# Patient Record
Sex: FEMALE | Race: WHITE | HISPANIC OR LATINO | Employment: FULL TIME | URBAN - NONMETROPOLITAN AREA
[De-identification: names, ages, dates, MRNs, and addresses within clinical notes are randomized per-mention and may not be internally consistent; named-entity substitution may affect disease eponyms.]

---

## 2017-01-23 RX ORDER — GLYBURIDE 5 MG/1
TABLET ORAL
Qty: 180 TAB | Refills: 3 | Status: ON HOLD | OUTPATIENT
Start: 2017-01-23 | End: 2018-10-20 | Stop reason: CLARIF

## 2017-02-18 DIAGNOSIS — I10 ESSENTIAL HYPERTENSION: ICD-10-CM

## 2017-02-21 RX ORDER — LISINOPRIL 20 MG/1
TABLET ORAL
Qty: 60 TAB | Refills: 2 | Status: ON HOLD | OUTPATIENT
Start: 2017-02-21 | End: 2018-10-20

## 2017-03-04 ENCOUNTER — OFFICE VISIT (OUTPATIENT)
Dept: URGENT CARE | Facility: PHYSICIAN GROUP | Age: 52
End: 2017-03-04
Payer: MEDICAID

## 2017-03-04 VITALS
TEMPERATURE: 97.1 F | WEIGHT: 180 LBS | SYSTOLIC BLOOD PRESSURE: 128 MMHG | RESPIRATION RATE: 16 BRPM | OXYGEN SATURATION: 97 % | DIASTOLIC BLOOD PRESSURE: 86 MMHG | HEART RATE: 96 BPM | BODY MASS INDEX: 34.03 KG/M2

## 2017-03-04 DIAGNOSIS — J06.9 VIRAL URI: ICD-10-CM

## 2017-03-04 PROCEDURE — 99214 OFFICE O/P EST MOD 30 MIN: CPT | Performed by: FAMILY MEDICINE

## 2017-03-04 RX ORDER — FLUTICASONE PROPIONATE 50 MCG
1 SPRAY, SUSPENSION (ML) NASAL DAILY
Qty: 1 BOTTLE | Refills: 0 | Status: ON HOLD | OUTPATIENT
Start: 2017-03-04 | End: 2018-10-20

## 2017-03-04 RX ORDER — BENZONATATE 100 MG/1
100 CAPSULE ORAL 3 TIMES DAILY PRN
Qty: 60 CAP | Refills: 0 | Status: ON HOLD | OUTPATIENT
Start: 2017-03-04 | End: 2018-10-20

## 2017-03-04 NOTE — MR AVS SNAPSHOT
Sonya Cervantes Eriberto   3/4/2017 12:35 PM   Office Visit   MRN: [id number]    Department:  [facility]   Dept Phone:  [phone]    Description:  Female : 1965   Provider:  Cezar Goodson M.D.           Reason for Visit     Cough           Allergies as of 3/4/2017     Allergen Noted Reactions    Morphine 2012   Itching    Rxn = unknown   Bumps all over body    Metformin 2012       headaches      You were diagnosed with     Viral URI   [386302]         Vital Signs     Blood Pressure Pulse Temperature Respirations Weight Oxygen Saturation    128/86 mmHg 96 36.2 °C (97.1 °F) 16 81.647 kg (180 lb) 97%    Smoking Status                   Never Smoker            Basic Information     Date Of Birth Sex Race Ethnicity Preferred Language    1965 Female  or , White  Origin (Japanese,Hungarian,Azerbaijani,Sohan, etc) English      Problem List              ICD-10-CM Priority Class Noted - Resolved    Diabetes mellitus type II, uncontrolled (CMS-HCC) E11.65   2012 - Present    Hyperlipidemia E78.5   2012 - Present    Vitamin D deficiency E55.9   2012 - Present    Microalbuminuria R80.9   2012 - Present    Left arm pain M79.602   10/23/2013 - Present    Hyponatremia E87.1   10/23/2013 - Present    Vaginal itching L29.8   2014 - Present    Healthcare maintenance Z00.00   2014 - Present    Chest pain R07.9   2015 - Present    Chest pain with high risk of acute coronary syndrome R07.9   2015 - Present    Hyperglycemia R73.9   2015 - Present    Hypertension I10   10/1/2015 - Present    Arthritis M19.90   2016 - Present    Incisional hernia K43.2   10/14/2016 - Present      Health Maintenance        Date Due Completion Dates    IMM HEP B VACCINE (1 of 3 - Primary Series) 1965 ---    IMM DTaP/Tdap/Td Vaccine (1 - Tdap) 1983 ---    RETINAL SCREENING 2015 (Prv Comp), 3/11/2013 (Prv Comp)    Override on  6/27/2014: Previously completed    Override on 3/11/2013: Previously completed    URINE ACR / MICROALBUMIN 8/27/2015 8/27/2014, 9/11/2013, 8/27/2012    DIABETES MONOFILAMENT / LE EXAM 9/27/2015 9/27/2014, 9/25/2014 (Done), 9/11/2013 (Done)    Override on 9/25/2014: Done    Override on 9/11/2013: Done    A1C SCREENING 6/30/2016 12/30/2015, 9/30/2015, 2/7/2015, 12/17/2014, 8/27/2014, 9/11/2013, 8/27/2012    IMM INFLUENZA (1) 9/1/2016 11/1/2014    FASTING LIPID PROFILE 9/30/2016 9/30/2015, 2/7/2015, 12/17/2014, 8/27/2014, 9/11/2013, 8/27/2012    MAMMOGRAM 12/11/2016 12/11/2015, 10/8/2014, 9/7/2012    COLON CANCER SCREENING ANNUAL FIT 1/27/2017 1/27/2016    SERUM CREATININE 10/5/2017 10/5/2016, 2/12/2016, 12/30/2015, 9/30/2015, 2/7/2015, 8/27/2014, 9/11/2013, 12/7/2012, 8/27/2012            Current Immunizations     Influenza TIV (IM) 11/1/2014    Pneumococcal polysaccharide vaccine (PPSV-23) 9/11/2013  8:30 AM      Below and/or attached are the medications your provider expects you to take. Review all of your home medications and newly ordered medications with your provider and/or pharmacist. Follow medication instructions as directed by your provider and/or pharmacist. Please keep your medication list with you and share with your provider. Update the information when medications are discontinued, doses are changed, or new medications (including over-the-counter products) are added; and carry medication information at all times in the event of emergency situations     Allergies:  MORPHINE - Itching     METFORMIN - (reactions not documented)               Medications  Valid as of: March 04, 2017 -  1:37 PM    Generic Name Brand Name Tablet Size Instructions for use    Aspirin (Tablet Delayed Response) ECOTRIN 81 MG Take 81 mg by mouth every day.        Atorvastatin Calcium (Tab) LIPITOR 10 MG Take 1 Tab by mouth every day.        Benzonatate (Cap) TESSALON 100 MG Take 1 Cap by mouth 3 times a day as needed for Cough.         Cholecalciferol (Cap) vitamin D3 5000 UNITS Take 1 Cap by mouth every day.        Fluticasone Propionate (Suspension) FLONASE 50 MCG/ACT Spray 1 Spray in nose every day.        GlyBURIDE (Tab) DIABETA 5 MG Take 1 Tab by mouth 2 times a day, with meals. For diabetes        GlyBURIDE (Tab) DIABETA 5 MG TAKE ONE TABLET BY MOUTH TWICE DAILY WITH MEALS FOR DIABETES        Lisinopril (Tab) PRINIVIL 10 MG Take 10 mg by mouth every day.        Lisinopril (Tab) PRINIVIL 20 MG TAKE ONE TABLET BY MOUTH ONCE DAILY        Meloxicam (Tab) MOBIC 7.5 MG Take 1 Tab by mouth every day.        Multiple Vitamin (Tab) THERAGRAN  Take 1 Tab by mouth every day.        Oxycodone-Acetaminophen (Tab) PERCOCET 5-325 MG Take 1-2 Tabs by mouth every four hours as needed.        .                 Medicines prescribed today were sent to:     [facility] [address] [place] [address]    [address] [place] [address]    Phone: [phone] Fax: [phone]    Open 24 Hours?: No    [facility] [address] [place] [address]    Phone: [phone] Fax: [phone]    Open 24 Hours?: No      Medication refill instructions:       If your prescription bottle indicates you have medication refills left, it is not necessary to call your provider’s office. Please contact your pharmacy and they will refill your medication.    If your prescription bottle indicates you do not have any refills left, you may request refills at any time through one of the following ways: The online [employer] system (except Urgent Care), by calling your provider’s office, or by asking your pharmacy to contact your provider’s office with a refill request. Medication refills are processed only during regular business hours and may not be available until the next business day. Your provider may request additional information or to have a follow-up visit with you prior to  refilling your medication.   *Please Note: Medication refills are assigned a new Rx number when refilled electronically. Your pharmacy may indicate that no refills were authorized even though a new prescription for the same medication is available at the pharmacy. Please request the medicine by name with the pharmacy before contacting your provider for a refill.           [employer] Access Code: [email or web address]  Expires: 3/28/2017 10:26 AM    [employer]  A secure, online tool to manage your health information     [employer]’s [employer]® is a secure, online tool that connects you to your personalized health information from the privacy of your home -- day or night - making it very easy for you to manage your healthcare. Once the activation process is completed, you can even access your medical information using the [employer] annabella, which is available for free in the Apple Annabella store or Google Play store.     [employer] provides the following levels of access (as shown below):   My Chart Features   [facility] Primary [facility] Doctor [facility]  Specialists [facility]  Urgent  Care Non-[facility]  Primary Care  Doctor   Email your healthcare team securely and privately 24/7 X X X    Manage appointments: schedule your next appointment; view details of past/upcoming appointments X      Request prescription refills. X      View recent personal medical records, including lab and immunizations X X X X   View health record, including health history, allergies, medications X X X X   Read reports about your outpatient visits, procedures, consult and ER notes X X X X   See your discharge summary, which is a recap of your hospital and/or ER visit that includes your diagnosis, lab results, and care plan. X X       How to register for [employer]:  1. Go to  [email or web address].  2. Click on the Sign Up Now box, which takes you to the New Member Sign Up page. You will need to provide the following information:  a. Enter your [employer] Access Code exactly as it  appears at the top of this page. (You will not need to use this code after you’ve completed the sign-up process. If you do not sign up before the expiration date, you must request a new code.)   b. Enter your date of birth.   c. Enter your home email address.   d. Click Submit, and follow the next screen’s instructions.  3. Create a [employer] ID. This will be your [employer] login ID and cannot be changed, so think of one that is secure and easy to remember.  4. Create a [employer] password. You can change your password at any time.  5. Enter your Password Reset Question and Answer. This can be used at a later time if you forget your password.   6. Enter your e-mail address. This allows you to receive e-mail notifications when new information is available in [employer].  7. Click Sign Up. You can now view your health information.    For assistance activating your [employer] account, call [phone]

## 2017-03-04 NOTE — PROGRESS NOTES
Chief Complaint   Patient presents with   • Cough         Cough  This is a new problem. The current episode started 2 days ago. The problem has been unchanged. The problem occurs constantly. The cough is dry. Associated symptoms include : fatigue, muscle aches, fever. Pertinent negatives include no   headaches, nausea, vomiting, diarrhea, sweats, weight loss or wheezing. Nothing aggravates the symptoms.  Patient has tried nothing for the symptoms. There is no history of asthma.        Past Medical History   Diagnosis Date   • Hyperlipidemia    • GERD (gastroesophageal reflux disease)    • Healthcare maintenance 9/27/2014     Mammogram: Due   • Hypertension    • Anemia    • Bowel habit changes      constipation   • Psychiatric problem      depression and anxiety   • Vaginal itching 8/27/2014     With anal itching X 1 month Getting worse Burning Min vag disch   • Jaundice 1999   • Diabetes    • Diabetes (CMS-[facility])    • High cholesterol    • Infectious disease      Cold 10/2016         Social History   Substance Use Topics   • Smoking status: Never Smoker    • Smokeless tobacco: None   • Alcohol Use: No         Current Outpatient Prescriptions on File Prior to Visit   Medication Sig Dispense Refill   • lisinopril (PRINIVIL) 20 MG Tab TAKE ONE TABLET BY MOUTH ONCE DAILY 60 Tab 2   • glyBURIDE (DIABETA) 5 MG Tab TAKE ONE TABLET BY MOUTH TWICE DAILY WITH MEALS FOR DIABETES 180 Tab 3   • oxycodone-acetaminophen (PERCOCET) 5-325 MG Tab Take 1-2 Tabs by mouth every four hours as needed. 35 Tab 0   • Cholecalciferol (VITAMIN D3) 5000 UNITS Cap Take 1 Cap by mouth every day.     • meloxicam (MOBIC) 7.5 MG Tab Take 1 Tab by mouth every day. 30 Tab 0   • glyBURIDE (DIABETA) 5 MG Tab Take 1 Tab by mouth 2 times a day, with meals. For diabetes 60 Tab 0   • atorvastatin (LIPITOR) 10 MG Tab Take 1 Tab by mouth every day. 60 Tab 3   • lisinopril (PRINIVIL) 10 MG Tab Take 10 mg by mouth every day.     • aspirin EC (ECOTRIN) 81 MG  Tablet Delayed Response Take 81 mg by mouth every day.     • multivitamin (THERAGRAN) Tab Take 1 Tab by mouth every day.       No current facility-administered medications on file prior to visit.                    Review of Systems   Constitutional: Negative for fever and weight loss.   HENT: negative for otalgia  Cardiovascular - denies chest pain or dyspnea  Respiratory: Positive for cough.  .  Negative for wheezing.    Neurological: Negative for headaches.   GI - denies nausea, vomiting or diarrhea  Neuro - denies numbness or tingling.            Objective:     Blood pressure 128/86, pulse 96, temperature 36.2 °C (97.1 °F), resp. rate 16, weight 81.647 kg (180 lb), SpO2 97 %.    Physical Exam   Constitutional: patient is oriented to person, place, and time. Patient appears well-developed and well-nourished. No distress.   HENT:   Head: Normocephalic and atraumatic.   Right Ear: External ear normal.   Left Ear: External ear normal.   Nose: Mucosal edema  present. Right sinus exhibits no maxillary sinus tenderness. Left sinus exhibits no maxillary sinus tenderness.   Mouth/Throat: Mucous membranes are normal. No oral lesions.  No posterior pharyngeal erythema.  No oropharyngeal exudate or posterior oropharyngeal edema.   Eyes: Conjunctivae and EOM are normal. Pupils are equal, round, and reactive to light. Right eye exhibits no discharge. Left eye exhibits no discharge. No scleral icterus.   Neck: Normal range of motion. Neck supple. No tracheal deviation present.   Cardiovascular: Normal rate, regular rhythm and normal heart sounds.  Exam reveals no friction rub.    Pulmonary/Chest: Effort normal. No respiratory distress. Patient has no wheezes or rhonchi. Patient has no rales.    Musculoskeletal:  exhibits no edema.   Lymphadenopathy:     Patient has no cervical adenopathy.      Neurological: patient is alert and oriented to person, place, and time.   Skin: Skin is warm and dry. No rash noted. No erythema.    Psychiatric: patient  has a normal mood and affect.  behavior is normal.   Nursing note and vitals reviewed.              Assessment/Plan:       1. Viral URI     - fluticasone (FLONASE) 50 MCG/ACT nasal spray; Spray 1 Spray in nose every day.  Dispense: 1 Bottle; Refill: 0  - benzonatate (TESSALON) 100 MG Cap; Take 1 Cap by mouth 3 times a day as needed for Cough.  Dispense: 60 Cap; Refill: 0     Follow up in one week if no improvement, sooner if symptoms worsen.

## 2017-04-11 ENCOUNTER — OFFICE VISIT (OUTPATIENT)
Dept: URGENT CARE | Facility: PHYSICIAN GROUP | Age: 52
End: 2017-04-11
Payer: MEDICAID

## 2017-04-11 VITALS
SYSTOLIC BLOOD PRESSURE: 132 MMHG | RESPIRATION RATE: 16 BRPM | DIASTOLIC BLOOD PRESSURE: 90 MMHG | BODY MASS INDEX: 37.24 KG/M2 | TEMPERATURE: 98.4 F | OXYGEN SATURATION: 98 % | WEIGHT: 197 LBS | HEART RATE: 94 BPM

## 2017-04-11 DIAGNOSIS — R05.9 COUGH: ICD-10-CM

## 2017-04-11 DIAGNOSIS — J98.8 WHEEZING-ASSOCIATED RESPIRATORY INFECTION (WARI): ICD-10-CM

## 2017-04-11 PROCEDURE — 99214 OFFICE O/P EST MOD 30 MIN: CPT | Performed by: PHYSICIAN ASSISTANT

## 2017-04-11 RX ORDER — BENZONATATE 200 MG/1
200 CAPSULE ORAL 3 TIMES DAILY PRN
Qty: 60 CAP | Refills: 0 | Status: ON HOLD | OUTPATIENT
Start: 2017-04-11 | End: 2018-10-20

## 2017-04-11 RX ORDER — ALBUTEROL SULFATE 90 UG/1
2 AEROSOL, METERED RESPIRATORY (INHALATION) EVERY 4 HOURS PRN
Qty: 1 INHALER | Refills: 0 | Status: ON HOLD | OUTPATIENT
Start: 2017-04-11 | End: 2018-10-20

## 2017-04-11 NOTE — MR AVS SNAPSHOT
Sonya Wei   2017 6:15 PM   Office Visit   MRN: [id number]    Department:  [facility]   Dept Phone:  [phone]    Description:  Female : 1965   Provider:  Patrick Kennedy PA-C           Reason for Visit     Cough x3 days      Allergies as of 2017     Allergen Noted Reactions    Morphine 2012   Itching    Rxn = unknown   Bumps all over body    Metformin 2012       headaches      You were diagnosed with     Wheezing-associated respiratory infection (WARI)   [086288]       Cough   [786.2.ICD-9-CM]         Vital Signs     Blood Pressure Pulse Temperature Respirations Weight Oxygen Saturation    132/90 mmHg 94 36.9 °C (98.4 °F) 16 89.359 kg (197 lb) 98%    Smoking Status                   Never Smoker            Basic Information     Date Of Birth Sex Race Ethnicity Preferred Language    1965 Female  or , White  Origin (Yi,Zambian,[place] Virgin Islander,Macanese, etc) English      Problem List              ICD-10-CM Priority Class Noted - Resolved    Diabetes mellitus type II, uncontrolled (CMS-HCC) E11.65   2012 - Present    Hyperlipidemia E78.5   2012 - Present    Vitamin D deficiency E55.9   2012 - Present    Microalbuminuria R80.9   2012 - Present    Left arm pain M79.602   10/23/2013 - Present    Hyponatremia E87.1   10/23/2013 - Present    Vaginal itching L29.8   2014 - Present    Healthcare maintenance Z00.00   2014 - Present    Chest pain R07.9   2015 - Present    Chest pain with high risk of acute coronary syndrome R07.9   2015 - Present    Hyperglycemia R73.9   2015 - Present    Hypertension I10   10/1/2015 - Present    Arthritis M19.90   2016 - Present    Incisional hernia K43.2   10/14/2016 - Present      Health Maintenance        Date Due Completion Dates    IMM HEP B VACCINE (1 of 3 - Primary Series) 1965 ---    IMM DTaP/Tdap/Td Vaccine (1 - Tdap) 1983 ---    RETINAL  SCREENING 6/27/2015 6/27/2014 (Prv Comp), 3/11/2013 (Prv Comp)    Override on 6/27/2014: Previously completed    Override on 3/11/2013: Previously completed    URINE ACR / MICROALBUMIN 8/27/2015 8/27/2014, 9/11/2013, 8/27/2012    DIABETES MONOFILAMENT / LE EXAM 9/27/2015 9/27/2014, 9/25/2014 (Done), 9/11/2013 (Done)    Override on 9/25/2014: Done    Override on 9/11/2013: Done    A1C SCREENING 6/30/2016 12/30/2015, 9/30/2015, 2/7/2015, 12/17/2014, 8/27/2014, 9/11/2013, 8/27/2012    FASTING LIPID PROFILE 9/30/2016 9/30/2015, 2/7/2015, 12/17/2014, 8/27/2014, 9/11/2013, 8/27/2012    MAMMOGRAM 12/11/2016 12/11/2015, 10/8/2014, 9/7/2012    COLON CANCER SCREENING ANNUAL FIT 1/27/2017 1/27/2016    SERUM CREATININE 10/5/2017 10/5/2016, 2/12/2016, 12/30/2015, 9/30/2015, 2/7/2015, 8/27/2014, 9/11/2013, 12/7/2012, 8/27/2012            Current Immunizations     Influenza TIV (IM) 11/1/2014    Pneumococcal polysaccharide vaccine (PPSV-23) 9/11/2013  8:30 AM      Below and/or attached are the medications your provider expects you to take. Review all of your home medications and newly ordered medications with your provider and/or pharmacist. Follow medication instructions as directed by your provider and/or pharmacist. Please keep your medication list with you and share with your provider. Update the information when medications are discontinued, doses are changed, or new medications (including over-the-counter products) are added; and carry medication information at all times in the event of emergency situations     Allergies:  MORPHINE - Itching     METFORMIN - (reactions not documented)               Medications  Valid as of: April 11, 2017 -  6:45 PM    Generic Name Brand Name Tablet Size Instructions for use    Albuterol Sulfate (Aero Soln) albuterol 108 (90 BASE) MCG/ACT Inhale 2 Puffs by mouth every four hours as needed.        Aspirin (Tablet Delayed Response) ECOTRIN 81 MG Take 81 mg by mouth every day.         Atorvastatin Calcium (Tab) LIPITOR 10 MG Take 1 Tab by mouth every day.        Benzonatate (Cap) TESSALON 100 MG Take 1 Cap by mouth 3 times a day as needed for Cough.        Benzonatate (Cap) TESSALON 200 MG Take 1 Cap by mouth 3 times a day as needed for Cough.        Cholecalciferol (Cap) vitamin D3 5000 UNITS Take 1 Cap by mouth every day.        Fluticasone Propionate (Suspension) FLONASE 50 MCG/ACT Spray 1 Spray in nose every day.        GlyBURIDE (Tab) DIABETA 5 MG Take 1 Tab by mouth 2 times a day, with meals. For diabetes        GlyBURIDE (Tab) DIABETA 5 MG TAKE ONE TABLET BY MOUTH TWICE DAILY WITH MEALS FOR DIABETES        Lisinopril (Tab) PRINIVIL 10 MG Take 10 mg by mouth every day.        Lisinopril (Tab) PRINIVIL 20 MG TAKE ONE TABLET BY MOUTH ONCE DAILY        Meloxicam (Tab) MOBIC 7.5 MG Take 1 Tab by mouth every day.        Multiple Vitamin (Tab) THERAGRAN  Take 1 Tab by mouth every day.        Oxycodone-Acetaminophen (Tab) PERCOCET 5-325 MG Take 1-2 Tabs by mouth every four hours as needed.        .                 Medicines prescribed today were sent to:     [facility] [address] [place] [address]    Phone: [phone] Fax: [phone]    Open 24 Hours?: No    [facility] [address] [place] [address]    Phone: [phone] Fax: [phone]    Open 24 Hours?: No      Medication refill instructions:       If your prescription bottle indicates you have medication refills left, it is not necessary to call your provider’s office. Please contact your pharmacy and they will refill your medication.    If your prescription bottle indicates you do not have any refills left, you may request refills at any time through one of the following ways: The online [employer] system (except Urgent Care), by calling your provider’s office, or by asking your pharmacy to contact  your provider’s office with a refill request. Medication refills are processed only during regular business hours and may not be available until the next business day. Your provider may request additional information or to have a follow-up visit with you prior to refilling your medication.   *Please Note: Medication refills are assigned a new Rx number when refilled electronically. Your pharmacy may indicate that no refills were authorized even though a new prescription for the same medication is available at the pharmacy. Please request the medicine by name with the pharmacy before contacting your provider for a refill.           [employer] Access Code: [email or web address]  Expires: 4/26/2017 12:06 PM    [employer]  A secure, online tool to manage your health information     [employer]’s [employer]® is a secure, online tool that connects you to your personalized health information from the privacy of your home -- day or night - making it very easy for you to manage your healthcare. Once the activation process is completed, you can even access your medical information using the [employer] annabella, which is available for free in the Apple Annabella store or Google Play store.     [employer] provides the following levels of access (as shown below):   My Chart Features   [facility] Primary Care Doctor [facility]  Specialists [facility]  Urgent  Care Non-[facility]  Primary Care  Doctor   Email your healthcare team securely and privately 24/7 X X X    Manage appointments: schedule your next appointment; view details of past/upcoming appointments X      Request prescription refills. X      View recent personal medical records, including lab and immunizations X X X X   View health record, including health history, allergies, medications X X X X   Read reports about your outpatient visits, procedures, consult and ER notes X X X X   See your discharge summary, which is a recap of your hospital and/or ER visit that includes your diagnosis, lab results, and care  plan. X X       How to register for [employer]:  1. Go to  [email or web address].  2. Click on the Sign Up Now box, which takes you to the New Member Sign Up page. You will need to provide the following information:  a. Enter your [employer] Access Code exactly as it appears at the top of this page. (You will not need to use this code after you’ve completed the sign-up process. If you do not sign up before the expiration date, you must request a new code.)   b. Enter your date of birth.   c. Enter your home email address.   d. Click Submit, and follow the next screen’s instructions.  3. Create a [employer] ID. This will be your [employer] login ID and cannot be changed, so think of one that is secure and easy to remember.  4. Create a [employer] password. You can change your password at any time.  5. Enter your Password Reset Question and Answer. This can be used at a later time if you forget your password.   6. Enter your e-mail address. This allows you to receive e-mail notifications when new information is available in [employer].  7. Click Sign Up. You can now view your health information.    For assistance activating your [employer] account, call [phone]

## 2017-04-12 NOTE — PROGRESS NOTES
Chief Complaint   Patient presents with   • Cough     x3 days       HISTORY OF PRESENT ILLNESS: Patient is a 51 y.o. female who presents today because she has a 4 day history of nasal congestion and a cough. Other family members have had similar symptoms over the last week. She has not been taking any over-the-counter medications, but had some Tessalon pearls that she took and those do seem to help quite a bit. She has noticed some wheezing, coughing, no nausea, vomiting or diarrhea.    Patient Active Problem List    Diagnosis Date Noted   • Incisional hernia 10/14/2016   • Arthritis 05/06/2016   • Hypertension 10/01/2015   • Chest pain with high risk of acute coronary syndrome 09/30/2015   • Hyperglycemia 09/30/2015   • Chest pain 02/07/2015   • Healthcare maintenance 09/27/2014   • Vaginal itching 08/27/2014   • Left arm pain 10/23/2013   • Hyponatremia 10/23/2013   • Vitamin D deficiency 09/24/2012   • Microalbuminuria 09/24/2012   • Diabetes mellitus type II, uncontrolled (CMS-[facility]) 08/27/2012   • Hyperlipidemia 08/27/2012       Allergies:Morphine and Metformin    Current Outpatient Prescriptions Ordered in [facility]   Medication Sig Dispense Refill   • benzonatate (TESSALON) 200 MG capsule Take 1 Cap by mouth 3 times a day as needed for Cough. 60 Cap 0   • albuterol 108 (90 BASE) MCG/ACT Aero Soln inhalation aerosol Inhale 2 Puffs by mouth every four hours as needed. 1 Inhaler 0   • fluticasone (FLONASE) 50 MCG/ACT nasal spray Spray 1 Spray in nose every day. 1 Bottle 0   • benzonatate (TESSALON) 100 MG Cap Take 1 Cap by mouth 3 times a day as needed for Cough. 60 Cap 0   • lisinopril (PRINIVIL) 20 MG Tab TAKE ONE TABLET BY MOUTH ONCE DAILY 60 Tab 2   • glyBURIDE (DIABETA) 5 MG Tab TAKE ONE TABLET BY MOUTH TWICE DAILY WITH MEALS FOR DIABETES 180 Tab 3   • oxycodone-acetaminophen (PERCOCET) 5-325 MG Tab Take 1-2 Tabs by mouth every four hours as needed. 35 Tab 0   • Cholecalciferol (VITAMIN D3) 5000 UNITS Cap Take 1  Cap by mouth every day.     • meloxicam (MOBIC) 7.5 MG Tab Take 1 Tab by mouth every day. 30 Tab 0   • glyBURIDE (DIABETA) 5 MG Tab Take 1 Tab by mouth 2 times a day, with meals. For diabetes 60 Tab 0   • atorvastatin (LIPITOR) 10 MG Tab Take 1 Tab by mouth every day. 60 Tab 3   • lisinopril (PRINIVIL) 10 MG Tab Take 10 mg by mouth every day.     • aspirin EC (ECOTRIN) 81 MG Tablet Delayed Response Take 81 mg by mouth every day.     • multivitamin (THERAGRAN) Tab Take 1 Tab by mouth every day.       No current [facility]-ordered facility-administered medications on file.       Past Medical History   Diagnosis Date   • Hyperlipidemia    • GERD (gastroesophageal reflux disease)    • Healthcare maintenance 2014     Mammogram: Due   • Hypertension    • Anemia    • Bowel habit changes      constipation   • Psychiatric problem      depression and anxiety   • Vaginal itching 2014     With anal itching X 1 month Getting worse Burning Min vag disch   • Jaundice    • Diabetes    • Diabetes (CMS-[facility])    • High cholesterol    • Infectious disease      Cold 10/2016       Social History   Substance Use Topics   • Smoking status: Never Smoker    • Smokeless tobacco: None   • Alcohol Use: No       Family Status   Relation Status Death Age   • Mother Alive    • Father       Family History   Problem Relation Age of Onset   • Diabetes Mother    • Hyperlipidemia Mother    • Diabetes Father    • Hypertension Father    • Hyperlipidemia Father        ROS:  Review of Systems   Constitutional: Negative for fever, chills, weight loss and malaise/fatigue.   HENT: Negative for ear pain, nosebleeds, positive for nasal congestion, no sore throat and neck pain.    Eyes: Negative for blurred vision.   Respiratory: Positive for cough, minimal if any sputum production, occasional cough related shortness of breath and has noticed wheezing.    Cardiovascular: Negative for chest pain, palpitations, orthopnea and leg swelling.    Gastrointestinal: Negative for heartburn, nausea, vomiting and abdominal pain.   Genitourinary: Negative for dysuria, urgency and frequency.     Exam:  Blood pressure 132/90, pulse 94, temperature 36.9 °C (98.4 °F), resp. rate 16, weight 89.359 kg (197 lb), SpO2 98 %.  General:  Well nourished, well developed female in NAD, frequent dry cough   Head:Normocephalic, atraumatic  Eyes: PERRLA, EOM within normal limits, no conjunctival injection, no scleral icterus, visual fields and acuity grossly intact.  Ears: Normal shape and symmetry, no tenderness, no discharge. External canals are without any significant edema or erythema. Tympanic membranes are without any inflammation, no effusion. Gross auditory acuity is intact  Nose: Symmetrical without tenderness, no discharge.  Mouth: reasonable hygiene, no erythema exudates or tonsillar enlargement.  Neck: no masses, range of motion within normal limits, no tracheal deviation. No obvious thyroid enlargement.  Pulmonary: chest is symmetrical with respiration, rare wheeze, no rales or rhonchi  Cardiovascular: regular rate and rhythm without murmurs, rubs, or gallops.  Extremities: no clubbing, cyanosis, or edema.    Please note that this dictation was created using voice recognition software. I have made every reasonable attempt to correct obvious errors, but I expect that there are errors of grammar and possibly content that I did not discover before finalizing the note.    Assessment/Plan:  1. Wheezing-associated respiratory infection (WARI)  albuterol 108 (90 BASE) MCG/ACT Aero Soln inhalation aerosol   2. Cough  benzonatate (TESSALON) 200 MG capsule       Followup with primary care in the next 7-10 days if not significantly improving, return to the urgent care or go to the emergency room sooner for any worsening of symptoms.

## 2018-02-19 ENCOUNTER — OCCUPATIONAL MEDICINE (OUTPATIENT)
Dept: URGENT CARE | Facility: CLINIC | Age: 53
End: 2018-02-19
Payer: COMMERCIAL

## 2018-02-19 ENCOUNTER — APPOINTMENT (OUTPATIENT)
Dept: RADIOLOGY | Facility: IMAGING CENTER | Age: 53
End: 2018-02-19
Attending: EMERGENCY MEDICINE
Payer: COMMERCIAL

## 2018-02-19 VITALS
WEIGHT: 170 LBS | DIASTOLIC BLOOD PRESSURE: 80 MMHG | HEIGHT: 61 IN | SYSTOLIC BLOOD PRESSURE: 136 MMHG | OXYGEN SATURATION: 98 % | TEMPERATURE: 98.3 F | RESPIRATION RATE: 14 BRPM | HEART RATE: 82 BPM | BODY MASS INDEX: 32.1 KG/M2

## 2018-02-19 DIAGNOSIS — M54.2 NECK PAIN: ICD-10-CM

## 2018-02-19 DIAGNOSIS — M25.511 ACUTE PAIN OF RIGHT SHOULDER: ICD-10-CM

## 2018-02-19 DIAGNOSIS — S16.1XXA STRAIN OF NECK MUSCLE, INITIAL ENCOUNTER: ICD-10-CM

## 2018-02-19 DIAGNOSIS — S00.03XA CONTUSION OF SCALP, INITIAL ENCOUNTER: ICD-10-CM

## 2018-02-19 DIAGNOSIS — M54.9 UPPER BACK PAIN: ICD-10-CM

## 2018-02-19 DIAGNOSIS — S20.229A CONTUSION OF UPPER BACK, UNSPECIFIED LATERALITY, INITIAL ENCOUNTER: ICD-10-CM

## 2018-02-19 DIAGNOSIS — Z79.82 ASPIRIN LONG-TERM USE: ICD-10-CM

## 2018-02-19 DIAGNOSIS — S09.90XA INJURY OF HEAD, INITIAL ENCOUNTER: ICD-10-CM

## 2018-02-19 DIAGNOSIS — S46.911A STRAIN OF RIGHT SHOULDER, INITIAL ENCOUNTER: ICD-10-CM

## 2018-02-19 PROCEDURE — 73030 X-RAY EXAM OF SHOULDER: CPT | Mod: TC,RT | Performed by: EMERGENCY MEDICINE

## 2018-02-19 PROCEDURE — 72070 X-RAY EXAM THORAC SPINE 2VWS: CPT | Mod: 26 | Performed by: EMERGENCY MEDICINE

## 2018-02-19 PROCEDURE — 72040 X-RAY EXAM NECK SPINE 2-3 VW: CPT | Mod: TC | Performed by: EMERGENCY MEDICINE

## 2018-02-19 PROCEDURE — 99203 OFFICE O/P NEW LOW 30 MIN: CPT | Mod: 25 | Performed by: EMERGENCY MEDICINE

## 2018-02-19 RX ORDER — ACETAMINOPHEN 500 MG
1000 TABLET ORAL ONCE
Status: COMPLETED | OUTPATIENT
Start: 2018-02-19 | End: 2018-02-19

## 2018-02-19 RX ADMIN — Medication 1000 MG: at 11:27

## 2018-02-19 ASSESSMENT — ENCOUNTER SYMPTOMS
DOUBLE VISION: 0
HEADACHES: 0
FOCAL WEAKNESS: 0
DIZZINESS: 0
LOSS OF CONSCIOUSNESS: 0
SHORTNESS OF BREATH: 0
ABDOMINAL PAIN: 0

## 2018-02-19 NOTE — PROGRESS NOTES
Subjective:      Sonya Wei is a 52 y.o. female who presents with Fall (pt fell today . hit head and neck . almost blacked out after this .)      Date of injury: 02/19/2018. Injured at work: yes; states slipped while walking in parking lot. States landed on back of head, pain in neck, upper back, right shoulder as well. Denies additional injury, no loss of consciousness. Previous injury: none. Second job: none. Outside activity: none. Contributing medical illness: Low-dose aspirin therapy. Severity: moderate. Prior treatment: none. Location: Right occipital scalp, upper neck, upper back, right diffuse shoulder. Radiation: none. Numbness/tingling: none. Focal weakness: none. Bowel/bladder dysfunction: none. No change in vision, nausea, vomiting.     PMH:  has a past medical history of Anemia; Bowel habit changes; Diabetes; Diabetes (CMS-[facility]); GERD (gastroesophageal reflux disease); Healthcare maintenance (9/27/2014); High cholesterol; Hyperlipidemia; Hypertension; Infectious disease; Jaundice (1999); Psychiatric problem; and Vaginal itching (8/27/2014).  MEDS: Current Outpatient Prescriptions: •  OMEPRAZOLE PO, Take  by mouth., Disp: , Rfl: •  fluticasone (FLONASE) 50 MCG/ACT nasal spray, Spray 1 Spray in nose every day., Disp: 1 Bottle, Rfl: 0•  lisinopril (PRINIVIL) 20 MG Tab, TAKE ONE TABLET BY MOUTH ONCE DAILY, Disp: 60 Tab, Rfl: 2•  glyBURIDE (DIABETA) 5 MG Tab, Take 1 Tab by mouth 2 times a day, with meals. For diabetes, Disp: 60 Tab, Rfl: 0•  atorvastatin (LIPITOR) 10 MG Tab, Take 1 Tab by mouth every day., Disp: 60 Tab, Rfl: 3•  aspirin EC (ECOTRIN) 81 MG Tablet Delayed Response, Take 81 mg by mouth every day., Disp: , Rfl: •  multivitamin (THERAGRAN) Tab, Take 1 Tab by mouth every day., Disp: , Rfl: •  benzonatate (TESSALON) 200 MG capsule, Take 1 Cap by mouth 3 times a day as needed for Cough., Disp: 60 Cap, Rfl: 0•  albuterol 108 (90 BASE) MCG/ACT Aero Soln inhalation aerosol, Inhale 2 Puffs by  mouth every four hours as needed., Disp: 1 Inhaler, Rfl: 0•  benzonatate (TESSALON) 100 MG Cap, Take 1 Cap by mouth 3 times a day as needed for Cough., Disp: 60 Cap, Rfl: 0•  glyBURIDE (DIABETA) 5 MG Tab, TAKE ONE TABLET BY MOUTH TWICE DAILY WITH MEALS FOR DIABETES, Disp: 180 Tab, Rfl: 3•  oxycodone-acetaminophen (PERCOCET) 5-325 MG Tab, Take 1-2 Tabs by mouth every four hours as needed., Disp: 35 Tab, Rfl: 0•  Cholecalciferol (VITAMIN D3) 5000 UNITS Cap, Take 1 Cap by mouth every day., Disp: , Rfl: •  meloxicam (MOBIC) 7.5 MG Tab, Take 1 Tab by mouth every day., Disp: 30 Tab, Rfl: 0•  lisinopril (PRINIVIL) 10 MG Tab, Take 10 mg by mouth every day., Disp: , Rfl:    ALLERGIES:  -- Morphine -- Itching    --  Rxn = unknown             Bumps all over body   -- Metformin     --  headaches  SURGHX: Past Surgical History:  10/14/2016: VENTRAL HERNIA REPAIR ROBOTIC XI N/A      Comment: Procedure: VENTRAL HERNIA REPAIR ROBOTIC XI                FOR INCISIONAL W/MESH;  Surgeon: Edi Mendoza M.D.;  Location: SURGERY [facility];  Service:   No date: ABDOMINAL HYSTERECTOMY TOTAL      Comment: still has ovaries  No date: CHOLECYSTECTOMY  No date: PRIMARY C SECTION  No date: TUBAL COAGULATION LAPAROSCOPIC BILATERAL  SOCHX:  reports that she has never smoked. She does not have any smokeless tobacco history on file. She reports that she does not drink alcohol or use drugs.  FH: family history includes Diabetes in her father and mother; Hyperlipidemia in her father and mother; Hypertension in her father.        Fall   Pertinent negatives include no abdominal pain, headaches or loss of consciousness.       Review of Systems   HENT: Negative for nosebleeds and tinnitus.    Eyes: Negative for double vision.   Respiratory: Negative for shortness of breath.    Cardiovascular: Negative for chest pain.   Gastrointestinal: Negative for abdominal pain.   Skin: Negative for rash.   Neurological: Negative  "for dizziness, focal weakness, loss of consciousness and headaches.          Objective:     /80   Pulse 82   Temp 36.8 °C (98.3 °F)   Resp 14   Ht 1.549 m (5' 1\")   Wt 77.1 kg (170 lb)   SpO2 98%   BMI 32.12 kg/m²      Physical Exam    Gen.: Alert, cooperative, mildly uncomfortable. Head: Approximately 3 cm raised tender area right occipital region without bony defect. Eyes: Pupils equal round and reactive to light, extraocular movement intact, conjunctiva clear. Nose: No discharge or epistaxis. Ears: No discharge or hemotympanum. Neck: Tenderness diffusely upper, no crepitation. Back: Tenderness mid thoracic region. Chest: No rib tenderness, lungs clear to auscultation bilaterally, heart regular rate and rhythm. Vascular: Bilateral radial pulses intact. Musculoskeletal: Right shoulder-diffuse anteromedial humeral head tenderness. Intact range of motion with some discomfort. No gross rotator cuff function deficit. Neurological: Alert and oriented ×3, cranial nerves intact, normal motor and sensory function, reflexes symmetric, no tremor, gait normal. Skin: Warm, dry, intact. Minimal erythema at right occipital scalp site.     D39 and C4 forms completed  Assessment/Plan:     1. Contusion of scalp, initial encounter  Cold pack, Tylenol when necessary    2. Strain of neck muscle, initial encounter    3. Contusion of upper back, unspecified laterality, initial encounter    4. Strain of right shoulder, initial encounter    5. Injury of head, initial encounter  - acetaminophen (TYLENOL) tablet 1,000 mg; Take 2 Tabs by mouth Once.    6. Neck pain  - DX-CERVICAL SPINE-2 OR 3 VIEWS; per radiologist:  Negative cervical spine series.    7. Upper back pain  - DX-THORACIC SPINE-2 VIEWS; per radiologist:  Unremarkable thoracic spine.    8. Acute pain of right shoulder  - DX-SHOULDER 2+ RIGHT; per radiologist:  Unremarkable shoulder series.    9. Aspirin long-term use  Plan recheck in 24 hours.    "

## 2018-02-19 NOTE — LETTER
[facility] [address] [place] [address]  Phone:  [phone] - Fax:  [phone]   [facility] Progress Report and Disability Certification  Date of Service: 2/19/2018   No Show:  No  Date / Time of Next Visit: 2/20/2018 @ 8:20 AM    Claim Information   Patient Name: Sonya Wei  Claim Number:     Employer: [employer] [facility] [facility] [employer] ONLY  Date of Injury: 2/19/2018     Insurer / TPA: [employer]  ID / SSN:     Occupation:   Diagnosis: Diagnoses of Contusion of scalp, initial encounter, Strain of neck muscle, initial encounter, Contusion of upper back, unspecified laterality, initial encounter, Strain of right shoulder, initial encounter, Injury of head, initial encounter, Neck pain, Upper back pain, Acute pain of right shoulder, and Aspirin long-term use were pertinent to this visit.    Medical Information   Related to Industrial Injury? Yes    Subjective Complaints:  Date of injury: 02/19/2018. Injured at work: yes; states slipped while walking in parking lot. States landed on back of head, pain in neck, upper back, right shoulder as well. Denies additional injury, no loss of consciousness. Previous injury: none. Second job: none. Outside activity: none. Contributing medical illness: Low-dose aspirin therapy. Severity: moderate. Prior treatment: none. Location: Right occipital scalp, upper neck, upper back, right diffuse shoulder. Radiation: none. Numbness/tingling: none. Focal weakness: none. Bowel/bladder dysfunction: none. No change in vision, nausea, vomiting.   Objective Findings: Gen.: Alert, cooperative, mildly uncomfortable. Head: Approximately 3 cm raised tender area right occipital region without bony defect. Eyes: Pupils equal round and reactive to light, extraocular movement intact, conjunctiva clear. Nose: No discharge or epistaxis. Ears: No discharge or hemotympanum.  Neck: Tenderness diffusely upper, no crepitation. Back: Tenderness mid thoracic region. Chest: No rib tenderness, lungs clear to auscultation bilaterally, heart regular rate and rhythm. Vascular: Bilateral radial pulses intact. Musculoskeletal: Right shoulder-diffuse anteromedial humeral head tenderness. Intact range of motion with some discomfort. No gross rotator cuff function deficit. Neurological: Alert and oriented ×3, cranial nerves intact, normal motor and sensory function, reflexes symmetric, no tremor, gait normal. Skin: Warm, dry, intact. Minimal erythema at right occipital scalp site.   Pre-Existing Condition(s):     Assessment:   Initial Visit    Status: Additional Care Required  Permanent Disability:No    Plan: Medication    Diagnostics: X-ray    Comments:       Disability Information   Status: Released to Restricted Duty    From:  2018  Through: 2018 Restrictions are: Temporary   Physical Restrictions   Sitting:    Standing:    Stooping:    Bending:      Squatting:    Walking:    Climbin hrs/day Pushing:      Pulling:    Other:    Reaching Above Shoulder (L):   Reaching Above Shoulder (R): 0 hrs/day     Reaching Below Shoulder (L):    Reaching Below Shoulder (R):      Not to exceed Weight Limits   Carrying(hrs):   Weight Limit(lb): < or = to 10 pounds Lifting(hrs):   Weight  Limit(lb): < or = to 10 pounds   Comments: Planning recheck tomorrow due to aspirin associated potential head injury complications.    Repetitive Actions   Hands: i.e. Fine Manipulations from Grasping:     Feet: i.e. Operating Foot Controls:     Driving / Operate Machinery:     Physician Name: Job Frey M.D. Physician Signature: JOB Ocampo M.D. e-Signature: Dr. Giles Tyson, Medical Director   Clinic Name / Location: [address] [facility]  [place] [address] Clinic Phone Number: Dept: [phone]   Appointment Time: 10:15 Am Visit Start Time: 10:32 AM   Check-In Time:   10:19 Am Visit Discharge Time:  12:22 PM    Original-Treating Physician or Chiropractor    Page 2-Insurer/TPA    Page 3-Employer    Page 4-Employee

## 2018-02-19 NOTE — LETTER
"EMPLOYEE’S CLAIM FOR COMPENSATION/ REPORT OF INITIAL TREATMENT  FORM C-4    EMPLOYEE’S CLAIM - PROVIDE ALL INFORMATION REQUESTED   First Name  Sonya Last Name  Eriberto Birthdate                    1965                Sex  female Claim Number   Home Address  [address] Age  52 y.o. Height  1.549 m (5' 1\") Weight  77.1 kg (170 lb) N     [facility] Zip  [address] Telephone  [phone] (home) [phone] (work)   Mailing Address  [address] [facility] Zip  [address] Primary Language Spoken  English    Insurer  [employer]   [employer]   Employee's Occupation (Job Title) When Injury or Occupational Disease Occurred      Employer's Name  [employer] ONLY  Telephone  [phone]    Employer Address  [address]  [facility]  Zip  [address]   Date of Injury  2/19/2018               Hour of Injury  7:35 AM Date Employer Notified  2/19/2018 Last Day of Work after Injury or Occupational Disease  2/19/2018 Supervisor to Whom Injury Reported  Jl   Address or Location of Accident (if applicable)  [[employer] (side) parking lot]   What were you doing at the time of accident? (if applicable)  I was walking to meet with my ride.    How did this injury or occupational disease occur? (Be specific an answer in detail. Use additional sheet if necessary)  I was walking and fell I hit (my) the back of my head and my head and neck hurt. I got an- goose egg size bump.   If you believe that you have an occupational disease, when did you first have knowledge of the disability and it relationship to your employment?  n/a Witnesses to the Accident  n/a      Nature of Injury or Occupational Disease  Contusion  Part(s) of Body Injured or Affected  Skull, Upper Back Area (Thoracic Area), N/A    I " certify that the above is true and correct to the best of my knowledge and that I have provided this information in order to obtain the benefits of [place]  I hereby authorize any physician, chiropractor, surgeon, practitioner, or other person, any hospital, including [facility] or [facility], any medical service organization, any insurance company, or other institution or organization to release to each other, any medical or other information, including benefits paid or payable, pertinent to this injury or disease, except information relative to diagnosis, treatment and/or counseling for AIDS, psychological conditions, alcohol or controlled substances, for which I must give specific authorization.  A Photostat of this authorization shall be as valid as the original.     Date   Place   Employee’s Signature   THIS REPORT MUST BE COMPLETED AND MAILED WITHIN 3 WORKING DAYS OF TREATMENT   Place  [facility]  Name of Facility  [facility]   Date  2/19/2018 Diagnosis  (S00.03XA) Contusion of scalp, initial encounter  (S16.1XXA) Strain of neck muscle, initial encounter  (S20.229A) Contusion of upper back, unspecified laterality, initial encounter  (S46.911A) Strain of right shoulder, initial encounter  (S09.90XA) Injury of head, initial encounter  (M54.2) Neck pain  (M54.9) Upper back pain  (M25.511) Acute pain of right shoulder  (Z79.82) Aspirin long-term use Is there evidence the injured employee was under the influence of alcohol and/or another controlled substance at the time of accident?   Hour  10:32 AM Description of Injury or Disease  Diagnoses of Contusion of scalp, initial encounter, Strain of neck muscle, initial encounter, Contusion of upper back, unspecified laterality, initial encounter, Strain of right shoulder, initial encounter, Injury of head, initial encounter, Neck  "pain, Upper back pain, Acute pain of right shoulder, and Aspirin long-term use were pertinent to this visit. No   Treatment  Cold pack, Tylenol when necessary.  Have you advised the patient to remain off work five days or more? No   X-Ray Findings  Negative   If Yes   From Date  To Date      From information given by the employee, together with medical evidence, can you directly connect this injury or occupational disease as job incurred?  Yes If No Full Duty  No Modified Duty  Yes   Is additional medical care by a physician indicated?  Yes If Modified Duty, Specify any Limitations / Restrictions  No right arm overhead activity, no climbing, no heavy lifting.   Do you know of any previous injury or disease contributing to this condition or occupational disease?                            No   Date  2/19/2018 Print Doctor’s Name Job Frey M.D. I certify the employer’s copy of  this form was mailed on:   Address  [address] [place] [address] Insurer’s Use Only     [facility]  [address]    Provider’s Tax ID Number  [id number] Telephone  Dept: [phone]        e-JOB Parisi M.D.   e-Signature: Dr. Giles Tyson, Medical Director Degree  MD        ORIGINAL-TREATING PHYSICIAN OR CHIROPRACTOR    PAGE 2-INSURER/TPA    PAGE 3-EMPLOYER    PAGE 4-EMPLOYEE             Form C-4 (rev10/07)              BRIEF DESCRIPTION OF RIGHTS AND BENEFITS  (Pursuant to [place] 616C.[place])    Notice of Injury or Occupational Disease (Incident Report Form C-1): If an injury or occupational disease (OD) arises out of and in the  course of employment, you must provide written notice to your employer as soon as practicable, but no later than 7 days after the accident or  OD. Your employer shall maintain a sufficient supply of the required forms.    Claim for Compensation (Form C-4): If medical treatment is sought, the form C-4 is available at the place of initial treatment. A completed  \"Claim for Compensation\" (Form C-4) must be " filed within 90 days after an accident or OD. The treating physician or chiropractor must,  within 3 working days after treatment, complete and mail to the employer, the employer's insurer and third-party , the Claim for  Compensation.    Medical Treatment: If you require medical treatment for your on-the-job injury or OD, you may be required to select a physician or  chiropractor from a list provided by your workers’ compensation insurer, if it has contracted with an Organization for Managed Care (MCO) or  Preferred Provider Organization (PPO) or providers of health care. If your employer has not entered into a contract with an MCO or PPO, you  may select a physician or chiropractor from the Panel of Physicians and Chiropractors. Any medical costs related to your industrial injury or  OD will be paid by your insurer.    Temporary Total Disability (TTD): If your doctor has certified that you are unable to work for a period of at least 5 consecutive days, or 5  cumulative days in a 20-day period, or places restrictions on you that your employer does not accommodate, you may be entitled to TTD  compensation.    Temporary Partial Disability (TPD): If the wage you receive upon reemployment is less than the compensation for TTD to which you are  entitled, the insurer may be required to pay you TPD compensation to make up the difference. TPD can only be paid for a maximum of 24  months.    Permanent Partial Disability (PPD): When your medical condition is stable and there is an indication of a PPD as a result of your injury or  OD, within 30 days, your insurer must arrange for an evaluation by a rating physician or chiropractor to determine the degree of your PPD. The  amount of your PPD award depends on the date of injury, the results of the PPD evaluation and your age and wage.    Permanent Total Disability (PTD): If you are medically certified by a treating physician or chiropractor as permanently and  totally disabled  and have been granted a PTD status by your insurer, you are entitled to receive monthly benefits not to exceed 66 2/3% of your average  monthly wage. The amount of your PTD payments is subject to reduction if you previously received a PPD award.    Vocational Rehabilitation Services: You may be eligible for vocational rehabilitation services if you are unable to return to the job due to a  permanent physical impairment or permanent restrictions as a result of your injury or occupational disease.    Transportation and Per Garcia Reimbursement: You may be eligible for travel expenses and per garcia associated with medical treatment.    Reopening: You may be able to reopen your claim if your condition worsens after claim closure.    Appeal Process: If you disagree with a written determination issued by the insurer or the insurer does not respond to your request, you may  appeal to the Department of Administration, , by following the instructions contained in your determination letter. You must  appeal the determination within 70 days from the date of the determination letter at [address], [address], [place]  [address], or [address], [address], [place] [address]. If you disagree with the  decision, you may appeal to the  Department of Administration, . You must file your appeal within 30 days from the date of the  decision  letter at [address], [address], [place], [place] [address], or [address], [address], [place], [place] [address]. If you  disagree with a decision of an , you may file a petition for judicial review with the District Court. You must do so within 30  days of the Appeal Officer’s decision. You may be represented by an  at your own expense or you may contact the [facility] for possible  representation.    [place]  for Injured Workers (IW): If you  disagree with a  decision, you may request that [facility] represent you  without charge at an  Hearing. For information regarding denial of benefits, you may contact the [facility] at: [address], [address], [place] [address], [phone], or [address] [facility], [address], [place] [address], [phone]    To File a Complaint with the Division: If you wish to file a complaint with the  of the Division of Industrial Relations (DIR),  please contact the Workers’ Compensation Section, [address], [address], [place], [place] [address], telephone [phone], or  [address], [address], [place] [address], telephone [phone].    For assistance with Workers’ Compensation Issues: you may contact the Office of the Governor Consumer Health Assistance, [address] [place] [address], [address], [place], [place] [address], Toll Free 1-627.233.5002, Web site: http://govcha.[facility].nv., E-mail  Alison@[facility].nv.                                                                                                                                                                                                                                   __________________________________________________________________                                                                   _________________                Employee Name / Signature                                                                                                                                                       Date                                                                                                                                                                                                     D-2 (rev. 10/07)

## 2018-02-20 ENCOUNTER — OCCUPATIONAL MEDICINE (OUTPATIENT)
Dept: OCCUPATIONAL MEDICINE | Facility: CLINIC | Age: 53
End: 2018-02-20
Payer: COMMERCIAL

## 2018-02-20 VITALS
WEIGHT: 173 LBS | HEIGHT: 61 IN | HEART RATE: 84 BPM | DIASTOLIC BLOOD PRESSURE: 94 MMHG | TEMPERATURE: 97.1 F | OXYGEN SATURATION: 96 % | RESPIRATION RATE: 14 BRPM | BODY MASS INDEX: 32.66 KG/M2 | SYSTOLIC BLOOD PRESSURE: 142 MMHG

## 2018-02-20 DIAGNOSIS — S16.1XXD STRAIN OF NECK MUSCLE, SUBSEQUENT ENCOUNTER: ICD-10-CM

## 2018-02-20 DIAGNOSIS — S00.03XD CONTUSION OF SCALP, SUBSEQUENT ENCOUNTER: ICD-10-CM

## 2018-02-20 DIAGNOSIS — S60.212D CONTUSION OF LEFT WRIST, SUBSEQUENT ENCOUNTER: ICD-10-CM

## 2018-02-20 PROCEDURE — 99204 OFFICE O/P NEW MOD 45 MIN: CPT | Performed by: PREVENTIVE MEDICINE

## 2018-02-20 RX ORDER — TIZANIDINE 4 MG/1
4 TABLET ORAL EVERY 6 HOURS PRN
Qty: 30 TAB | Refills: 3 | Status: ON HOLD | OUTPATIENT
Start: 2018-02-20 | End: 2018-10-20

## 2018-02-20 RX ORDER — DICLOFENAC SODIUM 75 MG/1
75 TABLET, DELAYED RELEASE ORAL 2 TIMES DAILY
Qty: 60 TAB | Refills: 1 | Status: ON HOLD | OUTPATIENT
Start: 2018-02-20 | End: 2018-10-20

## 2018-02-20 ASSESSMENT — PAIN SCALES - GENERAL: PAINLEVEL: 8=MODERATE-SEVERE PAIN

## 2018-02-20 NOTE — LETTER
[address],   [address] [place] [address]  Phone:  [phone] - Fax:  [phone]   Occupational Health [facility] Progress Report and Disability Certification  Date of Service: 2/20/2018   No Show:  No  Date / Time of Next Visit: 3/8/18 @9:10AM   Claim Information   Patient Name: Sonya Wei  Claim Number:     Employer: [employer] ONLY  Date of Injury: 2/19/2018     Insurer / TPA: [employer]  ID / SSN:     Occupation:   Diagnosis: Diagnoses of Contusion of scalp, subsequent encounter, Strain of neck muscle, subsequent encounter, and Contusion of left wrist, subsequent encounter were pertinent to this visit.    Medical Information   Related to Industrial Injury?      Subjective Complaints:  Date of injury 2/19/2018. Mechanism of injury-slipped in the parking lot injuring head and neck. 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 She complains of persistent headache, neck pain, and shoulder pain. No nausea, vomiting, or visual disturbance.   Objective Findings: Appearance: Well-developed, well-nourished.   Mental Status: Mood and Affect normal. Pleasant. Cooperative. Appropriate.   ENT: Oropharynx clear. Moist mucous membranes. Hearing normal.   Eyes: Pupils reactive. Conjunctiva normal. No scleral icterus.   Neck: Trachea Midline. No thyromegaly. No masses.  Cardiovascular: Normal rate. Regular rhythm. Normal heart sounds.   Chest: Effort normal. Breath sounds clear.   Skin: Skin is warm and dry. No rash.   Musculoskeletal: Cervical spine shows mild tenderness in spinal and paraspinal areas. Pain on motion moderate. Upper extremity strength and range of motion good.  Head: Mild tenderness and swelling in the occiput. No open wounds or abrasions.     Pre-Existing Condition(s):     Assessment:    Condition Worsened    Status:    Permanent Disability:     Plan: MedicationMedication (NOT at Work)PT    Diagnostics:      Comments:       Disability Information   Status: Released to Restricted Duty    From:  2018  Through: 3/5/2018 Restrictions are: Temporary   Physical Restrictions   Sitting:    Standing:    Stooping:    Bending:      Squatting:    Walking:    Climbin hrs/day Pushing:  < or = to 1 hr/day   Pulling:  < or = to 1 hr/day Other:    Reaching Above Shoulder (L): < or = to 1 hr/day Reaching Above Shoulder (R): < or = 1 hrs/day     Reaching Below Shoulder (L):    Reaching Below Shoulder (R):      Not to exceed Weight Limits   Carrying(hrs):   Weight Limit(lb):   Lifting(hrs):   Weight  Limit(lb): < or = to 10 pounds   Comments: No strenuous work. No safety sensitive duties.    Repetitive Actions   Hands: i.e. Fine Manipulations from Grasping:     Feet: i.e. Operating Foot Controls:     Driving / Operate Machinery:     Physician Name: Haroon Saunders M.D. Physician Signature: HAROON Esqueda M.D. e-Signature: Dr. Giles Tysno, Medical Director   Clinic Name / Location: [address],   [address] [place] [address] Clinic Phone Number: Dept: [phone]   Appointment Time: 8:30 Am Visit Start Time: 8:18 AM   Check-In Time:  8:15 Am Visit Discharge Time:  9:22 AM   Original-Treating Physician or Chiropractor    Page 2-Insurer/TPA    Page 3-Employer    Page 4-Employee

## 2018-02-20 NOTE — PROGRESS NOTES
Subjective:      Sonya Wei is a 52 y.o. female who presents with Follow-Up ( DOI 02/19/2018 - Head - Skull, Upper Back Area (Thoracic Area), - Worse - ROOM 1)      Date of injury 2/19/2018. Mechanism of injury-slipped in the parking lot injuring head and neck. 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 She complains of persistent headache, neck pain, and shoulder pain. No nausea, vomiting, or visual disturbance.     HPI    ROS  Comprehensive medical history form reviewed. Pertinent positives and negatives included in HPI.    PFSH: reviewed in Epic    PMH:  has a past medical history of Anemia; Bowel habit changes; Diabetes; Diabetes ([facility]); GERD (gastroesophageal reflux disease); Healthcare maintenance (9/27/2014); High cholesterol; Hyperlipidemia; Hypertension; Infectious disease; Jaundice (1999); Psychiatric problem; and Vaginal itching (8/27/2014).  MEDS:   Current Outpatient Prescriptions:   •  diclofenac EC (VOLTAREN) 75 MG Tablet Delayed Response, Take 1 Tab by mouth 2 times a day., Disp: 60 Tab, Rfl: 1  •  tizanidine (ZANAFLEX) 4 MG Tab, Take 1 Tab by mouth every 6 hours as needed., Disp: 30 Tab, Rfl: 3  •  OMEPRAZOLE PO, Take  by mouth., Disp: , Rfl:   •  benzonatate (TESSALON) 200 MG capsule, Take 1 Cap by mouth 3 times a day as needed for Cough., Disp: 60 Cap, Rfl: 0  •  albuterol 108 (90 BASE) MCG/ACT Aero Soln inhalation aerosol, Inhale 2 Puffs by mouth every four hours as needed., Disp: 1 Inhaler, Rfl: 0  •  fluticasone (FLONASE) 50 MCG/ACT nasal spray, Spray 1 Spray in nose every day., Disp: 1 Bottle, Rfl: 0  •  benzonatate (TESSALON) 100 MG Cap, Take 1 Cap by mouth 3 times a day as needed for Cough., Disp: 60 Cap, Rfl: 0  •  lisinopril (PRINIVIL) 20 MG Tab, TAKE ONE TABLET BY MOUTH ONCE DAILY, Disp: 60 Tab, Rfl: 2  •  glyBURIDE (DIABETA) 5 MG Tab, TAKE ONE TABLET BY MOUTH TWICE DAILY WITH MEALS FOR DIABETES, Disp:  "180 Tab, Rfl: 3  •  oxycodone-acetaminophen (PERCOCET) 5-325 MG Tab, Take 1-2 Tabs by mouth every four hours as needed., Disp: 35 Tab, Rfl: 0  •  Cholecalciferol (VITAMIN D3) 5000 UNITS Cap, Take 1 Cap by mouth every day., Disp: , Rfl:   •  glyBURIDE (DIABETA) 5 MG Tab, Take 1 Tab by mouth 2 times a day, with meals. For diabetes, Disp: 60 Tab, Rfl: 0  •  atorvastatin (LIPITOR) 10 MG Tab, Take 1 Tab by mouth every day., Disp: 60 Tab, Rfl: 3  •  lisinopril (PRINIVIL) 10 MG Tab, Take 10 mg by mouth every day., Disp: , Rfl:   •  aspirin EC (ECOTRIN) 81 MG Tablet Delayed Response, Take 81 mg by mouth every day., Disp: , Rfl:   •  multivitamin (THERAGRAN) Tab, Take 1 Tab by mouth every day., Disp: , Rfl:   ALLERGIES:   Allergies   Allergen Reactions   • Morphine Itching     Rxn = unknown   Bumps all over body   • Metformin      headaches     SURGHX:   Past Surgical History:   Procedure Laterality Date   • VENTRAL HERNIA REPAIR ROBOTIC XI N/A 10/14/2016    Procedure: VENTRAL HERNIA REPAIR ROBOTIC XI FOR INCISIONAL W/MESH;  Surgeon: Edi Mendoza M.D.;  Location: SURGERY [facility];  Service:    • ABDOMINAL HYSTERECTOMY TOTAL      still has ovaries   • CHOLECYSTECTOMY     • PRIMARY C SECTION     • TUBAL COAGULATION LAPAROSCOPIC BILATERAL       SOCHX:  reports that she has never smoked. She does not have any smokeless tobacco history on file. She reports that she does not drink alcohol or use drugs.  Work Status: Works for [employer]   FH: No pertinent hereditary disorders.    one more time     Objective:     /94   Pulse 84   Temp 36.2 °C (97.1 °F)   Resp 14   Ht 1.549 m (5' 1\")   Wt 78.5 kg (173 lb)   SpO2 96%   BMI 32.69 kg/m²      Physical Exam    Appearance: Well-developed, well-nourished.   Mental Status: Mood and Affect normal. Pleasant. Cooperative. Appropriate.   ENT: Oropharynx clear. Moist mucous membranes. Hearing normal.   Eyes: Pupils reactive. Conjunctiva normal. No " scleral icterus.   Neck: Trachea Midline. No thyromegaly. No masses.  Cardiovascular: Normal rate. Regular rhythm. Normal heart sounds.   Chest: Effort normal. Breath sounds clear.   Skin: Skin is warm and dry. No rash.   Musculoskeletal: Cervical spine shows mild tenderness in spinal and paraspinal areas. Pain on motion moderate. Upper extremity strength and range of motion good.  Head: Mild tenderness and swelling in the occiput. No open wounds or abrasions.         Assessment/Plan:     1. Contusion of scalp, subsequent encounter  2. Strain of neck muscle, subsequent encounter  3. Contusion of left wrist, subsequent encounter  New to Occupational Health from urgent care    - diclofenac EC (VOLTAREN) 75 MG Tablet Delayed Response; Take 1 Tab by mouth 2 times a day.  Dispense: 60 Tab; Refill: 1  - tizanidine (ZANAFLEX) 4 MG Tab; Take 1 Tab by mouth every 6 hours as needed.  Dispense: 30 Tab; Refill: 3  - REFERRAL TO PHYSICAL THERAPY Reason for Therapy: Eval/Treat/Report  - Restricted duty  - Recheck in 7-10 days or sooner if needed

## 2018-03-08 ENCOUNTER — OCCUPATIONAL MEDICINE (OUTPATIENT)
Dept: OCCUPATIONAL MEDICINE | Facility: CLINIC | Age: 53
End: 2018-03-08
Payer: COMMERCIAL

## 2018-03-08 VITALS
DIASTOLIC BLOOD PRESSURE: 90 MMHG | HEART RATE: 83 BPM | OXYGEN SATURATION: 96 % | RESPIRATION RATE: 12 BRPM | BODY MASS INDEX: 32.66 KG/M2 | SYSTOLIC BLOOD PRESSURE: 140 MMHG | HEIGHT: 61 IN | WEIGHT: 173 LBS

## 2018-03-08 DIAGNOSIS — S00.03XD CONTUSION OF SCALP, SUBSEQUENT ENCOUNTER: ICD-10-CM

## 2018-03-08 DIAGNOSIS — S16.1XXD STRAIN OF NECK MUSCLE, SUBSEQUENT ENCOUNTER: ICD-10-CM

## 2018-03-08 PROCEDURE — 99213 OFFICE O/P EST LOW 20 MIN: CPT | Performed by: PREVENTIVE MEDICINE

## 2018-03-08 RX ORDER — DULAGLUTIDE 0.75 MG/.5ML
INJECTION, SOLUTION SUBCUTANEOUS
Status: ON HOLD | COMMUNITY
Start: 2018-03-03 | End: 2018-10-20

## 2018-03-08 RX ORDER — ATORVASTATIN CALCIUM 40 MG/1
40 TABLET, FILM COATED ORAL DAILY
Status: ON HOLD | COMMUNITY
Start: 2018-01-31 | End: 2018-10-20

## 2018-03-08 RX ORDER — SITAGLIPTIN 100 MG/1
100 TABLET, FILM COATED ORAL DAILY
Status: ON HOLD | COMMUNITY
Start: 2018-01-31 | End: 2018-10-20

## 2018-03-08 RX ORDER — LISINOPRIL AND HYDROCHLOROTHIAZIDE 20; 12.5 MG/1; MG/1
TABLET ORAL
Status: ON HOLD | COMMUNITY
Start: 2018-03-02 | End: 2018-10-20

## 2018-03-08 ASSESSMENT — PAIN SCALES - GENERAL: PAINLEVEL: 5=MODERATE PAIN

## 2018-03-08 ASSESSMENT — ENCOUNTER SYMPTOMS
NEUROLOGICAL NEGATIVE: 1
CONSTITUTIONAL NEGATIVE: 1

## 2018-03-08 NOTE — PROGRESS NOTES
Subjective:      Sonya Wei is a 52 y.o. female who presents with Follow-Up ( DOI 02/19/2018 - head - neck - better - room 25)      Date of injury 2/19/2018. Mechanism of injury-slipped in the parking lot injuring head and neck. 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 Overall, she seems to relate slight improvement with 5/10 pain. She continues to have difficulty moving the right shoulder. Also, she has persistent neck discomfort. Seems to be tolerating light-duty. She is having difficulty scheduling physical therapy.     HPI    Review of Systems   Constitutional: Negative.    Neurological: Negative.      PFSH:  WORK STATUS: Restricted activity  PMH:  has a past medical history of Anemia; Bowel habit changes; Diabetes; Diabetes (CMS-[facility]); GERD (gastroesophageal reflux disease); Healthcare maintenance (9/27/2014); High cholesterol; Hyperlipidemia; Hypertension; Infectious disease; Jaundice (1999); Psychiatric problem; and Vaginal itching (8/27/2014).  MEDS:   Current Outpatient Prescriptions:   •  TRULICITY 0.75 MG/0.5ML Solution Pen-injector, , Disp: , Rfl:   •  lisinopril-hydrochlorothiazide (PRINZIDE, ZESTORETIC) 20-12.5 MG per tablet, , Disp: , Rfl:   •  JANUVIA 100 MG Tab, , Disp: , Rfl:   •  atorvastatin (LIPITOR) 40 MG Tab, , Disp: , Rfl:   •  diclofenac EC (VOLTAREN) 75 MG Tablet Delayed Response, Take 1 Tab by mouth 2 times a day., Disp: 60 Tab, Rfl: 1  •  tizanidine (ZANAFLEX) 4 MG Tab, Take 1 Tab by mouth every 6 hours as needed., Disp: 30 Tab, Rfl: 3  •  OMEPRAZOLE PO, Take  by mouth., Disp: , Rfl:   •  benzonatate (TESSALON) 200 MG capsule, Take 1 Cap by mouth 3 times a day as needed for Cough., Disp: 60 Cap, Rfl: 0  •  albuterol 108 (90 BASE) MCG/ACT Aero Soln inhalation aerosol, Inhale 2 Puffs by mouth every four hours as needed., Disp: 1 Inhaler, Rfl: 0  •  fluticasone (FLONASE) 50 MCG/ACT nasal spray, Spray 1 Spray  "in nose every day., Disp: 1 Bottle, Rfl: 0  •  benzonatate (TESSALON) 100 MG Cap, Take 1 Cap by mouth 3 times a day as needed for Cough., Disp: 60 Cap, Rfl: 0  •  lisinopril (PRINIVIL) 20 MG Tab, TAKE ONE TABLET BY MOUTH ONCE DAILY, Disp: 60 Tab, Rfl: 2  •  glyBURIDE (DIABETA) 5 MG Tab, TAKE ONE TABLET BY MOUTH TWICE DAILY WITH MEALS FOR DIABETES, Disp: 180 Tab, Rfl: 3  •  oxycodone-acetaminophen (PERCOCET) 5-325 MG Tab, Take 1-2 Tabs by mouth every four hours as needed., Disp: 35 Tab, Rfl: 0  •  Cholecalciferol (VITAMIN D3) 5000 UNITS Cap, Take 1 Cap by mouth every day., Disp: , Rfl:   •  glyBURIDE (DIABETA) 5 MG Tab, Take 1 Tab by mouth 2 times a day, with meals. For diabetes, Disp: 60 Tab, Rfl: 0  •  atorvastatin (LIPITOR) 10 MG Tab, Take 1 Tab by mouth every day., Disp: 60 Tab, Rfl: 3  •  lisinopril (PRINIVIL) 10 MG Tab, Take 10 mg by mouth every day., Disp: , Rfl:   •  aspirin EC (ECOTRIN) 81 MG Tablet Delayed Response, Take 81 mg by mouth every day., Disp: , Rfl:   •  multivitamin (THERAGRAN) Tab, Take 1 Tab by mouth every day., Disp: , Rfl:        Objective:     /90   Pulse 83   Resp 12   Ht 1.549 m (5' 1\")   Wt 78.5 kg (173 lb)   SpO2 96%   BMI 32.69 kg/m²      Physical Exam    Appearance: Well-developed, well-nourished.   Mental Status: Mood and Affect normal. Pleasant. Cooperative. Appropriate.   ENT: Oropharynx clear. Moist mucous membranes. Hearing normal.   Eyes: Pupils reactive. Conjunctiva normal. No scleral icterus.   Neck: Trachea Midline. No thyromegaly. No masses.  Cardiovascular: Normal rate. Regular rhythm. Normal heart sounds.   Chest: Effort normal. Breath sounds clear.   Skin: Skin is warm and dry. No rash.   Musculoskeletal: Cervical spine shows mild pain on motion. Right shoulder range of motion limited due to pain         Assessment/Plan:     1. Contusion of scalp, subsequent encounter  2. Strain of neck muscle, subsequent encounter  3. Right shoulder strain  Condition slightly " improved  Physical therapy pending scheduling  Restricted activity  Recheck 3 weeks

## 2018-03-08 NOTE — LETTER
[address],   [address] [place] [address]  Phone:  [phone] - Fax:  [phone]   Occupational Health [facility] Progress Report and Disability Certification  Date of Service: 3/8/2018   No Show:  No  Date / Time of Next Visit: 4/5/2018@10:30am   Claim Information   Patient Name: Sonya Wei  Claim Number:     Employer: [employer] [facility] RESPIRATORY SERVICES ONLY  Date of Injury: 2/19/2018     Insurer / TPA: [employer]  ID / SSN:     Occupation:   Diagnosis: Diagnoses of Contusion of scalp, subsequent encounter and Strain of neck muscle, subsequent encounter were pertinent to this visit.    Medical Information   Related to Industrial Injury? Yes    Subjective Complaints:  Date of injury 2/19/2018. Mechanism of injury-slipped in the parking lot injuring head and neck. 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 Overall, she seems to relate slight improvement with 5/10 pain. She continues to have difficulty moving the right shoulder. Also, she has persistent neck discomfort. Seems to be tolerating light-duty. She is having difficulty scheduling physical therapy.   Objective Findings: Appearance: Well-developed, well-nourished.   Mental Status: Mood and Affect normal. Pleasant. Cooperative. Appropriate.   ENT: Oropharynx clear. Moist mucous membranes. Hearing normal.   Eyes: Pupils reactive. Conjunctiva normal. No scleral icterus.   Neck: Trachea Midline. No thyromegaly. No masses.  Cardiovascular: Normal rate. Regular rhythm. Normal heart sounds.   Chest: Effort normal. Breath sounds clear.   Skin: Skin is warm and dry. No rash.   Musculoskeletal: Cervical spine shows mild pain on motion. Right shoulder range of motion limited due to pain     Pre-Existing Condition(s):     Assessment:   Condition Improved    Status: Additional  Care Required  Permanent Disability:No    Plan: MedicationPT    Diagnostics:      Comments:       Disability Information   Status: Released to Restricted Duty    From:  3/8/2018  Through: 4/5/2018 Restrictions are:     Physical Restrictions   Sitting:    Standing:    Stooping:    Bending:      Squatting:    Walking:    Climbing:    Pushing:      Pulling:    Other:    Reaching Above Shoulder (L):   Reaching Above Shoulder (R): < or = 1 hrs/day     Reaching Below Shoulder (L):    Reaching Below Shoulder (R):      Not to exceed Weight Limits   Carrying(hrs):   Weight Limit(lb):   Lifting(hrs):   Weight  Limit(lb):     Comments: Lifting restriction of 20 pounds.    Repetitive Actions   Hands: i.e. Fine Manipulations from Grasping:     Feet: i.e. Operating Foot Controls:     Driving / Operate Machinery:     Physician Name: Haroon Saunders M.D. Physician Signature: HAROON Esqueda M.D. e-Signature: Dr. Giles Tyson, Medical Director   Clinic Name / Location: [address],   [address] [address] Clinic Phone Number: Dept: [phone]   Appointment Time: 9:10 Am Visit Start Time: 9:14 AM   Check-In Time:  9:11 Am Visit Discharge Time:  10:15am   Original-Treating Physician or Chiropractor    Page 2-Insurer/TPA    Page 3-Employer    Page 4-Employee

## 2018-04-05 ENCOUNTER — OCCUPATIONAL MEDICINE (OUTPATIENT)
Dept: OCCUPATIONAL MEDICINE | Facility: CLINIC | Age: 53
End: 2018-04-05
Payer: COMMERCIAL

## 2018-04-05 VITALS
OXYGEN SATURATION: 100 % | WEIGHT: 173 LBS | SYSTOLIC BLOOD PRESSURE: 130 MMHG | DIASTOLIC BLOOD PRESSURE: 90 MMHG | BODY MASS INDEX: 32.66 KG/M2 | HEART RATE: 79 BPM | HEIGHT: 61 IN

## 2018-04-05 DIAGNOSIS — S16.1XXD STRAIN OF NECK MUSCLE, SUBSEQUENT ENCOUNTER: ICD-10-CM

## 2018-04-05 DIAGNOSIS — S00.03XD CONTUSION OF SCALP, SUBSEQUENT ENCOUNTER: ICD-10-CM

## 2018-04-05 DIAGNOSIS — S60.212D CONTUSION OF LEFT WRIST, SUBSEQUENT ENCOUNTER: ICD-10-CM

## 2018-04-05 PROCEDURE — 99212 OFFICE O/P EST SF 10 MIN: CPT | Performed by: PREVENTIVE MEDICINE

## 2018-04-05 ASSESSMENT — PAIN SCALES - GENERAL: PAINLEVEL: 1=MINIMAL PAIN

## 2018-04-05 NOTE — PROGRESS NOTES
"Subjective:      Sonya Wei is a 52 y.o. female who presents with Follow-Up (WC DOI 02/19/2018 - Head - Neck - Better- ROOM 3)      Date of injury 2/19/2018. Mechanism of injury-slipped in the parking lot injuring head and neck. 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 She indicates she is much improved. She has attended physical therapy and is now on home exercises.     HPI    ROS  PFSH:  WORK STATUS: Restricted activity       Objective:     /90   Pulse 79   Ht 1.549 m (5' 1\")   Wt 78.5 kg (173 lb)   SpO2 100%   BMI 32.69 kg/m²      Physical Exam    Appearance: Well-developed, well-nourished.   Mental Status: Mood and Affect normal. Pleasant. Cooperative. Appropriate.   Musculoskeletal: Cervical spine shows good range of motion in all planes. Right shoulder shows good strength and range of motion.         Assessment/Plan:     1. Contusion of scalp, subsequent encounter  2. Strain of neck muscle, subsequent encounter  3. Contusion of left wrist, subsequent encounter  Conditions improved  Regular work  Released from care      "

## 2018-04-05 NOTE — LETTER
[address],   [address] [place] [address]  Phone:  [phone] - Fax:  [phone]   Occupational Health [facility] Progress Report and Disability Certification  Date of Service: 4/5/2018   No Show:  No  Date / Time of Next Visit:  Discharged   Claim Information   Patient Name: Sonya Wei  Claim Number:     Employer: [employer] [employer] RESPIRATORY SERVICES ONLY  Date of Injury: 2/19/2018     Insurer / TPA: [employer]  ID / SSN:     Occupation:   Diagnosis: Diagnoses of Contusion of scalp, subsequent encounter, Strain of neck muscle, subsequent encounter, and Contusion of left wrist, subsequent encounter were pertinent to this visit.    Medical Information   Related to Industrial Injury? Yes    Subjective Complaints:  Date of injury 2/19/2018. Mechanism of injury-slipped in the parking lot injuring head and neck. 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 She indicates she is much improved. She has attended physical therapy and is now on home exercises.   Objective Findings: Appearance: Well-developed, well-nourished.   Mental Status: Mood and Affect normal. Pleasant. Cooperative. Appropriate.   Musculoskeletal: Cervical spine shows good range of motion in all planes. Right shoulder shows good strength and range of motion.     Pre-Existing Condition(s):     Assessment:   Condition Improved    Status: Discharged /  MMI  Permanent Disability:No    Plan:      Diagnostics:      Comments:       Disability Information   Status: Released to Full Duty    From:  4/5/2018  Through:   Restrictions are:     Physical Restrictions   Sitting:    Standing:    Stooping:    Bending:      Squatting:    Walking:    Climbing:    Pushing:      Pulling:    Other:    Reaching Above Shoulder (L):   Reaching Above Shoulder (R):       Reaching Below  Shoulder (L):    Reaching Below Shoulder (R):      Not to exceed Weight Limits   Carrying(hrs):   Weight Limit(lb):   Lifting(hrs):   Weight  Limit(lb):     Comments:      Repetitive Actions   Hands: i.e. Fine Manipulations from Grasping:     Feet: i.e. Operating Foot Controls:     Driving / Operate Machinery:     Physician Name: Haroon Saunders M.D. Physician Signature: HAROON Esqueda M.D. e-Signature: Dr. Giles yTson, Medical Director   Clinic Name / Location: [address],   [address]  [place] [address] Clinic Phone Number: Dept: [phone]   Appointment Time: 10:30 Am Visit Start Time: 10:48 AM   Check-In Time:  10:20 Am Visit Discharge Time:  11:12 AM    Original-Treating Physician or Chiropractor    Page 2-Insurer/TPA    Page 3-Employer    Page 4-Employee

## 2018-10-20 ENCOUNTER — APPOINTMENT (OUTPATIENT)
Dept: RADIOLOGY | Facility: MEDICAL CENTER | Age: 53
End: 2018-10-20
Attending: FAMILY MEDICINE
Payer: COMMERCIAL

## 2018-10-20 ENCOUNTER — APPOINTMENT (OUTPATIENT)
Dept: RADIOLOGY | Facility: MEDICAL CENTER | Age: 53
End: 2018-10-20
Attending: EMERGENCY MEDICINE
Payer: COMMERCIAL

## 2018-10-20 ENCOUNTER — HOSPITAL ENCOUNTER (OUTPATIENT)
Facility: MEDICAL CENTER | Age: 53
End: 2018-10-20
Attending: EMERGENCY MEDICINE | Admitting: FAMILY MEDICINE
Payer: COMMERCIAL

## 2018-10-20 VITALS
OXYGEN SATURATION: 96 % | HEART RATE: 81 BPM | HEIGHT: 61 IN | DIASTOLIC BLOOD PRESSURE: 59 MMHG | BODY MASS INDEX: 33.92 KG/M2 | SYSTOLIC BLOOD PRESSURE: 140 MMHG | TEMPERATURE: 97.8 F | RESPIRATION RATE: 16 BRPM | WEIGHT: 179.68 LBS

## 2018-10-20 DIAGNOSIS — R07.9 CHEST PAIN, UNSPECIFIED TYPE: ICD-10-CM

## 2018-10-20 LAB
ALBUMIN SERPL BCP-MCNC: 3.5 G/DL (ref 3.2–4.9)
ALBUMIN/GLOB SERPL: 1.1 G/DL
ALP SERPL-CCNC: 109 U/L (ref 30–99)
ALT SERPL-CCNC: 28 U/L (ref 2–50)
ANION GAP SERPL CALC-SCNC: 6 MMOL/L (ref 0–11.9)
APTT PPP: 28.6 SEC (ref 24.7–36)
AST SERPL-CCNC: 27 U/L (ref 12–45)
BASOPHILS # BLD AUTO: 0.4 % (ref 0–1.8)
BASOPHILS # BLD: 0.04 K/UL (ref 0–0.12)
BILIRUB SERPL-MCNC: 0.2 MG/DL (ref 0.1–1.5)
BNP SERPL-MCNC: 20 PG/ML (ref 0–100)
BUN SERPL-MCNC: 33 MG/DL (ref 8–22)
CALCIUM SERPL-MCNC: 8.9 MG/DL (ref 8.4–10.2)
CHLORIDE SERPL-SCNC: 102 MMOL/L (ref 96–112)
CO2 SERPL-SCNC: 24 MMOL/L (ref 20–33)
CREAT SERPL-MCNC: 0.99 MG/DL (ref 0.5–1.4)
EKG IMPRESSION: NORMAL
EOSINOPHIL # BLD AUTO: 0.26 K/UL (ref 0–0.51)
EOSINOPHIL NFR BLD: 2.9 % (ref 0–6.9)
ERYTHROCYTE [DISTWIDTH] IN BLOOD BY AUTOMATED COUNT: 38.7 FL (ref 35.9–50)
EST. AVERAGE GLUCOSE BLD GHB EST-MCNC: 200 MG/DL
GLOBULIN SER CALC-MCNC: 3.2 G/DL (ref 1.9–3.5)
GLUCOSE BLD-MCNC: 246 MG/DL (ref 65–99)
GLUCOSE BLD-MCNC: 319 MG/DL (ref 65–99)
GLUCOSE BLD-MCNC: 406 MG/DL (ref 65–99)
GLUCOSE BLD-MCNC: 429 MG/DL (ref 65–99)
GLUCOSE SERPL-MCNC: 378 MG/DL (ref 65–99)
HBA1C MFR BLD: 8.6 % (ref 0–5.6)
HCT VFR BLD AUTO: 32.9 % (ref 37–47)
HGB BLD-MCNC: 11.5 G/DL (ref 12–16)
IMM GRANULOCYTES # BLD AUTO: 0.04 K/UL (ref 0–0.11)
IMM GRANULOCYTES NFR BLD AUTO: 0.4 % (ref 0–0.9)
INR PPP: 0.92 (ref 0.87–1.13)
LIPASE SERPL-CCNC: 52 U/L (ref 7–58)
LYMPHOCYTES # BLD AUTO: 3.53 K/UL (ref 1–4.8)
LYMPHOCYTES NFR BLD: 39.7 % (ref 22–41)
MCH RBC QN AUTO: 30.3 PG (ref 27–33)
MCHC RBC AUTO-ENTMCNC: 35 G/DL (ref 33.6–35)
MCV RBC AUTO: 86.6 FL (ref 81.4–97.8)
MONOCYTES # BLD AUTO: 0.58 K/UL (ref 0–0.85)
MONOCYTES NFR BLD AUTO: 6.5 % (ref 0–13.4)
NEUTROPHILS # BLD AUTO: 4.45 K/UL (ref 2–7.15)
NEUTROPHILS NFR BLD: 50.1 % (ref 44–72)
NRBC # BLD AUTO: 0 K/UL
NRBC BLD-RTO: 0 /100 WBC
PLATELET # BLD AUTO: 264 K/UL (ref 164–446)
PMV BLD AUTO: 11.3 FL (ref 9–12.9)
POTASSIUM SERPL-SCNC: 4.2 MMOL/L (ref 3.6–5.5)
PROT SERPL-MCNC: 6.7 G/DL (ref 6–8.2)
PROTHROMBIN TIME: 12.3 SEC (ref 12–14.6)
RBC # BLD AUTO: 3.8 M/UL (ref 4.2–5.4)
SODIUM SERPL-SCNC: 132 MMOL/L (ref 135–145)
TROPONIN I SERPL-MCNC: <0.02 NG/ML (ref 0–0.04)
WBC # BLD AUTO: 8.9 K/UL (ref 4.8–10.8)

## 2018-10-20 PROCEDURE — 700111 HCHG RX REV CODE 636 W/ 250 OVERRIDE (IP): Performed by: FAMILY MEDICINE

## 2018-10-20 PROCEDURE — 96374 THER/PROPH/DIAG INJ IV PUSH: CPT

## 2018-10-20 PROCEDURE — A9502 TC99M TETROFOSMIN: HCPCS

## 2018-10-20 PROCEDURE — 80053 COMPREHEN METABOLIC PANEL: CPT

## 2018-10-20 PROCEDURE — 93018 CV STRESS TEST I&R ONLY: CPT | Performed by: INTERNAL MEDICINE

## 2018-10-20 PROCEDURE — 85025 COMPLETE CBC W/AUTO DIFF WBC: CPT

## 2018-10-20 PROCEDURE — 71045 X-RAY EXAM CHEST 1 VIEW: CPT

## 2018-10-20 PROCEDURE — G0378 HOSPITAL OBSERVATION PER HR: HCPCS

## 2018-10-20 PROCEDURE — A9270 NON-COVERED ITEM OR SERVICE: HCPCS | Performed by: EMERGENCY MEDICINE

## 2018-10-20 PROCEDURE — 78452 HT MUSCLE IMAGE SPECT MULT: CPT | Mod: 26 | Performed by: INTERNAL MEDICINE

## 2018-10-20 PROCEDURE — 83036 HEMOGLOBIN GLYCOSYLATED A1C: CPT

## 2018-10-20 PROCEDURE — 83880 ASSAY OF NATRIURETIC PEPTIDE: CPT

## 2018-10-20 PROCEDURE — A9270 NON-COVERED ITEM OR SERVICE: HCPCS | Performed by: FAMILY MEDICINE

## 2018-10-20 PROCEDURE — 36415 COLL VENOUS BLD VENIPUNCTURE: CPT

## 2018-10-20 PROCEDURE — 84484 ASSAY OF TROPONIN QUANT: CPT

## 2018-10-20 PROCEDURE — 99285 EMERGENCY DEPT VISIT HI MDM: CPT

## 2018-10-20 PROCEDURE — 700102 HCHG RX REV CODE 250 W/ 637 OVERRIDE(OP): Performed by: EMERGENCY MEDICINE

## 2018-10-20 PROCEDURE — 700102 HCHG RX REV CODE 250 W/ 637 OVERRIDE(OP): Performed by: FAMILY MEDICINE

## 2018-10-20 PROCEDURE — 85730 THROMBOPLASTIN TIME PARTIAL: CPT

## 2018-10-20 PROCEDURE — 85610 PROTHROMBIN TIME: CPT

## 2018-10-20 PROCEDURE — 700111 HCHG RX REV CODE 636 W/ 250 OVERRIDE (IP)

## 2018-10-20 PROCEDURE — 82962 GLUCOSE BLOOD TEST: CPT | Mod: 91

## 2018-10-20 PROCEDURE — 96372 THER/PROPH/DIAG INJ SC/IM: CPT

## 2018-10-20 PROCEDURE — 99235 HOSP IP/OBS SAME DATE MOD 70: CPT | Performed by: FAMILY MEDICINE

## 2018-10-20 PROCEDURE — 83690 ASSAY OF LIPASE: CPT

## 2018-10-20 PROCEDURE — 93005 ELECTROCARDIOGRAM TRACING: CPT | Performed by: EMERGENCY MEDICINE

## 2018-10-20 RX ORDER — GLYBURIDE 5 MG/1
10 TABLET ORAL 2 TIMES DAILY WITH MEALS
Status: ON HOLD | COMMUNITY
End: 2018-10-20

## 2018-10-20 RX ORDER — RANITIDINE 150 MG/1
150 TABLET ORAL 2 TIMES DAILY
Status: ON HOLD | COMMUNITY
End: 2018-10-20

## 2018-10-20 RX ORDER — ATORVASTATIN CALCIUM 40 MG/1
80 TABLET, FILM COATED ORAL EVERY EVENING
Status: DISCONTINUED | OUTPATIENT
Start: 2018-10-20 | End: 2018-10-20 | Stop reason: HOSPADM

## 2018-10-20 RX ORDER — REGADENOSON 0.08 MG/ML
INJECTION, SOLUTION INTRAVENOUS
Status: COMPLETED
Start: 2018-10-20 | End: 2018-10-20

## 2018-10-20 RX ORDER — INSULIN GLARGINE 100 [IU]/ML
10 INJECTION, SOLUTION SUBCUTANEOUS EVERY EVENING
Status: DISCONTINUED | OUTPATIENT
Start: 2018-10-20 | End: 2018-10-20 | Stop reason: HOSPADM

## 2018-10-20 RX ORDER — LISINOPRIL AND HYDROCHLOROTHIAZIDE 20; 12.5 MG/1; MG/1
1 TABLET ORAL DAILY
Status: ON HOLD | COMMUNITY
End: 2018-10-20

## 2018-10-20 RX ORDER — ONDANSETRON 2 MG/ML
4 INJECTION INTRAMUSCULAR; INTRAVENOUS EVERY 4 HOURS PRN
Status: DISCONTINUED | OUTPATIENT
Start: 2018-10-20 | End: 2018-10-20 | Stop reason: HOSPADM

## 2018-10-20 RX ORDER — DEXTROSE MONOHYDRATE 25 G/50ML
25 INJECTION, SOLUTION INTRAVENOUS
Status: DISCONTINUED | OUTPATIENT
Start: 2018-10-20 | End: 2018-10-20

## 2018-10-20 RX ORDER — ALBUTEROL SULFATE 90 UG/1
2 AEROSOL, METERED RESPIRATORY (INHALATION) EVERY 4 HOURS PRN
Status: DISCONTINUED | OUTPATIENT
Start: 2018-10-20 | End: 2018-10-20

## 2018-10-20 RX ORDER — ACETAMINOPHEN 325 MG/1
650 TABLET ORAL ONCE
Status: COMPLETED | OUTPATIENT
Start: 2018-10-20 | End: 2018-10-20

## 2018-10-20 RX ORDER — HYDRALAZINE HYDROCHLORIDE 20 MG/ML
10 INJECTION INTRAMUSCULAR; INTRAVENOUS EVERY 4 HOURS PRN
Status: DISCONTINUED | OUTPATIENT
Start: 2018-10-20 | End: 2018-10-20 | Stop reason: HOSPADM

## 2018-10-20 RX ORDER — DICLOFENAC SODIUM 75 MG/1
75 TABLET, DELAYED RELEASE ORAL 2 TIMES DAILY
Status: DISCONTINUED | OUTPATIENT
Start: 2018-10-20 | End: 2018-10-20

## 2018-10-20 RX ORDER — FENOFIBRATE 160 MG/1
160 TABLET ORAL DAILY
Qty: 30 TAB | Refills: 1 | Status: SHIPPED | OUTPATIENT
Start: 2018-10-20 | End: 2019-01-29 | Stop reason: SDUPTHER

## 2018-10-20 RX ORDER — M-VIT,TX,IRON,MINS/CALC/FOLIC 27MG-0.4MG
1 TABLET ORAL DAILY
Qty: 30 TAB | Refills: 11 | COMMUNITY
Start: 2018-10-20 | End: 2021-08-03

## 2018-10-20 RX ORDER — GLYBURIDE 5 MG/1
10 TABLET ORAL 2 TIMES DAILY WITH MEALS
Qty: 60 TAB | Refills: 1 | Status: SHIPPED | OUTPATIENT
Start: 2018-10-20 | End: 2019-01-08 | Stop reason: SDUPTHER

## 2018-10-20 RX ORDER — LISINOPRIL 20 MG/1
20 TABLET ORAL DAILY
Qty: 30 TAB | Refills: 1 | Status: SHIPPED | OUTPATIENT
Start: 2018-10-21 | End: 2019-01-29 | Stop reason: SDUPTHER

## 2018-10-20 RX ORDER — ATORVASTATIN CALCIUM 20 MG/1
30 TABLET, FILM COATED ORAL NIGHTLY
Status: ON HOLD | COMMUNITY
End: 2018-10-20

## 2018-10-20 RX ORDER — BISACODYL 10 MG
10 SUPPOSITORY, RECTAL RECTAL
Status: DISCONTINUED | OUTPATIENT
Start: 2018-10-20 | End: 2018-10-20 | Stop reason: HOSPADM

## 2018-10-20 RX ORDER — PROMETHAZINE HYDROCHLORIDE 25 MG/1
12.5-25 SUPPOSITORY RECTAL EVERY 4 HOURS PRN
Status: DISCONTINUED | OUTPATIENT
Start: 2018-10-20 | End: 2018-10-20 | Stop reason: HOSPADM

## 2018-10-20 RX ORDER — DEXTROSE MONOHYDRATE 25 G/50ML
25 INJECTION, SOLUTION INTRAVENOUS
Status: DISCONTINUED | OUTPATIENT
Start: 2018-10-20 | End: 2018-10-20 | Stop reason: HOSPADM

## 2018-10-20 RX ORDER — ATORVASTATIN CALCIUM 20 MG/1
20 TABLET, FILM COATED ORAL DAILY
Qty: 30 TAB | Refills: 1 | Status: SHIPPED | OUTPATIENT
Start: 2018-10-20 | End: 2019-01-29 | Stop reason: SDUPTHER

## 2018-10-20 RX ORDER — LISINOPRIL 5 MG/1
20 TABLET ORAL DAILY
Status: DISCONTINUED | OUTPATIENT
Start: 2018-10-20 | End: 2018-10-20 | Stop reason: HOSPADM

## 2018-10-20 RX ORDER — M-VIT,TX,IRON,MINS/CALC/FOLIC 27MG-0.4MG
1 TABLET ORAL DAILY
Status: ON HOLD | COMMUNITY
End: 2018-10-20

## 2018-10-20 RX ORDER — POLYETHYLENE GLYCOL 3350 17 G/17G
1 POWDER, FOR SOLUTION ORAL
Status: DISCONTINUED | OUTPATIENT
Start: 2018-10-20 | End: 2018-10-20 | Stop reason: HOSPADM

## 2018-10-20 RX ORDER — AMOXICILLIN 250 MG
2 CAPSULE ORAL 2 TIMES DAILY
Status: DISCONTINUED | OUTPATIENT
Start: 2018-10-20 | End: 2018-10-20 | Stop reason: HOSPADM

## 2018-10-20 RX ORDER — FENOFIBRATE 160 MG/1
160 TABLET ORAL DAILY
Status: ON HOLD | COMMUNITY
End: 2018-10-20

## 2018-10-20 RX ORDER — ONDANSETRON 4 MG/1
4 TABLET, ORALLY DISINTEGRATING ORAL EVERY 4 HOURS PRN
Status: DISCONTINUED | OUTPATIENT
Start: 2018-10-20 | End: 2018-10-20 | Stop reason: HOSPADM

## 2018-10-20 RX ORDER — DICYCLOMINE HYDROCHLORIDE 10 MG/1
10 CAPSULE ORAL 3 TIMES DAILY
Status: ON HOLD | COMMUNITY
End: 2018-10-20

## 2018-10-20 RX ORDER — FLUTICASONE PROPIONATE 50 MCG
1 SPRAY, SUSPENSION (ML) NASAL DAILY
Status: DISCONTINUED | OUTPATIENT
Start: 2018-10-20 | End: 2018-10-20 | Stop reason: HOSPADM

## 2018-10-20 RX ORDER — PROMETHAZINE HYDROCHLORIDE 25 MG/1
12.5-25 TABLET ORAL EVERY 4 HOURS PRN
Status: DISCONTINUED | OUTPATIENT
Start: 2018-10-20 | End: 2018-10-20 | Stop reason: HOSPADM

## 2018-10-20 RX ORDER — RANITIDINE 150 MG/1
150 TABLET ORAL 2 TIMES DAILY
Qty: 60 TAB | Refills: 11 | COMMUNITY
Start: 2018-10-20 | End: 2019-07-02 | Stop reason: SDUPTHER

## 2018-10-20 RX ORDER — ASPIRIN 325 MG
325 TABLET ORAL ONCE
Status: COMPLETED | OUTPATIENT
Start: 2018-10-20 | End: 2018-10-20

## 2018-10-20 RX ORDER — DICYCLOMINE HYDROCHLORIDE 10 MG/1
10 CAPSULE ORAL 3 TIMES DAILY
Qty: 120 CAP | Refills: 1 | Status: SHIPPED | OUTPATIENT
Start: 2018-10-20 | End: 2019-09-20

## 2018-10-20 RX ORDER — OXYCODONE HYDROCHLORIDE 5 MG/1
5 TABLET ORAL
Status: DISCONTINUED | OUTPATIENT
Start: 2018-10-20 | End: 2018-10-20 | Stop reason: HOSPADM

## 2018-10-20 RX ADMIN — SENNOSIDES AND DOCUSATE SODIUM 2 TABLET: 8.6; 5 TABLET ORAL at 05:42

## 2018-10-20 RX ADMIN — OXYCODONE HYDROCHLORIDE 5 MG: 5 TABLET ORAL at 12:23

## 2018-10-20 RX ADMIN — ACETAMINOPHEN 650 MG: 325 TABLET, FILM COATED ORAL at 05:41

## 2018-10-20 RX ADMIN — REGADENOSON 0.4 MG: 0.08 INJECTION, SOLUTION INTRAVENOUS at 10:15

## 2018-10-20 RX ADMIN — ENOXAPARIN SODIUM 40 MG: 100 INJECTION SUBCUTANEOUS at 05:42

## 2018-10-20 RX ADMIN — ONDANSETRON HYDROCHLORIDE 4 MG: 2 INJECTION INTRAMUSCULAR; INTRAVENOUS at 07:35

## 2018-10-20 RX ADMIN — ASPIRIN 325 MG ORAL TABLET 325 MG: 325 PILL ORAL at 01:57

## 2018-10-20 RX ADMIN — LISINOPRIL 20 MG: 5 TABLET ORAL at 05:41

## 2018-10-20 ASSESSMENT — COGNITIVE AND FUNCTIONAL STATUS - GENERAL
CLIMB 3 TO 5 STEPS WITH RAILING: A LITTLE
DAILY ACTIVITIY SCORE: 23
WALKING IN HOSPITAL ROOM: A LITTLE
TOILETING: A LITTLE
SUGGESTED CMS G CODE MODIFIER MOBILITY: CJ
MOBILITY SCORE: 22
SUGGESTED CMS G CODE MODIFIER DAILY ACTIVITY: CI

## 2018-10-20 ASSESSMENT — PAIN SCALES - GENERAL
PAINLEVEL_OUTOF10: 4
PAINLEVEL_OUTOF10: 3
PAINLEVEL_OUTOF10: 6

## 2018-10-20 ASSESSMENT — PATIENT HEALTH QUESTIONNAIRE - PHQ9
SUM OF ALL RESPONSES TO PHQ9 QUESTIONS 1 AND 2: 0
2. FEELING DOWN, DEPRESSED, IRRITABLE, OR HOPELESS: NOT AT ALL
2. FEELING DOWN, DEPRESSED, IRRITABLE, OR HOPELESS: NOT AT ALL
1. LITTLE INTEREST OR PLEASURE IN DOING THINGS: NOT AT ALL
SUM OF ALL RESPONSES TO PHQ9 QUESTIONS 1 AND 2: 0
1. LITTLE INTEREST OR PLEASURE IN DOING THINGS: NOT AT ALL

## 2018-10-20 ASSESSMENT — LIFESTYLE VARIABLES
ALCOHOL_USE: NO
EVER_SMOKED: NEVER

## 2018-10-20 ASSESSMENT — PAIN DESCRIPTION - DESCRIPTORS: DESCRIPTORS: PRESSURE

## 2018-10-20 NOTE — PROGRESS NOTES
0715- Beside report received from Karissa MCGILL. Safety precautions in place. Call light within reach. Pt resting in bed.     0845- Pt down to stress test via wheelchair by transport.    1100- Pt back to unit. Pt family at bedside. Pt states she has mild discomfort in her shoulders and neck    1223- Pt rates pain 5/10. Medication administration complete. Pt . MD notified. Sliding scale adjusted. Will continue to monitor.     1315- Pt BS >430 MD notified.     1400- MD at bedside. BS checked with MD in room. BS trending down.

## 2018-10-20 NOTE — DISCHARGE SUMMARY
Discharge Summary    CHIEF COMPLAINT ON ADMISSION  Chief Complaint   Patient presents with   • Chest Pain   • Nausea       Reason for Admission  Chest Pain      Admission Date  10/20/2018    CODE STATUS  Full Code    HPI & HOSPITAL COURSE  This is a 52 y.o. female here with chest pain, hypertension and elevated blood sugars.  She has been off her medications for several weeks and has not refilled them.  She states that she is waiting to get a glycohemoglobin and go see her primary care provider.  She had a myocardial perfusion imaging study that did not show any evidence of ischemia.  Put the patient back on all of her usual medications and her blood pressure improved.  Her blood sugar is coming down.  I refill her medications the patient is agreeable to going to the pharmacy picking them up today to resume them.  She has an appointment scheduled with her primary care provider in [place].       Therefore, she is discharged in good and stable condition to home with close outpatient follow-up.        Discharge Date  10/20/18    FOLLOW UP ITEMS POST DISCHARGE  Primary care    DISCHARGE DIAGNOSES  Active Problems:    Diabetes mellitus type II, uncontrolled (HCC) POA: Yes    Chest pain POA: Yes    Hypertension POA: Yes  Resolved Problems:    * No resolved hospital problems. *      FOLLOW UP  No future appointments.  No follow-up provider specified.    MEDICATIONS ON DISCHARGE     Medication List      CHANGE how you take these medications      Instructions   atorvastatin 20 MG Tabs  What changed:  · how much to take  · when to take this  Commonly known as:  LIPITOR   Take 1 Tab by mouth every day.  Dose:  20 mg     lisinopril 20 MG Tabs  What changed:  · medication strength  · how much to take  Commonly known as:  PRINIVIL   Take 1 Tab by mouth every day.  Dose:  20 mg        CONTINUE taking these medications      Instructions   aspirin EC 81 MG Tbec  Commonly known as:  ECOTRIN   Take 1 Tab by mouth every day.  Dose:  81  mg     dicyclomine 10 MG Caps  Commonly known as:  BENTYL   Take 1 Cap by mouth 3 times a day.  Dose:  10 mg     Dulaglutide 1.5 MG/0.5ML Sopn  Commonly known as:  TRULICITY   Inject 1.5 mg as instructed every Friday.  Dose:  1.5 mg     fenofibrate 160 MG tablet  Commonly known as:  TRIGLIDE   Take 1 Tab by mouth every day.  Dose:  160 mg     glyBURIDE 5 MG Tabs  Commonly known as:  DIABETA   Take 2 Tabs by mouth 2 times a day, with meals.  Dose:  10 mg     raNITidine 150 MG Tabs  Commonly known as:  ZANTAC   Take 1 Tab by mouth 2 times a day.  Dose:  150 mg     SITagliptin 100 MG Tabs  Commonly known as:  JANUVIA   Take 1 Tab by mouth every day.  Dose:  100 mg     therapeutic multivitamin-minerals Tabs   Take 1 Tab by mouth every day.  Dose:  1 Tab        STOP taking these medications    lisinopril-hydrochlorothiazide 20-12.5 MG per tablet  Commonly known as:  PRINZIDE, ZESTORETIC            Allergies  Allergies   Allergen Reactions   • Morphine Itching     Rxn = unknown   Bumps all over body   • Metformin      headaches       DIET  Orders Placed This Encounter   Procedures   • Diet Order Regular     Standing Status:   Standing     Number of Occurrences:   1     Order Specific Question:   Diet:     Answer:   Regular [1]     Order Specific Question:   Miscellaneous modifications:     Answer:   No Decaf, No Caffeine(for test) [11]     Comments:   Patient to have no caffeine for 12 hours prior to exam (decaf, coffee, cola, tea, chocolate)       ACTIVITY  As tolerated.  Weight bearing as tolerated    CONSULTATIONS  none    PROCEDURES  none    LABORATORY  Lab Results   Component Value Date    SODIUM 132 (L) 10/20/2018    POTASSIUM 4.2 10/20/2018    CHLORIDE 102 10/20/2018    CO2 24 10/20/2018    GLUCOSE 378 (H) 10/20/2018    BUN 33 (H) 10/20/2018    CREATININE 0.99 10/20/2018        Lab Results   Component Value Date    WBC 8.9 10/20/2018    HEMOGLOBIN 11.5 (L) 10/20/2018    HEMATOCRIT 32.9 (L) 10/20/2018    PLATELETCT  264 10/20/2018

## 2018-10-20 NOTE — CARE PLAN
Problem: Communication  Goal: The ability to communicate needs accurately and effectively will improve  Outcome: PROGRESSING AS EXPECTED      Problem: Infection  Goal: Will remain free from infection  Outcome: PROGRESSING AS EXPECTED      Problem: Knowledge Deficit  Goal: Knowledge of disease process/condition, treatment plan, diagnostic tests, and medications will improve  Outcome: PROGRESSING AS EXPECTED      Problem: Pain Management  Goal: Pain level will decrease to patient's comfort goal  Outcome: PROGRESSING AS EXPECTED

## 2018-10-20 NOTE — PROGRESS NOTES
x2 RN Skin check performed. Documented in assessment. No skin breakdown or pressure ulcers upon admission.

## 2018-10-20 NOTE — ED NOTES
The Medication Reconciliation process has been completed by interviewing the patient who lost her meds after her move recently.      Allergies have been reviewed  Antibiotic use in 30 days - none    Home Pharmacy:  [facility] [place]

## 2018-10-20 NOTE — PROGRESS NOTES
Discussed discharge instructions. Tele and IV D/C. Belongings gathered and given to pt upon leaving the unit.  Family pickup in front. Pt walked down by CNA via Wheelchair

## 2018-10-20 NOTE — PROGRESS NOTES
Pt admitted to floor. VS stable. Pt oriented to room and shown how to operate call light. Bed low and locked. Pt informed on care plan for shift, verbalized understanding.

## 2018-10-20 NOTE — ASSESSMENT & PLAN NOTE
Asa  lipitor  1st trop is negative  ekg is noted  Cardiac stress test  High risk with DM and htn  Oxycodone prn

## 2018-10-20 NOTE — ED NOTES
"Chief Complaint   Patient presents with   • Chest Pain   • Nausea       Pt arrives with C/O left chest \"pressure\" that began yesterday around 1630.    BP (!) 165/91   Pulse 82   Temp 36.3 °C (97.4 °F)   Resp 16   Ht 1.549 m (5' 1\")   Wt 82.7 kg (182 lb 5.1 oz)   BMI 34.45 kg/m²       "

## 2018-10-20 NOTE — ED PROVIDER NOTES
CHIEF COMPLAINT  Chest pain     HPI  Sonya Wei is a 52 y.o. female who presents to the ED for the evaluation of chest pain. The patient states that she has been feeling poorly over the last couple of days. Yesterday in the afternoon she started having chest tightness and palpitations. She went to work last night and started feeling worse. She was seen by a medic at her work and was told that her BP was high. Her pain is located in the middle of her chest and over the left chest. She denies alleviating or exacerbating factors. She admits to nausea and had one episode of emesis two days ago. She denies diaphoresis but feels short of breath. She has a history of DM, HTN, and HLD. Her father passed away from a heart attack in his 60's. She has never smoked. She denies any diarrhea, URI symptoms, or urinary symptoms.      REVIEW OF SYSTEMS  See HPI for further details. All other systems are negative.     PAST MEDICAL HISTORY   has a past medical history of Anemia; Bowel habit changes; Diabetes; Diabetes (CMS-HCC) (HCC); GERD (gastroesophageal reflux disease); Healthcare maintenance (9/27/2014); High cholesterol; Hyperlipidemia; Hypertension; Infectious disease; Jaundice (1999); Psychiatric problem; and Vaginal itching (8/27/2014).    SOCIAL HISTORY  Social History     Social History Main Topics   • Smoking status: Never Smoker   • Smokeless tobacco: Never Used   • Alcohol use No   • Drug use: No   • Sexual activity: Yes     Partners: Male       SURGICAL HISTORY   has a past surgical history that includes cholecystectomy; primary c section; tubal coagulation laparoscopic bilateral; abdominal hysterectomy total; and ventral hernia repair robotic xi (N/A, 10/14/2016).    CURRENT MEDICATIONS  Home Medications    **Home medications have not yet been reviewed for this encounter**         ALLERGIES  Allergies   Allergen Reactions   • Morphine Itching     Rxn = unknown   Bumps all over body   • Metformin      headaches  "      PHYSICAL EXAM  VITAL SIGNS: BP (!) 165/91   Pulse 82   Temp 36.3 °C (97.4 °F)   Resp 16   Ht 1.549 m (5' 1\")   Wt 82.7 kg (182 lb 5.1 oz)   BMI 34.45 kg/m²    Constitutional: Alert and in no apparent distress.  HENT: Normocephalic atraumatic. Bilateral external ears normal. Nose normal. Mucous membranes are moist.  Eyes: Pupils are equal and reactive. Conjunctiva normal. Non-icteric sclera.   Neck: Normal range of motion without tenderness. Supple. No meningeal signs.  Cardiovascular: Regular rate and rhythm. No murmurs, gallops or rubs.  Thorax & Lungs: Breath sounds are clear to auscultation bilaterally. No wheezing, rhonchi or rales.  Abdomen: Soft, nontender and nondistended. No peritoneal signs noted.  Skin: Warm and dry. No rashes are noted.  Back: No bony tenderness, No CVA tenderness.   Extremities: 2+ peripheral pulses. Cap refill is less than 2 seconds. No edema, cyanosis, or clubbing.  Musculoskeletal: Good range of motion in all major joints. No tenderness to palpation or major deformities noted.   Neurologic: Alert and oriented ×3. The patient moves all 4 extremities and follows commands.  Psychiatric: Affect is anxious. Judgment appears to be intact.      DIAGNOSTIC STUDIES / PROCEDURES    EKG  EKG was performed at 00: 32.  It shows a normal sinus rhythm with a heart rate of 80.  QT and QTC are 388/448.  Axis is normal.  A right bundle branch block is noted.  Previous EKG from September 2015 was reviewed.  Today's EKG shows a new right bundle branch block.  Impression: Abnormal EKG.    LABS  Results for orders placed or performed during the hospital encounter of 10/20/18   Troponin   Result Value Ref Range    Troponin I <0.02 0.00 - 0.04 ng/mL   Btype Natriuretic Peptide   Result Value Ref Range    B Natriuretic Peptide 20 0 - 100 pg/mL   CBC with Differential   Result Value Ref Range    WBC 8.9 4.8 - 10.8 K/uL    RBC 3.80 (L) 4.20 - 5.40 M/uL    Hemoglobin 11.5 (L) 12.0 - 16.0 g/dL    " Hematocrit 32.9 (L) 37.0 - 47.0 %    MCV 86.6 81.4 - 97.8 fL    MCH 30.3 27.0 - 33.0 pg    MCHC 35.0 33.6 - 35.0 g/dL    RDW 38.7 35.9 - 50.0 fL    Platelet Count 264 164 - 446 K/uL    MPV 11.3 9.0 - 12.9 fL    Neutrophils-Polys 50.10 44.00 - 72.00 %    Lymphocytes 39.70 22.00 - 41.00 %    Monocytes 6.50 0.00 - 13.40 %    Eosinophils 2.90 0.00 - 6.90 %    Basophils 0.40 0.00 - 1.80 %    Immature Granulocytes 0.40 0.00 - 0.90 %    Nucleated RBC 0.00 /100 WBC    Neutrophils (Absolute) 4.45 2.00 - 7.15 K/uL    Lymphs (Absolute) 3.53 1.00 - 4.80 K/uL    Monos (Absolute) 0.58 0.00 - 0.85 K/uL    Eos (Absolute) 0.26 0.00 - 0.51 K/uL    Baso (Absolute) 0.04 0.00 - 0.12 K/uL    Immature Granulocytes (abs) 0.04 0.00 - 0.11 K/uL    NRBC (Absolute) 0.00 K/uL   Complete Metabolic Panel (CMP)   Result Value Ref Range    Sodium 132 (L) 135 - 145 mmol/L    Potassium 4.2 3.6 - 5.5 mmol/L    Chloride 102 96 - 112 mmol/L    Co2 24 20 - 33 mmol/L    Anion Gap 6.0 0.0 - 11.9    Glucose 378 (H) 65 - 99 mg/dL    Bun 33 (H) 8 - 22 mg/dL    Creatinine 0.99 0.50 - 1.40 mg/dL    Calcium 8.9 8.4 - 10.2 mg/dL    AST(SGOT) 27 12 - 45 U/L    ALT(SGPT) 28 2 - 50 U/L    Alkaline Phosphatase 109 (H) 30 - 99 U/L    Total Bilirubin 0.2 0.1 - 1.5 mg/dL    Albumin 3.5 3.2 - 4.9 g/dL    Total Protein 6.7 6.0 - 8.2 g/dL    Globulin 3.2 1.9 - 3.5 g/dL    A-G Ratio 1.1 g/dL   Prothrombin Time   Result Value Ref Range    PT 12.3 12.0 - 14.6 sec    INR 0.92 0.87 - 1.13   APTT   Result Value Ref Range    APTT 28.6 24.7 - 36.0 sec   Lipase   Result Value Ref Range    Lipase 52 7 - 58 U/L   ESTIMATED GFR   Result Value Ref Range    GFR If African American >60 >60 mL/min/1.73 m 2    GFR If Non African American 58 (A) >60 mL/min/1.73 m 2   Troponin   Result Value Ref Range    Troponin I <0.02 0.00 - 0.04 ng/mL   EKG   Result Value Ref Range    Report       [facility] Emergency Dept.    Test Date:  2018-10-20  Pt Name:    MATTIE SANDOVAL                Department: [facility]  MRN:        [id number]                      Room:       -ROOM 5  Gender:     Female                       Technician: MELODIE  :        [id number]                   Requested By:MAR SOUSA  Order #:    [id number]                    Reading MD:    Measurements  Intervals                                Axis  Rate:       80                           P:          69  DC:         147                          QRS:        -1  QRSD:       136                          T:          43  QT:         388  QTc:        448    Interpretive Statements  Sinus rhythm  Right bundle branch block  Compared to ECG 10/05/2016 14:51:03  Right bundle-branch block now present       RADIOLOGY  DX-CHEST-LIMITED (1 VIEW)   Final Result         1.  No acute cardiopulmonary disease.      NM-CARDIAC STRESS TEST    (Results Pending)     COURSE & MEDICAL DECISION MAKING  Pertinent Labs & Imaging studies reviewed. (See chart for details)    This is a 52-year-old female presenting with chest pain with several significant risk factors including diabetes, hypertension, hyperlipidemia, and obesity.  Initial vital signs were stable and her physical exam was notable for obesity and an anxious affect however she had good pulses in all 4 extremities and was otherwise well-appearing.  EKG did not show evidence of acute ischemia and chest x-ray did not reveal any acute cardiopulmonary disease.  Her first troponin was negative.  She was noted to be hyperglycemic but did not have any evidence of DKA. The plan was made to admit for stress testing and close observation. She was given an ASA and remained stable while in the ED.     2:35 AM - I discussed the case with Dr Aguirre, hospitalist, who agreed with the plan and accepted the patient.    FINAL IMPRESSION  1. Chest pain, unspecified type    2.      Hypergycemia      -ADMIT-           Electronically signed by: Mar Sousa, 10/20/2018 1:45 AM  ED Provider Note

## 2018-10-20 NOTE — H&P
DATE OF ADMISSION:  10/20/2018    HISTORY OF PRESENT ILLNESS:  This is a 52-year-old female who comes to the   emergency room with a complaint of chest pain.  The patient states that chest   pain has started yesterday morning when she went to work.  The patient states   that chest pain is mostly in the center of the chest with radiation to the   left shoulder and also to the neck and also to the right shoulder as well.    The patient states that the pain is throbbing pain, has been persistent all   day.  The patient states that pain was getting worse, decided to come to the   emergency room.  The patient also states that there has been nausea and also   vomiting.  Denies any diaphoresis.  Patient also denies any shortness of   breath.  Patient also denies any fever or chills.  The patient states that   pain got worse last night and decided to come to the emergency room.    PAST MEDICAL HISTORY:  The patient has a past medical history of diabetes,   hypertension, hyperlipidemia, and arthritis.    SOCIAL HISTORY:  Denies any tobacco, alcohol, or drug use.    FAMILY HISTORY:  Reviewed, positive for heart disease in her father who   apparently  of heart disease.    PAST SURGICAL HISTORY:  Hysterectomy.    MEDICATIONS:  Lisinopril 10 mg p.o. daily, Januvia 100 mg p.o. daily, Lipitor   10 mg p.o. daily, Zanaflex is 1 tablet q. 6 hours p.r.n., omeprazole 20 mg   p.o. daily, glyburide 5 mg p.o. daily, Lipitor 10 mg p.o. daily, diclofenac 75   mg p.o. 2 times a day, albuterol p.r.n., Flonase is 1 spray every day in each   nostril, vitamin D3 5000 units p.o. daily, aspirin ____ 81 mg p.o. daily.    REVIEW OF SYSTEMS:  All 14 systems reviewed, all of them were negative except   what I mentioned in the history of present illness.    PHYSICAL EXAMINATION:  VITAL SIGNS:  Temperature 36.3, pulse of 82, respiratory rate of 16, blood   pressure 165/91, O2 saturation is 95%.  GENERAL APPEARANCE:  Patient is awake, alert, oriented  x3, in mild distress   secondary to pain.  NEUROLOGIC:  Cranial nerves II-XII intact.  HEENT:  Neck is supple.  Oral mucosa is dry.  No jugular venous distention   noted.  No icterus noted in both eyes.  CARDIOVASCULAR:  S1, S2, regular.  LUNGS:  Decreased breath sounds, otherwise clear.  ABDOMEN:  Obese, soft, and nontender.  LOWER EXTREMITIES:  No edema or rash noted.    LABORATORY DATA:  WBC of 8.9, H and H 11.5 and 32.9, platelets of 264,000.    Sodium is 132, potassium 4.2, chloride 102, bicarbonate 24, glucose of 378,   BUN of 33, creatinine 0.99, troponin less than 0.02.    IMAGING:  Chest x-ray was done and is negative.  EKG was done that shows   patient is in sinus rhythm at a rate of 80.  Rhythm is regular.  Axis is   normal  Also, right bundle-branch block noted.  This is new from the previous   EKG that was done on 10/05/2016.    ASSESSMENT AND PLAN:  This is a 52-year-old female with chest pain.  1.  Patient is at high risk for diabetes and high blood pressure.  2.  Aspirin 81 mg p.o. daily.  3.  Lipitor 80 mg p.o. daily.  4.  First troponin is negative.  5.  We will do troponin at 7:15 a.m.  6.  I will place order for cardiac stress test.  7.  Oxycodone IR 5 mg p.o. q. 3 hours p.r.n. pain.    For diabetes:  1.  Patient ____ that she is noncompliant with her medications.  2.  Sliding scale insulin insulin.  3.  Check hemoglobin A1c.  4.  Start the patient on Lantus 10 units subcutaneous daily, first dose now.  5.  Home medications will be on hold at this time.    For hypertension:  1.  Lisinopril 10 mg p.o. daily.  2.  Hydralazine 10 mg IV q. 4 hours p.r.n. if systolic blood pressure above   150.    For deep venous thrombosis prophylaxis, patient is on Lovenox 40 mg   subcutaneously daily.       ____________________________________     MD ZEINAB Castañeda / TATYANA    DD:  10/20/2018 03:21:49  DT:  10/20/2018 04:39:30    D#:  [id number]  Job#:  [id number]

## 2018-10-20 NOTE — PROGRESS NOTES
"Nursing care plan includes knowledge deficit, potential for discomfort, potential for compromised cardiac output. POC includes teaching, comfort measures and reassurance, and access to code cart, cardiology stand by and availability of rapid response team. Pt verbalizes good understanding of benefits and risks of pharmacological cardiac stress test. Informed consent obtained. Lexiscan given, pt developed the following symptoms SOB \"weird\" dizzy stomach cramps VS stable, major symptoms resolved. To waiting room, caffeinated fluids and/or snacks given, awaiting second scan. Nursing goals met.  "

## 2018-10-20 NOTE — CARE PLAN
Problem: Communication  Goal: The ability to communicate needs accurately and effectively will improve  Outcome: PROGRESSING AS EXPECTED  The ability to communicate needs accurately and effectively will improve    Problem: Safety  Goal: Will remain free from injury  Outcome: PROGRESSING AS EXPECTED  Hourly rounding, Call light within reach, Fall protocol and Safety precautions in place. Pt verbalized understanding.

## 2018-10-20 NOTE — PROGRESS NOTES
Telemetry Report: pt has been SR.  HR: 70s  Measurements: (.18/.12/.38)  Ectopy: none    Measurements and updates per Marcella WANG

## 2018-10-23 ENCOUNTER — PATIENT OUTREACH (OUTPATIENT)
Dept: HEALTH INFORMATION MANAGEMENT | Facility: OTHER | Age: 53
End: 2018-10-23

## 2018-10-23 NOTE — PROGRESS NOTES
10/23/18 8:55am:  CM post discharge outreach call first attempt.   left requesting return call to  at [phone].    10/23/18 4:15pm:  Post discharge call completed.

## 2018-10-25 ENCOUNTER — OFFICE VISIT (OUTPATIENT)
Dept: MEDICAL GROUP | Facility: MEDICAL CENTER | Age: 53
End: 2018-10-25
Payer: COMMERCIAL

## 2018-10-25 VITALS
DIASTOLIC BLOOD PRESSURE: 72 MMHG | TEMPERATURE: 97.6 F | SYSTOLIC BLOOD PRESSURE: 116 MMHG | WEIGHT: 184 LBS | HEART RATE: 82 BPM | RESPIRATION RATE: 14 BRPM | BODY MASS INDEX: 34.74 KG/M2 | OXYGEN SATURATION: 96 % | HEIGHT: 61 IN

## 2018-10-25 DIAGNOSIS — Z09 HOSPITAL DISCHARGE FOLLOW-UP: ICD-10-CM

## 2018-10-25 DIAGNOSIS — I10 ESSENTIAL HYPERTENSION: ICD-10-CM

## 2018-10-25 DIAGNOSIS — R07.9 CHEST PAIN, UNSPECIFIED TYPE: ICD-10-CM

## 2018-10-25 DIAGNOSIS — R10.10 UPPER ABDOMINAL PAIN: ICD-10-CM

## 2018-10-25 DIAGNOSIS — E11.65 UNCONTROLLED TYPE 2 DIABETES MELLITUS WITH HYPERGLYCEMIA (HCC): ICD-10-CM

## 2018-10-25 PROCEDURE — 99215 OFFICE O/P EST HI 40 MIN: CPT | Performed by: FAMILY MEDICINE

## 2018-10-25 NOTE — PROGRESS NOTES
Subjective:     Sonya Wei is a 52 y.o. female who presents for Hospital Follow-up.  Chart and discharge summary reviewed.   Date of discharge 10/20/18.  48- hour post discharge RN call completed on 10/23/18 and documented in the medical record by Areli Moreno RN:   POST DISCHARGE CALL:  Discharge Date:10/20/2018   Date of Outreach Call: 10/23/2018  3:50 PM  Now that you're home, how are you doing? Fair  Comment:Pt reports she still has some pain in mid  epigastric area. States pain is below her bra. Reports she  has been burping. States pain is better than when  hospitalized.  Do you have questions about your medications? No    Did you fill your medications? Yes    Do you have a follow-up appointment scheduled?Yes  Comment:Post discharge clinic on 10/25    Discharging Department: [facility]    Number of Attempts: 2  Current or previous attempts completed within two business days of discharge? Yes  Provided education regarding treatment plan, medication, self-management, ADLs? Yes  Comment:ER precautions reviewed with patient if any  worsening symptoms. Pt states blood sugar reading today 230.  Encourged pt to bring log of readings to appt.  Has patient completed Advance Directive? If yes, advise them to bring to appointment. No  Care Manager phone number provided? Yes  Is there anything else I can help you with? No        HPI: Recently hospitalized for chest pain, nausea, uncontrolled hypertension and diabetes.  Cardiac stress test was negative for ischemia.  Patient had been off of all of her medications for about a month because she lost them during a move.  She says they were 90-day supplies so she was unable to get refills.  Her hemoglobin A1c was 8.6.  She was placed back on all of her medications.  She has checked her blood sugars a couple of times since discharge.  One time it was 130, another time it was 200.    Since returning home, patient reports feeling better but she still has  the same nausea and upper abdominal pain that she had when she went into the hospital.  She is on Zantac 150 mg twice daily.  She has had an endoscopy in the past.  The pain is more of an ache and accompanied by a lot of burping.  Eating makes it worse.  She has had her gallbladder removed.     The patient has questions regarding hospitalization or discharge: All questions are answered  The patient denies weakness; denies difficulty taking care of self at home.  Patient reports taking medications as instructed.    Current Outpatient Prescriptions   Medication Sig Dispense Refill   • aspirin EC (ECOTRIN) 81 MG Tablet Delayed Response Take 1 Tab by mouth every day. 30 Tab    • dicyclomine (BENTYL) 10 MG Cap Take 1 Cap by mouth 3 times a day. 120 Cap 1   • [START ON 10/26/2018] Dulaglutide (TRULICITY) 1.5 MG/0.5ML Solution Pen-injector Inject 1.5 mg as instructed every Friday. 1 PEN 1   • fenofibrate (TRIGLIDE) 160 MG tablet Take 1 Tab by mouth every day. 30 Tab 1   • atorvastatin (LIPITOR) 20 MG Tab Take 1 Tab by mouth every day. 30 Tab 1   • glyBURIDE (DIABETA) 5 MG Tab Take 2 Tabs by mouth 2 times a day, with meals. 60 Tab 1   • lisinopril (PRINIVIL) 20 MG Tab Take 1 Tab by mouth every day. 30 Tab 1   • SITagliptin (JANUVIA) 100 MG Tab Take 1 Tab by mouth every day. 30 Tab 1   • therapeutic multivitamin-minerals (THERAGRAN-M) Tab Take 1 Tab by mouth every day. 30 Tab 11   • raNITidine (ZANTAC) 150 MG Tab Take 1 Tab by mouth 2 times a day. 60 Tab 11     No current facility-administered medications for this visit.        Allergies:   Morphine and Metformin    Social History:  Social History   Substance Use Topics   • Smoking status: Never Smoker   • Smokeless tobacco: Never Used   • Alcohol use No        Family History:  Family History   Problem Relation Age of Onset   • Diabetes Mother    • Hyperlipidemia Mother    • Diabetes Father    • Hypertension Father    • Hyperlipidemia Father        Past Medical History:  "  Diagnosis Date   • Anemia    • Bowel habit changes     constipation   • Diabetes    • Diabetes (HCC)    • GERD (gastroesophageal reflux disease)    • Healthcare maintenance 9/27/2014    Mammogram: Due   • High cholesterol    • Hyperlipidemia    • Hypertension    • Infectious disease     Cold 10/2016   • Jaundice 1999   • Psychiatric problem     depression and anxiety   • Vaginal itching 8/27/2014    With anal itching X 1 month Getting worse Burning Min vag disch       Surgical History  Past Surgical History:   Procedure Laterality Date   • VENTRAL HERNIA REPAIR ROBOTIC XI N/A 10/14/2016    Procedure: VENTRAL HERNIA REPAIR ROBOTIC XI FOR INCISIONAL W/MESH;  Surgeon: Edi Mendoza M.D.;  Location: SURGERY [facility];  Service:    • ABDOMINAL HYSTERECTOMY TOTAL      still has ovaries   • CHOLECYSTECTOMY     • PRIMARY C SECTION     • TUBAL COAGULATION LAPAROSCOPIC BILATERAL         ROS:    Constitutional:  Denies fever, chills, night sweats, fatigue or malaise  HENT: Denies head congestion, ear pain or drainage, decreased hearing, sore throat  Eyes: Denies visual changes, eye drainage or redness, eye pain  Neck: Denies pain, swollen glands, decreased range of motion  Lungs: Denies shortness of breath, wheezing, cough  Cardiovascular: Denies chest pain, orthopnea, lower extremity edema, palpitations  Abdominal: Denies change in bowel or bladder habits, vomiting.  She has nausea and upper abdominal pain as described above.  Musculoskeletal: Denies back pain, joint pains, decreased range of motion  Endocrine: Denies unexplained weight changes, heat or cold intolerance, hair loss, polyuria or polydipsia  Neurological: Denies dizziness, headache, confusion, focal weakness or numbness, memory loss  Psychiatric: Denies depression, anxiety, insomnia       Objective:     Blood pressure 116/72, pulse 82, temperature 36.4 °C (97.6 °F), temperature source Temporal, resp. rate 14, height 1.549 m (5' 1\"), weight 83.5 kg (184 " lb), SpO2 96 %, not currently breastfeeding.     Physical Exam:    GEN:  Alert, oriented, in no distress  HEENT:  PERRLA, EOMI  NECK;  Supple without adenopathy   LUNGS:  Clear to auscultation without rales, rhonci, or wheezes.  CV:  Heart RRR without murmurs or S3 or S4  ABD:  Soft, nondistended, mild midepigastric tenderness to palpation  EXT:  Without cyanosis, clubbing, or edema.  NEURO:  Cranial nerves II through XII intact.  Motor function and sensation intact.  SKIN: No rashes or suspicious lesions.  PSYCH:  Behavior is appropriate.      Assessment and Plan:     1. Hospital follow-up:   Hospitalization and results reviewed with patient. High risk conditions requiring teaching or care coordination were identified and addressed.The patient demonstrate understanding of admission and underlying conditions. The patient understands discharge instructions and when to seek medical attention. Medications reviewed including instructions regarding high risk medications, dosing and side effects.    The patient is able to safely adhere to ADL/IADL, treatment and medication regimen, self-manage of high-risk conditions? Yes  The patient requires physical therapy/home health/DME referral? No   The patient requires referral to care coordination/behavioral health/social work?  No   Patient requires referral for pharmacy consult? No     2. Uncontrolled type 2 diabetes mellitus with hyperglycemia (HCC)  -She is continuing Trulicity, glyburide, and Januvia.  She will continue to monitor blood sugar.    3. Chest pain, unspecified type  -Resolved    4. Essential hypertension  -Well-controlled on lisinopril    5. Upper abdominal pain  -She is currently taking ranitidine.  I recommended a trial of switching from ranitidine to omeprazole 20 mg daily.  She says she has some at home.  - US-ABDOMEN COMPLETE SURVEY; Future        Medication Reconciliation  Medication list at end of encounter:   Current Outpatient Prescriptions    Medication Sig Dispense Refill   • aspirin EC (ECOTRIN) 81 MG Tablet Delayed Response Take 1 Tab by mouth every day. 30 Tab    • dicyclomine (BENTYL) 10 MG Cap Take 1 Cap by mouth 3 times a day. 120 Cap 1   • [START ON 10/26/2018] Dulaglutide (TRULICITY) 1.5 MG/0.5ML Solution Pen-injector Inject 1.5 mg as instructed every Friday. 1 PEN 1   • fenofibrate (TRIGLIDE) 160 MG tablet Take 1 Tab by mouth every day. 30 Tab 1   • atorvastatin (LIPITOR) 20 MG Tab Take 1 Tab by mouth every day. 30 Tab 1   • glyBURIDE (DIABETA) 5 MG Tab Take 2 Tabs by mouth 2 times a day, with meals. 60 Tab 1   • lisinopril (PRINIVIL) 20 MG Tab Take 1 Tab by mouth every day. 30 Tab 1   • SITagliptin (JANUVIA) 100 MG Tab Take 1 Tab by mouth every day. 30 Tab 1   • therapeutic multivitamin-minerals (THERAGRAN-M) Tab Take 1 Tab by mouth every day. 30 Tab 11   • raNITidine (ZANTAC) 150 MG Tab Take 1 Tab by mouth 2 times a day. 60 Tab 11     No current facility-administered medications for this visit.        Primary care follow-up:    Recommended followup:  With new PCP Nu Eason M.D.   Future Appointments       Provider Department Center    11/21/2018 1:20 PM Nu Eason M.D. [facility]          Patient Instruction  Patient provided with educational material on discharge diagnosis and management of symptoms/red flags. Patient instructed to keep follow-up appointments and to bring written questions and and actual medications to each office visit. Patient instructed to call PCP/specialist with any problems/questions/concerns. Patient verbalizes understanding and has no further questions at this time.    Total of 40 minutes face-to-face time was spent with patient, with greater than 50% of the time spent in counseling regarding treatment of her diabetes and coordination of care.

## 2018-10-25 NOTE — PATIENT INSTRUCTIONS
If you need further assistance, or have any questions; concerns or lingering symptoms before seeing your Primary Care Provider or specialist.     Do not hesitate to contact us.     Please contact us at the Post-Hospital Follow Up Program at [phone] and ask for the Medical Assistant for Dr Hester.   Our offices hours are Monday-Friday 8 am-5 pm.

## 2019-01-08 ENCOUNTER — OFFICE VISIT (OUTPATIENT)
Dept: URGENT CARE | Facility: PHYSICIAN GROUP | Age: 54
End: 2019-01-08
Payer: COMMERCIAL

## 2019-01-08 VITALS
HEART RATE: 87 BPM | TEMPERATURE: 98.3 F | RESPIRATION RATE: 16 BRPM | WEIGHT: 193 LBS | OXYGEN SATURATION: 99 % | SYSTOLIC BLOOD PRESSURE: 146 MMHG | BODY MASS INDEX: 36.47 KG/M2 | DIASTOLIC BLOOD PRESSURE: 90 MMHG

## 2019-01-08 DIAGNOSIS — E11.9 TYPE 2 DIABETES MELLITUS WITHOUT COMPLICATION, WITHOUT LONG-TERM CURRENT USE OF INSULIN (HCC): ICD-10-CM

## 2019-01-08 DIAGNOSIS — R11.0 NAUSEA: ICD-10-CM

## 2019-01-08 DIAGNOSIS — R19.7 DIARRHEA, UNSPECIFIED TYPE: ICD-10-CM

## 2019-01-08 LAB — GLUCOSE BLD-MCNC: 294 MG/DL (ref 70–100)

## 2019-01-08 PROCEDURE — 82962 GLUCOSE BLOOD TEST: CPT | Performed by: PHYSICIAN ASSISTANT

## 2019-01-08 PROCEDURE — 99214 OFFICE O/P EST MOD 30 MIN: CPT | Performed by: PHYSICIAN ASSISTANT

## 2019-01-08 RX ORDER — ONDANSETRON 4 MG/1
4 TABLET, ORALLY DISINTEGRATING ORAL EVERY 8 HOURS PRN
Qty: 20 TAB | Refills: 0 | Status: SHIPPED | OUTPATIENT
Start: 2019-01-08 | End: 2019-06-21

## 2019-01-08 RX ORDER — ONDANSETRON 4 MG/1
4 TABLET, ORALLY DISINTEGRATING ORAL ONCE
Status: COMPLETED | OUTPATIENT
Start: 2019-01-08 | End: 2019-01-08

## 2019-01-08 RX ORDER — GLYBURIDE 5 MG/1
10 TABLET ORAL 2 TIMES DAILY WITH MEALS
Qty: 60 TAB | Refills: 0 | Status: SHIPPED | OUTPATIENT
Start: 2019-01-08 | End: 2019-01-29 | Stop reason: SDUPTHER

## 2019-01-08 RX ADMIN — ONDANSETRON 4 MG: 4 TABLET, ORALLY DISINTEGRATING ORAL at 15:52

## 2019-01-08 NOTE — PROGRESS NOTES
Chief Complaint   Patient presents with   • Diarrhea     x3 days, nausea, dizzy       HISTORY OF PRESENT ILLNESS: Patient is a 53 y.o. female who presents today for the following:    Diarrhea x 3 days  Getting up 3-4 times/night; during the day, 6-8 episodes; watery without blood  Nausea without vomiting   Intermittent dizziness  Burping a lot - chronic, 2 years  Reports having intermittent loose stool/constipation over the last 2 years  Hasn't seen GI; last c-scope was about a year ago; wasn't told it was abnormal  PCP - Dr. Cortes  Last checked BG: October 2018  Out of diabetes medication - 2-3 weeks  Denies recent travel, abx, bad food, sick contacts     Patient Active Problem List    Diagnosis Date Noted   • Incisional hernia 10/14/2016   • Arthritis 05/06/2016   • Hypertension 10/01/2015   • Pain in the chest 09/30/2015   • Hyperglycemia 09/30/2015   • Chest pain 02/07/2015   • Healthcare maintenance 09/27/2014   • Vaginal itching 08/27/2014   • Left arm pain 10/23/2013   • Hyponatremia 10/23/2013   • Vitamin D deficiency 09/24/2012   • Microalbuminuria 09/24/2012   • Diabetes mellitus type II, uncontrolled (HCC) 08/27/2012   • Hyperlipidemia 08/27/2012       Allergies:Morphine and Metformin    Current Outpatient Prescriptions Ordered in [facility]   Medication Sig Dispense Refill   • ondansetron (ZOFRAN ODT) 4 MG TABLET DISPERSIBLE Take 1 Tab by mouth every 8 hours as needed for Nausea. 20 Tab 0   • [START ON 1/11/2019] Dulaglutide (TRULICITY) 1.5 MG/0.5ML Solution Pen-injector Inject 1.5 mg as instructed every Friday. 1 PEN 0   • glyBURIDE (DIABETA) 5 MG Tab Take 2 Tabs by mouth 2 times a day, with meals. 60 Tab 0   • SITagliptin (JANUVIA) 100 MG Tab Take 1 Tab by mouth every day. 30 Tab 0   • aspirin EC (ECOTRIN) 81 MG Tablet Delayed Response Take 1 Tab by mouth every day. 30 Tab    • dicyclomine (BENTYL) 10 MG Cap Take 1 Cap by mouth 3 times a day. 120 Cap 1   • fenofibrate (TRIGLIDE) 160 MG tablet Take 1 Tab by  mouth every day. 30 Tab 1   • atorvastatin (LIPITOR) 20 MG Tab Take 1 Tab by mouth every day. 30 Tab 1   • lisinopril (PRINIVIL) 20 MG Tab Take 1 Tab by mouth every day. 30 Tab 1   • raNITidine (ZANTAC) 150 MG Tab Take 1 Tab by mouth 2 times a day. 60 Tab 11   • therapeutic multivitamin-minerals (THERAGRAN-M) Tab Take 1 Tab by mouth every day. 30 Tab 11     Current Facility-Administered Medications Ordered in Epic   Medication Dose Route Frequency Provider Last Rate Last Dose   • ondansetron (ZOFRAN ODT) dispertab 4 mg  4 mg Oral Once Serena Ariza PLoriALori-MAKI           Past Medical History:   Diagnosis Date   • Anemia    • Bowel habit changes     constipation   • Diabetes    • Diabetes (HCC)    • GERD (gastroesophageal reflux disease)    • Healthcare maintenance 2014    Mammogram: Due   • High cholesterol    • Hyperlipidemia    • Hypertension    • Infectious disease     Cold 10/2016   • Jaundice    • Psychiatric problem     depression and anxiety   • Vaginal itching 2014    With anal itching X 1 month Getting worse Burning Min vag disch       Social History   Substance Use Topics   • Smoking status: Never Smoker   • Smokeless tobacco: Never Used   • Alcohol use No       Family Status   Relation Status   • Mo Alive   • Fa      Family History   Problem Relation Age of Onset   • Diabetes Mother    • Hyperlipidemia Mother    • Diabetes Father    • Hypertension Father    • Hyperlipidemia Father        Review of Systems:    Constitutional ROS: No unexpected change in weight, No weakness, No fatigue  Eye ROS: No recent significant change in vision, No eye pain, redness, discharge  Ear ROS: No drainage, No tinnitus or vertigo, No recent change in hearing  Mouth/Throat ROS: No teeth or gum problems, No bleeding gums, No tongue complaints  Neck ROS: No swollen glands, No significant pain in neck  Pulmonary ROS: No chronic cough, sputum, or hemoptysis, No dyspnea on exertion, No wheezing  Cardiovascular  ROS: No diaphoresis, No edema, No palpitations  Musculoskeletal/Extremities ROS: No peripheral edema, No pain, redness or swelling on the joints  Hematologic/Lymphatic ROS: No chills, No night sweats, No weight loss  Skin/Integumentary ROS: No edema, No evidence of rash, No itching      Exam:  Blood pressure 146/90, pulse 87, temperature 36.8 °C (98.3 °F), resp. rate 16, weight 87.5 kg (193 lb), SpO2 99 %, not currently breastfeeding.  General: Well developed, well nourished. No distress.  HENT: Head is grossly normal.  Pulmonary: Unlabored respiratory effort. Lungs clear to auscultation, no wheezes, no rhonchi.  Cardiovascular: Regular rate and rhythm without murmur.    Abdomen: Soft, non-tender, nondistended. No hepatosplenomegaly.  Bowel sounds within normal limits.  Neurologic: Grossly nonfocal. No facial asymmetry noted.  Skin: Warm, dry, good turgor. No rashes in visible areas.   Psych: Normal mood. Alert and oriented x3. Judgment and insight is normal.    Glucose: 294    Zofran 4 mg ODT    Assessment/Plan:    1. Diarrhea, unspecified type   Discussed likely viral etiology.  Use all medication as directed.  Leslie diet.  Drink plenty fluids.. Follow up for worsening or persistent symptoms.    2. Nausea  ondansetron (ZOFRAN ODT) 4 MG TABLET DISPERSIBLE    ondansetron (ZOFRAN ODT) dispertab 4 mg   3. Type 2 diabetes mellitus without complication, without long-term current use of insulin ([facility])   Refilling patient's medications for 1 month while she is waiting to reestablish with primary care in this clinic. POCT glucose    Dulaglutide (TRULICITY) 1.5 MG/0.5ML Solution Pen-injector    glyBURIDE (DIABETA) 5 MG Tab    SITagliptin (JANUVIA) 100 MG Tab

## 2019-01-29 ENCOUNTER — OFFICE VISIT (OUTPATIENT)
Dept: MEDICAL GROUP | Facility: PHYSICIAN GROUP | Age: 54
End: 2019-01-29
Payer: COMMERCIAL

## 2019-01-29 VITALS
RESPIRATION RATE: 12 BRPM | OXYGEN SATURATION: 99 % | WEIGHT: 185 LBS | SYSTOLIC BLOOD PRESSURE: 142 MMHG | HEART RATE: 84 BPM | TEMPERATURE: 98.1 F | HEIGHT: 61 IN | DIASTOLIC BLOOD PRESSURE: 90 MMHG | BODY MASS INDEX: 34.93 KG/M2

## 2019-01-29 DIAGNOSIS — Z12.11 COLON CANCER SCREENING: ICD-10-CM

## 2019-01-29 DIAGNOSIS — E55.9 VITAMIN D DEFICIENCY: ICD-10-CM

## 2019-01-29 DIAGNOSIS — E11.9 TYPE 2 DIABETES MELLITUS WITHOUT COMPLICATION, WITHOUT LONG-TERM CURRENT USE OF INSULIN (HCC): ICD-10-CM

## 2019-01-29 DIAGNOSIS — E78.5 HYPERLIPIDEMIA, UNSPECIFIED HYPERLIPIDEMIA TYPE: ICD-10-CM

## 2019-01-29 DIAGNOSIS — K21.9 GASTROESOPHAGEAL REFLUX DISEASE, ESOPHAGITIS PRESENCE NOT SPECIFIED: ICD-10-CM

## 2019-01-29 DIAGNOSIS — Z12.39 BREAST CANCER SCREENING: ICD-10-CM

## 2019-01-29 DIAGNOSIS — I10 ESSENTIAL HYPERTENSION: ICD-10-CM

## 2019-01-29 DIAGNOSIS — L60.0 INGROWN TOENAIL: ICD-10-CM

## 2019-01-29 PROCEDURE — 99214 OFFICE O/P EST MOD 30 MIN: CPT | Performed by: NURSE PRACTITIONER

## 2019-01-29 RX ORDER — LISINOPRIL 20 MG/1
20 TABLET ORAL DAILY
Qty: 90 TAB | Refills: 1 | Status: SHIPPED | OUTPATIENT
Start: 2019-01-29 | End: 2019-09-10 | Stop reason: SDUPTHER

## 2019-01-29 RX ORDER — GLYBURIDE 5 MG/1
10 TABLET ORAL 2 TIMES DAILY WITH MEALS
Qty: 60 TAB | Refills: 0 | Status: SHIPPED | OUTPATIENT
Start: 2019-01-29 | End: 2019-06-27 | Stop reason: SDUPTHER

## 2019-01-29 RX ORDER — FENOFIBRATE 160 MG/1
160 TABLET ORAL DAILY
Qty: 90 TAB | Refills: 1 | Status: SHIPPED | OUTPATIENT
Start: 2019-01-29 | End: 2019-08-29 | Stop reason: SDUPTHER

## 2019-01-29 RX ORDER — ATORVASTATIN CALCIUM 20 MG/1
20 TABLET, FILM COATED ORAL DAILY
Qty: 90 TAB | Refills: 1 | Status: SHIPPED | OUTPATIENT
Start: 2019-01-29 | End: 2019-09-03 | Stop reason: SDUPTHER

## 2019-01-29 ASSESSMENT — PAIN SCALES - GENERAL: PAINLEVEL: NO PAIN

## 2019-01-29 ASSESSMENT — PATIENT HEALTH QUESTIONNAIRE - PHQ9: CLINICAL INTERPRETATION OF PHQ2 SCORE: 0

## 2019-01-29 NOTE — ASSESSMENT & PLAN NOTE
Patient reports this is a chronic health problem.  She takes  januvia 100 mg daily, trulicity 1.5 mg a week, glyburide 5 mg 2 tabs twice a day.  Doesn't take blood sugar at home.  Has glucometer.   Vision changes for reading.  Upcoming appointment with eye doctor.  Hgb a1c 8.6 in 10/2018 - had run out of medication at that time for 2 months.  Patient requesting refills of medication.  She was given 1 month refills at urgent care appointment 3 weeks ago.  Patient will start monitoring blood glucose at home.  Will bring readings to next appointment.  Refills have been sent into her pharmacy.  Denies numbness and tingling, dysuria, polyuria

## 2019-01-29 NOTE — ASSESSMENT & PLAN NOTE
Patient reports this is been a problem for over 2 years. She reports burping, epigastric pain, and nausea every time she eats.  Does not matter if it is a cracker or a hamburger.  She was taking ranitidine without relief.  She has recently started omeprazole as needed.  Will refer to gastroenterology for evaluation.

## 2019-01-29 NOTE — ASSESSMENT & PLAN NOTE
Patient reports left great toe tenderness.  Had some white discharge from lateral nail bed a couple days ago.  Patient reports she put a warm pack on it and drained it.  She reports it is better now but .  Upon exam today, mildly pink and swollen lateral aspect of the nailbed.  Will prescribe you mupirocin ointment and will refer to podiatry for evaluation of toenail removal.

## 2019-01-29 NOTE — ASSESSMENT & PLAN NOTE
Patient reports this is a chronic health problem for which she usually takes atorvastatin 20 mg daily.  She reports she has been out of atorvastatin for 2 months.  Patient reports she was tolerating medication, denies muscle aches or weakness.  She is requesting refill.  We reviewed lifestyle measures in addition to medication.  Refill has been sent to pharmacy.  Will get updated lipid profile.

## 2019-01-29 NOTE — PROGRESS NOTES
CC: To establish care and for diabetes, hyperlipidemia, other health problems    HISTORY OF THE PRESENT ILLNESS: Patient is a 53 y.o. female. This pleasant patient is here today to establish care and for evaluation management of the following health problems.    Health Maintenance: Due    Diabetes mellitus type II, uncontrolled (HCC)  Patient reports this is a chronic health problem.  She takes  januvia 100 mg daily, trulicity 1.5 mg a week, glyburide 5 mg 2 tabs twice a day.  Doesn't take blood sugar at home.  Has glucometer.   Vision changes for reading.  Upcoming appointment with eye doctor.  Hgb a1c 8.6 in 10/2018 - had run out of medication at that time for 2 months.  Patient requesting refills of medication.  She was given 1 month refills at urgent care appointment 3 weeks ago.  Patient will start monitoring blood glucose at home.  Will bring readings to next appointment.  Refills have been sent into her pharmacy.  Denies numbness and tingling, dysuria, polyuria    Hyperlipidemia  Patient reports this is a chronic health problem for which she usually takes atorvastatin 20 mg daily.  She reports she has been out of atorvastatin for 2 months.  Patient reports she was tolerating medication, denies muscle aches or weakness.  She is requesting refill.  We reviewed lifestyle measures in addition to medication.  Refill has been sent to pharmacy.  Will get updated lipid profile.      Vitamin D deficiency  History of vitamin d deficiency.  Does not take supplemental vitamin D.  Advised to take vitamin d 2000 units a day..  We will get updated vitamin D level.    Hypertension  Patient reports this is a chronic health problem.  Patient reports she was taking lisinopril 20 mg up until 3 months ago when she ran out.  She reports she was tolerating medication, denies cough or lightheadedness.  Blood pressure today is 142/90.  She does not take blood pressure at home.  She reports some increase in frequency of headaches.   Refill for lisinopril sent to pharmacy.  Patient will monitor blood pressure when she has at the store and bring in readings to next appointment.  ER precautions reviewed with patient. The patient denies chest pain, shortness of breath, epistaxis, or dyspnea on exertion.    Ingrown toenail  Patient reports left great toe tenderness.  Had some white discharge from lateral nail bed a couple days ago.  Patient reports she put a warm pack on it and drained it.  She reports it is better now but .  Upon exam today, mildly pink and swollen lateral aspect of the nailbed.  Will prescribe you mupirocin ointment and will refer to podiatry for evaluation of toenail removal.        Gastroesophageal reflux disease  Patient reports this is been a problem for over 2 years. She reports burping, epigastric pain, and nausea every time she eats.  Does not matter if it is a cracker or a hamburger.  She was taking ranitidine without relief.  She has recently started omeprazole as needed.  Will refer to gastroenterology for evaluation.      Allergies: Morphine and Metformin    Current Outpatient Prescriptions Ordered in [facility]   Medication Sig Dispense Refill   • atorvastatin (LIPITOR) 20 MG Tab Take 1 Tab by mouth every day. 90 Tab 1   • lisinopril (PRINIVIL) 20 MG Tab Take 1 Tab by mouth every day. 90 Tab 1   • fenofibrate (TRIGLIDE) 160 MG tablet Take 1 Tab by mouth every day. 90 Tab 1   • glyBURIDE (DIABETA) 5 MG Tab Take 2 Tabs by mouth 2 times a day, with meals. 60 Tab 0   • [START ON 2/1/2019] Dulaglutide (TRULICITY) 1.5 MG/0.5ML Solution Pen-injector Inject 1.5 mg as instructed every Friday. 4 PEN 2   • SITagliptin (JANUVIA) 100 MG Tab Take 1 Tab by mouth every day. 90 Tab 1   • mupirocin (BACTROBAN) 2 % Ointment Apply 1 Application to affected area(s) every day. 1 Tube 0   • ondansetron (ZOFRAN ODT) 4 MG TABLET DISPERSIBLE Take 1 Tab by mouth every 8 hours as needed for Nausea. 20 Tab 0   • aspirin EC (ECOTRIN) 81 MG  Tablet Delayed Response Take 1 Tab by mouth every day. 30 Tab    • dicyclomine (BENTYL) 10 MG Cap Take 1 Cap by mouth 3 times a day. 120 Cap 1   • raNITidine (ZANTAC) 150 MG Tab Take 1 Tab by mouth 2 times a day. 60 Tab 11   • therapeutic multivitamin-minerals (THERAGRAN-M) Tab Take 1 Tab by mouth every day. 30 Tab 11     No current [facility]-ordered facility-administered medications on file.        Past Medical History:   Diagnosis Date   • Anemia    • Bowel habit changes     constipation   • Diabetes    • Diabetes (HCC)    • GERD (gastroesophageal reflux disease)    • Healthcare maintenance 9/27/2014    Mammogram: Due   • High cholesterol    • Hyperlipidemia    • Hypertension    • Infectious disease     Cold 10/2016   • Jaundice 1999   • Psychiatric problem     depression and anxiety   • Vaginal itching 8/27/2014    With anal itching X 1 month Getting worse Burning Min vag disch       Past Surgical History:   Procedure Laterality Date   • VENTRAL HERNIA REPAIR ROBOTIC XI N/A 10/14/2016    Procedure: VENTRAL HERNIA REPAIR ROBOTIC XI FOR INCISIONAL W/MESH;  Surgeon: Edi Mendoza M.D.;  Location: SURGERY [facility];  Service:    • ABDOMINAL HYSTERECTOMY TOTAL      still has ovaries   • CHOLECYSTECTOMY     • PRIMARY C SECTION     • TUBAL COAGULATION LAPAROSCOPIC BILATERAL         Social History   Substance Use Topics   • Smoking status: Never Smoker   • Smokeless tobacco: Never Used   • Alcohol use No       Family History   Problem Relation Age of Onset   • Diabetes Mother    • Hyperlipidemia Mother    • Diabetes Father    • Hypertension Father    • Hyperlipidemia Father        ROS:     - Constitutional: Negative for fever, chills, unexpected weight change.     - HEENT: Negative for hearing changes, ear pain, rhinorrhea, sinus congestion, and sore throat.      - Respiratory: Negative for cough, dyspnea, and wheezing.      - Cardiovascular: Negative for chest pain, palpitations, orthopnea, and bilateral lower  "extremity edema.     - Gastrointestinal: As in HPI.     - Genitourinary: Negative for dysuria, polyuria, hematuria.    - Musculoskeletal: Negative for myalgias, back pain, and joint pain.     - Skin: As in HPI, otherwise negative for rash, itching, cyanotic skin color change.     - Neurological: Negative for dizziness, tingling, tremors, focal sensory deficit, focal weakness and headaches.     - Endo/Heme/Allergies: Does not bruise/bleed easily.     - Psychiatric/Behavioral: Negative for depression.          Exam: Blood pressure 142/90, pulse 84, temperature 36.7 °C (98.1 °F), temperature source Temporal, resp. rate 12, height 1.549 m (5' 1\"), weight 83.9 kg (185 lb), SpO2 99 %, not currently breastfeeding. Body mass index is 34.96 kg/m².    General: Alert, pleasant, obese habitus, well nourished, well developed female in NAD  HEENT: Normocephalic. Eyes conjunctiva clear lids without ptosis, pupils equal and reactive to light, ears normal shape and contour, canals are clear bilaterally, tympanic membranes are pearly gray with good light reflex, nasal mucosa without erythema and drainage, oropharynx is without erythema, edema or exudates.   Neck: Supple without bruit. Thyroid is not enlarged.  Pulmonary: Clear to ausculation.  Normal effort. No rales, ronchi, or wheezing.  Cardiovascular: Normal rate and rhythm without murmur. Carotid and radial pulses are intact and equal bilaterally.  No lower extremity edema.  Abdomen: Soft, nontender, nondistended. Normal bowel sounds. Liver and spleen are not palpable  Neurologic: Grossly nonfocal  Lymph: No cervical or supraclavicular lymph nodes are palpable  Right great toe: Mildly pink and swollen lateral aspect nailbed, toenail ingrown, no drainage, intact  Skin: Warm and dry.  No obvious lesions.  Musculoskeletal: Normal gait.   Psych: Normal mood and affect. Alert and oriented. Judgment and insight is normal.    Please note that this dictation was created using voice " recognition software. I have made every reasonable attempt to correct obvious errors, but I expect that there are errors of grammar and possibly content that I did not discover before finalizing the note.      Assessment/Plan    1. Hyperlipidemia, unspecified hyperlipidemia type  Continue with medications.  Work on lifestyle measures.  Will get updated lipid profile.  - Lipid Profile; Future  - atorvastatin (LIPITOR) 20 MG Tab; Take 1 Tab by mouth every day.  Dispense: 90 Tab; Refill: 1  - fenofibrate (TRIGLIDE) 160 MG tablet; Take 1 Tab by mouth every day.  Dispense: 90 Tab; Refill: 1    2. Vitamin D deficiency  History of vitamin D deficiency.  Patient will start supplemental vitamin D.  Will get serum vitamin D level.  - VITAMIN D,25 HYDROXY; Future    3. Essential hypertension  Patient will start lisinopril.  Work on lifestyle measures.  ER precautions reviewed with patient.  - lisinopril (PRINIVIL) 20 MG Tab; Take 1 Tab by mouth every day.  Dispense: 90 Tab; Refill: 1    4. Type 2 diabetes mellitus without complication, without long-term current use of insulin (HCC)  Patient will continue with medications and lifestyle measures.  Consider diabetic nurse educator.  - COMP METABOLIC PANEL; Future  - TSH; Future  - FREE THYROXINE; Future  - HEMOGLOBIN A1C; Future  - ESTIMATED GFR; Future  - glyBURIDE (DIABETA) 5 MG Tab; Take 2 Tabs by mouth 2 times a day, with meals.  Dispense: 60 Tab; Refill: 0  - Dulaglutide (TRULICITY) 1.5 MG/0.5ML Solution Pen-injector; Inject 1.5 mg as instructed every Friday.  Dispense: 4 PEN; Refill: 2  - SITagliptin (JANUVIA) 100 MG Tab; Take 1 Tab by mouth every day.  Dispense: 90 Tab; Refill: 1    5. Ingrown toenail  Will refer to podiatry for further evaluation and management.  Antibiotic ointment to help with local skin infection if needed.  - mupirocin (BACTROBAN) 2 % Ointment; Apply 1 Application to affected area(s) every day.  Dispense: 1 Tube; Refill: 0  - REFERRAL TO  PODIATRY    6. Breast cancer screening  Ordered.  - MA-SCREENING MAMMO BILAT W/TOMOSYNTHESIS W/CAD; Future    7. Colon cancer screening  Referred to GI for screening colonoscopy.  - REFERRAL TO GI FOR COLONOSCOPY    8. Gastroesophageal reflux disease, esophagitis presence not specified  Patient will continue with omeprazole.  Will refer to GI for further evaluation and treatment.  - REFERRAL TO GASTROENTEROLOGY    Patient will return to clinic in 2 months for lab review or sooner if needed.

## 2019-01-29 NOTE — ASSESSMENT & PLAN NOTE
Patient reports this is a chronic health problem.  Patient reports she was taking lisinopril 20 mg up until 3 months ago when she ran out.  She reports she was tolerating medication, denies cough or lightheadedness.  Blood pressure today is 142/90.  She does not take blood pressure at home.  She reports some increase in frequency of headaches.  Refill for lisinopril sent to pharmacy.  Patient will monitor blood pressure when she has at the store and bring in readings to next appointment.  ER precautions reviewed with patient. The patient denies chest pain, shortness of breath, epistaxis, or dyspnea on exertion.

## 2019-01-29 NOTE — ASSESSMENT & PLAN NOTE
History of vitamin d deficiency.  Does not take supplemental vitamin D.  Advised to take vitamin d 2000 units a day..  We will get updated vitamin D level.

## 2019-02-04 ENCOUNTER — HOSPITAL ENCOUNTER (OUTPATIENT)
Dept: RADIOLOGY | Facility: MEDICAL CENTER | Age: 54
End: 2019-02-04
Attending: NURSE PRACTITIONER
Payer: COMMERCIAL

## 2019-02-04 DIAGNOSIS — Z12.39 BREAST CANCER SCREENING: ICD-10-CM

## 2019-02-04 PROCEDURE — 77067 SCR MAMMO BI INCL CAD: CPT

## 2019-02-12 ENCOUNTER — APPOINTMENT (OUTPATIENT)
Dept: SCHEDULING | Facility: IMAGING CENTER | Age: 54
End: 2019-02-12

## 2019-03-26 ENCOUNTER — HOSPITAL ENCOUNTER (OUTPATIENT)
Dept: LAB | Facility: MEDICAL CENTER | Age: 54
End: 2019-03-26
Attending: NURSE PRACTITIONER
Payer: COMMERCIAL

## 2019-03-26 DIAGNOSIS — E78.5 HYPERLIPIDEMIA, UNSPECIFIED HYPERLIPIDEMIA TYPE: ICD-10-CM

## 2019-03-26 DIAGNOSIS — E11.9 TYPE 2 DIABETES MELLITUS WITHOUT COMPLICATION, WITHOUT LONG-TERM CURRENT USE OF INSULIN (HCC): ICD-10-CM

## 2019-03-26 DIAGNOSIS — E55.9 VITAMIN D DEFICIENCY: ICD-10-CM

## 2019-03-26 LAB
25(OH)D3 SERPL-MCNC: 27 NG/ML (ref 30–100)
ALBUMIN SERPL BCP-MCNC: 3.7 G/DL (ref 3.2–4.9)
ALBUMIN/GLOB SERPL: 1.2 G/DL
ALP SERPL-CCNC: 49 U/L (ref 30–99)
ALT SERPL-CCNC: 26 U/L (ref 2–50)
ANION GAP SERPL CALC-SCNC: 10 MMOL/L (ref 0–11.9)
AST SERPL-CCNC: 50 U/L (ref 12–45)
BILIRUB SERPL-MCNC: 0.1 MG/DL (ref 0.1–1.5)
BUN SERPL-MCNC: 24 MG/DL (ref 8–22)
CALCIUM SERPL-MCNC: 9.1 MG/DL (ref 8.5–10.5)
CHLORIDE SERPL-SCNC: 110 MMOL/L (ref 96–112)
CHOLEST SERPL-MCNC: 130 MG/DL (ref 100–199)
CO2 SERPL-SCNC: 17 MMOL/L (ref 20–33)
CREAT SERPL-MCNC: 0.82 MG/DL (ref 0.5–1.4)
GLOBULIN SER CALC-MCNC: 3.1 G/DL (ref 1.9–3.5)
GLUCOSE SERPL-MCNC: 133 MG/DL (ref 65–99)
HDLC SERPL-MCNC: 46 MG/DL
LDLC SERPL CALC-MCNC: 69 MG/DL
POTASSIUM SERPL-SCNC: 4.9 MMOL/L (ref 3.6–5.5)
PROT SERPL-MCNC: 6.8 G/DL (ref 6–8.2)
SODIUM SERPL-SCNC: 137 MMOL/L (ref 135–145)
T4 FREE SERPL-MCNC: 0.83 NG/DL (ref 0.53–1.43)
TRIGL SERPL-MCNC: 75 MG/DL (ref 0–149)
TSH SERPL DL<=0.005 MIU/L-ACNC: 3.5 UIU/ML (ref 0.38–5.33)

## 2019-03-26 PROCEDURE — 83036 HEMOGLOBIN GLYCOSYLATED A1C: CPT

## 2019-03-26 PROCEDURE — 84443 ASSAY THYROID STIM HORMONE: CPT

## 2019-03-26 PROCEDURE — 80053 COMPREHEN METABOLIC PANEL: CPT

## 2019-03-26 PROCEDURE — 36415 COLL VENOUS BLD VENIPUNCTURE: CPT

## 2019-03-26 PROCEDURE — 80061 LIPID PANEL: CPT

## 2019-03-26 PROCEDURE — 84439 ASSAY OF FREE THYROXINE: CPT

## 2019-03-26 PROCEDURE — 82306 VITAMIN D 25 HYDROXY: CPT

## 2019-03-28 LAB
EST. AVERAGE GLUCOSE BLD GHB EST-MCNC: 180 MG/DL
HBA1C MFR BLD: 7.9 % (ref 0–5.6)

## 2019-04-02 ENCOUNTER — OFFICE VISIT (OUTPATIENT)
Dept: MEDICAL GROUP | Facility: PHYSICIAN GROUP | Age: 54
End: 2019-04-02
Payer: COMMERCIAL

## 2019-04-02 VITALS
WEIGHT: 180 LBS | HEIGHT: 61 IN | OXYGEN SATURATION: 99 % | BODY MASS INDEX: 33.99 KG/M2 | TEMPERATURE: 97.8 F | RESPIRATION RATE: 12 BRPM | HEART RATE: 86 BPM | SYSTOLIC BLOOD PRESSURE: 130 MMHG | DIASTOLIC BLOOD PRESSURE: 88 MMHG

## 2019-04-02 DIAGNOSIS — E55.9 VITAMIN D DEFICIENCY: ICD-10-CM

## 2019-04-02 DIAGNOSIS — E66.9 OBESITY (BMI 30-39.9): ICD-10-CM

## 2019-04-02 DIAGNOSIS — Z13.6 SCREENING FOR CARDIOVASCULAR CONDITION: ICD-10-CM

## 2019-04-02 DIAGNOSIS — E78.5 HYPERLIPIDEMIA, UNSPECIFIED HYPERLIPIDEMIA TYPE: ICD-10-CM

## 2019-04-02 DIAGNOSIS — E11.65 UNCONTROLLED TYPE 2 DIABETES MELLITUS WITH HYPERGLYCEMIA (HCC): ICD-10-CM

## 2019-04-02 DIAGNOSIS — K21.9 GASTROESOPHAGEAL REFLUX DISEASE, ESOPHAGITIS PRESENCE NOT SPECIFIED: ICD-10-CM

## 2019-04-02 DIAGNOSIS — M54.9 MID BACK PAIN ON RIGHT SIDE: ICD-10-CM

## 2019-04-02 PROCEDURE — 99214 OFFICE O/P EST MOD 30 MIN: CPT | Performed by: NURSE PRACTITIONER

## 2019-04-02 NOTE — ASSESSMENT & PLAN NOTE
Chronic health problem with improving control.  Hemoglobin A1c is 7.9.  This is down from 8.6 6 months ago.  Takes Januvia 100 mg daily, Trulicity 1.5 mg weekly, glyburide 2 tabs twice a day.  Not taking blood sugar at home, though has glucometer.  Eye exam up-to-date.  On ACE and statin.  Denies numbness, tingling, dysuria, polyuria.  Reviewed lifestyle measures, including low carbohydrate diet, routine aerobic exercise, weight loss.

## 2019-04-02 NOTE — ASSESSMENT & PLAN NOTE
Chronic health problem.  Recent serum vitamin D level is 27 patient had started vitamin D 2000 units about a week prior to blood draw.  Patient will continue with vitamin D3 2000 units daily.  Explained the importance of vitamin D and calcium absorption.

## 2019-04-02 NOTE — PROGRESS NOTES
CC: Lab review, diabetes    HISTORY OF THE PRESENT ILLNESS: Patient is a 53 y.o. female. This pleasant patient is here today for evaluation management of the following health problems.      Vitamin D deficiency  Chronic health problem.  Recent serum vitamin D level is 27 patient had started vitamin D 2000 units about a week prior to blood draw.  Patient will continue with vitamin D3 2000 units daily.  Explained the importance of vitamin D and calcium absorption.      Diabetes mellitus type II, uncontrolled (HCC)  Chronic health problem with improving control.  Hemoglobin A1c is 7.9.  This is down from 8.6 6 months ago.  Takes Januvia 100 mg daily, Trulicity 1.5 mg weekly, glyburide 2 tabs twice a day.  Not taking blood sugar at home, though has glucometer.  Eye exam up-to-date.  On ACE and statin.  Denies numbness, tingling, dysuria, polyuria.  Reviewed lifestyle measures, including low carbohydrate diet, routine aerobic exercise, weight loss.      Hyperlipidemia  This is a chronic health problem that is well controlled with current medications and lifestyle measures.  Taking atorvastatin 20 mg daily.  Tolerating medication, denies muscle aches or weakness.  Reviewed lifestyle measures.  Component      Latest Ref Rng & Units 3/26/2019          11:08 AM   Cholesterol,Tot      100 - 199 mg/dL 130   Triglycerides      0 - 149 mg/dL 75   HDL      >=40 mg/dL 46   LDL      <100 mg/dL 69       Gastroesophageal reflux disease  Managed by gastroenterology.  Patient has upcoming colonoscopy and upper endoscopy scheduled.    Mid back pain on right side  Patient reports right-sided mid back pain, onset a couple weeks ago.  Denies injury.  Quality of pain is aching.  Took Advil one time and it helped.  Has not tried heat.  Cannot identify any aggravating factors.  Denies dysuria, lower extremity numbness and tingling, foot drop, fecal or urinary incontinence.      Allergies: Morphine and Metformin    Current Outpatient  Prescriptions Ordered in Epic   Medication Sig Dispense Refill   • atorvastatin (LIPITOR) 20 MG Tab Take 1 Tab by mouth every day. 90 Tab 1   • lisinopril (PRINIVIL) 20 MG Tab Take 1 Tab by mouth every day. 90 Tab 1   • fenofibrate (TRIGLIDE) 160 MG tablet Take 1 Tab by mouth every day. 90 Tab 1   • glyBURIDE (DIABETA) 5 MG Tab Take 2 Tabs by mouth 2 times a day, with meals. 60 Tab 0   • Dulaglutide (TRULICITY) 1.5 MG/0.5ML Solution Pen-injector Inject 1.5 mg as instructed every Friday. 4 PEN 2   • SITagliptin (JANUVIA) 100 MG Tab Take 1 Tab by mouth every day. 90 Tab 1   • mupirocin (BACTROBAN) 2 % Ointment Apply 1 Application to affected area(s) every day. 1 Tube 0   • ondansetron (ZOFRAN ODT) 4 MG TABLET DISPERSIBLE Take 1 Tab by mouth every 8 hours as needed for Nausea. 20 Tab 0   • aspirin EC (ECOTRIN) 81 MG Tablet Delayed Response Take 1 Tab by mouth every day. 30 Tab    • dicyclomine (BENTYL) 10 MG Cap Take 1 Cap by mouth 3 times a day. 120 Cap 1   • raNITidine (ZANTAC) 150 MG Tab Take 1 Tab by mouth 2 times a day. 60 Tab 11   • therapeutic multivitamin-minerals (THERAGRAN-M) Tab Take 1 Tab by mouth every day. 30 Tab 11     No current [facility]-ordered facility-administered medications on file.        Past Medical History:   Diagnosis Date   • Anemia    • Bowel habit changes     constipation   • Diabetes    • Diabetes (HCC)    • GERD (gastroesophageal reflux disease)    • Healthcare maintenance 9/27/2014    Mammogram: Due   • High cholesterol    • Hyperlipidemia    • Hypertension    • Infectious disease     Cold 10/2016   • Jaundice 1999   • Psychiatric problem     depression and anxiety   • Vaginal itching 8/27/2014    With anal itching X 1 month Getting worse Burning Min vag disch       Past Surgical History:   Procedure Laterality Date   • VENTRAL HERNIA REPAIR ROBOTIC XI N/A 10/14/2016    Procedure: VENTRAL HERNIA REPAIR ROBOTIC XI FOR INCISIONAL W/MESH;  Surgeon: Edi Mendoza M.D.;  Location: SURGERY [facility]  "[facility];  Service:    • ABDOMINAL HYSTERECTOMY TOTAL      still has ovaries   • CHOLECYSTECTOMY     • PRIMARY C SECTION     • TUBAL COAGULATION LAPAROSCOPIC BILATERAL         Social History   Substance Use Topics   • Smoking status: Never Smoker   • Smokeless tobacco: Never Used   • Alcohol use No       Family History   Problem Relation Age of Onset   • Diabetes Mother    • Hyperlipidemia Mother    • Diabetes Father    • Hypertension Father    • Hyperlipidemia Father        ROS:   As in HPI, otherwise negative for chest pain, dyspnea, abdominal pain, dysuria, blood in stool, fever. Positive fatigue for last few days.  .    Exam: Blood pressure 130/88, pulse 86, temperature 36.6 °C (97.8 °F), temperature source Temporal, resp. rate 12, height 1.549 m (5' 1\"), weight 81.6 kg (180 lb), SpO2 99 %, not currently breastfeeding. Body mass index is 34.01 kg/m².    General: Alert, pleasant, well nourished, well developed female in NAD  Neck: Supple without bruit. Thyroid is not enlarged.  Pulmonary: Clear to ausculation.  Normal effort. No rales, ronchi, or wheezing.  Cardiovascular: Normal rate and rhythm without murmur. Carotid and radial pulses are intact and equal bilaterally.  No lower extremity edema.  Abdomen: Soft, nontender, nondistended. Normal bowel sounds.   Mid back: No edema or erythema, mild tenderness to palpation right paraspinals, full range of motion, normal tip toe and heel walk, patellar DTRs 2+ and equal bilaterally  Neurologic: Grossly nonfocal  Lymph: No cervical or supraclavicular lymph nodes are palpable  Skin: Warm and dry.  No obvious lesions.  Musculoskeletal: Normal gait.   Psych: Normal mood and affect. Alert and oriented. Judgment and insight is normal.    Please note that this dictation was created using voice recognition software. I have made every reasonable attempt to correct obvious errors, but I expect that there are errors of grammar and possibly content that I did not discover before " finalizing the note.      Assessment/Plan  1. Vitamin D deficiency  Continue supplemental vitamin D.    2. Uncontrolled type 2 diabetes mellitus with hyperglycemia (HCC)  Continue with medications and lifestyle measures.  Consider referral to endocrinology or diabetic nurse educator.  - Comp Metabolic Panel; Future  - HEMOGLOBIN A1C; Future  - ESTIMATED GFR; Future    3. Screening for cardiovascular condition    - CBC WITHOUT DIFFERENTIAL; Future    4. Mid back pain on right side  Advised patient that this is likely a muscle strain and to use heat and ibuprofen as needed.  Will also refer to physical therapy for further evaluation management.  - REFERRAL TO PHYSICAL THERAPY Reason for Therapy: Eval/Treat/Report    5. Hyperlipidemia, unspecified hyperlipidemia type  Continue with atorvastatin and lifestyle measures.    6. Gastroesophageal reflux disease, esophagitis presence not specified  Continue follow-up with gastroenterology.      7. Obesity (BMI 30-39.9)    - Patient identified as having weight management issue.  Appropriate orders and counseling given.      Patient will return to clinic in 3 months.  I did inform patient that I am transferring to [facility].  Patient wishes to follow me there.

## 2019-04-03 PROBLEM — E66.9 OBESITY (BMI 30-39.9): Status: ACTIVE | Noted: 2019-04-03

## 2019-04-03 NOTE — ASSESSMENT & PLAN NOTE
This is a chronic health problem that is well controlled with current medications and lifestyle measures.  Taking atorvastatin 20 mg daily.  Tolerating medication, denies muscle aches or weakness.  Reviewed lifestyle measures.  Component      Latest Ref Rng & Units 3/26/2019          11:08 AM   Cholesterol,Tot      100 - 199 mg/dL 130   Triglycerides      0 - 149 mg/dL 75   HDL      >=40 mg/dL 46   LDL      <100 mg/dL 69

## 2019-04-03 NOTE — ASSESSMENT & PLAN NOTE
Patient reports right-sided mid back pain, onset a couple weeks ago.  Denies injury.  Quality of pain is aching.  Took Advil one time and it helped.  Has not tried heat.  Cannot identify any aggravating factors.  Denies dysuria, lower extremity numbness and tingling, foot drop, fecal or urinary incontinence.

## 2019-05-31 DIAGNOSIS — E11.9 TYPE 2 DIABETES MELLITUS WITHOUT COMPLICATION, WITHOUT LONG-TERM CURRENT USE OF INSULIN (HCC): ICD-10-CM

## 2019-06-03 RX ORDER — DULAGLUTIDE 1.5 MG/.5ML
INJECTION, SOLUTION SUBCUTANEOUS
Qty: 12 PEN | Refills: 0 | Status: SHIPPED | OUTPATIENT
Start: 2019-06-03 | End: 2019-08-29 | Stop reason: SDUPTHER

## 2019-06-03 NOTE — TELEPHONE ENCOUNTER
Was the patient seen in the last year in this department? Yes    Does patient have an active prescription for medications requested? No     Received Request Via: Pharmacy      Pt met protocol?: Yes, OV 4/19   Lab Results   Component Value Date/Time    HBA1C 7.9 (H) 03/26/2019 11:08 AM

## 2019-06-05 ENCOUNTER — TELEPHONE (OUTPATIENT)
Dept: MEDICAL GROUP | Facility: PHYSICIAN GROUP | Age: 54
End: 2019-06-05

## 2019-06-05 DIAGNOSIS — E11.65 UNCONTROLLED TYPE 2 DIABETES MELLITUS WITH HYPERGLYCEMIA (HCC): ICD-10-CM

## 2019-06-05 NOTE — TELEPHONE ENCOUNTER
1. Caller Name: [facility]                      Call Back Number: [phone]    2. Message: [facility] is requesting a backdate referral for 6/3/19, exam notes scanned into media. Please advise.    3. Patient approves office to leave a detailed voicemail/MyChart message: N\A

## 2019-06-21 ENCOUNTER — HOSPITAL ENCOUNTER (EMERGENCY)
Facility: MEDICAL CENTER | Age: 54
End: 2019-06-21
Attending: EMERGENCY MEDICINE
Payer: COMMERCIAL

## 2019-06-21 ENCOUNTER — APPOINTMENT (OUTPATIENT)
Dept: RADIOLOGY | Facility: MEDICAL CENTER | Age: 54
End: 2019-06-21
Attending: EMERGENCY MEDICINE
Payer: COMMERCIAL

## 2019-06-21 VITALS
HEIGHT: 61 IN | BODY MASS INDEX: 38.92 KG/M2 | OXYGEN SATURATION: 98 % | HEART RATE: 78 BPM | DIASTOLIC BLOOD PRESSURE: 68 MMHG | TEMPERATURE: 98 F | RESPIRATION RATE: 16 BRPM | WEIGHT: 206.13 LBS | SYSTOLIC BLOOD PRESSURE: 152 MMHG

## 2019-06-21 DIAGNOSIS — R73.9 HYPERGLYCEMIA: ICD-10-CM

## 2019-06-21 DIAGNOSIS — K85.90 ACUTE PANCREATITIS WITHOUT INFECTION OR NECROSIS, UNSPECIFIED PANCREATITIS TYPE: ICD-10-CM

## 2019-06-21 DIAGNOSIS — R10.13 EPIGASTRIC ABDOMINAL PAIN: ICD-10-CM

## 2019-06-21 LAB
ALBUMIN SERPL BCP-MCNC: 3.6 G/DL (ref 3.2–4.9)
ALBUMIN/GLOB SERPL: 1.2 G/DL
ALP SERPL-CCNC: 43 U/L (ref 30–99)
ALT SERPL-CCNC: 23 U/L (ref 2–50)
ANION GAP SERPL CALC-SCNC: 8 MMOL/L (ref 0–11.9)
APPEARANCE UR: CLEAR
AST SERPL-CCNC: 31 U/L (ref 12–45)
BACTERIA #/AREA URNS HPF: ABNORMAL /HPF
BASOPHILS # BLD AUTO: 0.5 % (ref 0–1.8)
BASOPHILS # BLD: 0.04 K/UL (ref 0–0.12)
BILIRUB SERPL-MCNC: 0.5 MG/DL (ref 0.1–1.5)
BILIRUB UR QL STRIP.AUTO: NEGATIVE
BUN SERPL-MCNC: 30 MG/DL (ref 8–22)
CALCIUM SERPL-MCNC: 9.2 MG/DL (ref 8.4–10.2)
CHLORIDE SERPL-SCNC: 102 MMOL/L (ref 96–112)
CO2 SERPL-SCNC: 25 MMOL/L (ref 20–33)
COLOR UR: YELLOW
CREAT SERPL-MCNC: 1.13 MG/DL (ref 0.5–1.4)
EKG IMPRESSION: NORMAL
EOSINOPHIL # BLD AUTO: 0.18 K/UL (ref 0–0.51)
EOSINOPHIL NFR BLD: 2.1 % (ref 0–6.9)
EPI CELLS #/AREA URNS HPF: ABNORMAL /HPF
ERYTHROCYTE [DISTWIDTH] IN BLOOD BY AUTOMATED COUNT: 39.2 FL (ref 35.9–50)
GLOBULIN SER CALC-MCNC: 3.1 G/DL (ref 1.9–3.5)
GLUCOSE SERPL-MCNC: 204 MG/DL (ref 65–99)
GLUCOSE UR STRIP.AUTO-MCNC: NEGATIVE MG/DL
HCT VFR BLD AUTO: 34.5 % (ref 37–47)
HGB BLD-MCNC: 11.6 G/DL (ref 12–16)
IMM GRANULOCYTES # BLD AUTO: 0.05 K/UL (ref 0–0.11)
IMM GRANULOCYTES NFR BLD AUTO: 0.6 % (ref 0–0.9)
KETONES UR STRIP.AUTO-MCNC: NEGATIVE MG/DL
LEUKOCYTE ESTERASE UR QL STRIP.AUTO: NEGATIVE
LIPASE SERPL-CCNC: 131 U/L (ref 7–58)
LYMPHOCYTES # BLD AUTO: 3.29 K/UL (ref 1–4.8)
LYMPHOCYTES NFR BLD: 38.3 % (ref 22–41)
MCH RBC QN AUTO: 30.2 PG (ref 27–33)
MCHC RBC AUTO-ENTMCNC: 33.6 G/DL (ref 33.6–35)
MCV RBC AUTO: 89.8 FL (ref 81.4–97.8)
MICRO URNS: ABNORMAL
MONOCYTES # BLD AUTO: 0.55 K/UL (ref 0–0.85)
MONOCYTES NFR BLD AUTO: 6.4 % (ref 0–13.4)
MUCOUS THREADS #/AREA URNS HPF: ABNORMAL /HPF
NEUTROPHILS # BLD AUTO: 4.49 K/UL (ref 2–7.15)
NEUTROPHILS NFR BLD: 52.1 % (ref 44–72)
NITRITE UR QL STRIP.AUTO: NEGATIVE
NRBC # BLD AUTO: 0 K/UL
NRBC BLD-RTO: 0 /100 WBC
PH UR STRIP.AUTO: 6 [PH]
PLATELET # BLD AUTO: 306 K/UL (ref 164–446)
PMV BLD AUTO: 10.9 FL (ref 9–12.9)
POTASSIUM SERPL-SCNC: 4.2 MMOL/L (ref 3.6–5.5)
PROT SERPL-MCNC: 6.7 G/DL (ref 6–8.2)
PROT UR QL STRIP: 100 MG/DL
RBC # BLD AUTO: 3.84 M/UL (ref 4.2–5.4)
RBC # URNS HPF: ABNORMAL /HPF
RBC UR QL AUTO: ABNORMAL
SODIUM SERPL-SCNC: 135 MMOL/L (ref 135–145)
SP GR UR STRIP.AUTO: 1.01
TROPONIN I SERPL-MCNC: <0.02 NG/ML (ref 0–0.04)
WBC # BLD AUTO: 8.6 K/UL (ref 4.8–10.8)
WBC #/AREA URNS HPF: ABNORMAL /HPF

## 2019-06-21 PROCEDURE — 99285 EMERGENCY DEPT VISIT HI MDM: CPT

## 2019-06-21 PROCEDURE — 80053 COMPREHEN METABOLIC PANEL: CPT

## 2019-06-21 PROCEDURE — 93005 ELECTROCARDIOGRAM TRACING: CPT | Performed by: EMERGENCY MEDICINE

## 2019-06-21 PROCEDURE — 71045 X-RAY EXAM CHEST 1 VIEW: CPT

## 2019-06-21 PROCEDURE — 700102 HCHG RX REV CODE 250 W/ 637 OVERRIDE(OP): Performed by: EMERGENCY MEDICINE

## 2019-06-21 PROCEDURE — 36415 COLL VENOUS BLD VENIPUNCTURE: CPT

## 2019-06-21 PROCEDURE — A9270 NON-COVERED ITEM OR SERVICE: HCPCS | Performed by: EMERGENCY MEDICINE

## 2019-06-21 PROCEDURE — 84484 ASSAY OF TROPONIN QUANT: CPT

## 2019-06-21 PROCEDURE — 93005 ELECTROCARDIOGRAM TRACING: CPT

## 2019-06-21 PROCEDURE — 83690 ASSAY OF LIPASE: CPT

## 2019-06-21 PROCEDURE — 85025 COMPLETE CBC W/AUTO DIFF WBC: CPT

## 2019-06-21 PROCEDURE — 81001 URINALYSIS AUTO W/SCOPE: CPT

## 2019-06-21 RX ORDER — ASPIRIN 81 MG/1
324 TABLET, CHEWABLE ORAL ONCE
Status: COMPLETED | OUTPATIENT
Start: 2019-06-21 | End: 2019-06-21

## 2019-06-21 RX ORDER — HYDROCODONE BITARTRATE AND ACETAMINOPHEN 5; 325 MG/1; MG/1
1 TABLET ORAL EVERY 4 HOURS PRN
Qty: 20 TAB | Refills: 0 | Status: SHIPPED | OUTPATIENT
Start: 2019-06-21 | End: 2019-06-26

## 2019-06-21 RX ORDER — ONDANSETRON 4 MG/1
4 TABLET, ORALLY DISINTEGRATING ORAL EVERY 6 HOURS PRN
Qty: 10 TAB | Refills: 0 | Status: SHIPPED | OUTPATIENT
Start: 2019-06-21 | End: 2019-09-20

## 2019-06-21 RX ORDER — ASPIRIN 600 MG/1
300 SUPPOSITORY RECTAL ONCE
Status: COMPLETED | OUTPATIENT
Start: 2019-06-21 | End: 2019-06-21

## 2019-06-21 RX ADMIN — ASPIRIN 81 MG 324 MG: 81 TABLET ORAL at 21:02

## 2019-06-21 RX ADMIN — LIDOCAINE HYDROCHLORIDE 30 ML: 20 SOLUTION OROPHARYNGEAL at 21:47

## 2019-06-22 NOTE — ED TRIAGE NOTES
Pt coms in c/o her BP high today   Got off work this am and wasn't feeling well  Went and saw the work health provider and they took her BP  It was high 200's over 100's  Went home took her BP med and went to sleep  Woke up not feeling well  Comes in for this complaint   Also c/p epigastric pain that she has been having on going for years  Comes and goes  Does not have a gallbladder feels like the usual complaint

## 2019-06-22 NOTE — ED NOTES
Pt roomed, attached to vitals machine and cardiac monitor. IV established, blood drawn and sent to lab. Call light in reach. All needs met at this time. Waiting for MD

## 2019-06-22 NOTE — ED NOTES
Dc instructions and medications discussed with patient at bedside. Informed consent for narcotics signs and discussed. VSS. IV removed. All questions answered at this time. Pt ambulated to lobby with daughter.
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
## 2019-06-27 DIAGNOSIS — E11.9 TYPE 2 DIABETES MELLITUS WITHOUT COMPLICATION, WITHOUT LONG-TERM CURRENT USE OF INSULIN (HCC): ICD-10-CM

## 2019-06-27 RX ORDER — GLYBURIDE 5 MG/1
10 TABLET ORAL 2 TIMES DAILY WITH MEALS
Qty: 60 TAB | Refills: 0 | Status: SHIPPED | OUTPATIENT
Start: 2019-06-27 | End: 2019-08-21 | Stop reason: SDUPTHER

## 2019-06-27 NOTE — TELEPHONE ENCOUNTER
Was the patient seen in the last year in this department? Yes    Does patient have an active prescription for medications requested? No     Received Request Via: Pharmacy    Last OV 4/2/19, last labs 6/21/19

## 2019-07-02 ENCOUNTER — OFFICE VISIT (OUTPATIENT)
Dept: MEDICAL GROUP | Facility: PHYSICIAN GROUP | Age: 54
End: 2019-07-02
Payer: COMMERCIAL

## 2019-07-02 VITALS
DIASTOLIC BLOOD PRESSURE: 56 MMHG | OXYGEN SATURATION: 96 % | WEIGHT: 178 LBS | TEMPERATURE: 97.8 F | SYSTOLIC BLOOD PRESSURE: 120 MMHG | HEART RATE: 81 BPM | RESPIRATION RATE: 14 BRPM | HEIGHT: 61 IN | BODY MASS INDEX: 33.61 KG/M2

## 2019-07-02 DIAGNOSIS — E11.65 UNCONTROLLED TYPE 2 DIABETES MELLITUS WITH HYPERGLYCEMIA (HCC): ICD-10-CM

## 2019-07-02 DIAGNOSIS — I10 ESSENTIAL HYPERTENSION: ICD-10-CM

## 2019-07-02 DIAGNOSIS — K21.9 GASTROESOPHAGEAL REFLUX DISEASE, ESOPHAGITIS PRESENCE NOT SPECIFIED: ICD-10-CM

## 2019-07-02 DIAGNOSIS — K85.90 ACUTE PANCREATITIS WITHOUT INFECTION OR NECROSIS, UNSPECIFIED PANCREATITIS TYPE: ICD-10-CM

## 2019-07-02 LAB
HBA1C MFR BLD: 8 % (ref 0–5.6)
INT CON NEG: NEGATIVE
INT CON POS: POSITIVE

## 2019-07-02 PROCEDURE — 83036 HEMOGLOBIN GLYCOSYLATED A1C: CPT | Performed by: NURSE PRACTITIONER

## 2019-07-02 PROCEDURE — 99214 OFFICE O/P EST MOD 30 MIN: CPT | Performed by: NURSE PRACTITIONER

## 2019-07-02 RX ORDER — RANITIDINE 150 MG/1
150 TABLET ORAL 2 TIMES DAILY
Qty: 60 TAB | Refills: 11 | Status: SHIPPED
Start: 2019-07-02 | End: 2020-03-03

## 2019-07-02 NOTE — PROGRESS NOTES
CC: Diabetes, ER follow-up    HISTORY OF THE PRESENT ILLNESS: Patient is a 53 y.o. female. This pleasant patient is here today for evaluation management of the following health problems.    Health Maintenance: Had screening colonoscopy in 5/2019      Diabetes mellitus type II, uncontrolled (HCC)  This is a chronic health problem that is uncontrolled with current medications and lifestyle measures.  Patient taking glyburide 10 mg twice daily, Januvia 100 mg daily, Trulicity 1.5 mg weekly.  Hemoglobin A1c has increased to 8%.  Patient is not monitoring blood sugars at home.  She was recently seen in the ER and diagnosed with pancreatitis.  Reviewed lifestyle measures with patient including low carbohydrate diet and routine aerobic exercise as tolerated.  Will refer to diabetic nurse educator via telemedicine.  Patient is unable to meet with diabetic nurse educator in person this patient works on days that educator comes to [place].  Recently went to eye doctor.  No diabetic retinopathy however does have some hypertensive changes.  On ACE and statin.      Hypertension  Chronic health problem, well controlled.    Taking lisinopril 20 mg daily.Tolerating medication, denies cough or lightheadedness.  Does not monitor blood pressures at home.  Blood pressure today is 120/56.  Patient recently saw optometry who reported some hypertensive retinopathy and will continue to monitor.  The patient denies chest pain, shortness of breath, headache, vision changes, epistaxis, or dyspnea on exertion.  Reviewed lifestyle measures including routine aerobic exercise, weight loss, DASH diet.    Acute pancreatitis without infection or necrosis  This is a 53-year-old female with history of hypertension, type 2 diabetes who presented to the [facility] ER on 6/21/2019 with concern for hypertension.  During work-up patient was found to have epigastric pain and diagnosed with pancreatitis.  Patient was prescribed a small supply of Norco, which  she has taken sparingly.  She has been eating a low-fat diet. Does report frequent burping. Denies nausea, emesis, diarrhea.  Reports mild to moderate epigastric pain.  Will refer to gastroenterology for further evaluation.  Patient is established with GI and she will also call to try to get an appointment.      Allergies: Morphine and Metformin    Current Outpatient Prescriptions Ordered in [facility]   Medication Sig Dispense Refill   • Misc. Devices Misc Please dispense blood pressure cuff 1 Device 0   • glyBURIDE (DIABETA) 5 MG Tab Take 2 Tabs by mouth 2 times a day, with meals. 60 Tab 0   • TRULICITY 1.5 MG/0.5ML Solution Pen-injector  INJECT 1.5MG SUBCUTANEOUSLY AS INSTRUCTED EVERY FRIDAY. 12 PEN 0   • atorvastatin (LIPITOR) 20 MG Tab Take 1 Tab by mouth every day. 90 Tab 1   • lisinopril (PRINIVIL) 20 MG Tab Take 1 Tab by mouth every day. 90 Tab 1   • fenofibrate (TRIGLIDE) 160 MG tablet Take 1 Tab by mouth every day. 90 Tab 1   • SITagliptin (JANUVIA) 100 MG Tab Take 1 Tab by mouth every day. 90 Tab 1   • aspirin EC (ECOTRIN) 81 MG Tablet Delayed Response Take 1 Tab by mouth every day. 30 Tab    • therapeutic multivitamin-minerals (THERAGRAN-M) Tab Take 1 Tab by mouth every day. 30 Tab 11   • raNITidine (ZANTAC) 150 MG Tab Take 1 Tab by mouth 2 times a day. 60 Tab 11   • ondansetron (ZOFRAN ODT) 4 MG TABLET DISPERSIBLE Take 1 Tab by mouth every 6 hours as needed for Nausea. 10 Tab 0   • mupirocin (BACTROBAN) 2 % Ointment Apply 1 Application to affected area(s) every day. (Patient not taking: Reported on 7/2/2019) 1 Tube 0   • dicyclomine (BENTYL) 10 MG Cap Take 1 Cap by mouth 3 times a day. (Patient not taking: Reported on 7/2/2019) 120 Cap 1     No current Epic-ordered facility-administered medications on file.        Past Medical History:   Diagnosis Date   • Anemia    • Bowel habit changes     constipation   • Diabetes    • Diabetes (HCC)    • GERD (gastroesophageal reflux disease)    • Healthcare maintenance  "9/27/2014    Mammogram: Due   • High cholesterol    • Hyperlipidemia    • Hypertension    • Infectious disease     Cold 10/2016   • Jaundice 1999   • Psychiatric problem     depression and anxiety   • Vaginal itching 8/27/2014    With anal itching X 1 month Getting worse Burning Min vag disch       Past Surgical History:   Procedure Laterality Date   • VENTRAL HERNIA REPAIR ROBOTIC XI N/A 10/14/2016    Procedure: VENTRAL HERNIA REPAIR ROBOTIC XI FOR INCISIONAL W/MESH;  Surgeon: Edi Mendoza M.D.;  Location: SURGERY [facility];  Service:    • ABDOMINAL HYSTERECTOMY TOTAL      still has ovaries   • CHOLECYSTECTOMY     • PRIMARY C SECTION     • TUBAL COAGULATION LAPAROSCOPIC BILATERAL         Social History   Substance Use Topics   • Smoking status: Never Smoker   • Smokeless tobacco: Never Used   • Alcohol use No       Family History   Problem Relation Age of Onset   • Diabetes Mother    • Hyperlipidemia Mother    • Diabetes Father    • Hypertension Father    • Hyperlipidemia Father        ROS:   As in HPI, otherwise negative for chest pain, dyspnea,  dysuria, blood in stool, fever         Exam: /56 (BP Location: Left arm, Patient Position: Sitting, BP Cuff Size: Adult)   Pulse 81   Temp 36.6 °C (97.8 °F)   Resp 14   Ht 1.549 m (5' 1\")   Wt 80.7 kg (178 lb)   SpO2 96%  Body mass index is 33.63 kg/m².    General: Alert, pleasant, obese habitus, well nourished, well developed female in NAD  Pulmonary: Clear to ausculation.  Normal effort. No rales, ronchi, or wheezing.  Cardiovascular: Normal rate and rhythm without murmur. Carotid and radial pulses are intact and equal bilaterally.  No lower extremity edema.  Abdomen: Soft, mild tenderness in epigastrium, nondistended. Normal bowel sounds. Liver and spleen are not palpable  Neurologic: Grossly nonfocal  Lymph: No cervical or supraclavicular lymph nodes are palpable  Skin: Warm and dry.    Musculoskeletal: Normal gait.   Psych: Normal mood and " affect. Alert and oriented. Judgment and insight is normal.    Diabetic Foot Exam: No ulcers or skin lesions present, patient tested with a 10 g force and is sensitive bilaterally throughout the ball of the foot, great toe and heel.  Dorsalis pedis and posterior tibial pulses are present and intact bilaterally      Please note that this dictation was created using voice recognition software. I have made every reasonable attempt to correct obvious errors, but I expect that there are errors of grammar and possibly content that I did not discover before finalizing the note.      Assessment/Plan  1. Uncontrolled type 2 diabetes mellitus with hyperglycemia (HCC)  Patient will continue with glipizide, Trulicity, Januvia.  She will work on lifestyle measures.  Patient will start monitoring blood sugars at home.  She does have equipment.  A1c may be elevated due to recent pancreatitis.  Continue to monitor. Will refer to diabetic nurse educator via telemedicine.  - POCT  A1C  - REFERRAL TO DIABETIC EDUCATION Diabetes Self Management Education / Training (DSME/T) and Medical Nutrition Therapy (MNT): Initial Group DSME/MNT as authorized by payor, Follow-Up DSME/MNT as authorized by payor; DSME/T Content: Monitoring Diabete...    2. Acute pancreatitis without infection or necrosis, unspecified pancreatitis type  Patient in no acute distress today, functioning eating okay.  Will refer patient back to gastroenterology for further evaluation and management.  Strict ER precautions reviewed with patient.  Consider abdominal CT.  - REFERRAL TO GASTROENTEROLOGY    3. Essential hypertension  Continue with lisinopril 20 mg daily.  Advised patient to monitor.  She is asking for prescription for blood pressure cuff.  I advised this may not be covered by her insurance, but I would be happy to give her a prescription for a cuff.  Patient will bring in readings to next appointment.  - Misc. Devices Misc; Please dispense blood pressure cuff   Dispense: 1 Device; Refill: 0    Patient will return to clinic in 3 months or sooner if needed.

## 2019-07-02 NOTE — ASSESSMENT & PLAN NOTE
This is a chronic health problem that is uncontrolled with current medications and lifestyle measures.  Patient taking glyburide 10 mg twice daily, Januvia 100 mg daily, Trulicity 1.5 mg weekly.  Hemoglobin A1c has increased to 8%.  Patient is not monitoring blood sugars at home.  She was recently seen in the ER and diagnosed with pancreatitis.  Reviewed lifestyle measures with patient including low carbohydrate diet and routine aerobic exercise as tolerated.  Will refer to diabetic nurse educator via telemedicine.  Patient is unable to meet with diabetic nurse educator in person this patient works on days that educator comes to [place].  Recently went to eye doctor.  No diabetic retinopathy however does have some hypertensive changes.  On ACE and statin.

## 2019-07-02 NOTE — PATIENT INSTRUCTIONS
Take blood pressure a few times a week and write down the reading.  Bring in readings to next appointment.    Monitor fasting blood sugar daily.  Bring in readings to next appointment.

## 2019-07-02 NOTE — ASSESSMENT & PLAN NOTE
Chronic health problem, well controlled.    Taking lisinopril 20 mg daily.Tolerating medication, denies cough or lightheadedness.  Does not monitor blood pressures at home.  Blood pressure today is 120/56.  Patient recently saw optometry who reported some hypertensive retinopathy and will continue to monitor.  The patient denies chest pain, shortness of breath, headache, vision changes, epistaxis, or dyspnea on exertion.  Reviewed lifestyle measures including routine aerobic exercise, weight loss, DASH diet.

## 2019-07-02 NOTE — TELEPHONE ENCOUNTER
Was the patient seen in the last year in this department? Yes    Does patient have an active prescription for medications requested? No     Received Request Via: Pharmacy     Last OV 7/2/19, last labs 6/21/19

## 2019-07-03 PROBLEM — K85.90 ACUTE PANCREATITIS WITHOUT INFECTION OR NECROSIS: Status: ACTIVE | Noted: 2019-07-03

## 2019-07-03 NOTE — ASSESSMENT & PLAN NOTE
This is a 53-year-old female with history of hypertension, type 2 diabetes who presented to the [facility] ER on 6/21/2019 with concern for hypertension.  During work-up patient was found to have epigastric pain and diagnosed with pancreatitis.  Patient was prescribed a small supply of Norco, which she has taken sparingly.  She has been eating a low-fat diet. Does report frequent burping. Denies nausea, emesis, diarrhea.  Reports mild to moderate epigastric pain.  Will refer to gastroenterology for further evaluation.  Patient is established with GI and she will also call to try to get an appointment.

## 2019-08-21 DIAGNOSIS — E11.9 TYPE 2 DIABETES MELLITUS WITHOUT COMPLICATION, WITHOUT LONG-TERM CURRENT USE OF INSULIN (HCC): ICD-10-CM

## 2019-08-22 RX ORDER — GLYBURIDE 5 MG/1
TABLET ORAL
Qty: 120 TAB | Refills: 1 | Status: SHIPPED | OUTPATIENT
Start: 2019-08-22 | End: 2019-10-30

## 2019-08-22 NOTE — TELEPHONE ENCOUNTER
Was the patient seen in the last year in this department? Yes    Does patient have an active prescription for medications requested? No     Received Request Via: Pharmacy    Pt met protocol?: Yes     Last OV 07/02/19    Lab Results   Component Value Date/Time    HBA1C 8.0 (A) 07/02/2019 09:16 AM      Lab Results   Component Value Date/Time    CHOLSTRLTOT 130 03/26/2019 1108    TRIGLYCERIDE 75 03/26/2019 1108    HDL 46 03/26/2019 1108    LDL 69 03/26/2019 1108

## 2019-08-29 DIAGNOSIS — E11.9 TYPE 2 DIABETES MELLITUS WITHOUT COMPLICATION, WITHOUT LONG-TERM CURRENT USE OF INSULIN (HCC): ICD-10-CM

## 2019-08-29 DIAGNOSIS — E78.5 HYPERLIPIDEMIA, UNSPECIFIED HYPERLIPIDEMIA TYPE: ICD-10-CM

## 2019-08-30 RX ORDER — FENOFIBRATE 160 MG/1
TABLET ORAL
Qty: 90 TAB | Refills: 0 | Status: SHIPPED | OUTPATIENT
Start: 2019-08-30 | End: 2019-10-28 | Stop reason: SDUPTHER

## 2019-08-30 RX ORDER — DULAGLUTIDE 1.5 MG/.5ML
INJECTION, SOLUTION SUBCUTANEOUS
Qty: 12 PEN | Refills: 0 | Status: SHIPPED | OUTPATIENT
Start: 2019-08-30 | End: 2019-10-28 | Stop reason: SDUPTHER

## 2019-09-03 DIAGNOSIS — E78.5 HYPERLIPIDEMIA, UNSPECIFIED HYPERLIPIDEMIA TYPE: ICD-10-CM

## 2019-09-04 RX ORDER — ATORVASTATIN CALCIUM 20 MG/1
TABLET, FILM COATED ORAL
Qty: 90 TAB | Refills: 1 | Status: SHIPPED | OUTPATIENT
Start: 2019-09-04 | End: 2020-02-13

## 2019-09-04 NOTE — TELEPHONE ENCOUNTER
Was the patient seen in the last year in this department? Yes    Does patient have an active prescription for medications requested? No     Received Request Via: Pharmacy      Pt met protocol?: Yes    LAST OV 07/02/2019    BP Readings from Last 1 Encounters:   07/02/19 120/56     Lab Results   Component Value Date/Time    CHOLSTRLTOT 130 03/26/2019 11:08 AM    LDL 69 03/26/2019 11:08 AM    HDL 46 03/26/2019 11:08 AM    TRIGLYCERIDE 75 03/26/2019 11:08 AM       Lab Results   Component Value Date/Time    SODIUM 135 06/21/2019 08:40 PM    POTASSIUM 4.2 06/21/2019 08:40 PM    CHLORIDE 102 06/21/2019 08:40 PM    CO2 25 06/21/2019 08:40 PM    GLUCOSE 204 (H) 06/21/2019 08:40 PM    BUN 30 (H) 06/21/2019 08:40 PM    CREATININE 1.13 06/21/2019 08:40 PM     Lab Results   Component Value Date/Time    ALKPHOSPHAT 43 06/21/2019 08:40 PM    ASTSGOT 31 06/21/2019 08:40 PM    ALTSGPT 23 06/21/2019 08:40 PM    TBILIRUBIN 0.5 06/21/2019 08:40 PM

## 2019-09-04 NOTE — TELEPHONE ENCOUNTER
Pt has had OV within the 12 month protocol and lipid panel is current. 6 month supply sent to pharmacy.   Lab Results   Component Value Date/Time    CHOLSTRLTOT 130 03/26/2019 11:08 AM    LDL 69 03/26/2019 11:08 AM    HDL 46 03/26/2019 11:08 AM    TRIGLYCERIDE 75 03/26/2019 11:08 AM       Lab Results   Component Value Date/Time    SODIUM 135 06/21/2019 08:40 PM    POTASSIUM 4.2 06/21/2019 08:40 PM    CHLORIDE 102 06/21/2019 08:40 PM    CO2 25 06/21/2019 08:40 PM    GLUCOSE 204 (H) 06/21/2019 08:40 PM    BUN 30 (H) 06/21/2019 08:40 PM    CREATININE 1.13 06/21/2019 08:40 PM     Lab Results   Component Value Date/Time    ALKPHOSPHAT 43 06/21/2019 08:40 PM    ASTSGOT 31 06/21/2019 08:40 PM    ALTSGPT 23 06/21/2019 08:40 PM    TBILIRUBIN 0.5 06/21/2019 08:40 PM

## 2019-09-10 DIAGNOSIS — I10 ESSENTIAL HYPERTENSION: ICD-10-CM

## 2019-09-11 RX ORDER — LISINOPRIL 20 MG/1
TABLET ORAL
Qty: 90 TAB | Refills: 1 | Status: SHIPPED | OUTPATIENT
Start: 2019-09-11 | End: 2020-05-15

## 2019-09-11 NOTE — TELEPHONE ENCOUNTER
Was the patient seen in the last year in this department? Yes    Does patient have an active prescription for medications requested? No     Received Request Via: Pharmacy    Pt met protocol?: Yes     Last OV 07/02/19    BP Readings from Last 1 Encounters:   07/02/19 120/56

## 2019-09-11 NOTE — TELEPHONE ENCOUNTER
Refill X 6 months, sent to pharmacy.Pt. Seen in the last 6 months per protocol.   Lab Results   Component Value Date/Time    SODIUM 135 06/21/2019 08:40 PM    POTASSIUM 4.2 06/21/2019 08:40 PM    CHLORIDE 102 06/21/2019 08:40 PM    CO2 25 06/21/2019 08:40 PM    GLUCOSE 204 (H) 06/21/2019 08:40 PM    BUN 30 (H) 06/21/2019 08:40 PM    CREATININE 1.13 06/21/2019 08:40 PM

## 2019-09-20 ENCOUNTER — HOSPITAL ENCOUNTER (EMERGENCY)
Facility: MEDICAL CENTER | Age: 54
End: 2019-09-20
Attending: EMERGENCY MEDICINE
Payer: COMMERCIAL

## 2019-09-20 ENCOUNTER — NON-PROVIDER VISIT (OUTPATIENT)
Dept: OCCUPATIONAL MEDICINE | Facility: CLINIC | Age: 54
End: 2019-09-20

## 2019-09-20 ENCOUNTER — APPOINTMENT (OUTPATIENT)
Dept: RADIOLOGY | Facility: MEDICAL CENTER | Age: 54
End: 2019-09-20
Attending: EMERGENCY MEDICINE
Payer: COMMERCIAL

## 2019-09-20 VITALS
OXYGEN SATURATION: 99 % | WEIGHT: 187.83 LBS | SYSTOLIC BLOOD PRESSURE: 145 MMHG | HEIGHT: 61 IN | HEART RATE: 70 BPM | DIASTOLIC BLOOD PRESSURE: 80 MMHG | RESPIRATION RATE: 18 BRPM | BODY MASS INDEX: 35.46 KG/M2 | TEMPERATURE: 97.2 F
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
## 2019-09-20 DIAGNOSIS — S39.012A STRAIN OF LUMBAR REGION, INITIAL ENCOUNTER: ICD-10-CM

## 2019-09-20 DIAGNOSIS — Z02.83 ENCOUNTER FOR DRUG SCREENING: ICD-10-CM

## 2019-09-20 LAB
AMP AMPHETAMINE: NORMAL
BAR BARBITURATES: NORMAL
BREATH ALCOHOL COMMENT: NORMAL
BZO BENZODIAZEPINES: NORMAL
COC COCAINE: NORMAL
INT CON NEG: NORMAL
INT CON POS: NORMAL
MDMA ECSTASY: NORMAL
MET METHAMPHETAMINES: NORMAL
MTD METHADONE: NORMAL
OPI OPIATES: NORMAL
OXY OXYCODONE: NORMAL
PCP PHENCYCLIDINE: NORMAL
POC BREATHALIZER: 0 PERCENT (ref 0–0.01)
POC URINE DRUG SCREEN OCDRS: NORMAL
THC: NORMAL

## 2019-09-20 PROCEDURE — 700102 HCHG RX REV CODE 250 W/ 637 OVERRIDE(OP): Performed by: EMERGENCY MEDICINE

## 2019-09-20 PROCEDURE — A9270 NON-COVERED ITEM OR SERVICE: HCPCS | Performed by: EMERGENCY MEDICINE

## 2019-09-20 PROCEDURE — 99284 EMERGENCY DEPT VISIT MOD MDM: CPT

## 2019-09-20 PROCEDURE — 8899 PR URINE 11 PANEL - AFTER HOURS: Performed by: PREVENTIVE MEDICINE

## 2019-09-20 PROCEDURE — 72100 X-RAY EXAM L-S SPINE 2/3 VWS: CPT

## 2019-09-20 PROCEDURE — 80305 DRUG TEST PRSMV DIR OPT OBS: CPT | Performed by: PREVENTIVE MEDICINE

## 2019-09-20 PROCEDURE — 82075 ASSAY OF BREATH ETHANOL: CPT | Performed by: PREVENTIVE MEDICINE

## 2019-09-20 RX ORDER — IBUPROFEN 600 MG/1
600 TABLET ORAL ONCE
Status: COMPLETED | OUTPATIENT
Start: 2019-09-20 | End: 2019-09-20

## 2019-09-20 RX ADMIN — IBUPROFEN 600 MG: 600 TABLET ORAL at 08:24

## 2019-09-20 ASSESSMENT — LIFESTYLE VARIABLES: DO YOU DRINK ALCOHOL: NO

## 2019-09-20 NOTE — LETTER
"  FORM C-4:  EMPLOYEE’S CLAIM FOR COMPENSATION/ REPORT OF INITIAL TREATMENT  EMPLOYEE’S CLAIM - PROVIDE ALL INFORMATION REQUESTED   First Name  Sonya Last Name  Eribreto Birthdate             Age  1965 53 y.o. Sex  female Claim Number   Home Employee Address  [address]  [facility]                                     Zip  [address] Height  1.549 m (5' 1\") Weight  85.2 kg (187 lb 13.3 oz) N  [id number]   Mailing Employee Address                           [address]   [facility]               Zip  [address] Telephone  [phone] (home) [phone] (work) Primary Language Spoken  ENGLISH   Insurer  *** Third Party   [employer] Employee's Occupation (Job Title) When Injury or Occupational Disease Occurred     Employer's Name  [employer] Telephone  [phone]    Employer Address  [address] [facility] [address]] Zip  [address]   Date of Injury  9/20/2019       Hour of Injury  9:02 AM Date Employer Notified  9/20/2019 Last Day of Work after Injury or Occupational Disease  9/20/2019 Supervisor to Whom Injury Reported  Bniu Newman   Address or Location of Accident (if applicable)  [[facility]]   What were you doing at the time of accident? (if applicable)  Pulling a cart    How did this injury or occupational disease occur? Be specific and answer in detail. Use additional sheet if necessary)  I was pulling the cart w 1 material pancake called anode the tire got caught on some    some tape that was on the floor and the cart fell on top of me.   If you believe that you have an occupational disease, when did you first have knowledge of the disability and it relationship to your employment?  N/A Witnesses to the Accident  John Shift Lead     Nature of Injury or Occupational Disease  Crushing  Part(s) of Body Injured or Affected  Buttocks, Shoulder (L), Upper " Back Area (Thoracic Area)    I certify that the above is true and correct to the best of my knowledge and that I have provided this information in order to obtain the benefits of [place] inclusive or Chapter 617 of [place]RS).  I hereby authorize any physician, chiropractor, surgeon, practitioner, or other person, any hospital, including [facility] or [facility], any medical service organization, any insurance company, or other institution or organization to release to each other, any medical or other information, including benefits paid or payable, pertinent to this injury or disease, except information relative to diagnosis, treatment and/or counseling for AIDS, psychological conditions, alcohol or controlled substances, for which I must give specific authorization.  A Photostat of this authorization shall be as valid as the original.   Date Place   Employee’s Signature   THIS REPORT MUST BE COMPLETED AND MAILED WITHIN 3 WORKING DAYS OF TREATMENT   Place  [facility], EMERGENCY DEPT  Name of Facility   [facility]   Date  9/20/2019 Diagnosis  No diagnosis found. Is there evidence the injured employee was under the influence of alcohol and/or another controlled substance at the time of accident?   Hour  9:07 AM Description of Injury or Disease       Treatment     Have you advised the patient to remain off work five days or more?             X-Ray Findings      If Yes   From Date    To Date      From information given by the employee, together with medical evidence, can you directly connect this injury or occupational disease as job incurred?    If No, is the employee capable of: Full Duty    Modified Duty      Is additional medical care by a physician indicated?    If Modified Duty, Specify any Limitations / Restrictions        Do you know of any previous injury or disease  "contributing to this condition or occupational disease?      Date  9/20/2019 Print Doctor’s Name  Derik Yoo JUNAID certify the employer’s copy of this form was mailed on:   Address  [address] [place] [address] [phone] Insurer’s Use Only   [facility]  [address]    Provider’s Tax ID Number  [id number] Telephone  Dept: [phone]    Doctor’s Signature    Degree       Original - TREATING PHYSICIAN OR CHIROPRACTOR   Pg 2-Insurer/TPA   Pg 3-Employer   Pg 4-Employee                                                                                                  Form C-4 (rev01/03)     BRIEF DESCRIPTION OF RIGHTS AND BENEFITS  (Pursuant to NRS 616C.050)    Notice of Injury or Occupational Disease (Incident Report Form C-1): If an injury or occupational disease (OD) arises out of and in the course of employment, you must provide written notice to your employer as soon as practicable, but no later than 7 days after the accident or OD. Your employer shall maintain a sufficient supply of the required forms.    Claim for Compensation (Form C-4): If medical treatment is sought, the form C-4 is available at the place of initial treatment. A completed \"Claim for Compensation\" (Form C-4) must be filed within 90 days after an accident or OD. The treating physician or chiropractor must, within 3 working days after treatment, complete and mail to the employer, the employer's insurer and third-party , the Claim for Compensation.    Medical Treatment: If you require medical treatment for your on-the-job injury or OD, you may be required to select a physician or chiropractor from a list provided by your workers’ compensation insurer, if it has contracted with an Organization for Managed Care (MCO) or Preferred Provider Organization (PPO) or providers of health care. If your employer has not entered into a contract with an MCO or PPO, you may select a physician or chiropractor from the " Panel of Physicians and Chiropractors. Any medical costs related to your industrial injury or OD will be paid by your insurer.    Temporary Total Disability (TTD): If your doctor has certified that you are unable to work for a period of at least 5 consecutive days, or 5 cumulative days in a 20-day period, or places restrictions on you that your employer does not accommodate, you may be entitled to TTD compensation.    Temporary Partial Disability (TPD): If the wage you receive upon reemployment is less than the compensation for TTD to which you are entitled, the insurer may be required to pay you TPD compensation to make up the difference. TPD can only be paid for a maximum of 24 months.    Permanent Partial Disability (PPD): When your medical condition is stable and there is an indication of a PPD as a result of your injury or OD, within 30 days, your insurer must arrange for an evaluation by a rating physician or chiropractor to determine the degree of your PPD. The amount of your PPD award depends on the date of injury, the results of the PPD evaluation and your age and wage.    Permanent Total Disability (PTD): If you are medically certified by a treating physician or chiropractor as permanently and totally disabled and have been granted a PTD status by your insurer, you are entitled to receive monthly benefits not to exceed 66 2/3% of your average monthly wage. The amount of your PTD payments is subject to reduction if you previously received a PPD award.    Vocational Rehabilitation Services: You may be eligible for vocational rehabilitation services if you are unable to return to the job due to a permanent physical impairment or permanent restrictions as a result of your injury or occupational disease.    Transportation and Per Garica Reimbursement: You may be eligible for travel expenses and per garcia associated with medical treatment.  Reopening: You may be able to reopen your claim if your condition worsens  after claim closure.    Appeal Process: If you disagree with a written determination issued by the insurer or the insurer does not respond to your request, you may appeal to the Department of Administration, , by following the instructions contained in your determination letter. You must appeal the determination within 70 days from the date of the determination letter at [address], [address], [place], [place] [address], or [address], [place], [place] [address]. If you disagree with the  decision, you may appeal to the Department of Administration, . You must file your appeal within 30 days from the date of the  decision letter at [address], [address], [place], [place] [address], or [address] [facility] [address] [facility] [address], [place], [place] [address]. If you disagree with a decision of an , you may file a petition for judicial review with the District Court. You must do so within 30 days of the Appeal Officer’s decision. You may be represented by an  at your own expense or you may contact the [facility] for possible representation.    [place]  for Injured Workers (NAIW): If you disagree with a  decision, you may request that NAIW represent you without charge at an  Hearing. For information regarding denial of benefits, you may contact the NA at: [address], [address], [place] [address], [phone], or [address], [address] [facility] [facility] [address], [place] [address], [phone]    To File a Complaint with the Division: If you wish to file a complaint with the  of the Division of Industrial Relations (DIR), please contact the Workers’ Compensation Section, [address] [facility] [address] [facility], [address] [facility] [address], [place], [place] [address], telephone [phone], or [address], [place] [address], telephone [phone].    For  assistance with Workers’ Compensation Issues: you may contact the Office of the Governor Consumer Health Assistance, [address] [place] [address], [place] [address], [place] [address], Toll Free 1-281.888.9428, Web site: http://govcha.[facility].nv., E-mail [email or web address].                                                                                                                                                                               __________________________________________________________________                                    _________________            Employee Name / Signature                                                                                                                            Date                                       D-2 (rev. 10/07)

## 2019-09-20 NOTE — ED NOTES
Pt alert and oriented, NAD, VSS, pt verbalized understanding of dc instructions and follow up. Patient ambulatory without difficulty

## 2019-09-20 NOTE — LETTER
"  FORM C-4:  EMPLOYEE’S CLAIM FOR COMPENSATION/ REPORT OF INITIAL TREATMENT  EMPLOYEE’S CLAIM - PROVIDE ALL INFORMATION REQUESTED   First Name  Sonya Last Name  Eriberto Birthdate             Age  1965 53 y.o. Sex  female Claim Number   Home Employee Address  [address]  [facility]                                     Zip  [address] Height  1.549 m (5' 1\") Weight  85.2 kg (187 lb 13.3 oz) [facility]     Mailing Employee Address                           [address]   [facility]               Zip  [address] Telephone  [phone] (home) [phone] (work) Primary Language Spoken  ENGLISH   Insurer   Third Party   [employer] Employee's Occupation (Job Title) When Injury or Occupational Disease Occurred     Employer's Name  [employer] Telephone  [phone]    Employer Address  [address] [facility] [address]] Zip  [address]   Date of Injury  9/20/2019       Hour of Injury  9:02 AM Date Employer Notified  9/20/2019 Last Day of Work after Injury or Occupational Disease  9/20/2019 Supervisor to Whom Injury Reported  Binu Newman   Address or Location of Accident (if applicable)  [[facility]]   What were you doing at the time of accident? (if applicable)  Pulling a cart    How did this injury or occupational disease occur? Be specific and answer in detail. Use additional sheet if necessary)  I was pulling the cart w 1 material pancake called anode the tire got caught on some    some tape that was on the floor and the cart fell on top of me.   If you believe that you have an occupational disease, when did you first have knowledge of the disability and it relationship to your employment?  N/A Witnesses to the Accident  John Shift Lead     Nature of Injury or Occupational Disease  Crushing  Part(s) of Body Injured or Affected  Buttocks, Shoulder (L), Upper Back " Area (Thoracic Area)    I certify that the above is true and correct to the best of my knowledge and that I have provided this information in order to obtain the benefits of [place]’s [place] and [place] ([place] 616A to 616D, inclusive or Chapter 617 of NRS).  I hereby authorize any physician, chiropractor, surgeon, practitioner, or other person, any hospital, including [facility] or [facility], any medical service organization, any insurance company, or other institution or organization to release to each other, any medical or other information, including benefits paid or payable, pertinent to this injury or disease, except information relative to diagnosis, treatment and/or counseling for AIDS, psychological conditions, alcohol or controlled substances, for which I must give specific authorization.  A Photostat of this authorization shall be as valid as the original.   Date  09/20/2019 Place:[facility] Employee’s Signature   THIS REPORT MUST BE COMPLETED AND MAILED WITHIN 3 WORKING DAYS OF TREATMENT   Place  [facility], EMERGENCY DEPT  Name of Facility   [facility]   Date  9/20/2019 Diagnosis  (W19.XXXA) Fall, initial encounter  (S39.012A) Strain of lumbar region, initial encounter Is there evidence the injured employee was under the influence of alcohol and/or another controlled substance at the time of accident?   Hour  9:17 AM Description of Injury or Disease  Fall, initial encounter  Strain of lumbar region, initial encounter No   Treatment  Rest, ibuprofen  Have you advised the patient to remain off work five days or more?         No   X-Ray Findings  Negative   If Yes   From Date    To Date      From information given by the employee, together with medical evidence, can you directly connect this injury or occupational disease as job incurred?  Yes If No, is the employee capable of:  "Full Duty  No Modified Duty  Yes   Is additional medical care by a physician indicated?  Yes If Modified Duty, Specify any Limitations / Restrictions  No lifting or bending or prolonged standing for 3 days     Do you know of any previous injury or disease contributing to this condition or occupational disease?  No   Date  9/20/2019 Print Doctor’s Name  Mike Yoo certify the employer’s copy of this form was mailed on:   Address  [address] [facility] [address] [phone] Insurer’s Use Only   [facility]  [address]    Provider’s Tax ID Number  [id number] Telephone  Dept: [phone]    Doctor’s Signature  e-MIKE Lira M.D. Degree   MD    Original - TREATING PHYSICIAN OR CHIROPRACTOR   Pg 2-Insurer/TPA   Pg 3-Employer   Pg 4-Employee                                                                                                  Form C-4 (rev01/03)     BRIEF DESCRIPTION OF RIGHTS AND BENEFITS  (Pursuant to NRS 616C.050)    Notice of Injury or Occupational Disease (Incident Report Form C-1): If an injury or occupational disease (OD) arises out of and in the course of employment, you must provide written notice to your employer as soon as practicable, but no later than 7 days after the accident or OD. Your employer shall maintain a sufficient supply of the required forms.    Claim for Compensation (Form C-4): If medical treatment is sought, the form C-4 is available at the place of initial treatment. A completed \"Claim for Compensation\" (Form C-4) must be filed within 90 days after an accident or OD. The treating physician or chiropractor must, within 3 working days after treatment, complete and mail to the employer, the employer's insurer and third-party , the Claim for Compensation.    Medical Treatment: If you require medical treatment for your on-the-job injury or OD, you may be required to select a physician or chiropractor from a list provided by your " workers’ compensation insurer, if it has contracted with an Organization for Managed Care (MCO) or Preferred Provider Organization (PPO) or providers of health care. If your employer has not entered into a contract with an MCO or PPO, you may select a physician or chiropractor from the Panel of Physicians and Chiropractors. Any medical costs related to your industrial injury or OD will be paid by your insurer.    Temporary Total Disability (TTD): If your doctor has certified that you are unable to work for a period of at least 5 consecutive days, or 5 cumulative days in a 20-day period, or places restrictions on you that your employer does not accommodate, you may be entitled to TTD compensation.  Temporary Partial Disability (TPD): If the wage you receive upon reemployment is less than the compensation for TTD to which you are entitled, the insurer may be required to pay you TPD compensation to make up the difference. TPD can only be paid for a maximum of 24 months.  Permanent Partial Disability (PPD): When your medical condition is stable and there is an indication of a PPD as a result of your injury or OD, within 30 days, your insurer must arrange for an evaluation by a rating physician or chiropractor to determine the degree of your PPD. The amount of your PPD award depends on the date of injury, the results of the PPD evaluation and your age and wage.  Permanent Total Disability (PTD): If you are medically certified by a treating physician or chiropractor as permanently and totally disabled and have been granted a PTD status by your insurer, you are entitled to receive monthly benefits not to exceed 66 2/3% of your average monthly wage. The amount of your PTD payments is subject to reduction if you previously received a PPD award.  Vocational Rehabilitation Services: You may be eligible for vocational rehabilitation services if you are unable to return to the job due to a permanent physical impairment or  permanent restrictions as a result of your injury or occupational disease.  Transportation and Per Garcia Reimbursement: You may be eligible for travel expenses and per garcia associated with medical treatment.  Reopening: You may be able to reopen your claim if your condition worsens after claim closure.  Appeal Process: If you disagree with a written determination issued by the insurer or the insurer does not respond to your request, you may appeal to the Department of Administration, , by following the instructions contained in your determination letter. You must appeal the determination within 70 days from the date of the determination letter at [address], [address], [place], [place] [address], or [address], [address], [place], [place] [address]. If you disagree with the  decision, you may appeal to the Department of Administration, . You must file your appeal within 30 days from the date of the  decision letter at [address], [address], [place], [place] [address], or [address], [facility] [address], [place], [place] [address]. If you disagree with a decision of an , you may file a petition for judicial review with the District Court. You must do so within 30 days of the Appeal Officer’s decision. You may be represented by an  at your own expense or you may contact the [facility] for possible representation.  [place]  for Injured Workers ([place]IW): If you disagree with a  decision, you may request that NAIW represent you without charge at an  Hearing. For information regarding denial of benefits, you may contact the [facility] at: [address], [address], [place] [address], [phone], or [address], [address], [place] [address], [phone]  To File a Complaint with the Division: If you wish to file a complaint with the  of the Division of  Industrial Relations (DIR), please contact the Workers’ Compensation Section, [address], [address], [place], [place] [address], telephone (108) 955-9120, or [address], [address], [place], [place] [address], telephone [phone]) [phone].  For assistance with Workers’ Compensation Issues: you may contact the Office of the [facility] Consumer Health Assistance, [address] [place] [address], [address], [place], [place] [address], Toll Free 1-433.520.8927, Web site: http://govVeodin.[facility].nv., E-mail [email or web address].                                                                                                                                                                               __________________________________________________________________                                    _________________            Employee Name / Signature                                                                                                                            Date                                       D-2 (rev. 10/07)

## 2019-09-20 NOTE — ED PROVIDER NOTES
"ED Provider Note    ER PROVIDER NOTE        CHIEF COMPLAINT  Chief Complaint   Patient presents with   • Fall     Pt was at work when a \"cart\" fell and hit her left hand; bruising noted to left third digit; cms intact; pt then fell onto right side; c/o right hip and lower back pain   • Work-Related Injury       HPI  Sonya Wei is a 53 y.o. female who presents to the emergency department complaining of back and side pain.  Patient was at work, moving a cart when the car tipped over and fell knocking her over onto her right side.  She has had some low back pain since as well as right sided hip/leg pain.  She states it did hit her left hand but her left hand does not hurt.  She denies any focal weakness numbness or tingling.  No bowel or bladder incontinence or retention.  Denies any chest pain abdominal pain or head injury, no other extremity pain    REVIEW OF SYSTEMS  Pertinent positives include back pain. Pertinent negatives include no weakness or numbness. See HPI for details. All other systems reviewed and are negative.    PAST MEDICAL HISTORY   has a past medical history of Anemia, Bowel habit changes, Diabetes, Diabetes (HCC), GERD (gastroesophageal reflux disease), Healthcare maintenance (9/27/2014), High cholesterol, Hyperlipidemia, Hypertension, Infectious disease, Jaundice (1999), Psychiatric problem, and Vaginal itching (8/27/2014).    SURGICAL HISTORY   has a past surgical history that includes cholecystectomy; primary c section; tubal coagulation laparoscopic bilateral; abdominal hysterectomy total; and ventral hernia repair robotic xi (N/A, 10/14/2016).    FAMILY HISTORY  Family History   Problem Relation Age of Onset   • Diabetes Mother    • Hyperlipidemia Mother    • Diabetes Father    • Hypertension Father    • Hyperlipidemia Father        SOCIAL HISTORY  Social History     Socioeconomic History   • Marital status:      Spouse name: Not on file   • Number of children: Not on file   • " Years of education: Not on file   • Highest education level: Not on file   Occupational History   • Not on file   Social Needs   • Financial resource strain: Not on file   • Food insecurity:     Worry: Not on file     Inability: Not on file   • Transportation needs:     Medical: Not on file     Non-medical: Not on file   Tobacco Use   • Smoking status: Never Smoker   • Smokeless tobacco: Never Used   Substance and Sexual Activity   • Alcohol use: No   • Drug use: No   • Sexual activity: Yes     Partners: Male     Birth control/protection: None   Lifestyle   • Physical activity:     Days per week: Not on file     Minutes per session: Not on file   • Stress: Not on file   Relationships   • Social connections:     Talks on phone: Not on file     Gets together: Not on file     Attends Muslim service: Not on file     Active member of club or organization: Not on file     Attends meetings of clubs or organizations: Not on file     Relationship status: Not on file   • Intimate partner violence:     Fear of current or ex partner: Not on file     Emotionally abused: Not on file     Physically abused: Not on file     Forced sexual activity: Not on file   Other Topics Concern   • Not on file   Social History Narrative    ** Merged History Encounter **           Social History     Substance and Sexual Activity   Drug Use No       CURRENT MEDICATIONS  Home Medications     Reviewed by Louie Brar (Pharmacy Tech) on 09/20/19 at 0917  Med List Status: Complete   Medication Last Dose Status   aspirin EC (ECOTRIN) 81 MG Tablet Delayed Response 9/19/2019 Active   atorvastatin (LIPITOR) 20 MG Tab 9/19/2019 Active   Cholecalciferol (VITAMIN D PO) 9/19/2019 Active   fenofibrate (TRIGLIDE) 160 MG tablet 9/19/2019 Active   glyBURIDE (DIABETA) 5 MG Tab 9/20/2019 Active   lisinopril (PRINIVIL) 20 MG Tab 9/19/2019 Active   raNITidine (ZANTAC) 150 MG Tab 9/20/2019 Active   SITagliptin (JANUVIA) 100 MG Tab 9/20/2019 Active  "  therapeutic multivitamin-minerals (THERAGRAN-M) Tab 9/19/2019 Active   TRULICITY 1.5 MG/0.5ML Solution Pen-injector 9/15/2019 Active                ALLERGIES  Allergies   Allergen Reactions   • Morphine Itching     Rxn = unknown   Bumps all over body   • Metformin      headaches       PHYSICAL EXAM  VITAL SIGNS: /82   Pulse 86   Temp 36.2 °C (97.2 °F) (Temporal)   Resp 18   Ht 1.549 m (5' 1\")   Wt 85.2 kg (187 lb 13.3 oz)   SpO2 97%   BMI 35.49 kg/m²   Pulse ox interpretation: I interpret this pulse ox as normal.    Constitutional: Alert in no apparent distress.  HENT: No signs of trauma, Bilateral external ears normal, Nose normal.   Eyes: Pupils are equal and reactive, Conjunctiva normal, Non-icteric.   Neck: Normal range of motion, No tenderness, Supple, No stridor.   Cardiovascular: Regular rate and rhythm, no murmurs.   Thorax & Lungs: Normal breath sounds, No respiratory distress, No wheezing, No chest tenderness.   Abdomen: Bowel sounds normal, Soft, No tenderness, No masses, No pulsatile masses. No peritoneal signs.  Skin: Warm, Dry, No erythema, No rash.   Back: Mild tenderness over mid L spine, no deformity or step-off, no other bony tenderness no CVA tenderness.   Musculoskeletal: Small contusion noted over dorsal left hand although nontender, no deformity or step-off, mild tenderness to right lateral upper leg although no bony tenderness otherwise good range of motion in all major joints. No tenderness to palpation or major deformities noted.   Neurologic: Alert , strength is 5 out of 5 with bilateral lower extremities, hip flexion and extension, abduction, abduction, knee flexion and extension, dorsiflexion and plantar flexion of feet, sensation is intact to light touch, no clonus, ambulates with steady gait normal motor function, Normal sensory function, No focal deficits noted.   Psychiatric: Affect normal, Judgment normal, Mood normal.     DIAGNOSTIC STUDIES / " PROCEDURES        RADIOLOGY  DX-LUMBAR SPINE-2 OR 3 VIEWS   Final Result      Mild multilevel degenerative disc disease and facet degeneration. No evidence of fracture.        The radiologist's interpretation of all radiological studies have been reviewed by me.    COURSE & MEDICAL DECISION MAKING  Nursing notes, VS, PMSFHx reviewed in chart.    8:10 AM Patient seen and examined at bedside. Patient will be treated with ibuprofen. Ordered for x-ray to evaluate her symptoms.     9:06 AM  Patient reevaluated, she is feeling improved, updated on results and will plan for discharge        Decision Making:  This is a 53 y.o. female presenting after fall and injury sustained at work.  Patient primarily has some low back pain and this seems more strained than anything at this point.  Advised to rest and ibuprofen, will follow-up with occupational health.  She has no focal neurologic findings or symptoms to suggest spinal compression syndrome, no evidence of fracture or significant traumatic injury on her x-ray.  She otherwise has no other bony tenderness to suggest other significant traumatic injury     The patient will return for new or worsening symptoms and is stable at the time of discharge.    The patient is referred to a primary physician for blood pressure management, diabetic screening, and for all other preventative health concerns.      DISPOSITION:  Patient will be discharged home in stable condition.    FOLLOW UP:  [place] [id number] [facility]  [address]  [facility] [address] [phone]  In 3 days        OUTPATIENT MEDICATIONS:  New Prescriptions    No medications on file         FINAL IMPRESSION  1. Fall, initial encounter    2. Strain of lumbar region, initial encounter          The note accurately reflects work and decisions made by me.  Derik Yoo  9/20/2019  9:27 AM

## 2019-09-20 NOTE — LETTER
"  FORM C-4:  EMPLOYEE’S CLAIM FOR COMPENSATION/ REPORT OF INITIAL TREATMENT  EMPLOYEE’S CLAIM - PROVIDE ALL INFORMATION REQUESTED   First Name  Sonya Last Name  Eriberto Birthdate             Age  1965 53 y.o. Sex  female Claim Number   Home Employee Address  [address]  [facility] [place] [facility]                                     [place]  [address] Height  1.549 m (5' 1\") Weight  85.2 kg (187 lb 13.3 oz) [facility]     Mailing Employee Address                           [address]   [facility]               Zip  [address] Telephone  [phone] (home) [phone] (work) Primary Language Spoken  ENGLISH   Insurer   Third Party   [employer] Employee's Occupation (Job Title) When Injury or Occupational Disease Occurred     Employer's Name  [employer] Telephone  [phone]    Employer Address  [address] [facility] [address]] Zip  [address]   Date of Injury  9/20/2019       Hour of Injury  5:55 AM Date Employer Notified  9/20/2019 Last Day of Work after Injury or Occupational Disease  9/20/2019 Supervisor to Whom Injury Reported  Binu Newman   Address or Location of Accident (if applicable)  [[facility]]   What were you doing at the time of accident? (if applicable)  Pulling a cart    How did this injury or occupational disease occur? Be specific and answer in detail. Use additional sheet if necessary)  I was pulling the cart w 1 material pancake called anode the tire got caught on some    some tape that was on the floor and the cart fell on top of me.   If you believe that you have an occupational disease, when did you first have knowledge of the disability and it relationship to your employment?  N/A Witnesses to the Accident  John Shift Lead     Nature of Injury or Occupational Disease  Crushing  Part(s) of Body Injured or Affected  Buttocks, Shoulder (L), Upper Back " Area (Thoracic Area)    I certify that the above is true and correct to the best of my knowledge and that I have provided this information in order to obtain the benefits of [place]  I hereby authorize any physician, chiropractor, surgeon, practitioner, or other person, any hospital, including [facility] or [facility], any medical service organization, any insurance company, or other institution or organization to release to each other, any medical or other information, including benefits paid or payable, pertinent to this injury or disease, except information relative to diagnosis, treatment and/or counseling for AIDS, psychological conditions, alcohol or controlled substances, for which I must give specific authorization.  A Photostat of this authorization shall be as valid as the original.   Date  09/20/2019 Place:[facility] Employee’s Signature   THIS REPORT MUST BE COMPLETED AND MAILED WITHIN 3 WORKING DAYS OF TREATMENT   Place  [facility], EMERGENCY DEPT  Name of Facility   [facility]   Date  9/20/2019 Diagnosis  (W19.XXXA) Fall, initial encounter  (S39.012A) Strain of lumbar region, initial encounter Is there evidence the injured employee was under the influence of alcohol and/or another controlled substance at the time of accident?   Hour  9:34 AM Description of Injury or Disease  Fall, initial encounter  Strain of lumbar region, initial encounter No   Treatment  Rest, ibuprofen  Have you advised the patient to remain off work five days or more?         No   X-Ray Findings  Negative   If Yes   From Date    To Date      From information given by the employee, together with medical evidence, can you directly connect this injury or occupational disease as job incurred?  Yes If No, is the employee capable of:  "Full Duty  No Modified Duty  Yes   Is additional medical care by a physician indicated?  Yes If Modified Duty, Specify any Limitations / Restrictions  No lifting or bending or prolonged standing for 3 days     Do you know of any previous injury or disease contributing to this condition or occupational disease?  No   Date  9/20/2019 Print Doctor’s Name  Mike Yoo certify the employer’s copy of this form was mailed on:   Address  [address] [facility] [address] [phone] Insurer’s Use Only   [facility]  [address]    Provider’s Tax ID Number  [id number] Telephone  Dept: [phone]    Doctor’s Signature  e-MIKE Lira M.D. Degree   MD    Original - TREATING PHYSICIAN OR CHIROPRACTOR   Pg 2-Insurer/TPA   Pg 3-Employer   Pg 4-Employee                                                                                                  Form C-4 (rev01/03)     BRIEF DESCRIPTION OF RIGHTS AND BENEFITS  (Pursuant to [place] 616C.050)    Notice of Injury or Occupational Disease (Incident Report Form C-1): If an injury or occupational disease (OD) arises out of and in the course of employment, you must provide written notice to your employer as soon as practicable, but no later than 7 days after the accident or OD. Your employer shall maintain a sufficient supply of the required forms.    Claim for Compensation (Form C-4): If medical treatment is sought, the form C-4 is available at the place of initial treatment. A completed \"Claim for Compensation\" (Form C-4) must be filed within 90 days after an accident or OD. The treating physician or chiropractor must, within 3 working days after treatment, complete and mail to the employer, the employer's insurer and third-party , the Claim for Compensation.    Medical Treatment: If you require medical treatment for your on-the-job injury or OD, you may be required to select a physician or chiropractor from a list provided by your " workers’ compensation insurer, if it has contracted with an Organization for Managed Care (MCO) or Preferred Provider Organization (PPO) or providers of health care. If your employer has not entered into a contract with an MCO or PPO, you may select a physician or chiropractor from the Panel of Physicians and Chiropractors. Any medical costs related to your industrial injury or OD will be paid by your insurer.    Temporary Total Disability (TTD): If your doctor has certified that you are unable to work for a period of at least 5 consecutive days, or 5 cumulative days in a 20-day period, or places restrictions on you that your employer does not accommodate, you may be entitled to TTD compensation.    Temporary Partial Disability (TPD): If the wage you receive upon reemployment is less than the compensation for TTD to which you are entitled, the insurer may be required to pay you TPD compensation to make up the difference. TPD can only be paid for a maximum of 24 months.    Permanent Partial Disability (PPD): When your medical condition is stable and there is an indication of a PPD as a result of your injury or OD, within 30 days, your insurer must arrange for an evaluation by a rating physician or chiropractor to determine the degree of your PPD. The amount of your PPD award depends on the date of injury, the results of the PPD evaluation and your age and wage.    Permanent Total Disability (PTD): If you are medically certified by a treating physician or chiropractor as permanently and totally disabled and have been granted a PTD status by your insurer, you are entitled to receive monthly benefits not to exceed 66 2/3% of your average monthly wage. The amount of your PTD payments is subject to reduction if you previously received a PPD award.    Vocational Rehabilitation Services: You may be eligible for vocational rehabilitation services if you are unable to return to the job due to a permanent physical impairment  or permanent restrictions as a result of your injury or occupational disease.    Transportation and Per Garcia Reimbursement: You may be eligible for travel expenses and per garcia associated with medical treatment.  Reopening: You may be able to reopen your claim if your condition worsens after claim closure.    Appeal Process: If you disagree with a written determination issued by the insurer or the insurer does not respond to your request, you may appeal to the Department of Administration, , by following the instructions contained in your determination letter. You must appeal the determination within 70 days from the date of the determination letter at [address], [address], [place], [place] [address], or [address], [address], [place], [place] [address]. If you disagree with the  decision, you may appeal to the Department of Administration, . You must file your appeal within 30 days from the date of the  decision letter at [address], [address], [place], [place] [address], or [address] [facility] [address], [address], [place], [place] [address]. If you disagree with a decision of an , you may file a petition for judicial review with the District Court. You must do so within 30 days of the Appeal Officer’s decision. You may be represented by an  at your own expense or you may contact the [facility] for possible representation.    [place]  for Injured Workers (NAIW): If you disagree with a  decision, you may request that NAIW represent you without charge at an  Hearing. For information regarding denial of benefits, you may contact the [facility] at: [address], [address], [place] [address], [phone], or [address], [address], [place] [address], [phone]    To File a Complaint with the Division: If you wish to file a complaint with the  of the  Division of Industrial Relations (DIR), please contact the Workers’ Compensation Section, [address], [address], [place], [place] [address], telephone [phone]) [phone], or [address], [address], [place], [place] [address], telephone ([phone] [phone].    For assistance with Workers’ Compensation Issues: you may contact the Office of the [facility] Consumer Health Assistance, [address] [place] [address], [address], [place], [place] [address], Toll Free 1-830.439.3653, Web site: http://Priceline.[facility].nv., E-mail kath@[facility].nv.                                                                                                                                                                               __________________________________________________________________                                    ____09/29/2019__             Employee Name / Signature                                                                                                                            Date                                       D-2 (rev. 10/07)

## 2019-09-20 NOTE — ED TRIAGE NOTES
"Chief Complaint   Patient presents with   • Fall     Pt was at work when a \"cart\" fell and hit her left hand; bruising noted to left third digit; cms intact; pt then fell onto right side; c/o right hip and lower back pain   • Work-Related Injury     Pt states cart was about 150lbs. Pt states right lower back is hurting as well as right hip. Pt is able to bear weight and ambulate independently. Denies hitting head. Denies any blood thinner use.   "

## 2019-09-20 NOTE — LETTER
"  FORM C-4:  EMPLOYEE’S CLAIM FOR COMPENSATION/ REPORT OF INITIAL TREATMENT  EMPLOYEE’S CLAIM - PROVIDE ALL INFORMATION REQUESTED   First Name  Sonya Last Name  Eriberto Birthdate             Age  1965 53 y.o. Sex  female Claim Number   Home Employee Address  [address]  [facility]                                     Zip  [address] Height  1.549 m (5' 1\") Weight  85.2 kg (187 lb 13.3 oz) [facility]     Mailing Employee Address                           [address]   [facility]               Zip  [address] Telephone  [phone] (home) [phone] (work) Primary Language Spoken  ENGLISH    Third Party   [employer] Employee's Occupation (Job Title) When Injury or Occupational Disease Occurred     Employer's Name  [employer] Telephone  [phone]    Employer Address  [address] [facility] [[address]] Zip  [address]   Date of Injury  9/20/2019       Hour of Injury  9:02 AM Date Employer Notified  9/20/2019 Last Day of Work after Injury or Occupational Disease  9/20/2019 Supervisor to Whom Injury Reported  Binu Newman   Address or Location of Accident (if applicable)  [[facility]]   What were you doing at the time of accident? (if applicable)  Pulling a cart    How did this injury or occupational disease occur? Be specific and answer in detail. Use additional sheet if necessary)  I was pulling the cart w 1 material pancake called anode the tire got caught on some    some tape that was on the floor and the cart fell on top of me.   If you believe that you have an occupational disease, when did you first have knowledge of the disability and it relationship to your employment?  N/A Witnesses to the Accident  John Shift Lead     Nature of Injury or Occupational Disease  Crushing  Part(s) of Body Injured or Affected  Buttocks, Shoulder (L), Upper Back Area " (Thoracic Area)    I certify that the above is true and correct to the best of my knowledge and that I have provided this information in order to obtain the benefits of [place] or [place] of [place]).  I hereby authorize any physician, chiropractor, surgeon, practitioner, or other person, any hospital, including [facility] or [facility], any medical service organization, any insurance company, or other institution or organization to release to each other, any medical or other information, including benefits paid or payable, pertinent to this injury or disease, except information relative to diagnosis, treatment and/or counseling for AIDS, psychological conditions, alcohol or controlled substances, for which I must give specific authorization.  A Photostat of this authorization shall be as valid as the original.   Date Place   Employee’s Signature   THIS REPORT MUST BE COMPLETED AND MAILED WITHIN 3 WORKING DAYS OF TREATMENT   Place  [facility], EMERGENCY DEPT  Name of Facility   [facility]   Date  9/20/2019 Diagnosis  (W19.XXXA) Fall, initial encounter  (S39.012A) Strain of lumbar region, initial encounter Is there evidence the injured employee was under the influence of alcohol and/or another controlled substance at the time of accident?   Hour  9:31 AM Description of Injury or Disease  Fall, initial encounter  Strain of lumbar region, initial encounter No   Treatment  Rest, ibuprofen  Have you advised the patient to remain off work five days or more?         No   X-Ray Findings  Negative   If Yes   From Date    To Date      From information given by the employee, together with medical evidence, can you directly connect this injury or occupational disease as job incurred?  Yes If No, is the employee capable of: Full Duty  No Modified Duty  Yes   Is additional  "medical care by a physician indicated?  Yes If Modified Duty, Specify any Limitations / Restrictions  No lifting or bending or prolonged standing for 3 days     Do you know of any previous injury or disease contributing to this condition or occupational disease?  No   Date  9/20/2019 Print Doctor’s Name  Mike Yoo certify the employer’s copy of this form was mailed on:   [place]  [address] [place] [id number] [phone] Insurer’s Use Only   [facility]  [address]    Provider’s Tax ID Number  [id number] Telephone  Dept: [phone]    Doctor’s Signature  e-MIKE Lira M.D. Degree       Original - TREATING PHYSICIAN OR CHIROPRACTOR   Pg 2-Insurer/TPA   Pg 3-Employer   Pg 4-Employee                                                                                                  Form C-4 (rev01/03)     BRIEF DESCRIPTION OF RIGHTS AND BENEFITS  (Pursuant to [place]RS 616C.050)    Notice of Injury or Occupational Disease (Incident Report Form C-1): If an injury or occupational disease (OD) arises out of and in the course of employment, you must provide written notice to your employer as soon as practicable, but no later than 7 days after the accident or OD. Your employer shall maintain a sufficient supply of the required forms.    Claim for Compensation (Form C-4): If medical treatment is sought, the form C-4 is available at the place of initial treatment. A completed \"Claim for Compensation\" (Form C-4) must be filed within 90 days after an accident or OD. The treating physician or chiropractor must, within 3 working days after treatment, complete and mail to the employer, the employer's insurer and third-party , the Claim for Compensation.    Medical Treatment: If you require medical treatment for your on-the-job injury or OD, you may be required to select a physician or chiropractor from a list provided by your workers’ compensation insurer, if it has contracted " with an Organization for Managed Care (MCO) or Preferred Provider Organization (PPO) or providers of health care. If your employer has not entered into a contract with an MCO or PPO, you may select a physician or chiropractor from the Panel of Physicians and Chiropractors. Any medical costs related to your industrial injury or OD will be paid by your insurer.    Temporary Total Disability (TTD): If your doctor has certified that you are unable to work for a period of at least 5 consecutive days, or 5 cumulative days in a 20-day period, or places restrictions on you that your employer does not accommodate, you may be entitled to TTD compensation.    Temporary Partial Disability (TPD): If the wage you receive upon reemployment is less than the compensation for TTD to which you are entitled, the insurer may be required to pay you TPD compensation to make up the difference. TPD can only be paid for a maximum of 24 months.    Permanent Partial Disability (PPD): When your medical condition is stable and there is an indication of a PPD as a result of your injury or OD, within 30 days, your insurer must arrange for an evaluation by a rating physician or chiropractor to determine the degree of your PPD. The amount of your PPD award depends on the date of injury, the results of the PPD evaluation and your age and wage.    Permanent Total Disability (PTD): If you are medically certified by a treating physician or chiropractor as permanently and totally disabled and have been granted a PTD status by your insurer, you are entitled to receive monthly benefits not to exceed 66 2/3% of your average monthly wage. The amount of your PTD payments is subject to reduction if you previously received a PPD award.    Vocational Rehabilitation Services: You may be eligible for vocational rehabilitation services if you are unable to return to the job due to a permanent physical impairment or permanent restrictions as a result of your injury  or occupational disease.    Transportation and Per Garcia Reimbursement: You may be eligible for travel expenses and per garcia associated with medical treatment.  Reopening: You may be able to reopen your claim if your condition worsens after claim closure.    Appeal Process: If you disagree with a written determination issued by the insurer or the insurer does not respond to your request, you may appeal to the Department of Administration, , by following the instructions contained in your determination letter. You must appeal the determination within 70 days from the date of the determination letter at [address], [address], [place], [place] [address], or [address], [address], [place], [place] [address]. If you disagree with the  decision, you may appeal to the Department of Administration, . You must file your appeal within 30 days from the date of the  decision letter at [address], [address], [place], [place] [address], or [address], [place], [place] [address]. If you disagree with a decision of an , you may file a petition for judicial review with the District Court. You must do so within 30 days of the Appeal Officer’s decision. You may be represented by an  at your own expense or you may contact the [facility] for possible representation.    [place]  for Injured Workers (NAIW): If you disagree with a  decision, you may request that NAIW represent you without charge at an  Hearing. For information regarding denial of benefits, you may contact the [facility] at: [address], [address], [place] [address], [phone], or [address] [facility] [address] [address], [phone]    To File a Complaint with the Division: If you wish to file a complaint with the  of the Division of Industrial Relations (DIR), please  contact the Workers’ Compensation Section, [address], [address], [place], [place] [address], telephone ([phone]) [phone], or [address], [address], [place], [place] [address], telephone [phone]) [phone].    For assistance with Workers’ Compensation Issues: you may contact the Office of the [facility] Consumer Health Assistance, [address] [place] [address], [address], [place], [place] [address], Toll Free 1-405.327.3118, Web site: http://govcha.[facility].nv., E-mail [email or web address].nv.                                                                                                                                                                               __________________________________________________________________                                    _________________            Employee Name / Signature                                                                                                                            Date                                       D-2 (rev. 10/07)

## 2019-09-23 ENCOUNTER — OCCUPATIONAL MEDICINE (OUTPATIENT)
Dept: OCCUPATIONAL MEDICINE | Facility: CLINIC | Age: 54
End: 2019-09-23
Payer: COMMERCIAL

## 2019-09-23 VITALS
OXYGEN SATURATION: 100 % | TEMPERATURE: 96.9 F | SYSTOLIC BLOOD PRESSURE: 130 MMHG | BODY MASS INDEX: 34.55 KG/M2 | DIASTOLIC BLOOD PRESSURE: 80 MMHG | HEART RATE: 86 BPM | HEIGHT: 61 IN | WEIGHT: 183 LBS

## 2019-09-23 DIAGNOSIS — S70.01XD CONTUSION OF RIGHT HIP, SUBSEQUENT ENCOUNTER: ICD-10-CM

## 2019-09-23 DIAGNOSIS — S39.012D STRAIN OF LUMBAR REGION, SUBSEQUENT ENCOUNTER: ICD-10-CM

## 2019-09-23 PROCEDURE — 99214 OFFICE O/P EST MOD 30 MIN: CPT | Performed by: PREVENTIVE MEDICINE

## 2019-09-23 RX ORDER — CYCLOBENZAPRINE HCL 10 MG
10 TABLET ORAL
Qty: 15 TAB | Refills: 0 | Status: SHIPPED
Start: 2019-09-23 | End: 2020-03-03

## 2019-09-23 NOTE — PROGRESS NOTES
"Subjective:      Sonya Wei is a 53 y.o. female who presents with Other (new- doi- 09/2019/ back - r leg- better )      DOI 9/20/2019: 52 yo female presents with back and right hip injury. Patient was at work, moving a cart when the car tipped over and fell knocking her over onto her right side.  She has had some low back pain since as well as right sided hip/leg pain.  Patient states she has had a little bit of improvement since the injury.  She continues to have mostly right-sided lower back and hip pain.  Denies any radiating pain, numbness or tingling.  She has been taking ibuprofen for relief which does seem to help a little bit.  She does note some difficulty sleeping at night due to pain.  She has been working light duty but will be off over the next week and is not set to return to work until next Wednesday.     HPI    ROS  ROS: All systems were reviewed on intake form, form was reviewed and signed. See scanned documents in media. Pertinent positives and negatives included in HPI.    PMH: No pertinent past medical history to this problem  MEDS: Medications were reviewed in Epic  ALLERGIES:   Allergies   Allergen Reactions   • Morphine Itching     Rxn = unknown   Bumps all over body   • Metformin      headaches     SOCHX: Works as a  at [employer]  FH: No pertinent family history to this problem     Objective:     /80   Pulse 86   Temp 36.1 °C (96.9 °F)   Ht 1.549 m (5' 1\")   Wt 83 kg (183 lb)   SpO2 100%   BMI 34.58 kg/m²      Physical Exam   Constitutional: She is oriented to person, place, and time. She appears well-developed and well-nourished.   Cardiovascular: Normal rate.   Pulmonary/Chest: Effort normal.   Neurological: She is alert and oriented to person, place, and time.   Skin: Skin is warm and dry.   Psychiatric: She has a normal mood and affect. Her behavior is normal. Judgment normal.       Lumbar: No gross deformity.  Tenderness over the right lower paraspinal " musculature.  Slightly decreased flexion with pain.  Straight leg test negative.  Reflexes intact.  Strength intact  Right Hip: Mild diffuse tenderness over the right hip on the lateral side.  Full range of motion.       Assessment/Plan:     1. Strain of lumbar region, subsequent encounter  - cyclobenzaprine (FLEXERIL) 10 MG Tab; Take 1 Tab by mouth at bedtime as needed.  Dispense: 15 Tab; Refill: 0  2. Contusion of right hip, subsequent encounter  - cyclobenzaprine (FLEXERIL) 10 MG Tab; Take 1 Tab by mouth at bedtime as needed.  Dispense: 15 Tab; Refill: 0    Prescribed Flexeril to use at night  Continue ibuprofen as needed  Restricted duty  Follow-up 1 week

## 2019-09-23 NOTE — LETTER
[address],   [address] [place] [address]  Phone:  [phone] - Fax:  [phone]   Occupational Health [facility] Progress Report and Disability Certification  Date of Service: 9/23/2019   No Show:  No  Date / Time of Next Visit: 10/1/2019 @ 1pm   Claim Information   Patient Name: Sonya Wei  Claim Number:     Employer: [employer] [facility] [employer]  Date of Injury: 9/20/2019     Insurer / TPA: [employer]  ID / SSN:     Occupation:   Diagnosis: Diagnoses of Strain of lumbar region, subsequent encounter and Contusion of right hip, subsequent encounter were pertinent to this visit.    Medical Information   Related to Industrial Injury? Yes    Subjective Complaints:  DOI 9/20/2019: 54 yo female presents with back and right hip injury. Patient was at work, moving a cart when the car tipped over and fell knocking her over onto her right side.  She has had some low back pain since as well as right sided hip/leg pain.  Patient states she has had a little bit of improvement since the injury.  She continues to have mostly right-sided lower back and hip pain.  Denies any radiating pain, numbness or tingling.  She has been taking ibuprofen for relief which does seem to help a little bit.  She does note some difficulty sleeping at night due to pain.  She has been working light duty but will be off over the next week and is not set to return to work until next Wednesday.   Objective Findings: Lumbar: No gross deformity.  Tenderness over the right lower paraspinal musculature.  Slightly decreased flexion with pain.  Straight leg test negative.  Reflexes intact.  Strength intact  Right Hip: Mild diffuse tenderness over the right hip on the lateral side.  Full range of motion.   Pre-Existing Condition(s):     Assessment:   Condition Same    Status: Additional Care Required  Permanent Disability:No       Plan:      Diagnostics:      Comments:  Prescribed Flexeril to use at night  Continue ibuprofen as needed  Restricted duty  Follow-up 1 week    Disability Information   Status: Released to Restricted Duty    From:  9/23/2019  Through: 10/1/2019 Restrictions are: Temporary   Physical Restrictions   Sitting:    Standing:    Stooping:  < or = to 1 hr/day Bending:  < or = to 1 hr/day   Squatting:    Walking:    Climbing:    Pushing:      Pulling:    Other:    Reaching Above Shoulder (L):   Reaching Above Shoulder (R):       Reaching Below Shoulder (L):    Reaching Below Shoulder (R):      Not to exceed Weight Limits   Carrying(hrs):   Weight Limit(lb): < or = to 10 pounds Lifting(hrs):   Weight  Limit(lb): < or = to 10 pounds   Comments:      Repetitive Actions   Hands: i.e. Fine Manipulations from Grasping:     Feet: i.e. Operating Foot Controls:     Driving / Operate Machinery:     Physician Name: Eligio Salmon D.O. Physician Signature: ELIGIO Cullen D.O. e-Signature: Dr. Willis Frankel, Medical Director   Clinic Name / Location: [address],   [address] [place] [address] Clinic Phone Number: Dept: [phone]   Appointment Time: 8:45 Am Visit Start Time: 8:38 AM   Check-In Time:  8:36 Am Visit Discharge Time:  8:57am   Original-Treating Physician or Chiropractor    Page 2-Insurer/TPA    Page 3-Employer    Page 4-Employee

## 2019-10-01 ENCOUNTER — OCCUPATIONAL MEDICINE (OUTPATIENT)
Dept: OCCUPATIONAL MEDICINE | Facility: CLINIC | Age: 54
End: 2019-10-01
Payer: COMMERCIAL

## 2019-10-01 ENCOUNTER — OFFICE VISIT (OUTPATIENT)
Dept: MEDICAL GROUP | Facility: PHYSICIAN GROUP | Age: 54
End: 2019-10-01
Payer: COMMERCIAL

## 2019-10-01 VITALS
RESPIRATION RATE: 12 BRPM | BODY MASS INDEX: 34.74 KG/M2 | DIASTOLIC BLOOD PRESSURE: 62 MMHG | HEART RATE: 81 BPM | OXYGEN SATURATION: 96 % | HEIGHT: 61 IN | SYSTOLIC BLOOD PRESSURE: 122 MMHG | TEMPERATURE: 97.8 F | WEIGHT: 184 LBS

## 2019-10-01 VITALS
SYSTOLIC BLOOD PRESSURE: 144 MMHG | TEMPERATURE: 97.9 F | DIASTOLIC BLOOD PRESSURE: 80 MMHG | OXYGEN SATURATION: 97 % | HEIGHT: 61 IN | BODY MASS INDEX: 34.74 KG/M2 | WEIGHT: 184 LBS | HEART RATE: 89 BPM

## 2019-10-01 DIAGNOSIS — S39.012D STRAIN OF LUMBAR REGION, SUBSEQUENT ENCOUNTER: ICD-10-CM

## 2019-10-01 DIAGNOSIS — E55.9 VITAMIN D DEFICIENCY: ICD-10-CM

## 2019-10-01 DIAGNOSIS — E78.5 HYPERLIPIDEMIA, UNSPECIFIED HYPERLIPIDEMIA TYPE: ICD-10-CM

## 2019-10-01 DIAGNOSIS — K85.90 ACUTE PANCREATITIS WITHOUT INFECTION OR NECROSIS, UNSPECIFIED PANCREATITIS TYPE: ICD-10-CM

## 2019-10-01 DIAGNOSIS — Z13.29 SCREENING FOR THYROID DISORDER: ICD-10-CM

## 2019-10-01 DIAGNOSIS — S70.01XD CONTUSION OF RIGHT HIP, SUBSEQUENT ENCOUNTER: ICD-10-CM

## 2019-10-01 DIAGNOSIS — Z13.6 SCREENING FOR CARDIOVASCULAR CONDITION: ICD-10-CM

## 2019-10-01 DIAGNOSIS — E11.65 UNCONTROLLED TYPE 2 DIABETES MELLITUS WITH HYPERGLYCEMIA (HCC): ICD-10-CM

## 2019-10-01 LAB
HBA1C MFR BLD: 7.9 % (ref 0–5.6)
INT CON NEG: NEGATIVE
INT CON POS: POSITIVE

## 2019-10-01 PROCEDURE — 83036 HEMOGLOBIN GLYCOSYLATED A1C: CPT | Performed by: NURSE PRACTITIONER

## 2019-10-01 PROCEDURE — 99212 OFFICE O/P EST SF 10 MIN: CPT | Performed by: PREVENTIVE MEDICINE

## 2019-10-01 PROCEDURE — 99213 OFFICE O/P EST LOW 20 MIN: CPT | Performed by: NURSE PRACTITIONER

## 2019-10-01 NOTE — LETTER
[facility]  DECLAN Johnson  [address]   [place] [address]  Fax: [phone]   Authorization for Release/Disclosure of   Protected Health Information   Name: MATTIE SANDOVAL : 1965 SSN: [id number]   Address: [address]  [place] [address] Phone:    [phone] (home) [phone] (work)   I authorize the entity listed below to release/disclose the PHI below to:   [facility]/DECLAN Johnson and DECLAN Johnson   Provider or Entity Name:    [facility]      Address   City, State, Zip   Phone:      Fax:     Reason for request: continuity of care   Information to be released:    [  ] LAST COLONOSCOPY,  including any PATH REPORT and follow-up  [  ] LAST FIT/COLOGUARD RESULT [  ] LAST DEXA  [  ] LAST MAMMOGRAM  [  ] LAST PAP  [  ] LAST LABS [  ] RETINA EXAM REPORT  [  ] IMMUNIZATION RECORDS  [X] Release last visit notes      [  ] Check here and initial the line next to each item to release ALL health information INCLUDING  _____ Care and treatment for drug and / or alcohol abuse  _____ HIV testing, infection status, or AIDS  _____ Genetic Testing    DATES OF SERVICE OR TIME PERIOD TO BE DISCLOSED: _____________  I understand and acknowledge that:  * This Authorization may be revoked at any time by you in writing, except if your health information has already been used or disclosed.  * Your health information that will be used or disclosed as a result of you signing this authorization could be re-disclosed by the recipient. If this occurs, your re-disclosed health information may no longer be protected by State or Federal laws.  * You may refuse to sign this Authorization. Your refusal will not affect your ability to obtain treatment.  * This Authorization becomes effective upon signing and will  on (date) __________.      If no date is indicated, this Authorization will  one (1) year from the signature date.    Name:  Sonya Wei    Signature:   Date:     10/1/2019       PLEASE FAX REQUESTED RECORDS BACK TO: [phone]

## 2019-10-01 NOTE — ASSESSMENT & PLAN NOTE
This is a chronic health problem that is uncontrolled with current medications and lifestyle measures.  Taking glyburide 10 mg twice a day, Trulicity 1.5 mg weekly, Januvia 100 mg daily.  Point-of-care hemoglobin A1c is mildly improved to 7.9%.  Was 8% at last appointment.  Short fasting blood sugars are in the 90s.  Reportedly having low fasting blood sugars in the 50s when waking in the morning after a work night.  Patient works nights.  During those low blood sugar readings, she was not eating prior to bedtime.  She then started eating a snack prior to bedtime which brought her fasting blood sugars into the 90s.  Advised patient to include snack of protein.  Explained to patient as her hemoglobin A1c continues to be elevated and she is taking 3 medications for diabetes, that I would like her to see endocrinology for further evaluation and treatment.  Patient is open to referral.

## 2019-10-01 NOTE — PROGRESS NOTES
CC: Diabetes    HISTORY OF THE PRESENT ILLNESS: Patient is a 53 y.o. female. This pleasant patient is here today for evaluation management following health problems.    Health Maintenance:  Patient declined flu vaccine, though we reviewed benefits of flu vaccine.  Encouraged him to wash his hands frequently and stay out of environments with people who are ill.  Declined all vaccines.      Diabetes mellitus type II, uncontrolled (HCC)  This is a chronic health problem that is uncontrolled with current medications and lifestyle measures.  Taking glyburide 10 mg twice a day, Trulicity 1.5 mg weekly, Januvia 100 mg daily.  Point-of-care hemoglobin A1c is mildly improved to 7.9%.  Was 8% at last appointment.  Short fasting blood sugars are in the 90s.  Reportedly having low fasting blood sugars in the 50s when waking in the morning after a work night.  Patient works nights.  During those low blood sugar readings, she was not eating prior to bedtime.  She then started eating a snack prior to bedtime which brought her fasting blood sugars into the 90s.  Advised patient to include snack of protein.  Explained to patient as her hemoglobin A1c continues to be elevated and she is taking 3 medications for diabetes, that I would like her to see endocrinology for further evaluation and treatment.  Patient is open to referral.        Acute pancreatitis without infection or necrosis  Patient was seen in ER in 6/2019 for mild pancreatitis.  She reports she followed up with her gastroenterologist at [facility] and was reportedly told she did not have pancreatitis.  I will request records today.      Allergies: Morphine and Metformin    Current Outpatient Medications Ordered in Epic   Medication Sig Dispense Refill   • cyclobenzaprine (FLEXERIL) 10 MG Tab Take 1 Tab by mouth at bedtime as needed. 15 Tab 0   • Cholecalciferol (VITAMIN D PO) Take 2 Tabs by mouth every day. GUMMIES     • lisinopril (PRINIVIL) 20 MG Tab  TAKE 1 TABLET BY MOUTH ONCE DAILY 90 Tab 1   • atorvastatin (LIPITOR) 20 MG Tab TAKE 1 TABLET BY MOUTH ONCE DAILY 90 Tab 1   • fenofibrate (TRIGLIDE) 160 MG tablet TAKE 1 TABLET BY MOUTH ONCE DAILY 90 Tab 0   • TRULICITY 1.5 MG/0.5ML Solution Pen-injector INJECT 1 SYRINGE SUBCUTANEOUSLY EVERY FRIDAY 12 PEN 0   • glyBURIDE (DIABETA) 5 MG Tab TAKE 2 TABLETS BY MOUTH TWICE DAILY WITH MEALS 120 Tab 1   • raNITidine (ZANTAC) 150 MG Tab Take 1 Tab by mouth 2 times a day. 60 Tab 11   • SITagliptin (JANUVIA) 100 MG Tab Take 1 Tab by mouth every day. 90 Tab 1   • aspirin EC (ECOTRIN) 81 MG Tablet Delayed Response Take 1 Tab by mouth every day. 30 Tab    • therapeutic multivitamin-minerals (THERAGRAN-M) Tab Take 1 Tab by mouth every day. 30 Tab 11     No current [facility]-ordered facility-administered medications on file.        Past Medical History:   Diagnosis Date   • Anemia    • Bowel habit changes     constipation   • Diabetes    • Diabetes (HCC)    • GERD (gastroesophageal reflux disease)    • Healthcare maintenance 9/27/2014    Mammogram: Due   • High cholesterol    • Hyperlipidemia    • Hypertension    • Infectious disease     Cold 10/2016   • Jaundice 1999   • Psychiatric problem     depression and anxiety   • Vaginal itching 8/27/2014    With anal itching X 1 month Getting worse Burning Min vag disch       Past Surgical History:   Procedure Laterality Date   • VENTRAL HERNIA REPAIR ROBOTIC XI N/A 10/14/2016    Procedure: VENTRAL HERNIA REPAIR ROBOTIC XI FOR INCISIONAL W/MESH;  Surgeon: Edi Mendoza M.D.;  Location: SURGERY [facility];  Service:    • ABDOMINAL HYSTERECTOMY TOTAL      still has ovaries   • CHOLECYSTECTOMY     • PRIMARY C SECTION     • TUBAL COAGULATION LAPAROSCOPIC BILATERAL         Social History     Tobacco Use   • Smoking status: Never Smoker   • Smokeless tobacco: Never Used   Substance Use Topics   • Alcohol use: No   • Drug use: No       Family History   Problem Relation Age of Onset   •  "Diabetes Mother    • Hyperlipidemia Mother    • Diabetes Father    • Hypertension Father    • Hyperlipidemia Father        ROS:   As in HPI, otherwise negative for chest pain, dyspnea, abdominal pain, dysuria, blood in stool, fever           Exam: /62 (BP Location: Left arm, Patient Position: Sitting, BP Cuff Size: Adult)   Pulse 81   Temp 36.6 °C (97.8 °F)   Resp 12   Ht 1.549 m (5' 1\")   Wt 83.5 kg (184 lb)   SpO2 96%  Body mass index is 34.77 kg/m².    General: Alert, pleasant, well nourished, well developed female in NAD  Neck: Supple without bruit.   Pulmonary: Clear to ausculation.  Normal effort. No rales, ronchi, or wheezing.  Cardiovascular: Normal rate and rhythm without murmur. Carotid and radial pulses are intact and equal bilaterally.  No lower extremity edema.  Abdomen: Soft, nontender, nondistended.  Neurologic: Grossly nonfocal  Skin: Warm and dry.   Musculoskeletal: Normal gait.   Psych: Normal mood and affect. Alert and oriented. Judgment and insight is normal.    Please note that this dictation was created using voice recognition software. I have made every reasonable attempt to correct obvious errors, but I expect that there are errors of grammar and possibly content that I did not discover before finalizing the note.      Assessment/Plan  1. Uncontrolled type 2 diabetes mellitus with hyperglycemia (HCC)  Mildly improved.  Taking 3 medications.  Agreeable to referral to endocrinology.  Patient will have labs done prior to next appointment in 6 months.  - POCT  A1C  - REFERRAL TO ENDOCRINOLOGY  - Comp Metabolic Panel; Future  - Lipid Profile; Future  - MICROALBUMIN CREAT RATIO URINE; Future  - ESTIMATED GFR; Future  - TSH; Future  - FREE THYROXINE; Future    2. Screening for cardiovascular condition  Will review results with patient at next appointment.  - Comp Metabolic Panel; Future  - Lipid Profile; Future    3. Screening for thyroid disorder    - TSH; Future  - FREE THYROXINE; " Future    4. Hyperlipidemia, unspecified hyperlipidemia type    - Lipid Profile; Future    5. Vitamin D deficiency    - VITAMIN D,25 HYDROXY; Future    6. Acute pancreatitis without infection or necrosis, unspecified pancreatitis type  Patient was reportedly told by gastroenterology that she did not have pancreatitis.  Will request records.    Patient will return to clinic in 6 months.  She will have lab work done prior to that appointment.  She will return to clinic sooner if needed.

## 2019-10-01 NOTE — ASSESSMENT & PLAN NOTE
Patient was seen in ER in 6/2019 for mild pancreatitis.  She reports she followed up with her gastroenterologist at [facility] and was reportedly told she did not have pancreatitis.  I will request records today.

## 2019-10-01 NOTE — PROGRESS NOTES
"Subjective:      Sonya Wei is a 53 y.o. female who presents with Other (Doi -better )      DOI 9/20/2019: 52 yo female presents with back and right hip injury. Patient was at work, moving a cart when the car tipped over and fell knocking her over onto her right side.  She has had some low back pain since as well as right sided hip/leg pain. Improved, only minimal pain at this point. Feels ready for release and full duty     HPI    ROS       Objective:     /80   Pulse 89   Temp 36.6 °C (97.9 °F)   Ht 1.549 m (5' 1\")   Wt 83.5 kg (184 lb)   SpO2 97%   BMI 34.77 kg/m²      Physical Exam    Lumbar: No gross deformity.  Full ROM. Normal Gait.       Assessment/Plan:     1. Strain of lumbar region, subsequent encounter  2. Contusion of right hip, subsequent encounter    Release from care  FUll  Duty  Follow up as needed    "

## 2019-10-01 NOTE — LETTER
[address],   [address] [place] [address]  Phone:  [phone] - Fax:  [phone]   Occupational [facility] Progress Report and Disability Certification  Date of Service: 10/1/2019   No Show:  No  Date / Time of Next Visit:  Release from care   Claim Information   Patient Name: Sonya Wei  Claim Number:     Employer: [employer] RESPIRATORY SERVICES ONLY  Date of Injury: 9/20/2019     Insurer / TPA: [employer]  ID / SSN:     Occupation:   Diagnosis: Diagnoses of Strain of lumbar region, subsequent encounter and Contusion of right hip, subsequent encounter were pertinent to this visit.    Medical Information   Related to Industrial Injury? Yes    Subjective Complaints:  DOI 9/20/2019: 52 yo female presents with back and right hip injury. Patient was at work, moving a cart when the car tipped over and fell knocking her over onto her right side.  She has had some low back pain since as well as right sided hip/leg pain. Improved, only minimal pain at this point. Feels ready for release and full duty   Objective Findings: Lumbar: No gross deformity.  Full ROM. Normal Gait.   Pre-Existing Condition(s):     Assessment:   Condition Improved    Status: Discharged /  MMI  Permanent Disability:No    Plan:      Diagnostics:      Comments:  Release from care  FUll  Duty  Follow up as needed    Disability Information   Status: Released to Full Duty    From:  10/1/2019  Through:   Restrictions are:     Physical Restrictions   Sitting:    Standing:    Stooping:    Bending:      Squatting:    Walking:    Climbing:    Pushing:      Pulling:    Other:    Reaching Above Shoulder (L):   Reaching Above Shoulder (R):       Reaching Below Shoulder (L):    Reaching Below Shoulder (R):      Not to exceed Weight Limits   Carrying(hrs):   Weight Limit(lb):   Lifting(hrs):   Weight  Limit(lb):     Comments:         Repetitive Actions   Hands: i.e. Fine Manipulations from Grasping:     Feet: i.e. Operating Foot Controls:     Driving / Operate Machinery:     Physician Name: Eligio Salmon D.O. Physician Signature: ELIGIO Cullen D.O. e-Signature: Dr. Willis Frankel, Medical Director   Clinic Name / Location: [address],   [address]  [place] [address] Clinic Phone Number: Dept: [phone]   Appointment Time: 1:00 Pm Visit Start Time: 1:17 PM   Check-In Time:  12:57 Pm Visit Discharge Time: 1:28 PM   Original-Treating Physician or Chiropractor    Page 2-Insurer/TPA    Page 3-Employer    Page 4-Employee

## 2019-10-28 DIAGNOSIS — E11.9 TYPE 2 DIABETES MELLITUS WITHOUT COMPLICATION, WITHOUT LONG-TERM CURRENT USE OF INSULIN (HCC): ICD-10-CM

## 2019-10-28 DIAGNOSIS — E78.5 HYPERLIPIDEMIA, UNSPECIFIED HYPERLIPIDEMIA TYPE: ICD-10-CM

## 2019-10-30 RX ORDER — SITAGLIPTIN 100 MG/1
TABLET, FILM COATED ORAL
Qty: 90 TAB | Refills: 1 | Status: ON HOLD | OUTPATIENT
Start: 2019-10-30 | End: 2020-08-14 | Stop reason: SDUPTHER

## 2019-10-30 RX ORDER — DULAGLUTIDE 1.5 MG/.5ML
INJECTION, SOLUTION SUBCUTANEOUS
Qty: 12 PEN | Refills: 1 | Status: SHIPPED | OUTPATIENT
Start: 2019-10-30 | End: 2020-05-15

## 2019-10-30 RX ORDER — GLYBURIDE 5 MG/1
TABLET ORAL
Qty: 360 TAB | Refills: 1 | Status: SHIPPED | OUTPATIENT
Start: 2019-10-30 | End: 2020-05-15

## 2019-10-30 RX ORDER — FENOFIBRATE 160 MG/1
TABLET ORAL
Qty: 90 TAB | Refills: 1 | Status: SHIPPED | OUTPATIENT
Start: 2019-10-30 | End: 2020-05-15

## 2019-10-30 NOTE — TELEPHONE ENCOUNTER
Patient has recently been seen by PCP within the last 6 months per protocol (10/19). Will refill medications for 6 months.  Lab Results   Component Value Date/Time    HBA1C 7.9 (A) 10/01/2019 09:48 AM      Lab Results   Component Value Date/Time    MALBCRT 328 (H) 08/27/2014 11:38 AM    MICROALBUR 66.2 08/27/2014 11:38 AM      Lab Results   Component Value Date/Time    ALKPHOSPHAT 43 06/21/2019 08:40 PM    ASTSGOT 31 06/21/2019 08:40 PM    ALTSGPT 23 06/21/2019 08:40 PM    TBILIRUBIN 0.5 06/21/2019 08:40 PM        Lab Results   Component Value Date/Time    CHOLSTRLTOT 130 03/26/2019 11:08 AM    LDL 69 03/26/2019 11:08 AM    HDL 46 03/26/2019 11:08 AM    TRIGLYCERIDE 75 03/26/2019 11:08 AM       Lab Results   Component Value Date/Time    SODIUM 135 06/21/2019 08:40 PM    POTASSIUM 4.2 06/21/2019 08:40 PM    CHLORIDE 102 06/21/2019 08:40 PM    CO2 25 06/21/2019 08:40 PM    GLUCOSE 204 (H) 06/21/2019 08:40 PM    BUN 30 (H) 06/21/2019 08:40 PM    CREATININE 1.13 06/21/2019 08:40 PM

## 2019-11-22 ENCOUNTER — OFFICE VISIT (OUTPATIENT)
Dept: URGENT CARE | Facility: PHYSICIAN GROUP | Age: 54
End: 2019-11-22
Payer: COMMERCIAL

## 2019-11-22 ENCOUNTER — APPOINTMENT (OUTPATIENT)
Dept: RADIOLOGY | Facility: IMAGING CENTER | Age: 54
End: 2019-11-22
Attending: NURSE PRACTITIONER
Payer: COMMERCIAL

## 2019-11-22 VITALS
BODY MASS INDEX: 34.77 KG/M2 | HEART RATE: 93 BPM | SYSTOLIC BLOOD PRESSURE: 130 MMHG | RESPIRATION RATE: 18 BRPM | TEMPERATURE: 98 F | DIASTOLIC BLOOD PRESSURE: 72 MMHG | WEIGHT: 184 LBS | OXYGEN SATURATION: 97 %

## 2019-11-22 DIAGNOSIS — R05.9 COUGH: ICD-10-CM

## 2019-11-22 DIAGNOSIS — R50.9 FEVER, UNSPECIFIED FEVER CAUSE: ICD-10-CM

## 2019-11-22 DIAGNOSIS — R53.81 MALAISE: ICD-10-CM

## 2019-11-22 DIAGNOSIS — J10.1 INFLUENZA B: ICD-10-CM

## 2019-11-22 LAB
FLUAV+FLUBV AG SPEC QL IA: POSITIVE
INT CON NEG: NEGATIVE
INT CON POS: POSITIVE

## 2019-11-22 PROCEDURE — 87804 INFLUENZA ASSAY W/OPTIC: CPT | Performed by: NURSE PRACTITIONER

## 2019-11-22 PROCEDURE — 99214 OFFICE O/P EST MOD 30 MIN: CPT | Performed by: NURSE PRACTITIONER

## 2019-11-22 PROCEDURE — 71046 X-RAY EXAM CHEST 2 VIEWS: CPT | Mod: TC,FY | Performed by: NURSE PRACTITIONER

## 2019-11-22 RX ORDER — BENZONATATE 200 MG/1
200 CAPSULE ORAL 3 TIMES DAILY PRN
Qty: 60 CAP | Refills: 0 | Status: SHIPPED
Start: 2019-11-22 | End: 2020-03-03

## 2019-11-22 RX ORDER — ALBUTEROL SULFATE 90 UG/1
2 AEROSOL, METERED RESPIRATORY (INHALATION) EVERY 6 HOURS PRN
Qty: 8.5 G | Refills: 0 | Status: SHIPPED
Start: 2019-11-22 | End: 2020-03-03

## 2019-11-22 RX ORDER — OSELTAMIVIR PHOSPHATE 75 MG/1
75 CAPSULE ORAL 2 TIMES DAILY
Qty: 10 CAP | Refills: 0 | Status: SHIPPED
Start: 2019-11-22 | End: 2020-03-03

## 2019-11-22 ASSESSMENT — ENCOUNTER SYMPTOMS
SPUTUM PRODUCTION: 1
RHINORRHEA: 1
COUGH: 1
WHEEZING: 1
CHILLS: 1

## 2019-11-22 NOTE — LETTER
November 22, 2019       Patient: Sonya Wei   YOB: 1965   Date of Visit: 11/22/2019         To Whom It May Concern:    It is my medical opinion that Sonya Wei may return to work on 11/27/19.    If you have any questions or concerns, please don't hesitate to call [phone]          Sincerely,          Cathey J Hamman, A.P.N.  Electronically Signed

## 2019-11-23 NOTE — PROGRESS NOTES
Subjective:      Sonya Wei is a 53 y.o. female who presents with Cough (x2 weeks, ear pain, wheezing, headaches )    Past Medical History:   Diagnosis Date   • Anemia    • Bowel habit changes     constipation   • Diabetes    • Diabetes (HCC)    • GERD (gastroesophageal reflux disease)    • Healthcare maintenance 9/27/2014    Mammogram: Due   • High cholesterol    • Hyperlipidemia    • Hypertension    • Infectious disease     Cold 10/2016   • Jaundice 1999   • Psychiatric problem     depression and anxiety   • Vaginal itching 8/27/2014    With anal itching X 1 month Getting worse Burning Min vag disch     Social History     Socioeconomic History   • Marital status:      Spouse name: Not on file   • Number of children: Not on file   • Years of education: Not on file   • Highest education level: Not on file   Occupational History   • Not on file   Social Needs   • Financial resource strain: Not on file   • Food insecurity:     Worry: Not on file     Inability: Not on file   • Transportation needs:     Medical: Not on file     Non-medical: Not on file   Tobacco Use   • Smoking status: Never Smoker   • Smokeless tobacco: Never Used   Substance and Sexual Activity   • Alcohol use: No   • Drug use: No   • Sexual activity: Yes     Partners: Male     Birth control/protection: None   Lifestyle   • Physical activity:     Days per week: Not on file     Minutes per session: Not on file   • Stress: Not on file   Relationships   • Social connections:     Talks on phone: Not on file     Gets together: Not on file     Attends [employer] service: Not on file     Active member of club or organization: Not on file     Attends meetings of clubs or organizations: Not on file     Relationship status: Not on file   • Intimate partner violence:     Fear of current or ex partner: Not on file     Emotionally abused: Not on file     Physically abused: Not on file     Forced sexual activity: Not on file   Other Topics Concern   •  Not on file   Social History Narrative    ** Merged History Encounter **          Family History   Problem Relation Age of Onset   • Diabetes Mother    • Hyperlipidemia Mother    • Diabetes Father    • Hypertension Father    • Hyperlipidemia Father        Allergies: Morphine and Metformin    Patient is a 53-year-old female who presents today with complaint of 2-week onset of cough.  She reports cough was productive initially and then she improved.  Over the last 24 hours, she has had sudden onset of worsening of cough with fever, body aches, chills, and intermittent shortness of breath.        Cough   This is a new problem. The current episode started 1 to 4 weeks ago. The problem has been unchanged. The problem occurs every few minutes. The cough is productive of sputum. Associated symptoms include chills, nasal congestion, postnasal drip, rhinorrhea and wheezing. Nothing aggravates the symptoms. She has tried nothing for the symptoms. The treatment provided no relief.       Review of Systems   Constitutional: Positive for chills and malaise/fatigue.   HENT: Positive for postnasal drip and rhinorrhea.    Respiratory: Positive for cough, sputum production and wheezing.    All other systems reviewed and are negative.         Objective:     /72   Pulse 93   Temp 36.7 °C (98 °F)   Resp 18   Wt 83.5 kg (184 lb)   SpO2 97%   BMI 34.77 kg/m²      Physical Exam  Vitals signs reviewed.   Constitutional:       Appearance: Normal appearance.   HENT:      Head: Normocephalic.      Right Ear: Tympanic membrane, ear canal and external ear normal.      Left Ear: Tympanic membrane, ear canal and external ear normal.      Mouth/Throat:      Mouth: Mucous membranes are moist.   Eyes:      Extraocular Movements: Extraocular movements intact.      Pupils: Pupils are equal, round, and reactive to light.   Cardiovascular:      Rate and Rhythm: Normal rate and regular rhythm.   Pulmonary:      Effort: Pulmonary effort is  normal.      Breath sounds: Wheezing present.   Musculoskeletal: Normal range of motion.   Skin:     General: Skin is warm and dry.      Capillary Refill: Capillary refill takes less than 2 seconds.   Neurological:      Mental Status: She is alert.       X-ray, chest:      11/22/2019 5:30 PM     HISTORY/REASON FOR EXAM: Cough.     TECHNIQUE/EXAM DESCRIPTION AND NUMBER OF VIEWS:  Two views of the chest.     COMPARISON:  6/21/2019.     FINDINGS:     Cardiomediastinal contours are stable with upper normal heart size.     No pulmonary consolidation is seen.     No pleural space process is evident.     IMPRESSION:     No radiographic evidence of acute cardiopulmonary process.    Point-of-care influenza: positive B          Assessment/Plan:   1. Cough  2.  Fever  3.  Malaise  4. INfluenza B    tamiflu  Continue albuterol  Tessalon PRN cough  Push fluids  ER precautions for respiratory distress

## 2020-02-12 DIAGNOSIS — E78.5 HYPERLIPIDEMIA, UNSPECIFIED HYPERLIPIDEMIA TYPE: ICD-10-CM

## 2020-02-13 RX ORDER — ATORVASTATIN CALCIUM 20 MG/1
TABLET, FILM COATED ORAL
Qty: 90 TAB | Refills: 0 | Status: SHIPPED | OUTPATIENT
Start: 2020-02-13 | End: 2020-05-15

## 2020-02-13 NOTE — TELEPHONE ENCOUNTER
Was the patient seen in the last year in this department? Yes    Does patient have an active prescription for medications requested? No     Received Request Via: Pharmacy      Pt met protocol?: Yes   Last ov 10/2019   Lab Results   Component Value Date/Time    CHOLSTRLTOT 130 03/26/2019 1108    TRIGLYCERIDE 75 03/26/2019 1108    HDL 46 03/26/2019 1108    LDL 69 03/26/2019 1108

## 2020-03-03 ENCOUNTER — OFFICE VISIT (OUTPATIENT)
Dept: MEDICAL GROUP | Facility: PHYSICIAN GROUP | Age: 55
End: 2020-03-03
Payer: COMMERCIAL

## 2020-03-03 VITALS
HEART RATE: 81 BPM | RESPIRATION RATE: 16 BRPM | SYSTOLIC BLOOD PRESSURE: 130 MMHG | DIASTOLIC BLOOD PRESSURE: 72 MMHG | HEIGHT: 61 IN | TEMPERATURE: 97.9 F | BODY MASS INDEX: 35.3 KG/M2 | OXYGEN SATURATION: 98 % | WEIGHT: 187 LBS

## 2020-03-03 DIAGNOSIS — J01.90 ACUTE BACTERIAL SINUSITIS: ICD-10-CM

## 2020-03-03 DIAGNOSIS — K21.9 GASTROESOPHAGEAL REFLUX DISEASE, ESOPHAGITIS PRESENCE NOT SPECIFIED: ICD-10-CM

## 2020-03-03 DIAGNOSIS — B96.89 ACUTE BACTERIAL SINUSITIS: ICD-10-CM

## 2020-03-03 PROCEDURE — 99214 OFFICE O/P EST MOD 30 MIN: CPT | Performed by: NURSE PRACTITIONER

## 2020-03-03 RX ORDER — AMOXICILLIN AND CLAVULANATE POTASSIUM 875; 125 MG/1; MG/1
1 TABLET, FILM COATED ORAL 2 TIMES DAILY
Qty: 14 TAB | Refills: 0 | Status: SHIPPED | OUTPATIENT
Start: 2020-03-03 | End: 2020-05-28

## 2020-03-03 RX ORDER — FLUTICASONE PROPIONATE 50 MCG
1 SPRAY, SUSPENSION (ML) NASAL DAILY
Qty: 1 BOTTLE | Refills: 3 | Status: SHIPPED | OUTPATIENT
Start: 2020-03-03 | End: 2020-10-13

## 2020-03-03 RX ORDER — FAMOTIDINE 20 MG/1
20 TABLET, FILM COATED ORAL 2 TIMES DAILY
Qty: 60 TAB | Refills: 2 | Status: SHIPPED | OUTPATIENT
Start: 2020-03-03 | End: 2020-08-18

## 2020-03-03 ASSESSMENT — PATIENT HEALTH QUESTIONNAIRE - PHQ9: CLINICAL INTERPRETATION OF PHQ2 SCORE: 0

## 2020-03-03 ASSESSMENT — FIBROSIS 4 INDEX: FIB4 SCORE: 1.16

## 2020-03-03 NOTE — PROGRESS NOTES
CC: Headache, malaise    HISTORY OF THE PRESENT ILLNESS: Patient is a 54 y.o. female. This pleasant patient is here today for evaluation and management of the following health problems.      Acute bacterial sinusitis  Patient reports 3-week onset of sinus congestion, frontal headache, cough, malaise.  Denies fever.  Has mild cough.  Sometimes headache radiates to back of head.  Denies fever, otalgia, sore throat, nausea.    Gastroesophageal reflux disease  Chronic health problem.  Was taking ranitidine twice a day, but discontinued taking due to ranitidine recall.  Patient reports she has been taking medication for GERD that she had at home that has been working well.  Does not know the name of the medication.  Will prescribe Pepcid to take twice a day.  Advised patient to follow-up with GI.      Allergies: Morphine; Glucophage  [metformin hcl]; and Metformin    Current Outpatient Medications Ordered in Epic   Medication Sig Dispense Refill   • fluticasone (FLONASE) 50 MCG/ACT nasal spray Spray 1 Spray in nose every day. 1 Bottle 3   • amoxicillin-clavulanate (AUGMENTIN) 875-125 MG Tab Take 1 Tab by mouth 2 times a day. 14 Tab 0   • famotidine (PEPCID) 20 MG Tab Take 1 Tab by mouth 2 times a day. 60 Tab 2   • atorvastatin (LIPITOR) 20 MG Tab TAKE 1 TABLET BY MOUTH ONCE DAILY 90 Tab 0   • TRULICITY 1.5 MG/0.5ML Solution Pen-injector  INJECT 1 SYRINGE SUBCUTANEOUSLY  EVERY FRIDAY. 12 PEN 1   • JANUVIA 100 MG Tab TAKE 1 TABLET BY MOUTH ONCE DAILY 90 Tab 1   • glyBURIDE (DIABETA) 5 MG Tab TAKE 2 TABLETS BY MOUTH TWICE DAILY WITH MEALS 360 Tab 1   • Cholecalciferol (VITAMIN D PO) Take 2 Tabs by mouth every day. GUMMIES     • lisinopril (PRINIVIL) 20 MG Tab TAKE 1 TABLET BY MOUTH ONCE DAILY 90 Tab 1   • aspirin EC (ECOTRIN) 81 MG Tablet Delayed Response Take 1 Tab by mouth every day. 30 Tab    • therapeutic multivitamin-minerals (THERAGRAN-M) Tab Take 1 Tab by mouth every day. 30 Tab 11   • fenofibrate (TRIGLIDE) 160 MG  "tablet TAKE 1 TABLET BY MOUTH ONCE DAILY 90 Tab 1     No current Epic-ordered facility-administered medications on file.        Past Medical History:   Diagnosis Date   • Anemia    • Bowel habit changes     constipation   • Diabetes    • Diabetes (HCC)    • GERD (gastroesophageal reflux disease)    • Healthcare maintenance 9/27/2014    Mammogram: Due   • High cholesterol    • Hyperlipidemia    • Hypertension    • Infectious disease     Cold 10/2016   • Jaundice 1999   • Psychiatric problem     depression and anxiety   • Vaginal itching 8/27/2014    With anal itching X 1 month Getting worse Burning Min vag disch       Past Surgical History:   Procedure Laterality Date   • VENTRAL HERNIA REPAIR ROBOTIC XI N/A 10/14/2016    Procedure: VENTRAL HERNIA REPAIR ROBOTIC XI FOR INCISIONAL W/MESH;  Surgeon: Edi Mendoza M.D.;  Location: SURGERY [facility];  Service:    • ABDOMINAL HYSTERECTOMY TOTAL      still has ovaries   • CHOLECYSTECTOMY     • PRIMARY C SECTION     • TUBAL COAGULATION LAPAROSCOPIC BILATERAL         Social History     Tobacco Use   • Smoking status: Never Smoker   • Smokeless tobacco: Never Used   Substance Use Topics   • Alcohol use: No   • Drug use: No       Family History   Problem Relation Age of Onset   • Diabetes Mother    • Hyperlipidemia Mother    • Diabetes Father    • Hypertension Father    • Hyperlipidemia Father        ROS:   As in HPI, otherwise negative for chest pain, dyspnea, abdominal pain, dysuria, blood in stool, fever         Exam: /72 (BP Location: Left arm, Patient Position: Sitting, BP Cuff Size: Adult)   Pulse 81   Temp 36.6 °C (97.9 °F)   Resp 16   Ht 1.549 m (5' 1\")   Wt 84.8 kg (187 lb)   SpO2 98%  Body mass index is 35.33 kg/m².    General: Alert, pleasant, obese habitus, well nourished, well developed female in NAD  HEENT: Normocephalic. Eyes conjunctiva clear lids without ptosis, pupils equal and reactive to light, ears normal shape and contour, canals are " clear bilaterally, tympanic membranes are pearly gray with good light reflex, nasal mucosa pink and boggy with clear drainage, oropharynx is without erythema, edema or exudates.  Frontal maxillary sinuses moderately tender to palpation.  Neck: Supple without bruit. Thyroid is not enlarged.  Pulmonary: Clear to ausculation.  Normal effort. No rales, ronchi, or wheezing.  Cardiovascular: Normal rate and rhythm without murmur. Carotid and radial pulses are intact and equal bilaterally.  No lower extremity edema.  Abdomen: Soft, nontender, nondistended.   Neurologic: Grossly nonfocal  Lymph: No cervical or supraclavicular lymph nodes are palpable  Skin: Warm and dry.    Musculoskeletal: Normal gait.   Psych: Normal mood and affect. Alert and oriented. Judgment and insight is normal.    Please note that this dictation was created using voice recognition software. I have made every reasonable attempt to correct obvious errors, but I expect that there are errors of grammar and possibly content that I did not discover before finalizing the note.      Assessment/Plan  1. Acute bacterial sinusitis  Symptoms have been present for over 10 days.  Will start Flonase nasal spray in addition to Augmentin.  Instructions and side effects reviewed with patient.  Comfort measures reviewed with patient.  - fluticasone (FLONASE) 50 MCG/ACT nasal spray; Spray 1 Spray in nose every day.  Dispense: 1 Bottle; Refill: 3  - amoxicillin-clavulanate (AUGMENTIN) 875-125 MG Tab; Take 1 Tab by mouth 2 times a day.  Dispense: 14 Tab; Refill: 0    2. Gastroesophageal reflux disease, esophagitis presence not specified  I did not deem recalled.  Will prescribe famotidine.  Patient due for follow-up with GI.  - famotidine (PEPCID) 20 MG Tab; Take 1 Tab by mouth 2 times a day.  Dispense: 60 Tab; Refill: 2    Patient will return to clinic as previously scheduled on 3/31/2020 or sooner if needed.

## 2020-03-04 NOTE — ASSESSMENT & PLAN NOTE
Chronic health problem.  Was taking ranitidine twice a day, but discontinued taking due to ranitidine recall.  Patient reports she has been taking medication for GERD that she had at home that has been working well.  Does not know the name of the medication.  Will prescribe Pepcid to take twice a day.  Advised patient to follow-up with GI.

## 2020-03-04 NOTE — ASSESSMENT & PLAN NOTE
Patient reports 3-week onset of sinus congestion, frontal headache, cough, malaise.  Denies fever.  Has mild cough.  Sometimes headache radiates to back of head.  Denies fever, otalgia, sore throat, nausea.

## 2020-03-04 NOTE — PATIENT INSTRUCTIONS
Take antibiotic twice a day until gone    Use flonase nasal spray once a day    Take ibuprofen or acetaminophen for headache      Sinusitis, Adult  Sinusitis is soreness and inflammation of your sinuses. Sinuses are hollow spaces in the bones around your face. They are located:  · Around your eyes.  · In the middle of your forehead.  · Behind your nose.  · In your cheekbones.  Your sinuses and nasal passages are lined with a stringy fluid (mucus). Mucus normally drains out of your sinuses. When your nasal tissues get inflamed or swollen, the mucus can get trapped or blocked so air cannot flow through your sinuses. This lets bacteria, viruses, and funguses grow, and that leads to infection.  Follow these instructions at home:  Medicines  · Take, use, or apply over-the-counter and prescription medicines only as told by your doctor. These may include nasal sprays.  · If you were prescribed an antibiotic medicine, take it as told by your doctor. Do not stop taking the antibiotic even if you start to feel better.  Hydrate and Humidify  · Drink enough water to keep your pee (urine) clear or pale yellow.  · Use a cool mist humidifier to keep the humidity level in your home above 50%.  · Breathe in steam for 10-15 minutes, 3-4 times a day or as told by your doctor. You can do this in the bathroom while a hot shower is running.  · Try not to spend time in cool or dry air.  Rest  · Rest as much as possible.  · Sleep with your head raised (elevated).  · Make sure to get enough sleep each night.  General instructions  · Put a warm, moist washcloth on your face 3-4 times a day or as told by your doctor. This will help with discomfort.  · Wash your hands often with soap and water. If there is no soap and water, use hand .  · Do not smoke. Avoid being around people who are smoking (secondhand smoke).  · Keep all follow-up visits as told by your doctor. This is important.  Contact a doctor if:  · You have a fever.  · Your  symptoms get worse.  · Your symptoms do not get better within 10 days.  Get help right away if:  · You have a very bad headache.  · You cannot stop throwing up (vomiting).  · You have pain or swelling around your face or eyes.  · You have trouble seeing.  · You feel confused.  · Your neck is stiff.  · You have trouble breathing.  This information is not intended to replace advice given to you by your health care provider. Make sure you discuss any questions you have with your health care provider.  Document Released: 06/05/2009 Document Revised: 08/13/2017 Document Reviewed: 10/12/2016  [employer] Interactive Patient Education © 2017 [employer]

## 2020-03-18 ENCOUNTER — OFFICE VISIT (OUTPATIENT)
Dept: URGENT CARE | Facility: CLINIC | Age: 55
End: 2020-03-18
Payer: COMMERCIAL

## 2020-03-18 ENCOUNTER — TELEPHONE (OUTPATIENT)
Dept: HEALTH INFORMATION MANAGEMENT | Facility: OTHER | Age: 55
End: 2020-03-18

## 2020-03-18 VITALS
DIASTOLIC BLOOD PRESSURE: 82 MMHG | OXYGEN SATURATION: 96 % | SYSTOLIC BLOOD PRESSURE: 144 MMHG | TEMPERATURE: 97.1 F | HEART RATE: 88 BPM

## 2020-03-18 DIAGNOSIS — R68.89 FLU-LIKE SYMPTOMS: ICD-10-CM

## 2020-03-18 DIAGNOSIS — R05.9 COUGH: ICD-10-CM

## 2020-03-18 DIAGNOSIS — R06.09 DYSPNEA ON EXERTION: ICD-10-CM

## 2020-03-18 LAB
FLUAV+FLUBV AG SPEC QL IA: NEGATIVE
INT CON NEG: NORMAL
INT CON POS: NORMAL

## 2020-03-18 PROCEDURE — 99214 OFFICE O/P EST MOD 30 MIN: CPT | Performed by: NURSE PRACTITIONER

## 2020-03-18 PROCEDURE — 87804 INFLUENZA ASSAY W/OPTIC: CPT | Performed by: NURSE PRACTITIONER

## 2020-03-18 RX ORDER — ALBUTEROL SULFATE 90 UG/1
2 AEROSOL, METERED RESPIRATORY (INHALATION) EVERY 4 HOURS PRN
Qty: 1 INHALER | Refills: 0 | Status: SHIPPED | OUTPATIENT
Start: 2020-03-18 | End: 2020-04-01

## 2020-03-18 RX ORDER — DEXTROMETHORPHAN HYDROBROMIDE AND PROMETHAZINE HYDROCHLORIDE 15; 6.25 MG/5ML; MG/5ML
5 SYRUP ORAL EVERY 4 HOURS PRN
Qty: 100 ML | Refills: 0 | Status: SHIPPED | OUTPATIENT
Start: 2020-03-18 | End: 2020-03-25

## 2020-03-18 ASSESSMENT — ENCOUNTER SYMPTOMS
HEADACHES: 1
MYALGIAS: 1
SORE THROAT: 1
NAUSEA: 0
SPUTUM PRODUCTION: 0
VOMITING: 0
COUGH: 1
FEVER: 0
CHILLS: 1
ABDOMINAL PAIN: 0
PALPITATIONS: 0
BLURRED VISION: 0
DOUBLE VISION: 0
SHORTNESS OF BREATH: 1
WHEEZING: 0
BACK PAIN: 1
HEARTBURN: 0

## 2020-03-18 ASSESSMENT — COPD QUESTIONNAIRES: COPD: 0

## 2020-03-18 NOTE — PROGRESS NOTES
Subjective:     Sonya Wei is a 54 y.o. female who presents for Cough (had it 3 weeks ago, runny nose and cough came back this morning)      Cough   This is a new problem. The current episode started in the past 7 days (2 days ago). The problem has been gradually worsening. The cough is non-productive. Associated symptoms include chest pain, chills, ear pain, headaches, myalgias, nasal congestion, a sore throat and shortness of breath. Pertinent negatives include no fever, heartburn, rash or wheezing. The symptoms are aggravated by exercise and cold air. She has tried rest for the symptoms. There is no history of asthma, bronchitis or COPD.       Review of Systems   Constitutional: Positive for chills and malaise/fatigue. Negative for fever.   HENT: Positive for congestion, ear pain and sore throat. Negative for ear discharge.    Eyes: Negative for blurred vision and double vision.   Respiratory: Positive for cough and shortness of breath. Negative for sputum production and wheezing.    Cardiovascular: Positive for chest pain. Negative for palpitations.   Gastrointestinal: Negative for abdominal pain, heartburn, nausea and vomiting.   Genitourinary: Negative for dysuria, frequency and urgency.   Musculoskeletal: Positive for back pain and myalgias.   Skin: Negative for itching and rash.   Neurological: Positive for headaches.   All other systems reviewed and are negative.      PMH:   Past Medical History:   Diagnosis Date   • Anemia    • Bowel habit changes     constipation   • Diabetes    • Diabetes (HCC)    • GERD (gastroesophageal reflux disease)    • Healthcare maintenance 9/27/2014    Mammogram: Due   • High cholesterol    • Hyperlipidemia    • Hypertension    • Infectious disease     Cold 10/2016   • Jaundice 1999   • Psychiatric problem     depression and anxiety   • Vaginal itching 8/27/2014    With anal itching X 1 month Getting worse Burning Min vag disch     ALLERGIES:   Allergies   Allergen  Reactions   • Morphine Itching     Rxn = unknown   Bumps all over body   • Glucophage  [Metformin Hcl]    • Metformin      headaches     SURGHX:   Past Surgical History:   Procedure Laterality Date   • VENTRAL HERNIA REPAIR ROBOTIC XI N/A 10/14/2016    Procedure: VENTRAL HERNIA REPAIR ROBOTIC XI FOR INCISIONAL W/MESH;  Surgeon: Edi Menodza M.D.;  Location: SURGERY [facility];  Service:    • ABDOMINAL HYSTERECTOMY TOTAL      still has ovaries   • CHOLECYSTECTOMY     • PRIMARY C SECTION     • TUBAL COAGULATION LAPAROSCOPIC BILATERAL       SOCHX:   Social History     Socioeconomic History   • Marital status:      Spouse name: Not on file   • Number of children: Not on file   • Years of education: Not on file   • Highest education level: Not on file   Occupational History   • Not on file   Social Needs   • Financial resource strain: Not on file   • Food insecurity     Worry: Not on file     Inability: Not on file   • Transportation needs     Medical: Not on file     Non-medical: Not on file   Tobacco Use   • Smoking status: Never Smoker   • Smokeless tobacco: Never Used   Substance and Sexual Activity   • Alcohol use: No   • Drug use: No   • Sexual activity: Yes     Partners: Male     Birth control/protection: None   Lifestyle   • Physical activity     Days per week: Not on file     Minutes per session: Not on file   • Stress: Not on file   Relationships   • Social connections     Talks on phone: Not on file     Gets together: Not on file     Attends [employer] service: Not on file     Active member of club or organization: Not on file     Attends meetings of clubs or organizations: Not on file     Relationship status: Not on file   • Intimate partner violence     Fear of current or ex partner: Not on file     Emotionally abused: Not on file     Physically abused: Not on file     Forced sexual activity: Not on file   Other Topics Concern   • Not on file   Social History Narrative    ** Merged History  Encounter **          FH:   Family History   Problem Relation Age of Onset   • Diabetes Mother    • Hyperlipidemia Mother    • Diabetes Father    • Hypertension Father    • Hyperlipidemia Father          Objective:   /82   Pulse 88   Temp 36.2 °C (97.1 °F) (Temporal)   SpO2 96%     Physical Exam  Vitals signs and nursing note reviewed.   Constitutional:       General: She is not in acute distress.     Appearance: Normal appearance. She is obese. She is ill-appearing. She is not toxic-appearing.   HENT:      Head: Normocephalic and atraumatic.      Right Ear: Tympanic membrane, ear canal and external ear normal. There is no impacted cerumen.      Left Ear: Tympanic membrane, ear canal and external ear normal. There is no impacted cerumen.      Nose: Congestion and rhinorrhea present.      Mouth/Throat:      Mouth: Mucous membranes are moist.      Pharynx: No oropharyngeal exudate or posterior oropharyngeal erythema.   Eyes:      General:         Right eye: No discharge.         Left eye: No discharge.      Extraocular Movements: Extraocular movements intact.      Conjunctiva/sclera: Conjunctivae normal.      Pupils: Pupils are equal, round, and reactive to light.   Neck:      Musculoskeletal: Normal range of motion and neck supple. No muscular tenderness.   Cardiovascular:      Rate and Rhythm: Normal rate and regular rhythm.      Heart sounds: Normal heart sounds.   Pulmonary:      Effort: Pulmonary effort is normal. No respiratory distress.      Breath sounds: Normal breath sounds. No stridor. No wheezing, rhonchi or rales.   Chest:      Chest wall: No tenderness.   Abdominal:      General: Abdomen is flat. There is no distension.      Palpations: Abdomen is soft.      Tenderness: There is abdominal tenderness in the epigastric area. There is no right CVA tenderness, left CVA tenderness, guarding or rebound. Negative signs include Bonner's sign, Rovsing's sign, McBurney's sign, psoas sign and obturator  sign.          Comments: Pt currently being treated for PUD by PCP   Musculoskeletal:         General: No swelling or tenderness.   Lymphadenopathy:      Cervical: No cervical adenopathy.   Skin:     General: Skin is warm and dry.      Findings: No rash.   Neurological:      General: No focal deficit present.      Mental Status: She is alert and oriented to person, place, and time. Mental status is at baseline.   Psychiatric:         Mood and Affect: Mood normal.         Behavior: Behavior normal.         Thought Content: Thought content normal.         Judgment: Judgment normal.       POCT flu:neg  Assessment/Plan:   Assessment      1. Flu-like symptoms  - POCT Influenza A/B    2. Cough    3. Dyspnea on exertion  We discussed supportive measures including humidifier, warm salt water gargles, over-the-counter Cepacol throat lozenges, rest  and increased fluids.  She was encouraged to seek treatment back in the ER or urgent care for worsening symptoms,  fever greater than 100.5, wheezes or shortness of breath.  She was instructed to remain in isolation for the next 2 weeks. Work note given.  AVS handout given and reviewed with patient. Pt educated on red flags and when to seek treatment back in ER or UC.

## 2020-03-18 NOTE — PATIENT INSTRUCTIONS
"Upper Respiratory Infection, Adult  Most upper respiratory infections (URIs) are caused by a virus. A URI affects the nose, throat, and upper air passages. The most common type of URI is often called \"the common cold.\"  Follow these instructions at home:  · Take medicines only as told by your doctor.  · Gargle warm saltwater or take cough drops to comfort your throat as told by your doctor.  · Use a warm mist humidifier or inhale steam from a shower to increase air moisture. This may make it easier to breathe.  · Drink enough fluid to keep your pee (urine) clear or pale yellow.  · Eat soups and other clear broths.  · Have a healthy diet.  · Rest as needed.  · Go back to work when your fever is gone or your doctor says it is okay.  ¨ You may need to stay home longer to avoid giving your URI to others.  ¨ You can also wear a face mask and wash your hands often to prevent spread of the virus.  · Use your inhaler more if you have asthma.  · Do not use any tobacco products, including cigarettes, chewing tobacco, or electronic cigarettes. If you need help quitting, ask your doctor.  Contact a doctor if:  · You are getting worse, not better.  · Your symptoms are not helped by medicine.  · You have chills.  · You are getting more short of breath.  · You have brown or red mucus.  · You have yellow or brown discharge from your nose.  · You have pain in your face, especially when you bend forward.  · You have a fever.  · You have puffy (swollen) neck glands.  · You have pain while swallowing.  · You have white areas in the back of your throat.  Get help right away if:  · You have very bad or constant:  ¨ Headache.  ¨ Ear pain.  ¨ Pain in your forehead, behind your eyes, and over your cheekbones (sinus pain).  ¨ Chest pain.  · You have long-lasting (chronic) lung disease and any of the following:  ¨ Wheezing.  ¨ Long-lasting cough.  ¨ Coughing up blood.  ¨ A change in your usual mucus.  · You have a stiff neck.  · You have " changes in your:  ¨ Vision.  ¨ Hearing.  ¨ Thinking.  ¨ Mood.  This information is not intended to replace advice given to you by your health care provider. Make sure you discuss any questions you have with your health care provider.  Document Released: 06/05/2009 Document Revised: 08/20/2017 Document Reviewed: 03/25/2015  [employer] Interactive Patient Education © 2017 [employer]  Self-Isolating at Home    If it is determined that you do not need to be hospitalized and can be isolated at home please follow the follow the prevention steps below as based on CDC guidelines.    Stay home except to get medical care  People who are mildly ill with unconfirmed COVID-19 or have any other infectious respiratory illness are able to isolate at home during their illness. You should restrict activities outside your home, except for getting medical care. Do not go to work, school, or public areas. Avoid using public transportation, ride-sharing, or taxis.    Call ahead before visiting your doctor  If you have a medical appointment, call the healthcare provider and tell them that you have or may have unconfirmed COVID-19 or another possibly contagious respiratory illness. This will help the healthcare provider’s office take steps to keep other people from getting infected or exposed.    Separate yourself from other people and animals in your home  As much as possible, you should stay in a specific room and away from other people in your home. Also, you should use a separate bathroom, if available.    You should restrict contact with pets and other animals while you are sick, just like you would around other people. When possible, have another member of your household care for your animals while you are sick. If you must care for your pet or be around animals while you are sick, wash your hands before and after you interact with pets.    Wear a facemask  You should wear a facemask when you are around other people (e.g., sharing a  room or vehicle) or pets and before you enter a healthcare provider’s office. If you are not able to wear a facemask (for example, because it causes trouble breathing), then people who live with you should not stay in the same room with you, or they should wear a facemask if they enter your room.      Cover your coughs and sneezes  Cover your mouth and nose with a tissue when you cough or sneeze. Throw used tissues in a lined trash can. Immediately wash your hands with soap and water for at least 20 seconds or, if soap and water are not available, clean your hands with an alcohol-based hand  that contains at least 60% alcohol.    Clean your hands often  Wash your hands often with soap and water for at least 20 seconds, especially after blowing your nose, coughing, or sneezing; going to the bathroom; and before eating or preparing food. If soap and water are not readily available, use an alcohol-based hand  with at least 60% alcohol, covering all surfaces of your hands and rubbing them together until they feel dry.    Soap and water are the best option if hands are visibly dirty. Avoid touching your eyes, nose, and mouth with unwashed hands.    Avoid sharing personal household items  You should not share dishes, drinking glasses, cups, eating utensils, towels, or bedding with other people or pets in your home. After using these items, they should be washed thoroughly with soap and water.    Clean all “high-touch” surfaces everyday  High touch surfaces include counters, tabletops, doorknobs, bathroom fixtures, toilets, phones, keyboards, tablets, and bedside tables. Also, clean any surfaces that may have blood, stool, or body fluids on them. Use a household cleaning spray or wipe, according to the label instructions. Labels contain instructions for safe and effective use of the cleaning product including precautions you should take when applying the product, such as wearing gloves and making sure you  have good ventilation during use of the product.    Monitor your symptoms  Seek prompt medical attention if your illness is worsening (e.g., difficulty breathing). Before seeking care, call your healthcare provider and tell them that you have, or are being evaluated for, unconfirmed COVID-19 or another infectious respiratory illness. Put on a facemask before you enter the facility. These steps will help the healthcare provider’s office to keep other people in the office or waiting room from getting infected or exposed. Ask your healthcare provider to call the local or [facility] health department. Persons who are placed under active monitoring or facilitated self-monitoring should follow instructions provided by their local health department or occupational health professionals, as appropriate. When working with your local health department check their available hours.    If you have a medical emergency and need to call 911, notify the dispatch personnel that you have, or are being evaluated for unconfirmed COVID-19 or another infectious respiratory illness. If possible, put on a facemask before emergency medical services arrive.    Discontinuing home isolation  Patients with unconfirmed COVID-19 or other infectious respiratory illnesses should remain under home isolation precautions until the risk of secondary transmission to others is thought to be low. In general that means 24 hours after fever resolves without the use of fever reducing medications or 3 days after symptoms have resolved. If you have questions or concerns consult your healthcare providers or your local health department.

## 2020-03-18 NOTE — TELEPHONE ENCOUNTER
1. Caller Name: Sonya Wei                Call Back Number: [phone]  [facility] PCP or Specialty Provider: Yes         2.  Does patient have any active symptoms of respiratory illness (fever OR cough OR shortness of breath)? Yes, the patient reports the following respiratory symptoms: cough, runny nose, headaches.  Got dx with a sinus infection x3 weeks ago, has heavy pressure on lungs on her back.    3.  Does patient have any comoribidities? DM    4.  In the last 30 days, has the patient traveled outside of the country OR in a high risk area within the  OR have any known contact with someone who has or is suspected to have COVID-19?  No.     5. Disposition: Advised to go to  Patient finished antibiotics for sinus infection with no improvement, sounded like she has some dyspnea with coughing.    Note routed to PCP: KLAUDIA only.

## 2020-03-31 ENCOUNTER — OFFICE VISIT (OUTPATIENT)
Dept: MEDICAL GROUP | Facility: PHYSICIAN GROUP | Age: 55
End: 2020-03-31
Payer: COMMERCIAL

## 2020-03-31 DIAGNOSIS — J06.9 VIRAL UPPER RESPIRATORY TRACT INFECTION: ICD-10-CM

## 2020-03-31 DIAGNOSIS — M54.9 MID BACK PAIN: ICD-10-CM

## 2020-03-31 DIAGNOSIS — E11.65 UNCONTROLLED TYPE 2 DIABETES MELLITUS WITH HYPERGLYCEMIA (HCC): ICD-10-CM

## 2020-03-31 PROBLEM — B96.89 ACUTE BACTERIAL SINUSITIS: Status: RESOLVED | Noted: 2020-03-03 | Resolved: 2020-03-31

## 2020-03-31 PROBLEM — J01.90 ACUTE BACTERIAL SINUSITIS: Status: RESOLVED | Noted: 2020-03-03 | Resolved: 2020-03-31

## 2020-03-31 PROCEDURE — 99443 PR PHYSICIAN TELEPHONE EVALUATION 21-30 MIN: CPT | Mod: CR | Performed by: NURSE PRACTITIONER

## 2020-03-31 NOTE — PROGRESS NOTES
Telephone Appointment Visit   As a means of avoiding spread of COVID-19, this visit is being conducted by telephone. This telephone visit was initiated by the patient and they verbally consented.    Time at start of call: 9:49 am    Reason for Call:  Urgent Care/ ED Follow-up    Patient Comments / History:      Diabetes mellitus type II, uncontrolled (HCC)  As a means of avoiding spread of COVID-19, this visit is being conducted by telephone.  Chronic health problem, uncontrolled.  Continues with glyburide, Trsue Januvia.  Patient was referred to endocrinology 5 months ago, but has not followed up.  Has not had labs done.  Unable to get point-of-care A1c as this is a telephone appointment.  Denies vision changes, polyuria, polydipsia.    Viral upper respiratory tract infection  As a means of avoiding spread of COVID-19, this visit is being conducted by telephone.  Patient was seen in urgent care on 3/18/2024 flulike symptoms and cough.  She reports that cough has improved.  Denies fever or chills.  Does continue to have rhinorrhea and headache.  She is not sure if this is from allergies.  She is inquiring about returning back to work.  I advised patient that she will need to be symptom free for 3 days prior to returning to work.  She is asking for a note.  Advised patient that we are not required to provide work notes during COVID-19 pandemic.  Patient works at [employer] and is pleading for a note.  A note was written today for patient pickup.  Reports has been diligent about self isolating.    Mid back pain  As a means of avoiding spread of COVID-19, this visit is being conducted by telephone.  Patient reports chronic intermittent mid back pain.  Seems to be worsening in the last few months.  Pain is at bra line across both sides of back.  Worse when supine.  Some relief with acetaminophen.  Has not tried heat.  Denies upper extremity weakness, numbness and tingling.  Has been sedentary secondary to URI.   Did have physical therapy almost a year ago that helped with pain.  Patient reports she is starting on home exercise program today.            Labs / Images Reviewed   none     Assessment and Plan:     1. Uncontrolled type 2 diabetes mellitus with hyperglycemia (HCC)  Continue with medications, no changes.  Will get hemoglobin A1c.  Patient instructed to call and schedule appointment with endocrinology to establish care.  She will continue to monitor blood sugars at home.  - HEMOGLOBIN A1C; Future    2. Viral upper respiratory tract infection  Work note given to patient today.  Instructed to continue with self isolation until 3 days symptom-free.  ER precautions reviewed with patient.    3. Mid back pain  Advised patient that we could refer back to physical therapy, but there are likely restrictions due to coronavirus pandemic.  Patient will start home exercise program that she received from physical therapy in the past.  Will try heat.    Follow-up: 2 months.  Time at end of call: 10:14 am  Total Time Spent: 21-30 minutes    DOMINICK Johnson.

## 2020-03-31 NOTE — LETTER
March 31, 2020    To Whom It May Concern:         This is confirmation that Sonya Wei attended her scheduled appointment with DECLAN Johnson on 3/31/20.    Ms. Wei continues to be acutely ill.  She will need to off work until she is symptom free for 72 hours. Patient will notify workplace when she has been symptom free for 72 hours.  Please see [facility] policy for return to work during corona virus pandemic.         If you have any questions please do not hesitate to call me at the phone number listed below.    Sincerely,          RODGER Johnson.CATHERINE.R.N.  [phone]

## 2020-04-01 NOTE — ASSESSMENT & PLAN NOTE
As a means of avoiding spread of COVID-19, this visit is being conducted by telephone.  Patient reports chronic intermittent mid back pain.  Seems to be worsening in the last few months.  Pain is at bra line across both sides of back.  Worse when supine.  Some relief with acetaminophen.  Has not tried heat.  Denies upper extremity weakness, numbness and tingling.  Has been sedentary secondary to URI.  Did have physical therapy almost a year ago that helped with pain.  Patient reports she is starting on home exercise program today.

## 2020-04-01 NOTE — ASSESSMENT & PLAN NOTE
As a means of avoiding spread of COVID-19, this visit is being conducted by telephone.  Patient was seen in urgent care on 3/18/2024 flulike symptoms and cough.  She reports that cough has improved.  Denies fever or chills.  Does continue to have rhinorrhea and headache.  She is not sure if this is from allergies.  She is inquiring about returning back to work.  I advised patient that she will need to be symptom free for 3 days prior to returning to work.  She is asking for a note.  Advised patient that we are not required to provide work notes during COVID-19 pandemic.  Patient works at [employer] and is pleading for a note.  A note was written today for patient pickup.  Reports has been diligent about self isolating.

## 2020-04-01 NOTE — ASSESSMENT & PLAN NOTE
As a means of avoiding spread of COVID-19, this visit is being conducted by telephone.  Chronic health problem, uncontrolled.  Continues with glyburide, Trulicity, Januvia.  Patient was referred to endocrinology 5 months ago, but has not followed up.  Has not had labs done.  Unable to get point-of-care A1c as this is a telephone appointment.  Denies vision changes, polyuria, polydipsia.

## 2020-05-15 DIAGNOSIS — E11.9 TYPE 2 DIABETES MELLITUS WITHOUT COMPLICATION, WITHOUT LONG-TERM CURRENT USE OF INSULIN (HCC): ICD-10-CM

## 2020-05-15 DIAGNOSIS — I10 ESSENTIAL HYPERTENSION: ICD-10-CM

## 2020-05-15 DIAGNOSIS — E78.5 HYPERLIPIDEMIA, UNSPECIFIED HYPERLIPIDEMIA TYPE: ICD-10-CM

## 2020-05-15 RX ORDER — FENOFIBRATE 160 MG/1
TABLET ORAL
Qty: 90 TAB | Refills: 0 | Status: SHIPPED | OUTPATIENT
Start: 2020-05-15 | End: 2020-08-18 | Stop reason: SDUPTHER

## 2020-05-15 RX ORDER — ATORVASTATIN CALCIUM 20 MG/1
TABLET, FILM COATED ORAL
Qty: 90 TAB | Refills: 0 | Status: SHIPPED | OUTPATIENT
Start: 2020-05-15 | End: 2020-08-18 | Stop reason: SDUPTHER

## 2020-05-15 RX ORDER — DULAGLUTIDE 1.5 MG/.5ML
INJECTION, SOLUTION SUBCUTANEOUS
Qty: 12 ML | Refills: 0 | Status: SHIPPED | OUTPATIENT
Start: 2020-05-15 | End: 2020-08-18 | Stop reason: SDUPTHER

## 2020-05-15 RX ORDER — LISINOPRIL 20 MG/1
TABLET ORAL
Qty: 90 TAB | Refills: 0 | Status: SHIPPED | OUTPATIENT
Start: 2020-05-15 | End: 2020-08-18 | Stop reason: SDUPTHER

## 2020-05-15 RX ORDER — GLYBURIDE 5 MG/1
TABLET ORAL
Qty: 360 TAB | Refills: 0 | Status: ON HOLD | OUTPATIENT
Start: 2020-05-15 | End: 2020-08-14 | Stop reason: SDUPTHER

## 2020-05-18 ENCOUNTER — PATIENT MESSAGE (OUTPATIENT)
Dept: HEALTH INFORMATION MANAGEMENT | Facility: OTHER | Age: 55
End: 2020-05-18

## 2020-05-28 ENCOUNTER — APPOINTMENT (OUTPATIENT)
Dept: RADIOLOGY | Facility: MEDICAL CENTER | Age: 55
End: 2020-05-28
Attending: EMERGENCY MEDICINE
Payer: COMMERCIAL

## 2020-05-28 ENCOUNTER — NON-PROVIDER VISIT (OUTPATIENT)
Dept: OCCUPATIONAL MEDICINE | Facility: CLINIC | Age: 55
End: 2020-05-28
Payer: COMMERCIAL

## 2020-05-28 ENCOUNTER — HOSPITAL ENCOUNTER (EMERGENCY)
Facility: MEDICAL CENTER | Age: 55
End: 2020-05-28
Attending: EMERGENCY MEDICINE
Payer: COMMERCIAL

## 2020-05-28 VITALS
TEMPERATURE: 97 F | BODY MASS INDEX: 36.02 KG/M2 | HEIGHT: 61 IN | DIASTOLIC BLOOD PRESSURE: 71 MMHG | SYSTOLIC BLOOD PRESSURE: 179 MMHG | OXYGEN SATURATION: 96 % | RESPIRATION RATE: 16 BRPM | HEART RATE: 77 BPM | WEIGHT: 190.81 LBS

## 2020-05-28 DIAGNOSIS — Z02.83 ENCOUNTER FOR DRUG SCREENING: ICD-10-CM

## 2020-05-28 DIAGNOSIS — T07.XXXA MULTIPLE CONTUSIONS: ICD-10-CM

## 2020-05-28 PROCEDURE — A9270 NON-COVERED ITEM OR SERVICE: HCPCS | Performed by: EMERGENCY MEDICINE

## 2020-05-28 PROCEDURE — 700102 HCHG RX REV CODE 250 W/ 637 OVERRIDE(OP): Performed by: EMERGENCY MEDICINE

## 2020-05-28 PROCEDURE — 73080 X-RAY EXAM OF ELBOW: CPT | Mod: RT

## 2020-05-28 PROCEDURE — 73564 X-RAY EXAM KNEE 4 OR MORE: CPT | Mod: LT

## 2020-05-28 PROCEDURE — 8899 PR URINE 11 PANEL - AFTER HOURS: Performed by: NURSE PRACTITIONER

## 2020-05-28 PROCEDURE — 82075 ASSAY OF BREATH ETHANOL: CPT | Performed by: NURSE PRACTITIONER

## 2020-05-28 PROCEDURE — 80305 DRUG TEST PRSMV DIR OPT OBS: CPT | Performed by: NURSE PRACTITIONER

## 2020-05-28 PROCEDURE — 72100 X-RAY EXAM L-S SPINE 2/3 VWS: CPT

## 2020-05-28 PROCEDURE — 99283 EMERGENCY DEPT VISIT LOW MDM: CPT

## 2020-05-28 RX ORDER — IBUPROFEN 600 MG/1
600 TABLET ORAL ONCE
Status: COMPLETED | OUTPATIENT
Start: 2020-05-28 | End: 2020-05-28

## 2020-05-28 RX ADMIN — IBUPROFEN 600 MG: 600 TABLET ORAL at 07:46

## 2020-05-28 ASSESSMENT — FIBROSIS 4 INDEX: FIB4 SCORE: 1.16

## 2020-05-28 NOTE — ED PROVIDER NOTES
ED Provider Note      CHIEF COMPLAINT  Fall    HPI  This is a 54-year-old female who presents after ground-level fall.  She was at work.  There is a leaky water dispenser.  She slipped on water on the ground falling on her right elbow and left knee.  She had immediate severe pain in both of these areas.  Constant aching since the event at 540 this morning.  She has noticed progressive worsening low back pain.  Denies hitting her head or neck pain.  No chest pain, abdominal pain or other extremity injury.  Denies weakness, numbness or neurologic symptoms.    REVIEW OF SYSTEMS    Pertinent negatives: No fever, runny nose, sore throat, cough, rash, bleeding, bruising      PAST MEDICAL HISTORY  Past Medical History:   Diagnosis Date   • Anemia    • Bowel habit changes     constipation   • Diabetes    • Diabetes (HCC)    • GERD (gastroesophageal reflux disease)    • Healthcare maintenance 9/27/2014    Mammogram: Due   • High cholesterol    • Hyperlipidemia    • Hypertension    • Infectious disease     Cold 10/2016   • Jaundice 1999   • Psychiatric problem     depression and anxiety   • Vaginal itching 8/27/2014    With anal itching X 1 month Getting worse Burning Min vag disch       FAMILY HISTORY  Family History   Problem Relation Age of Onset   • Diabetes Mother    • Hyperlipidemia Mother    • Diabetes Father    • Hypertension Father    • Hyperlipidemia Father        SOCIAL HISTORY  Social History     Tobacco Use   • Smoking status: Never Smoker   • Smokeless tobacco: Never Used   Substance Use Topics   • Alcohol use: No   • Drug use: No       SURGICAL HISTORY  Past Surgical History:   Procedure Laterality Date   • VENTRAL HERNIA REPAIR ROBOTIC XI N/A 10/14/2016    Procedure: VENTRAL HERNIA REPAIR ROBOTIC XI FOR INCISIONAL W/MESH;  Surgeon: Edi Mendoza M.D.;  Location: SURGERY [facility];  Service:    • ABDOMINAL HYSTERECTOMY TOTAL      still has ovaries   • CHOLECYSTECTOMY     • PRIMARY C SECTION     • TUBAL  COAGULATION LAPAROSCOPIC BILATERAL         CURRENT MEDICATIONS  Home Medications    **Home medications have not yet been reviewed for this encounter**         ALLERGIES  Allergies   Allergen Reactions   • Morphine Itching     Rxn = unknown   Bumps all over body   • Glucophage  [Metformin Hcl]    • Metformin      headaches       PHYSICAL EXAM  VITAL SIGNS: See chart  Constitutional: Awake and alert complaining of pain  HENT: Head nontender, face without suggestion of fracture, TMs without hemotympanum, oropharynx without trauma  Eyes: PERRL, Conjunctiva normal, No discharge.   Neck: No mid line tenderness.  Full range of motion.  Nexus negative  Cardiovascular: Normal heart rate, Normal rhythm.   Thorax & Lungs: Normal breath sounds, No respiratory distress, No wheezing, No chest tenderness.   Abdomen: Bowel sounds normal, Soft, No tenderness, No masses, No pulsatile masses. No tenderness over solid organ.  Skin: No suturable lacerations.   Back: Nontender thoracic and lumbar spine  Musculoskeletal: Left arm without trauma.  Right arm: Mild tenderness over the medial and lateral epicondyles of the right elbow.  No tenderness over the current on.  No tenderness of the forearm wrist or shoulder.  Left leg tenderness over the patella.  Full range of motion of the knee otherwise without deformity or swelling.  No joint effusion.  No ligamentous laxity.  No crepitus.  No tenderness over the hip full range of motion of the hip.  No tenderness of the tib-fib ankle foot.  Right leg: Without trauma.  Neurologic: Alert & oriented x 3, Normal motor function, Normal sensory function, No focal deficits noted.       RADIOLOGY/PROCEDURES  DX-ELBOW-COMPLETE 3+ RIGHT   Final Result      No evidence of acute fracture or dislocation.      DX-LUMBAR SPINE-2 OR 3 VIEWS   Final Result      1.  No evidence of fracture.      2.  Multilevel degenerative disc disease and facet degeneration.      DX-KNEE COMPLETE 4+ LEFT   Final Result       Mild tricompartment degenerative change. No evidence of fracture.         Imaging is interpreted by radiologist        COURSE & MEDICAL DECISION MAKING  Patient presents after mechanical ground-level fall.  She has several areas of injury however on examination no remarkable swelling or deformity.  Obtained x-rays to exercise caution.  These x-rays are negative for fracture.  She is noted to have degenerative changes.  At this point she appears to have suffered multiple contusions and muscle strain.  Of advised Tylenol and/or ibuprofen, rest, ice.  She will need to follow-up at occupational health within the next 2 to 3 days and I informed her of this.  She should return to the ER if she notices other area of injury or has concern..     FINAL IMPRESSION  1.  Multiple contusions  2.  Lumbar strain          This dictation was created using voice recognition software. The accuracy of the dictation is limited to the abilities of the software.  The nursing notes were reviewed and certain aspects of this information were incorporated into this note.      Electronically signed by: Teddy Edwards M.D., 5/28/2020

## 2020-05-28 NOTE — LETTER
"  FORM C-4:  EMPLOYEE’S CLAIM FOR COMPENSATION/ REPORT OF INITIAL TREATMENT  EMPLOYEE’S CLAIM - PROVIDE ALL INFORMATION REQUESTED   First Name Sonya Last Name Eriberto Birthdate 1965  Sex female Claim Number   Home Employee Address [address]  [address]  [facility]                                     Zip  [address] Height  1.549 m (5' 1\") Weight  86.5 kg (190 lb 12.9 oz) [facility]     Mailing Employee Address [address]  [address]   [facility]               Zip  [address] Telephone  [phone] (home) [phone] (work) Primary Language Spoken  English   Insurer   Third Party   [employer] Employee's Occupation (Job Title) When Injury or Occupational Disease Occurred     Employer's Name   [employer] Telephone [phone]    Employer Address [address] [facility] [address]] Zip [address]   Date of Injury  5/28/2020       Hour of Injury  5:40 AM Date Employer Notified  5/28/2020 Last Day of Work after Injury or Occupational Disease  5/28/2020 Supervisor to Whom Injury Reported  Surjit Lacy   Address or Location of Accident (if applicable) [[employer] [place]]   What were you doing at the time of accident? (if applicable) Walking to the bathroom on my break.    How did this injury or occupational disease occur? Be specific and answer in detail. Use additional sheet if necessary)  I was walking to the restroom on my break and I fell in front of the water dispenser I landed on my left knee and hit my lower back my elbow and my whole body slamped on the floor.   If you believe that you have an occupational disease, when did you first have knowledge of the disability and it relationship to your employment? N/A Witnesses to the Accident  N/A   Nature of Injury or Occupational Disease  Workers' Compensation Part(s) of Body Injured or Affected  Knee (L), Elbow (R), Lower Back Area (Lumbar Area " & Lumbo-Sacral)    I CERTIFY THAT THE ABOVE IS TRUE AND CORRECT TO THE BEST OF MY KNOWLEDGE AND THAT I HAVE PROVIDED THIS INFORMATION IN ORDER TO OBTAIN THE BENEFITS OF [place]).  I HEREBY AUTHORIZE ANY PHYSICIAN, CHIROPRACTOR, SURGEON, PRACTITIONER, OR OTHER PERSON, ANY HOSPITAL, INCLUDING [facility] OR [facility], ANY MEDICAL SERVICE ORGANIZATION, ANY INSURANCE COMPANY, OR OTHER INSTITUTION OR ORGANIZATION TO RELEASE TO EACH OTHER, ANY MEDICAL OR OTHER INFORMATION, INCLUDING BENEFITS PAID OR PAYABLE, PERTINENT TO THIS INJURY OR DISEASE, EXCEPT INFORMATION RELATIVE TO DIAGNOSIS, TREATMENT AND/OR COUNSELING FOR AIDS, PSYCHOLOGICAL CONDITIONS, ALCOHOL OR CONTROLLED SUBSTANCES, FOR WHICH I MUST GIVE SPECIFIC AUTHORIZATION.  A PHOTOSTAT OF THIS AUTHORIZATION SHALL BE AS VALID AS THE ORIGINAL.  Date: 05/28/2020                   Place: [facility]                         Employee’s Signature:   THIS REPORT MUST BE COMPLETED AND MAILED WITHIN 3 WORKING DAYS OF TREATMENT   Place [facility], EMERGENCY DEPT                                                                             Name of Facility [facility]   Date  5/28/2020 Diagnosis  (T07.XXXA) Multiple contusions Is there evidence the injured employee was under the influence of alcohol and/or another controlled substance at the time of accident?   Hour  9:10 AM Description of Injury or Disease  Multiple contusions No   Treatment  rest ice, NSAIDs  Have you advised the patient to remain off work five days or more?         No   X-Ray Findings  Negative  Comments:Negative elbow x-ray, negative lumbar x-ray, negative knee x-ray aside from degenerative changes If Yes   From Date    To Date      From information given by the employee, together with medical evidence, can you  "directly connect this injury or occupational disease as job incurred? Yes If No, is employee capable of: Full Duty  No Modified Duty  Yes   Is additional medical care by a physician indicated? Yes If Modified Duty, Specify any Limitations / Restrictions   May return to work 5/30 or per occupational health restrictions   Do you know of any previous injury or disease contributing to this condition or occupational disease? No    Date 5/28/2020 Print Doctor’s Name Samantha Edwards certify the employer’s copy of this form was mailed on:   Address [address] [facility] [place] [id number] [phone] INSURER’S USE ONLY   Provider’s Tax ID Number [id number] Telephone Dept: [phone]    Doctor’s Signature e-SAMANTHA Huerta M.D. Degree     MD      Form C-4 (rev.10/07)                                                                         BRIEF DESCRIPTION OF RIGHTS AND BENEFITS  (Pursuant to [place] 616[place].050)    Notice of Injury or Occupational Disease (Incident Report Form C-1): If an injury or occupational disease (OD) arises out of and in the course of employment, you must provide written notice to your employer as soon as practicable, but no later than 7 days after the accident or OD. Your employer shall maintain a sufficient supply of the required forms.    Claim for Compensation (Form C-4): If medical treatment is sought, the form C-4 is available at the place of initial treatment. A completed \"Claim for Compensation\" (Form C-4) must be filed within 90 days after an accident or OD. The treating physician or chiropractor must, within 3 working days after treatment, complete and mail to the employer, the employer's insurer and third-party , the Claim for Compensation.    Medical Treatment: If you require medical treatment for your on-the-job injury or OD, you may be required to select a physician or chiropractor from a list provided by your workers’ compensation insurer, if it has contracted with an " Organization for Managed Care (MCO) or Preferred Provider Organization (PPO) or providers of health care. If your employer has not entered into a contract with an MCO or PPO, you may select a physician or chiropractor from the Panel of Physicians and Chiropractors. Any medical costs related to your industrial injury or OD will be paid by your insurer.    Temporary Total Disability (TTD): If your doctor has certified that you are unable to work for a period of at least 5 consecutive days, or 5 cumulative days in a 20-day period, or places restrictions on you that your employer does not accommodate, you may be entitled to TTD compensation.    Temporary Partial Disability (TPD): If the wage you receive upon reemployment is less than the compensation for TTD to which you are entitled, the insurer may be required to pay you TPD compensation to make up the difference. TPD can only be paid for a maximum of 24 months.    Permanent Partial Disability (PPD): When your medical condition is stable and there is an indication of a PPD as a result of your injury or OD, within 30 days, your insurer must arrange for an evaluation by a rating physician or chiropractor to determine the degree of your PPD. The amount of your PPD award depends on the date of injury, the results of the PPD evaluation and your age and wage.    Permanent Total Disability (PTD): If you are medically certified by a treating physician or chiropractor as permanently and totally disabled and have been granted a PTD status by your insurer, you are entitled to receive monthly benefits not to exceed 66 2/3% of your average monthly wage. The amount of your PTD payments is subject to reduction if you previously received a PPD award.    Vocational Rehabilitation Services: You may be eligible for vocational rehabilitation services if you are unable to return to the job due to a permanent physical impairment or permanent restrictions as a result of your injury or  occupational disease.    Transportation and Per Garcia Reimbursement: You may be eligible for travel expenses and per garcia associated with medical treatment.    Reopening: You may be able to reopen your claim if your condition worsens after claim closure.     Appeal Process: If you disagree with a written determination issued by the insurer or the insurer does not respond to your request, you may appeal to the Department of Administration, , by following the instructions contained in your determination letter. You must appeal the determination within 70 days from the date of the determination letter at [address], [address], [place], [place] [address], or [address], [address], [place], [place] [address]. If you disagree with the  decision, you may appeal to the Department of Administration, . You must file your appeal within 30 days from the date of the  decision letter at [address], [address], [place], [place] [address], or [address], [place], [place] [address]. If you disagree with a decision of an , you may file a petition for judicial review with the District Court. You must do so within 30 days of the Appeal Officer’s decision. You may be represented by an  at your own expense or you may contact the [facility] for possible representation.    [place]  for Injured Workers (NAIW): If you disagree with a  decision, you may request that NAIW represent you without charge at an  Hearing. For information regarding denial of benefits, you may contact the [facility] at: [address], [address], [place] [address], [phone], or [address], [place] [address], [phone]    To File a Complaint with the Division: If you wish to file a complaint with the  of the Division of Industrial Relations (DIR),  please  contact the Workers’ Compensation Section, [address], [address], [place], [place] [address], telephone [phone]) [phone], or [address], [address], [place], [place] [address], telephone [phone].    For assistance with Workers’ Compensation Issues: You may contact the Office of the [facility] Consumer Health Assistance, [address] [place] [address], [address], [place], [place] [address], Toll Free 1-413.911.1235, Web site: [email or web address]., E-mail [email or web address].  D-2 (rev. 06/18)              __________________________________________________________________                                    _______05/28/2020__________            Employee Name / Signature                                                                                                                            Date

## 2020-05-28 NOTE — ED TRIAGE NOTES
"Chief Complaint   Patient presents with   • GLF     Workmans Comp case. pt works for [employer]. pt slipped on wet floor sustaining left knee, R elbow and lower back. pt denies LOC or hitting head.      /78   Pulse 85   Temp 36.1 °C (97 °F) (Temporal)   Resp 16   Ht 1.549 m (5' 1\")   Wt 86.5 kg (190 lb 12.9 oz)   SpO2 97%   BMI 36.05 kg/m²     "

## 2020-05-28 NOTE — LETTER
"  FORM C-4:  EMPLOYEE’S CLAIM FOR COMPENSATION/ REPORT OF INITIAL TREATMENT  EMPLOYEE’S CLAIM - PROVIDE ALL INFORMATION REQUESTED   First Name Sonya Last Name Eriberto Birthdate 1965  Sex female Claim Number   Home Employee Address [address]  [facility]                                     Zip  [address] Height  1.549 m (5' 1\") Weight  86.5 kg (190 lb 12.9 oz) [facility]     Mailing Employee Address [address]   [facility]               Zip  [address] Telephone  [phone] (home) [phone] (work) Primary Language Spoken  English   Insurer   Third Party   [employer] Employee's Occupation (Job Title) When Injury or Occupational Disease Occurred     Employer's Name   [employer] [place] Telephone [phone]    Employer Address [address] [facility] [[address]] Zip [address]   Date of Injury  5/28/2020       Hour of Injury  5:40 AM Date Employer Notified  5/28/2020 Last Day of Work after Injury or Occupational Disease  5/28/2020 Supervisor to Whom Injury Reported  Montrell Ed   Address or Location of Accident (if applicable) [[employer] [place]]   What were you doing at the time of accident? (if applicable) Walking to the bathroom on my break.    How did this injury or occupational disease occur? Be specific and answer in detail. Use additional sheet if necessary)  I was walking to the restroom on my break and I fell in front of the water dispenser I landed on my left knee and hit my lower back my elbow and my whole body slamped on the floor.   If you believe that you have an occupational disease, when did you first have knowledge of the disability and it relationship to your employment? N/A Witnesses to the Accident  N/A   Nature of Injury or Occupational Disease  Workers' Compensation Part(s) of Body Injured or Affected  Knee (L), Elbow (R), Lower Back Area (Lumbar Area & " Lumbo-Sacral)    I CERTIFY THAT THE ABOVE IS TRUE AND CORRECT TO THE BEST OF MY KNOWLEDGE AND THAT I HAVE PROVIDED THIS INFORMATION IN ORDER TO OBTAIN THE BENEFITS OF [place] OR [place]).  I HEREBY AUTHORIZE ANY PHYSICIAN, CHIROPRACTOR, SURGEON, PRACTITIONER, OR OTHER PERSON, ANY HOSPITAL, INCLUDING [facility] OR [facility], ANY MEDICAL SERVICE ORGANIZATION, ANY INSURANCE COMPANY, OR OTHER INSTITUTION OR ORGANIZATION TO RELEASE TO EACH OTHER, ANY MEDICAL OR OTHER INFORMATION, INCLUDING BENEFITS PAID OR PAYABLE, PERTINENT TO THIS INJURY OR DISEASE, EXCEPT INFORMATION RELATIVE TO DIAGNOSIS, TREATMENT AND/OR COUNSELING FOR AIDS, PSYCHOLOGICAL CONDITIONS, ALCOHOL OR CONTROLLED SUBSTANCES, FOR WHICH I MUST GIVE SPECIFIC AUTHORIZATION.  A PHOTOSTAT OF THIS AUTHORIZATION SHALL BE AS VALID AS THE ORIGINAL.  Date:05/28/2020                 Place: [facility]                   Employee’s Signature:   THIS REPORT MUST BE COMPLETED AND MAILED WITHIN 3 WORKING DAYS OF TREATMENT   Place [facility], EMERGENCY DEPT                                                                             Name of Facility [facility]   Date  5/28/2020 Diagnosis  (T07.XXXA) Multiple contusions Is there evidence the injured employee was under the influence of alcohol and/or another controlled substance at the time of accident?   Hour  9:01 AM Description of Injury or Disease  Multiple contusions No   Treatment  rest ice, NSAIDs  Have you advised the patient to remain off work five days or more?         No   X-Ray Findings  Negative  Comments:Negative elbow x-ray, negative lumbar x-ray, negative knee x-ray aside from degenerative changes If Yes   From Date    To Date      From information given by the employee, together with medical evidence, can you directly  "connect this injury or occupational disease as job incurred? Yes If No, is employee capable of: Full Duty  No Modified Duty  Yes   Is additional medical care by a physician indicated? Yes If Modified Duty, Specify any Limitations / Restrictions   May return to work 5/30 or per occupational health restrictions   Do you know of any previous injury or disease contributing to this condition or occupational disease? No    Date 5/28/2020 Print Doctor’s Name Samantha Edwards certify the employer’s copy of this form was mailed on:   Address [address] [facility] [place] [id number] [phone] INSURER’S USE ONLY   Provider’s Tax ID Number [id number] Telephone Dept: [phone]    Doctor’s Signature e-SAMANTHA Huerta M.D. Degree     MD      Form C-4 (rev.10/07)                                                                         BRIEF DESCRIPTION OF RIGHTS AND BENEFITS  (Pursuant to [place] 616C.050)    Notice of Injury or Occupational Disease (Incident Report Form C-1): If an injury or occupational disease (OD) arises out of and in the course of employment, you must provide written notice to your employer as soon as practicable, but no later than 7 days after the accident or OD. Your employer shall maintain a sufficient supply of the required forms.    Claim for Compensation (Form C-4): If medical treatment is sought, the form C-4 is available at the place of initial treatment. A completed \"Claim for Compensation\" (Form C-4) must be filed within 90 days after an accident or OD. The treating physician or chiropractor must, within 3 working days after treatment, complete and mail to the employer, the employer's insurer and third-party , the Claim for Compensation.    Medical Treatment: If you require medical treatment for your on-the-job injury or OD, you may be required to select a physician or chiropractor from a list provided by your workers’ compensation insurer, if it has contracted with an " Organization for Managed Care (MCO) or Preferred Provider Organization (PPO) or providers of health care. If your employer has not entered into a contract with an MCO or PPO, you may select a physician or chiropractor from the Panel of Physicians and Chiropractors. Any medical costs related to your industrial injury or OD will be paid by your insurer.    Temporary Total Disability (TTD): If your doctor has certified that you are unable to work for a period of at least 5 consecutive days, or 5 cumulative days in a 20-day period, or places restrictions on you that your employer does not accommodate, you may be entitled to TTD compensation.    Temporary Partial Disability (TPD): If the wage you receive upon reemployment is less than the compensation for TTD to which you are entitled, the insurer may be required to pay you TPD compensation to make up the difference. TPD can only be paid for a maximum of 24 months.    Permanent Partial Disability (PPD): When your medical condition is stable and there is an indication of a PPD as a result of your injury or OD, within 30 days, your insurer must arrange for an evaluation by a rating physician or chiropractor to determine the degree of your PPD. The amount of your PPD award depends on the date of injury, the results of the PPD evaluation and your age and wage.    Permanent Total Disability (PTD): If you are medically certified by a treating physician or chiropractor as permanently and totally disabled and have been granted a PTD status by your insurer, you are entitled to receive monthly benefits not to exceed 66 2/3% of your average monthly wage. The amount of your PTD payments is subject to reduction if you previously received a PPD award.    Vocational Rehabilitation Services: You may be eligible for vocational rehabilitation services if you are unable to return to the job due to a permanent physical impairment or permanent restrictions as a result of your injury or  occupational disease.    Transportation and Per Garcia Reimbursement: You may be eligible for travel expenses and per garcia associated with medical treatment.    Reopening: You may be able to reopen your claim if your condition worsens after claim closure.     Appeal Process: If you disagree with a written determination issued by the insurer or the insurer does not respond to your request, you may appeal to the Department of Administration, , by following the instructions contained in your determination letter. You must appeal the determination within 70 days from the date of the determination letter at [address], [address], [place], [place] [address], or [address], [address], [place], [place] [address]. If you disagree with the  decision, you may appeal to the Department of Administration, . You must file your appeal within 30 days from the date of the  decision letter at [address], [address], [place], [place] [address], or [address], [place], [place] [address]. If you disagree with a decision of an , you may file a petition for judicial review with the District Court. You must do so within 30 days of the Appeal Officer’s decision. You may be represented by an  at your own expense or you may contact the [facility] for possible representation.    [place]  for Injured Workers (NAIW): If you disagree with a  decision, you may request that NAIW represent you without charge at an  Hearing. For information regarding denial of benefits, you may contact the [facility] at: [address], [address], [place] [address], [phone], or [address], [place] [address], [phone]    To File a Complaint with the Division: If you wish to file a complaint with the  of the Division of Industrial Relations (DIR),  please  contact the Workers’ Compensation Section, [address], [address], [place], [place] [address], telephone [phone], or [address], [address], [place], [place] [address], telephone [phone].    For assistance with Workers’ Compensation Issues: You may contact the Office of the [facility], [address] [place] [address], [address], [place], [place] [address], Toll Free 1-685.536.2720, Web site: http://govcha.[facility].nv., E-mail [email or web address].  D-2 (rev. 06/18)              __________________________________________________________________                                    ____05/28/2020_____________            Employee Name / Signature                                                                                                                            Date

## 2020-05-29 ENCOUNTER — OCCUPATIONAL MEDICINE (OUTPATIENT)
Dept: URGENT CARE | Facility: CLINIC | Age: 55
End: 2020-05-29
Payer: COMMERCIAL

## 2020-05-29 VITALS
WEIGHT: 184 LBS | BODY MASS INDEX: 34.74 KG/M2 | DIASTOLIC BLOOD PRESSURE: 84 MMHG | HEART RATE: 88 BPM | TEMPERATURE: 98 F | OXYGEN SATURATION: 97 % | RESPIRATION RATE: 16 BRPM | SYSTOLIC BLOOD PRESSURE: 138 MMHG | HEIGHT: 61 IN

## 2020-05-29 DIAGNOSIS — S39.012D STRAIN OF LUMBAR REGION, SUBSEQUENT ENCOUNTER: ICD-10-CM

## 2020-05-29 DIAGNOSIS — S46.911D STRAIN OF RIGHT SHOULDER, SUBSEQUENT ENCOUNTER: ICD-10-CM

## 2020-05-29 DIAGNOSIS — S50.01XD CONTUSION OF RIGHT ELBOW, SUBSEQUENT ENCOUNTER: ICD-10-CM

## 2020-05-29 DIAGNOSIS — S86.912D KNEE STRAIN, LEFT, SUBSEQUENT ENCOUNTER: ICD-10-CM

## 2020-05-29 DIAGNOSIS — S16.1XXD STRAIN OF NECK MUSCLE, SUBSEQUENT ENCOUNTER: ICD-10-CM

## 2020-05-29 PROCEDURE — 99214 OFFICE O/P EST MOD 30 MIN: CPT | Performed by: PREVENTIVE MEDICINE

## 2020-05-29 RX ORDER — TIZANIDINE 4 MG/1
4 TABLET ORAL
Qty: 20 TAB | Refills: 0 | Status: ON HOLD | OUTPATIENT
Start: 2020-05-29 | End: 2020-08-13

## 2020-05-29 RX ORDER — DICLOFENAC SODIUM 75 MG/1
75 TABLET, DELAYED RELEASE ORAL 2 TIMES DAILY
Qty: 60 TAB | Refills: 0 | Status: ON HOLD | OUTPATIENT
Start: 2020-05-29 | End: 2020-08-13

## 2020-05-29 ASSESSMENT — FIBROSIS 4 INDEX: FIB4 SCORE: 1.16

## 2020-05-29 NOTE — PROGRESS NOTES
"Subjective:     Sonya Wei is a 54 y.o. female who presents for No chief complaint on file.      DOI 5/28/2020: 53 yo female presents with left knee, right elbow and lower back injury. She slipped on water on the ground falling on her right elbow and left knee.  She had immediate severe pain in both of these areas. She was seen in the ER, XR of the knee and elbow were negative for acute findings.  Patient states overall pain is about the same as yesterday.  Notes significant pain in left knee and walks with limp.  She does note some weakness but no overt instability.  Mostly pain with walking.  She notes right elbow pain has improved but she has pain in the right shoulder and in the neck bilaterally.  Denies radiating pain, numbness or tingling.  Patient notes also lower back pain without radiation.  She states she just feels stiff and tender all over.  Taking ibuprofen currently for relief.    ROS: All systems were reviewed on intake form, form was reviewed and signed. See scanned documents in media. Pertinent positives and negatives included in HPI.    PMH: No pertinent past medical history to this problem  MEDS: Medications were reviewed in Epic  ALLERGIES:   Allergies   Allergen Reactions   • Morphine Itching     Rxn = unknown   Bumps all over body   • Glucophage  [Metformin Hcl]    • Metformin      headaches     SOCHX: Works as a  at [employer]  FH: No pertinent family history to this problem       Objective:     /84   Pulse 88   Temp 36.7 °C (98 °F) (Temporal)   Resp 16   Ht 1.549 m (5' 1\")   Wt 83.5 kg (184 lb)   SpO2 97%   BMI 34.77 kg/m²     Constitutional: Patient is in no acute distress. Appears well-developed and well-nourished.   HENT: Normocephalic and atraumatic. EOM are normal. No scleral icterus.   Cardiovascular: Normal rate.    Pulmonary/Chest: Effort normal. No respiratory distress.   Neurological: Patient is alert and oriented to person, place, and time. "   Skin: Skin is warm and dry.   Psychiatric: Normal mood and affect. Behavior is normal.     Left Knee: No gross deformity.  Tenderness over the anterior knee and diffusely over both medial and lateral knee without specific joint line tenderness.  Somewhat reduced flexion/extension of the knee.  Unable to squat very much due to pain.  Anterior/posterior drawer test negative.  No laxity of varus or valgus stress.  Right upper extremity: Minimal tenderness over the lateral elbow, mild to moderate tenderness over the anterior GH, lateral shoulder and upper trapezius.  Somewhat diminished range of motion of the shoulder with flexion/abduction to less than 150 degrees.  Strength grossly intact but does elicit pain.  Empty can test negative.  Cervical: No gross deformity.  Moderate tenderness over the bilateral paraspinal musculature with notable spasm.  Spurling's is negative.  Lumbar: No gross deformity.  Bilateral paraspinal muscular tenderness in lower lumbar region.  Range of motion diminished slightly with flexion to less than 70 degrees.  Straight leg test negative.  Antalgic gait.    Assessment/Plan:       1. Contusion of right elbow, subsequent encounter  - diclofenac EC (VOLTAREN) 75 MG Tablet Delayed Response; Take 1 Tab by mouth 2 times a day.  Dispense: 60 Tab; Refill: 0  - tizanidine (ZANAFLEX) 4 MG Tab; Take 1 Tab by mouth at bedtime as needed.  Dispense: 20 Tab; Refill: 0    2. Knee strain, left, subsequent encounter  - diclofenac EC (VOLTAREN) 75 MG Tablet Delayed Response; Take 1 Tab by mouth 2 times a day.  Dispense: 60 Tab; Refill: 0  - tizanidine (ZANAFLEX) 4 MG Tab; Take 1 Tab by mouth at bedtime as needed.  Dispense: 20 Tab; Refill: 0    3. Strain of lumbar region, subsequent encounter  - diclofenac EC (VOLTAREN) 75 MG Tablet Delayed Response; Take 1 Tab by mouth 2 times a day.  Dispense: 60 Tab; Refill: 0  - tizanidine (ZANAFLEX) 4 MG Tab; Take 1 Tab by mouth at bedtime as needed.  Dispense: 20  Tab; Refill: 0    4. Strain of neck muscle, subsequent encounter  - diclofenac EC (VOLTAREN) 75 MG Tablet Delayed Response; Take 1 Tab by mouth 2 times a day.  Dispense: 60 Tab; Refill: 0  - tizanidine (ZANAFLEX) 4 MG Tab; Take 1 Tab by mouth at bedtime as needed.  Dispense: 20 Tab; Refill: 0    5. Strain of right shoulder, subsequent encounter  - diclofenac EC (VOLTAREN) 75 MG Tablet Delayed Response; Take 1 Tab by mouth 2 times a day.  Dispense: 60 Tab; Refill: 0  - tizanidine (ZANAFLEX) 4 MG Tab; Take 1 Tab by mouth at bedtime as needed.  Dispense: 20 Tab; Refill: 0    • Prescribed diclofenac 75 mg twice daily and tizanidine 4 mg to use at night as needed  • Gentle range of motion exercises  • Restricted duty  • Follow-up 1 week, if no improvement will consider physical therapy    Differential diagnosis, natural history, supportive care, and indications for immediate follow-up discussed.

## 2020-05-29 NOTE — LETTER
[facility]  [address] [facility] [address] [place] [address]  Phone:  [phone] - Fax:  [phone]   [facility] Progress Report and Disability Certification  Date of Service: 5/29/2020   No Show:  No  Date / Time of Next Visit: 6/5/2020 @ 7:45 AM   Claim Information   Patient Name: Sonya Wei  Claim Number:     Employer: [employer] [facility] [employer]ATORY SERVICES ONLY  Date of Injury: 5/28/2020     Insurer / TPA: [employer]  ID / SSN:     Occupation:   Diagnosis: Diagnoses of Contusion of right elbow, subsequent encounter, Knee strain, left, subsequent encounter, Strain of lumbar region, subsequent encounter, Strain of neck muscle, subsequent encounter, and Strain of right shoulder, subsequent encounter were pertinent to this visit.    Medical Information   Related to Industrial Injury? Yes    Subjective Complaints:  DOI 5/28/2020: 55 yo female presents with left knee, right elbow and lower back injury. She slipped on water on the ground falling on her right elbow and left knee.  She had immediate severe pain in both of these areas. She was seen in the ER, XR of the knee and elbow were negative for acute findings.  Patient states overall pain is about the same as yesterday.  Notes significant pain in left knee and walks with limp.  She does note some weakness but no overt instability.  Mostly pain with walking.  She notes right elbow pain has improved but she has pain in the right shoulder and in the neck bilaterally.  Denies radiating pain, numbness or tingling.  Patient notes also lower back pain without radiation.  She states she just feels stiff and tender all over.  Taking ibuprofen currently for relief.   Objective Findings: Left Knee: No gross deformity.  Tenderness over the anterior knee and diffusely over both medial and lateral knee without specific joint line tenderness.  Somewhat reduced  flexion/extension of the knee.  Unable to squat very much due to pain.  Anterior/posterior drawer test negative.  No laxity of varus or valgus stress.  Right upper extremity: Minimal tenderness over the lateral elbow, mild to moderate tenderness over the anterior GH, lateral shoulder and upper trapezius.  Somewhat diminished range of motion of the shoulder with flexion/abduction to less than 150 degrees.  Strength grossly intact but does elicit pain.  Empty can test negative.  Cervical: No gross deformity.  Moderate tenderness over the bilateral paraspinal musculature with notable spasm.  Spurling's is negative.  Lumbar: No gross deformity.  Bilateral paraspinal muscular tenderness in lower lumbar region.  Range of motion diminished slightly with flexion to less than 70 degrees.  Straight leg test negative.  Antalgic gait.   Pre-Existing Condition(s):     Assessment:   Condition Same    Status: Additional Care Required  Permanent Disability:No    Plan:      Diagnostics:      Comments:  Prescribed diclofenac 75 mg twice daily and tizanidine 4 mg to use at night as needed  Gentle range of motion exercises  Restricted duty  Follow-up 1 week, if no improvement will consider physical therapy    Disability Information   Status: Released to Restricted Duty    From:  2020  Through: 2020 Restrictions are: Temporary   Physical Restrictions   Sitting:    Standing:  < or = to 1 hr/day Stooping:  < or = to 1 hr/day Bending:  < or = to 1 hr/day   Squattin hrs/day Walking:  < or = to 1 hr/day Climbing:    Pushing:  < or = to 1 hr/day   Pulling:  < or = to 1 hr/day Other:    Reaching Above Shoulder (L):   Reaching Above Shoulder (R):       Reaching Below Shoulder (L):    Reaching Below Shoulder (R):      Not to exceed Weight Limits   Carrying(hrs):   Weight Limit(lb): < or = to 10 pounds Lifting(hrs):   Weight  Limit(lb): < or = to 10 pounds   Comments: Sedentary/office work only    Repetitive Actions   Hands: i.e.  Fine Manipulations from Grasping:     Feet: i.e. Operating Foot Controls:     Driving / Operate Machinery:     Physician Name: Eligio Salmon D.O. Physician Signature: ELIGIO Cullen D.O. e-Signature: Dr. Willis Frankel, Medical Director   Clinic Name / Location: [address]  [place] [address] Clinic Phone Number: Dept: [phone]   Appointment Time: 10:30 Am Visit Start Time: 10:30 AM   Check-In Time:  10:16 Am Visit Discharge Time:  10:43 AM   Original-Treating Physician or Chiropractor    Page 2-Insurer/TPA    Page 3-Employer    Page 4-Employee

## 2020-05-29 NOTE — LETTER
[facility]  [address] [facility], [address] - [place] [address]  Phone:  [phone] - Fax:  [phone]   [facility] Progress Report and Disability Certification  Date of Service: 5/29/2020   No Show:  No  Date / Time of Next Visit: 6/5/2020   Claim Information   Patient Name: Sonya Wei  Claim Number:     Employer: [employer] RESPIRATORY SERVICES ONLY *** Date of Injury: 5/28/2020     Insurer / TPA: [employer] *** ID / SSN:     Occupation:  *** Diagnosis: Diagnoses of Contusion of right elbow, subsequent encounter and Knee strain, left, subsequent encounter were pertinent to this visit.    Medical Information   Related to Industrial Injury? Yes ***   Subjective Complaints:  DOI 5/28/2020: 55 yo female presents with left knee, right elbow and lower back injury. She slipped on water on the ground falling on her right elbow and left knee.  She had immediate severe pain in both of these areas. She was seen in the ER, XR of the knee and elbow were negative for acute findings.  Patient states overall pain is about the same as yesterday.  Notes significant pain in left knee and walks with limp.  She does note some weakness but no overt instability.  Mostly pain with walking.  She notes right elbow pain has improved but she has pain in the right shoulder and in the neck bilaterally.  Denies radiating pain, numbness or tingling.  Patient notes also lower back pain without radiation.  She states she just feels stiff and tender all over.  Taking ibuprofen currently for relief.   Objective Findings: Left Knee: No gross deformity.  Tenderness over the anterior knee and diffusely over both medial and lateral knee without specific joint line tenderness.  Somewhat reduced flexion/extension of the knee.  Unable to squat very much due to pain.  Anterior/posterior drawer test negative.  No laxity of varus or valgus  stress.  Right upper extremity: Minimal tenderness over the lateral elbow, mild to moderate tenderness over the anterior GH, lateral shoulder and upper trapezius.  Somewhat diminished range of motion of the shoulder with flexion/abduction to less than 150 degrees.  Strength grossly intact but does elicit pain.  Empty can test negative.  Cervical: No gross deformity.  Moderate tenderness over the bilateral paraspinal musculature with notable spasm.  Spurling's is negative.  Lumbar: No gross deformity.  Bilateral paraspinal muscular tenderness in lower lumbar region.  Range of motion diminished slightly with flexion to less than 70 degrees.  Straight leg test negative.  Antalgic gait.   Pre-Existing Condition(s):     Assessment:   Condition Same    Status: Additional Care Required  Permanent Disability:No    Plan:      Diagnostics:      Comments:  Prescribed diclofenac 75 mg twice daily and tizanidine 4 mg to use at night as needed  Gentle range of motion exercises  Restricted duty  Follow-up 1 week, if no improvement will consider physical therapy    Disability Information   Status: Released to Restricted Duty    From:  2020  Through: 2020 Restrictions are: Temporary   Physical Restrictions   Sitting:    Standing:  < or = to 1 hr/day Stooping:  < or = to 1 hr/day Bending:  < or = to 1 hr/day   Squattin hrs/day Walking:  < or = to 1 hr/day Climbing:    Pushing:  < or = to 1 hr/day   Pulling:  < or = to 1 hr/day Other:    Reaching Above Shoulder (L):   Reaching Above Shoulder (R):       Reaching Below Shoulder (L):    Reaching Below Shoulder (R):      Not to exceed Weight Limits   Carrying(hrs):   Weight Limit(lb): < or = to 10 pounds Lifting(hrs):   Weight  Limit(lb): < or = to 10 pounds   Comments: Sedentary/office work only    Repetitive Actions   Hands: i.e. Fine Manipulations from Grasping:     Feet: i.e. Operating Foot Controls:     Driving / Operate Machinery:     Physician Name: Eligio Salmon  ROCKY Physician Signature: SAVANNAH Cullen D.O. e-Signature: Dr. Willis Frankel, Medical Director   Clinic Name / Location: [address], [address]  [place] [address] Clinic Phone Number: Dept: [phone]   Appointment Time: 10:30 Am Visit Start Time: 10:30 AM   Check-In Time:  10:16 Am Visit Discharge Time:  ***   Original-Treating Physician or Chiropractor    Page 2-Insurer/TPA    Page 3-Employer    Page 4-Employee

## 2020-06-05 ENCOUNTER — OCCUPATIONAL MEDICINE (OUTPATIENT)
Dept: OCCUPATIONAL MEDICINE | Facility: CLINIC | Age: 55
End: 2020-06-05
Payer: COMMERCIAL

## 2020-06-05 VITALS
OXYGEN SATURATION: 96 % | SYSTOLIC BLOOD PRESSURE: 114 MMHG | HEIGHT: 61 IN | TEMPERATURE: 98.7 F | HEART RATE: 92 BPM | BODY MASS INDEX: 35.5 KG/M2 | WEIGHT: 188 LBS | DIASTOLIC BLOOD PRESSURE: 78 MMHG

## 2020-06-05 DIAGNOSIS — S39.012D STRAIN OF LUMBAR REGION, SUBSEQUENT ENCOUNTER: ICD-10-CM

## 2020-06-05 DIAGNOSIS — S46.911D STRAIN OF RIGHT SHOULDER, SUBSEQUENT ENCOUNTER: ICD-10-CM

## 2020-06-05 DIAGNOSIS — S16.1XXD STRAIN OF NECK MUSCLE, SUBSEQUENT ENCOUNTER: ICD-10-CM

## 2020-06-05 DIAGNOSIS — S86.912D KNEE STRAIN, LEFT, SUBSEQUENT ENCOUNTER: ICD-10-CM

## 2020-06-05 PROCEDURE — 99214 OFFICE O/P EST MOD 30 MIN: CPT | Performed by: PREVENTIVE MEDICINE

## 2020-06-05 ASSESSMENT — ENCOUNTER SYMPTOMS
SENSORY CHANGE: 0
HEADACHES: 0
DIZZINESS: 0
TINGLING: 1

## 2020-06-05 ASSESSMENT — FIBROSIS 4 INDEX: FIB4 SCORE: 1.16

## 2020-06-05 NOTE — LETTER
[address],   [address] [place] [address]  Phone:  [phone] - Fax:  [phone]   Occupational Health [facility] Progress Report and Disability Certification  Date of Service: 6/5/2020   No Show:  No  Date / Time of Next Visit: 6/26/2020 @ 9:30 AM   Claim Information   Patient Name: Sonya Wei  Claim Number:     Employer: [employer] [facility] RESPIRATORY SERVICES ONLY  Date of Injury: 5/28/2020     Insurer / TPA: [employer]  ID / SSN:     Occupation:   Diagnosis: Diagnoses of Strain of lumbar region, subsequent encounter, Strain of neck muscle, subsequent encounter, Knee strain, left, subsequent encounter, and Strain of right shoulder, subsequent encounter were pertinent to this visit.    Medical Information   Related to Industrial Injury? Yes    Subjective Complaints:  DOI 5/28/2020: 55 yo female presents with left knee, right elbow and lower back injury. She slipped on water on the ground falling on her right elbow and left knee.  She had immediate severe pain in both of these areas. XR of the knee and elbow were negative for acute findings.  Patient states that her left knee has slightly improved but everything else is about the same continues as significant pain in the neck, lower back and right upper extremity extending from the elbow to the shoulder.  She notes limited range of motion of the shoulder.  She notes a continuous sense of soreness in all these areas.  She has not been back to work due to the restrictions.  She is has been taking some the diclofenac and tizanidine but that tizanidine does not seem to be helping much at night.   Objective Findings: Left Knee: No gross deformity.  Diffuse tenderness over the entire knee.  Slightly reduced flexion/extension of the knee.  Unable to squat very much due to pain.   Right upper extremity: No gross deformity.  Moderate tenderness over the  lateral elbow, mild to moderate tenderness over the anterior GH, lateral shoulder and upper trapezius.  Range of motion is worsened and is now less than 90 degrees flexion/abduction.  Strength grossly intact but does elicit pain.  Empty can test negative.  Cervical: No gross deformity.  Moderate tenderness over the bilateral paraspinal musculature with notable spasm.  Range of motion essentially normal but with hesitation and pain.  Spurling's is negative.  Lumbar: No gross deformity.  Bilateral paraspinal muscular tenderness in lower lumbar region.  Range of motion diminished slightly with flexion to less than 70 degrees.     Pre-Existing Condition(s):     Assessment:   Condition Same    Status: Additional Care Required  Permanent Disability:No    Plan:      Diagnostics:      Comments:  Referral to physical therapy  Continue diclofenac as prescribed  We will prescribe Flexeril in place of tizanidine  Gentle range of motion exercises  Restricted duty  Follow-up 3 weeks    Disability Information   Status: Released to Restricted Duty    From:  6/5/2020  Through: 6/26/2020 Restrictions are: Temporary   Physical Restrictions   Sitting:    Standing:  < or = to 2 hrs/day Stooping:  < or = to 1 hr/day Bending:  < or = to 1 hr/day   Squatting:    Walking:  < or = to 2 hrs/day Climbing:    Pushing:  < or = to 1 hr/day   Pulling:  < or = to 1 hr/day Other:    Reaching Above Shoulder (L):   Reaching Above Shoulder (R): < or = 1 hrs/day     Reaching Below Shoulder (L):    Reaching Below Shoulder (R):      Not to exceed Weight Limits   Carrying(hrs):   Weight Limit(lb): < or = to 10 pounds Lifting(hrs):   Weight  Limit(lb): < or = to 10 pounds   Comments: Seated/sedentary work     Repetitive Actions   Hands: i.e. Fine Manipulations from Grasping:     Feet: i.e. Operating Foot Controls:     Driving / Operate Machinery:     Physician Name: Eligio Salmon D.O. Physician Signature: heather-SignTAYLELIGIO MADDEN D.O. e-Signature: Dr. Pedro  Jostin, Medical Director   Clinic Name / Location: [address],   [address]  [place] [address] Clinic Phone Number: Dept: [phone]   Appointment Time: 10:45 Am Visit Start Time: 9:15 AM   Check-In Time:  9:14 Am Visit Discharge Time: 10:05 AM    Original-Treating Physician or Chiropractor    Page 2-Insurer/TPA    Page 3-Employer    Page 4-Employee

## 2020-06-05 NOTE — PROGRESS NOTES
"Subjective:     Sonya Wei is a 54 y.o. female who presents for Follow-Up ( DOI 5/28/20 left knee, feeling the same room 11)      DOI 5/28/2020: 53 yo female presents with left knee, right elbow and lower back injury. She slipped on water on the ground falling on her right elbow and left knee.  She had immediate severe pain in both of these areas. XR of the knee and elbow were negative for acute findings.  Patient states that her left knee has slightly improved but everything else is about the same continues as significant pain in the neck, lower back and right upper extremity extending from the elbow to the shoulder.  She notes limited range of motion of the shoulder.  She notes a continuous sense of soreness in all these areas.  She has not been back to work due to the restrictions.  She is has been taking some the diclofenac and tizanidine but that tizanidine does not seem to be helping much at night.    Review of Systems   Skin: Negative for itching and rash.   Neurological: Positive for tingling. Negative for dizziness, sensory change and headaches.       SOCHX: Works as a  at [employer]  FH: No pertinent family history to this problem.       Objective:     /78   Pulse 92   Temp 37.1 °C (98.7 °F)   Ht 1.549 m (5' 1\")   Wt 85.3 kg (188 lb)   SpO2 96%   BMI 35.52 kg/m²     Constitutional: Patient is in no acute distress. Appears well-developed and well-nourished.   Cardiovascular: Normal rate.    Pulmonary/Chest: Effort normal. No respiratory distress.   Neurological: Patient is alert and oriented to person, place, and time.   Skin: Skin is warm and dry.   Psychiatric: Normal mood and affect. Behavior is normal.     Left Knee: No gross deformity.  Diffuse tenderness over the entire knee.  Slightly reduced flexion/extension of the knee.  Unable to squat very much due to pain.   Right upper extremity: No gross deformity.  Moderate tenderness over the lateral elbow, mild to " moderate tenderness over the anterior GH, lateral shoulder and upper trapezius.  Range of motion is worsened and is now less than 90 degrees flexion/abduction.  Strength grossly intact but does elicit pain.  Empty can test negative.  Cervical: No gross deformity.  Moderate tenderness over the bilateral paraspinal musculature with notable spasm.  Range of motion essentially normal but with hesitation and pain.  Spurling's is negative.  Lumbar: No gross deformity.  Bilateral paraspinal muscular tenderness in lower lumbar region.  Range of motion diminished slightly with flexion to less than 70 degrees.      Assessment/Plan:       1. Strain of lumbar region, subsequent encounter    2. Strain of neck muscle, subsequent encounter    3. Knee strain, left, subsequent encounter    4. Strain of right shoulder, subsequent encounter    • Referral to physical therapy  • Continue diclofenac as prescribed  • We will prescribe Flexeril in place of tizanidine  • Gentle range of motion exercises  • Restricted duty  • Follow-up 3 weeks    Differential diagnosis, natural history, supportive care, and indications for immediate follow-up discussed.

## 2020-06-12 RX ORDER — CYCLOBENZAPRINE HCL 10 MG
10 TABLET ORAL
Qty: 30 TAB | Refills: 0 | Status: ON HOLD | OUTPATIENT
Start: 2020-06-12 | End: 2020-08-13

## 2020-06-26 ENCOUNTER — OCCUPATIONAL MEDICINE (OUTPATIENT)
Dept: OCCUPATIONAL MEDICINE | Facility: CLINIC | Age: 55
End: 2020-06-26
Payer: COMMERCIAL

## 2020-06-26 VITALS
BODY MASS INDEX: 35.3 KG/M2 | HEART RATE: 83 BPM | RESPIRATION RATE: 14 BRPM | TEMPERATURE: 98.4 F | HEIGHT: 61 IN | OXYGEN SATURATION: 97 % | WEIGHT: 187 LBS

## 2020-06-26 DIAGNOSIS — S86.912D KNEE STRAIN, LEFT, SUBSEQUENT ENCOUNTER: ICD-10-CM

## 2020-06-26 DIAGNOSIS — S39.012D STRAIN OF LUMBAR REGION, SUBSEQUENT ENCOUNTER: ICD-10-CM

## 2020-06-26 DIAGNOSIS — S16.1XXD STRAIN OF NECK MUSCLE, SUBSEQUENT ENCOUNTER: ICD-10-CM

## 2020-06-26 DIAGNOSIS — S46.911D STRAIN OF RIGHT SHOULDER, SUBSEQUENT ENCOUNTER: ICD-10-CM

## 2020-06-26 PROCEDURE — 99214 OFFICE O/P EST MOD 30 MIN: CPT | Performed by: PREVENTIVE MEDICINE

## 2020-06-26 ASSESSMENT — ENCOUNTER SYMPTOMS
COUGH: 0
WHEEZING: 0
FEVER: 0
SENSORY CHANGE: 0
SHORTNESS OF BREATH: 0
TINGLING: 0
CHILLS: 0

## 2020-06-26 ASSESSMENT — PAIN SCALES - GENERAL: PAINLEVEL: 4=SLIGHT-MODERATE PAIN

## 2020-06-26 ASSESSMENT — FIBROSIS 4 INDEX: FIB4 SCORE: 1.16

## 2020-06-26 NOTE — LETTER
[address],   [address] [place] [address]  Phone:  [phone] - Fax:  [phone]   Occupational Health [facility] Progress Report and Disability Certification  Date of Service: 6/26/2020   No Show:  No  Date / Time of Next Visit: 8/10/2020 @ 11 AM   Claim Information   Patient Name: Sonya Wei  Claim Number:     Employer: [employer] [facility] RESPIRATORY SERVICES ONLY  Date of Injury: 5/28/2020     Insurer / TPA: [employer]  ID / SSN:     Occupation:   Diagnosis: Diagnoses of Strain of lumbar region, subsequent encounter, Strain of neck muscle, subsequent encounter, Knee strain, left, subsequent encounter, and Strain of right shoulder, subsequent encounter were pertinent to this visit.    Medical Information   Related to Industrial Injury? Yes    Subjective Complaints:  DOI 5/28/2020: 53 yo female presents with left knee, right elbow and lower back injury. She slipped on water on the ground falling on her right elbow and left knee.  She had immediate severe pain in both of these areas. XR of the knee and elbow were negative for acute findings.  Patient notes overall gradual mild improvement in symptoms in her neck, low back and shoulder.  She states that physical therapy has been helping and that she has been doing her home exercise program.  She states that the physical therapist believes she will need at least 6 more weeks of PT.  She is not working due to the current work restrictions.   Objective Findings: Left Knee: No gross deformity.  Diffuse tenderness over the entire knee.  Slightly reduced flexion/extension of the knee.   Right upper extremity:   Range of motion improved about 150 degrees flexion/abduction.    Cervical: No gross deformity.    Range of motion essentially normal but with pain.  Spurling's is negative.  Lumbar: No gross deformity.  Bilateral paraspinal muscular tenderness in  lower lumbar region.  Range of motion diminished slightly with flexion to less than 80 degrees.     Pre-Existing Condition(s):     Assessment:   Condition Same    Status: Additional Care Required  Permanent Disability:No    Plan:      Diagnostics:      Comments:  Referral for more physical therapy  OTC ibuprofen/Tylenol as needed  Continue home exercise program  Restricted duty  Follow-up 4 weeks will consider lessening work restrictions if improvement continues    ADDENDUM: Rescheduled appointment due to upp  er respiratory symptoms, lengthened work restrictions until next appointment    Disability Information   Status: Released to Restricted Duty    From:  6/26/2020  Through: 8/10/2020 Restrictions are: Temporary   Physical Restrictions   Sitting:    Standing:  < or = to 2 hrs/day Stooping:    Bending:  < or = to 1 hr/day   Squatting:  < or = to 1 hr/day Walking:  < or = to 2 hrs/day Climbing:  < or = to 1 hr/day Pushing:      Pulling:    Other:    Reaching Above Shoulder (L):   Reaching Above Shoulder (R):       Reaching Below Shoulder (L):    Reaching Below Shoulder (R):      Not to exceed Weight Limits   Carrying(hrs):   Weight Limit(lb): < or = to 10 pounds Lifting(hrs):   Weight  Limit(lb): < or = to 10 pounds   Comments: Seated/sedentary work      Repetitive Actions   Hands: i.e. Fine Manipulations from Grasping:     Feet: i.e. Operating Foot Controls:     Driving / Operate Machinery:     Provider Name:   Eligio Salmon D.O. Physician Signature:  Physician Name:     Clinic Name / Location: [address],   [address] [place] [address] Clinic Phone Number: Dept: [phone]   Appointment Time: 9:30 Am Visit Start Time: 9:06 AM   Check-In Time:  8:58 Am Visit Discharge Time: 1:40 Am    Original-Treating Physician or Chiropractor    Page 2-Insurer/TPA    Page 3-Employer    Page 4-Employee

## 2020-06-26 NOTE — PROGRESS NOTES
"Subjective:     Sonya Wei is a 54 y.o. female who presents for Follow-Up (WC DOI 5/28/20 left knee - same - RM 19)      DOI 5/28/2020: 53 yo female presents with left knee, right elbow and lower back injury. She slipped on water on the ground falling on her right elbow and left knee.  She had immediate severe pain in both of these areas. XR of the knee and elbow were negative for acute findings.  Patient notes overall gradual mild improvement in symptoms in her neck, low back and shoulder.  She states that physical therapy has been helping and that she has been doing her home exercise program.  She states that the physical therapist believes she will need at least 6 more weeks of PT.  She is not working due to the current work restrictions.    Review of Systems   Constitutional: Negative for chills and fever.   Respiratory: Negative for cough, shortness of breath and wheezing.    Neurological: Negative for tingling and sensory change.       SOCHX: Works as a  at [employer]  FH: No pertinent family history to this problem.       Objective:     Pulse 83   Temp 36.9 °C (98.4 °F) (Temporal)   Resp 14   Ht 1.549 m (5' 1\")   Wt 84.8 kg (187 lb)   SpO2 97%   BMI 35.33 kg/m²     Constitutional: Patient is in no acute distress. Appears well-developed and well-nourished.   Cardiovascular: Normal rate.    Pulmonary/Chest: Effort normal. No respiratory distress.   Neurological: Patient is alert and oriented to person, place, and time.   Skin: Skin is warm and dry.   Psychiatric: Normal mood and affect. Behavior is normal.     Left Knee: No gross deformity.  Diffuse tenderness over the entire knee.  Slightly reduced flexion/extension of the knee.   Right upper extremity:   Range of motion improved about 150 degrees flexion/abduction.    Cervical: No gross deformity.    Range of motion essentially normal but with pain.  Spurling's is negative.  Lumbar: No gross deformity.  Bilateral paraspinal " muscular tenderness in lower lumbar region.  Range of motion diminished slightly with flexion to less than 80 degrees.      Assessment/Plan:       1. Strain of lumbar region, subsequent encounter  - REFERRAL TO PHYSICAL THERAPY Reason for Therapy: Eval/Treat/Report    2. Strain of neck muscle, subsequent encounter  - REFERRAL TO PHYSICAL THERAPY Reason for Therapy: Eval/Treat/Report    3. Knee strain, left, subsequent encounter  - REFERRAL TO PHYSICAL THERAPY Reason for Therapy: Eval/Treat/Report    4. Strain of right shoulder, subsequent encounter  - REFERRAL TO PHYSICAL THERAPY Reason for Therapy: Eval/Treat/Report    • Referral for more physical therapy  • OTC ibuprofen/Tylenol as needed  • Continue home exercise program  • Restricted duty  • Follow-up 4 weeks will consider lessening work restrictions if improvement continues    Differential diagnosis, natural history, supportive care, and indications for immediate follow-up discussed.

## 2020-06-26 NOTE — LETTER
[address],   [address] [address]  Phone:  [phone] - Fax:  [phone]   Occupational Health [facility] Progress Report and Disability Certification  Date of Service: 6/26/2020   No Show:  No  Date / Time of Next Visit: 7/24/2020 @ 9:15 AM   Claim Information   Patient Name: Sonya Wei  Claim Number:     Employer: [employer] [facility] RESPIRATORY SERVICES ONLY  Date of Injury: 5/28/2020     Insurer / TPA: [employer]  ID / SSN:     Occupation:   Diagnosis: Diagnoses of Strain of lumbar region, subsequent encounter, Strain of neck muscle, subsequent encounter, Knee strain, left, subsequent encounter, and Strain of right shoulder, subsequent encounter were pertinent to this visit.    Medical Information   Related to Industrial Injury? Yes    Subjective Complaints:  DOI 5/28/2020: 55 yo female presents with left knee, right elbow and lower back injury. She slipped on water on the ground falling on her right elbow and left knee.  She had immediate severe pain in both of these areas. XR of the knee and elbow were negative for acute findings.  Patient notes overall gradual mild improvement in symptoms in her neck, low back and shoulder.  She states that physical therapy has been helping and that she has been doing her home exercise program.  She states that the physical therapist believes she will need at least 6 more weeks of PT.  She is not working due to the current work restrictions.   Objective Findings: Left Knee: No gross deformity.  Diffuse tenderness over the entire knee.  Slightly reduced flexion/extension of the knee.   Right upper extremity:   Range of motion improved about 150 degrees flexion/abduction.    Cervical: No gross deformity.    Range of motion essentially normal but with pain.  Spurling's is negative.  Lumbar: No gross deformity.  Bilateral paraspinal muscular tenderness in  lower lumbar region.  Range of motion diminished slightly with flexion to less than 80 degrees.     Pre-Existing Condition(s):     Assessment:   Condition Same    Status: Additional Care Required  Permanent Disability:No    Plan:      Diagnostics:      Comments:  Referral for more physical therapy  OTC ibuprofen/Tylenol as needed  Continue home exercise program  Restricted duty  Follow-up 4 weeks will consider lessening work restrictions if improvement continues    Disability Information   Status: Released to Restricted Duty    From:  6/26/2020  Through: 7/24/2020 Restrictions are: Temporary   Physical Restrictions   Sitting:    Standing:  < or = to 2 hrs/day Stooping:    Bending:  < or = to 1 hr/day   Squatting:  < or = to 1 hr/day Walking:  < or = to 2 hrs/day Climbing:  < or = to 1 hr/day Pushing:      Pulling:    Other:    Reaching Above Shoulder (L):   Reaching Above Shoulder (R):       Reaching Below Shoulder (L):    Reaching Below Shoulder (R):      Not to exceed Weight Limits   Carrying(hrs):   Weight Limit(lb): < or = to 10 pounds Lifting(hrs):   Weight  Limit(lb): < or = to 10 pounds   Comments: Seated/sedentary work      Repetitive Actions   Hands: i.e. Fine Manipulations from Grasping:     Feet: i.e. Operating Foot Controls:     Driving / Operate Machinery:     Provider Name:   Eligio Salmon D.O. Physician Signature:  Physician Name:     Clinic Name / Location: [address],   [address] [address] Clinic Phone Number: Dept: [phone]   Appointment Time: 9:30 Am Visit Start Time: 9:06 AM   Check-In Time:  8:58 Am Visit Discharge Time: 9:31 AM    Original-Treating Physician or Chiropractor    Page 2-Insurer/TPA    Page 3-Employer    Page 4-Employee

## 2020-07-27 ENCOUNTER — HOSPITAL ENCOUNTER (OUTPATIENT)
Facility: MEDICAL CENTER | Age: 55
End: 2020-07-27
Attending: PHYSICIAN ASSISTANT
Payer: COMMERCIAL

## 2020-07-27 ENCOUNTER — NURSE TRIAGE (OUTPATIENT)
Dept: HEALTH INFORMATION MANAGEMENT | Facility: OTHER | Age: 55
End: 2020-07-27

## 2020-07-27 ENCOUNTER — OFFICE VISIT (OUTPATIENT)
Dept: URGENT CARE | Facility: PHYSICIAN GROUP | Age: 55
End: 2020-07-27
Payer: COMMERCIAL

## 2020-07-27 VITALS
HEART RATE: 84 BPM | HEIGHT: 61 IN | SYSTOLIC BLOOD PRESSURE: 138 MMHG | BODY MASS INDEX: 34.93 KG/M2 | RESPIRATION RATE: 16 BRPM | DIASTOLIC BLOOD PRESSURE: 86 MMHG | WEIGHT: 185 LBS | TEMPERATURE: 97.5 F | OXYGEN SATURATION: 95 %

## 2020-07-27 DIAGNOSIS — Z20.822 CLOSE EXPOSURE TO COVID-19 VIRUS: ICD-10-CM

## 2020-07-27 DIAGNOSIS — R50.9 FEVER, UNSPECIFIED FEVER CAUSE: ICD-10-CM

## 2020-07-27 DIAGNOSIS — R11.0 NAUSEA: ICD-10-CM

## 2020-07-27 DIAGNOSIS — R06.02 SOB (SHORTNESS OF BREATH): ICD-10-CM

## 2020-07-27 DIAGNOSIS — R05.9 COUGH: ICD-10-CM

## 2020-07-27 LAB
COVID ORDER STATUS COVID19: NORMAL
FLUAV+FLUBV AG SPEC QL IA: NEGATIVE
INT CON NEG: NORMAL
INT CON POS: NORMAL

## 2020-07-27 PROCEDURE — U0003 INFECTIOUS AGENT DETECTION BY NUCLEIC ACID (DNA OR RNA); SEVERE ACUTE RESPIRATORY SYNDROME CORONAVIRUS 2 (SARS-COV-2) (CORONAVIRUS DISEASE [COVID-19]), AMPLIFIED PROBE TECHNIQUE, MAKING USE OF HIGH THROUGHPUT TECHNOLOGIES AS DESCRIBED BY CMS-2020-01-R: HCPCS

## 2020-07-27 PROCEDURE — 87804 INFLUENZA ASSAY W/OPTIC: CPT | Mod: CS | Performed by: PHYSICIAN ASSISTANT

## 2020-07-27 PROCEDURE — 99214 OFFICE O/P EST MOD 30 MIN: CPT | Mod: CS | Performed by: PHYSICIAN ASSISTANT

## 2020-07-27 RX ORDER — ONDANSETRON 4 MG/1
4 TABLET, FILM COATED ORAL EVERY 4 HOURS PRN
Qty: 20 TAB | Refills: 0 | Status: SHIPPED | OUTPATIENT
Start: 2020-07-27 | End: 2020-08-01

## 2020-07-27 RX ORDER — ALBUTEROL SULFATE 90 UG/1
2 AEROSOL, METERED RESPIRATORY (INHALATION) EVERY 6 HOURS PRN
Qty: 8.5 G | Refills: 0 | Status: SHIPPED | OUTPATIENT
Start: 2020-07-27 | End: 2022-05-03 | Stop reason: SDUPTHER

## 2020-07-27 RX ORDER — BENZONATATE 100 MG/1
100 CAPSULE ORAL 3 TIMES DAILY PRN
Qty: 60 CAP | Refills: 0 | Status: ON HOLD | OUTPATIENT
Start: 2020-07-27 | End: 2020-08-13

## 2020-07-27 ASSESSMENT — ENCOUNTER SYMPTOMS
ABDOMINAL PAIN: 0
FEVER: 0
HEADACHES: 1
DIARRHEA: 0
SPUTUM PRODUCTION: 0
RHINORRHEA: 1
CHILLS: 1
CARDIOVASCULAR NEGATIVE: 1
NAUSEA: 1
SINUS PAIN: 0
WHEEZING: 1
SHORTNESS OF BREATH: 0
VOMITING: 0
COUGH: 1
PALPITATIONS: 0
MYALGIAS: 1
SORE THROAT: 1

## 2020-07-27 ASSESSMENT — COPD QUESTIONNAIRES: COPD: 0

## 2020-07-27 ASSESSMENT — FIBROSIS 4 INDEX: FIB4 SCORE: 1.16

## 2020-07-27 NOTE — TELEPHONE ENCOUNTER
1. Caller Name: Sonya                 Call Back Number: [phone]  [facility] PCP or Specialty Provider: Yes Fern Lawrence        2.  In the last two weeks, has the patient had any new or worsening symptoms (not explained by alternative diagnosis)? Yes, the patient reports the following COVID-19 consistent symptoms: cough, chills, sore throat, congestion or runny nose, muscle pain or body aches, fatigue, headache and nausea.    3.  Does patient have any comoribidities? DM, HTN    4.  Has the patient traveled in the last 14 days OR had any known contact with someone who is suspected or confirmed to have COVID-19?  Yes, Daughter was exposed and quarantined for 2 weeks.  Pt went with daughter to get test.  Another daughter came to visit and she had flu symptoms.    5. POTENTIAL PUI (MODERATE):  Directed to [facility] Patient lives in [place] and prefers to go to  in [place]. Symptoms started Friday afternoon.  Symptoms are getting worse then Friday coughing up green mucus with upper back pain and chest tightness.    Note routed to [facility] Provider: KLAUDIA only.         Reason for Disposition  • [1] Mild body aches, chills, diarrhea, headache, runny nose, or sore throat AND [2] within 14 days of COVID-19 EXPOSURE    Additional Information  • Negative: SEVERE difficulty breathing (e.g., struggling for each breath, speak in single words, bluish lips)  • Negative: Sounds like a life-threatening emergency to the triager  • Negative: [1] Adult has symptoms of COVID-19 (fever, cough, or SOB) AND [2] lab test positive  • Negative: [1] Adult has symptoms of COVID-19 (fever, cough or SOB) AND [2] major community spread where patient lives AND [3] testing not being done for mild symptoms  • Negative: [1] Difficulty breathing (shortness of breath) occurs AND [2] onset > 14 days after COVID-19 EXPOSURE (Close Contact) AND [3] no major community spread  • Negative: [1] Cough occurs AND [2] onset > 14 days after COVID-19  "EXPOSURE AND [3] no major community spread  • Negative: [1] Common cold symptoms AND [2] onset > 14 days after COVID-19 EXPOSURE AND [3] no major community spread  • Negative: [1] Fever (or feeling feverish) OR cough AND [2] within 14 Days of COVID-19 EXPOSURE (Close Contact)  • Negative: [1] Fever (or feeling feverish) OR cough occurs AND [2] within 14 days of travel from another country (international travel)  • Negative: [1] Fever (or feeling feverish) OR cough occurs AND [2] within 14 days of travel from a city or area with major community spread  • Negative: [1] Fever (or feeling feverish) OR cough occurs AND [2] living in area with major community spread AND [3] testing being done in the community for symptoms    Answer Assessment - Initial Assessment Questions  1. CLOSE CONTACT: \"Who is the person with the confirmed or suspected COVID-19 infection that you were exposed to?\"      yes  2. PLACE of CONTACT: \"Where were you when you were exposed to COVID-19?\" (e.g., home, school, medical waiting room; which city?)      Home  3. TYPE of CONTACT: \"How much contact was there?\" (e.g., sitting next to, live in same house, work in same office, same building)      She was visiting for about 2 hours  4. DURATION of CONTACT: \"How long were you in contact with the COVID-19 patient?\" (e.g., a few seconds, passed by person, a few minutes, live with the patient)      2  hours  5. DATE of CONTACT: \"When did you have contact with a COVID-19 patient?\" (e.g., how many days ago)      Tuesday the 21st  6. TRAVEL: \"Have you traveled out of the country recently?\" If so, \"When and where?\"      * Also ask about out-of-state travel, since the CDC has identified some high risk cities for community spread in the .      * Note: Travel becomes less relevant if there is widespread community transmission where the patient lives.      no  7. COMMUNITY SPREAD: \"Are there lots of cases of COVID-19 (community spread) where you live?\" (See " "public health department website, if unsure)    * MAJOR community spread: high number of cases; numbers of cases are increasing; many people hospitalized.    * MINOR community spread: low number of cases; not increasing; few or no people hospitalized      no  8. SYMPTOMS: \"Do you have any symptoms?\" (e.g., fever, cough, breathing difficulty)      Cough, chills, runny nose, congestion , muscle pain and body aches  9. PREGNANCY OR POSTPARTUM: \"Is there any chance you are pregnant?\" \"When was your last menstrual period?\" \"Did you deliver in the last 2 weeks?\"      No  10. HIGH RISK: \"Do you have any heart or lung problems? Do you have a weak immune system?\" (e.g., CHF, COPD, asthma, HIV positive, chemotherapy, renal failure, diabetes mellitus, sickle cell anemia)         DM HTN    Protocols used: CORONAVIRUS (COVID-19) EXPOSURE-A-OH      "

## 2020-07-27 NOTE — PROGRESS NOTES
Subjective:      Sonya Wei is a 54 y.o. female who presents with Coronavirus Screening (cold, cough, sore throat, wheezing, bodyaches, muscle pain, green mucus comes up with cough, nausea)            Congestion, cough, sore throat, body aches, nausea, cough, shortness of breath.  Exposure to COVID-19.  No loss of taste or smell.    Cough   This is a new problem. The current episode started in the past 7 days. The problem has been gradually worsening. The problem occurs every few minutes. The cough is non-productive. Associated symptoms include chills, headaches, myalgias, nasal congestion, rhinorrhea, a sore throat and wheezing. Pertinent negatives include no chest pain, ear congestion, ear pain, fever, postnasal drip or shortness of breath. She has tried OTC cough suppressant for the symptoms. The treatment provided mild relief. Her past medical history is significant for asthma. There is no history of bronchitis, COPD or pneumonia.       PMH:  has a past medical history of Anemia, Bowel habit changes, Diabetes, Diabetes (HCC), GERD (gastroesophageal reflux disease), Healthcare maintenance (9/27/2014), High cholesterol, Hyperlipidemia, Hypertension, Infectious disease, Jaundice (1999), Psychiatric problem, and Vaginal itching (8/27/2014).  MEDS:   Current Outpatient Medications:   •  cyclobenzaprine (FLEXERIL) 10 MG Tab, Take 1 Tab by mouth at bedtime as needed., Disp: 30 Tab, Rfl: 0  •  diclofenac EC (VOLTAREN) 75 MG Tablet Delayed Response, Take 1 Tab by mouth 2 times a day., Disp: 60 Tab, Rfl: 0  •  tizanidine (ZANAFLEX) 4 MG Tab, Take 1 Tab by mouth at bedtime as needed., Disp: 20 Tab, Rfl: 0  •  lisinopril (PRINIVIL) 20 MG Tab, Take 1 tablet by mouth once daily, Disp: 90 Tab, Rfl: 0  •  fenofibrate (TRIGLIDE) 160 MG tablet, Take 1 tablet by mouth once daily, Disp: 90 Tab, Rfl: 0  •  glyBURIDE (DIABETA) 5 MG Tab, TAKE 2 TABLETS BY MOUTH TWICE DAILY WITH MEALS, Disp: 360 Tab, Rfl: 0  •  TRULICITY 1.5  MG/0.5ML Solution Pen-injector, INJECT 1 SYRINGE SUBCUTANEOUSLY ONCE A WEEK ON  FRIDAY, Disp: 12 mL, Rfl: 0  •  atorvastatin (LIPITOR) 20 MG Tab, Take 1 tablet by mouth once daily, Disp: 90 Tab, Rfl: 0  •  fluticasone (FLONASE) 50 MCG/ACT nasal spray, Spray 1 Spray in nose every day., Disp: 1 Bottle, Rfl: 3  •  famotidine (PEPCID) 20 MG Tab, Take 1 Tab by mouth 2 times a day., Disp: 60 Tab, Rfl: 2  •  JANUVIA 100 MG Tab, TAKE 1 TABLET BY MOUTH ONCE DAILY, Disp: 90 Tab, Rfl: 1  •  Cholecalciferol (VITAMIN D PO), Take 2 Tabs by mouth every day. GUMMIES, Disp: , Rfl:   •  aspirin EC (ECOTRIN) 81 MG Tablet Delayed Response, Take 1 Tab by mouth every day., Disp: 30 Tab, Rfl:   •  therapeutic multivitamin-minerals (THERAGRAN-M) Tab, Take 1 Tab by mouth every day., Disp: 30 Tab, Rfl: 11  ALLERGIES:   Allergies   Allergen Reactions   • Morphine Itching     Rxn = unknown   Bumps all over body   • Glucophage  [Metformin Hcl]    • Metformin      headaches     SURGHX:   Past Surgical History:   Procedure Laterality Date   • VENTRAL HERNIA REPAIR ROBOTIC XI N/A 10/14/2016    Procedure: VENTRAL HERNIA REPAIR ROBOTIC XI FOR INCISIONAL W/MESH;  Surgeon: Edi Mendoza M.D.;  Location: SURGERY [facility];  Service:    • ABDOMINAL HYSTERECTOMY TOTAL      still has ovaries   • CHOLECYSTECTOMY     • PRIMARY C SECTION     • TUBAL COAGULATION LAPAROSCOPIC BILATERAL       SOCHX:  reports that she has never smoked. She has never used smokeless tobacco. She reports that she does not drink alcohol or use drugs.  FH: family history includes Diabetes in her father and mother; Hyperlipidemia in her father and mother; Hypertension in her father.      Review of Systems   Constitutional: Positive for chills and malaise/fatigue. Negative for fever.   HENT: Positive for congestion, rhinorrhea and sore throat. Negative for ear pain, postnasal drip and sinus pain.    Respiratory: Positive for cough and wheezing. Negative for sputum production  "and shortness of breath.    Cardiovascular: Negative.  Negative for chest pain, palpitations and leg swelling.   Gastrointestinal: Positive for nausea. Negative for abdominal pain, diarrhea and vomiting.   Genitourinary: Negative.    Musculoskeletal: Positive for myalgias. Negative for joint pain.   Neurological: Positive for headaches.       Medications, Allergies, and current problem list reviewed today in Epic     Objective:     /86   Pulse 84   Temp 36.4 °C (97.5 °F) (Temporal)   Resp 16   Ht 1.549 m (5' 1\")   Wt 83.9 kg (185 lb)   SpO2 95%   BMI 34.96 kg/m²      Physical Exam  Vitals signs and nursing note reviewed.   Constitutional:       General: She is not in acute distress.     Appearance: Normal appearance. She is well-developed. She is not ill-appearing, toxic-appearing or diaphoretic.   HENT:      Head: Normocephalic and atraumatic.      Right Ear: Tympanic membrane and external ear normal.      Left Ear: Tympanic membrane and external ear normal.      Nose: Congestion and rhinorrhea present.      Mouth/Throat:      Pharynx: Posterior oropharyngeal erythema present. No oropharyngeal exudate.   Eyes:      General:         Right eye: No discharge.         Left eye: No discharge.      Conjunctiva/sclera: Conjunctivae normal.      Pupils: Pupils are equal, round, and reactive to light.   Neck:      Musculoskeletal: Normal range of motion and neck supple.   Cardiovascular:      Rate and Rhythm: Normal rate and regular rhythm.      Heart sounds: Normal heart sounds.   Pulmonary:      Effort: Pulmonary effort is normal. No respiratory distress.      Breath sounds: Normal breath sounds. No wheezing, rhonchi or rales.   Abdominal:      General: Abdomen is flat. There is no distension.      Tenderness: There is no abdominal tenderness. There is no right CVA tenderness, left CVA tenderness, guarding or rebound.   Musculoskeletal: Normal range of motion.      Right lower leg: No edema.      Left lower " leg: No edema.   Lymphadenopathy:      Cervical: No cervical adenopathy.   Skin:     General: Skin is warm and dry.   Neurological:      Mental Status: She is alert and oriented to person, place, and time.   Psychiatric:         Behavior: Behavior normal.         Thought Content: Thought content normal.         Judgment: Judgment normal.                 Assessment/Plan:     1. Cough  POCT Influenza A/B    COVID/SARS COV-2 PCR    benzonatate (TESSALON) 100 MG Cap   2. Fever, unspecified fever cause  POCT Influenza A/B    COVID/SARS COV-2 PCR   3. Close exposure to COVID-19 virus  COVID/SARS COV-2 PCR   4. SOB (shortness of breath)  albuterol 108 (90 Base) MCG/ACT Aero Soln inhalation aerosol   5. Nausea  ondansetron (ZOFRAN) 4 MG Tab tablet     Flu: Negative    COVID type symptoms with exposure to COVID-19.  Exam shows PO2 95%.  Lungs clear bilaterally with no signs of wheezes rhonchi or rales.  Patient denies chest pain, palpitations.  No lower leg swelling.  She does have upper respiratory symptoms with a cough.  Flu test negative.  Coban testing initiated.  Quarantine discussed at length.  Tessalon Perles, albuterol, Zofran for symptomatic relief.  OTC meds and conservative measures as discussed    Return to clinic or go to ED if symptoms worsen or persist. Indications for ED discussed at length. Patient voices understanding. Follow-up with your primary care provider in 3-5 days. Red flags discussed. All side effects of medication discussed including allergic response, GI upset, tendon injury, etc.    Please note that this dictation was created using voice recognition software. I have made every reasonable attempt to correct obvious errors, but I expect that there are errors of grammar and possibly content that I did not discover before finalizing the note.

## 2020-07-28 LAB
SARS-COV-2 RNA RESP QL NAA+PROBE: NOTDETECTED
SPECIMEN SOURCE: NORMAL

## 2020-08-10 ENCOUNTER — OCCUPATIONAL MEDICINE (OUTPATIENT)
Dept: OCCUPATIONAL MEDICINE | Facility: CLINIC | Age: 55
End: 2020-08-10
Payer: COMMERCIAL

## 2020-08-10 VITALS
SYSTOLIC BLOOD PRESSURE: 132 MMHG | WEIGHT: 185 LBS | BODY MASS INDEX: 34.93 KG/M2 | HEART RATE: 95 BPM | DIASTOLIC BLOOD PRESSURE: 68 MMHG | OXYGEN SATURATION: 96 % | TEMPERATURE: 97.6 F | RESPIRATION RATE: 12 BRPM | HEIGHT: 61 IN

## 2020-08-10 DIAGNOSIS — S16.1XXD STRAIN OF NECK MUSCLE, SUBSEQUENT ENCOUNTER: ICD-10-CM

## 2020-08-10 DIAGNOSIS — S86.912D KNEE STRAIN, LEFT, SUBSEQUENT ENCOUNTER: ICD-10-CM

## 2020-08-10 DIAGNOSIS — S46.911D STRAIN OF RIGHT SHOULDER, SUBSEQUENT ENCOUNTER: ICD-10-CM

## 2020-08-10 DIAGNOSIS — S39.012D STRAIN OF LUMBAR REGION, SUBSEQUENT ENCOUNTER: ICD-10-CM

## 2020-08-10 PROCEDURE — 99212 OFFICE O/P EST SF 10 MIN: CPT | Performed by: PREVENTIVE MEDICINE

## 2020-08-10 ASSESSMENT — PAIN SCALES - GENERAL: PAINLEVEL: NO PAIN

## 2020-08-10 ASSESSMENT — FIBROSIS 4 INDEX: FIB4 SCORE: 1.16

## 2020-08-10 NOTE — PROGRESS NOTES
"Subjective:     Sonya Wei is a 54 y.o. female who presents for No chief complaint on file.      DOI 5/28/2020: 55 yo female presents with left knee, right elbow and lower back injury. She slipped on water on the ground falling on her right elbow and left knee.  She had immediate severe pain in both of these areas. XR of the knee and elbow were negative for acute findings.  Patient notes overall significant improvement with physical therapy.  She states at this point her knee has very little pain.  She states her right upper extremity as well has very minimal pain.  She states the neck and lower back as well over improved as well.  She feels comfortable returning to full duty at this point.  She has 2 physical therapy visits remaining.    ROS         Objective:     /68   Pulse 95   Temp 36.4 °C (97.6 °F)   Resp 12   Ht 1.549 m (5' 1\")   Wt 83.9 kg (185 lb)   SpO2 96%   BMI 34.96 kg/m²     Constitutional: Patient is in no acute distress. Appears well-developed and well-nourished.   Cardiovascular: Normal rate.    Pulmonary/Chest: Effort normal. No respiratory distress.   Neurological: Patient is alert and oriented to person, place, and time.   Skin: Skin is warm and dry.   Psychiatric: Normal mood and affect. Behavior is normal.     Left Knee: No gross deformity. No tenderness. Full ROM  Right upper extremity: No gross deformity. Essentially full ROM without discomfort   Cervical: No gross deformity.    Range of motion essentially normal .  Lumbar: No gross deformity.  Full ROM. Normal gait    Assessment/Plan:       1. Strain of lumbar region, subsequent encounter    2. Strain of neck muscle, subsequent encounter    3. Knee strain, left, subsequent encounter    4. Strain of right shoulder, subsequent encounter    • Complete remaining physical therapy  • Released from care  • Full duty  • Follow-up as needed    Differential diagnosis, natural history, supportive care, and indications for immediate " follow-up discussed.

## 2020-08-10 NOTE — LETTER
[address],   [address] [place] [address]  Phone:  [phone] - Fax:  [phone]   Occupational Health [facility] Progress Report and Disability Certification  Date of Service: 8/10/2020   No Show:  No  Date / Time of Next Visit:  Release from care   Claim Information   Patient Name: Sonya Wei  Claim Number:     Employer: [employer] [facility] [employer]ATORY [employer] ONLY  Date of Injury: 5/28/2020     Insurer / TPA: [employer]  ID / SSN:     Occupation:   Diagnosis: Diagnoses of Strain of lumbar region, subsequent encounter, Strain of neck muscle, subsequent encounter, Knee strain, left, subsequent encounter, and Strain of right shoulder, subsequent encounter were pertinent to this visit.    Medical Information   Related to Industrial Injury? Yes    Subjective Complaints:  DOI 5/28/2020: 53 yo female presents with left knee, right elbow and lower back injury. She slipped on water on the ground falling on her right elbow and left knee.  She had immediate severe pain in both of these areas. XR of the knee and elbow were negative for acute findings.  Patient notes overall significant improvement with physical therapy.  She states at this point her knee has very little pain.  She states her right upper extremity as well has very minimal pain.  She states the neck and lower back as well over improved as well.  She feels comfortable returning to full duty at this point.  She has 2 physical therapy visits remaining.   Objective Findings: Left Knee: No gross deformity.  Diffuse tenderness over the entire knee.  Slightly reduced flexion/extension of the knee.   Right upper extremity:   Range of motion improved about 150 degrees flexion/abduction.    Cervical: No gross deformity.    Range of motion essentially normal but with pain.  Spurling's is negative.  Lumbar: No gross deformity.  Bilateral paraspinal  muscular tenderness in lower lumbar region.  Range of motion diminished slightly with flexion to less than 80 degrees.   Pre-Existing Condition(s):     Assessment:   Condition Improved    Status: Discharged /  MMI  Permanent Disability:No    Plan:      Diagnostics:      Comments:  Complete remaining physical therapy  Released from care  Full duty  Follow-up as needed    Disability Information   Status: Released to Full Duty    From:  8/10/2020  Through:   Restrictions are:     Physical Restrictions   Sitting:    Standing:    Stooping:    Bending:      Squatting:    Walking:    Climbing:    Pushing:      Pulling:    Other:    Reaching Above Shoulder (L):   Reaching Above Shoulder (R):       Reaching Below Shoulder (L):    Reaching Below Shoulder (R):      Not to exceed Weight Limits   Carrying(hrs):   Weight Limit(lb):   Lifting(hrs):   Weight  Limit(lb):     Comments:      Repetitive Actions   Hands: i.e. Fine Manipulations from Grasping:     Feet: i.e. Operating Foot Controls:     Driving / Operate Machinery:     Provider Name:   Eligio Salmon D.O. Physician Signature:  Physician Name:     Clinic Name / Location: [address] [address] Clinic Phone Number: Dept: [phone]   Appointment Time: 11:00 Am Visit Start Time: 10:59 AM   Check-In Time:  10:53 Am Visit Discharge Time:  11:15am   Original-Treating Physician or Chiropractor    Page 2-Insurer/TPA    Page 3-Employer    Page 4-Employee

## 2020-08-13 ENCOUNTER — APPOINTMENT (OUTPATIENT)
Dept: RADIOLOGY | Facility: MEDICAL CENTER | Age: 55
End: 2020-08-13
Attending: EMERGENCY MEDICINE
Payer: COMMERCIAL

## 2020-08-13 ENCOUNTER — HOSPITAL ENCOUNTER (OUTPATIENT)
Facility: MEDICAL CENTER | Age: 55
End: 2020-08-14
Attending: EMERGENCY MEDICINE | Admitting: INTERNAL MEDICINE
Payer: COMMERCIAL

## 2020-08-13 ENCOUNTER — APPOINTMENT (OUTPATIENT)
Dept: RADIOLOGY | Facility: MEDICAL CENTER | Age: 55
End: 2020-08-13
Attending: INTERNAL MEDICINE
Payer: COMMERCIAL

## 2020-08-13 DIAGNOSIS — R42 DIZZINESS: ICD-10-CM

## 2020-08-13 DIAGNOSIS — E11.9 TYPE 2 DIABETES MELLITUS WITHOUT COMPLICATION, WITHOUT LONG-TERM CURRENT USE OF INSULIN (HCC): ICD-10-CM

## 2020-08-13 PROBLEM — N17.9 AKI (ACUTE KIDNEY INJURY) (HCC): Status: ACTIVE | Noted: 2020-08-13

## 2020-08-13 PROBLEM — R10.13 EPIGASTRIC PAIN: Status: ACTIVE | Noted: 2020-08-13

## 2020-08-13 LAB
ALBUMIN SERPL BCP-MCNC: 3.5 G/DL (ref 3.2–4.9)
ALBUMIN/GLOB SERPL: 1.3 G/DL
ALP SERPL-CCNC: 64 U/L (ref 30–99)
ALT SERPL-CCNC: 27 U/L (ref 2–50)
ANION GAP SERPL CALC-SCNC: 15 MMOL/L (ref 7–16)
AST SERPL-CCNC: 23 U/L (ref 12–45)
BASOPHILS # BLD AUTO: 0.4 % (ref 0–1.8)
BASOPHILS # BLD: 0.03 K/UL (ref 0–0.12)
BILIRUB SERPL-MCNC: 0.2 MG/DL (ref 0.1–1.5)
BUN SERPL-MCNC: 42 MG/DL (ref 8–22)
CALCIUM SERPL-MCNC: 8.5 MG/DL (ref 8.5–10.5)
CHLORIDE SERPL-SCNC: 98 MMOL/L (ref 96–112)
CO2 SERPL-SCNC: 18 MMOL/L (ref 20–33)
CREAT SERPL-MCNC: 1.48 MG/DL (ref 0.5–1.4)
D DIMER PPP IA.FEU-MCNC: 0.29 UG/ML (FEU) (ref 0–0.5)
EKG IMPRESSION: NORMAL
EOSINOPHIL # BLD AUTO: 0.15 K/UL (ref 0–0.51)
EOSINOPHIL NFR BLD: 2 % (ref 0–6.9)
ERYTHROCYTE [DISTWIDTH] IN BLOOD BY AUTOMATED COUNT: 40.8 FL (ref 35.9–50)
EST. AVERAGE GLUCOSE BLD GHB EST-MCNC: 301 MG/DL
GLOBULIN SER CALC-MCNC: 2.7 G/DL (ref 1.9–3.5)
GLUCOSE BLD-MCNC: 113 MG/DL (ref 65–99)
GLUCOSE BLD-MCNC: 144 MG/DL (ref 65–99)
GLUCOSE BLD-MCNC: 188 MG/DL (ref 65–99)
GLUCOSE BLD-MCNC: 200 MG/DL (ref 65–99)
GLUCOSE SERPL-MCNC: 513 MG/DL (ref 65–99)
H PYLORI AG STL QL IA: NOT DETECTED
HBA1C MFR BLD: 12.1 % (ref 0–5.6)
HCT VFR BLD AUTO: 29.4 % (ref 37–47)
HGB BLD-MCNC: 9.6 G/DL (ref 12–16)
IMM GRANULOCYTES # BLD AUTO: 0.07 K/UL (ref 0–0.11)
IMM GRANULOCYTES NFR BLD AUTO: 0.9 % (ref 0–0.9)
LYMPHOCYTES # BLD AUTO: 2.27 K/UL (ref 1–4.8)
LYMPHOCYTES NFR BLD: 30.4 % (ref 22–41)
MCH RBC QN AUTO: 29.9 PG (ref 27–33)
MCHC RBC AUTO-ENTMCNC: 32.7 G/DL (ref 33.6–35)
MCV RBC AUTO: 91.6 FL (ref 81.4–97.8)
MONOCYTES # BLD AUTO: 0.51 K/UL (ref 0–0.85)
MONOCYTES NFR BLD AUTO: 6.8 % (ref 0–13.4)
NEUTROPHILS # BLD AUTO: 4.43 K/UL (ref 2–7.15)
NEUTROPHILS NFR BLD: 59.5 % (ref 44–72)
NRBC # BLD AUTO: 0 K/UL
NRBC BLD-RTO: 0 /100 WBC
PLATELET # BLD AUTO: 244 K/UL (ref 164–446)
PMV BLD AUTO: 11.3 FL (ref 9–12.9)
POTASSIUM SERPL-SCNC: 4.5 MMOL/L (ref 3.6–5.5)
PROT SERPL-MCNC: 6.2 G/DL (ref 6–8.2)
RBC # BLD AUTO: 3.21 M/UL (ref 4.2–5.4)
SODIUM SERPL-SCNC: 131 MMOL/L (ref 135–145)
TROPONIN T SERPL-MCNC: 14 NG/L (ref 6–19)
TROPONIN T SERPL-MCNC: 18 NG/L (ref 6–19)
TROPONIN T SERPL-MCNC: 19 NG/L (ref 6–19)
WBC # BLD AUTO: 7.5 K/UL (ref 4.8–10.8)

## 2020-08-13 PROCEDURE — 36415 COLL VENOUS BLD VENIPUNCTURE: CPT

## 2020-08-13 PROCEDURE — 71045 X-RAY EXAM CHEST 1 VIEW: CPT

## 2020-08-13 PROCEDURE — 700105 HCHG RX REV CODE 258: Performed by: INTERNAL MEDICINE

## 2020-08-13 PROCEDURE — 83036 HEMOGLOBIN GLYCOSYLATED A1C: CPT

## 2020-08-13 PROCEDURE — 80053 COMPREHEN METABOLIC PANEL: CPT

## 2020-08-13 PROCEDURE — 700111 HCHG RX REV CODE 636 W/ 250 OVERRIDE (IP)

## 2020-08-13 PROCEDURE — C9803 HOPD COVID-19 SPEC COLLECT: HCPCS | Performed by: INTERNAL MEDICINE

## 2020-08-13 PROCEDURE — 82962 GLUCOSE BLOOD TEST: CPT | Mod: 91

## 2020-08-13 PROCEDURE — 85379 FIBRIN DEGRADATION QUANT: CPT

## 2020-08-13 PROCEDURE — 700105 HCHG RX REV CODE 258: Performed by: EMERGENCY MEDICINE

## 2020-08-13 PROCEDURE — G0378 HOSPITAL OBSERVATION PER HR: HCPCS

## 2020-08-13 PROCEDURE — 99220 PR INITIAL OBSERVATION CARE,LEVL III: CPT | Performed by: INTERNAL MEDICINE

## 2020-08-13 PROCEDURE — 84484 ASSAY OF TROPONIN QUANT: CPT

## 2020-08-13 PROCEDURE — A9270 NON-COVERED ITEM OR SERVICE: HCPCS | Performed by: INTERNAL MEDICINE

## 2020-08-13 PROCEDURE — A9502 TC99M TETROFOSMIN: HCPCS | Mod: ME

## 2020-08-13 PROCEDURE — A9270 NON-COVERED ITEM OR SERVICE: HCPCS | Performed by: HOSPITALIST

## 2020-08-13 PROCEDURE — 87338 HPYLORI STOOL AG IA: CPT

## 2020-08-13 PROCEDURE — 93005 ELECTROCARDIOGRAM TRACING: CPT | Performed by: INTERNAL MEDICINE

## 2020-08-13 PROCEDURE — 93005 ELECTROCARDIOGRAM TRACING: CPT | Performed by: EMERGENCY MEDICINE

## 2020-08-13 PROCEDURE — 700102 HCHG RX REV CODE 250 W/ 637 OVERRIDE(OP): Performed by: HOSPITALIST

## 2020-08-13 PROCEDURE — 700102 HCHG RX REV CODE 250 W/ 637 OVERRIDE(OP): Performed by: INTERNAL MEDICINE

## 2020-08-13 PROCEDURE — 99285 EMERGENCY DEPT VISIT HI MDM: CPT

## 2020-08-13 PROCEDURE — 96372 THER/PROPH/DIAG INJ SC/IM: CPT

## 2020-08-13 PROCEDURE — 85025 COMPLETE CBC W/AUTO DIFF WBC: CPT

## 2020-08-13 RX ORDER — FAMOTIDINE 20 MG/1
20 TABLET, FILM COATED ORAL DAILY
Status: DISCONTINUED | OUTPATIENT
Start: 2020-08-14 | End: 2020-08-14 | Stop reason: HOSPADM

## 2020-08-13 RX ORDER — REGADENOSON 0.08 MG/ML
0.4 INJECTION, SOLUTION INTRAVENOUS
Status: COMPLETED | OUTPATIENT
Start: 2020-08-13 | End: 2020-08-13

## 2020-08-13 RX ORDER — FENOFIBRATE 160 MG/1
160 TABLET ORAL DAILY
Status: DISCONTINUED | OUTPATIENT
Start: 2020-08-13 | End: 2020-08-13

## 2020-08-13 RX ORDER — DEXTROSE MONOHYDRATE 25 G/50ML
50 INJECTION, SOLUTION INTRAVENOUS
Status: DISCONTINUED | OUTPATIENT
Start: 2020-08-13 | End: 2020-08-14 | Stop reason: HOSPADM

## 2020-08-13 RX ORDER — FAMOTIDINE 20 MG/1
20 TABLET, FILM COATED ORAL 2 TIMES DAILY
Status: DISCONTINUED | OUTPATIENT
Start: 2020-08-13 | End: 2020-08-13

## 2020-08-13 RX ORDER — ASPIRIN 300 MG/1
300 SUPPOSITORY RECTAL DAILY
Status: DISCONTINUED | OUTPATIENT
Start: 2020-08-13 | End: 2020-08-14 | Stop reason: HOSPADM

## 2020-08-13 RX ORDER — AMOXICILLIN 250 MG
2 CAPSULE ORAL 2 TIMES DAILY
Status: DISCONTINUED | OUTPATIENT
Start: 2020-08-13 | End: 2020-08-14 | Stop reason: HOSPADM

## 2020-08-13 RX ORDER — ALBUTEROL SULFATE 90 UG/1
2 AEROSOL, METERED RESPIRATORY (INHALATION) EVERY 6 HOURS PRN
Status: DISCONTINUED | OUTPATIENT
Start: 2020-08-13 | End: 2020-08-14 | Stop reason: HOSPADM

## 2020-08-13 RX ORDER — AMINOPHYLLINE 25 MG/ML
100 INJECTION, SOLUTION INTRAVENOUS
Status: DISCONTINUED | OUTPATIENT
Start: 2020-08-13 | End: 2020-08-14 | Stop reason: HOSPADM

## 2020-08-13 RX ORDER — REGADENOSON 0.08 MG/ML
INJECTION, SOLUTION INTRAVENOUS
Status: COMPLETED
Start: 2020-08-13 | End: 2020-08-13

## 2020-08-13 RX ORDER — CYCLOBENZAPRINE HCL 10 MG
10 TABLET ORAL EVERY EVENING
Status: DISCONTINUED | OUTPATIENT
Start: 2020-08-13 | End: 2020-08-13

## 2020-08-13 RX ORDER — FENOFIBRATE 134 MG/1
134 CAPSULE ORAL DAILY
Status: DISCONTINUED | OUTPATIENT
Start: 2020-08-13 | End: 2020-08-14 | Stop reason: HOSPADM

## 2020-08-13 RX ORDER — ASPIRIN 81 MG/1
324 TABLET, CHEWABLE ORAL DAILY
Status: DISCONTINUED | OUTPATIENT
Start: 2020-08-13 | End: 2020-08-14 | Stop reason: HOSPADM

## 2020-08-13 RX ORDER — ACETAMINOPHEN 325 MG/1
650 TABLET ORAL EVERY 6 HOURS PRN
Status: DISCONTINUED | OUTPATIENT
Start: 2020-08-13 | End: 2020-08-14 | Stop reason: HOSPADM

## 2020-08-13 RX ORDER — SODIUM CHLORIDE 9 MG/ML
INJECTION, SOLUTION INTRAVENOUS CONTINUOUS
Status: DISCONTINUED | OUTPATIENT
Start: 2020-08-13 | End: 2020-08-14 | Stop reason: HOSPADM

## 2020-08-13 RX ORDER — INSULIN GLARGINE 100 [IU]/ML
10 INJECTION, SOLUTION SUBCUTANEOUS
Status: DISCONTINUED | OUTPATIENT
Start: 2020-08-13 | End: 2020-08-13

## 2020-08-13 RX ORDER — SODIUM CHLORIDE 9 MG/ML
1000 INJECTION, SOLUTION INTRAVENOUS ONCE
Status: COMPLETED | OUTPATIENT
Start: 2020-08-13 | End: 2020-08-13

## 2020-08-13 RX ORDER — ATORVASTATIN CALCIUM 20 MG/1
20 TABLET, FILM COATED ORAL EVERY EVENING
Status: DISCONTINUED | OUTPATIENT
Start: 2020-08-13 | End: 2020-08-14 | Stop reason: HOSPADM

## 2020-08-13 RX ORDER — ASPIRIN 325 MG
325 TABLET ORAL DAILY
Status: DISCONTINUED | OUTPATIENT
Start: 2020-08-13 | End: 2020-08-14 | Stop reason: HOSPADM

## 2020-08-13 RX ORDER — GLYBURIDE 5 MG/1
10 TABLET ORAL 2 TIMES DAILY WITH MEALS
Status: DISCONTINUED | OUTPATIENT
Start: 2020-08-13 | End: 2020-08-13

## 2020-08-13 RX ORDER — POLYETHYLENE GLYCOL 3350 17 G/17G
1 POWDER, FOR SOLUTION ORAL
Status: DISCONTINUED | OUTPATIENT
Start: 2020-08-13 | End: 2020-08-14 | Stop reason: HOSPADM

## 2020-08-13 RX ORDER — BISACODYL 10 MG
10 SUPPOSITORY, RECTAL RECTAL
Status: DISCONTINUED | OUTPATIENT
Start: 2020-08-13 | End: 2020-08-14 | Stop reason: HOSPADM

## 2020-08-13 RX ADMIN — ATORVASTATIN CALCIUM 20 MG: 20 TABLET, FILM COATED ORAL at 17:43

## 2020-08-13 RX ADMIN — REGADENOSON 0.4 MG: 0.08 INJECTION, SOLUTION INTRAVENOUS at 12:04

## 2020-08-13 RX ADMIN — SODIUM CHLORIDE: 9 INJECTION, SOLUTION INTRAVENOUS at 03:58

## 2020-08-13 RX ADMIN — INSULIN HUMAN 2 UNITS: 100 INJECTION, SOLUTION PARENTERAL at 20:17

## 2020-08-13 RX ADMIN — ACETAMINOPHEN 650 MG: 325 TABLET, FILM COATED ORAL at 20:22

## 2020-08-13 RX ADMIN — SITAGLIPTIN 50 MG: 50 TABLET, FILM COATED ORAL at 14:52

## 2020-08-13 RX ADMIN — INSULIN HUMAN 2 UNITS: 100 INJECTION, SOLUTION PARENTERAL at 09:04

## 2020-08-13 RX ADMIN — ASPIRIN 325 MG: 325 TABLET, FILM COATED ORAL at 04:03

## 2020-08-13 RX ADMIN — SODIUM CHLORIDE 1000 ML: 9 INJECTION, SOLUTION INTRAVENOUS at 01:26

## 2020-08-13 RX ADMIN — DOCUSATE SODIUM 50 MG AND SENNOSIDES 8.6 MG 2 TABLET: 8.6; 5 TABLET, FILM COATED ORAL at 17:43

## 2020-08-13 ASSESSMENT — ENCOUNTER SYMPTOMS
HEARTBURN: 0
CHILLS: 0
EYE DISCHARGE: 0
BACK PAIN: 0
EYE PAIN: 0
SPUTUM PRODUCTION: 0
MYALGIAS: 0
HEADACHES: 0
VOMITING: 0
NERVOUS/ANXIOUS: 0
INSOMNIA: 0
ORTHOPNEA: 0
EYE REDNESS: 0
BLURRED VISION: 0
ABDOMINAL PAIN: 0
SEIZURES: 0
SHORTNESS OF BREATH: 0
COUGH: 0
DEPRESSION: 0
WEIGHT LOSS: 0
FEVER: 0
STRIDOR: 0
PALPITATIONS: 0
NECK PAIN: 0
FOCAL WEAKNESS: 0
NAUSEA: 0
DIARRHEA: 0
DIZZINESS: 1

## 2020-08-13 ASSESSMENT — COGNITIVE AND FUNCTIONAL STATUS - GENERAL
MOVING FROM LYING ON BACK TO SITTING ON SIDE OF FLAT BED: A LITTLE
DRESSING REGULAR LOWER BODY CLOTHING: A LITTLE
SUGGESTED CMS G CODE MODIFIER DAILY ACTIVITY: CI
DAILY ACTIVITIY SCORE: 23
MOBILITY SCORE: 22
SUGGESTED CMS G CODE MODIFIER MOBILITY: CJ
MOVING TO AND FROM BED TO CHAIR: A LITTLE

## 2020-08-13 ASSESSMENT — PATIENT HEALTH QUESTIONNAIRE - PHQ9
9. THOUGHTS THAT YOU WOULD BE BETTER OFF DEAD, OR OF HURTING YOURSELF: NOT AT ALL
SUM OF ALL RESPONSES TO PHQ QUESTIONS 1-9: 3
4. FEELING TIRED OR HAVING LITTLE ENERGY: SEVERAL DAYS
3. TROUBLE FALLING OR STAYING ASLEEP OR SLEEPING TOO MUCH: NOT AT ALL
SUM OF ALL RESPONSES TO PHQ9 QUESTIONS 1 AND 2: 1
1. LITTLE INTEREST OR PLEASURE IN DOING THINGS: NOT AT ALL
2. FEELING DOWN, DEPRESSED, IRRITABLE, OR HOPELESS: SEVERAL DAYS
8. MOVING OR SPEAKING SO SLOWLY THAT OTHER PEOPLE COULD HAVE NOTICED. OR THE OPPOSITE, BEING SO FIGETY OR RESTLESS THAT YOU HAVE BEEN MOVING AROUND A LOT MORE THAN USUAL: NOT AT ALL
5. POOR APPETITE OR OVEREATING: NOT AT ALL
6. FEELING BAD ABOUT YOURSELF - OR THAT YOU ARE A FAILURE OR HAVE LET YOURSELF OR YOUR FAMILY DOWN: NOT AL ALL
7. TROUBLE CONCENTRATING ON THINGS, SUCH AS READING THE NEWSPAPER OR WATCHING TELEVISION: SEVERAL DAYS

## 2020-08-13 ASSESSMENT — LIFESTYLE VARIABLES
DO YOU DRINK ALCOHOL: NO
ALCOHOL_USE: NO
TOTAL SCORE: 0
TOTAL SCORE: 0
EVER_SMOKED: NEVER
CONSUMPTION TOTAL: NEGATIVE
AVERAGE NUMBER OF DAYS PER WEEK YOU HAVE A DRINK CONTAINING ALCOHOL: 0
EVER HAD A DRINK FIRST THING IN THE MORNING TO STEADY YOUR NERVES TO GET RID OF A HANGOVER: NO
HOW MANY TIMES IN THE PAST YEAR HAVE YOU HAD 5 OR MORE DRINKS IN A DAY: 0
HAVE YOU EVER FELT YOU SHOULD CUT DOWN ON YOUR DRINKING: NO
EVER FELT BAD OR GUILTY ABOUT YOUR DRINKING: NO
ON A TYPICAL DAY WHEN YOU DRINK ALCOHOL HOW MANY DRINKS DO YOU HAVE: 0
TOTAL SCORE: 0
HAVE PEOPLE ANNOYED YOU BY CRITICIZING YOUR DRINKING: NO

## 2020-08-13 ASSESSMENT — FIBROSIS 4 INDEX
FIB4 SCORE: 1.16
FIB4 SCORE: 1

## 2020-08-13 NOTE — ED TRIAGE NOTES
"Chief Complaint   Patient presents with   • Chest Pain     was at work developed dizziness and then chest pain pt given 324mg ASA and 0.4 nitro   • Dizziness     Pt BIB EMS from work. Pt developed dizziness/lightheadedness and then developed chest pain. Pt described pain 4/10 in the center of her chest. Pt took 324 ASA and in route given .4 nitro.  given 750mL NS. Pt placed in gown and EKG obtained. Labs collected and sent. ERP at bedside.     /62   Pulse 91   Temp 36.3 °C (97.3 °F) (Temporal)   Resp 18   Ht 1.549 m (5' 1\")   Wt 83.9 kg (185 lb)   SpO2 97%   BMI 34.96 kg/m²     "

## 2020-08-13 NOTE — H&P
Hospital Medicine History & Physical Note    Date of Service  8/13/2020    Primary Care Physician  DOMINICK Johnson.    Consultants  none    Code Status  Full Code    Chief Complaint  Chief Complaint   Patient presents with   • Chest Pain     was at work developed dizziness and then chest pain pt given 324mg ASA and 0.4 nitro   • Dizziness       History of Presenting Illness  54 y.o. female with past medical history of essential hypertension, diabetes mellitus who presented 8/13/2020 with lightheadedness when she was at work earlier today.  She also complained about racing heart rate.  Denies any syncope episode associated with it.  In addition she complained about pain in the epigastric area and lower sternal area.  She has history of GERD but no history of stomach ulcer in the past.  She will be admitted for observation.    Review of Systems  Review of Systems   Constitutional: Negative for chills, fever and weight loss.   HENT: Negative for congestion and nosebleeds.    Eyes: Negative for blurred vision, pain, discharge and redness.   Respiratory: Negative for cough, sputum production, shortness of breath and stridor.    Cardiovascular: Positive for chest pain. Negative for palpitations and orthopnea.   Gastrointestinal: Negative for abdominal pain, diarrhea, heartburn, nausea and vomiting.   Genitourinary: Negative for dysuria, frequency and urgency.   Musculoskeletal: Negative for back pain, myalgias and neck pain.   Skin: Negative for itching and rash.   Neurological: Positive for dizziness. Negative for focal weakness, seizures and headaches.   Psychiatric/Behavioral: Negative for depression. The patient is not nervous/anxious and does not have insomnia.        Past Medical History   has a past medical history of Anemia, Bowel habit changes, Diabetes, Diabetes (HCC), GERD (gastroesophageal reflux disease), Healthcare maintenance (9/27/2014), High cholesterol, Hyperlipidemia, Hypertension,  Infectious disease, Jaundice (1999), Psychiatric problem, and Vaginal itching (8/27/2014).    Surgical History   has a past surgical history that includes cholecystectomy; primary c section; tubal coagulation laparoscopic bilateral; abdominal hysterectomy total; and ventral hernia repair robotic xi (N/A, 10/14/2016).     Family History  family history includes Diabetes in her father and mother; Hyperlipidemia in her father and mother; Hypertension in her father.     Social History   reports that she has never smoked. She has never used smokeless tobacco. She reports that she does not drink alcohol or use drugs.    Allergies  Allergies   Allergen Reactions   • Morphine Itching     Rxn = unknown   Bumps all over body   • Glucophage  [Metformin Hcl]    • Metformin      headaches       Medications  Prior to Admission Medications   Prescriptions Last Dose Informant Patient Reported? Taking?   Cholecalciferol (VITAMIN D PO)  Patient Yes No   Sig: Take 2 Tabs by mouth every day. GUMMIES   JANUVIA 100 MG Tab  Patient No No   Sig: TAKE 1 TABLET BY MOUTH ONCE DAILY   TRULICITY 1.5 MG/0.5ML Solution Pen-injector  Patient No No   Sig: INJECT 1 SYRINGE SUBCUTANEOUSLY ONCE A WEEK ON  FRIDAY   albuterol 108 (90 Base) MCG/ACT Aero Soln inhalation aerosol   No No   Sig: Inhale 2 Puffs by mouth every 6 hours as needed for Shortness of Breath.   aspirin EC (ECOTRIN) 81 MG Tablet Delayed Response  Patient Yes No   Sig: Take 1 Tab by mouth every day.   atorvastatin (LIPITOR) 20 MG Tab  Patient No No   Sig: Take 1 tablet by mouth once daily   benzonatate (TESSALON) 100 MG Cap   No No   Sig: Take 1 Cap by mouth 3 times a day as needed for Cough.   cyclobenzaprine (FLEXERIL) 10 MG Tab   No No   Sig: Take 1 Tab by mouth at bedtime as needed.   diclofenac EC (VOLTAREN) 75 MG Tablet Delayed Response   No No   Sig: Take 1 Tab by mouth 2 times a day.   famotidine (PEPCID) 20 MG Tab  Patient No No   Sig: Take 1 Tab by mouth 2 times a day.    fenofibrate (TRIGLIDE) 160 MG tablet  Patient No No   Sig: Take 1 tablet by mouth once daily   fluticasone (FLONASE) 50 MCG/ACT nasal spray  Patient No No   Sig: Spray 1 Spray in nose every day.   glyBURIDE (DIABETA) 5 MG Tab  Patient No No   Sig: TAKE 2 TABLETS BY MOUTH TWICE DAILY WITH MEALS   lisinopril (PRINIVIL) 20 MG Tab  Patient No No   Sig: Take 1 tablet by mouth once daily   therapeutic multivitamin-minerals (THERAGRAN-M) Tab  Patient Yes No   Sig: Take 1 Tab by mouth every day.   tizanidine (ZANAFLEX) 4 MG Tab   No No   Sig: Take 1 Tab by mouth at bedtime as needed.      Facility-Administered Medications: None       Physical Exam  Temp:  [36.3 °C (97.3 °F)] 36.3 °C (97.3 °F)  Pulse:  [91] 91  Resp:  [18] 18  BP: (145)/(62) 145/62  SpO2:  [97 %] 97 %    Physical Exam  Vitals signs reviewed.   Constitutional:       General: She is not in acute distress.     Appearance: Normal appearance.   HENT:      Head: Normocephalic and atraumatic.      Nose: No congestion or rhinorrhea.   Eyes:      Extraocular Movements: Extraocular movements intact.      Pupils: Pupils are equal, round, and reactive to light.   Neck:      Musculoskeletal: Normal range of motion and neck supple.   Cardiovascular:      Rate and Rhythm: Normal rate and regular rhythm.      Pulses: Normal pulses.   Pulmonary:      Effort: Pulmonary effort is normal. No respiratory distress.      Breath sounds: Normal breath sounds.   Abdominal:      General: Bowel sounds are normal. There is no distension.      Palpations: Abdomen is soft.      Tenderness: There is no abdominal tenderness.   Musculoskeletal:         General: No swelling or tenderness.   Skin:     General: Skin is warm.      Findings: No erythema.   Neurological:      General: No focal deficit present.      Mental Status: She is alert and oriented to person, place, and time.         Laboratory:  Recent Labs     08/13/20  0011   WBC 7.5   RBC 3.21*   HEMOGLOBIN 9.6*   HEMATOCRIT 29.4*    MCV 91.6   MCH 29.9   MCHC 32.7*   RDW 40.8   PLATELETCT 244   MPV 11.3     Recent Labs     08/13/20  0011   SODIUM 131*   POTASSIUM 4.5   CHLORIDE 98   CO2 18*   GLUCOSE 513*   BUN 42*   CREATININE 1.48*   CALCIUM 8.5     Recent Labs     08/13/20  0011   ALTSGPT 27   ASTSGOT 23   ALKPHOSPHAT 64   TBILIRUBIN 0.2   GLUCOSE 513*         No results for input(s): NTPROBNP in the last 72 hours.      Recent Labs     08/13/20  0011   TROPONINT 18       Imaging:  DX-CHEST-PORTABLE (1 VIEW)   Final Result      No acute cardiopulmonary disease evident.      NM-CARDIAC STRESS TEST    (Results Pending)         Assessment/Plan:  I anticipate this patient is appropriate for observation status at this time.    Epigastric pain- (present on admission)  Assessment & Plan  Check H. pylori test  Treat if positive for infection    ANA (acute kidney injury) (HCC)- (present on admission)  Assessment & Plan  Likely prerenal  Holding lisinopril, diclofenac  Started on IV fluid  Follow CMP in the morning  Avoid nephrotoxic drugs  Check urinalysis    Pain in the chest- (present on admission)  Assessment & Plan  Trend troponin and EKG  N.p.o.  Check d-dimer  Stress test in the morning    Diabetes mellitus type II, uncontrolled (HCC)- (present on admission)  Assessment & Plan  Blood sugar over 500 in ER  Sliding scale insulin  Last A1c was 7.9 in 10/19  Recheck A1c

## 2020-08-13 NOTE — ED PROVIDER NOTES
ED Provider Note    CHIEF COMPLAINT  Chief Complaint   Patient presents with   • Chest Pain     was at work developed dizziness and then chest pain pt given 324mg ASA and 0.4 nitro   • Dizziness       HPI  Sonya Wei is a 54 y.o. female who presents with dizziness.  The patient states she went back to work today for the first time in several months.  She states she started to feel dizzy.  She bent over to  a battery at [employer] and when she sat up she had sharp pain in the epigastric region.  The patient did not have any associated dyspnea.  She does not have any nausea.  She continued some dizziness.  She do not develop a headache and she does not have any focal weakness.  EMS was contacted and the patient received aspirin as well as nitroglycerin.  She states after the nitroglycerin she feels tremulous.  Currently the patient does not have any chest pain or difficulty with breathing.    REVIEW OF SYSTEMS  See HPI for further details. All other systems are negative.     PAST MEDICAL HISTORY  Past Medical History:   Diagnosis Date   • Healthcare maintenance 9/27/2014    Mammogram: Due   • Vaginal itching 8/27/2014    With anal itching X 1 month Getting worse Burning Min vag disch   • Jaundice 1999   • Anemia    • Bowel habit changes     constipation   • Diabetes    • Diabetes (HCC)    • GERD (gastroesophageal reflux disease)    • High cholesterol    • Hyperlipidemia    • Hypertension    • Infectious disease     Cold 10/2016   • Psychiatric problem     depression and anxiety       FAMILY HISTORY  [unfilled]    SOCIAL HISTORY  Social History     Socioeconomic History   • Marital status:      Spouse name: Not on file   • Number of children: Not on file   • Years of education: Not on file   • Highest education level: Not on file   Occupational History   • Not on file   Social Needs   • Financial resource strain: Not on file   • Food insecurity     Worry: Not on file     Inability: Not on file   •  "Transportation needs     Medical: Not on file     Non-medical: Not on file   Tobacco Use   • Smoking status: Never Smoker   • Smokeless tobacco: Never Used   Substance and Sexual Activity   • Alcohol use: No   • Drug use: No   • Sexual activity: Yes     Partners: Male     Birth control/protection: None   Lifestyle   • Physical activity     Days per week: Not on file     Minutes per session: Not on file   • Stress: Not on file   Relationships   • Social connections     Talks on phone: Not on file     Gets together: Not on file     Attends Jainism service: Not on file     Active member of club or organization: Not on file     Attends meetings of clubs or organizations: Not on file     Relationship status: Not on file   • Intimate partner violence     Fear of current or ex partner: Not on file     Emotionally abused: Not on file     Physically abused: Not on file     Forced sexual activity: Not on file   Other Topics Concern   • Not on file   Social History Narrative    ** Merged History Encounter **            SURGICAL HISTORY  Past Surgical History:   Procedure Laterality Date   • VENTRAL HERNIA REPAIR ROBOTIC XI N/A 10/14/2016    Procedure: VENTRAL HERNIA REPAIR ROBOTIC XI FOR INCISIONAL W/MESH;  Surgeon: Edi Mendoza M.D.;  Location: SURGERY [facility];  Service:    • ABDOMINAL HYSTERECTOMY TOTAL      still has ovaries   • CHOLECYSTECTOMY     • PRIMARY C SECTION     • TUBAL COAGULATION LAPAROSCOPIC BILATERAL         CURRENT MEDICATIONS  Home Medications    **Home medications have not yet been reviewed for this encounter**         ALLERGIES  Allergies   Allergen Reactions   • Morphine Itching     Rxn = unknown   Bumps all over body   • Glucophage  [Metformin Hcl]    • Metformin      headaches       PHYSICAL EXAM  VITAL SIGNS: /62   Pulse 91   Temp 36.3 °C (97.3 °F) (Temporal)   Resp 18   Ht 1.549 m (5' 1\")   Wt 83.9 kg (185 lb)   SpO2 97%   BMI 34.96 kg/m²       Constitutional: Anxious but " nontoxic  HENT: Normocephalic, Atraumatic, Bilateral external ears normal, Oropharynx moist, No oral exudates, Nose normal.   Eyes: PERRLA, EOMI, Conjunctiva normal, No discharge.   Neck: Normal range of motion, No tenderness, Supple, No stridor.   Lymphatic: No lymphadenopathy noted.   Cardiovascular: Normal heart rate, Normal rhythm, No murmurs, No rubs, No gallops.   Thorax & Lungs: Normal breath sounds, No respiratory distress, No wheezing, No chest tenderness.   Abdomen: Bowel sounds normal, Soft, No tenderness, No masses, No pulsatile masses.   Skin: Warm, Dry, No erythema, No rash.   Back: No tenderness, No CVA tenderness.   Extremities: Intact distal pulses, No edema, No tenderness, No cyanosis, No clubbing.   Neurologic: Alert & oriented x 3, Normal motor function, Normal sensory function, No focal deficits noted.   Psychiatric: Anxious    EKG  Twelve-lead EKG shows a normal sinus rhythm with a ventricular to 93, QRS shows a right bundle branch block, no ST segment elevation or depression, normal T waves.    RADIOLOGY/PROCEDURES  DX-CHEST-PORTABLE (1 VIEW)   Final Result      No acute cardiopulmonary disease evident.            COURSE & MEDICAL DECISION MAKING  Pertinent Labs & Imaging studies reviewed. (See chart for details)  This a 54-year-old female who presents the emergency department with dizziness.  The patient is neurologically intact.  She also had some associated epigastric discomfort.  EKG was performed in triage and does not show any evidence of ischemic disease.  Her troponin is negative.  Her pain is more in the epigastric region I suspect this is from gastritis.  In reviewing her laboratory analysis she has uncontrolled diabetes mellitus with mild acidosis and I suspect this is the source of her dizziness.  The patient will see if 2 L of fluid intravenously help dilute down the blood sugar as well as treat the acute on chronic renal sufficiency that is most likely prerenal.  The patient  continues be neurologically intact.    FINAL IMPRESSION  1.  Dizziness  2.  Epigastric pain suspect secondary to gastritis  3.  Uncontrolled diabetes mellitus  4.  Acute on chronic renal insufficiency  5.  Dehydration    Disposition  The patient will be admitted in stable condition         Electronically signed by: Bryan Araiza M.D., 8/13/2020 12:19 AM

## 2020-08-13 NOTE — ASSESSMENT & PLAN NOTE
Likely prerenal  Holding lisinopril, diclofenac  Started on IV fluid  Follow CMP in the morning  Avoid nephrotoxic drugs  Check urinalysis

## 2020-08-13 NOTE — ED NOTES
Assist RN:  Report called to Linda MCGILL on Neuro. ACLS RN to come  patient from ER.   Pt updated on POC and room assignment.

## 2020-08-13 NOTE — CARE PLAN
Problem: Safety  Goal: Will remain free from injury  Outcome: PROGRESSING AS EXPECTED  Intervention: Educate patient and significant other/support system about adaptive mobility strategies and safe transfers  Note: A/Ox4. Call light and personal belongings within reach, able to make needs known. No impulsive behavior noted so far during shift. Hourly rounding, fall precautions, safety maintained. WCTM       Problem: Pain Management  Goal: Pain level will decrease to patient's comfort goal  Outcome: PROGRESSING AS EXPECTED

## 2020-08-13 NOTE — CARE PLAN
Problem: Safety  Goal: Will remain free from falls  Outcome: PROGRESSING AS EXPECTED  Note: Bed alarm on, wheels locked, in lowest position. Non skid socks applied. Call light within reach.      Problem: Discharge Barriers/Planning  Goal: Patient's continuum of care needs will be met  Outcome: PROGRESSING AS EXPECTED  Note: Pt updated on plan of care. No questions or concerns at this time.

## 2020-08-13 NOTE — ED NOTES
Assist RN: Second IV established, repeat troponin drawn and sent to lab. IV fluids still infusing and VSS

## 2020-08-13 NOTE — PROCEDURES
Patient presents to NM suite for cardiac stress test with MPI. Nursing goals identified: knowledge deficit, potential for anxiety r/t stress test, potential for compromised cardiac output. Care plan includes educating patient, reassurance, access to Aminophylline and access to ACLS cart/team. Labs and ECG reviewed. No caffeine and NPO confirmed. Resting images attained and patient prepped for pharmacological stress study. Lexiscan given while patient ambulated on TM x 2 min. Reported &/or observed symptoms include:  Caffeinated beverage provided. Symptoms resolved.

## 2020-08-13 NOTE — ED NOTES
Lab called with critical result of glucose 513 at 0111. Critical lab result read back to .   Dr. Araiza notified of critical lab result at 0112.  Critical lab result read back by Dr. Araiza.

## 2020-08-13 NOTE — PROGRESS NOTES
Patient admitted after midnight    Patient with epigastric abd pain and chest pain, ACS r/o with nm stress today   Cont with famotidine suspect GI etiology of patient symptoms     Uncontrolled diabetes has been non complaint with home glyburide and januvia as she ran out of medications will restart home medications, IVF and SSI    Gabbie recheck labs in am    Telephone Encounter by Anita Andres at 10/18/17 07:50 AM     Author:  Anita Andres Service:  (none) Author Type:  Patient      Filed:  10/18/17 07:52 AM Encounter Date:  10/16/2017 Status:  Signed     :  Anita Andres (Patient )            Offered multiple appointments for pt.  Pt unable to make days/time. Pt is moving to [facility] in November.  Would like to see Dr Clayton prior to leaving.    Possible work in appt?[KL1.1M]      Revision History        User Key Date/Time User Provider Type Action    > KL1.1 10/18/17 07:52 AM Anita Anders Patient  Sign    M - Manual

## 2020-08-13 NOTE — PROGRESS NOTES
2 RN Skin Check    2 RN skin check complete with Nohelia, RN     Ears, shoulders, elbows, sacrum pink and blanching. Bilateral heels dry and calloused.  All other skin WDL.

## 2020-08-14 VITALS
DIASTOLIC BLOOD PRESSURE: 64 MMHG | SYSTOLIC BLOOD PRESSURE: 127 MMHG | HEIGHT: 61 IN | HEART RATE: 80 BPM | BODY MASS INDEX: 36.55 KG/M2 | RESPIRATION RATE: 17 BRPM | TEMPERATURE: 98.6 F | WEIGHT: 193.6 LBS | OXYGEN SATURATION: 96 %

## 2020-08-14 LAB
ANION GAP SERPL CALC-SCNC: 10 MMOL/L (ref 7–16)
BUN SERPL-MCNC: 29 MG/DL (ref 8–22)
CALCIUM SERPL-MCNC: 8.9 MG/DL (ref 8.5–10.5)
CHLORIDE SERPL-SCNC: 105 MMOL/L (ref 96–112)
CO2 SERPL-SCNC: 22 MMOL/L (ref 20–33)
CREAT SERPL-MCNC: 0.91 MG/DL (ref 0.5–1.4)
ERYTHROCYTE [DISTWIDTH] IN BLOOD BY AUTOMATED COUNT: 40.4 FL (ref 35.9–50)
GLUCOSE BLD-MCNC: 255 MG/DL (ref 65–99)
GLUCOSE BLD-MCNC: 341 MG/DL (ref 65–99)
GLUCOSE SERPL-MCNC: 246 MG/DL (ref 65–99)
HCT VFR BLD AUTO: 31.7 % (ref 37–47)
HGB BLD-MCNC: 10.4 G/DL (ref 12–16)
MCH RBC QN AUTO: 29.9 PG (ref 27–33)
MCHC RBC AUTO-ENTMCNC: 32.8 G/DL (ref 33.6–35)
MCV RBC AUTO: 91.1 FL (ref 81.4–97.8)
PLATELET # BLD AUTO: 242 K/UL (ref 164–446)
PMV BLD AUTO: 10.9 FL (ref 9–12.9)
POTASSIUM SERPL-SCNC: 4.1 MMOL/L (ref 3.6–5.5)
RBC # BLD AUTO: 3.48 M/UL (ref 4.2–5.4)
SODIUM SERPL-SCNC: 137 MMOL/L (ref 135–145)
WBC # BLD AUTO: 6 K/UL (ref 4.8–10.8)

## 2020-08-14 PROCEDURE — 700105 HCHG RX REV CODE 258: Performed by: INTERNAL MEDICINE

## 2020-08-14 PROCEDURE — 82962 GLUCOSE BLOOD TEST: CPT

## 2020-08-14 PROCEDURE — 99217 PR OBSERVATION CARE DISCHARGE: CPT | Performed by: HOSPITALIST

## 2020-08-14 PROCEDURE — 700102 HCHG RX REV CODE 250 W/ 637 OVERRIDE(OP): Performed by: INTERNAL MEDICINE

## 2020-08-14 PROCEDURE — 96372 THER/PROPH/DIAG INJ SC/IM: CPT

## 2020-08-14 PROCEDURE — 700102 HCHG RX REV CODE 250 W/ 637 OVERRIDE(OP): Performed by: HOSPITALIST

## 2020-08-14 PROCEDURE — A9270 NON-COVERED ITEM OR SERVICE: HCPCS | Performed by: INTERNAL MEDICINE

## 2020-08-14 PROCEDURE — 80048 BASIC METABOLIC PNL TOTAL CA: CPT

## 2020-08-14 PROCEDURE — 36415 COLL VENOUS BLD VENIPUNCTURE: CPT

## 2020-08-14 PROCEDURE — 85027 COMPLETE CBC AUTOMATED: CPT

## 2020-08-14 PROCEDURE — G0378 HOSPITAL OBSERVATION PER HR: HCPCS

## 2020-08-14 PROCEDURE — A9270 NON-COVERED ITEM OR SERVICE: HCPCS | Performed by: HOSPITALIST

## 2020-08-14 RX ORDER — GLYBURIDE 5 MG/1
10 TABLET ORAL 2 TIMES DAILY WITH MEALS
Qty: 14 TAB | Refills: 0 | Status: SHIPPED | OUTPATIENT
Start: 2020-08-14 | End: 2020-08-18 | Stop reason: SDUPTHER

## 2020-08-14 RX ADMIN — SITAGLIPTIN 50 MG: 50 TABLET, FILM COATED ORAL at 04:59

## 2020-08-14 RX ADMIN — DOCUSATE SODIUM 50 MG AND SENNOSIDES 8.6 MG 2 TABLET: 8.6; 5 TABLET, FILM COATED ORAL at 05:00

## 2020-08-14 RX ADMIN — SODIUM CHLORIDE: 9 INJECTION, SOLUTION INTRAVENOUS at 05:00

## 2020-08-14 RX ADMIN — INSULIN HUMAN 6 UNITS: 100 INJECTION, SOLUTION PARENTERAL at 10:59

## 2020-08-14 RX ADMIN — INSULIN HUMAN 5 UNITS: 100 INJECTION, SOLUTION PARENTERAL at 07:55

## 2020-08-14 RX ADMIN — FENOFIBRATE 134 MG: 134 CAPSULE ORAL at 04:59

## 2020-08-14 RX ADMIN — FAMOTIDINE 20 MG: 20 TABLET, FILM COATED ORAL at 05:00

## 2020-08-14 RX ADMIN — ASPIRIN 325 MG: 325 TABLET, FILM COATED ORAL at 04:59

## 2020-08-14 NOTE — DISCHARGE SUMMARY
Discharge Summary    CHIEF COMPLAINT ON ADMISSION  Chief Complaint   Patient presents with   • Chest Pain     was at work developed dizziness and then chest pain pt given 324mg ASA and 0.4 nitro   • Dizziness       Reason for Admission  EMS     Admission Date  8/13/2020    CODE STATUS  Full Code    HPI & HOSPITAL COURSE  This is a 54 y.o. female here with past medical history of hypertension, diabetes who presented to the hospital on 8/13/2020 chest pain epigastric pain and lightheadedness.  This patient was initially admitted to the hospital as an ACS rule out and a nuclear medicine stress test that was unremarkable for any ischemia.  Also had uncontrolled diabetes with initial blood sugars in the 500s.  She states that she ran out of her oral medications.  She has been without glyburide as well as Januvia for the last month to month and a half.  She was also noted to be dehydrated with an ANA.  Subsequently she received IV fluids restarted on her home blood sugar regimen with improvement of her symptoms and will be discharged home with close outpatient follow-up.       Therefore, she is discharged in good and stable condition to home with close outpatient follow-up.        Discharge Date  8/14/20    FOLLOW UP ITEMS POST DISCHARGE  pcp x 1 week     DISCHARGE DIAGNOSES  Active Problems:    Diabetes mellitus type II, uncontrolled (HCC) POA: Yes    ANA (acute kidney injury) (HCC) POA: Yes    Epigastric pain POA: Yes  Resolved Problems:    Pain in the chest POA: Yes      FOLLOW UP  No future appointments.  Fern Lawrence, RODGER.P.RLoriNLori  [address]  [facility] [address]  [phone]    In 1 week        MEDICATIONS ON DISCHARGE     Medication List      START taking these medications      Instructions   metFORMIN 500 MG Tabs  Commonly known as: GLUCOPHAGE   Take 1 Tab by mouth 2 times a day, with meals.  Dose: 500 mg        CHANGE how you take these medications      Instructions   glyBURIDE 5 MG Tabs  What  changed: when to take this  Commonly known as: DIABETA   Take 2 Tabs by mouth 2 times a day, with meals.  Dose: 10 mg     SITagliptin 100 MG Tabs  What changed:   · how much to take  · when to take this  Commonly known as: Januvia   Take 1 Tab by mouth every day.  Dose: 100 mg        CONTINUE taking these medications      Instructions   albuterol 108 (90 Base) MCG/ACT Aers inhalation aerosol   Inhale 2 Puffs by mouth every 6 hours as needed for Shortness of Breath.  Dose: 2 Puff     aspirin EC 81 MG Tbec  Commonly known as: ECOTRIN   Take 1 Tab by mouth every day.  Dose: 81 mg     atorvastatin 20 MG Tabs  Commonly known as: LIPITOR   Take 1 tablet by mouth once daily     famotidine 20 MG Tabs  Commonly known as: PEPCID   Take 1 Tab by mouth 2 times a day.  Dose: 20 mg     fenofibrate 160 MG tablet  Commonly known as: TRIGLIDE   Take 1 tablet by mouth once daily     fluticasone 50 MCG/ACT nasal spray  Commonly known as: FLONASE   Spray 1 Spray in nose every day.  Dose: 1 Spray     lisinopril 20 MG Tabs  Commonly known as: PRINIVIL   Take 1 tablet by mouth once daily     therapeutic multivitamin-minerals Tabs   Take 1 Tab by mouth every day.  Dose: 1 Tab     Trulicity 1.5 MG/0.5ML Sopn  Generic drug: Dulaglutide   INJECT 1 SYRINGE SUBCUTANEOUSLY ONCE A WEEK ON  FRIDAY     VITAMIN D PO   Take 2 Tabs by mouth every day. GUMMIES  Dose: 2 Tab            Allergies  Allergies   Allergen Reactions   • Morphine Itching     Rxn = unknown   Bumps all over body   • Glucophage  [Metformin Hcl]    • Metformin      headaches       DIET  Orders Placed This Encounter   Procedures   • Diet Order Diabetic     Standing Status:   Standing     Number of Occurrences:   1     Order Specific Question:   Diet:     Answer:   Diabetic [3]     Order Specific Question:   Texture Modifier     Answer:   Level 7 - Regular/Easy to Chew     Order Specific Question:   Liquid level     Answer:   Level 0 - Thin       ACTIVITY  As tolerated.  Weight  bearing as tolerated    CONSULTATIONS  none    PROCEDURES  none    LABORATORY  Lab Results   Component Value Date    SODIUM 137 08/14/2020    POTASSIUM 4.1 08/14/2020    CHLORIDE 105 08/14/2020    CO2 22 08/14/2020    GLUCOSE 246 (H) 08/14/2020    BUN 29 (H) 08/14/2020    CREATININE 0.91 08/14/2020        Lab Results   Component Value Date    WBC 6.0 08/14/2020    HEMOGLOBIN 10.4 (L) 08/14/2020    HEMATOCRIT 31.7 (L) 08/14/2020    PLATELETCT 242 08/14/2020        Total time of the discharge process exceeds 36 minutes.
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
## 2020-08-14 NOTE — PROGRESS NOTES
Pt DCd home via WC to relative's vehicle with hospital escort.     LDAs addressed. Tele monitor disconnected.      AVS printed, questions reviewed. Education provided about diabetic diet and glucose monitoring.     Doctor's note provided for work from Dr Ye     All belongings sent with pt.

## 2020-08-14 NOTE — DISCHARGE INSTRUCTIONS
Blood Glucose Monitoring, Adult  Monitoring your blood sugar (glucose) is an important part of managing your diabetes (diabetes mellitus). Blood glucose monitoring involves checking your blood glucose as often as directed and keeping a record (log) of your results over time.  Checking your blood glucose regularly and keeping a blood glucose log can:  · Help you and your health care provider adjust your diabetes management plan as needed, including your medicines or insulin.  · Help you understand how food, exercise, illnesses, and medicines affect your blood glucose.  · Let you know what your blood glucose is at any time. You can quickly find out if you have low blood glucose (hypoglycemia) or high blood glucose (hyperglycemia).  Your health care provider will set individualized treatment goals for you. Your goals will be based on your age, other medical conditions you have, and how you respond to diabetes treatment. Generally, the goal of treatment is to maintain the following blood glucose levels:  · Before meals (preprandial):  mg/dL (4.4-7.2 mmol/L).  · After meals (postprandial): below 180 mg/dL (10 mmol/L).  · A1c level: less than 7%.  Supplies needed:  · Blood glucose meter.  · Test strips for your meter. Each meter has its own strips. You must use the strips that came with your meter.  · A needle to prick your finger (lancet). Do not use a lancet more than one time.  · A device that holds the lancet (lancing device).  · A journal or log book to write down your results.  How to check your blood glucose    1. Wash your hands with soap and water.  2. Prick the side of your finger (not the tip) with the lancet. Use a different finger each time.  3. Gently rub the finger until a small drop of blood appears.  4. Follow instructions that come with your meter for inserting the test strip, applying blood to the strip, and using your blood glucose meter.  5. Write down your result and any notes.  Some meters  allow you to use areas of your body other than your finger (alternative sites) to test your blood. The most common alternative sites are:  · Forearm.  · Thigh.  · Palm of the hand.  If you think you may have hypoglycemia, or if you have a history of not knowing when your blood glucose is getting low (hypoglycemia unawareness), do not use alternative sites. Use your finger instead. Alternative sites may not be as accurate as the fingers, because blood flow is slower in these areas. This means that the result you get may be delayed, and it may be different from the result that you would get from your finger.  Follow these instructions at home:  Blood glucose log    · Every time you check your blood glucose, write down your result. Also write down any notes about things that may be affecting your blood glucose, such as your diet and exercise for the day. This information can help you and your health care provider:  ? Look for patterns in your blood glucose over time.  ? Adjust your diabetes management plan as needed.  · Check if your meter allows you to download your records to a computer. Most glucose meters store a record of glucose readings in the meter.  If you have type 1 diabetes:  · Check your blood glucose 2 or more times a day.  · Also check your blood glucose:  ? Before every insulin injection.  ? Before and after exercise.  ? Before meals.  ? 2 hours after a meal.  ? Occasionally between 2:00 a.m. and 3:00 a.m., as directed.  ? Before potentially dangerous tasks, like driving or using heavy machinery.  ? At bedtime.  · You may need to check your blood glucose more often, up to 6-10 times a day, if you:  ? Use an insulin pump.  ? Need multiple daily injections (MDI).  ? Have diabetes that is not well-controlled.  ? Are ill.  ? Have a history of severe hypoglycemia.  ? Have hypoglycemia unawareness.  If you have type 2 diabetes:  · If you take insulin or other diabetes medicines, check your blood glucose 2 or  "more times a day.  · If you are on intensive insulin therapy, check your blood glucose 4 or more times a day. Occasionally, you may also need to check between 2:00 a.m. and 3:00 a.m., as directed.  · Also check your blood glucose:  ? Before and after exercise.  ? Before potentially dangerous tasks, like driving or using heavy machinery.  · You may need to check your blood glucose more often if:  ? Your medicine is being adjusted.  ? Your diabetes is not well-controlled.  ? You are ill.  General tips  · Always keep your supplies with you.  · If you have questions or need help, all blood glucose meters have a 24-hour \"hotline\" phone number that you can call. You may also contact your health care provider.  · After you use a few boxes of test strips, adjust (calibrate) your blood glucose meter by following instructions that came with your meter.  Contact a health care provider if:  · Your blood glucose is at or above 240 mg/dL (13.3 mmol/L) for 2 days in a row.  · You have been sick or have had a fever for 2 days or longer, and you are not getting better.  · You have any of the following problems for more than 6 hours:  ? You cannot eat or drink.  ? You have nausea or vomiting.  ? You have diarrhea.  Get help right away if:  · Your blood glucose is lower than 54 mg/dL (3 mmol/L).  · You become confused or you have trouble thinking clearly.  · You have difficulty breathing.  · You have moderate or large ketone levels in your urine.  Summary  · Monitoring your blood sugar (glucose) is an important part of managing your diabetes (diabetes mellitus).  · Blood glucose monitoring involves checking your blood glucose as often as directed and keeping a record (log) of your results over time.  · Your health care provider will set individualized treatment goals for you. Your goals will be based on your age, other medical conditions you have, and how you respond to diabetes treatment.  · Every time you check your blood glucose, " "write down your result. Also write down any notes about things that may be affecting your blood glucose, such as your diet and exercise for the day.  This information is not intended to replace advice given to you by your health care provider. Make sure you discuss any questions you have with your health care provider.  Document Released: 12/20/2004 Document Revised: 10/11/2019 Document Reviewed: 05/29/2017  Elsevier Patient Education © 2020 [employer]      Carbohydrate Counting for Diabetes Mellitus, Adult    Carbohydrate counting is a method of keeping track of how many carbohydrates you eat. Eating carbohydrates naturally increases the amount of sugar (glucose) in the blood. Counting how many carbohydrates you eat helps keep your blood glucose within normal limits, which helps you manage your diabetes (diabetes mellitus).  It is important to know how many carbohydrates you can safely have in each meal. This is different for every person. A diet and nutrition specialist (registered dietitian) can help you make a meal plan and calculate how many carbohydrates you should have at each meal and snack.  Carbohydrates are found in the following foods:  · Grains, such as breads and cereals.  · Dried beans and soy products.  · Starchy vegetables, such as potatoes, peas, and corn.  · Fruit and fruit juices.  · Milk and yogurt.  · Sweets and snack foods, such as cake, cookies, candy, chips, and soft drinks.  How do I count carbohydrates?  There are two ways to count carbohydrates in food. You can use either of the methods or a combination of both.  Reading \"Nutrition Facts\" on packaged food  The \"Nutrition Facts\" list is included on the labels of almost all packaged foods and beverages in the [place] It includes:  · The serving size.  · Information about nutrients in each serving, including the grams (g) of carbohydrate per serving.  To use the “Nutrition Facts\":  · Decide how many servings you will have.  · Multiply the " "number of servings by the number of carbohydrates per serving.  · The resulting number is the total amount of carbohydrates that you will be having.  Learning standard serving sizes of other foods  When you eat carbohydrate foods that are not packaged or do not include \"Nutrition Facts\" on the label, you need to measure the servings in order to count the amount of carbohydrates:  · Measure the foods that you will eat with a food scale or measuring cup, if needed.  · Decide how many standard-size servings you will eat.  · Multiply the number of servings by 15. Most carbohydrate-rich foods have about 15 g of carbohydrates per serving.  ? For example, if you eat 8 oz (170 g) of strawberries, you will have eaten 2 servings and 30 g of carbohydrates (2 servings x 15 g = 30 g).  · For foods that have more than one food mixed, such as soups and casseroles, you must count the carbohydrates in each food that is included.  The following list contains standard serving sizes of common carbohydrate-rich foods. Each of these servings has about 15 g of carbohydrates:  · ½ hamburger bun or ½ English muffin.  · ½ oz (15 mL) syrup.  · ½ oz (14 g) jelly.  · 1 slice of bread.  · 1 six-inch tortilla.  · 3 oz (85 g) cooked rice or pasta.  · 4 oz (113 g) cooked dried beans.  · 4 oz (113 g) starchy vegetable, such as peas, corn, or potatoes.  · 4 oz (113 g) hot cereal.  · 4 oz (113 g) mashed potatoes or ¼ of a large baked potato.  · 4 oz (113 g) canned or frozen fruit.  · 4 oz (120 mL) fruit juice.  · 4-6 crackers.  · 6 chicken nuggets.  · 6 oz (170 g) unsweetened dry cereal.  · 6 oz (170 g) plain fat-free yogurt or yogurt sweetened with artificial sweeteners.  · 8 oz (240 mL) milk.  · 8 oz (170 g) fresh fruit or one small piece of fruit.  · 24 oz (680 g) popped popcorn.  Example of carbohydrate counting  Sample meal  · 3 oz (85 g) chicken breast.  · 6 oz (170 g) brown rice.  · 4 oz (113 g) corn.  · 8 oz (240 mL) milk.  · 8 oz (170 g) " strawberries with sugar-free whipped topping.  Carbohydrate calculation  1. Identify the foods that contain carbohydrates:  ? Rice.  ? Corn.  ? Milk.  ? Strawberries.  2. Calculate how many servings you have of each food:  ? 2 servings rice.  ? 1 serving corn.  ? 1 serving milk.  ? 1 serving strawberries.  3. Multiply each number of servings by 15 g:  ? 2 servings rice x 15 g = 30 g.  ? 1 serving corn x 15 g = 15 g.  ? 1 serving milk x 15 g = 15 g.  ? 1 serving strawberries x 15 g = 15 g.  4. Add together all of the amounts to find the total grams of carbohydrates eaten:  ? 30 g + 15 g + 15 g + 15 g = 75 g of carbohydrates total.  Summary  · Carbohydrate counting is a method of keeping track of how many carbohydrates you eat.  · Eating carbohydrates naturally increases the amount of sugar (glucose) in the blood.  · Counting how many carbohydrates you eat helps keep your blood glucose within normal limits, which helps you manage your diabetes.  · A diet and nutrition specialist (registered dietitian) can help you make a meal plan and calculate how many carbohydrates you should have at each meal and snack.  This information is not intended to replace advice given to you by your health care provider. Make sure you discuss any questions you have with your health care provider.  Document Released: 12/18/2006 Document Revised: 07/12/2018 Document Reviewed: 05/31/2017  [employer] Patient Education © 2020 [employer]    Diabetes Mellitus and Nutrition, Adult  When you have diabetes (diabetes mellitus), it is very important to have healthy eating habits because your blood sugar (glucose) levels are greatly affected by what you eat and drink. Eating healthy foods in the appropriate amounts, at about the same times every day, can help you:  · Control your blood glucose.  · Lower your risk of heart disease.  · Improve your blood pressure.  · Reach or maintain a healthy weight.  Every person with diabetes is different, and each  person has different needs for a meal plan. Your health care provider may recommend that you work with a diet and nutrition specialist (dietitian) to make a meal plan that is best for you. Your meal plan may vary depending on factors such as:  · The calories you need.  · The medicines you take.  · Your weight.  · Your blood glucose, blood pressure, and cholesterol levels.  · Your activity level.  · Other health conditions you have, such as heart or kidney disease.  How do carbohydrates affect me?  Carbohydrates, also called carbs, affect your blood glucose level more than any other type of food. Eating carbs naturally raises the amount of glucose in your blood. Carb counting is a method for keeping track of how many carbs you eat. Counting carbs is important to keep your blood glucose at a healthy level, especially if you use insulin or take certain oral diabetes medicines.  It is important to know how many carbs you can safely have in each meal. This is different for every person. Your dietitian can help you calculate how many carbs you should have at each meal and for each snack.  Foods that contain carbs include:  · Bread, cereal, rice, pasta, and crackers.  · Potatoes and corn.  · Peas, beans, and lentils.  · Milk and yogurt.  · Fruit and juice.  · Desserts, such as cakes, cookies, ice cream, and candy.  How does alcohol affect me?  Alcohol can cause a sudden decrease in blood glucose (hypoglycemia), especially if you use insulin or take certain oral diabetes medicines. Hypoglycemia can be a life-threatening condition. Symptoms of hypoglycemia (sleepiness, dizziness, and confusion) are similar to symptoms of having too much alcohol.  If your health care provider says that alcohol is safe for you, follow these guidelines:  · Limit alcohol intake to no more than 1 drink per day for nonpregnant women and 2 drinks per day for men. One drink equals 12 oz of beer, 5 oz of wine, or 1½ oz of hard liquor.  · Do not drink  "on an empty stomach.  · Keep yourself hydrated with water, diet soda, or unsweetened iced tea.  · Keep in mind that regular soda, juice, and other mixers may contain a lot of sugar and must be counted as carbs.  What are tips for following this plan?    Reading food labels  · Start by checking the serving size on the \"Nutrition Facts\" label of packaged foods and drinks. The amount of calories, carbs, fats, and other nutrients listed on the label is based on one serving of the item. Many items contain more than one serving per package.  · Check the total grams (g) of carbs in one serving. You can calculate the number of servings of carbs in one serving by dividing the total carbs by 15. For example, if a food has 30 g of total carbs, it would be equal to 2 servings of carbs.  · Check the number of grams (g) of saturated and trans fats in one serving. Choose foods that have low or no amount of these fats.  · Check the number of milligrams (mg) of salt (sodium) in one serving. Most people should limit total sodium intake to less than 2,300 mg per day.  · Always check the nutrition information of foods labeled as \"low-fat\" or \"nonfat\". These foods may be higher in added sugar or refined carbs and should be avoided.  · Talk to your dietitian to identify your daily goals for nutrients listed on the label.  Shopping  · Avoid buying canned, premade, or processed foods. These foods tend to be high in fat, sodium, and added sugar.  · Shop around the outside edge of the grocery store. This includes fresh fruits and vegetables, bulk grains, fresh meats, and fresh dairy.  Cooking  · Use low-heat cooking methods, such as baking, instead of high-heat cooking methods like deep frying.  · Cook using healthy oils, such as olive, canola, or sunflower oil.  · Avoid cooking with butter, cream, or high-fat meats.  Meal planning  · Eat meals and snacks regularly, preferably at the same times every day. Avoid going long periods of time " without eating.  · Eat foods high in fiber, such as fresh fruits, vegetables, beans, and whole grains. Talk to your dietitian about how many servings of carbs you can eat at each meal.  · Eat 4-6 ounces (oz) of lean protein each day, such as lean meat, chicken, fish, eggs, or tofu. One oz of lean protein is equal to:  ? 1 oz of meat, chicken, or fish.  ? 1 egg.  ? ¼ cup of tofu.  · Eat some foods each day that contain healthy fats, such as avocado, nuts, seeds, and fish.  Lifestyle  · Check your blood glucose regularly.  · Exercise regularly as told by your health care provider. This may include:  ? 150 minutes of moderate-intensity or vigorous-intensity exercise each week. This could be brisk walking, biking, or water aerobics.  ? Stretching and doing strength exercises, such as yoga or weightlifting, at least 2 times a week.  · Take medicines as told by your health care provider.  · Do not use any products that contain nicotine or tobacco, such as cigarettes and e-cigarettes. If you need help quitting, ask your health care provider.  · Work with a counselor or diabetes educator to identify strategies to manage stress and any emotional and social challenges.  Questions to ask a health care provider  · Do I need to meet with a diabetes educator?  · Do I need to meet with a dietitian?  · What number can I call if I have questions?  · When are the best times to check my blood glucose?  Where to find more information:  · American Diabetes Association: diabetes.org  · Academy of Nutrition and Dietetics: www.eatright.org  · National [place] of Diabetes and Digestive and Kidney Diseases (NIH): www.niddk.nih.gov  Summary  · A healthy meal plan will help you control your blood glucose and maintain a healthy lifestyle.  · Working with a diet and nutrition specialist (dietitian) can help you make a meal plan that is best for you.  · Keep in mind that carbohydrates (carbs) and alcohol have immediate effects on your blood  glucose levels. It is important to count carbs and to use alcohol carefully.  This information is not intended to replace advice given to you by your health care provider. Make sure you discuss any questions you have with your health care provider.      Discharge Instructions    Discharged to home by car with relative. Discharged via wheelchair, hospital escort: Yes.  Special equipment needed: Not Applicable    Be sure to schedule a follow-up appointment with your primary care doctor or any specialists as instructed.     Discharge Plan:   Diet Plan: Discussed  Activity Level: Discussed  Confirmed Follow up Appointment: Patient to Call and Schedule Appointment  Confirmed Symptoms Management: Discussed  Medication Reconciliation Updated: Yes    I understand that a diet low in cholesterol, fat, and sodium is recommended for good health. Unless I have been given specific instructions below for another diet, I accept this instruction as my diet prescription.   Other diet: Diabetic     Special Instructions: None    · Is patient discharged on Warfarin / Coumadin?   No     Depression / Suicide Risk    As [facility], it is important to learn how to keep safe from harming yourself.    Recognize the warning signs:  · Abrupt changes in personality, positive or negative- including increase in energy   · Giving away possessions  · Change in eating patterns- significant weight changes-  positive or negative  · Change in sleeping patterns- unable to sleep or sleeping all the time   · Unwillingness or inability to communicate  · Depression  · Unusual sadness, discouragement and loneliness  · Talk of wanting to die  · Neglect of personal appearance   · Rebelliousness- reckless behavior  · Withdrawal from people/activities they love  · Confusion- inability to concentrate     If you or a loved one observes any of these behaviors or has concerns about self-harm, here's what you can do:  · Talk  about it- your feelings and reasons for harming yourself  · Remove any means that you might use to hurt yourself (examples: pills, rope, extension cords, firearm)  · Get professional help from the community (Mental Health, Substance Abuse, psychological counseling)  · Do not be alone:Call your Safe Contact- someone whom you trust who will be there for you.  · Call your local CRISIS HOTLINE 986-4278 or 798-429-0815  · Call your local Children's Mobile Crisis Response Team [place] (302) 299-6182 or wwwunrival  · Call the toll free National Suicide Prevention Hotlines   · National Suicide Prevention Lifeline 015-654-DVNE (9626)  · National Hope Line Network 800-SUICIDE (197-4770)

## 2020-08-14 NOTE — CARE PLAN
Problem: Communication  Goal: The ability to communicate needs accurately and effectively will improve  Outcome: MET     Problem: Safety  Goal: Will remain free from injury  Outcome: MET  Intervention: Educate patient and significant other/support system about adaptive mobility strategies and safe transfers  Note: Call light and personal belongings within reach, able to make needs known. No impulsive behavior noted so far during shift. Hourly rounding, fall precautions, safety maintained. WCTM    Goal: Will remain free from falls  Outcome: MET     Problem: Infection  Goal: Will remain free from infection  Outcome: MET     Problem: Venous Thromboembolism (VTW)/Deep Vein Thrombosis (DVT) Prevention:  Goal: Patient will participate in Venous Thrombosis (VTE)/Deep Vein Thrombosis (DVT)Prevention Measures  Outcome: MET     Problem: Bowel/Gastric:  Goal: Normal bowel function is maintained or improved  Outcome: MET  Goal: Will not experience complications related to bowel motility  Outcome: MET     Problem: Knowledge Deficit  Goal: Knowledge of disease process/condition, treatment plan, diagnostic tests, and medications will improve  Outcome: MET  Goal: Knowledge of the prescribed therapeutic regimen will improve  Outcome: MET     Problem: Discharge Barriers/Planning  Goal: Patient's continuum of care needs will be met  Outcome: MET     Problem: Pain Management  Goal: Pain level will decrease to patient's comfort goal  Outcome: MET

## 2020-08-14 NOTE — CARE PLAN
Problem: Communication  Goal: The ability to communicate needs accurately and effectively will improve  Outcome: PROGRESSING AS EXPECTED   Patient encouraged to use call light to make needs known.   Problem: Bowel/Gastric:  Goal: Normal bowel function is maintained or improved  Outcome: PROGRESSING AS EXPECTED   Patient had bowel movement tonight 8/13

## 2020-08-18 ENCOUNTER — OFFICE VISIT (OUTPATIENT)
Dept: MEDICAL GROUP | Facility: PHYSICIAN GROUP | Age: 55
End: 2020-08-18
Payer: COMMERCIAL

## 2020-08-18 VITALS
SYSTOLIC BLOOD PRESSURE: 118 MMHG | DIASTOLIC BLOOD PRESSURE: 62 MMHG | BODY MASS INDEX: 35.57 KG/M2 | WEIGHT: 188.4 LBS | HEIGHT: 61 IN | HEART RATE: 80 BPM | TEMPERATURE: 97 F | OXYGEN SATURATION: 98 %

## 2020-08-18 DIAGNOSIS — I10 ESSENTIAL HYPERTENSION: ICD-10-CM

## 2020-08-18 DIAGNOSIS — E78.5 HYPERLIPIDEMIA, UNSPECIFIED HYPERLIPIDEMIA TYPE: ICD-10-CM

## 2020-08-18 DIAGNOSIS — M79.18 MUSCULOSKELETAL PAIN: ICD-10-CM

## 2020-08-18 DIAGNOSIS — E11.65 UNCONTROLLED TYPE 2 DIABETES MELLITUS WITH HYPERGLYCEMIA (HCC): ICD-10-CM

## 2020-08-18 DIAGNOSIS — K21.9 GASTROESOPHAGEAL REFLUX DISEASE, ESOPHAGITIS PRESENCE NOT SPECIFIED: ICD-10-CM

## 2020-08-18 DIAGNOSIS — R10.13 EPIGASTRIC PAIN: ICD-10-CM

## 2020-08-18 DIAGNOSIS — M54.2 NECK PAIN: ICD-10-CM

## 2020-08-18 DIAGNOSIS — E11.9 TYPE 2 DIABETES MELLITUS WITHOUT COMPLICATION, WITHOUT LONG-TERM CURRENT USE OF INSULIN (HCC): ICD-10-CM

## 2020-08-18 DIAGNOSIS — N17.9 AKI (ACUTE KIDNEY INJURY) (HCC): ICD-10-CM

## 2020-08-18 PROCEDURE — 99214 OFFICE O/P EST MOD 30 MIN: CPT | Performed by: INTERNAL MEDICINE

## 2020-08-18 RX ORDER — GLYBURIDE 5 MG/1
10 TABLET ORAL 2 TIMES DAILY WITH MEALS
Qty: 120 TAB | Refills: 1 | Status: SHIPPED | OUTPATIENT
Start: 2020-08-18 | End: 2020-10-13 | Stop reason: SDUPTHER

## 2020-08-18 RX ORDER — LISINOPRIL 20 MG/1
20 TABLET ORAL DAILY
Qty: 90 TAB | Refills: 1 | Status: SHIPPED | OUTPATIENT
Start: 2020-08-18 | End: 2021-02-19 | Stop reason: SDUPTHER

## 2020-08-18 RX ORDER — ATORVASTATIN CALCIUM 20 MG/1
20 TABLET, FILM COATED ORAL DAILY
Qty: 90 TAB | Refills: 1 | Status: SHIPPED | OUTPATIENT
Start: 2020-08-18 | End: 2021-09-30

## 2020-08-18 RX ORDER — PANTOPRAZOLE SODIUM 20 MG/1
20 TABLET, DELAYED RELEASE ORAL DAILY
Qty: 30 TAB | Refills: 3 | Status: SHIPPED | OUTPATIENT
Start: 2020-08-18 | End: 2020-12-09

## 2020-08-18 RX ORDER — DULAGLUTIDE 1.5 MG/.5ML
0.5 INJECTION, SOLUTION SUBCUTANEOUS
Qty: 6 ML | Refills: 1 | Status: SHIPPED | OUTPATIENT
Start: 2020-08-18 | End: 2020-10-13 | Stop reason: SDUPTHER

## 2020-08-18 RX ORDER — FENOFIBRATE 160 MG/1
160 TABLET ORAL DAILY
Qty: 90 TAB | Refills: 1 | Status: SHIPPED | OUTPATIENT
Start: 2020-08-18 | End: 2021-08-03

## 2020-08-18 ASSESSMENT — PAIN SCALES - GENERAL: PAINLEVEL: 2=MINIMAL-SLIGHT

## 2020-08-18 ASSESSMENT — FIBROSIS 4 INDEX: FIB4 SCORE: 1.005989105406166105

## 2020-08-18 NOTE — ASSESSMENT & PLAN NOTE
Patient s/p recent admit for chest pain and dizziness, work up neg for cardiac ischemia.  She reports the retrosternal pain persists but is better, is just at the epigastric, improves with burping.

## 2020-08-18 NOTE — ASSESSMENT & PLAN NOTE
metformin 500 mg 2 x a day, glyburide 5 mg 2 tabs bid, trulicity 1.5 mg on Mon, sitagliptin 100 mg daily.  Patient reports she just replaced her home fingersticks yesterday.  She does not have home fingersticks between hospitalization and today.

## 2020-08-18 NOTE — ASSESSMENT & PLAN NOTE
Patient is status post recent Workmen's Compensation injury that resulted in neck bilateral shoulder right arm and left leg pain.  She has received evaluation and physical therapy.  She states these pains are improved but still persisting.  She has been released from Workmen's Comp. and is back to work.

## 2020-08-18 NOTE — PROGRESS NOTES
Chief Complaint   Patient presents with   • Hospital Follow-up     [facility] REG, Neuro, headache       HISTORY OF PRESENT ILLNESS: Patient is a 54 y.o. female established patient who presents today to discuss the medical issues below.    Diabetes mellitus type II, uncontrolled ([facility])  metformin 500 mg 2 x a day, glyburide 5 mg 2 tabs bid, trulicity 1.5 mg on Mon, sitagliptin 100 mg daily.  Patient reports she just replaced her home fingersticks yesterday.  She does not have home fingersticks between hospitalization and today.    Hypertension  Continues on the lisinopril 20 mg a day.      Epigastric pain  Patient s/p recent admit for chest pain and dizziness, work up neg for cardiac ischemia.  She reports the retrosternal pain persists but is better, is just at the epigastric, improves with burping.      Gastroesophageal reflux disease  Patient with epigatric discomfort as above worked up for cardiac neg, has been on the pepsid bid since discharge.  Patient reports she has had GI evaluation in March 2020 positive only gastritis, change from proton pump to pepsid, gradually reoccuring.      ANA (acute kidney injury) ([facility])  Acute kidney issues noted in house.  Improved with hydration    Musculoskeletal pain  Patient is status post recent Workmen's Compensation injury that resulted in neck bilateral shoulder right arm and left leg pain.  She has received evaluation and physical therapy.  She states these pains are improved but still persisting.  She has been released from Workmen's Comp. and is back to work.      Patient Active Problem List    Diagnosis Date Noted   • Neck pain 08/18/2020   • Musculoskeletal pain 08/18/2020   • ANA (acute kidney injury) ([facility]) 08/13/2020   • Epigastric pain 08/13/2020   • Viral upper respiratory tract infection 03/31/2020   • Obesity (BMI 30-39.9) 04/03/2019   • Mid back pain 04/02/2019   • Ingrown toenail 01/29/2019   • Gastroesophageal reflux disease 01/29/2019   • Incisional hernia  10/14/2016   • Hypertension 10/01/2015   • Vitamin D deficiency 09/24/2012   • Microalbuminuria 09/24/2012   • Diabetes mellitus type II, uncontrolled (HCC) 08/27/2012   • Hyperlipidemia 08/27/2012       Allergies:Morphine, Glucophage  [metformin hcl], and Metformin    Current Outpatient Medications   Medication Sig Dispense Refill   • pantoprazole (PROTONIX) 20 MG tablet Take 1 Tab by mouth every day. 30 Tab 3   • metFORMIN (GLUCOPHAGE) 500 MG Tab Take 1 Tab by mouth 2 times a day, with meals. 60 Tab 0   • glyBURIDE (DIABETA) 5 MG Tab Take 2 Tabs by mouth 2 times a day, with meals. 14 Tab 0   • SITagliptin (JANUVIA) 100 MG Tab Take 1 Tab by mouth every day. 14 Tab 1   • albuterol 108 (90 Base) MCG/ACT Aero Soln inhalation aerosol Inhale 2 Puffs by mouth every 6 hours as needed for Shortness of Breath. 8.5 g 0   • lisinopril (PRINIVIL) 20 MG Tab Take 1 tablet by mouth once daily 90 Tab 0   • fenofibrate (TRIGLIDE) 160 MG tablet Take 1 tablet by mouth once daily 90 Tab 0   • TRULICITY 1.5 MG/0.5ML Solution Pen-injector INJECT 1 SYRINGE SUBCUTANEOUSLY ONCE A WEEK ON  FRIDAY 12 mL 0   • atorvastatin (LIPITOR) 20 MG Tab Take 1 tablet by mouth once daily 90 Tab 0   • fluticasone (FLONASE) 50 MCG/ACT nasal spray Spray 1 Spray in nose every day. 1 Bottle 3   • Cholecalciferol (VITAMIN D PO) Take 2 Tabs by mouth every day. GUMMIES     • aspirin EC (ECOTRIN) 81 MG Tablet Delayed Response Take 1 Tab by mouth every day. 30 Tab    • therapeutic multivitamin-minerals (THERAGRAN-M) Tab Take 1 Tab by mouth every day. 30 Tab 11     No current facility-administered medications for this visit.          Past Medical History:   Diagnosis Date   • Anemia    • Bowel habit changes     constipation   • Diabetes    • Diabetes (HCC)    • GERD (gastroesophageal reflux disease)    • Healthcare maintenance 9/27/2014    Mammogram: Due   • High cholesterol    • Hyperlipidemia    • Hypertension    • Infectious disease     Cold 10/2016   • Jaundice  "   • Psychiatric problem     depression and anxiety   • Vaginal itching 2014    With anal itching X 1 month Getting worse Burning Min vag disch       Social History     Tobacco Use   • Smoking status: Never Smoker   • Smokeless tobacco: Never Used   Substance Use Topics   • Alcohol use: No   • Drug use: No       Family Status   Relation Name Status   • Mo  Alive   • Fa       Family History   Problem Relation Age of Onset   • Diabetes Mother    • Hyperlipidemia Mother    • Diabetes Father    • Hypertension Father    • Hyperlipidemia Father        ROS:    Respiratory: Negative for cough, sputum production, shortness of breath or wheezing.    Cardiovascular: Negative for chest pain, palpitations, orthopnea, dyspnea with exertion or edema.   Gastrointestinal: Negative for GI upset, nausea, vomiting, abdominal pain, constipation or diarrhea.   Genitourinary: Negative for dysuria, urgency, hesitancy or frequency.       Exam:    /62 (BP Location: Right arm, Patient Position: Sitting)   Pulse 80   Temp 36.1 °C (97 °F) (Temporal)   Ht 1.549 m (5' 1\")   Wt 85.5 kg (188 lb 6.4 oz)   SpO2 98%  Body mass index is 35.6 kg/m².  General:  Well nourished, well developed female in NAD.  Neck: No localized areas of tenderness.  Indicates that palpation actually feels good.  Spine: No vertebral or vertebral angle tenderness  Pulmonary: Clear to ausculation and percussion.  Normal effort. No rales, rhonchi, or wheezing.  Cardiovascular: Regular rate and rhythm without murmur.   Abdomen: Normal bowel sounds soft and nontender no palpable liver spleen bladder mass.  Extremities: No LE edema noted.  No point tenderness on upper or lower extremity exam  Neuro: Grossly nonfocal.  Psych: Alert and oriented to person, place, and time. Appropriate mood and conversation.    Reviewed hospital records    This dictation was created using voice recognition software. I have made reasonable attempts to correct errors, " however, errors of grammar and content may exist.          Assessment/Plan:    1. Neck pain  Per patient this is improved she has been released to go back to work.  Most likely related to fall she had at work.  Monitor with regular exercise.    2. Uncontrolled type 2 diabetes mellitus with hyperglycemia ([facility])  A1c was awful in house.  She had been out of medications had not been following diet.  Reviewed with her current situation.  She is working really hard to start following her blood sugars follow diet and has her medications.  Early follow-up with labs.  Encouragement given.  She did not see endocrinology after last referral.  Discussed compliance with medications and diet as key.  Defer repeat referral to endocrinology to [facility] at follow-up.  - HEMOGLOBIN A1C; Future    3. Essential hypertension  Stable on meds  - Comp Metabolic Panel; Future    4. Epigastric pain  Some exacerbation recently.  She has had work-up which was positive for gastritis.  Change Pepcid back over to Protonix monitor    5. Gastroesophageal reflux disease, esophagitis presence not specified  Review of EGD negative for reflux however positive for gastritis.  As #4 above.  Current with endoscopies.    6. Hyperlipidemia, unspecified hyperlipidemia type  Check labs with next draw  - Lipid Profile; Future    7. ANA (acute kidney injury) ([facility])  Monitor with ongoing return to regular diet and hydration.  Implications discussed with patient regarding poor diabetic control.    8. Musculoskeletal pain  Patient with neck and extremity discomfort highly consistent with her previous injury.  Encouraged her to continue her regular physical exercise and physical therapy exercises.    Patient was seen for 25 minutes face to face of which more than 50% of the time was spent in counseling and coordination of care regarding the above problems.

## 2020-08-18 NOTE — ASSESSMENT & PLAN NOTE
Patient with epigatric discomfort as above worked up for cardiac neg, has been on the pepsid bid since discharge.  Patient reports she has had GI evaluation in March 2020 positive only gastritis, change from proton pump to pepsid, gradually reoccuring.

## 2020-10-13 ENCOUNTER — OFFICE VISIT (OUTPATIENT)
Dept: MEDICAL GROUP | Facility: PHYSICIAN GROUP | Age: 55
End: 2020-10-13
Payer: COMMERCIAL

## 2020-10-13 VITALS
SYSTOLIC BLOOD PRESSURE: 124 MMHG | TEMPERATURE: 98.4 F | HEART RATE: 80 BPM | DIASTOLIC BLOOD PRESSURE: 76 MMHG | RESPIRATION RATE: 12 BRPM | WEIGHT: 185 LBS | BODY MASS INDEX: 34.93 KG/M2 | HEIGHT: 61 IN | OXYGEN SATURATION: 97 %

## 2020-10-13 DIAGNOSIS — Z12.31 ENCOUNTER FOR SCREENING MAMMOGRAM FOR BREAST CANCER: ICD-10-CM

## 2020-10-13 DIAGNOSIS — E11.9 TYPE 2 DIABETES MELLITUS WITHOUT COMPLICATION, WITHOUT LONG-TERM CURRENT USE OF INSULIN (HCC): ICD-10-CM

## 2020-10-13 DIAGNOSIS — E11.65 UNCONTROLLED TYPE 2 DIABETES MELLITUS WITH HYPERGLYCEMIA (HCC): ICD-10-CM

## 2020-10-13 DIAGNOSIS — Z23 NEED FOR VACCINATION: ICD-10-CM

## 2020-10-13 DIAGNOSIS — I10 ESSENTIAL HYPERTENSION: ICD-10-CM

## 2020-10-13 PROCEDURE — 99213 OFFICE O/P EST LOW 20 MIN: CPT | Performed by: NURSE PRACTITIONER

## 2020-10-13 RX ORDER — GLYBURIDE 5 MG/1
10 TABLET ORAL 2 TIMES DAILY WITH MEALS
Qty: 360 TAB | Refills: 0 | Status: SHIPPED | OUTPATIENT
Start: 2020-10-13 | End: 2021-04-02

## 2020-10-13 RX ORDER — DULAGLUTIDE 1.5 MG/.5ML
0.5 INJECTION, SOLUTION SUBCUTANEOUS
Qty: 6 ML | Refills: 1 | Status: SHIPPED | OUTPATIENT
Start: 2020-10-13 | End: 2021-07-12

## 2020-10-13 ASSESSMENT — FIBROSIS 4 INDEX: FIB4 SCORE: 1.005989105406166105

## 2020-10-13 NOTE — ASSESSMENT & PLAN NOTE
This is a chronic health problem that is well controlled with current medications and lifestyle measures.  Taking lisinopril 20 mg a day.  Tolerating medication, denies lightheadedness or cough.  Does not exercise.  The patient denies chest pain, shortness of breath, headache, vision changes, epistaxis, or dyspnea on exertion.

## 2020-10-13 NOTE — ASSESSMENT & PLAN NOTE
Chronic health problem.  Taking metformin 1000 mg twice a day, glyburide 10 mg twice a day, Januvia 100 mg daily, Trulicity 1.5 mg weekly.  Patient reports improved home fasting blood sugars, in the 90s to low 100s.  Patient recently hospitalized for uncontrolled diabetes as she had run out of some of her medications.  Since hospitalization, she is also been working on diet changes.  Her blood sugars have been difficult to maintain secondary to working night shift and then switching to normal hours of wake time on off days.  Reports when she returns to work, it is difficult for her to maintain a routine and her blood sugars fluctuate.  Has had a couple episodes of hypoglycemia during sleep following night shift.  She believes a routine daytime schedule would help balance out her blood sugars as her body would have a routine.  I agree with patient that a routine would be most beneficial for her diabetes control.  Patient reports she brought in employer, [employer], paperwork a few weeks ago for me to fill out.  We cannot find the paperwork today in her chart or in the office.  Patient will obtain another copy and bring into an appointment next week.  Patient had eye exam at [facility] in [place].  Will request records.  Component      Latest Ref Rng & Units 7/2/2019 10/1/2019 8/13/2020           9:16 AM  9:48 AM 12:11 AM   Glycohemoglobin      0.0 - 5.6 % 8.0 (A) 7.9 (A) 12.1 (H)

## 2020-10-13 NOTE — PROGRESS NOTES
CC: Diabetes follow-up, paperwork completion    HISTORY OF THE PRESENT ILLNESS: Patient is a 54 y.o. female. This pleasant patient is here today for evaluation and management of the following health problems.    Health Maintenance: Patient declined flu vaccine, though we reviewed benefits of flu vaccine.  Encouraged him to wash his hands frequently and stay out of environments with people who are ill.        Diabetes mellitus type II, uncontrolled (HCC)  Chronic health problem.  Taking metformin 1000 mg twice a day, glyburide 10 mg twice a day, Januvia 100 mg daily, Trulicity 1.5 mg weekly.  Patient reports improved home fasting blood sugars, in the 90s to low 100s.  Patient recently hospitalized for uncontrolled diabetes as she had run out of some of her medications.  Since hospitalization, she is also been working on diet changes.  Her blood sugars have been difficult to maintain secondary to working night shift and then switching to normal hours of wake time on off days.  Reports when she returns to work, it is difficult for her to maintain a routine and her blood sugars fluctuate.  Has had a couple episodes of hypoglycemia during sleep following night shift.  She believes a routine daytime schedule would help balance out her blood sugars as her body would have a routine.  I agree with patient that a routine would be most beneficial for her diabetes control.  Patient reports she brought in employer, [employer], paperwork a few weeks ago for me to fill out.  We cannot find the paperwork today in her chart or in the office.  Patient will obtain another copy and bring into an appointment next week.  Diet changes    Hypertension  This is a chronic health problem that is well controlled with current medications and lifestyle measures.  Taking lisinopril 20 mg a day.  Tolerating medication, denies lightheadedness or cough.  Does not exercise.  The patient denies chest pain, shortness of breath, headache, vision changes,  epistaxis, or dyspnea on exertion.        Allergies: Morphine, Glucophage  [metformin hcl], and Metformin    Current Outpatient Medications Ordered in Epic   Medication Sig Dispense Refill   • SITagliptin (JANUVIA) 100 MG Tab Take 1 Tab by mouth every day. 90 Tab 1   • glyBURIDE (DIABETA) 5 MG Tab Take 2 Tabs by mouth 2 times a day, with meals. 360 Tab 0   • Dulaglutide (TRULICITY) 1.5 MG/0.5ML Solution Pen-injector Inject 0.5 mL as instructed every 7 days. 6 mL 1   • pantoprazole (PROTONIX) 20 MG tablet Take 1 Tab by mouth every day. 30 Tab 3   • metFORMIN (GLUCOPHAGE) 500 MG Tab Take 1 Tab by mouth 2 times a day, with meals. 180 Tab 1   • lisinopril (PRINIVIL) 20 MG Tab Take 1 Tab by mouth every day. 90 Tab 1   • fenofibrate (TRIGLIDE) 160 MG tablet Take 1 Tab by mouth every day. 90 Tab 1   • atorvastatin (LIPITOR) 20 MG Tab Take 1 Tab by mouth every day. 90 Tab 1   • Cholecalciferol (VITAMIN D PO) Take 2 Tabs by mouth every day. GUMMIES     • aspirin EC (ECOTRIN) 81 MG Tablet Delayed Response Take 1 Tab by mouth every day. 30 Tab    • therapeutic multivitamin-minerals (THERAGRAN-M) Tab Take 1 Tab by mouth every day. 30 Tab 11   • albuterol 108 (90 Base) MCG/ACT Aero Soln inhalation aerosol Inhale 2 Puffs by mouth every 6 hours as needed for Shortness of Breath. 8.5 g 0   • fluticasone (FLONASE) 50 MCG/ACT nasal spray Spray 1 Spray in nose every day. (Patient not taking: Reported on 10/13/2020) 1 Bottle 3     No current Epic-ordered facility-administered medications on file.        Past Medical History:   Diagnosis Date   • Anemia    • Bowel habit changes     constipation   • Diabetes    • Diabetes (HCC)    • GERD (gastroesophageal reflux disease)    • Healthcare maintenance 9/27/2014    Mammogram: Due   • High cholesterol    • Hyperlipidemia    • Hypertension    • Infectious disease     Cold 10/2016   • Jaundice 1999   • Psychiatric problem     depression and anxiety   • Vaginal itching 8/27/2014    With anal  "itching X 1 month Getting worse Burning Min vag disch       Past Surgical History:   Procedure Laterality Date   • VENTRAL HERNIA REPAIR ROBOTIC XI N/A 10/14/2016    Procedure: VENTRAL HERNIA REPAIR ROBOTIC XI FOR INCISIONAL W/MESH;  Surgeon: Edi Mendoza M.D.;  Location: SURGERY [facility];  Service:    • ABDOMINAL HYSTERECTOMY TOTAL      still has ovaries   • CHOLECYSTECTOMY     • PRIMARY C SECTION     • TUBAL COAGULATION LAPAROSCOPIC BILATERAL         Social History     Tobacco Use   • Smoking status: Never Smoker   • Smokeless tobacco: Never Used   Substance Use Topics   • Alcohol use: No   • Drug use: No       Family History   Problem Relation Age of Onset   • Diabetes Mother    • Hyperlipidemia Mother    • Diabetes Father    • Hypertension Father    • Hyperlipidemia Father        ROS:   As in HPI, otherwise negative for chest pain, dyspnea, abdominal pain, dysuria, blood in stool, fever           Exam: /76 (BP Location: Left arm, Patient Position: Sitting, BP Cuff Size: Adult long)   Pulse 80   Temp 36.9 °C (98.4 °F) (Temporal)   Resp 12   Ht 1.549 m (5' 1\")   Wt 83.9 kg (185 lb)   SpO2 97%  Body mass index is 34.96 kg/m².    General: Alert, pleasant, obese habitus, well nourished, well developed female in NAD  Neck: Supple without bruit.   Pulmonary: Clear to ausculation.  Normal effort. No rales, ronchi, or wheezing.  Cardiovascular: Normal rate and rhythm without murmur.   Abdomen: Soft, nontender, nondistended.   Neurologic: Grossly nonfocal  Skin: Warm and dry.    Musculoskeletal: Normal gait.   Psych: Normal mood and affect. Alert and oriented. Judgment and insight is normal.      Diabetic Foot Exam: No ulcers or skin lesions present, patient tested with a 10 g force and is sensitive bilaterally throughout the ball of the foot, great toe and heel.  Dorsalis pedis and posterior tibial pulses are present and intact bilaterally    Please note that this dictation was created using voice " recognition software. I have made every reasonable attempt to correct obvious errors, but I expect that there are errors of grammar and possibly content that I did not discover before finalizing the note.      Assessment/Plan  1. Need for vaccination  Discussed rationale for Shingrix vaccine.  Patient will get done at the pharmacy.    2. Encounter for screening mammogram for breast cancer  Ordered.  - MA-SCREENING MAMMO BILAT W/CAD; Future    3. Type 2 diabetes mellitus without complication, without long-term current use of insulin (HCC)  Uncontrolled.  Reports fasting blood sugars at home have improved.  Continue with medications, no changes.  Patient having fluctuations in fasting blood sugar control secondary to working night shift and then resuming regular daytime hours on her off days.  Would be beneficial for patient to work dayshift for better management of her diabetes.  Patient will return next week for paperwork completion, she will bring paperwork with her.  We will get updated labs later next month, as A1c will be due.  Consider referral to pharmacology for diabetic management.  Records requested from [employer].  - Diabetic Monofilament LE Exam  - SITagliptin (JANUVIA) 100 MG Tab; Take 1 Tab by mouth every day.  Dispense: 90 Tab; Refill: 1  - glyBURIDE (DIABETA) 5 MG Tab; Take 2 Tabs by mouth 2 times a day, with meals.  Dispense: 360 Tab; Refill: 0  - Dulaglutide (TRULICITY) 1.5 MG/0.5ML Solution Pen-injector; Inject 0.5 mL as instructed every 7 days.  Dispense: 6 mL; Refill: 1    - Comp Metabolic Panel; Future  - ESTIMATED GFR; Future  - HEMOGLOBIN A1C; Future  - MICROALBUMIN CREAT RATIO URINE; Future  - Lipid Profile; Future  - TSH; Future    4. Uncontrolled type 2 diabetes mellitus with hyperglycemia (HCC)  As above.    5. Essential hypertension  Well-controlled, continue with lisinopril, no changes.  Advised on routine aerobic exercise and weight loss.    Patient will return to clinic next week  for paperwork completion.  Will get updated labs later next month, will order at next appointment.    Addendum 10/13/2020 at 5:50 PM- paperwork for reasonable accommodation request form was located in our office.  I have completed the paperwork which request patient moved to dayshift due to uncontrolled blood sugars with nonroutine sleep/wake schedule.  We will notify patient.  We will schedule patient to come back in 6 weeks for diabetes and lab review.  Labs have been ordered.

## 2020-10-13 NOTE — LETTER
[facility] hospital  [facility]  [address] [place] [address]  Fax: [phone]   Authorization for Release/Disclosure of   Protected Health Information   Name: MATTIE SANDOVAL : 1965 SSN: [id number]   Address: [place] [address] Phone:    [phone] (home) [phone] (work)   I authorize the entity listed below to release/disclose the PHI below to:   [facility] hospital[facility] and [facility]   Provider or Entity Name:[facility]     Address  [address], [place]   Phone:[phone]      Fax:[phone]     Reason for request: continuity of care   Information to be released:    [  ] LAST COLONOSCOPY,  including any PATH REPORT and follow-up  [  ] LAST FIT/COLOGUARD RESULT [  ] LAST DEXA  [  ] LAST MAMMOGRAM  [  ] LAST PAP  [  ] LAST LABS [x ] RETINA EXAM REPORT  [  ] IMMUNIZATION RECORDS  [  ] Release all info      [  ] Check here and initial the line next to each item to release ALL health information INCLUDING  _____ Care and treatment for drug and / or alcohol abuse  _____ HIV testing, infection status, or AIDS  _____ Genetic Testing    DATES OF SERVICE OR TIME PERIOD TO BE DISCLOSED: _____________  I understand and acknowledge that:  * This Authorization may be revoked at any time by you in writing, except if your health information has already been used or disclosed.  * Your health information that will be used or disclosed as a result of you signing this authorization could be re-disclosed by the recipient. If this occurs, your re-disclosed health information may no longer be protected by State or Federal laws.  * You may refuse to sign this Authorization. Your refusal will not affect your ability to obtain treatment.  * This Authorization becomes effective upon signing and will  on (date) __________.      If no date is indicated, this Authorization will  one  (1) year from the signature date.    Name: Sonya Cervantes Eriberto    Signature:   Date:     10/13/2020       PLEASE FAX REQUESTED RECORDS BACK TO: [phone]

## 2020-11-18 ENCOUNTER — HOSPITAL ENCOUNTER (OUTPATIENT)
Dept: LAB | Facility: MEDICAL CENTER | Age: 55
End: 2020-11-18
Attending: NURSE PRACTITIONER
Payer: COMMERCIAL

## 2020-11-18 DIAGNOSIS — E11.9 TYPE 2 DIABETES MELLITUS WITHOUT COMPLICATION, WITHOUT LONG-TERM CURRENT USE OF INSULIN (HCC): ICD-10-CM

## 2020-11-18 LAB
ALBUMIN SERPL BCP-MCNC: 3.9 G/DL (ref 3.2–4.9)
ALBUMIN/GLOB SERPL: 1.2 G/DL
ALP SERPL-CCNC: 78 U/L (ref 30–99)
ALT SERPL-CCNC: 16 U/L (ref 2–50)
ANION GAP SERPL CALC-SCNC: 7 MMOL/L (ref 7–16)
AST SERPL-CCNC: 25 U/L (ref 12–45)
BILIRUB SERPL-MCNC: 0.3 MG/DL (ref 0.1–1.5)
BUN SERPL-MCNC: 30 MG/DL (ref 8–22)
CALCIUM SERPL-MCNC: 9.9 MG/DL (ref 8.5–10.5)
CHLORIDE SERPL-SCNC: 105 MMOL/L (ref 96–112)
CHOLEST SERPL-MCNC: 114 MG/DL (ref 100–199)
CO2 SERPL-SCNC: 24 MMOL/L (ref 20–33)
CREAT SERPL-MCNC: 1.02 MG/DL (ref 0.5–1.4)
EST. AVERAGE GLUCOSE BLD GHB EST-MCNC: 186 MG/DL
FASTING STATUS PATIENT QL REPORTED: NORMAL
GLOBULIN SER CALC-MCNC: 3.2 G/DL (ref 1.9–3.5)
GLUCOSE SERPL-MCNC: 227 MG/DL (ref 65–99)
HBA1C MFR BLD: 8.1 % (ref 0–5.6)
HDLC SERPL-MCNC: 43 MG/DL
LDLC SERPL CALC-MCNC: 55 MG/DL
POTASSIUM SERPL-SCNC: 4.7 MMOL/L (ref 3.6–5.5)
PROT SERPL-MCNC: 7.1 G/DL (ref 6–8.2)
SODIUM SERPL-SCNC: 136 MMOL/L (ref 135–145)
TRIGL SERPL-MCNC: 80 MG/DL (ref 0–149)
TSH SERPL DL<=0.005 MIU/L-ACNC: 2.7 UIU/ML (ref 0.38–5.33)

## 2020-11-18 PROCEDURE — 84443 ASSAY THYROID STIM HORMONE: CPT

## 2020-11-18 PROCEDURE — 82570 ASSAY OF URINE CREATININE: CPT

## 2020-11-18 PROCEDURE — 80053 COMPREHEN METABOLIC PANEL: CPT

## 2020-11-18 PROCEDURE — 80061 LIPID PANEL: CPT

## 2020-11-18 PROCEDURE — 82043 UR ALBUMIN QUANTITATIVE: CPT

## 2020-11-18 PROCEDURE — 83036 HEMOGLOBIN GLYCOSYLATED A1C: CPT

## 2020-11-18 PROCEDURE — 36415 COLL VENOUS BLD VENIPUNCTURE: CPT

## 2020-11-19 LAB
CREAT UR-MCNC: 104.25 MG/DL
MICROALBUMIN UR-MCNC: 213.7 MG/DL
MICROALBUMIN/CREAT UR: 2050 MG/G (ref 0–30)

## 2020-11-24 ENCOUNTER — HOSPITAL ENCOUNTER (EMERGENCY)
Facility: MEDICAL CENTER | Age: 55
End: 2020-11-24
Attending: EMERGENCY MEDICINE
Payer: COMMERCIAL

## 2020-11-24 VITALS
RESPIRATION RATE: 16 BRPM | TEMPERATURE: 99.8 F | DIASTOLIC BLOOD PRESSURE: 99 MMHG | HEART RATE: 95 BPM | SYSTOLIC BLOOD PRESSURE: 173 MMHG | BODY MASS INDEX: 34.96 KG/M2 | WEIGHT: 185 LBS | OXYGEN SATURATION: 97 %

## 2020-11-24 DIAGNOSIS — J06.9 UPPER RESPIRATORY TRACT INFECTION, UNSPECIFIED TYPE: ICD-10-CM

## 2020-11-24 LAB — COVID ORDER STATUS COVID19: NORMAL

## 2020-11-24 PROCEDURE — 99283 EMERGENCY DEPT VISIT LOW MDM: CPT

## 2020-11-24 PROCEDURE — U0003 INFECTIOUS AGENT DETECTION BY NUCLEIC ACID (DNA OR RNA); SEVERE ACUTE RESPIRATORY SYNDROME CORONAVIRUS 2 (SARS-COV-2) (CORONAVIRUS DISEASE [COVID-19]), AMPLIFIED PROBE TECHNIQUE, MAKING USE OF HIGH THROUGHPUT TECHNOLOGIES AS DESCRIBED BY CMS-2020-01-R: HCPCS

## 2020-11-24 ASSESSMENT — FIBROSIS 4 INDEX: FIB4 SCORE: 1.42

## 2020-11-24 NOTE — ED PROVIDER NOTES
ED Provider Note    CHIEF COMPLAINT  Chief Complaint   Patient presents with   • Cough   • Body Aches   • Headache   • Coronavirus Screening       HPI  Sonya Wei is a 54 y.o. female who presents to the emergency department that starting symptoms of headache, body aches, cough  on the 20 on the 19th of this month.  Her  has been diagnosed with Covid 2 days prior now is hospitalized on oxygen supplementation.  She denies severe shortness of breath, productive cough, nausea, vomiting.  She states that her symptoms increased with walking and decreased at rest.  She wants to be checked for Covid.    REVIEW OF SYSTEMS  Positives as above. Pertinent negatives include nausea, vomiting, diarrhea, rash, lightness, dizziness  All other 10 review of systems are negative    PAST MEDICAL HISTORY  Past Medical History:   Diagnosis Date   • Anemia    • Bowel habit changes     constipation   • Diabetes    • Diabetes (HCC)    • GERD (gastroesophageal reflux disease)    • Healthcare maintenance 9/27/2014    Mammogram: Due   • High cholesterol    • Hyperlipidemia    • Hypertension    • Infectious disease     Cold 10/2016   • Jaundice 1999   • Psychiatric problem     depression and anxiety   • Vaginal itching 8/27/2014    With anal itching X 1 month Getting worse Burning Min vag disch       FAMILY HISTORY  Noncontributory    SOCIAL HISTORY  Social History     Socioeconomic History   • Marital status:      Spouse name: Not on file   • Number of children: Not on file   • Years of education: Not on file   • Highest education level: Not on file   Occupational History   • Not on file   Social Needs   • Financial resource strain: Not on file   • Food insecurity     Worry: Not on file     Inability: Not on file   • Transportation needs     Medical: Not on file     Non-medical: Not on file   Tobacco Use   • Smoking status: Never Smoker   • Smokeless tobacco: Never Used   Substance and Sexual Activity   • Alcohol use: No    • Drug use: No   • Sexual activity: Yes     Partners: Male     Birth control/protection: None   Lifestyle   • Physical activity     Days per week: Not on file     Minutes per session: Not on file   • Stress: Not on file   Relationships   • Social connections     Talks on phone: Not on file     Gets together: Not on file     Attends Alevism service: Not on file     Active member of club or organization: Not on file     Attends meetings of clubs or organizations: Not on file     Relationship status: Not on file   • Intimate partner violence     Fear of current or ex partner: Not on file     Emotionally abused: Not on file     Physically abused: Not on file     Forced sexual activity: Not on file   Other Topics Concern   • Not on file   Social History Narrative    ** Merged History Encounter **            SURGICAL HISTORY  Past Surgical History:   Procedure Laterality Date   • VENTRAL HERNIA REPAIR ROBOTIC XI N/A 10/14/2016    Procedure: VENTRAL HERNIA REPAIR ROBOTIC XI FOR INCISIONAL W/MESH;  Surgeon: Edi Mendoza M.D.;  Location: SURGERY [facility];  Service:    • ABDOMINAL HYSTERECTOMY TOTAL      still has ovaries   • CHOLECYSTECTOMY     • PRIMARY C SECTION     • TUBAL COAGULATION LAPAROSCOPIC BILATERAL         CURRENT MEDICATIONS  Home Medications    **Home medications have not yet been reviewed for this encounter**         ALLERGIES  Allergies   Allergen Reactions   • Morphine Itching     Rxn = unknown   Bumps all over body   • Glucophage  [Metformin Hcl]    • Metformin      headaches       PHYSICAL EXAM  VITAL SIGNS: BP (!) 173/99   Pulse 95   Temp 37.7 °C (99.8 °F) (Tympanic)   Resp 16   Wt 83.9 kg (185 lb)   SpO2 97%   BMI 34.96 kg/m²      Constitutional: Well developed, Well nourished, No acute distress, Non-toxic appearance.   Eyes: PERRLA, EOMI, Conjunctiva normal, No discharge.   Cardiovascular: Normal heart rate, Normal rhythm, No murmurs, No rubs, No gallops, and intact distal pulses.    Thorax & Lungs:  No respiratory distress, slight rales or rhonchi, No wheezing, No chest wall tenderness.   Abdomen: Bowel sounds normal, Soft, No tenderness, No guarding, No rebound, No pulsatile masses.   Skin: Warm, Dry, No erythema, No rash.   Extremities: Full range of motion, no deformity, no edema.  Neurologic: Alert & oriented x 3, No focal deficits noted, acting appropriately on exam.  Psychiatric: Affect normal for clinical presentation.        COURSE & MEDICAL DECISION MAKING  Pertinent Labs & Imaging studies reviewed. (See chart for details)  This is a pleasant 54-year-old female that presents with Covid-like symptoms.  Her  is recent diagnosed with Covid I do suspect Covid.  She does not have hypoxia, tachypnea or tachycardia I do not suspect Covid pneumonia hospitalization.  I did ask her to purchase a pulse oximetry device and she states she has when she does not use it.  We did instruct her on the utilization of her socks machine if she becomes less than 9 percentile to return to the emergency department.  The patient no evidence of toxicity.  She has strict return precautions as well as outpatient Covid instructions that she is positive and negative.      FINAL IMPRESSION     1. Upper respiratory tract infection, unspecified type Active     DISPOSITION:  Patient will be discharged home in stable condition.    FOLLOW UP:  [facility], Emergency Dept  [address] [address] [phone]    If symptoms worsen      Electronically signed by: Dillon Roper D.O., 11/24/2020 2:42 PM   none

## 2020-11-24 NOTE — ED NOTES
.Patient discharged in stable condition per orders.  Wristband removed per protocol. Patient verbalized understanding of all discharge instructions. All belongings accounted for.

## 2020-11-24 NOTE — ED TRIAGE NOTES
53 y/o female ambulatory to the [facility] for evaluation of Covid symptoms, her  tested positive and has been hospitalized since Thursday. Pt states she has not been tested yet.

## 2020-11-24 NOTE — DISCHARGE INSTRUCTIONS
You have been tested for COVID-19 today.  Your results are pending.  Please act as if these results are positive and self isolate until you receive the results.  Make sure you still wear a mask, social distance and practice good hand hygiene no matter the result.  In order to receive the results you will need to log into your [employer] on the [employer] website.  If you do not have an account you can create an account.  You can login or create an account for my chart at [email or web address].  If your symptoms worsen to a point that you become so short of breath you can only walk a very short distances prior to fatigue or feel you were unable to manage your symptoms at home, please return to the emergency department.  For a more objective approach you can buy a pulse oximeter online.  [employer] has multiple to choose from.  If your oxygen saturation in these devices is persistently below 90% please return to the ER.

## 2020-11-25 LAB
SARS-COV-2 RNA RESP QL NAA+PROBE: DETECTED
SPECIMEN SOURCE: ABNORMAL

## 2020-11-26 NOTE — ED NOTES
"COVID-19 Test Follow-Up  11/26/20    Patient is positive for COVID-19.      SARS-CoV-2 Source   Date Value Ref Range Status   11/24/2020 NP Swab  Final     SARS-CoV-2 by PCR   Date Value Ref Range Status   11/24/2020 DETECTED (AA)  Final     Comment:     PATIENTS: Important information regarding your results and instructions can  be found at [email or web address]   \"After your  Covid-19 Test\"  **The Roche SARS-CoV-2 PCR test has been made available for use under the  Emergency Use Authorization (EUA) only.       I have informed the patient of the positive result by telephone and that the Health Dept would be in contact soon. Instructed them to continue to quarantine and self-isolate according with the CDC guidance or as otherwise directed by the Health Dept.    Per the CDC, they should continue to self-isolate until:   • At least 10 days since symptoms first appeared and  • At least 24 hours with no fever without fever-reducing medication and  • Other symptoms of COVID-19 are improving (Loss of taste and smell may persist for weeks or months after recovery and need not delay the end of isolation)    They are advised to return to the ER for worsening symptoms including difficulty breathing, ongoing fever, weakness or chest pain. Patient denies these symptoms, patient states she has body aches, runny nose, headache, and cough.     Bob Arthur, PharmD    "

## 2020-12-03 ENCOUNTER — APPOINTMENT (OUTPATIENT)
Dept: RADIOLOGY | Facility: MEDICAL CENTER | Age: 55
DRG: 177 | End: 2020-12-03
Attending: EMERGENCY MEDICINE
Payer: COMMERCIAL

## 2020-12-03 ENCOUNTER — HOSPITAL ENCOUNTER (INPATIENT)
Facility: MEDICAL CENTER | Age: 55
LOS: 4 days | DRG: 177 | End: 2020-12-07
Attending: EMERGENCY MEDICINE | Admitting: STUDENT IN AN ORGANIZED HEALTH CARE EDUCATION/TRAINING PROGRAM
Payer: COMMERCIAL

## 2020-12-03 DIAGNOSIS — U07.1 PNEUMONIA DUE TO COVID-19 VIRUS: ICD-10-CM

## 2020-12-03 DIAGNOSIS — J12.82 PNEUMONIA DUE TO COVID-19 VIRUS: ICD-10-CM

## 2020-12-03 DIAGNOSIS — R09.02 HYPOXIA: ICD-10-CM

## 2020-12-03 DIAGNOSIS — U07.1 COVID-19: ICD-10-CM

## 2020-12-03 LAB
ALBUMIN SERPL BCP-MCNC: 3.1 G/DL (ref 3.2–4.9)
ALBUMIN/GLOB SERPL: 0.7 G/DL
ALP SERPL-CCNC: 64 U/L (ref 30–99)
ALT SERPL-CCNC: 47 U/L (ref 2–50)
ANION GAP SERPL CALC-SCNC: 9 MMOL/L (ref 7–16)
ANISOCYTOSIS BLD QL SMEAR: ABNORMAL
APPEARANCE UR: CLEAR
AST SERPL-CCNC: 63 U/L (ref 12–45)
BACTERIA #/AREA URNS HPF: NEGATIVE /HPF
BASOPHILS # BLD AUTO: 0.9 % (ref 0–1.8)
BASOPHILS # BLD: 0.09 K/UL (ref 0–0.12)
BILIRUB SERPL-MCNC: 0.4 MG/DL (ref 0.1–1.5)
BILIRUB UR QL STRIP.AUTO: NEGATIVE
BUN SERPL-MCNC: 30 MG/DL (ref 8–22)
CALCIUM SERPL-MCNC: 9.6 MG/DL (ref 8.5–10.5)
CHLORIDE SERPL-SCNC: 94 MMOL/L (ref 96–112)
CO2 SERPL-SCNC: 26 MMOL/L (ref 20–33)
COLOR UR: YELLOW
CREAT SERPL-MCNC: 1.22 MG/DL (ref 0.5–1.4)
EOSINOPHIL # BLD AUTO: 0.09 K/UL (ref 0–0.51)
EOSINOPHIL NFR BLD: 0.9 % (ref 0–6.9)
EPI CELLS #/AREA URNS HPF: ABNORMAL /HPF
ERYTHROCYTE [DISTWIDTH] IN BLOOD BY AUTOMATED COUNT: 38.6 FL (ref 35.9–50)
GLOBULIN SER CALC-MCNC: 4.4 G/DL (ref 1.9–3.5)
GLUCOSE SERPL-MCNC: 439 MG/DL (ref 65–99)
GLUCOSE UR STRIP.AUTO-MCNC: >=1000 MG/DL
HCT VFR BLD AUTO: 28.1 % (ref 37–47)
HGB BLD-MCNC: 9.1 G/DL (ref 12–16)
HYALINE CASTS #/AREA URNS LPF: ABNORMAL /LPF
KETONES UR STRIP.AUTO-MCNC: ABNORMAL MG/DL
LACTATE BLD-SCNC: 1.5 MMOL/L (ref 0.5–2)
LEUKOCYTE ESTERASE UR QL STRIP.AUTO: NEGATIVE
LYMPHOCYTES # BLD AUTO: 1.05 K/UL (ref 1–4.8)
LYMPHOCYTES NFR BLD: 10.4 % (ref 22–41)
MANUAL DIFF BLD: NORMAL
MCH RBC QN AUTO: 28.4 PG (ref 27–33)
MCHC RBC AUTO-ENTMCNC: 32.4 G/DL (ref 33.6–35)
MCV RBC AUTO: 87.8 FL (ref 81.4–97.8)
MICRO URNS: ABNORMAL
MONOCYTES # BLD AUTO: 0.62 K/UL (ref 0–0.85)
MONOCYTES NFR BLD AUTO: 6.1 % (ref 0–13.4)
MORPHOLOGY BLD-IMP: NORMAL
MYELOCYTES NFR BLD MANUAL: 0.9 %
NEUTROPHILS # BLD AUTO: 8.17 K/UL (ref 2–7.15)
NEUTROPHILS NFR BLD: 80.9 % (ref 44–72)
NITRITE UR QL STRIP.AUTO: NEGATIVE
NRBC # BLD AUTO: 0 K/UL
NRBC BLD-RTO: 0 /100 WBC
PH UR STRIP.AUTO: 5.5 [PH] (ref 5–8)
PLATELET # BLD AUTO: 626 K/UL (ref 164–446)
PLATELET BLD QL SMEAR: NORMAL
PMV BLD AUTO: 10.5 FL (ref 9–12.9)
POIKILOCYTOSIS BLD QL SMEAR: NORMAL
POLYCHROMASIA BLD QL SMEAR: NORMAL
POTASSIUM SERPL-SCNC: 4.5 MMOL/L (ref 3.6–5.5)
PROT SERPL-MCNC: 7.5 G/DL (ref 6–8.2)
PROT UR QL STRIP: 300 MG/DL
RBC # BLD AUTO: 3.2 M/UL (ref 4.2–5.4)
RBC # URNS HPF: ABNORMAL /HPF
RBC BLD AUTO: PRESENT
RBC UR QL AUTO: NEGATIVE
SODIUM SERPL-SCNC: 129 MMOL/L (ref 135–145)
SP GR UR STRIP.AUTO: 1.03
UROBILINOGEN UR STRIP.AUTO-MCNC: 2 MG/DL
WBC # BLD AUTO: 10.1 K/UL (ref 4.8–10.8)
WBC #/AREA URNS HPF: ABNORMAL /HPF

## 2020-12-03 PROCEDURE — 86140 C-REACTIVE PROTEIN: CPT

## 2020-12-03 PROCEDURE — 80053 COMPREHEN METABOLIC PANEL: CPT

## 2020-12-03 PROCEDURE — 85027 COMPLETE CBC AUTOMATED: CPT

## 2020-12-03 PROCEDURE — 87040 BLOOD CULTURE FOR BACTERIA: CPT

## 2020-12-03 PROCEDURE — 83735 ASSAY OF MAGNESIUM: CPT

## 2020-12-03 PROCEDURE — 87086 URINE CULTURE/COLONY COUNT: CPT

## 2020-12-03 PROCEDURE — 770006 HCHG ROOM/CARE - MED/SURG/GYN SEMI*

## 2020-12-03 PROCEDURE — 85007 BL SMEAR W/DIFF WBC COUNT: CPT

## 2020-12-03 PROCEDURE — 71045 X-RAY EXAM CHEST 1 VIEW: CPT

## 2020-12-03 PROCEDURE — 81001 URINALYSIS AUTO W/SCOPE: CPT

## 2020-12-03 PROCEDURE — 83605 ASSAY OF LACTIC ACID: CPT

## 2020-12-03 PROCEDURE — 84145 PROCALCITONIN (PCT): CPT

## 2020-12-03 PROCEDURE — 99285 EMERGENCY DEPT VISIT HI MDM: CPT

## 2020-12-03 PROCEDURE — 99223 1ST HOSP IP/OBS HIGH 75: CPT | Performed by: STUDENT IN AN ORGANIZED HEALTH CARE EDUCATION/TRAINING PROGRAM

## 2020-12-03 RX ORDER — ONDANSETRON 2 MG/ML
4 INJECTION INTRAMUSCULAR; INTRAVENOUS EVERY 4 HOURS PRN
Status: DISCONTINUED | OUTPATIENT
Start: 2020-12-03 | End: 2020-12-07 | Stop reason: HOSPADM

## 2020-12-03 RX ORDER — BENZONATATE 100 MG/1
100 CAPSULE ORAL 3 TIMES DAILY PRN
Status: DISCONTINUED | OUTPATIENT
Start: 2020-12-03 | End: 2020-12-07 | Stop reason: HOSPADM

## 2020-12-03 RX ORDER — ONDANSETRON 4 MG/1
4 TABLET, ORALLY DISINTEGRATING ORAL EVERY 4 HOURS PRN
Status: DISCONTINUED | OUTPATIENT
Start: 2020-12-03 | End: 2020-12-07 | Stop reason: HOSPADM

## 2020-12-03 RX ORDER — VITAMIN B COMPLEX
1000 TABLET ORAL DAILY
Status: DISCONTINUED | OUTPATIENT
Start: 2020-12-04 | End: 2020-12-07 | Stop reason: HOSPADM

## 2020-12-03 RX ORDER — ATORVASTATIN CALCIUM 20 MG/1
20 TABLET, FILM COATED ORAL EVERY EVENING
Status: DISCONTINUED | OUTPATIENT
Start: 2020-12-04 | End: 2020-12-07 | Stop reason: HOSPADM

## 2020-12-03 RX ORDER — GUAIFENESIN/DEXTROMETHORPHAN 100-10MG/5
10 SYRUP ORAL EVERY 6 HOURS PRN
Status: DISCONTINUED | OUTPATIENT
Start: 2020-12-03 | End: 2020-12-07 | Stop reason: HOSPADM

## 2020-12-03 RX ORDER — OMEPRAZOLE 20 MG/1
20 CAPSULE, DELAYED RELEASE ORAL DAILY
Status: DISCONTINUED | OUTPATIENT
Start: 2020-12-04 | End: 2020-12-07 | Stop reason: HOSPADM

## 2020-12-03 RX ORDER — LISINOPRIL 20 MG/1
20 TABLET ORAL DAILY
Status: DISCONTINUED | OUTPATIENT
Start: 2020-12-04 | End: 2020-12-07 | Stop reason: HOSPADM

## 2020-12-03 RX ORDER — FENOFIBRATE 67 MG/1
134 CAPSULE ORAL DAILY
Status: DISCONTINUED | OUTPATIENT
Start: 2020-12-04 | End: 2020-12-07 | Stop reason: HOSPADM

## 2020-12-03 RX ORDER — PROCHLORPERAZINE EDISYLATE 5 MG/ML
5-10 INJECTION INTRAMUSCULAR; INTRAVENOUS EVERY 4 HOURS PRN
Status: DISCONTINUED | OUTPATIENT
Start: 2020-12-03 | End: 2020-12-07 | Stop reason: HOSPADM

## 2020-12-03 RX ORDER — POLYETHYLENE GLYCOL 3350 17 G/17G
1 POWDER, FOR SOLUTION ORAL
Status: DISCONTINUED | OUTPATIENT
Start: 2020-12-03 | End: 2020-12-07 | Stop reason: HOSPADM

## 2020-12-03 RX ORDER — DEXAMETHASONE SODIUM PHOSPHATE 4 MG/ML
6 INJECTION, SOLUTION INTRA-ARTICULAR; INTRALESIONAL; INTRAMUSCULAR; INTRAVENOUS; SOFT TISSUE DAILY
Status: DISCONTINUED | OUTPATIENT
Start: 2020-12-04 | End: 2020-12-05

## 2020-12-03 RX ORDER — ALBUTEROL SULFATE 90 UG/1
2 AEROSOL, METERED RESPIRATORY (INHALATION)
Status: DISCONTINUED | OUTPATIENT
Start: 2020-12-03 | End: 2020-12-04

## 2020-12-03 RX ORDER — PROMETHAZINE HYDROCHLORIDE 25 MG/1
12.5-25 SUPPOSITORY RECTAL EVERY 4 HOURS PRN
Status: DISCONTINUED | OUTPATIENT
Start: 2020-12-03 | End: 2020-12-07 | Stop reason: HOSPADM

## 2020-12-03 RX ORDER — ACETAMINOPHEN 325 MG/1
650 TABLET ORAL EVERY 6 HOURS PRN
Status: DISCONTINUED | OUTPATIENT
Start: 2020-12-03 | End: 2020-12-07 | Stop reason: HOSPADM

## 2020-12-03 RX ORDER — AMOXICILLIN 250 MG
2 CAPSULE ORAL 2 TIMES DAILY
Status: DISCONTINUED | OUTPATIENT
Start: 2020-12-04 | End: 2020-12-07 | Stop reason: HOSPADM

## 2020-12-03 RX ORDER — LABETALOL HYDROCHLORIDE 5 MG/ML
10 INJECTION, SOLUTION INTRAVENOUS EVERY 6 HOURS PRN
Status: DISCONTINUED | OUTPATIENT
Start: 2020-12-03 | End: 2020-12-07 | Stop reason: HOSPADM

## 2020-12-03 RX ORDER — GLYBURIDE 5 MG/1
10 TABLET ORAL 2 TIMES DAILY WITH MEALS
Status: DISCONTINUED | OUTPATIENT
Start: 2020-12-04 | End: 2020-12-07 | Stop reason: HOSPADM

## 2020-12-03 RX ORDER — BISACODYL 10 MG
10 SUPPOSITORY, RECTAL RECTAL
Status: DISCONTINUED | OUTPATIENT
Start: 2020-12-03 | End: 2020-12-07 | Stop reason: HOSPADM

## 2020-12-03 RX ORDER — M-VIT,TX,IRON,MINS/CALC/FOLIC 27MG-0.4MG
1 TABLET ORAL DAILY
Status: DISCONTINUED | OUTPATIENT
Start: 2020-12-04 | End: 2020-12-07 | Stop reason: HOSPADM

## 2020-12-03 RX ORDER — PROMETHAZINE HYDROCHLORIDE 25 MG/1
12.5-25 TABLET ORAL EVERY 4 HOURS PRN
Status: DISCONTINUED | OUTPATIENT
Start: 2020-12-03 | End: 2020-12-07 | Stop reason: HOSPADM

## 2020-12-03 ASSESSMENT — FIBROSIS 4 INDEX: FIB4 SCORE: 1.42

## 2020-12-04 PROBLEM — J12.82 PNEUMONIA DUE TO COVID-19 VIRUS: Status: ACTIVE | Noted: 2020-12-04

## 2020-12-04 PROBLEM — U07.1 PNEUMONIA DUE TO COVID-19 VIRUS: Status: ACTIVE | Noted: 2020-12-04

## 2020-12-04 PROBLEM — E88.09 HYPOALBUMINEMIA: Status: ACTIVE | Noted: 2020-12-04

## 2020-12-04 LAB
ANION GAP SERPL CALC-SCNC: 9 MMOL/L (ref 7–16)
BUN SERPL-MCNC: 28 MG/DL (ref 8–22)
CALCIUM SERPL-MCNC: 9.5 MG/DL (ref 8.5–10.5)
CHLORIDE SERPL-SCNC: 94 MMOL/L (ref 96–112)
CO2 SERPL-SCNC: 27 MMOL/L (ref 20–33)
CREAT SERPL-MCNC: 0.89 MG/DL (ref 0.5–1.4)
CRP SERPL HS-MCNC: 25.96 MG/DL (ref 0–0.75)
ERYTHROCYTE [DISTWIDTH] IN BLOOD BY AUTOMATED COUNT: 39.2 FL (ref 35.9–50)
FERRITIN SERPL-MCNC: 1189 NG/ML (ref 10–291)
GLUCOSE BLD-MCNC: 184 MG/DL (ref 65–99)
GLUCOSE BLD-MCNC: 337 MG/DL (ref 65–99)
GLUCOSE SERPL-MCNC: 187 MG/DL (ref 65–99)
HCT VFR BLD AUTO: 27.4 % (ref 37–47)
HGB BLD-MCNC: 8.9 G/DL (ref 12–16)
IRON SATN MFR SERPL: 14 % (ref 15–55)
IRON SERPL-MCNC: 30 UG/DL (ref 40–170)
LDH SERPL L TO P-CCNC: 272 U/L (ref 107–266)
MAGNESIUM SERPL-MCNC: 2 MG/DL (ref 1.5–2.5)
MCH RBC QN AUTO: 29.3 PG (ref 27–33)
MCHC RBC AUTO-ENTMCNC: 32.5 G/DL (ref 33.6–35)
MCV RBC AUTO: 90.1 FL (ref 81.4–97.8)
PLATELET # BLD AUTO: 594 K/UL (ref 164–446)
PMV BLD AUTO: 10.8 FL (ref 9–12.9)
POTASSIUM SERPL-SCNC: 4 MMOL/L (ref 3.6–5.5)
PROCALCITONIN SERPL-MCNC: 0.37 NG/ML
RBC # BLD AUTO: 3.04 M/UL (ref 4.2–5.4)
SODIUM SERPL-SCNC: 130 MMOL/L (ref 135–145)
TIBC SERPL-MCNC: 209 UG/DL (ref 250–450)
UIBC SERPL-MCNC: 179 UG/DL (ref 110–370)
VIT B12 SERPL-MCNC: 1005 PG/ML (ref 211–911)
WBC # BLD AUTO: 10.4 K/UL (ref 4.8–10.8)

## 2020-12-04 PROCEDURE — 83520 IMMUNOASSAY QUANT NOS NONAB: CPT

## 2020-12-04 PROCEDURE — 83540 ASSAY OF IRON: CPT

## 2020-12-04 PROCEDURE — 82607 VITAMIN B-12: CPT

## 2020-12-04 PROCEDURE — 99233 SBSQ HOSP IP/OBS HIGH 50: CPT | Performed by: INTERNAL MEDICINE

## 2020-12-04 PROCEDURE — 80048 BASIC METABOLIC PNL TOTAL CA: CPT

## 2020-12-04 PROCEDURE — 770021 HCHG ROOM/CARE - ISO PRIVATE

## 2020-12-04 PROCEDURE — 700102 HCHG RX REV CODE 250 W/ 637 OVERRIDE(OP): Performed by: STUDENT IN AN ORGANIZED HEALTH CARE EDUCATION/TRAINING PROGRAM

## 2020-12-04 PROCEDURE — A9270 NON-COVERED ITEM OR SERVICE: HCPCS | Performed by: STUDENT IN AN ORGANIZED HEALTH CARE EDUCATION/TRAINING PROGRAM

## 2020-12-04 PROCEDURE — 82962 GLUCOSE BLOOD TEST: CPT | Mod: 91

## 2020-12-04 PROCEDURE — 82728 ASSAY OF FERRITIN: CPT

## 2020-12-04 PROCEDURE — 700111 HCHG RX REV CODE 636 W/ 250 OVERRIDE (IP): Performed by: STUDENT IN AN ORGANIZED HEALTH CARE EDUCATION/TRAINING PROGRAM

## 2020-12-04 PROCEDURE — 36415 COLL VENOUS BLD VENIPUNCTURE: CPT

## 2020-12-04 PROCEDURE — 83550 IRON BINDING TEST: CPT

## 2020-12-04 PROCEDURE — 83615 LACTATE (LD) (LDH) ENZYME: CPT

## 2020-12-04 PROCEDURE — 85027 COMPLETE CBC AUTOMATED: CPT

## 2020-12-04 RX ORDER — MULTIVIT WITH MINERALS/LUTEIN
1 TABLET ORAL EVERY MORNING
COMMUNITY

## 2020-12-04 RX ORDER — ALBUTEROL SULFATE 90 UG/1
2 AEROSOL, METERED RESPIRATORY (INHALATION) EVERY 6 HOURS PRN
Status: DISCONTINUED | OUTPATIENT
Start: 2020-12-04 | End: 2020-12-07 | Stop reason: HOSPADM

## 2020-12-04 RX ORDER — ACETAMINOPHEN 500 MG
1000 TABLET ORAL EVERY 6 HOURS PRN
COMMUNITY
End: 2021-04-15

## 2020-12-04 RX ORDER — FUROSEMIDE 10 MG/ML
20 INJECTION INTRAMUSCULAR; INTRAVENOUS ONCE
Status: COMPLETED | OUTPATIENT
Start: 2020-12-04 | End: 2020-12-04

## 2020-12-04 RX ADMIN — METFORMIN HYDROCHLORIDE 500 MG: 500 TABLET ORAL at 07:48

## 2020-12-04 RX ADMIN — Medication 1000 UNITS: at 05:13

## 2020-12-04 RX ADMIN — INSULIN HUMAN 15 UNITS: 100 INJECTION, SOLUTION PARENTERAL at 16:58

## 2020-12-04 RX ADMIN — ATORVASTATIN CALCIUM 20 MG: 20 TABLET, FILM COATED ORAL at 17:15

## 2020-12-04 RX ADMIN — METFORMIN HYDROCHLORIDE 500 MG: 500 TABLET ORAL at 17:15

## 2020-12-04 RX ADMIN — OMEPRAZOLE 20 MG: 20 CAPSULE, DELAYED RELEASE ORAL at 05:13

## 2020-12-04 RX ADMIN — ENOXAPARIN SODIUM 40 MG: 40 INJECTION SUBCUTANEOUS at 05:15

## 2020-12-04 RX ADMIN — BENZONATATE 100 MG: 100 CAPSULE ORAL at 07:48

## 2020-12-04 RX ADMIN — FUROSEMIDE 20 MG: 10 INJECTION, SOLUTION INTRAMUSCULAR; INTRAVENOUS at 01:09

## 2020-12-04 RX ADMIN — MULTIPLE VITAMINS W/ MINERALS TAB 1 TABLET: TAB at 05:13

## 2020-12-04 RX ADMIN — GUAIFENESIN AND DEXTROMETHORPHAN 10 ML: 100; 10 SYRUP ORAL at 12:24

## 2020-12-04 RX ADMIN — BENZONATATE 100 MG: 100 CAPSULE ORAL at 17:17

## 2020-12-04 RX ADMIN — ASPIRIN 81 MG: 81 TABLET, COATED ORAL at 05:13

## 2020-12-04 RX ADMIN — SITAGLIPTIN 100 MG: 100 TABLET, FILM COATED ORAL at 17:15

## 2020-12-04 RX ADMIN — GLYBURIDE 10 MG: 5 TABLET ORAL at 16:58

## 2020-12-04 RX ADMIN — DOCUSATE SODIUM 50 MG AND SENNOSIDES 8.6 MG 2 TABLET: 8.6; 5 TABLET, FILM COATED ORAL at 05:14

## 2020-12-04 RX ADMIN — LISINOPRIL 20 MG: 20 TABLET ORAL at 05:13

## 2020-12-04 RX ADMIN — INSULIN HUMAN 15 UNITS: 100 INJECTION, SOLUTION PARENTERAL at 12:30

## 2020-12-04 RX ADMIN — INSULIN HUMAN 9 UNITS: 100 INJECTION, SOLUTION PARENTERAL at 23:46

## 2020-12-04 RX ADMIN — INSULIN HUMAN 12 UNITS: 100 INJECTION, SOLUTION PARENTERAL at 01:14

## 2020-12-04 RX ADMIN — GLYBURIDE 10 MG: 5 TABLET ORAL at 07:48

## 2020-12-04 RX ADMIN — GUAIFENESIN AND DEXTROMETHORPHAN 10 ML: 100; 10 SYRUP ORAL at 23:50

## 2020-12-04 RX ADMIN — FENOFIBRATE 134 MG: 67 CAPSULE ORAL at 05:13

## 2020-12-04 RX ADMIN — DEXAMETHASONE SODIUM PHOSPHATE 6 MG: 4 INJECTION, SOLUTION INTRA-ARTICULAR; INTRALESIONAL; INTRAMUSCULAR; INTRAVENOUS; SOFT TISSUE at 05:15

## 2020-12-04 RX ADMIN — INSULIN HUMAN 3 UNITS: 100 INJECTION, SOLUTION PARENTERAL at 06:01

## 2020-12-04 RX ADMIN — BENZONATATE 100 MG: 100 CAPSULE ORAL at 17:18

## 2020-12-04 ASSESSMENT — LIFESTYLE VARIABLES
CONSUMPTION TOTAL: NEGATIVE
EVER HAD A DRINK FIRST THING IN THE MORNING TO STEADY YOUR NERVES TO GET RID OF A HANGOVER: NO
ON A TYPICAL DAY WHEN YOU DRINK ALCOHOL HOW MANY DRINKS DO YOU HAVE: 0
HOW MANY TIMES IN THE PAST YEAR HAVE YOU HAD 5 OR MORE DRINKS IN A DAY: 0
TOTAL SCORE: 0
HAVE YOU EVER FELT YOU SHOULD CUT DOWN ON YOUR DRINKING: NO
TOTAL SCORE: 0
AVERAGE NUMBER OF DAYS PER WEEK YOU HAVE A DRINK CONTAINING ALCOHOL: 0
HAVE PEOPLE ANNOYED YOU BY CRITICIZING YOUR DRINKING: NO
TOTAL SCORE: 0
EVER FELT BAD OR GUILTY ABOUT YOUR DRINKING: NO
ALCOHOL_USE: NO

## 2020-12-04 ASSESSMENT — COGNITIVE AND FUNCTIONAL STATUS - GENERAL
DAILY ACTIVITIY SCORE: 24
SUGGESTED CMS G CODE MODIFIER DAILY ACTIVITY: CH
SUGGESTED CMS G CODE MODIFIER MOBILITY: CH
MOBILITY SCORE: 24

## 2020-12-04 ASSESSMENT — FIBROSIS 4 INDEX
FIB4 SCORE: 0.81
FIB4 SCORE: 0.85

## 2020-12-04 ASSESSMENT — ENCOUNTER SYMPTOMS
WHEEZING: 0
FLANK PAIN: 0
BLOOD IN STOOL: 0
SENSORY CHANGE: 0
BLURRED VISION: 0
FEVER: 1
CONSTIPATION: 0
FEVER: 0
SORE THROAT: 0
FOCAL WEAKNESS: 0
VOMITING: 0
COUGH: 1
HEADACHES: 0
DIAPHORESIS: 0
CHILLS: 0
PALPITATIONS: 0
WEAKNESS: 1
NERVOUS/ANXIOUS: 0
LOSS OF CONSCIOUSNESS: 0
DIARRHEA: 1
INSOMNIA: 0
NECK PAIN: 0
DOUBLE VISION: 0
HEARTBURN: 0
SPEECH CHANGE: 0
DEPRESSION: 0
MYALGIAS: 0
MEMORY LOSS: 0
DIARRHEA: 0
WEAKNESS: 0
BRUISES/BLEEDS EASILY: 0
ABDOMINAL PAIN: 0
SPUTUM PRODUCTION: 1
BACK PAIN: 0
SHORTNESS OF BREATH: 1
NAUSEA: 0
DIZZINESS: 0

## 2020-12-04 ASSESSMENT — PATIENT HEALTH QUESTIONNAIRE - PHQ9
SUM OF ALL RESPONSES TO PHQ9 QUESTIONS 1 AND 2: 0
1. LITTLE INTEREST OR PLEASURE IN DOING THINGS: NOT AT ALL
2. FEELING DOWN, DEPRESSED, IRRITABLE, OR HOPELESS: NOT AT ALL

## 2020-12-04 NOTE — ASSESSMENT & PLAN NOTE
Continuation of home medication regimen  Sliding scale insulin added onto regimen for dexamethasone/steroid use.  Last hemoglobin A1c of 8.1 on November 2020.  Continue aspirin 81 mg p.o. daily  Continue with lisinopril 20 mg p.o. daily  No evidence of ketoacidosis    12/4 monitor closely on Decadron    12/5 uncontrolled, add Lantus  - cont ssi    12/6 with hypoglycemia this am, unresponsive  Dc lantus, cont ssi

## 2020-12-04 NOTE — PROGRESS NOTES
2 RN skin assessment completed with Lucinda RN  Bilateral elbows, ears, heels intact. Sacrum intact.  Devices in place: PIV; nasal cannula.   Skin assessed under devices: Yes  Interventions: Pillows for support and positioning.

## 2020-12-04 NOTE — CARE PLAN
Problem: Communication  Goal: The ability to communicate needs accurately and effectively will improve  Outcome: PROGRESSING AS EXPECTED  Note: Pt oriented to appropriate use of call light. White board updated.      Problem: Knowledge Deficit  Goal: Knowledge of disease process/condition, treatment plan, diagnostic tests, and medications will improve  Outcome: PROGRESSING AS EXPECTED  Note: Questions regarding POC were answered to patient satisfaction.

## 2020-12-04 NOTE — DIETARY
Nutrition Services: Day 1 of admit.  Sonya Wei is a 54 y.o. female with admitting DX of Pneumonia due to COVID-19 virus  Consult received for Poor PO & Wt Loss 2-13# in 1 Month on Nutrition Admit Screen (MST 2)    Per current department guidelines dietary staff not permitted to enter COVID+ isolation rooms at this time.  Spoke to pt via phone. Pt states her appetite is okay and reports prior to admit her appetite wasn't good for ~1 week. Pt states she was still eating 3 meals per day, but only consuming ~50% of the meal. Pt reports wt loss and reports her UBW at 185 lbs (84.1 kg), which she weighed a week ago. Update pt's preference in computrition. Pt had no questions regarding the diabetic diet.     Assessment:  Ht: 154.9 cm, Wt 12/4: 82.4 kg via bed scale, BMI: 34.34 - Class I Obesity  Diet/Intake: Consistent Carbohydrate - No PO documented at this time.     Evaluation:   1. Wt loss percent is 2% in a week, which is significant  2. Labs: Na 140, Glucose 187, BUN 28, POC glucose 337, 341, 255  3. Meds: decadron, lovenox, lofibra, diabeta, humulin, glucophage, prilosec, pericolace, januvia, MVI with minerals, vitamin D    Malnutrition Risk: Pt with moderate malnutrition in the context of acute illness related to COVID-19 as evidenced by significant wt loss of 2% in a week and PO <50% for a week per pt report.     Recommendations/Plan:  1. Encourage intake of meals  2. Document intake of all meals as % taken in ADL's to provide interdisciplinary communication across all shifts.   3. Monitor weight.  4. Nutrition rep will continue to see patient for ongoing meal and snack preferences.  5. Obtain supplement order per RD as needed.    RD following

## 2020-12-04 NOTE — PROGRESS NOTES
[facility] Medicine Daily Progress Note    Date of Service  12/4/2020    Chief Complaint  54 y.o. female admitted 12/3/2020 with Covid pneumonia.    Hospital Course  54 y.o. female who presented 12/3/2020 with complaints of worsening shortness of breath.  Patient was diagnosed with COVID-19 November 24.  Her  is currently admitted to the hospital on the seventh floor with COVID-19.  She complains of recent diarrhea which is now resolved, cough with sputum production, fevers, chills, myalgias.  She currently denies otherwise melena, hematochezia, constipation.      On presentation patient had the following vital signs: 98.0, 86, 16, 154/82, 84% on room air.  She subsequently treated with nasal cannula and had oxygen saturations at 99% on 3 L nasal cannula.  She was also noted to be tachypneic and hypertensive subsequently.     Labs on presentation reveal lymphocytopenia, neutrophilia, thrombocytosis, moderate normocytic anemia.  CMP reveals moderate hyponatremia, hyperglycemia at 439, mild elevation in AST at 63, hypoalbuminemia at 3.1 and otherwise unremarkable.  Urinalysis was performed and revealed concentrated urine with a specific gravity 1.033, glucose greater than 1000 and trace ketones and protein greater than 300.  Microscopic analysis reviews mild pyuria with no bacteria.     Patient in agreement with inpatient admission to medical [facility] floor for COVID-19 pneumonia.  She agrees to full CODE STATUS and agrees with above treatment plan.    Interval Problem Update    Improving sob  Min cough with sputum production    Consultants/Specialty    none    Code Status  Full Code    Disposition  To home in 2-3 days with clinical improvement    Review of Systems  Review of Systems   Constitutional: Positive for malaise/fatigue. Negative for chills, diaphoresis and fever.   HENT: Negative for congestion and hearing loss.    Respiratory: Positive for cough, sputum production and shortness of breath. Negative for  wheezing.    Cardiovascular: Negative for chest pain, palpitations and leg swelling.   Gastrointestinal: Negative for abdominal pain, constipation, diarrhea, nausea and vomiting.   Genitourinary: Negative for dysuria and flank pain.   Musculoskeletal: Negative for back pain, joint pain and myalgias.   Neurological: Positive for weakness. Negative for dizziness, sensory change, speech change, focal weakness and headaches.   Psychiatric/Behavioral: Negative for depression and memory loss. The patient is not nervous/anxious.         Physical Exam  Temp:  [36.2 °C (97.1 °F)-37.5 °C (99.5 °F)] 36.8 °C (98.2 °F)  Pulse:  [81-92] 81  Resp:  [16-24] 18  BP: (127-181)/(51-87) 128/68  SpO2:  [84 %-99 %] 97 %    Physical Exam  Vitals signs and nursing note reviewed.   Constitutional:       General: She is not in acute distress.     Appearance: She is ill-appearing. She is not toxic-appearing or diaphoretic.   HENT:      Head: Normocephalic and atraumatic.      Nose: Nose normal.   Eyes:      General:         Right eye: No discharge.         Left eye: No discharge.      Pupils: Pupils are equal, round, and reactive to light.   Neck:      Musculoskeletal: Neck supple.      Thyroid: No thyromegaly.      Vascular: No JVD.   Cardiovascular:      Rate and Rhythm: Normal rate.      Heart sounds: No murmur.   Pulmonary:      Effort: No respiratory distress.      Breath sounds: No stridor. Rhonchi present. No wheezing or rales.   Chest:      Chest wall: No tenderness.   Abdominal:      General: Bowel sounds are normal. There is no distension.      Palpations: Abdomen is soft. There is no mass.      Tenderness: There is no abdominal tenderness.   Musculoskeletal:         General: No swelling or tenderness.      Right lower leg: No edema.      Left lower leg: No edema.   Skin:     General: Skin is warm and dry.      Coloration: Skin is not pale.      Findings: No erythema or rash.   Neurological:      Mental Status: She is alert and  oriented to person, place, and time.      Cranial Nerves: No cranial nerve deficit.      Motor: Weakness present.   Psychiatric:         Behavior: Behavior normal.         Thought Content: Thought content normal.         Fluids    Intake/Output Summary (Last 24 hours) at 12/4/2020 1214  Last data filed at 12/4/2020 1000  Gross per 24 hour   Intake 300 ml   Output --   Net 300 ml       Laboratory  Recent Labs     12/03/20 2145 12/04/20  0645   WBC 10.1 10.4   RBC 3.20* 3.04*   HEMOGLOBIN 9.1* 8.9*   HEMATOCRIT 28.1* 27.4*   MCV 87.8 90.1   MCH 28.4 29.3   MCHC 32.4* 32.5*   RDW 38.6 39.2   PLATELETCT 626* 594*   MPV 10.5 10.8     Recent Labs     12/03/20 2145 12/04/20 0645   SODIUM 129* 130*   POTASSIUM 4.5 4.0   CHLORIDE 94* 94*   CO2 26 27   GLUCOSE 439* 187*   BUN 30* 28*   CREATININE 1.22 0.89   CALCIUM 9.6 9.5                   Imaging  DX-CHEST-PORTABLE (1 VIEW)   Final Result         1.  Interstitial pulmonary parenchymal prominence, compatible with interstitial edema and/or infiltrates.   2.  Cardiomegaly   3.  Atherosclerosis           Assessment/Plan  * Pneumonia due to COVID-19 virus- (present on admission)  Assessment & Plan  Chest x-ray reveals diffuse pulmonary edema and cardiomegaly.  Hypoxic on admission and improved with oxygenation through nasal cannula.  Maintain oxygen saturation greater than 92%.  Dexamethasone 6 mg IV x10 days  Prone patient as needed  Lasix 20 mg IV x1 administered  Consider ID consultation for remdesivir  LDH, ferritin elevated,, CRP, D-dimer, sedimentation rate elevated    12/4 continue oxygen supplementation  On 3 L   on telemetry floor    Hyponatremia- (present on admission)  Assessment & Plan  Asymptomatic euvolemic hyponatremia    Mild    Diabetes mellitus type II, uncontrolled (HCC)- (present on admission)  Assessment & Plan  Continuation of home medication regimen  Sliding scale insulin added onto regimen for dexamethasone/steroid use.  Last hemoglobin A1c of  8.1 on November 2020.  Continue aspirin 81 mg p.o. daily  Continue with lisinopril 20 mg p.o. daily  No evidence of ketoacidosis    12/4 monitor closely on Decadron    Hypoalbuminemia- (present on admission)  Assessment & Plan  No indication for albumin infusion at this time  CMP in a.m.    Hypertension- (present on admission)  Assessment & Plan  Continue lisinopril 20 mg p.o. daily  Monitor vital signs  Labetalol 10 mg IV as needed    Hyperlipidemia- (present on admission)  Assessment & Plan  Continue Lipitor 20 mg nightly       VTE prophylaxis: lovenox

## 2020-12-04 NOTE — H&P
Hospital Medicine History & Physical Note    Date of Service  12/3/2020    Primary Care Physician  DOMINICK Johnson.    Consultants  None    Code Status  Full Code    Chief Complaint  Chief Complaint   Patient presents with   • Shortness of Breath     Tested positive for COVID last Tues 11/26; tested spO2 at home 77% on RA, 84% in triage. Placed on 3L NC.    • Flu Like Symptoms     C/o HA, chills, nausea, diarrhea, malaise x last week       History of Presenting Illness  54 y.o. female who presented 12/3/2020 with complaints of worsening shortness of breath.  Patient was diagnosed with COVID-19 November 24.  Her  is currently admitted to the hospital on the seventh floor with COVID-19.  She complains of recent diarrhea which is now resolved, cough with sputum production, fevers, chills, myalgias.  She currently denies otherwise melena, hematochezia, constipation.     On presentation patient had the following vital signs: 98.0, 86, 16, 154/82, 84% on room air.  She subsequently treated with nasal cannula and had oxygen saturations at 99% on 3 L nasal cannula.  She was also noted to be tachypneic and hypertensive subsequently.    Labs on presentation reveal lymphocytopenia, neutrophilia, thrombocytosis, moderate normocytic anemia.  CMP reveals moderate hyponatremia, hyperglycemia at 439, mild elevation in AST at 63, hypoalbuminemia at 3.1 and otherwise unremarkable.  Urinalysis was performed and revealed concentrated urine with a specific gravity 1.033, glucose greater than 1000 and trace ketones and protein greater than 300.  Microscopic analysis reviews mild pyuria with no bacteria.    Patient in agreement with inpatient admission to [facility] floor for COVID-19 pneumonia.  She agrees to full CODE STATUS and agrees with above treatment plan.    Review of Systems  Review of Systems   Constitutional: Positive for fever and malaise/fatigue. Negative for chills.   HENT: Negative for congestion and  sore throat.    Eyes: Negative for blurred vision and double vision.   Respiratory: Positive for cough, sputum production and shortness of breath. Negative for wheezing.    Cardiovascular: Negative for chest pain and palpitations.   Gastrointestinal: Positive for diarrhea. Negative for abdominal pain, blood in stool, constipation, heartburn, melena, nausea and vomiting.   Genitourinary: Negative for dysuria and frequency.   Musculoskeletal: Negative for back pain and neck pain.   Skin: Negative for itching and rash.   Neurological: Negative for focal weakness, loss of consciousness, weakness and headaches.   Endo/Heme/Allergies: Negative for environmental allergies. Does not bruise/bleed easily.   Psychiatric/Behavioral: Negative for depression. The patient does not have insomnia.        Past Medical History   has a past medical history of Anemia, Bowel habit changes, Diabetes, Diabetes (HCC), GERD (gastroesophageal reflux disease), Healthcare maintenance (9/27/2014), High cholesterol, Hyperlipidemia, Hypertension, Infectious disease, Jaundice (1999), Psychiatric problem, and Vaginal itching (8/27/2014).    Surgical History   has a past surgical history that includes cholecystectomy; primary c section; tubal coagulation laparoscopic bilateral; abdominal hysterectomy total; and ventral hernia repair robotic xi (N/A, 10/14/2016).     Family History  family history includes Diabetes in her father and mother; Hyperlipidemia in her father and mother; Hypertension in her father.     Social History   reports that she has never smoked. She has never used smokeless tobacco. She reports that she does not drink alcohol or use drugs.    Allergies  Allergies   Allergen Reactions   • Morphine Itching     Rxn = unknown   Bumps all over body       Medications  Prior to Admission Medications   Prescriptions Last Dose Informant Patient Reported? Taking?   Cholecalciferol (VITAMIN D PO)  Patient Yes No   Sig: Take 2 Tabs by mouth  every day. GUMMIES   Dulaglutide (TRULICITY) 1.5 MG/0.5ML Solution Pen-injector   No No   Sig: Inject 0.5 mL as instructed every 7 days.   SITagliptin (JANUVIA) 100 MG Tab   No No   Sig: Take 1 Tab by mouth every day.   albuterol 108 (90 Base) MCG/ACT Aero Soln inhalation aerosol   No No   Sig: Inhale 2 Puffs by mouth every 6 hours as needed for Shortness of Breath.   aspirin EC (ECOTRIN) 81 MG Tablet Delayed Response  Patient Yes No   Sig: Take 1 Tab by mouth every day.   atorvastatin (LIPITOR) 20 MG Tab   No No   Sig: Take 1 Tab by mouth every day.   fenofibrate (TRIGLIDE) 160 MG tablet   No No   Sig: Take 1 Tab by mouth every day.   glyBURIDE (DIABETA) 5 MG Tab   No No   Sig: Take 2 Tabs by mouth 2 times a day, with meals.   lisinopril (PRINIVIL) 20 MG Tab   No No   Sig: Take 1 Tab by mouth every day.   metFORMIN (GLUCOPHAGE) 500 MG Tab   No No   Sig: Take 1 Tab by mouth 2 times a day, with meals.   pantoprazole (PROTONIX) 20 MG tablet   No No   Sig: Take 1 Tab by mouth every day.   therapeutic multivitamin-minerals (THERAGRAN-M) Tab  Patient Yes No   Sig: Take 1 Tab by mouth every day.      Facility-Administered Medications: None       Physical Exam  Temp:  [36.7 °C (98 °F)-37.5 °C (99.5 °F)] 37.5 °C (99.5 °F)  Pulse:  [86-91] 91  Resp:  [16-24] 22  BP: (154-181)/(71-87) 165/71  SpO2:  [84 %-99 %] 95 %    Physical Exam  Vitals signs reviewed.   Constitutional:       General: She is not in acute distress.     Appearance: Normal appearance. She is obese. She is not ill-appearing, toxic-appearing or diaphoretic.   HENT:      Head: Normocephalic and atraumatic.      Mouth/Throat:      Mouth: Mucous membranes are moist.      Pharynx: Oropharynx is clear. No posterior oropharyngeal erythema.   Eyes:      General: No scleral icterus.     Extraocular Movements: Extraocular movements intact.      Pupils: Pupils are equal, round, and reactive to light.      Comments: Pale conjunctiva   Neck:      Musculoskeletal: Normal  range of motion.      Vascular: No carotid bruit.   Cardiovascular:      Rate and Rhythm: Normal rate and regular rhythm.      Pulses: Normal pulses.      Heart sounds: Normal heart sounds. No murmur. No friction rub. No gallop.    Pulmonary:      Effort: Pulmonary effort is normal.      Breath sounds: Normal breath sounds. No wheezing, rhonchi or rales.   Abdominal:      General: Abdomen is flat. Bowel sounds are normal. There is no distension.      Palpations: There is no mass.      Tenderness: There is no abdominal tenderness. There is no rebound.   Musculoskeletal: Normal range of motion.         General: No swelling.      Right lower leg: No edema.      Left lower leg: No edema.   Lymphadenopathy:      Cervical: No cervical adenopathy.   Skin:     General: Skin is warm and dry.      Capillary Refill: Capillary refill takes less than 2 seconds.      Findings: No erythema or rash.   Neurological:      General: No focal deficit present.      Mental Status: She is alert and oriented to person, place, and time. Mental status is at baseline.      Cranial Nerves: No cranial nerve deficit.      Sensory: No sensory deficit.      Motor: No weakness.   Psychiatric:         Mood and Affect: Mood normal.         Behavior: Behavior normal.         Laboratory:  Recent Labs     12/03/20  2145   WBC 10.1   RBC 3.20*   HEMOGLOBIN 9.1*   HEMATOCRIT 28.1*   MCV 87.8   MCH 28.4   MCHC 32.4*   RDW 38.6   PLATELETCT 626*   MPV 10.5     Recent Labs     12/03/20  2145   SODIUM 129*   POTASSIUM 4.5   CHLORIDE 94*   CO2 26   GLUCOSE 439*   BUN 30*   CREATININE 1.22   CALCIUM 9.6     Recent Labs     12/03/20  2145   ALTSGPT 47   ASTSGOT 63*   ALKPHOSPHAT 64   TBILIRUBIN 0.4   GLUCOSE 439*         No results for input(s): NTPROBNP in the last 72 hours.      No results for input(s): TROPONINT in the last 72 hours.    Imaging:  DX-CHEST-PORTABLE (1 VIEW)   Final Result         1.  Interstitial pulmonary parenchymal prominence, compatible  with interstitial edema and/or infiltrates.   2.  Cardiomegaly   3.  Atherosclerosis          I have reviewed and interpreted the above imaging and appreciate cardiomegaly with diffuse pulmonary edema.    Twelve-lead EKG ordered and pending.    Assessment/Plan:  I anticipate this patient will require at least two midnights for appropriate medical management, necessitating inpatient admission.    * Pneumonia due to COVID-19 virus- (present on admission)  Assessment & Plan  Chest x-ray reveals diffuse pulmonary edema and cardiomegaly.  Hypoxic on admission and improved with oxygenation through nasal cannula.  Maintain oxygen saturation greater than 92%.  Dexamethasone 6 mg IV x10 days  Prone patient as needed  Lasix 20 mg IV x1 administered  Consider ID consultation for remdesivir  LDH, ferritin, interleukin-6, CRP, D-dimer, sedimentation rate ordered and pending.    Hyponatremia- (present on admission)  Assessment & Plan  Asymptomatic euvolemic hyponatremia  CMP in a.m.    Diabetes mellitus type II, uncontrolled (HCC)- (present on admission)  Assessment & Plan  Continuation of home medication regimen  Sliding scale insulin added onto regimen for dexamethasone/steroid use.  Last hemoglobin A1c of 8.1 on November 2020.  Continue aspirin 81 mg p.o. daily  Continue with lisinopril 20 mg p.o. daily  No evidence of ketoacidosis    Hypoalbuminemia- (present on admission)  Assessment & Plan  No indication for albumin infusion at this time  CMP in a.m.    Hypertension- (present on admission)  Assessment & Plan  Continue lisinopril 20 mg p.o. daily  Monitor vital signs  Labetalol 10 mg IV as needed    Hyperlipidemia- (present on admission)  Assessment & Plan  Continue Lipitor 20 mg nightly    GI prophylaxis: None  DVT prophylaxis: Lovenox  CODE STATUS: Full code  Diet: Diabetic    Electronically signed:   Blaine Schmid D.O.

## 2020-12-04 NOTE — ED TRIAGE NOTES
"Sonya Wei  54 y.o. female  Chief Complaint   Patient presents with   • Shortness of Breath     Tested positive for COVID last Tues 11/26; tested spO2 at home 77% on RA, 84% in triage. Placed on 3L NC.    • Flu Like Symptoms     C/o HA, chills, nausea, diarrhea, malaise x last week       Patient ambulatory to triage with a steady gait for above complaint. Sepsis protocol activated, CN aware.     Patient is alert and oriented, speaking in full sentences, following commands, and responding appropriately to questions. Educated on triage process and instructed patient to alert staff to any changes in condition or worsening symptoms.     /82   Pulse 86   Temp 36.7 °C (98 °F)   Resp (!) 24   Ht 1.549 m (5' 1\")   Wt 81.4 kg (179 lb 7.3 oz)   SpO2 99%   BMI 33.91 kg/m²       "

## 2020-12-04 NOTE — ASSESSMENT & PLAN NOTE
Chest x-ray reveals diffuse pulmonary edema and cardiomegaly.  Hypoxic on admission and improved with oxygenation through nasal cannula.  Maintain oxygen saturation greater than 92%.  Dexamethasone 6 mg IV x10 days  Prone patient as needed  Lasix 20 mg IV x1 administered  Consider ID consultation for remdesivir  LDH, ferritin elevated,, CRP, D-dimer, sedimentation rate elevated    12/4 continue oxygen supplementation  On 3 L   on telemetry floor    12/5 increasing oxygen needs, on 4L    12/6 improving

## 2020-12-04 NOTE — ED PROVIDER NOTES
ED Provider Note    CHIEF COMPLAINT  Chief Complaint   Patient presents with   • Shortness of Breath     Tested positive for COVID last Tues 11/26; tested spO2 at home 77% on RA, 84% in triage. Placed on 3L NC.    • Flu Like Symptoms     C/o HA, chills, nausea, diarrhea, malaise x last week       HPI  Sonya Wei is a 54 y.o. female who presents to the emergency department for worsening shortness of breath. Patient tested positive for COVID nine days ago. Now on home oxygen meter showing oxygen saturation's in the high 70s to mid 80s. Patient was again in the mid-80s at triage for her report and was feeling better while waiting in the waiting room on the three there is neither candidate was supplied. She also has had ongoing headache, chills, nausea, diarrhea and generalized fatigue. Past medical history otherwise is below. Say that she has been able to take her medications appropriately.    REVIEW OF SYSTEMS  See HPI for further details. All other systems are negative.     PAST MEDICAL HISTORY   has a past medical history of Anemia, Bowel habit changes, Diabetes, Diabetes (HCC), GERD (gastroesophageal reflux disease), Healthcare maintenance (9/27/2014), High cholesterol, Hyperlipidemia, Hypertension, Infectious disease, Jaundice (1999), Psychiatric problem, and Vaginal itching (8/27/2014).    SOCIAL HISTORY  Social History     Tobacco Use   • Smoking status: Never Smoker   • Smokeless tobacco: Never Used   Substance and Sexual Activity   • Alcohol use: No   • Drug use: No   • Sexual activity: Yes     Partners: Male     Birth control/protection: None       SURGICAL HISTORY   has a past surgical history that includes cholecystectomy; primary c section; tubal coagulation laparoscopic bilateral; abdominal hysterectomy total; and ventral hernia repair robotic xi (N/A, 10/14/2016).    CURRENT MEDICATIONS  Home Medications     Reviewed by Louie Torres (Pharmacy Tech) on 12/04/20 at 0012  Med List Status:  "Complete   Medication Last Dose Status   acetaminophen (TYLENOL) 500 MG Tab 12/3/2020 Active   albuterol 108 (90 Base) MCG/ACT Aero Soln inhalation aerosol Not Taking Active   Ascorbic Acid (VITAMIN C) 1000 MG Tab 12/3/2020 Active   aspirin EC (ECOTRIN) 81 MG Tablet Delayed Response 12/3/2020 Active   atorvastatin (LIPITOR) 20 MG Tab 12/3/2020 Active   Cholecalciferol (VITAMIN D PO) 12/3/2020 Active   DM-Doxylamine-Acetaminophen (NYQUIL HBP COLD & FLU) 15-6. MG/15ML Liquid 12/2/2020 Active   Dulaglutide (TRULICITY) 1.5 MG/0.5ML Solution Pen-injector 11/26/2020 Active   fenofibrate (TRIGLIDE) 160 MG tablet 12/3/2020 Active   glyBURIDE (DIABETA) 5 MG Tab 12/3/2020 Active   lisinopril (PRINIVIL) 20 MG Tab 12/3/2020 Active   metFORMIN (GLUCOPHAGE) 500 MG Tab 12/3/2020 Active   pantoprazole (PROTONIX) 20 MG tablet 12/3/2020 Active   Pseudoephedrine-APAP-DM (DAYQUIL MULTI-SYMPTOM COLD/FLU PO) 12/3/2020 Active   SITagliptin (JANUVIA) 100 MG Tab 12/3/2020 Active   therapeutic multivitamin-minerals (THERAGRAN-M) Tab 12/3/2020 Active                ALLERGIES  Allergies   Allergen Reactions   • Morphine Itching     Rxn = unknown   Bumps all over body       PHYSICAL EXAM  VITAL SIGNS: /67   Pulse 92   Temp 36.9 °C (98.5 °F) (Temporal)   Resp 20   Ht 1.549 m (5' 1\")   Wt 82.4 kg (181 lb 10.5 oz)   SpO2 98%   BMI 34.32 kg/m²  @DIONNE[[id number]::@   Pulse ox interpretation: I interpret this pulse ox as normal.  Constitutional: Alert in no apparent distress.  HENT: No signs of trauma, Bilateral external ears normal, Nose normal.   Eyes: Pupils are equal and reactive  Neck: Normal range of motion, No tenderness, Supple  Cardiovascular: Regular rate and rhythm  Thorax & Lungs: dyspneic with slight increase worker breathing  Abdomen: Bowel sounds normal, Soft, No tenderness, No masses, No pulsatile masses. No peritoneal signs.  Skin: Warm, Dry, No erythema, No rash.   Extremities: Intact distal pulses, No edema, No " tenderness  Musculoskeletal: Good range of motion in all major joints. No tenderness to palpation or major deformities noted.   Neurologic: Alert , Normal motor function, Normal sensory function, No focal deficits noted.   Psychiatric: Affect normal, Judgment normal, Mood normal.       DIAGNOSTIC STUDIES / PROCEDURES      LABS  Results for orders placed or performed during the hospital encounter of 12/03/20   Lactic acid (lactate)   Result Value Ref Range    Lactic Acid 1.5 0.5 - 2.0 mmol/L   CBC WITH DIFFERENTIAL   Result Value Ref Range    WBC 10.1 4.8 - 10.8 K/uL    RBC 3.20 (L) 4.20 - 5.40 M/uL    Hemoglobin 9.1 (L) 12.0 - 16.0 g/dL    Hematocrit 28.1 (L) 37.0 - 47.0 %    MCV 87.8 81.4 - 97.8 fL    MCH 28.4 27.0 - 33.0 pg    MCHC 32.4 (L) 33.6 - 35.0 g/dL    RDW 38.6 35.9 - 50.0 fL    Platelet Count 626 (H) 164 - 446 K/uL    MPV 10.5 9.0 - 12.9 fL    Neutrophils-Polys 80.90 (H) 44.00 - 72.00 %    Lymphocytes 10.40 (L) 22.00 - 41.00 %    Monocytes 6.10 0.00 - 13.40 %    Eosinophils 0.90 0.00 - 6.90 %    Basophils 0.90 0.00 - 1.80 %    Nucleated RBC 0.00 /100 WBC    Neutrophils (Absolute) 8.17 (H) 2.00 - 7.15 K/uL    Lymphs (Absolute) 1.05 1.00 - 4.80 K/uL    Monos (Absolute) 0.62 0.00 - 0.85 K/uL    Eos (Absolute) 0.09 0.00 - 0.51 K/uL    Baso (Absolute) 0.09 0.00 - 0.12 K/uL    NRBC (Absolute) 0.00 K/uL    Anisocytosis 1+    COMP METABOLIC PANEL   Result Value Ref Range    Sodium 129 (L) 135 - 145 mmol/L    Potassium 4.5 3.6 - 5.5 mmol/L    Chloride 94 (L) 96 - 112 mmol/L    Co2 26 20 - 33 mmol/L    Anion Gap 9.0 7.0 - 16.0    Glucose 439 (H) 65 - 99 mg/dL    Bun 30 (H) 8 - 22 mg/dL    Creatinine 1.22 0.50 - 1.40 mg/dL    Calcium 9.6 8.5 - 10.5 mg/dL    AST(SGOT) 63 (H) 12 - 45 U/L    ALT(SGPT) 47 2 - 50 U/L    Alkaline Phosphatase 64 30 - 99 U/L    Total Bilirubin 0.4 0.1 - 1.5 mg/dL    Albumin 3.1 (L) 3.2 - 4.9 g/dL    Total Protein 7.5 6.0 - 8.2 g/dL    Globulin 4.4 (H) 1.9 - 3.5 g/dL    A-G Ratio 0.7 g/dL    URINALYSIS    Specimen: Urine, Clean Catch   Result Value Ref Range    Color Yellow     Character Clear     Specific Gravity 1.033 <1.035    Ph 5.5 5.0 - 8.0    Glucose >=1000 (A) Negative mg/dL    Ketones Trace (A) Negative mg/dL    Protein 300 (A) Negative mg/dL    Bilirubin Negative Negative    Urobilinogen, Urine 2.0 Negative    Nitrite Negative Negative    Leukocyte Esterase Negative Negative    Occult Blood Negative Negative    Micro Urine Req Microscopic    URINE MICROSCOPIC (W/UA)   Result Value Ref Range    WBC 5-10 (A) /hpf    RBC 2-5 (A) /hpf    Bacteria Negative None /hpf    Epithelial Cells Few /hpf    Hyaline Cast 0-2 /lpf   ESTIMATED GFR   Result Value Ref Range    GFR If  55 (A) >60 mL/min/1.73 m 2    GFR If Non  46 (A) >60 mL/min/1.73 m 2   DIFFERENTIAL MANUAL   Result Value Ref Range    Myelocytes 0.90 %    Manual Diff Status PERFORMED    PERIPHERAL SMEAR REVIEW   Result Value Ref Range    Peripheral Smear Review see below    PLATELET ESTIMATE   Result Value Ref Range    Plt Estimation Increased    MORPHOLOGY   Result Value Ref Range    RBC Morphology Present     Polychromia 1+     Poikilocytosis 1+    CRP Quantitative (Non-Cardiac)   Result Value Ref Range    Stat C-Reactive Protein 25.96 (H) 0.00 - 0.75 mg/dL   Procalcitonin   Result Value Ref Range    Procalcitonin 0.37 (H) <0.25 ng/mL   MAGNESIUM   Result Value Ref Range    Magnesium 2.0 1.5 - 2.5 mg/dL   ACCU-CHEK GLUCOSE   Result Value Ref Range    Glucose - Accu-Ck 337 (H) 65 - 99 mg/dL         RADIOLOGY  DX-CHEST-PORTABLE (1 VIEW)   Final Result         1.  Interstitial pulmonary parenchymal prominence, compatible with interstitial edema and/or infiltrates.   2.  Cardiomegaly   3.  Atherosclerosis          2240: discussed case with hospice medical to ongoing inpatient care. Likely placement in [facility].    I verified that the patient was wearing a mask and I was wearing appropriate PPE every time I entered the  room. The patient's mask was on the patient at all times during my encounter except for a brief view of the oropharynx.    CRITICAL CARE  The very real possibilty of a deterioration of this patient's condition required the highest level of my preparedness for sudden, emergent intervention.  I provided critical care services, which included medication orders, frequent reevaluations of the patient's condition and response to treatment, ordering and reviewing test results, and discussing the case with various consultants.  The critical care time associated with the care of the patient was 35  minutes. Review chart for interventions. This time is exclusive of any other billable procedures.     COURSE & MEDICAL DECISION MAKING  Pertinent Labs & Imaging studies reviewed. (See chart for details)  54-year-old present emergency room with worsening shortness of breath. History as above. Patient known, positive.  has been an inpatient in the hospital now for greater than one week. Now her symptoms are getting worse and on home. She became more hypoxic and more disconnected thus bring her back here to the ER. Currently feeling better with supplemental oxygen here as initiating triage. I will have her body in the hospital service as noted above.      FINAL IMPRESSION  1. COVID-19    2. Hypoxia    3. Anemia        Electronically signed by: Stanton Cordoba M.D., 12/3/2020 9:36 PM

## 2020-12-05 LAB
GLUCOSE BLD-MCNC: 106 MG/DL (ref 65–99)
GLUCOSE BLD-MCNC: 267 MG/DL (ref 65–99)
GLUCOSE BLD-MCNC: 332 MG/DL (ref 65–99)
GLUCOSE BLD-MCNC: 397 MG/DL (ref 65–99)
GLUCOSE BLD-MCNC: 403 MG/DL (ref 65–99)
GLUCOSE BLD-MCNC: 457 MG/DL (ref 65–99)

## 2020-12-05 PROCEDURE — 770021 HCHG ROOM/CARE - ISO PRIVATE

## 2020-12-05 PROCEDURE — 82962 GLUCOSE BLOOD TEST: CPT | Mod: 91

## 2020-12-05 PROCEDURE — 700102 HCHG RX REV CODE 250 W/ 637 OVERRIDE(OP): Performed by: INTERNAL MEDICINE

## 2020-12-05 PROCEDURE — 700102 HCHG RX REV CODE 250 W/ 637 OVERRIDE(OP): Performed by: STUDENT IN AN ORGANIZED HEALTH CARE EDUCATION/TRAINING PROGRAM

## 2020-12-05 PROCEDURE — 99232 SBSQ HOSP IP/OBS MODERATE 35: CPT | Performed by: INTERNAL MEDICINE

## 2020-12-05 PROCEDURE — A9270 NON-COVERED ITEM OR SERVICE: HCPCS | Performed by: STUDENT IN AN ORGANIZED HEALTH CARE EDUCATION/TRAINING PROGRAM

## 2020-12-05 PROCEDURE — 700111 HCHG RX REV CODE 636 W/ 250 OVERRIDE (IP): Performed by: STUDENT IN AN ORGANIZED HEALTH CARE EDUCATION/TRAINING PROGRAM

## 2020-12-05 RX ORDER — DEXAMETHASONE 6 MG/1
6 TABLET ORAL DAILY
Status: DISCONTINUED | OUTPATIENT
Start: 2020-12-06 | End: 2020-12-07 | Stop reason: HOSPADM

## 2020-12-05 RX ORDER — INSULIN GLARGINE 100 [IU]/ML
10 INJECTION, SOLUTION SUBCUTANEOUS EVERY EVENING
Status: DISCONTINUED | OUTPATIENT
Start: 2020-12-05 | End: 2020-12-06

## 2020-12-05 RX ADMIN — LISINOPRIL 20 MG: 20 TABLET ORAL at 04:58

## 2020-12-05 RX ADMIN — INSULIN HUMAN 12 UNITS: 100 INJECTION, SOLUTION PARENTERAL at 16:57

## 2020-12-05 RX ADMIN — INSULIN GLARGINE 10 UNITS: 100 INJECTION, SOLUTION SUBCUTANEOUS at 16:56

## 2020-12-05 RX ADMIN — OMEPRAZOLE 20 MG: 20 CAPSULE, DELAYED RELEASE ORAL at 04:58

## 2020-12-05 RX ADMIN — DEXAMETHASONE SODIUM PHOSPHATE 6 MG: 4 INJECTION, SOLUTION INTRA-ARTICULAR; INTRALESIONAL; INTRAMUSCULAR; INTRAVENOUS; SOFT TISSUE at 04:59

## 2020-12-05 RX ADMIN — ENOXAPARIN SODIUM 40 MG: 40 INJECTION SUBCUTANEOUS at 04:58

## 2020-12-05 RX ADMIN — GLYBURIDE 10 MG: 5 TABLET ORAL at 08:15

## 2020-12-05 RX ADMIN — GUAIFENESIN AND DEXTROMETHORPHAN 10 ML: 100; 10 SYRUP ORAL at 20:54

## 2020-12-05 RX ADMIN — Medication 1000 UNITS: at 04:58

## 2020-12-05 RX ADMIN — BENZONATATE 100 MG: 100 CAPSULE ORAL at 20:54

## 2020-12-05 RX ADMIN — BENZONATATE 100 MG: 100 CAPSULE ORAL at 08:21

## 2020-12-05 RX ADMIN — DOCUSATE SODIUM 50 MG AND SENNOSIDES 8.6 MG 2 TABLET: 8.6; 5 TABLET, FILM COATED ORAL at 16:35

## 2020-12-05 RX ADMIN — GLYBURIDE 10 MG: 5 TABLET ORAL at 16:36

## 2020-12-05 RX ADMIN — METFORMIN HYDROCHLORIDE 500 MG: 500 TABLET ORAL at 08:15

## 2020-12-05 RX ADMIN — ASPIRIN 81 MG: 81 TABLET, COATED ORAL at 04:58

## 2020-12-05 RX ADMIN — BENZONATATE 100 MG: 100 CAPSULE ORAL at 16:35

## 2020-12-05 RX ADMIN — FENOFIBRATE 134 MG: 67 CAPSULE ORAL at 04:58

## 2020-12-05 RX ADMIN — METFORMIN HYDROCHLORIDE 500 MG: 500 TABLET ORAL at 16:35

## 2020-12-05 RX ADMIN — DOCUSATE SODIUM 50 MG AND SENNOSIDES 8.6 MG 2 TABLET: 8.6; 5 TABLET, FILM COATED ORAL at 04:58

## 2020-12-05 RX ADMIN — MULTIPLE VITAMINS W/ MINERALS TAB 1 TABLET: TAB at 04:58

## 2020-12-05 RX ADMIN — SITAGLIPTIN 100 MG: 100 TABLET, FILM COATED ORAL at 16:35

## 2020-12-05 RX ADMIN — INSULIN HUMAN 15 UNITS: 100 INJECTION, SOLUTION PARENTERAL at 11:13

## 2020-12-05 RX ADMIN — ATORVASTATIN CALCIUM 20 MG: 20 TABLET, FILM COATED ORAL at 16:35

## 2020-12-05 ASSESSMENT — ENCOUNTER SYMPTOMS
FOCAL WEAKNESS: 0
FEVER: 0
WHEEZING: 0
PALPITATIONS: 0
FLANK PAIN: 0
CONSTIPATION: 0
SPEECH CHANGE: 0
COUGH: 1
MYALGIAS: 0
CHILLS: 0
WEAKNESS: 1
MEMORY LOSS: 0
SHORTNESS OF BREATH: 1
NERVOUS/ANXIOUS: 0
BACK PAIN: 0
SPUTUM PRODUCTION: 1
DEPRESSION: 0
NAUSEA: 0
ABDOMINAL PAIN: 0
DIZZINESS: 0

## 2020-12-05 ASSESSMENT — PATIENT HEALTH QUESTIONNAIRE - PHQ9
1. LITTLE INTEREST OR PLEASURE IN DOING THINGS: NOT AT ALL
SUM OF ALL RESPONSES TO PHQ9 QUESTIONS 1 AND 2: 0
2. FEELING DOWN, DEPRESSED, IRRITABLE, OR HOPELESS: NOT AT ALL

## 2020-12-05 NOTE — PROGRESS NOTES
[facility] Medicine Daily Progress Note    Date of Service  12/5/2020    Chief Complaint  54 y.o. female admitted 12/3/2020 with Covid pneumonia.    Hospital Course  54 y.o. female who presented 12/3/2020 with complaints of worsening shortness of breath.  Patient was diagnosed with COVID-19 November 24.  Her  is currently admitted to the hospital on the seventh floor with COVID-19.  She complains of recent diarrhea which is now resolved, cough with sputum production, fevers, chills, myalgias.  She currently denies otherwise melena, hematochezia, constipation.      On presentation patient had the following vital signs: 98.0, 86, 16, 154/82, 84% on room air.  She subsequently treated with nasal cannula and had oxygen saturations at 99% on 3 L nasal cannula.  She was also noted to be tachypneic and hypertensive subsequently.     Labs on presentation reveal lymphocytopenia, neutrophilia, thrombocytosis, moderate normocytic anemia.  CMP reveals moderate hyponatremia, hyperglycemia at 439, mild elevation in AST at 63, hypoalbuminemia at 3.1 and otherwise unremarkable.  Urinalysis was performed and revealed concentrated urine with a specific gravity 1.033, glucose greater than 1000 and trace ketones and protein greater than 300.  Microscopic analysis reviews mild pyuria with no bacteria.     Patient in agreement with inpatient admission to medical [facility]ado floor for COVID-19 pneumonia.  She agrees to full CODE STATUS and agrees with above treatment plan.    Interval Problem Update    Improving sob  Ongoing cough, feels better    Consultants/Specialty    none    Code Status  Full Code    Disposition  To home in 2-3 days with clinical improvement    Review of Systems  Review of Systems   Constitutional: Positive for malaise/fatigue. Negative for chills and fever.   HENT: Negative for congestion.    Respiratory: Positive for cough, sputum production and shortness of breath. Negative for wheezing.    Cardiovascular: Negative  for chest pain, palpitations and leg swelling.   Gastrointestinal: Negative for abdominal pain, constipation and nausea.   Genitourinary: Negative for dysuria and flank pain.   Musculoskeletal: Negative for back pain and myalgias.   Neurological: Positive for weakness. Negative for dizziness, speech change and focal weakness.   Psychiatric/Behavioral: Negative for depression and memory loss. The patient is not nervous/anxious.         Physical Exam  Temp:  [36.4 °C (97.5 °F)-37.4 °C (99.4 °F)] 37.4 °C (99.4 °F)  Pulse:  [73-97] 73  Resp:  [16-24] 18  BP: (117-131)/(54-73) 124/57  SpO2:  [97 %-100 %] 99 %    Physical Exam  Vitals signs and nursing note reviewed.   Constitutional:       General: She is not in acute distress.     Appearance: She is ill-appearing. She is not toxic-appearing or diaphoretic.   HENT:      Head: Normocephalic and atraumatic.      Nose: Nose normal.   Eyes:      Extraocular Movements: Extraocular movements intact.      Pupils: Pupils are equal, round, and reactive to light.   Neck:      Musculoskeletal: Neck supple.      Thyroid: No thyromegaly.      Vascular: No JVD.   Cardiovascular:      Rate and Rhythm: Normal rate.      Heart sounds: No murmur.   Pulmonary:      Effort: No respiratory distress.      Breath sounds: No stridor. No wheezing, rhonchi or rales.      Comments: Decreased breath sounds bilaterally  Abdominal:      General: Bowel sounds are normal. There is no distension.      Palpations: Abdomen is soft.      Tenderness: There is no abdominal tenderness.   Musculoskeletal:         General: No swelling or tenderness.      Right lower leg: No edema.      Left lower leg: No edema.   Skin:     General: Skin is warm and dry.      Coloration: Skin is not pale.   Neurological:      Mental Status: She is alert and oriented to person, place, and time.      Cranial Nerves: No cranial nerve deficit.      Sensory: No sensory deficit.      Motor: Weakness present.      Coordination:  Coordination normal.   Psychiatric:         Behavior: Behavior normal.         Thought Content: Thought content normal.         Fluids    Intake/Output Summary (Last 24 hours) at 12/5/2020 1223  Last data filed at 12/5/2020 1000  Gross per 24 hour   Intake 980 ml   Output --   Net 980 ml       Laboratory  Recent Labs     12/03/20 2145 12/04/20  0645   WBC 10.1 10.4   RBC 3.20* 3.04*   HEMOGLOBIN 9.1* 8.9*   HEMATOCRIT 28.1* 27.4*   MCV 87.8 90.1   MCH 28.4 29.3   MCHC 32.4* 32.5*   RDW 38.6 39.2   PLATELETCT 626* 594*   MPV 10.5 10.8     Recent Labs     12/03/20 2145 12/04/20  0645   SODIUM 129* 130*   POTASSIUM 4.5 4.0   CHLORIDE 94* 94*   CO2 26 27   GLUCOSE 439* 187*   BUN 30* 28*   CREATININE 1.22 0.89   CALCIUM 9.6 9.5                   Imaging  DX-CHEST-PORTABLE (1 VIEW)   Final Result         1.  Interstitial pulmonary parenchymal prominence, compatible with interstitial edema and/or infiltrates.   2.  Cardiomegaly   3.  Atherosclerosis           Assessment/Plan  * Pneumonia due to COVID-19 virus- (present on admission)  Assessment & Plan  Chest x-ray reveals diffuse pulmonary edema and cardiomegaly.  Hypoxic on admission and improved with oxygenation through nasal cannula.  Maintain oxygen saturation greater than 92%.  Dexamethasone 6 mg IV x10 days  Prone patient as needed  Lasix 20 mg IV x1 administered  Consider ID consultation for remdesivir  LDH, ferritin elevated,, CRP, D-dimer, sedimentation rate elevated    12/4 continue oxygen supplementation  On 3 L   on telemetry floor    12/5 increasing oxygen needs, on 4L    Hyponatremia- (present on admission)  Assessment & Plan  Asymptomatic euvolemic hyponatremia    Mild    Diabetes mellitus type II, uncontrolled (HCC)- (present on admission)  Assessment & Plan  Continuation of home medication regimen  Sliding scale insulin added onto regimen for dexamethasone/steroid use.  Last hemoglobin A1c of 8.1 on November 2020.  Continue aspirin 81 mg p.o.  daily  Continue with lisinopril 20 mg p.o. daily  No evidence of ketoacidosis    12/4 monitor closely on Decadron    12/5 uncontrolled, add Lantus  - cont ssi    Hypoalbuminemia- (present on admission)  Assessment & Plan  No indication for albumin infusion at this time  CMP in a.m.    Hypertension- (present on admission)  Assessment & Plan  Continue lisinopril 20 mg p.o. daily  Monitor vital signs  Labetalol 10 mg IV as needed    Hyperlipidemia- (present on admission)  Assessment & Plan  Continue Lipitor 20 mg nightly       VTE prophylaxis: lovenox

## 2020-12-05 NOTE — DOCUMENTATION QUERY
Critical access hospital                                                                       Query Response Note      PATIENT:               MATTIE SANDOVAL  ACCT #:                  [id number]  MRN:                     [id number]  :                      1965  ADMIT DATE:       12/3/2020 8:58 PM  DISCH DATE:          RESPONDING  PROVIDER #:        [id number]           QUERY TEXT:    Based on presentation of symptoms and diagnostic findings, if applicable, can a corresponding diagnosis with acuity be provided to support the clinical picture (includes probable or suspected).    NOTE:  If an appropriate response is not listed below, please respond with a new note.    The patient's clinical indicators include:  Clinical indicators-  Per ER MD: +Covid-19. Presented w worsening SOB. SpO2 at home 77% on RA, 84% in triage. Hypoxia. Dyspneic with slight increased work of breathing. Placed on 3L NC.   Per H&P: 84% on RA. Tachypneic. +Cough, sputum production and SOB. Admitted for Covid 19 PNA. RR16-24.    Treatments-  RT protocol  Supplemental O2  CXR  Dexamethasone  Lasix    Risks-  SOB; Dyspnea; Tachypnea; Hypoxia; +Covid-19 PNA; DM2    Thank you,  Yuly Allison, CDI RN  Connect via Voalte  Options provided:   -- Acute respiratory failure with hypoxia   -- Respiratory distress only   -- Hypoxia only   -- Unable to determine      Query created by: Yuly Allison on 2020 5:15 PM    RESPONSE TEXT:    Acute respiratory failure with hypoxia          Electronically signed by:  ANGELITO FALK MD 2020 10:20 PM

## 2020-12-05 NOTE — PROGRESS NOTES
Spiritual Care Note    Patient Information     Patient's Name: Sonya Wei   MRN: [id number]    YOB: 1965   Age and Gender: 54 y.o. female   Service Area: [place] [address]   Room (and Bed): [place] [address]   Ethnicity or Nationality:     Primary Language: English   Jewish/Spiritual preference: [employer]   Place of Residence: [place]   Family/Friends/Others Present: no   Clinical Team Present: RN   Medical Diagnosis(-es)/Procedure(s): Pneumonia due to covid 19   Code Status: Full Code    Date of Admission: 12/3/2020   Length of Stay: 1 days        Spiritual Care Provider Information:  Name of Spiritual Care Provider: Anaya Moss  Title of Spiritual Care Provider: Associate   Phone Number: [phone]  E-mail: [email or web address]  Total time : 45 minutes    Spiritual Screen Results:    Gen Nursing  Spiritual Screen  Was spiritual care education provided to the patient?: Yes     Palliative Care  PC Jewish/Spiritual Screening  Was spiritual care education provided to the patient?: Yes      Encounter/Request Information  Encounter/Request Type   Visited With: Patient  Nature of the Visit: Initial, On shift  General Visit: Yes  Referral From/ Origin of Request: Epic nursing    Religous Needs/Values  Jewish Needs Visit    Jewish Needs: Prayer    Spiritual Assessment   Spiritual Care Encounters    Observations/Symptoms: Accepting, Thankfulness(Pt sitting EOB, on nasal canula)    Interacton/Conversation:  donned all appropriate PPE: gown, gloves, goggles, faceshield.   introduced self to patient, pt welcomed visit.  Pt very faithful [employer] woman - her , also covid19+ was on T7, was intubated today, and now on CIC.  Pt shared of strong joseph of family, concern for 's condition, and her hope of going home tomorrow.  Pt confirmed desire for zoom call with family in 's room, once pt is cleared to go home.  2 adult children reside with  pt.  Pt desired prayer -  prayed with pt and will f/u with call on Monday.    Assessment: Need, Distress    Need: Family Issues/Concern, Seeking Spiritual Assistance and Support    Distress: Anxiety about the Future, Upsetting Diagnosis/Prognosis(concern for )    Interventions: Companionship, Conversation, Prayer    Outcomes: Ability to Communicate with Truth and Honesty, Love/Belonging/ Compassion/Kindness/Acceptance, Progress with Trust, Spiritual Comfort, Value/Dignity/Respect    Plan: (f/u phone call Monday)

## 2020-12-06 LAB
ALBUMIN SERPL BCP-MCNC: 2.9 G/DL (ref 3.2–4.9)
ALBUMIN/GLOB SERPL: 0.7 G/DL
ALP SERPL-CCNC: 54 U/L (ref 30–99)
ALT SERPL-CCNC: 24 U/L (ref 2–50)
ANION GAP SERPL CALC-SCNC: 7 MMOL/L (ref 7–16)
AST SERPL-CCNC: 26 U/L (ref 12–45)
BACTERIA UR CULT: NORMAL
BASOPHILS # BLD AUTO: 0.4 % (ref 0–1.8)
BASOPHILS # BLD: 0.04 K/UL (ref 0–0.12)
BILIRUB SERPL-MCNC: 0.2 MG/DL (ref 0.1–1.5)
BUN SERPL-MCNC: 44 MG/DL (ref 8–22)
CALCIUM SERPL-MCNC: 10 MG/DL (ref 8.5–10.5)
CHLORIDE SERPL-SCNC: 103 MMOL/L (ref 96–112)
CO2 SERPL-SCNC: 27 MMOL/L (ref 20–33)
CREAT SERPL-MCNC: 0.96 MG/DL (ref 0.5–1.4)
EOSINOPHIL # BLD AUTO: 0.27 K/UL (ref 0–0.51)
EOSINOPHIL NFR BLD: 3 % (ref 0–6.9)
ERYTHROCYTE [DISTWIDTH] IN BLOOD BY AUTOMATED COUNT: 39.8 FL (ref 35.9–50)
GLOBULIN SER CALC-MCNC: 3.9 G/DL (ref 1.9–3.5)
GLUCOSE BLD-MCNC: 172 MG/DL (ref 65–99)
GLUCOSE BLD-MCNC: 294 MG/DL (ref 65–99)
GLUCOSE BLD-MCNC: 40 MG/DL (ref 65–99)
GLUCOSE BLD-MCNC: 411 MG/DL (ref 65–99)
GLUCOSE BLD-MCNC: 97 MG/DL (ref 65–99)
GLUCOSE SERPL-MCNC: 153 MG/DL (ref 65–99)
HCT VFR BLD AUTO: 26.9 % (ref 37–47)
HGB BLD-MCNC: 8.8 G/DL (ref 12–16)
IMM GRANULOCYTES # BLD AUTO: 0.41 K/UL (ref 0–0.11)
IMM GRANULOCYTES NFR BLD AUTO: 4.6 % (ref 0–0.9)
LYMPHOCYTES # BLD AUTO: 2.43 K/UL (ref 1–4.8)
LYMPHOCYTES NFR BLD: 27 % (ref 22–41)
MCH RBC QN AUTO: 29.2 PG (ref 27–33)
MCHC RBC AUTO-ENTMCNC: 32.7 G/DL (ref 33.6–35)
MCV RBC AUTO: 89.4 FL (ref 81.4–97.8)
MONOCYTES # BLD AUTO: 0.6 K/UL (ref 0–0.85)
MONOCYTES NFR BLD AUTO: 6.7 % (ref 0–13.4)
NEUTROPHILS # BLD AUTO: 5.26 K/UL (ref 2–7.15)
NEUTROPHILS NFR BLD: 58.3 % (ref 44–72)
NRBC # BLD AUTO: 0 K/UL
NRBC BLD-RTO: 0 /100 WBC
PLATELET # BLD AUTO: 730 K/UL (ref 164–446)
PMV BLD AUTO: 10 FL (ref 9–12.9)
POTASSIUM SERPL-SCNC: 4.2 MMOL/L (ref 3.6–5.5)
PROT SERPL-MCNC: 6.8 G/DL (ref 6–8.2)
RBC # BLD AUTO: 3.01 M/UL (ref 4.2–5.4)
SIGNIFICANT IND 70042: NORMAL
SITE SITE: NORMAL
SODIUM SERPL-SCNC: 137 MMOL/L (ref 135–145)
SOURCE SOURCE: NORMAL
WBC # BLD AUTO: 9 K/UL (ref 4.8–10.8)

## 2020-12-06 PROCEDURE — A9270 NON-COVERED ITEM OR SERVICE: HCPCS | Performed by: STUDENT IN AN ORGANIZED HEALTH CARE EDUCATION/TRAINING PROGRAM

## 2020-12-06 PROCEDURE — 82962 GLUCOSE BLOOD TEST: CPT | Mod: 91

## 2020-12-06 PROCEDURE — 770021 HCHG ROOM/CARE - ISO PRIVATE

## 2020-12-06 PROCEDURE — 700102 HCHG RX REV CODE 250 W/ 637 OVERRIDE(OP): Performed by: STUDENT IN AN ORGANIZED HEALTH CARE EDUCATION/TRAINING PROGRAM

## 2020-12-06 PROCEDURE — 700101 HCHG RX REV CODE 250

## 2020-12-06 PROCEDURE — 80053 COMPREHEN METABOLIC PANEL: CPT

## 2020-12-06 PROCEDURE — 700102 HCHG RX REV CODE 250 W/ 637 OVERRIDE(OP): Performed by: INTERNAL MEDICINE

## 2020-12-06 PROCEDURE — A9270 NON-COVERED ITEM OR SERVICE: HCPCS | Performed by: INTERNAL MEDICINE

## 2020-12-06 PROCEDURE — 85025 COMPLETE CBC W/AUTO DIFF WBC: CPT

## 2020-12-06 PROCEDURE — 36415 COLL VENOUS BLD VENIPUNCTURE: CPT

## 2020-12-06 PROCEDURE — 99232 SBSQ HOSP IP/OBS MODERATE 35: CPT | Performed by: INTERNAL MEDICINE

## 2020-12-06 PROCEDURE — 700111 HCHG RX REV CODE 636 W/ 250 OVERRIDE (IP): Performed by: STUDENT IN AN ORGANIZED HEALTH CARE EDUCATION/TRAINING PROGRAM

## 2020-12-06 RX ORDER — GUAIFENESIN/DEXTROMETHORPHAN 100-10MG/5
10 SYRUP ORAL EVERY 6 HOURS PRN
Qty: 840 ML | Refills: 0 | Status: SHIPPED | OUTPATIENT
Start: 2020-12-06 | End: 2021-02-19

## 2020-12-06 RX ORDER — INSULIN GLARGINE 100 [IU]/ML
5 INJECTION, SOLUTION SUBCUTANEOUS EVERY EVENING
Status: DISCONTINUED | OUTPATIENT
Start: 2020-12-06 | End: 2020-12-07 | Stop reason: HOSPADM

## 2020-12-06 RX ORDER — DEXTROSE MONOHYDRATE 25 G/50ML
50 INJECTION, SOLUTION INTRAVENOUS ONCE
Status: COMPLETED | OUTPATIENT
Start: 2020-12-06 | End: 2020-12-06

## 2020-12-06 RX ORDER — DEXTROSE MONOHYDRATE 25 G/50ML
INJECTION, SOLUTION INTRAVENOUS
Status: COMPLETED
Start: 2020-12-06 | End: 2020-12-06

## 2020-12-06 RX ORDER — INSULIN GLARGINE 100 [IU]/ML
10 INJECTION, SOLUTION SUBCUTANEOUS EVERY EVENING
Qty: 1 EACH | Refills: 0 | Status: SHIPPED | OUTPATIENT
Start: 2020-12-06 | End: 2020-12-07

## 2020-12-06 RX ORDER — DEXAMETHASONE 6 MG/1
6 TABLET ORAL DAILY
Qty: 8 TAB | Refills: 0 | Status: SHIPPED | OUTPATIENT
Start: 2020-12-07 | End: 2020-12-15

## 2020-12-06 RX ADMIN — ASPIRIN 81 MG: 81 TABLET, COATED ORAL at 05:39

## 2020-12-06 RX ADMIN — BENZONATATE 100 MG: 100 CAPSULE ORAL at 19:46

## 2020-12-06 RX ADMIN — MULTIPLE VITAMINS W/ MINERALS TAB 1 TABLET: TAB at 05:39

## 2020-12-06 RX ADMIN — GLYBURIDE 10 MG: 5 TABLET ORAL at 16:30

## 2020-12-06 RX ADMIN — DEXTROSE MONOHYDRATE 50 ML: 25 INJECTION, SOLUTION INTRAVENOUS at 05:52

## 2020-12-06 RX ADMIN — ATORVASTATIN CALCIUM 20 MG: 20 TABLET, FILM COATED ORAL at 17:00

## 2020-12-06 RX ADMIN — GLYBURIDE 10 MG: 5 TABLET ORAL at 07:49

## 2020-12-06 RX ADMIN — METFORMIN HYDROCHLORIDE 500 MG: 500 TABLET ORAL at 16:30

## 2020-12-06 RX ADMIN — OMEPRAZOLE 20 MG: 20 CAPSULE, DELAYED RELEASE ORAL at 05:39

## 2020-12-06 RX ADMIN — INSULIN HUMAN 9 UNITS: 100 INJECTION, SOLUTION PARENTERAL at 12:21

## 2020-12-06 RX ADMIN — FENOFIBRATE 134 MG: 67 CAPSULE ORAL at 05:39

## 2020-12-06 RX ADMIN — LISINOPRIL 20 MG: 20 TABLET ORAL at 05:39

## 2020-12-06 RX ADMIN — ENOXAPARIN SODIUM 40 MG: 40 INJECTION SUBCUTANEOUS at 05:39

## 2020-12-06 RX ADMIN — METFORMIN HYDROCHLORIDE 500 MG: 500 TABLET ORAL at 07:49

## 2020-12-06 RX ADMIN — GUAIFENESIN AND DEXTROMETHORPHAN 10 ML: 100; 10 SYRUP ORAL at 07:57

## 2020-12-06 RX ADMIN — INSULIN GLARGINE 5 UNITS: 100 INJECTION, SOLUTION SUBCUTANEOUS at 17:46

## 2020-12-06 RX ADMIN — BENZONATATE 100 MG: 100 CAPSULE ORAL at 07:55

## 2020-12-06 RX ADMIN — SITAGLIPTIN 100 MG: 100 TABLET, FILM COATED ORAL at 17:00

## 2020-12-06 RX ADMIN — INSULIN HUMAN 15 UNITS: 100 INJECTION, SOLUTION PARENTERAL at 17:42

## 2020-12-06 RX ADMIN — Medication 1000 UNITS: at 05:39

## 2020-12-06 RX ADMIN — GUAIFENESIN AND DEXTROMETHORPHAN 10 ML: 100; 10 SYRUP ORAL at 19:46

## 2020-12-06 RX ADMIN — DEXAMETHASONE 6 MG: 6 TABLET ORAL at 05:39

## 2020-12-06 ASSESSMENT — ENCOUNTER SYMPTOMS
MEMORY LOSS: 0
PALPITATIONS: 0
SEIZURES: 0
DIAPHORESIS: 0
WEAKNESS: 1
LOSS OF CONSCIOUSNESS: 1
DIZZINESS: 0
NERVOUS/ANXIOUS: 0
SHORTNESS OF BREATH: 1
BLURRED VISION: 0
FLANK PAIN: 0
SPUTUM PRODUCTION: 1
COUGH: 1
NAUSEA: 0
FEVER: 0
MYALGIAS: 0
HEARTBURN: 0
FOCAL WEAKNESS: 0
BACK PAIN: 0
ABDOMINAL PAIN: 0

## 2020-12-06 ASSESSMENT — PATIENT HEALTH QUESTIONNAIRE - PHQ9
SUM OF ALL RESPONSES TO PHQ9 QUESTIONS 1 AND 2: 0
SUM OF ALL RESPONSES TO PHQ9 QUESTIONS 1 AND 2: 0
1. LITTLE INTEREST OR PLEASURE IN DOING THINGS: NOT AT ALL
2. FEELING DOWN, DEPRESSED, IRRITABLE, OR HOPELESS: NOT AT ALL
1. LITTLE INTEREST OR PLEASURE IN DOING THINGS: NOT AT ALL
2. FEELING DOWN, DEPRESSED, IRRITABLE, OR HOPELESS: NOT AT ALL

## 2020-12-06 NOTE — CARE PLAN
Problem: Venous Thromboembolism (VTW)/Deep Vein Thrombosis (DVT) Prevention:  Goal: Patient will participate in Venous Thrombosis (VTE)/Deep Vein Thrombosis (DVT)Prevention Measures  Outcome: PROGRESSING AS EXPECTED     Problem: Respiratory:  Goal: Respiratory status will improve  Outcome: PROGRESSING AS EXPECTED

## 2020-12-06 NOTE — PATHOLOGY
Pt declines routine family update at this time;     Pt encouraged to self prone, responds she will when she gets back in bed to go to sleep (currently sitting on side of the bed sifting through things on bedside table); Pt in NAD; pulse ox WDL; Education provided;     Safety intact with assessment ongoing

## 2020-12-06 NOTE — PROGRESS NOTES
Hospital Medicine Daily Progress Note    Date of Service  12/6/2020    Chief Complaint  54 y.o. female admitted 12/3/2020 with Covid pneumonia.    Hospital Course  54 y.o. female who presented 12/3/2020 with complaints of worsening shortness of breath.  Patient was diagnosed with COVID-19 November 24.  Her  is currently admitted to the hospital on the seventh floor with COVID-19.  She complains of recent diarrhea which is now resolved, cough with sputum production, fevers, chills, myalgias.  She currently denies otherwise melena, hematochezia, constipation.      On presentation patient had the following vital signs: 98.0, 86, 16, 154/82, 84% on room air.  She subsequently treated with nasal cannula and had oxygen saturations at 99% on 3 L nasal cannula.  She was also noted to be tachypneic and hypertensive subsequently.     Labs on presentation reveal lymphocytopenia, neutrophilia, thrombocytosis, moderate normocytic anemia.  CMP reveals moderate hyponatremia, hyperglycemia at 439, mild elevation in AST at 63, hypoalbuminemia at 3.1 and otherwise unremarkable.  Urinalysis was performed and revealed concentrated urine with a specific gravity 1.033, glucose greater than 1000 and trace ketones and protein greater than 300.  Microscopic analysis reviews mild pyuria with no bacteria.     Patient in agreement with inpatient admission to medical [facility] floor for COVID-19 pneumonia.  She agrees to full CODE STATUS and agrees with above treatment plan.    Interval Problem Update    Hypoglycemia this am 2nd to lantus  Awake and oriented x 3, sob improving  -uses trulicity at home, prior home insulin use    Consultants/Specialty    none    Code Status  Full Code    Disposition  To home in 1-2 days with clinical improvement  Document LOREN agarwal    Review of Systems  Review of Systems   Constitutional: Negative for diaphoresis, fever and malaise/fatigue.   HENT: Negative for hearing loss.    Eyes: Negative for blurred vision.    Respiratory: Positive for cough, sputum production and shortness of breath.    Cardiovascular: Negative for palpitations and leg swelling.   Gastrointestinal: Negative for abdominal pain, heartburn and nausea.   Genitourinary: Negative for dysuria and flank pain.   Musculoskeletal: Negative for back pain, joint pain and myalgias.   Neurological: Positive for loss of consciousness and weakness. Negative for dizziness, focal weakness and seizures.   Psychiatric/Behavioral: Negative for memory loss. The patient is not nervous/anxious.         Physical Exam  Temp:  [36.1 °C (97 °F)-36.2 °C (97.2 °F)] 36.2 °C (97.2 °F)  Pulse:  [72-81] 75  Resp:  [18] 18  BP: (116-144)/(58-76) 116/63  SpO2:  [92 %-98 %] 92 %    Physical Exam  Vitals signs and nursing note reviewed.   Constitutional:       General: She is not in acute distress.     Appearance: She is ill-appearing. She is not diaphoretic.   HENT:      Head: Normocephalic and atraumatic.      Nose: Nose normal.   Eyes:      Extraocular Movements: Extraocular movements intact.      Pupils: Pupils are equal, round, and reactive to light.   Neck:      Musculoskeletal: Neck supple.      Thyroid: No thyromegaly.      Vascular: No JVD.   Cardiovascular:      Rate and Rhythm: Normal rate.      Heart sounds: No murmur.   Pulmonary:      Effort: No respiratory distress.      Breath sounds: No stridor. No wheezing.      Comments: Decreased breath sounds bilaterally  Abdominal:      General: Bowel sounds are normal. There is no distension.      Palpations: Abdomen is soft.      Tenderness: There is no abdominal tenderness.   Musculoskeletal:         General: No swelling or tenderness.   Skin:     General: Skin is warm and dry.   Neurological:      Mental Status: She is alert and oriented to person, place, and time.      Cranial Nerves: No cranial nerve deficit.      Sensory: No sensory deficit.      Motor: Weakness present.      Coordination: Coordination normal.   Psychiatric:          Behavior: Behavior normal.         Thought Content: Thought content normal.         Fluids    Intake/Output Summary (Last 24 hours) at 12/6/2020 1059  Last data filed at 12/6/2020 1000  Gross per 24 hour   Intake 1000 ml   Output --   Net 1000 ml       Laboratory  Recent Labs     12/03/20 2145 12/04/20  0645 12/06/20  0650   WBC 10.1 10.4 9.0   RBC 3.20* 3.04* 3.01*   HEMOGLOBIN 9.1* 8.9* 8.8*   HEMATOCRIT 28.1* 27.4* 26.9*   MCV 87.8 90.1 89.4   MCH 28.4 29.3 29.2   MCHC 32.4* 32.5* 32.7*   RDW 38.6 39.2 39.8   PLATELETCT 626* 594* 730*   MPV 10.5 10.8 10.0     Recent Labs     12/03/20 2145 12/04/20 0645 12/06/20  0650   SODIUM 129* 130* 137   POTASSIUM 4.5 4.0 4.2   CHLORIDE 94* 94* 103   CO2 26 27 27   GLUCOSE 439* 187* 153*   BUN 30* 28* 44*   CREATININE 1.22 0.89 0.96   CALCIUM 9.6 9.5 10.0                   Imaging  DX-CHEST-PORTABLE (1 VIEW)   Final Result         1.  Interstitial pulmonary parenchymal prominence, compatible with interstitial edema and/or infiltrates.   2.  Cardiomegaly   3.  Atherosclerosis           Assessment/Plan  * Pneumonia due to COVID-19 virus- (present on admission)  Assessment & Plan  Chest x-ray reveals diffuse pulmonary edema and cardiomegaly.  Hypoxic on admission and improved with oxygenation through nasal cannula.  Maintain oxygen saturation greater than 92%.  Dexamethasone 6 mg IV x10 days  Prone patient as needed  Lasix 20 mg IV x1 administered  Consider ID consultation for remdesivir  LDH, ferritin elevated,, CRP, D-dimer, sedimentation rate elevated    12/4 continue oxygen supplementation  On 3 L   on telemetry floor    12/5 increasing oxygen needs, on 4L    12/6 improving    Hyponatremia- (present on admission)  Assessment & Plan  Asymptomatic euvolemic hyponatremia    Mild    Diabetes mellitus type II, uncontrolled (HCC)- (present on admission)  Assessment & Plan  Continuation of home medication regimen  Sliding scale insulin added onto regimen for  dexamethasone/steroid use.  Last hemoglobin A1c of 8.1 on November 2020.  Continue aspirin 81 mg p.o. daily  Continue with lisinopril 20 mg p.o. daily  No evidence of ketoacidosis    12/4 monitor closely on Decadron    12/5 uncontrolled, add Lantus  - cont ssi    12/6 with hypoglycemia this am, unresponsive  Dc lantus, cont ssi    Hypoalbuminemia- (present on admission)  Assessment & Plan  No indication for albumin infusion at this time  CMP in a.m.    Hypertension- (present on admission)  Assessment & Plan  Continue lisinopril 20 mg p.o. daily  Monitor vital signs  Labetalol 10 mg IV as needed    Hyperlipidemia- (present on admission)  Assessment & Plan  Continue Lipitor 20 mg nightly       VTE prophylaxis: lovenox

## 2020-12-06 NOTE — PROGRESS NOTES
Notified MD of BG 37-40 and D50% order/admin per hypoglycemia protocol; safety intact with assessment ongoing

## 2020-12-07 VITALS
DIASTOLIC BLOOD PRESSURE: 46 MMHG | OXYGEN SATURATION: 95 % | HEIGHT: 61 IN | WEIGHT: 182.98 LBS | RESPIRATION RATE: 18 BRPM | SYSTOLIC BLOOD PRESSURE: 129 MMHG | BODY MASS INDEX: 34.55 KG/M2 | HEART RATE: 88 BPM | TEMPERATURE: 97.2 F

## 2020-12-07 LAB
GLUCOSE BLD-MCNC: 162 MG/DL (ref 65–99)
GLUCOSE BLD-MCNC: 314 MG/DL (ref 65–99)
GLUCOSE BLD-MCNC: 361 MG/DL (ref 65–99)
GLUCOSE BLD-MCNC: 62 MG/DL (ref 65–99)
IL6 SERPL-MCNC: 8.1 PG/ML

## 2020-12-07 PROCEDURE — A9270 NON-COVERED ITEM OR SERVICE: HCPCS | Performed by: INTERNAL MEDICINE

## 2020-12-07 PROCEDURE — 700102 HCHG RX REV CODE 250 W/ 637 OVERRIDE(OP): Performed by: STUDENT IN AN ORGANIZED HEALTH CARE EDUCATION/TRAINING PROGRAM

## 2020-12-07 PROCEDURE — 700111 HCHG RX REV CODE 636 W/ 250 OVERRIDE (IP): Performed by: STUDENT IN AN ORGANIZED HEALTH CARE EDUCATION/TRAINING PROGRAM

## 2020-12-07 PROCEDURE — 99239 HOSP IP/OBS DSCHRG MGMT >30: CPT | Performed by: INTERNAL MEDICINE

## 2020-12-07 PROCEDURE — 700102 HCHG RX REV CODE 250 W/ 637 OVERRIDE(OP): Performed by: INTERNAL MEDICINE

## 2020-12-07 PROCEDURE — 82962 GLUCOSE BLOOD TEST: CPT | Mod: 91

## 2020-12-07 PROCEDURE — A9270 NON-COVERED ITEM OR SERVICE: HCPCS | Performed by: STUDENT IN AN ORGANIZED HEALTH CARE EDUCATION/TRAINING PROGRAM

## 2020-12-07 RX ORDER — INSULIN GLARGINE 100 [IU]/ML
5 INJECTION, SOLUTION SUBCUTANEOUS EVERY EVENING
Qty: 1 EACH | Refills: 0 | Status: SHIPPED | OUTPATIENT
Start: 2020-12-07 | End: 2021-02-19

## 2020-12-07 RX ADMIN — ENOXAPARIN SODIUM 40 MG: 40 INJECTION SUBCUTANEOUS at 06:29

## 2020-12-07 RX ADMIN — LISINOPRIL 20 MG: 20 TABLET ORAL at 06:27

## 2020-12-07 RX ADMIN — INSULIN HUMAN 3 UNITS: 100 INJECTION, SOLUTION PARENTERAL at 00:03

## 2020-12-07 RX ADMIN — INSULIN GLARGINE 5 UNITS: 100 INJECTION, SOLUTION SUBCUTANEOUS at 17:03

## 2020-12-07 RX ADMIN — SITAGLIPTIN 100 MG: 100 TABLET, FILM COATED ORAL at 17:03

## 2020-12-07 RX ADMIN — ATORVASTATIN CALCIUM 20 MG: 20 TABLET, FILM COATED ORAL at 17:03

## 2020-12-07 RX ADMIN — ASPIRIN 81 MG: 81 TABLET, COATED ORAL at 06:27

## 2020-12-07 RX ADMIN — OMEPRAZOLE 20 MG: 20 CAPSULE, DELAYED RELEASE ORAL at 06:28

## 2020-12-07 RX ADMIN — INSULIN HUMAN 12 UNITS: 100 INJECTION, SOLUTION PARENTERAL at 12:09

## 2020-12-07 RX ADMIN — GLYBURIDE 10 MG: 5 TABLET ORAL at 06:29

## 2020-12-07 RX ADMIN — Medication 1000 UNITS: at 06:28

## 2020-12-07 RX ADMIN — MULTIPLE VITAMINS W/ MINERALS TAB 1 TABLET: TAB at 06:28

## 2020-12-07 RX ADMIN — FENOFIBRATE 134 MG: 67 CAPSULE ORAL at 06:28

## 2020-12-07 RX ADMIN — DEXAMETHASONE 6 MG: 6 TABLET ORAL at 06:27

## 2020-12-07 RX ADMIN — METFORMIN HYDROCHLORIDE 500 MG: 500 TABLET ORAL at 06:28

## 2020-12-07 RX ADMIN — GLYBURIDE 10 MG: 5 TABLET ORAL at 17:03

## 2020-12-07 RX ADMIN — DOCUSATE SODIUM 50 MG AND SENNOSIDES 8.6 MG 2 TABLET: 8.6; 5 TABLET, FILM COATED ORAL at 17:03

## 2020-12-07 RX ADMIN — INSULIN HUMAN 15 UNITS: 100 INJECTION, SOLUTION PARENTERAL at 17:05

## 2020-12-07 RX ADMIN — METFORMIN HYDROCHLORIDE 500 MG: 500 TABLET ORAL at 17:03

## 2020-12-07 NOTE — PROGRESS NOTES
Pt given 2 cups OG and a few crackers for blood glucose in 60s this am; will recheck accordingly; Pt alert and orient x4; safety intact with assessment ongoing

## 2020-12-07 NOTE — DISCHARGE PLANNING
Received Choice form at 1251  Agency/Facility Name: DME 1)[facility], 2)[facility]  Referral sent per Choice form @ 1400

## 2020-12-07 NOTE — DISCHARGE PLANNING
Care Transition Team Assessment        This RN MERVAT spoke to pt via phone, verified facesheet and obtained the following information. Pt lives in a one story house with her , 2 daughters (23, 26), and 2 grandchildren (5, 6). Pt denies use of home oxygen or other DMEs. Pt was independent with ADLs and IADLs prior to hospitalization. Pt works full time. Pt has insurance coverage. Pt has a PCP. Pt's preferred pharmacy is [facility] [place].  Per pt, her family will pick her up at discharge.        Information Source  Information Given By: Patient  Informant's Name: Sonya Wei  Who is responsible for making decisions for patient? : Patient         Elopement Risk  Legal Hold: No  Ambulatory or Self Mobile in Wheelchair: No-Not an Elopement Risk  Disoriented: No  Psychiatric Symptoms: None  History of Wandering: No  Elopement this Admit: No  Vocalizing Wanting to Leave: No  Displays Behaviors, Body Language Wanting to Leave: No-Not at Risk for Elopement  Elopement Risk: Not at Risk for Elopement    Interdisciplinary Discharge Planning  Primary Care Physician: CHERIE GILLESPIE  Lives with - Patient's Self Care Capacity: Spouse, Adult Children, Other (Comments)(grandchildren)  Patient or legal guardian wants to designate a caregiver: No  Support Systems: Spouse / Significant Other, Children  Housing / Facility: 1 Story House  Durable Medical Equipment: Not Applicable    Discharge Preparedness  What is your plan after discharge?: Home with help  What are your discharge supports?: Child, Spouse  Prior Functional Level: Independent with Activities of Daily Living    Functional Assesment  Prior Functional Level: Independent with Activities of Daily Living    Finances  Financial Barriers to Discharge: No  Prescription Coverage: Yes    Vision / Hearing Impairment  Vision Impairment : Yes  Right Eye Vision: Impaired  Left Eye Vision: Impaired  Hearing Impairment : No         Advance Directive  Advance  Directive?: None  Advance Directive offered?: AD Booklet refused    Domestic Abuse  Have you ever been the victim of abuse or violence?: No  Physical Abuse or Sexual Abuse: No  Verbal Abuse or Emotional Abuse: No  Possible Abuse/Neglect Reported to:: Not Applicable         Discharge Risks or Barriers  Discharge risks or barriers?: Complex medical needs  Patient risk factors: Complex medical needs    Anticipated Discharge Information  Discharge Disposition: Still a Patient (30)

## 2020-12-07 NOTE — PROGRESS NOTES
"Pt declines routine family update at this time, \"I have been texting them all day\"    Pt encouraged to self prone, states she will when she goes to sleep (currently sitting on edge of the bed on phone)    Pt in NAD; pulse ox WDL; safety intact with assessment ongoing      "

## 2020-12-07 NOTE — CARE PLAN
Problem: Nutritional:  Goal: Achieve adequate nutritional intake  Description: Patient will consume >50% of meals  Outcome: MET

## 2020-12-07 NOTE — FACE TO FACE
"Face to Face Note  -  Durable Medical Equipment    Nohelia Phelps D.O. - NPI: [id number]  I certify that this patient is under my care and that they had a durable medical equipment(DME)face to face encounter by myself that meets the physician DME face-to-face encounter requirements with this patient on:    Date of encounter:   Patient:                    MRN:                       YOB: 2021  Sonya Wei  [id number]  1965     The encounter with the patient was in whole, or in part, for the following medical condition, which is the primary reason for durable medical equipment:  Other - covid pneumonia    I certify that, based on my findings, the following durable medical equipment is medically necessary:  Oxygen.    HOME O2 Saturation Measurements:(Values must be present for Home Oxygen orders)  Room air sat at rest: 91  Room air sat with amb: 85  With liters of O2: 1, O2 sat at rest with O2: 95  With Liters of O2: 1, O2 sat with amb with O2 : 94  Is the patient mobile?: Yes    My Clinical findings support the need for the above equipment due to:  Hypoxia    Supporting Symptoms: The patient requires supplemental oxygen, as the following interventions have been tried with limited or no improvement: \"Oral and/or IV steroids, \"Ambulation with oximetry and \"Incentive spirometry    If patient feels more short of breath, they can go up to 6 liters per minute and contact healthcare provider.  "

## 2020-12-07 NOTE — DISCHARGE PLANNING
Anticipated Discharge Disposition:   Home with home oxygen    Action:   Discussed discharge planning needs during rounds. Per Dr. Phelps, pt will need home oxygen set up, DME order in place.     RN CM called pt's room phone and mobile phone. No answer. Voalte message sent to bedside RN to help facilitate call.    Addendum:  RN CM received call from pt, discussed discharge planning, oxygen need. Received verbal consent for DME for home oxygen: 1-[facility], 2- [facility]. Choice form faxed to [facility].    Barriers to Discharge:   Home oxygen set up, pending pt's DME choice    Plan:   Follow up with bedside RN.   Hospital Care Management will continue to follow and assist with discharge planning needs.

## 2020-12-07 NOTE — PROGRESS NOTES
Assumed care at 0645. Patient in bed, a&ox4. Currently on 1L O2. Decreases to 85% on RA with ambulation. Patient to be discharged to home. No new issues. Will continue to monitor for additional needs.

## 2020-12-07 NOTE — DOCUMENTATION QUERY
[facility]                                                                       Query Response Note      PATIENT:               MATTIE SANDOVAL  ACCT #:                  [id number]  MRN:                     [id number]  :                      1965  ADMIT DATE:       12/3/2020 8:58 PM  DISCH DATE:          RESPONDING  PROVIDER #:        [id number]           QUERY TEXT:    Moderate malnutrition is documented in the Registered Dietician evaluation.      Please specify if you:    NOTE:  If an appropriate response is not listed below, please respond with a new note.    The patient's Clinical Indicators include:  Clinical indicators-  Per RD assessment on  - Malnutrition Risk: Pt with moderate malnutrition in the context of acute illness related to COVID-19 as evidenced  by significant wt loss of 2% in a week and PO <50% for a week per pt report.     Treatments-   RD eval & follow  Encourge & document intake of meals  Monitor Wt    Risks-  Significant wt loss of 2% in a week and PO <50% for a week per pt report; +Covid-19 PNA    Thank you,  Yuly Allison, CDI RN  Connect via Voalte  Options provided:   -- Agree with dietician assessment of moderate malnutrition   -- Disagree with dietician assessment of moderate malnutrition   -- Unable to determine      Query created by: Yuly Allison on 2020 5:04 PM    RESPONSE TEXT:    Agree with dietician assessment of moderate malnutrition          Electronically signed by:  BENTON MIKE DO 2020 3:16 PM

## 2020-12-08 NOTE — DISCHARGE INSTRUCTIONS
Discharge Instructions    Discharged to home by car with relative. Discharged via wheelchair, hospital escort: Yes.  Special equipment needed: Oxygen    Be sure to schedule a follow-up appointment with your primary care doctor or any specialists as instructed.     Discharge Plan:   Diet Plan: Discussed  Activity Level: Discussed  Confirmed Follow up Appointment: Patient to Call and Schedule Appointment  Confirmed Symptoms Management: Discussed  Medication Reconciliation Updated: Yes  Influenza Vaccine Indication: Not indicated: Previously immunized this influenza season and > 8 years of age    I understand that a diet low in cholesterol, fat, and sodium is recommended for good health. Unless I have been given specific instructions below for another diet, I accept this instruction as my diet prescription.   Other diet: DM    Special Instructions: None    · Is patient discharged on Warfarin / Coumadin?   No     Depression / Suicide Risk    As you are discharged from this [facility] facility, it is important to learn how to keep safe from harming yourself.    Recognize the warning signs:  · Abrupt changes in personality, positive or negative- including increase in energy   · Giving away possessions  · Change in eating patterns- significant weight changes-  positive or negative  · Change in sleeping patterns- unable to sleep or sleeping all the time   · Unwillingness or inability to communicate  · Depression  · Unusual sadness, discouragement and loneliness  · Talk of wanting to die  · Neglect of personal appearance   · Rebelliousness- reckless behavior  · Withdrawal from people/activities they love  · Confusion- inability to concentrate     If you or a loved one observes any of these behaviors or has concerns about self-harm, here's what you can do:  · Talk about it- your feelings and reasons for harming yourself  · Remove any means that you might use to hurt yourself (examples: pills, rope, extension cords,  firearm)  · Get professional help from the community (Mental Health, Substance Abuse, psychological counseling)  · Do not be alone:Call your Safe Contact- someone whom you trust who will be there for you.  · Call your local CRISIS HOTLINE 081-9688 or 781-311-8044  · Call your local Children's Mobile Crisis Response Team [place] (251) 924-1794 or www.[employer]  · Call the toll free National Suicide Prevention Hotlines   · National Suicide Prevention Lifeline 840-421-JPGB (0805)  · [employer] Line Network 800-SUICIDE (114-1514)    INSTRUCTIONS FOR COVID-19 OR ANY OTHER INFECTIOUS RESPIRATORY ILLNESSES    The Centers for Disease Control and Prevention (CDC) states that early indications for COVID-19 include cough, shortness of breath, difficulty breathing, or at least two of the following symptoms: chills, shaking with chills, muscle pain, headache, sore throat, and loss of taste or smell. Symptoms can range from mild to severe and may appear up to two weeks after exposure to the virus.    The practice of self-isolation and quarantine helps protect the public and your family by  preventing exposure to people who have or may have a contagious disease. Please follow the prevention steps below as based on CDC guidelines:    WHEN TO STOP ISOLATION: Persons with COVID-19 or any other infectious respiratory illness who have symptoms and were advised to care for themselves at home may discontinue home isolation under the following conditions:  · At least 24 hours have passed since recovery defined as resolution of fever without the use of fever-reducing medications; AND,  · Improvement in respiratory symptoms (e.g., cough, shortness of breath); AND,  · At least 10 days have passed since symptoms first appeared and have had no subsequent illness.    MONITOR YOUR SYMPTOMS: If your illness is worsening, seek prompt medical attention. If you have a medical emergency and need to call 911, notify the dispatch  personnel that you have, or are being evaluated for confirmed or suspected COVID-19 or another infectious respiratory illness. Wear a facemask if possible.    ACTIVITY RESTRICTION: restrict activities outside your home, except for getting medical care. Do not go to work, school, or public areas. Avoid using public transportation, ride-sharing, or taxis.    SCHEDULED MEDICAL APPOINTMENTS: Notify your provider that you have, or are being evaluated for, confirmed or suspected COVID-19 or another infectious respiratory. This will help the healthcare provider’s office safely take care of you and keep other people from getting exposed or infected.    FACEMASKS, when to wear: Anytime you are away from your home or around other people or pets. If you are unable to wear one, maintain a minimum of 6 feet distancing from others.    LIVING ENVIRONMENT: Stay in a separate room from other people and pets. If possible, use a separate bathroom, have someone else care for your pets and avoid sharing household items. Any items used should be washed thoroughly with soap and water. Clean all “high-touch” surfaces every day. Use a household cleaning spray or wipe, according to the label instructions. High touch surfaces include (but are not limited to) counters, tabletops, doorknobs, bathroom fixtures, toilets, phones, keyboards, tablets, and bedside tables.     HAND WASHING: Frequently wash hands with soap and water for at least 20 seconds,  especially after blowing your nose, coughing, or sneezing; going to the bathroom; before and after interacting with pets; and before and after eating or preparing food. If hands are visibly dirty use soap and water. If soap and water are not available, use an alcohol-based hand  with at least 60% alcohol. Avoid touching your eyes, nose, and mouth with unwashed hands. Cover your coughs and sneezes with a tissue. Throw used tissues in a lined trash can. Immediately wash your  hands.    ACTIVE/FACILITATED SELF-MONITORING: Follow instructions provided by your local health department or health professionals, as appropriate. When working with your local health department check their available hours.    [facility]   Phone Number   [place] [phone]   Mati Triana [place], [place] [phone]   Sky Kennedy Call 211   [place] [phone]     IF YOU HAVE CONFIRMED POSITIVE COVID-19:    Those who have completely recovered from COVID-19 may have immune-boosting antibodies in their plasma--called “convalescent plasma”--that could be used to treat critically ill COVID19 patients.    [facility] is excited to begin working with [facility] on collecting convalescent plasma from  people who have recovered from COVID-19 as part of a program to treat patients infected with the virus. This FDA-approved “emergency investigational new drug” is a special blood product containing antibodies that may give patients an extra boost to fight the virus.    To be eligible to donate convalescent plasma, you must have a prior COVID-19 diagnosis documented by a laboratory test (or a positive test result for SARS-CoV-2 antibodies) and meet additional eligibility requirements.    If you are interested in donating convalescent plasma or have any additional questions, please contact the [facility] Convalescent Plasma  at [phone] or via e-mail at [email or web address].  Discharge Instructions per Nohelia Phelps D.O.    Decadron x 8 days  Cont lantus x 8 days, then back to home insulin once completed decadron  F/u with pcp in 1 week    DIET: diabetic diet    ACTIVITY: as tolerated    DIAGNOSIS: covid pneumonia, diabetes    Return to ER if recurrent sob

## 2020-12-08 NOTE — PROGRESS NOTES
Patient discharged home with family care, AAOx4, no concerns at this time. All personal belongings sent with patient.

## 2020-12-08 NOTE — PROGRESS NOTES
Patients IV access removed. Discharge instruction given and signed by patient. Home o2 at bedside. Education given regarding how to use o2. Patient verbalized understanding. Waiting for her ride.

## 2020-12-08 NOTE — DISCHARGE PLANNING
Medical Social Work     LSW was informed that [employer] is backed up by 2 hours on processing referrals. LSW spoke w/ [employer] after hours representative who confirmed that this pt's orders have been received and processed. Delivery of O2 will be sometime this evening but no ETA given at this time as drivers are doing their best with multiple deliveries.

## 2020-12-09 LAB
BACTERIA BLD CULT: NORMAL
SIGNIFICANT IND 70042: NORMAL
SITE SITE: NORMAL
SOURCE SOURCE: NORMAL

## 2020-12-09 RX ORDER — PANTOPRAZOLE SODIUM 20 MG/1
TABLET, DELAYED RELEASE ORAL
Qty: 30 TAB | Refills: 5 | Status: SHIPPED | OUTPATIENT
Start: 2020-12-09 | End: 2021-08-03

## 2020-12-09 NOTE — TELEPHONE ENCOUNTER
Received request via: Pharmacy    Was the patient seen in the last year in this department? Yes    Does the patient have an active prescription (recently filled or refills available) for medication(s) requested? No;

## 2020-12-16 ENCOUNTER — TELEMEDICINE (OUTPATIENT)
Dept: MEDICAL GROUP | Facility: PHYSICIAN GROUP | Age: 55
End: 2020-12-16
Payer: COMMERCIAL

## 2020-12-16 VITALS
DIASTOLIC BLOOD PRESSURE: 76 MMHG | SYSTOLIC BLOOD PRESSURE: 152 MMHG | TEMPERATURE: 97 F | WEIGHT: 175 LBS | HEIGHT: 61 IN | BODY MASS INDEX: 33.04 KG/M2

## 2020-12-16 DIAGNOSIS — Z09 HOSPITAL DISCHARGE FOLLOW-UP: ICD-10-CM

## 2020-12-16 PROCEDURE — 99213 OFFICE O/P EST LOW 20 MIN: CPT | Mod: 95,CR | Performed by: NURSE PRACTITIONER

## 2020-12-16 ASSESSMENT — FIBROSIS 4 INDEX: FIB4 SCORE: 0.41

## 2020-12-16 NOTE — ASSESSMENT & PLAN NOTE
As a means of avoiding spread of COVID-19, this visit is being conducted by video.    Patient is 55-year-old female with history of type 2 diabetes, hypertension, hyperlipidemia.  Patient was admitted to [facility] from 12/3 through 12/7/2020 for Covid pneumonia.  I have reviewed discharge summary.  Tested positive for Covid on 11/26/2020.  She was discharged with supplemental oxygen at 2 to 3 L via nasal cannula.  Since returning home, she reports shortness of breath is improving.  Able to walk around the house without getting short of breath.  Her pulse oximeter has broken and plans on getting a new one today.  Prior to pulse ox breaking, her saturations were 95% with supplemental oxygen.  Has not tried periods without supplemental oxygen.  Today is her last day of taking oral steroid.  She does report continued fatigue.  Wonders if she will be able to return to work.  Hospitalist reportedly gave her a note that says she could return to work after 12/21/2020.  Patient has physically active job at [employer], has to walk a long distance throughout the workday.  Her blood sugars have been varying.  Fasting blood sugars are in the 105 through 120s.  Blood sugars later in the day have been ranging from 200-300.  Patient continues to take Metformin, glipizide, Januvia daily.  Has started Lantus 5 to 8 units in the evening.  Trulicity is on hold.  Hemoglobin A1c in 11/18/2020 was 8.1%.  Denies fever, edema.  Her  is still in hospital with COVID, on ventilator.

## 2020-12-16 NOTE — PROGRESS NOTES
Virtual Visit: Established Patient   This visit was conducted via Zoom using secure and encrypted videoconferencing technology. The patient was in a private location in the state of [place].    The patient's identity was confirmed and verbal consent was obtained for this virtual visit.    Subjective:   CC:   Chief Complaint   Patient presents with   • Follow-Up     was in hospital for covid- [facility]       Sonya Wei is a 55 y.o. female presenting for evaluation and management of:    Hospital discharge follow-up  As a means of avoiding spread of COVID-19, this visit is being conducted by video.    Patient is 55-year-old female with history of type 2 diabetes, hypertension, hyperlipidemia.  Patient was admitted to [facility] from 12/3 through 12/7/2020 for Covid pneumonia.  Tested positive for Covid on 11/26/2020.  She was discharged with supplemental oxygen at 2 to 3 L via nasal cannula.  Since returning home, she reports shortness of breath is improving.  Able to walk around the house without getting short of breath.  Her pulse oximeter has broken and plans on getting a new one today.  Prior to pulse ox breaking, her saturations were 95% with supplemental oxygen.  Has not tried periods without supplemental oxygen.  Today is her last day of taking oral steroid.  She does report continued fatigue.  Wonders if she will be able to return to work.  Hospitalist reportedly gave her a note that says she could return to work after 12/21/2020.  Patient has physically active job at [employer], has to walk a long distance throughout the workday.  Her blood sugars have been varying.  Fasting blood sugars are in the 105 through 120s.  Blood sugars later in the day have been ranging from 200-300.  Patient continues to take Metformin, glipizide, Januvia daily.  Has started Lantus 5 to 8 units in the evening.  Trulicity is on hold.  Hemoglobin A1c in 11/18/2020 was 8.1%.  Denies fever, edema.  Her  is still in  hospital with COVID, on ventilator.      ROS   Denies any recent fevers or chills. No nausea or vomiting. No chest pains.     Allergies   Allergen Reactions   • Morphine Itching     Rxn = unknown   Bumps all over body       Current medicines (including changes today)  Current Outpatient Medications   Medication Sig Dispense Refill   • pantoprazole (PROTONIX) 20 MG tablet Take 1 tablet by mouth once daily 30 Tab 5   • insulin glargine (INSULIN GLARGINE) 100 UNIT/ML Solution Pen-injector injection Inject 5 Units under the skin every evening. (Patient taking differently: Inject 5 Units under the skin every evening. Using daily until 12/16/2020 then has to wait for sugars to go down before injecting again) 1 Each 0   • guaiFENesin dextromethorphan (ROBITUSSIN DM) 100-10 MG/5ML Syrup syrup Take 10 mL by mouth every 6 hours as needed for Cough. 840 mL 0   • Insulin Pen Needle 32 G x 4 mm Use one pen needle in pen device to inject insulin once daily. 100 Each 0   • Ascorbic Acid (VITAMIN C) 1000 MG Tab Take 1 Tab by mouth every morning.     • acetaminophen (TYLENOL) 500 MG Tab Take 1,000 mg by mouth every 6 hours as needed.     • Pseudoephedrine-APAP-DM (DAYQUIL MULTI-SYMPTOM COLD/FLU PO) Take 30 mL by mouth 1 time a day as needed.     • DM-Doxylamine-Acetaminophen (NYQUIL HBP COLD & FLU) 15-6. MG/15ML Liquid Take 30 mL by mouth at bedtime as needed.     • SITagliptin (JANUVIA) 100 MG Tab Take 1 Tab by mouth every day. 90 Tab 1   • glyBURIDE (DIABETA) 5 MG Tab Take 2 Tabs by mouth 2 times a day, with meals. 360 Tab 0   • Dulaglutide (TRULICITY) 1.5 MG/0.5ML Solution Pen-injector Inject 0.5 mL as instructed every 7 days. (Patient taking differently: Inject 0.5 mL under the skin every 7 days. Pt injects q thurs) 6 mL 1   • metFORMIN (GLUCOPHAGE) 500 MG Tab Take 1 Tab by mouth 2 times a day, with meals. 180 Tab 1   • lisinopril (PRINIVIL) 20 MG Tab Take 1 Tab by mouth every day. 90 Tab 1   • fenofibrate (TRIGLIDE) 160  MG tablet Take 1 Tab by mouth every day. 90 Tab 1   • atorvastatin (LIPITOR) 20 MG Tab Take 1 Tab by mouth every day. 90 Tab 1   • Cholecalciferol (VITAMIN D PO) Take 2 Tabs by mouth every day. GUMMIES     • aspirin EC (ECOTRIN) 81 MG Tablet Delayed Response Take 1 Tab by mouth every day. 30 Tab    • therapeutic multivitamin-minerals (THERAGRAN-M) Tab Take 1 Tab by mouth every day. 30 Tab 11   • albuterol 108 (90 Base) MCG/ACT Aero Soln inhalation aerosol Inhale 2 Puffs by mouth every 6 hours as needed for Shortness of Breath. (Patient not taking: Reported on 12/4/2020) 8.5 g 0     No current facility-administered medications for this visit.        Patient Active Problem List    Diagnosis Date Noted   • Pneumonia due to COVID-19 virus 12/04/2020     Priority: High   • Hypoalbuminemia 12/04/2020     Priority: Low   • Hypertension 10/01/2015     Priority: Low   • Hyperlipidemia 08/27/2012     Priority: Low   • Hospital discharge follow-up 12/16/2020   • Neck pain 08/18/2020   • Musculoskeletal pain 08/18/2020   • ANA (acute kidney injury) (HCC) 08/13/2020   • Epigastric pain 08/13/2020   • Viral upper respiratory tract infection 03/31/2020   • Obesity (BMI 30-39.9) 04/03/2019   • Mid back pain 04/02/2019   • Ingrown toenail 01/29/2019   • Gastroesophageal reflux disease 01/29/2019   • Incisional hernia 10/14/2016   • Hyponatremia 10/23/2013   • Vitamin D deficiency 09/24/2012   • Microalbuminuria 09/24/2012   • Diabetes mellitus type II, uncontrolled (HCC) 08/27/2012       Family History   Problem Relation Age of Onset   • Diabetes Mother    • Hyperlipidemia Mother    • Diabetes Father    • Hypertension Father    • Hyperlipidemia Father        She  has a past medical history of Anemia, Bowel habit changes, Diabetes, Diabetes (HCC), GERD (gastroesophageal reflux disease), Healthcare maintenance (9/27/2014), High cholesterol, Hyperlipidemia, Hypertension, Infectious disease, Jaundice (1999), Psychiatric problem, and  "Vaginal itching (8/27/2014).  She  has a past surgical history that includes cholecystectomy; primary c section; tubal coagulation laparoscopic bilateral; abdominal hysterectomy total; and ventral hernia repair robotic xi (N/A, 10/14/2016).       Objective:   /76   Temp 36.1 °C (97 °F) (Oral)   Ht 1.549 m (5' 1\")   Wt 79.4 kg (175 lb)   BMI 33.07 kg/m²     Physical Exam:  Constitutional: Alert, no distress, well-groomed.  Skin: No rashes in visible areas.  Eye: Round. Conjunctiva clear, lids normal. No icterus.   ENMT: Lips pink without lesions, good dentition, moist mucous membranes. Phonation normal.  Neck: No masses, no thyromegaly. Moves freely without pain.  Respiratory: Unlabored respiratory effort, no cough or audible wheeze.  Wearing supplemental O2 via nasal cannula.  Psych: Alert and oriented x3, normal affect and mood.       Assessment and Plan:   The following treatment plan was discussed:     1. Hospital discharge follow-up  Patient slowly improving.  Still requiring supplemental oxygen and having fatigue.  She will try walking around the house without supplemental oxygen.  If her saturations go under 90% or she is extremely short of breath, she will place pulmonal oxygen back on.  Uncertain if she will be ready to return to work next Monday.   She will reach out to her supervisor and human resources to find out what next steps are if she is unable to return to work.  If she does return to work, may need to have a reduced schedule temporarily.   Continue with medications for diabetes.  Will wait a couple days before discontinuing Lantus and restarting Trulicity.  Continue to monitor blood sugars.  Schedule patient for follow-up appointment next week.  We will call patient.      Follow-up: 1 week                       "

## 2020-12-23 ENCOUNTER — OFFICE VISIT (OUTPATIENT)
Dept: MEDICAL GROUP | Facility: PHYSICIAN GROUP | Age: 55
End: 2020-12-23
Payer: COMMERCIAL

## 2020-12-23 VITALS
WEIGHT: 177.9 LBS | SYSTOLIC BLOOD PRESSURE: 126 MMHG | DIASTOLIC BLOOD PRESSURE: 70 MMHG | BODY MASS INDEX: 33.59 KG/M2 | OXYGEN SATURATION: 97 % | HEIGHT: 61 IN | RESPIRATION RATE: 16 BRPM | HEART RATE: 77 BPM | TEMPERATURE: 97.9 F

## 2020-12-23 DIAGNOSIS — E11.65 UNCONTROLLED TYPE 2 DIABETES MELLITUS WITH HYPERGLYCEMIA (HCC): ICD-10-CM

## 2020-12-23 DIAGNOSIS — J12.82 PNEUMONIA DUE TO COVID-19 VIRUS: ICD-10-CM

## 2020-12-23 DIAGNOSIS — U07.1 PNEUMONIA DUE TO COVID-19 VIRUS: ICD-10-CM

## 2020-12-23 PROCEDURE — 99214 OFFICE O/P EST MOD 30 MIN: CPT | Mod: CS | Performed by: NURSE PRACTITIONER

## 2020-12-23 RX ORDER — MAGNESIUM OXIDE 400 MG/1
400 TABLET ORAL DAILY
COMMUNITY
End: 2021-08-03

## 2020-12-23 ASSESSMENT — FIBROSIS 4 INDEX: FIB4 SCORE: 0.41

## 2020-12-23 NOTE — LETTER
December 23, 2020    To Whom It May Concern:         This is confirmation that Sonya Wei attended her scheduled appointment with DECLAN Johnson on 12/23/20.    Sonya will need to be off of work approximately until 1/25/2021 due to lingering affects from acute illness.         If you have any questions please do not hesitate to call me at the phone number listed below.    Sincerely,          SANTA JohnsonP.R.N.  [phone]

## 2020-12-23 NOTE — PROGRESS NOTES
CC: Follow-up on Covid pneumonia    HISTORY OF THE PRESENT ILLNESS: Patient is a 55 y.o. female. This pleasant patient is here today for evaluation and management of the following health problems.      Pneumonia due to COVID-19 virus  Today's HPI: Patient reports continues with shortness of breath, though slowly improving.  Continues with extreme fatigue.  Used to use 2 to 3 L/minute via nasal cannula with activity.  When resting, reports 91% saturation on room air.  Continues to be short of breath with exertion, when taking a shower or walking around the house.  Saturations with activity range from 80 to 87% on room air.  Patient reports she has been working on deep breathing, lying prone, increasing activity in the house.  Denies fever.  She does not think, and I agree, that she can return to work yet.  She has a very physically active job and is needing supplemental oxygen.  She will need to be off work for another month.   passed away last week from Covid pneumonia.  Patient is grateful that she was able to see him in the hospital when he passed.    HPI from 12/16/20: As a means of avoiding spread of COVID-19, this visit is being conducted by video.     Patient is 55-year-old female with history of type 2 diabetes, hypertension, hyperlipidemia.  Patient was admitted to [facility] from 12/3 through 12/7/2020 for Covid pneumonia.  Tested positive for Covid on 11/26/2020.  She was discharged with supplemental oxygen at 2 to 3 L via nasal cannula.  Since returning home, she reports shortness of breath is improving.  Able to walk around the house without getting short of breath.  Her pulse oximeter has broken and plans on getting a new one today.  Prior to pulse ox breaking, her saturations were 95% with supplemental oxygen.  Has not tried periods without supplemental oxygen.  Today is her last day of taking oral steroid.  She does report continued fatigue.  Wonders if she will be able to return to work.   Hospitalist reportedly gave her a note that says she could return to work after 12/21/2020.  Patient has physically active job at [employer], has to walk a long distance throughout the workday.  Her blood sugars have been varying.  Fasting blood sugars are in the 105 through 120s.  Blood sugars later in the day have been ranging from 200-300.  Patient continues to take Metformin, glipizide, Januvia daily.  Has started Lantus 5 to 8 units in the evening.  Trulicity is on hold.  Hemoglobin A1c in 11/18/2020 was 8.1%.  Denies fever, edema.  Her  is still in hospital with COVID, on ventilator.    Diabetes mellitus type II, uncontrolled ([facility])  Home blood sugars continue to vary.  Fasting blood sugars are in the 100s through 120s.  Blood sugars later in the day are in the low 200s.  Patient is continuing with Metformin, glipizide, Januvia daily.  Continues with Lantus 10 units in the evening, though she does adjust dosage based on morning fasting blood sugars.  Denies hypoglycemia events.  Trulicity is on hold.      Allergies: Morphine    Current Outpatient Medications Ordered in Epic   Medication Sig Dispense Refill   • magnesium oxide (MAG-OX) 400 MG Tab tablet Take 400 mg by mouth every day.     • pantoprazole (PROTONIX) 20 MG tablet Take 1 tablet by mouth once daily 30 Tab 5   • Insulin Pen Needle 32 G x 4 mm Use one pen needle in pen device to inject insulin once daily. 100 Each 0   • Ascorbic Acid (VITAMIN C) 1000 MG Tab Take 1 Tab by mouth every morning.     • acetaminophen (TYLENOL) 500 MG Tab Take 1,000 mg by mouth every 6 hours as needed.     • Pseudoephedrine-APAP-DM (DAYQUIL MULTI-SYMPTOM COLD/FLU PO) Take 30 mL by mouth 1 time a day as needed.     • DM-Doxylamine-Acetaminophen (NYQUIL HBP COLD & FLU) 15-6. MG/15ML Liquid Take 30 mL by mouth at bedtime as needed.     • SITagliptin (JANUVIA) 100 MG Tab Take 1 Tab by mouth every day. 90 Tab 1   • glyBURIDE (DIABETA) 5 MG Tab Take 2 Tabs by mouth 2  times a day, with meals. 360 Tab 0   • metFORMIN (GLUCOPHAGE) 500 MG Tab Take 1 Tab by mouth 2 times a day, with meals. 180 Tab 1   • lisinopril (PRINIVIL) 20 MG Tab Take 1 Tab by mouth every day. 90 Tab 1   • fenofibrate (TRIGLIDE) 160 MG tablet Take 1 Tab by mouth every day. 90 Tab 1   • atorvastatin (LIPITOR) 20 MG Tab Take 1 Tab by mouth every day. 90 Tab 1   • Cholecalciferol (VITAMIN D PO) Take 2 Tabs by mouth every day. GUMMIES     • aspirin EC (ECOTRIN) 81 MG Tablet Delayed Response Take 1 Tab by mouth every day. 30 Tab    • therapeutic multivitamin-minerals (THERAGRAN-M) Tab Take 1 Tab by mouth every day. 30 Tab 11   • insulin glargine (INSULIN GLARGINE) 100 UNIT/ML Solution Pen-injector injection Inject 5 Units under the skin every evening. (Patient taking differently: Inject 5 Units under the skin every evening. Using daily until 12/16/2020 then has to wait for sugars to go down before injecting again) 1 Each 0   • guaiFENesin dextromethorphan (ROBITUSSIN DM) 100-10 MG/5ML Syrup syrup Take 10 mL by mouth every 6 hours as needed for Cough. (Patient not taking: Reported on 12/23/2020) 840 mL 0   • Dulaglutide (TRULICITY) 1.5 MG/0.5ML Solution Pen-injector Inject 0.5 mL as instructed every 7 days. (Patient taking differently: Inject 0.5 mL under the skin every 7 days. Pt injects q thurs) 6 mL 1   • albuterol 108 (90 Base) MCG/ACT Aero Soln inhalation aerosol Inhale 2 Puffs by mouth every 6 hours as needed for Shortness of Breath. (Patient not taking: Reported on 12/4/2020) 8.5 g 0     No current Epic-ordered facility-administered medications on file.        Past Medical History:   Diagnosis Date   • Anemia    • Bowel habit changes     constipation   • Diabetes    • Diabetes (HCC)    • GERD (gastroesophageal reflux disease)    • Healthcare maintenance 9/27/2014    Mammogram: Due   • High cholesterol    • Hyperlipidemia    • Hypertension    • Infectious disease     Cold 10/2016   • Jaundice 1999   •  "Psychiatric problem     depression and anxiety   • Vaginal itching 8/27/2014    With anal itching X 1 month Getting worse Burning Min vag disch       Past Surgical History:   Procedure Laterality Date   • VENTRAL HERNIA REPAIR ROBOTIC XI N/A 10/14/2016    Procedure: VENTRAL HERNIA REPAIR ROBOTIC XI FOR INCISIONAL W/MESH;  Surgeon: Edi Mendoza M.D.;  Location: SURGERY [facility];  Service:    • ABDOMINAL HYSTERECTOMY TOTAL      still has ovaries   • CHOLECYSTECTOMY     • PRIMARY C SECTION     • TUBAL COAGULATION LAPAROSCOPIC BILATERAL         Social History     Tobacco Use   • Smoking status: Never Smoker   • Smokeless tobacco: Never Used   Substance Use Topics   • Alcohol use: No   • Drug use: No       Family History   Problem Relation Age of Onset   • Diabetes Mother    • Hyperlipidemia Mother    • Diabetes Father    • Hypertension Father    • Hyperlipidemia Father        ROS:   As in HPI, otherwise negative for chest pain, abdominal pain, dysuria, blood in stool, fever           Exam: /70 (BP Location: Left arm, Patient Position: Sitting, BP Cuff Size: Adult long)   Pulse 77   Temp 36.6 °C (97.9 °F) (Temporal)   Resp 16   Ht 1.549 m (5' 1\")   Wt 80.7 kg (177 lb 14.4 oz)   SpO2 97%  Body mass index is 33.61 kg/m².    General: Alert, pleasant, obese habitus, well nourished, well developed female in NAD  HEENT: Normocephalic. Eyes conjunctiva clear lids without ptosis, pupils equal and reactive to light, ears normal shape and contour, canals are clear bilaterally, tympanic membranes are pearly gray with good light reflex.  Patient wearing mask.   Neck: Supple without bruit. Thyroid is not enlarged.  Pulmonary: Clear to ausculation. Mild effort. No rales, ronchi, or wheezing.  Supplemental oxygen nasal cannula.  Cardiovascular: Normal rate and rhythm without murmur. Carotid and radial pulses are intact and equal bilaterally.  No lower extremity edema.  Abdomen: Soft, nontender, nondistended. " Normal bowel sounds.   Neurologic: Grossly nonfocal  Lymph: No cervical or supraclavicular lymph nodes are palpable  Skin: Warm and dry.    Psych: Normal mood and affect. Alert and oriented. Judgment and insight is normal.    Please note that this dictation was created using voice recognition software. I have made every reasonable attempt to correct obvious errors, but I expect that there are errors of grammar and possibly content that I did not discover before finalizing the note.      Assessment/Plan  1. Pneumonia due to COVID-19 virus  Patient continues with residual shortness of breath, low oxygen saturations with activity, fatigue.  Will excuse from work for another month.  Letter given to patient today.  Will get chest x-ray and follow-up in 1 month.  In the meantime, advised patient to continue with increasing activity, work on deep breathing frequently, humming, incentive spirometer, lying prone when possible.  - DX-CHEST-2 VIEWS; Future    2. Uncontrolled type 2 diabetes mellitus with hyperglycemia (HCC)  Continue to monitor daily.  Continue with current medications, no changes.    Patient will return to clinic in 1 month or sooner if needed.

## 2020-12-29 NOTE — ASSESSMENT & PLAN NOTE
Home blood sugars continue to vary.  Fasting blood sugars are in the 100s through 120s.  Blood sugars later in the day are in the low 200s.  Patient is continuing with Metformin, glipizide, Januvia daily.  Continues with Lantus 10 units in the evening, though she does adjust dosage based on morning fasting blood sugars.  Denies hypoglycemia events.  Trulicity is on hold.

## 2020-12-29 NOTE — ASSESSMENT & PLAN NOTE
Today's HPI: Patient reports continues with shortness of breath, though slowly improving.  Continues with extreme fatigue.  Used to use 2 to 3 L/minute via nasal cannula with activity.  When resting, reports 91% saturation on room air.  Continues to be short of breath with exertion, when taking a shower or walking around the house.  Saturations with activity range from 80 to 87% on room air.  Patient reports she has been working on deep breathing, lying prone, increasing activity in the house.  Denies fever.  She does not think, and I agree, that she can return to work yet.  She has a very physically active job and is needing supplemental oxygen.  She will need to be off work for another month.   passed away last week from Covid pneumonia.  Patient is grateful that she was able to see him in the hospital when he passed.    HPI from 12/16/20: As a means of avoiding spread of COVID-19, this visit is being conducted by video.     Patient is 55-year-old female with history of type 2 diabetes, hypertension, hyperlipidemia.  Patient was admitted to [facility] from 12/3 through 12/7/2020 for Covid pneumonia.  Tested positive for Covid on 11/26/2020.  She was discharged with supplemental oxygen at 2 to 3 L via nasal cannula.  Since returning home, she reports shortness of breath is improving.  Able to walk around the house without getting short of breath.  Her pulse oximeter has broken and plans on getting a new one today.  Prior to pulse ox breaking, her saturations were 95% with supplemental oxygen.  Has not tried periods without supplemental oxygen.  Today is her last day of taking oral steroid.  She does report continued fatigue.  Wonders if she will be able to return to work.  Hospitalist reportedly gave her a note that says she could return to work after 12/21/2020.  Patient has physically active job at [employer], has to walk a long distance throughout the workday.  Her blood sugars have been varying.   Fasting blood sugars are in the 105 through 120s.  Blood sugars later in the day have been ranging from 200-300.  Patient continues to take Metformin, glipizide, Januvia daily.  Has started Lantus 5 to 8 units in the evening.  Trulicity is on hold.  Hemoglobin A1c in 11/18/2020 was 8.1%.  Denies fever, edema.  Her  is still in hospital with COVID, on ventilator.

## 2021-01-13 ENCOUNTER — APPOINTMENT (OUTPATIENT)
Dept: RADIOLOGY | Facility: IMAGING CENTER | Age: 56
End: 2021-01-13
Attending: NURSE PRACTITIONER
Payer: COMMERCIAL

## 2021-01-13 ENCOUNTER — APPOINTMENT (OUTPATIENT)
Dept: URGENT CARE | Facility: PHYSICIAN GROUP | Age: 56
End: 2021-01-13
Payer: COMMERCIAL

## 2021-01-13 DIAGNOSIS — J12.82 PNEUMONIA DUE TO COVID-19 VIRUS: ICD-10-CM

## 2021-01-13 DIAGNOSIS — U07.1 PNEUMONIA DUE TO COVID-19 VIRUS: ICD-10-CM

## 2021-01-13 PROCEDURE — 71046 X-RAY EXAM CHEST 2 VIEWS: CPT | Mod: TC,FY,CS | Performed by: NURSE PRACTITIONER

## 2021-01-14 ENCOUNTER — OFFICE VISIT (OUTPATIENT)
Dept: MEDICAL GROUP | Facility: PHYSICIAN GROUP | Age: 56
End: 2021-01-14
Payer: COMMERCIAL

## 2021-01-14 VITALS
TEMPERATURE: 97.4 F | DIASTOLIC BLOOD PRESSURE: 88 MMHG | OXYGEN SATURATION: 96 % | SYSTOLIC BLOOD PRESSURE: 176 MMHG | HEIGHT: 61 IN | BODY MASS INDEX: 34.74 KG/M2 | WEIGHT: 184 LBS | HEART RATE: 83 BPM

## 2021-01-14 DIAGNOSIS — J12.82 PNEUMONIA DUE TO COVID-19 VIRUS: ICD-10-CM

## 2021-01-14 DIAGNOSIS — F41.9 ANXIETY: ICD-10-CM

## 2021-01-14 DIAGNOSIS — E11.65 UNCONTROLLED TYPE 2 DIABETES MELLITUS WITH HYPERGLYCEMIA (HCC): ICD-10-CM

## 2021-01-14 DIAGNOSIS — Z86.16 HISTORY OF 2019 NOVEL CORONAVIRUS DISEASE (COVID-19): ICD-10-CM

## 2021-01-14 DIAGNOSIS — G93.31 POSTVIRAL FATIGUE SYNDROME: ICD-10-CM

## 2021-01-14 DIAGNOSIS — M79.18 MUSCULOSKELETAL PAIN: ICD-10-CM

## 2021-01-14 DIAGNOSIS — U07.1 PNEUMONIA DUE TO COVID-19 VIRUS: ICD-10-CM

## 2021-01-14 DIAGNOSIS — F32.9 REACTIVE DEPRESSION: ICD-10-CM

## 2021-01-14 PROBLEM — L60.0 INGROWN TOENAIL: Status: RESOLVED | Noted: 2019-01-29 | Resolved: 2021-01-14

## 2021-01-14 PROBLEM — J06.9 VIRAL UPPER RESPIRATORY TRACT INFECTION: Status: RESOLVED | Noted: 2020-03-31 | Resolved: 2021-01-14

## 2021-01-14 PROBLEM — N17.9 AKI (ACUTE KIDNEY INJURY) (HCC): Status: RESOLVED | Noted: 2020-08-13 | Resolved: 2021-01-14

## 2021-01-14 PROBLEM — E88.09 HYPOALBUMINEMIA: Status: RESOLVED | Noted: 2020-12-04 | Resolved: 2021-01-14

## 2021-01-14 PROCEDURE — 99214 OFFICE O/P EST MOD 30 MIN: CPT | Mod: CS | Performed by: NURSE PRACTITIONER

## 2021-01-14 RX ORDER — UBIDECARENONE 75 MG
100 CAPSULE ORAL DAILY
COMMUNITY
End: 2021-02-19

## 2021-01-14 RX ORDER — ESCITALOPRAM OXALATE 10 MG/1
10 TABLET ORAL DAILY
Qty: 90 TAB | Refills: 0 | Status: SHIPPED | OUTPATIENT
Start: 2021-01-14 | End: 2021-02-19

## 2021-01-14 RX ORDER — HYDROXYZINE 50 MG/1
50 TABLET, FILM COATED ORAL 3 TIMES DAILY PRN
Qty: 60 TAB | Refills: 0 | Status: SHIPPED | OUTPATIENT
Start: 2021-01-14 | End: 2021-02-19

## 2021-01-14 ASSESSMENT — FIBROSIS 4 INDEX: FIB4 SCORE: 0.41

## 2021-01-14 NOTE — PROGRESS NOTES
CC: Follow-up on Covid pneumonia, fatigue, depression, back pain    HISTORY OF THE PRESENT ILLNESS: Patient is a 55 y.o. female. This pleasant patient is here today for evaluation and management of the following health problems.      Pneumonia due to COVID-19 virus  Today's HPI: Patient reports improvement in breathing.  No longer needing supplemental oxygen.  Saturating between 92 and 96% on room air with rest and exertion.  Unfortunately continues to have frontal headache, generalized weakness, and extreme fatigue that is lingering.  Patient does not think she is able to go back to work just yet due to her symptoms.    HPI from 12/29/21: Patient reports continues with shortness of breath, though slowly improving.  Continues with extreme fatigue.  Used to use 2 to 3 L/minute via nasal cannula with activity.  When resting, reports 91% saturation on room air.  Continues to be short of breath with exertion, when taking a shower or walking around the house.  Saturations with activity range from 80 to 87% on room air.  Patient reports she has been working on deep breathing, lying prone, increasing activity in the house.  Denies fever.  She does not think, and I agree, that she can return to work yet.  She has a very physically active job and is needing supplemental oxygen.  She will need to be off work for another month.   passed away last week from Covid pneumonia.  Patient is grateful that she was able to see him in the hospital when he passed.     HPI from 12/16/20: As a means of avoiding spread of COVID-19, this visit is being conducted by video.     Patient is 55-year-old female with history of type 2 diabetes, hypertension, hyperlipidemia.  Patient was admitted to [facility] from 12/3 through 12/7/2020 for Covid pneumonia.  Tested positive for Covid on 11/26/2020.  She was discharged with supplemental oxygen at 2 to 3 L via nasal cannula.  Since returning home, she reports shortness of breath is  improving.  Able to walk around the house without getting short of breath.  Her pulse oximeter has broken and plans on getting a new one today.  Prior to pulse ox breaking, her saturations were 95% with supplemental oxygen.  Has not tried periods without supplemental oxygen.  Today is her last day of taking oral steroid.  She does report continued fatigue.  Wonders if she will be able to return to work.  Hospitalist reportedly gave her a note that says she could return to work after 2020.  Patient has physically active job at [employer], has to walk a long distance throughout the workday.  Her blood sugars have been varying.  Fasting blood sugars are in the 105 through 120s.  Blood sugars later in the day have been ranging from 200-300.  Patient continues to take Metformin, glipizide, Januvia daily.  Has started Lantus 5 to 8 units in the evening.  Trulicity is on hold.  Hemoglobin A1c in 2020 was 8.1%.  Denies fever, edema.  Her  is still in hospital with COVID, on ventilator.    Reactive depression  Patient reports that she is feeling down and hopeless.  Has some episodes of anxiety.  Not sleeping well.  Will only sleep 3 to 5 hours a night.  Wakes in the middle of the night and cannot go back to sleep.  Has been going on since her  passed away from COVID-19 over a month ago.  Her adult kids are also feeling down, which is not helping patient.  She plans on calling counselors from a list given to her by the  home.  She denies SI/HI.  Wanting to start medication for depression.    History of 2019 novel coronavirus disease (COVID-19)  Please see additional notes under pneumonia due to COVID-19 virus.    Musculoskeletal pain  Reports worsening neck and shoulder pain.  Feels tight.  Did injure self at work a few months ago, workmen's claim is now closed.      Allergies: Morphine    Current Outpatient Medications Ordered in Epic   Medication Sig Dispense Refill   • cyanocobalamin  (VITAMIN B-12) 100 MCG Tab Take 100 mcg by mouth every day.     • escitalopram (LEXAPRO) 10 MG Tab Take 1 Tab by mouth every day. 90 Tab 0   • hydrOXYzine HCl (ATARAX) 50 MG Tab Take 1 Tab by mouth 3 times a day as needed for Anxiety. 60 Tab 0   • pantoprazole (PROTONIX) 20 MG tablet Take 1 tablet by mouth once daily 30 Tab 5   • Insulin Pen Needle 32 G x 4 mm Use one pen needle in pen device to inject insulin once daily. 100 Each 0   • Ascorbic Acid (VITAMIN C) 1000 MG Tab Take 1 Tab by mouth every morning.     • SITagliptin (JANUVIA) 100 MG Tab Take 1 Tab by mouth every day. 90 Tab 1   • glyBURIDE (DIABETA) 5 MG Tab Take 2 Tabs by mouth 2 times a day, with meals. 360 Tab 0   • Dulaglutide (TRULICITY) 1.5 MG/0.5ML Solution Pen-injector Inject 0.5 mL as instructed every 7 days. (Patient taking differently: Inject 0.5 mL under the skin every 7 days. Pt injects q thurs) 6 mL 1   • metFORMIN (GLUCOPHAGE) 500 MG Tab Take 1 Tab by mouth 2 times a day, with meals. 180 Tab 1   • lisinopril (PRINIVIL) 20 MG Tab Take 1 Tab by mouth every day. 90 Tab 1   • fenofibrate (TRIGLIDE) 160 MG tablet Take 1 Tab by mouth every day. 90 Tab 1   • atorvastatin (LIPITOR) 20 MG Tab Take 1 Tab by mouth every day. 90 Tab 1   • Cholecalciferol (VITAMIN D PO) Take 2 Tabs by mouth every day. GUMMIES     • aspirin EC (ECOTRIN) 81 MG Tablet Delayed Response Take 1 Tab by mouth every day. 30 Tab    • therapeutic multivitamin-minerals (THERAGRAN-M) Tab Take 1 Tab by mouth every day. 30 Tab 11   • magnesium oxide (MAG-OX) 400 MG Tab tablet Take 400 mg by mouth every day.     • insulin glargine (INSULIN GLARGINE) 100 UNIT/ML Solution Pen-injector injection Inject 5 Units under the skin every evening. (Patient not taking: Reported on 1/14/2021) 1 Each 0   • guaiFENesin dextromethorphan (ROBITUSSIN DM) 100-10 MG/5ML Syrup syrup Take 10 mL by mouth every 6 hours as needed for Cough. (Patient not taking: Reported on 12/23/2020) 840 mL 0   • acetaminophen  (TYLENOL) 500 MG Tab Take 1,000 mg by mouth every 6 hours as needed.     • Pseudoephedrine-APAP-DM (DAYQUIL MULTI-SYMPTOM COLD/FLU PO) Take 30 mL by mouth 1 time a day as needed.     • DM-Doxylamine-Acetaminophen (NYQUIL HBP COLD & FLU) 15-6. MG/15ML Liquid Take 30 mL by mouth at bedtime as needed.     • albuterol 108 (90 Base) MCG/ACT Aero Soln inhalation aerosol Inhale 2 Puffs by mouth every 6 hours as needed for Shortness of Breath. (Patient not taking: Reported on 12/4/2020) 8.5 g 0     No current Epic-ordered facility-administered medications on file.        Past Medical History:   Diagnosis Date   • Anemia    • Bowel habit changes     constipation   • Diabetes    • Diabetes (HCC)    • GERD (gastroesophageal reflux disease)    • Healthcare maintenance 9/27/2014    Mammogram: Due   • High cholesterol    • Hyperlipidemia    • Hypertension    • Infectious disease     Cold 10/2016   • Jaundice 1999   • Psychiatric problem     depression and anxiety   • Vaginal itching 8/27/2014    With anal itching X 1 month Getting worse Burning Min vag disch       Past Surgical History:   Procedure Laterality Date   • VENTRAL HERNIA REPAIR ROBOTIC XI N/A 10/14/2016    Procedure: VENTRAL HERNIA REPAIR ROBOTIC XI FOR INCISIONAL W/MESH;  Surgeon: Edi Mendoza M.D.;  Location: SURGERY [facility];  Service:    • ABDOMINAL HYSTERECTOMY TOTAL      still has ovaries   • CHOLECYSTECTOMY     • PRIMARY C SECTION     • TUBAL COAGULATION LAPAROSCOPIC BILATERAL         Social History     Tobacco Use   • Smoking status: Never Smoker   • Smokeless tobacco: Never Used   Substance Use Topics   • Alcohol use: No   • Drug use: No       Family History   Problem Relation Age of Onset   • Diabetes Mother    • Hyperlipidemia Mother    • Diabetes Father    • Hypertension Father    • Hyperlipidemia Father        ROS:  As in HPI, otherwise negative for chest pain, dyspnea, abdominal pain, dysuria, blood in stool, fever           Exam: BP (!)  "176/88 (BP Location: Right arm, Patient Position: Sitting, BP Cuff Size: Small adult)   Pulse 83   Temp 36.3 °C (97.4 °F) (Temporal)   Ht 1.549 m (5' 1\")   Wt 83.5 kg (184 lb)   SpO2 96%  Body mass index is 34.77 kg/m².    General: Alert, pleasant, well nourished, well developed female in NAD  Neck: Supple without bruit.   Pulmonary: Clear to ausculation.  Normal effort. No rales, ronchi, or wheezing.  Cardiovascular: Normal rate and rhythm without murmur.   No lower extremity edema.  Neurologic: Grossly nonfocal  Cervical spine: No erythema or edema, moderate tenderness to palpation paraspinals, somewhat limited active range of motion secondary to pain  Low back: no erythema or edema, mild tenderness to palpation, able to tiptoe and heel walk, full active ROM, patellar DTR's 1+ and equal bilaterally  Skin: Warm and dry.  No obvious lesions.  Musculoskeletal: Normal gait.   Psych: Normal mood and affect. Alert and oriented. Judgment and insight is normal.    Patient did 5-minute walk test today.  Saturations on room air went down to 92% during walk test.    Please note that this dictation was created using voice recognition software. I have made every reasonable attempt to correct obvious errors, but I expect that there are errors of grammar and possibly content that I did not discover before finalizing the note.      Assessment/Plan  1. Pneumonia due to COVID-19 virus  Chest x-ray shows improvement but Covid opacities remain.  Shortness of breath improving.  Saturations above 90% with activity.  No longer needing supplemental oxygen, though she can keep a hold of it for a while longer to use if needed.  She will notify oxygen company.  Patient having lingering fatigue and weakness from COVID-19 illness.  I agree that she is not ready to return to work.  Will extend work leave until 2/26/2021.  Patient reports that I will need to fill out FMLA/short-term disability paperwork.  Is not available at this time.  We " will call for paperwork.  Instructed to try to build up her tolerance for activity by taking short walks, doing a little bit extra every day.  Advised that this will take time.    2. Reactive depression  Patient having depression after her 's passing.  Offered support.  She is having difficulty coping.  She will call and establish with counselor.  We will start escitalopram. We reviewed that medication takes 4 to 6 weeks to reach full effect, and that side effects of headache and nausea are temporary lasting about 2 weeks.  Patient will follow-up in clinic in 4 to 6 weeks for depression.    - escitalopram (LEXAPRO) 10 MG Tab; Take 1 Tab by mouth every day.  Dispense: 90 Tab; Refill: 0    3. Anxiety  Patient having increased episodes of anxiety.  Will trial hydroxyzine as needed.  Did advise patient that she can try this at nighttime as it will make her sleepy.  She verbalized understanding.  - hydrOXYzine HCl (ATARAX) 50 MG Tab; Take 1 Tab by mouth 3 times a day as needed for Anxiety.  Dispense: 60 Tab; Refill: 0    4. Uncontrolled type 2 diabetes mellitus with hyperglycemia (HCC)  We will get updated labs prior to next appointment.  - Comp Metabolic Panel; Future  - ESTIMATED GFR; Future  - HEMOGLOBIN A1C; Future    5. History of 2019 novel coronavirus disease (COVID-19)  As in #1.    6. Musculoskeletal pain  Advised on heat, acetaminophen, gentle stretching.  Booklet with neck exercises given to patient today.    7. Postviral fatigue syndrome  As in #1    Patient will return to clinic in 6 weeks or sooner if needed.

## 2021-01-14 NOTE — LETTER
January 14, 2021    To Whom It May Concern:         This is confirmation that Sonya Wei attended her scheduled appointment with DECLAN Johnson on 1/14/21.    Sonay will need to be off of work approximately until 2/26/2021 due to lingering affects from acute illness.         If you have any questions please do not hesitate to call me at the phone number listed below.    Sincerely,          RODGER Johnson.P.R.N.  [phone]

## 2021-01-14 NOTE — ASSESSMENT & PLAN NOTE
Today's HPI: Patient reports improvement in breathing.  No longer needing supplemental oxygen.  Saturating between 92 and 96% on room air with rest and exertion.  Unfortunately continues to have frontal headache, generalized weakness, and extreme fatigue that is lingering.  Patient does not think she is able to go back to work just yet due to her symptoms.  Chest x-ray shows some improvement.  1/13/2021 12:30 PM     HISTORY/REASON FOR EXAM:  Shortness of breath. Covid Positive.        TECHNIQUE/EXAM DESCRIPTION AND NUMBER OF VIEWS:  Two views of the chest.     COMPARISON:  12/3/2020     FINDINGS:        The mediastinal and cardiac silhouette is unremarkable.     The pulmonary vascularity is within normal limits.     The bilateral patchy parenchymal opacities are significantly improved with minimal remaining in the mid and lower lung zones peripherally.     There is no significant pleural effusion.     There is no visible pneumothorax.     There are no acute bony abnormalities.     IMPRESSION:     1.  There has been interval improvement in bilateral parenchymal opacities consistent with improving Covid pneumonia radiographically.    HPI from 12/29/21: Patient reports continues with shortness of breath, though slowly improving.  Continues with extreme fatigue.  Used to use 2 to 3 L/minute via nasal cannula with activity.  When resting, reports 91% saturation on room air.  Continues to be short of breath with exertion, when taking a shower or walking around the house.  Saturations with activity range from 80 to 87% on room air.  Patient reports she has been working on deep breathing, lying prone, increasing activity in the house.  Denies fever.  She does not think, and I agree, that she can return to work yet.  She has a very physically active job and is needing supplemental oxygen.  She will need to be off work for another month.   passed away last week from Covid pneumonia.  Patient is grateful that she was  able to see him in the hospital when he passed.     HPI from 12/16/20: As a means of avoiding spread of COVID-19, this visit is being conducted by video.     Patient is 55-year-old female with history of type 2 diabetes, hypertension, hyperlipidemia.  Patient was admitted to [facility] from 12/3 through 12/7/2020 for Covid pneumonia.  Tested positive for Covid on 11/26/2020.  She was discharged with supplemental oxygen at 2 to 3 L via nasal cannula.  Since returning home, she reports shortness of breath is improving.  Able to walk around the house without getting short of breath.  Her pulse oximeter has broken and plans on getting a new one today.  Prior to pulse ox breaking, her saturations were 95% with supplemental oxygen.  Has not tried periods without supplemental oxygen.  Today is her last day of taking oral steroid.  She does report continued fatigue.  Wonders if she will be able to return to work.  Hospitalist reportedly gave her a note that says she could return to work after 12/21/2020.  Patient has physically active job at [employer], has to walk a long distance throughout the workday.  Her blood sugars have been varying.  Fasting blood sugars are in the 105 through 120s.  Blood sugars later in the day have been ranging from 200-300.  Patient continues to take Metformin, glipizide, Januvia daily.  Has started Lantus 5 to 8 units in the evening.  Trulicity is on hold.  Hemoglobin A1c in 11/18/2020 was 8.1%.  Denies fever, edema.  Her  is still in hospital with COVID, on ventilator.

## 2021-01-14 NOTE — PATIENT INSTRUCTIONS
"Good sleep hygiene:  Try to get to bed at the same time every night (even on weekends).  Plan for 8 hours of sleep.  Maintain the same waking time every morning (even on weekends).  [place] the bedroom for sleeping and intimacy.  Do not watch TV in the bedroom. Do not read in bed.  No screen time (TV, smart phone, tablet) within an hour of bedtime.  Develop a \"sleeping ritual\"  Keep room dark and quiet.  Run a small fan for background noise.  Try some relaxation exercises.  Avoid caffeine after noon.      Start escitalopram once a day for depression    Take hydroxizine up to 3 times a day if neededfor anxiety or sleep  "

## 2021-01-15 NOTE — ASSESSMENT & PLAN NOTE
Reports worsening neck and shoulder pain.  Feels tight.  Did injure self at work a few months ago, workmen's claim is now closed.

## 2021-01-15 NOTE — ASSESSMENT & PLAN NOTE
Patient reports that she is feeling down and hopeless.  Has some episodes of anxiety.  Not sleeping well.  Will only sleep 3 to 5 hours a night.  Wakes in the middle of the night and cannot go back to sleep.  Has been going on since her  passed away from COVID-19 over a month ago.  Her adult kids are also feeling down, which is not helping patient.  She plans on calling counselors from a list given to her by the  home.  She denies SI/HI.  Wanting to start medication for depression.

## 2021-01-26 ENCOUNTER — HOSPITAL ENCOUNTER (OUTPATIENT)
Dept: RADIOLOGY | Facility: MEDICAL CENTER | Age: 56
End: 2021-01-26
Attending: NURSE PRACTITIONER
Payer: COMMERCIAL

## 2021-01-26 DIAGNOSIS — Z12.31 ENCOUNTER FOR SCREENING MAMMOGRAM FOR BREAST CANCER: ICD-10-CM

## 2021-01-26 PROCEDURE — 77063 BREAST TOMOSYNTHESIS BI: CPT

## 2021-02-02 ENCOUNTER — TELEPHONE (OUTPATIENT)
Dept: MEDICAL GROUP | Facility: PHYSICIAN GROUP | Age: 56
End: 2021-02-02

## 2021-02-03 NOTE — TELEPHONE ENCOUNTER
Pt brought paperwork to fill out.      Scanned in to media (blank)     Does she need an appointment?

## 2021-02-04 ENCOUNTER — TELEMEDICINE (OUTPATIENT)
Dept: MEDICAL GROUP | Facility: PHYSICIAN GROUP | Age: 56
End: 2021-02-04
Payer: COMMERCIAL

## 2021-02-04 VITALS
DIASTOLIC BLOOD PRESSURE: 78 MMHG | OXYGEN SATURATION: 95 % | WEIGHT: 180 LBS | HEART RATE: 86 BPM | SYSTOLIC BLOOD PRESSURE: 143 MMHG | HEIGHT: 61 IN | BODY MASS INDEX: 33.99 KG/M2

## 2021-02-04 DIAGNOSIS — Z86.16 HISTORY OF 2019 NOVEL CORONAVIRUS DISEASE (COVID-19): ICD-10-CM

## 2021-02-04 PROCEDURE — 99213 OFFICE O/P EST LOW 20 MIN: CPT | Performed by: NURSE PRACTITIONER

## 2021-02-04 ASSESSMENT — FIBROSIS 4 INDEX: FIB4 SCORE: 0.41

## 2021-02-05 ENCOUNTER — HOSPITAL ENCOUNTER (EMERGENCY)
Facility: MEDICAL CENTER | Age: 56
End: 2021-02-05
Attending: EMERGENCY MEDICINE
Payer: COMMERCIAL

## 2021-02-05 ENCOUNTER — APPOINTMENT (OUTPATIENT)
Dept: RADIOLOGY | Facility: MEDICAL CENTER | Age: 56
End: 2021-02-05
Attending: EMERGENCY MEDICINE
Payer: COMMERCIAL

## 2021-02-05 VITALS
WEIGHT: 180 LBS | SYSTOLIC BLOOD PRESSURE: 212 MMHG | OXYGEN SATURATION: 98 % | HEART RATE: 83 BPM | HEIGHT: 61 IN | DIASTOLIC BLOOD PRESSURE: 94 MMHG | TEMPERATURE: 97.4 F | RESPIRATION RATE: 18 BRPM | BODY MASS INDEX: 33.99 KG/M2

## 2021-02-05 DIAGNOSIS — G44.209 TENSION HEADACHE: ICD-10-CM

## 2021-02-05 DIAGNOSIS — R03.0 ELEVATED BLOOD PRESSURE READING: ICD-10-CM

## 2021-02-05 LAB
ALBUMIN SERPL BCP-MCNC: 3.7 G/DL (ref 3.2–4.9)
ALBUMIN/GLOB SERPL: 1.2 G/DL
ALP SERPL-CCNC: 68 U/L (ref 30–99)
ALT SERPL-CCNC: 20 U/L (ref 2–50)
ANION GAP SERPL CALC-SCNC: 9 MMOL/L (ref 7–16)
AST SERPL-CCNC: 30 U/L (ref 12–45)
BASOPHILS # BLD AUTO: 0.5 % (ref 0–1.8)
BASOPHILS # BLD: 0.05 K/UL (ref 0–0.12)
BILIRUB SERPL-MCNC: 0.3 MG/DL (ref 0.1–1.5)
BUN SERPL-MCNC: 23 MG/DL (ref 8–22)
CALCIUM SERPL-MCNC: 9.9 MG/DL (ref 8.5–10.5)
CHLORIDE SERPL-SCNC: 102 MMOL/L (ref 96–112)
CO2 SERPL-SCNC: 24 MMOL/L (ref 20–33)
CREAT SERPL-MCNC: 0.95 MG/DL (ref 0.5–1.4)
EKG IMPRESSION: NORMAL
EOSINOPHIL # BLD AUTO: 0.13 K/UL (ref 0–0.51)
EOSINOPHIL NFR BLD: 1.4 % (ref 0–6.9)
ERYTHROCYTE [DISTWIDTH] IN BLOOD BY AUTOMATED COUNT: 42.1 FL (ref 35.9–50)
GLOBULIN SER CALC-MCNC: 3.1 G/DL (ref 1.9–3.5)
GLUCOSE SERPL-MCNC: 176 MG/DL (ref 65–99)
HCT VFR BLD AUTO: 36 % (ref 37–47)
HGB BLD-MCNC: 11.9 G/DL (ref 12–16)
IMM GRANULOCYTES # BLD AUTO: 0.07 K/UL (ref 0–0.11)
IMM GRANULOCYTES NFR BLD AUTO: 0.8 % (ref 0–0.9)
LYMPHOCYTES # BLD AUTO: 1.97 K/UL (ref 1–4.8)
LYMPHOCYTES NFR BLD: 21.2 % (ref 22–41)
MCH RBC QN AUTO: 29.7 PG (ref 27–33)
MCHC RBC AUTO-ENTMCNC: 33.1 G/DL (ref 33.6–35)
MCV RBC AUTO: 89.8 FL (ref 81.4–97.8)
MONOCYTES # BLD AUTO: 0.72 K/UL (ref 0–0.85)
MONOCYTES NFR BLD AUTO: 7.7 % (ref 0–13.4)
NEUTROPHILS # BLD AUTO: 6.37 K/UL (ref 2–7.15)
NEUTROPHILS NFR BLD: 68.4 % (ref 44–72)
NRBC # BLD AUTO: 0 K/UL
NRBC BLD-RTO: 0 /100 WBC
PLATELET # BLD AUTO: 324 K/UL (ref 164–446)
PMV BLD AUTO: 11.6 FL (ref 9–12.9)
POTASSIUM SERPL-SCNC: 4.5 MMOL/L (ref 3.6–5.5)
PROT SERPL-MCNC: 6.8 G/DL (ref 6–8.2)
RBC # BLD AUTO: 4.01 M/UL (ref 4.2–5.4)
SODIUM SERPL-SCNC: 135 MMOL/L (ref 135–145)
TROPONIN T SERPL-MCNC: 17 NG/L (ref 6–19)
TSH SERPL DL<=0.005 MIU/L-ACNC: 3.61 UIU/ML (ref 0.38–5.33)
WBC # BLD AUTO: 9.3 K/UL (ref 4.8–10.8)

## 2021-02-05 PROCEDURE — 80053 COMPREHEN METABOLIC PANEL: CPT

## 2021-02-05 PROCEDURE — 84484 ASSAY OF TROPONIN QUANT: CPT

## 2021-02-05 PROCEDURE — 700102 HCHG RX REV CODE 250 W/ 637 OVERRIDE(OP): Performed by: EMERGENCY MEDICINE

## 2021-02-05 PROCEDURE — 99285 EMERGENCY DEPT VISIT HI MDM: CPT

## 2021-02-05 PROCEDURE — A9270 NON-COVERED ITEM OR SERVICE: HCPCS | Performed by: EMERGENCY MEDICINE

## 2021-02-05 PROCEDURE — 84443 ASSAY THYROID STIM HORMONE: CPT

## 2021-02-05 PROCEDURE — 85025 COMPLETE CBC W/AUTO DIFF WBC: CPT

## 2021-02-05 PROCEDURE — 71045 X-RAY EXAM CHEST 1 VIEW: CPT

## 2021-02-05 PROCEDURE — 700111 HCHG RX REV CODE 636 W/ 250 OVERRIDE (IP): Performed by: EMERGENCY MEDICINE

## 2021-02-05 PROCEDURE — 96374 THER/PROPH/DIAG INJ IV PUSH: CPT

## 2021-02-05 PROCEDURE — 96375 TX/PRO/DX INJ NEW DRUG ADDON: CPT

## 2021-02-05 PROCEDURE — 93005 ELECTROCARDIOGRAM TRACING: CPT | Performed by: EMERGENCY MEDICINE

## 2021-02-05 RX ORDER — KETOROLAC TROMETHAMINE 30 MG/ML
30 INJECTION, SOLUTION INTRAMUSCULAR; INTRAVENOUS ONCE
Status: COMPLETED | OUTPATIENT
Start: 2021-02-05 | End: 2021-02-05

## 2021-02-05 RX ORDER — LISINOPRIL 20 MG/1
20 TABLET ORAL ONCE
Status: COMPLETED | OUTPATIENT
Start: 2021-02-05 | End: 2021-02-05

## 2021-02-05 RX ORDER — DIPHENHYDRAMINE HYDROCHLORIDE 50 MG/ML
25 INJECTION INTRAMUSCULAR; INTRAVENOUS ONCE
Status: COMPLETED | OUTPATIENT
Start: 2021-02-05 | End: 2021-02-05

## 2021-02-05 RX ORDER — LORAZEPAM 2 MG/ML
0.5 INJECTION INTRAMUSCULAR ONCE
Status: COMPLETED | OUTPATIENT
Start: 2021-02-05 | End: 2021-02-05

## 2021-02-05 RX ORDER — METOCLOPRAMIDE HYDROCHLORIDE 5 MG/ML
5 INJECTION INTRAMUSCULAR; INTRAVENOUS ONCE
Status: COMPLETED | OUTPATIENT
Start: 2021-02-05 | End: 2021-02-05

## 2021-02-05 RX ADMIN — LISINOPRIL 20 MG: 20 TABLET ORAL at 13:05

## 2021-02-05 RX ADMIN — METOCLOPRAMIDE 5 MG: 5 INJECTION, SOLUTION INTRAMUSCULAR; INTRAVENOUS at 14:45

## 2021-02-05 RX ADMIN — DIPHENHYDRAMINE HYDROCHLORIDE 25 MG: 50 INJECTION INTRAMUSCULAR; INTRAVENOUS at 14:45

## 2021-02-05 RX ADMIN — KETOROLAC TROMETHAMINE 30 MG: 30 INJECTION, SOLUTION INTRAMUSCULAR at 13:19

## 2021-02-05 RX ADMIN — LORAZEPAM 0.5 MG: 2 INJECTION INTRAMUSCULAR; INTRAVENOUS at 13:05

## 2021-02-05 ASSESSMENT — FIBROSIS 4 INDEX: FIB4 SCORE: 0.41

## 2021-02-05 NOTE — INFECTION CONTROL
This patient is considered COVID RECOVERED.    Patient initially tested positive for COVID on 11/24/2020.  Patient is greater than or equal to 21 days from symptom onset and/or positive test, with symptom improvement.  Per the CDC guidance, this patient no longer requires transmission based precautions.  Patient may be placed on any unit per the bed assignment policy (except CNU), including placement in a semi-private room with a roommate.

## 2021-02-05 NOTE — ED PROVIDER NOTES
ED Provider Note    Scribed for Madi Calvillo M.D. by Mark Rubio. 2/5/2021  12:46 PM    Primary care provider: DECLAN Johnson  Means of arrival: Walk In  History obtained from: Patient  History limited by: None    CHIEF COMPLAINT  Chief Complaint   Patient presents with   • Headache     patient c/o headaches since dx of covid 11/2020 and had progressively gotten worse. patient reports pain starts at the top of head and radiating down the back to shoulders.       HPI  Sonya Wei is a 55 y.o. female who presents to the Emergency Department for evaluation of intermittent, worsening headache onset 3 months. Patient states she was diagnosed with COVID-19 on 11/26/2020 and has experienced a worsening headache since. Patient adds that the pain begins at the top of her head and radiates down her back into her shoulders. She reports associated mild shortness of breath (she believes is from her COVID-19 infection) and tingling and weakness in her legs (onset November around COVID diagnosis). Patient denies any chest pain. She notes she has taken Tylenol and has had her daughter massage her shoulders, but neither has alleviated her pain more than temporarily. She denies treating herself with Ibuprofen. The patient adds a history of hypertension and states she is compliant with her Lisinopril medication.    I verified that the patient was wearing a mask and I was wearing appropriate PPE every time I entered the room. The patient's mask was on the patient at all times during my encounter.    REVIEW OF SYSTEMS  Pertinent positives include headache, shortness of breath, tingling and weakness in legs. Pertinent negatives include no chest pain.  All other systems reviewed and negative.    PAST MEDICAL HISTORY   has a past medical history of Anemia, Bowel habit changes, Diabetes, Diabetes (HCC), GERD (gastroesophageal reflux disease), Healthcare maintenance (9/27/2014), High cholesterol, Hyperlipidemia,  Hypertension, Infectious disease, Jaundice (1999), Psychiatric problem, and Vaginal itching (8/27/2014).    SURGICAL HISTORY   has a past surgical history that includes cholecystectomy; primary c section; tubal coagulation laparoscopic bilateral; abdominal hysterectomy total; and ventral hernia repair robotic xi (N/A, 10/14/2016).    SOCIAL HISTORY  Social History     Tobacco Use   • Smoking status: Never Smoker   • Smokeless tobacco: Never Used   Substance Use Topics   • Alcohol use: No   • Drug use: No      Social History     Substance and Sexual Activity   Drug Use No       FAMILY HISTORY  Family History   Problem Relation Age of Onset   • Diabetes Mother    • Hyperlipidemia Mother    • Diabetes Father    • Hypertension Father    • Hyperlipidemia Father        CURRENT MEDICATIONS  Current Outpatient Medications   Medication Instructions   • acetaminophen (TYLENOL) 1,000 mg, Oral, EVERY 6 HOURS PRN   • albuterol 108 (90 Base) MCG/ACT Aero Soln inhalation aerosol 2 Puffs, Inhalation, EVERY 6 HOURS PRN   • Ascorbic Acid (VITAMIN C) 1000 MG Tab 1 Tab, Oral, EVERY MORNING   • aspirin EC (ECOTRIN) 81 mg, Oral, DAILY   • atorvastatin (LIPITOR) 20 mg, Oral, DAILY   • Cholecalciferol (VITAMIN D PO) 2 Tabs, Oral, DAILY, GUMMIES   • cyanocobalamin (VITAMIN B-12) 100 mcg, Oral, DAILY   • DM-Doxylamine-Acetaminophen (NYQUIL HBP COLD & FLU) 15-6. MG/15ML Liquid 30 mL, Oral, NIGHTLY PRN   • Dulaglutide (TRULICITY) 1.5 MG/0.5ML Solution Pen-injector 0.5 mL, Subcutaneous, EVERY 7 DAYS   • escitalopram (LEXAPRO) 10 mg, Oral, DAILY   • fenofibrate (TRIGLIDE) 160 mg, Oral, DAILY   • glyBURIDE (DIABETA) 10 mg, Oral, 2 TIMES DAILY WITH MEALS   • guaiFENesin dextromethorphan (ROBITUSSIN DM) 100-10 MG/5ML Syrup syrup 10 mL, Oral, EVERY 6 HOURS PRN   • hydrOXYzine HCl (ATARAX) 50 mg, Oral, 3 TIMES DAILY PRN   • insulin glargine 5 Units, Subcutaneous, EVERY EVENING   • Insulin Pen Needle 32 G x 4 mm Use one pen needle in pen device  "to inject insulin once daily.   • lisinopril (PRINIVIL) 20 mg, Oral, DAILY   • magnesium oxide (MAG-OX) 400 mg, Oral, DAILY   • metFORMIN (GLUCOPHAGE) 500 mg, Oral, 2 TIMES DAILY WITH MEALS   • pantoprazole (PROTONIX) 20 MG tablet Take 1 tablet by mouth once daily   • Pseudoephedrine-APAP-DM (DAYQUIL MULTI-SYMPTOM COLD/FLU PO) 30 mL, Oral, 1 TIME DAILY PRN   • SITagliptin (JANUVIA) 100 mg, Oral, DAILY   • therapeutic multivitamin-minerals (THERAGRAN-M) Tab 1 Tab, Oral, DAILY       ALLERGIES  Allergies   Allergen Reactions   • Morphine Itching     Rxn = unknown   Bumps all over body       PHYSICAL EXAM  VITAL SIGNS: BP (!) 207/112   Pulse 91   Temp 36.9 °C (98.4 °F) (Temporal)   Resp 18   Ht 1.549 m (5' 1\")   Wt 81.6 kg (180 lb)   SpO2 95%   BMI 34.01 kg/m²     Constitutional: Well developed, Well nourished, moderate distress, Non-toxic appearance.   HENT: Normocephalic, Atraumatic, Bilateral external ears normal. Mask in place  Eyes: PERRLA, EOMI, Conjunctiva normal, No discharge.   Neck: Diffuse tenderness to palpation throughout the paraspinal trapezius and rhomboid area. Supple, No stridor.   Lymphatic: No lymphadenopathy noted.   Cardiovascular: Normal heart rate, Normal rhythm.   Thorax & Lungs: Clear to auscultation bilaterally, No respiratory distress, No wheezing, No crackles.   Abdomen: Soft, No tenderness, No masses, No pulsatile masses.   Skin: Warm, Dry, No erythema, No rash.   Extremities:, No edema No cyanosis.   Musculoskeletal: No tenderness to palpation or major deformities noted.  Intact distal pulses  Neurologic: Awake, alert. Moves all extremities spontaneously. Cranial nerves 2-11 intact, muscle strength 5/5 in bilateral upper and lower extremities  Psychiatric: Anxious. Affect normal, Judgment normal.     LABS  Results for orders placed or performed during the hospital encounter of 02/05/21   CBC w/ Differential   Result Value Ref Range    WBC 9.3 4.8 - 10.8 K/uL    RBC 4.01 (L) 4.20 - " 5.40 M/uL    Hemoglobin 11.9 (L) 12.0 - 16.0 g/dL    Hematocrit 36.0 (L) 37.0 - 47.0 %    MCV 89.8 81.4 - 97.8 fL    MCH 29.7 27.0 - 33.0 pg    MCHC 33.1 (L) 33.6 - 35.0 g/dL    RDW 42.1 35.9 - 50.0 fL    Platelet Count 324 164 - 446 K/uL    MPV 11.6 9.0 - 12.9 fL    Neutrophils-Polys 68.40 44.00 - 72.00 %    Lymphocytes 21.20 (L) 22.00 - 41.00 %    Monocytes 7.70 0.00 - 13.40 %    Eosinophils 1.40 0.00 - 6.90 %    Basophils 0.50 0.00 - 1.80 %    Immature Granulocytes 0.80 0.00 - 0.90 %    Nucleated RBC 0.00 /100 WBC    Neutrophils (Absolute) 6.37 2.00 - 7.15 K/uL    Lymphs (Absolute) 1.97 1.00 - 4.80 K/uL    Monos (Absolute) 0.72 0.00 - 0.85 K/uL    Eos (Absolute) 0.13 0.00 - 0.51 K/uL    Baso (Absolute) 0.05 0.00 - 0.12 K/uL    Immature Granulocytes (abs) 0.07 0.00 - 0.11 K/uL    NRBC (Absolute) 0.00 K/uL   Complete Metabolic Panel (CMP)   Result Value Ref Range    Sodium 135 135 - 145 mmol/L    Potassium 4.5 3.6 - 5.5 mmol/L    Chloride 102 96 - 112 mmol/L    Co2 24 20 - 33 mmol/L    Anion Gap 9.0 7.0 - 16.0    Glucose 176 (H) 65 - 99 mg/dL    Bun 23 (H) 8 - 22 mg/dL    Creatinine 0.95 0.50 - 1.40 mg/dL    Calcium 9.9 8.5 - 10.5 mg/dL    AST(SGOT) 30 12 - 45 U/L    ALT(SGPT) 20 2 - 50 U/L    Alkaline Phosphatase 68 30 - 99 U/L    Total Bilirubin 0.3 0.1 - 1.5 mg/dL    Albumin 3.7 3.2 - 4.9 g/dL    Total Protein 6.8 6.0 - 8.2 g/dL    Globulin 3.1 1.9 - 3.5 g/dL    A-G Ratio 1.2 g/dL   Troponin STAT   Result Value Ref Range    Troponin T 17 6 - 19 ng/L   TSH (for screening thyroid dysfunction)   Result Value Ref Range    TSH 3.610 0.380 - 5.330 uIU/mL   ESTIMATED GFR   Result Value Ref Range    GFR If African American >60 >60 mL/min/1.73 m 2    GFR If Non African American >60 >60 mL/min/1.73 m 2   EKG   Result Value Ref Range    Report       [facility] Emergency Dept.    Test Date:  2021-02-05  Pt Name:    MATTIE LORI               Department: ER  MRN:        [id number]                       Room:       [facility]  Gender:     Female                       Technician: [id number]  :        1965                   Requested By:MARKUS RUBALCAVA  Order #:    [id number]                    Reading MD: MARKUS RUBALCAVA MD    Measurements  Intervals                                Axis  Rate:       84                           P:          56  NY:         148                          QRS:        0  QRSD:       130                          T:          30  QT:         376  QTc:        445    Interpretive Statements  SINUS RHYTHM  PROBABLE LEFT ATRIAL ABNORMALITY  RIGHT BUNDLE BRANCH BLOCK  BASELINE WANDER IN LEAD(S) V6  Compared to ECG 2020 00:07:10  No significant changes  Electronically Signed On 2021 15:38:22 PST by MARKUS RUBALCAVA MD        All labs reviewed by me.    RADIOLOGY  DX-CHEST-PORTABLE (1 VIEW)   Final Result      No acute cardiopulmonary abnormality.        The radiologist's interpretation of all radiological studies have been reviewed by me.    COURSE & MEDICAL DECISION MAKING  Pertinent Labs & Imaging studies reviewed. (See chart for details)    I reviewed the patient's medical records which showed she had COVID-19 in the beginning of 2020 and was hospitalized. No known history of hypertension.    12:46 PM - Patient seen and examined at bedside. Patient will be treated with Toradol 30 mg injection, Lisinopril 20 mg tab, and Ativan 0.5 mg injection. Ordered DX-Chest, EKG, CBC w diff, CMP, eGFR, Troponin STAT, and TSH to evaluate her symptoms. The differential diagnoses include but are not limited to: tension headache, hypertensive urgency    2:41 PM - Patient was reevaluated at bedside. Patient blood pressure is still elevated at 213/86, and she informs me that her pain is a 5/10 currently. Discussed lab and radiology results with the patient. Patient will be treated with Benadryl 25 mg injection and Reglan 5 mg injection.    3:20 PM - Patient was reevaluated at bedside.  Patient is informed of the plan for discharge. Patient is instructed to follow up with her doctor because her blood pressure is still elevated. The patient will return for new or worsening symptoms and is stable at the time of discharge. Patient verbalizes understanding and agreement to this plan of care.     Decision Making:  patient is coming in with a headache, I believe is mainly a tension headache, it's been intermittent since the patient had COVID, seems very related to her neck musculature, given patient toward all, Ativan with improvement the patient symptoms over the patient's blood pressure was still extraordinarily high, The patient Reglan of Benadryl, the patient's headache is vastly improved however the patient is still hypersensitive with a systolic and the two tendons. She is not having any hypertensive emergency, laboratory tests are unremarkable including the troponin EKG, TSH was normal. At this point time I believe if I gave the patient medication to drop her blood pressure this could cause more harm than good given the patient could have a nearsyncopal or syncopal event and possibly cause some trauma. Therefore I will have the patient monitor blood pressure at home, call her primary care physician for guidance about her blood pressure management. I'll have the patient return with any other concerns. She will take Tylenol ibuprofen for headache, heat, massage on her neck.    HTN/IDDM FOLLOW UP:  The patient has known hypertension and is being followed by their primary care doctor      DISPOSITION:  Patient will be discharged home in stable condition.    FOLLOW UP:  [facility], Emergency Dept  [address] [address] [phone]    If symptoms worsen    Fern Lawrence, RODGER.[email or web address].  [address]  [facility] [address] [phone]            FINAL IMPRESSION  1. Tension headache    2. Elevated blood pressure reading          I, Mark Rubio (Ta), am  scribing for, and in the presence of, Madi Calvillo M.D..    Electronically signed by: Mark Rubio (Scribe), 2/5/2021    I, Madi Calvillo M.D. personally performed the services described in this documentation, as scribed by Mark Rubio in my presence, and it is both accurate and complete. C.    The note accurately reflects work and decisions made by me.  Madi Calvillo M.D.  2/5/2021  7:25 PM

## 2021-02-05 NOTE — ED TRIAGE NOTES
.Sonya Wei  .  Chief Complaint   Patient presents with   • Headache     patient c/o headaches since dx of covid 11/2020 and had progressively gotten worse. patient reports pain starts at the top of head and radiating down the back to shoulders.     Patient to triage with above complaint. aao x 4. PERRLA, equal .  Patient to senior lounge and instructed to inform staff of any needs.

## 2021-02-05 NOTE — PROGRESS NOTES
CC:paperwork completion    HISTORY OF PRESENT ILLNESS: Patient is a 55 y.o. female established patient who presents today to for following health problem.    History of 2019 novel coronavirus disease (COVID-19)  Patient presents today for work accommodation paperwork completion.            Patient Active Problem List    Diagnosis Date Noted   • Pneumonia due to COVID-19 virus 12/04/2020     Priority: High   • Hypertension 10/01/2015     Priority: Low   • Hyperlipidemia 08/27/2012     Priority: Low   • History of 2019 novel coronavirus disease (COVID-19) 01/14/2021   • Reactive depression 01/14/2021   • Hospital discharge follow-up 12/16/2020   • Neck pain 08/18/2020   • Musculoskeletal pain 08/18/2020   • Epigastric pain 08/13/2020   • Obesity (BMI 30-39.9) 04/03/2019   • Mid back pain 04/02/2019   • Gastroesophageal reflux disease 01/29/2019   • Incisional hernia 10/14/2016   • Vitamin D deficiency 09/24/2012   • Microalbuminuria 09/24/2012   • Diabetes mellitus type II, uncontrolled ([facility]) 08/27/2012      Allergies:Morphine    Current Outpatient Medications   Medication Sig Dispense Refill   • cyanocobalamin (VITAMIN B-12) 100 MCG Tab Take 100 mcg by mouth every day.     • escitalopram (LEXAPRO) 10 MG Tab Take 1 Tab by mouth every day. 90 Tab 0   • hydrOXYzine HCl (ATARAX) 50 MG Tab Take 1 Tab by mouth 3 times a day as needed for Anxiety. 60 Tab 0   • magnesium oxide (MAG-OX) 400 MG Tab tablet Take 400 mg by mouth every day.     • pantoprazole (PROTONIX) 20 MG tablet Take 1 tablet by mouth once daily 30 Tab 5   • Insulin Pen Needle 32 G x 4 mm Use one pen needle in pen device to inject insulin once daily. 100 Each 0   • acetaminophen (TYLENOL) 500 MG Tab Take 1,000 mg by mouth every 6 hours as needed.     • DM-Doxylamine-Acetaminophen (NYQUIL HBP COLD & FLU) 15-6. MG/15ML Liquid Take 30 mL by mouth at bedtime as needed.     • SITagliptin (JANUVIA) 100 MG Tab Take 1 Tab by mouth every day. 90 Tab 1   •  glyBURIDE (DIABETA) 5 MG Tab Take 2 Tabs by mouth 2 times a day, with meals. 360 Tab 0   • Dulaglutide (TRULICITY) 1.5 MG/0.5ML Solution Pen-injector Inject 0.5 mL as instructed every 7 days. (Patient taking differently: Inject 0.5 mL under the skin every 7 days. Pt injects q thurs) 6 mL 1   • metFORMIN (GLUCOPHAGE) 500 MG Tab Take 1 Tab by mouth 2 times a day, with meals. 180 Tab 1   • lisinopril (PRINIVIL) 20 MG Tab Take 1 Tab by mouth every day. 90 Tab 1   • fenofibrate (TRIGLIDE) 160 MG tablet Take 1 Tab by mouth every day. 90 Tab 1   • atorvastatin (LIPITOR) 20 MG Tab Take 1 Tab by mouth every day. 90 Tab 1   • Cholecalciferol (VITAMIN D PO) Take 2 Tabs by mouth every day. GUMMIES     • therapeutic multivitamin-minerals (THERAGRAN-M) Tab Take 1 Tab by mouth every day. 30 Tab 11   • insulin glargine (INSULIN GLARGINE) 100 UNIT/ML Solution Pen-injector injection Inject 5 Units under the skin every evening. (Patient not taking: Reported on 1/14/2021) 1 Each 0   • guaiFENesin dextromethorphan (ROBITUSSIN DM) 100-10 MG/5ML Syrup syrup Take 10 mL by mouth every 6 hours as needed for Cough. (Patient not taking: Reported on 12/23/2020) 840 mL 0   • Ascorbic Acid (VITAMIN C) 1000 MG Tab Take 1 Tab by mouth every morning.     • Pseudoephedrine-APAP-DM (DAYQUIL MULTI-SYMPTOM COLD/FLU PO) Take 30 mL by mouth 1 time a day as needed.     • albuterol 108 (90 Base) MCG/ACT Aero Soln inhalation aerosol Inhale 2 Puffs by mouth every 6 hours as needed for Shortness of Breath. (Patient not taking: Reported on 12/4/2020) 8.5 g 0   • aspirin EC (ECOTRIN) 81 MG Tablet Delayed Response Take 1 Tab by mouth every day. 30 Tab      No current facility-administered medications for this visit.        Social History     Tobacco Use   • Smoking status: Never Smoker   • Smokeless tobacco: Never Used   Substance Use Topics   • Alcohol use: No   • Drug use: No     Social History     Social History Narrative    ** Merged History Encounter **       "      Family History   Problem Relation Age of Onset   • Diabetes Mother    • Hyperlipidemia Mother    • Diabetes Father    • Hypertension Father    • Hyperlipidemia Father        Review of Systems:   Denies chest pain      Exam:    /78   Pulse 86   Ht 1.549 m (5' 1\")   Wt 81.6 kg (180 lb)   SpO2 95%  Body mass index is 34.01 kg/m².    General:  Well nourished, well developed female in NAD  Neck: Supple  Pulmonary: Normal effort.       Please note that this dictation was created using voice recognition software. I have made every reasonable attempt to correct obvious errors, but I expect that there are errors of grammar and possibly content that I did not discover before finalizing the note.    Assessment/Plan:  1. History of 2019 novel coronavirus disease (COVID-19)  Work Accomodation form completed and scanned into chart, 2 copies given to patient.  Patient will give copy to her employer     My total time spent caring for the patient on the day of the encounter was 20 minutes.   This does not include time spent on separately billable procedures/tests.    "

## 2021-02-13 DIAGNOSIS — E11.9 TYPE 2 DIABETES MELLITUS WITHOUT COMPLICATION, WITHOUT LONG-TERM CURRENT USE OF INSULIN (HCC): ICD-10-CM

## 2021-02-16 ENCOUNTER — APPOINTMENT (OUTPATIENT)
Dept: LAB | Facility: MEDICAL CENTER | Age: 56
End: 2021-02-16
Attending: NURSE PRACTITIONER

## 2021-02-16 ENCOUNTER — HOSPITAL ENCOUNTER (OUTPATIENT)
Dept: LAB | Facility: MEDICAL CENTER | Age: 56
End: 2021-02-16
Attending: NURSE PRACTITIONER
Payer: COMMERCIAL

## 2021-02-16 DIAGNOSIS — E11.65 UNCONTROLLED TYPE 2 DIABETES MELLITUS WITH HYPERGLYCEMIA (HCC): ICD-10-CM

## 2021-02-16 LAB
ALBUMIN SERPL BCP-MCNC: 4.1 G/DL (ref 3.2–4.9)
ALBUMIN/GLOB SERPL: 1.3 G/DL
ALP SERPL-CCNC: 46 U/L (ref 30–99)
ALT SERPL-CCNC: 21 U/L (ref 2–50)
ANION GAP SERPL CALC-SCNC: 10 MMOL/L (ref 7–16)
AST SERPL-CCNC: 32 U/L (ref 12–45)
BILIRUB SERPL-MCNC: 0.2 MG/DL (ref 0.1–1.5)
BUN SERPL-MCNC: 50 MG/DL (ref 8–22)
CALCIUM SERPL-MCNC: 10 MG/DL (ref 8.5–10.5)
CHLORIDE SERPL-SCNC: 99 MMOL/L (ref 96–112)
CO2 SERPL-SCNC: 24 MMOL/L (ref 20–33)
CREAT SERPL-MCNC: 1.08 MG/DL (ref 0.5–1.4)
EST. AVERAGE GLUCOSE BLD GHB EST-MCNC: 169 MG/DL
GLOBULIN SER CALC-MCNC: 3.2 G/DL (ref 1.9–3.5)
GLUCOSE SERPL-MCNC: 205 MG/DL (ref 65–99)
HBA1C MFR BLD: 7.5 % (ref 0–5.6)
POTASSIUM SERPL-SCNC: 4.9 MMOL/L (ref 3.6–5.5)
PROT SERPL-MCNC: 7.3 G/DL (ref 6–8.2)
SODIUM SERPL-SCNC: 133 MMOL/L (ref 135–145)

## 2021-02-16 PROCEDURE — 83036 HEMOGLOBIN GLYCOSYLATED A1C: CPT

## 2021-02-16 PROCEDURE — 36415 COLL VENOUS BLD VENIPUNCTURE: CPT

## 2021-02-16 PROCEDURE — 80053 COMPREHEN METABOLIC PANEL: CPT

## 2021-02-16 RX ORDER — SITAGLIPTIN 100 MG/1
TABLET, FILM COATED ORAL
Qty: 90 TABLET | Refills: 3 | Status: SHIPPED | OUTPATIENT
Start: 2021-02-16 | End: 2021-04-02

## 2021-02-19 ENCOUNTER — OFFICE VISIT (OUTPATIENT)
Dept: MEDICAL GROUP | Facility: PHYSICIAN GROUP | Age: 56
End: 2021-02-19
Payer: COMMERCIAL

## 2021-02-19 VITALS
WEIGHT: 180 LBS | DIASTOLIC BLOOD PRESSURE: 90 MMHG | TEMPERATURE: 97.8 F | RESPIRATION RATE: 14 BRPM | HEIGHT: 61 IN | SYSTOLIC BLOOD PRESSURE: 130 MMHG | OXYGEN SATURATION: 97 % | HEART RATE: 82 BPM | BODY MASS INDEX: 33.99 KG/M2

## 2021-02-19 DIAGNOSIS — I10 ESSENTIAL HYPERTENSION: ICD-10-CM

## 2021-02-19 DIAGNOSIS — R79.9 ELEVATED BUN: ICD-10-CM

## 2021-02-19 DIAGNOSIS — M54.2 NECK PAIN: ICD-10-CM

## 2021-02-19 DIAGNOSIS — R51.9 NONINTRACTABLE HEADACHE, UNSPECIFIED CHRONICITY PATTERN, UNSPECIFIED HEADACHE TYPE: ICD-10-CM

## 2021-02-19 DIAGNOSIS — R27.0 ATAXIA: ICD-10-CM

## 2021-02-19 DIAGNOSIS — F32.9 REACTIVE DEPRESSION: ICD-10-CM

## 2021-02-19 DIAGNOSIS — E11.65 UNCONTROLLED TYPE 2 DIABETES MELLITUS WITH HYPERGLYCEMIA (HCC): ICD-10-CM

## 2021-02-19 DIAGNOSIS — E66.9 OBESITY (BMI 30-39.9): ICD-10-CM

## 2021-02-19 DIAGNOSIS — R94.4 DECREASED GFR: ICD-10-CM

## 2021-02-19 PROCEDURE — 99214 OFFICE O/P EST MOD 30 MIN: CPT | Performed by: NURSE PRACTITIONER

## 2021-02-19 RX ORDER — GABAPENTIN 300 MG/1
CAPSULE ORAL
Qty: 180 CAPSULE | Refills: 1 | Status: SHIPPED | OUTPATIENT
Start: 2021-02-19 | End: 2021-04-14 | Stop reason: SDUPTHER

## 2021-02-19 RX ORDER — LISINOPRIL 40 MG/1
40 TABLET ORAL DAILY
Qty: 90 TABLET | Refills: 1 | Status: SHIPPED | OUTPATIENT
Start: 2021-02-19 | End: 2021-07-27

## 2021-02-19 ASSESSMENT — FIBROSIS 4 INDEX: FIB4 SCORE: 1.21

## 2021-02-19 NOTE — ASSESSMENT & PLAN NOTE
Chronic health problem, improved control.  Taking Metformin 500 mg twice a day, glyburide 10 mg twice a day, Januvia 100 mg daily and Trulicity 1.5 mg weekly.  Patient discontinued Lantus almost a month ago, but only just started Trulicity last week.  Hemoglobin A1c may not be representative of her current regimen.  Home fbs averaging 140's.  On ACE and statin.  Up-to-date on retinal scan.  Denies polydipsia, polyuria.    Component      Latest Ref Rng & Units 11/18/2020 2/16/2021           9:58 AM  9:01 AM   Glycohemoglobin      0.0 - 5.6 % 8.1 (H) 7.5 (H)

## 2021-02-19 NOTE — ASSESSMENT & PLAN NOTE
This is a chronic health problem that is uncontrolled with current medications and lifestyle measures.  Taking lisinopril 20 mg daily.  Has been having worsening headache.  Went to [facility] ER with headache and found to be hypertensive.  Patient reports blood pressures at home have been in the 160s over 90s.

## 2021-02-20 NOTE — ASSESSMENT & PLAN NOTE
Improved.  Tried escitalopram and hydroxyzine.  Made her really tired, so she discontinued.  She feels that she does not need medication any longer.  Has support from family and [employer].

## 2021-02-20 NOTE — PROGRESS NOTES
"CC: Lab review, diabetes, neck pain, blood pressure, headache    HISTORY OF THE PRESENT ILLNESS: Patient is a 55 y.o. female. This pleasant patient is here today for evaluation and management of the following health problems.        Diabetes mellitus type II, uncontrolled (HCC)  Chronic health problem, improved control.  Taking Metformin 500 mg twice a day, glyburide 10 mg twice a day, Januvia 100 mg daily and Trulicity 1.5 mg weekly.  Patient discontinued Lantus almost a month ago, but only just started Trulicity last week.  Hemoglobin A1c may not be representative of her current regimen.  Home fbs averaging 140's.  On ACE and statin.  Up-to-date on retinal scan.  Denies polydipsia, polyuria.    Component      Latest Ref Rng & Units 11/18/2020 2/16/2021           9:58 AM  9:01 AM   Glycohemoglobin      0.0 - 5.6 % 8.1 (H) 7.5 (H)           Hypertension  This is a chronic health problem that is uncontrolled with current medications and lifestyle measures.  Taking lisinopril 20 mg daily.  Has been having worsening headache.  Went to [facility] ER with headache and found to be hypertensive.  Patient reports blood pressures at home have been in the 160s over 90s.          Nonintractable headache  Patient reports worsening headache since she had coronavirus 2 months ago.  Seems to radiate from her neck and upper head to her bilateral temporal areas.  Was seen in ER at [facility] recently.  At that time her blood pressure was very elevated.  Also reports neck pain that radiates down bilateral shoulders.    Also reports worsening ataxia.  \"I feel like the floor is farther away than it really is.\"  Has fallen multiple times.  Massage helps.  Denies vision changes, weakness, tingling.    Neck pain  Please see additional notes under headache.    Reactive depression  Improved.  Tried escitalopram and hydroxyzine.  Made her really tired, so she discontinued.  She feels that she does not need medication any longer.  Has support from " family and [employer].      Allergies: Morphine    Current Outpatient Medications Ordered in Epic   Medication Sig Dispense Refill   • lisinopril (PRINIVIL) 40 MG tablet Take 1 tablet by mouth every day. 90 tablet 1   • gabapentin (NEURONTIN) 300 MG Cap Take 1 cap qhs for 1 week, then may increase to BID thereafter. 180 capsule 1   • JANUVIA 100 MG Tab Take 1 tablet by mouth once daily 90 tablet 3   • magnesium oxide (MAG-OX) 400 MG Tab tablet Take 400 mg by mouth every day.     • pantoprazole (PROTONIX) 20 MG tablet Take 1 tablet by mouth once daily 30 Tab 5   • Insulin Pen Needle 32 G x 4 mm Use one pen needle in pen device to inject insulin once daily. 100 Each 0   • Ascorbic Acid (VITAMIN C) 1000 MG Tab Take 1 Tab by mouth every morning.     • glyBURIDE (DIABETA) 5 MG Tab Take 2 Tabs by mouth 2 times a day, with meals. 360 Tab 0   • Dulaglutide (TRULICITY) 1.5 MG/0.5ML Solution Pen-injector Inject 0.5 mL as instructed every 7 days. (Patient taking differently: Inject 0.5 mL under the skin every 7 days. Pt injects q thurs) 6 mL 1   • metFORMIN (GLUCOPHAGE) 500 MG Tab Take 1 Tab by mouth 2 times a day, with meals. 180 Tab 1   • fenofibrate (TRIGLIDE) 160 MG tablet Take 1 Tab by mouth every day. 90 Tab 1   • atorvastatin (LIPITOR) 20 MG Tab Take 1 Tab by mouth every day. 90 Tab 1   • Cholecalciferol (VITAMIN D PO) Take 2 Tabs by mouth every day. GUMMIES     • therapeutic multivitamin-minerals (THERAGRAN-M) Tab Take 1 Tab by mouth every day. 30 Tab 11   • acetaminophen (TYLENOL) 500 MG Tab Take 1,000 mg by mouth every 6 hours as needed.     • albuterol 108 (90 Base) MCG/ACT Aero Soln inhalation aerosol Inhale 2 Puffs by mouth every 6 hours as needed for Shortness of Breath. (Patient not taking: Reported on 12/4/2020) 8.5 g 0     No current Epic-ordered facility-administered medications on file.       Past Medical History:   Diagnosis Date   • Anemia    • Bowel habit changes     constipation   • Diabetes    • Diabetes  "([facility])    • GERD (gastroesophageal reflux disease)    • Healthcare maintenance 9/27/2014    Mammogram: Due   • High cholesterol    • Hyperlipidemia    • Hypertension    • Infectious disease     Cold 10/2016   • Jaundice 1999   • Psychiatric problem     depression and anxiety   • Vaginal itching 8/27/2014    With anal itching X 1 month Getting worse Burning Min vag disch       Past Surgical History:   Procedure Laterality Date   • VENTRAL HERNIA REPAIR ROBOTIC XI N/A 10/14/2016    Procedure: VENTRAL HERNIA REPAIR ROBOTIC XI FOR INCISIONAL W/MESH;  Surgeon: Edi Mendoza M.D.;  Location: SURGERY [facility];  Service:    • ABDOMINAL HYSTERECTOMY TOTAL      still has ovaries   • CHOLECYSTECTOMY     • PRIMARY C SECTION     • TUBAL COAGULATION LAPAROSCOPIC BILATERAL         Social History     Tobacco Use   • Smoking status: Never Smoker   • Smokeless tobacco: Never Used   Substance Use Topics   • Alcohol use: No   • Drug use: No       Family History   Problem Relation Age of Onset   • Diabetes Mother    • Hyperlipidemia Mother    • Diabetes Father    • Hypertension Father    • Hyperlipidemia Father        ROS:   As in HPI, otherwise negative for chest pain, dyspnea, abdominal pain, dysuria, blood in stool, fever.  Positive fatigue since COVID-19 illness      Exam: /90 (BP Location: Left arm, Patient Position: Sitting, BP Cuff Size: Adult long)   Pulse 82   Temp 36.6 °C (97.8 °F) (Temporal)   Resp 14   Ht 1.549 m (5' 1\")   Wt 81.6 kg (180 lb)   SpO2 97%  Body mass index is 34.01 kg/m².    General: Alert, pleasant, obese habitus, well nourished, well developed female in NAD  HEENT: Normocephalic. Eyes conjunctiva clear lids without ptosis, pupils equal and reactive to light.   Neck: Supple without bruit. Thyroid is not enlarged.  Pulmonary: Clear to ausculation.  Normal effort. No rales, ronchi, or wheezing.  Cardiovascular: Normal rate and rhythm without murmur.   Abdomen: Soft, nontender, nondistended. " Normal bowel sounds. Liver and spleen are not palpable  Cervical spine: No erythema or edema, full active range of motion, moderate tenderness superior trapezius bilaterally, shoulders full active range of motion  Neuro: CN 2-12 tested and grossly intact, strength testing in upper and lower extremities is 5/5 and equal bilaterally, patellar deep tendon reflexes 1+ bilaterally, Gross motor movement intact in all 4 extremities, Gross sensation intact to extremities and trunk, Gait grossly normal and not antalgic  Lymph: No cervical or supraclavicular lymph nodes are palpable  Skin: Warm and dry.    Psych: Normal mood and affect. Alert and oriented. Judgment and insight is normal.    Labs reviewed:    Component      Latest Ref Rng & Units 2/5/2021 2/16/2021   Sodium      135 - 145 mmol/L 135 133 (L)   Potassium      3.6 - 5.5 mmol/L 4.5 4.9   Chloride      96 - 112 mmol/L 102 99   Co2      20 - 33 mmol/L 24 24   Anion Gap      7.0 - 16.0 9.0 10.0   Glucose      65 - 99 mg/dL 176 (H) 205 (H)   Bun      8 - 22 mg/dL 23 (H) 50 (H)   Creatinine      0.50 - 1.40 mg/dL 0.95 1.08   Calcium      8.5 - 10.5 mg/dL 9.9 10.0   AST(SGOT)      12 - 45 U/L 30 32   ALT(SGPT)      2 - 50 U/L 20 21   Alkaline Phosphatase      30 - 99 U/L 68 46   Total Bilirubin      0.1 - 1.5 mg/dL 0.3 0.2   Albumin      3.2 - 4.9 g/dL 3.7 4.1   Total Protein      6.0 - 8.2 g/dL 6.8 7.3   Globulin      1.9 - 3.5 g/dL 3.1 3.2   A-G Ratio      g/dL 1.2 1.3   GFR If African American      >60 mL/min/1.73 m 2 >60 >60   GFR If Non African American      >60 mL/min/1.73 m 2 >60 52 (A)   TSH      0.380 - 5.330 uIU/mL 3.610        Please note that this dictation was created using voice recognition software. I have made every reasonable attempt to correct obvious errors, but I expect that there are errors of grammar and possibly content that I did not discover before finalizing the note.      Assessment/Plan  1. Uncontrolled type 2 diabetes mellitus with  hyperglycemia (HCC)  Improving control.  Fasting blood sugars decreasing.  Will trial Trulicity in addition to current medications before making any other adjustments.  Continue with Januvia, glyburide, no changes.  Discontinued Lantus.  Continue to monitor blood sugars.  Will refer to dietitian per patient request for diabetes education diet.  - HEMOGLOBIN A1C; Future  - Comp Metabolic Panel; Future  - ESTIMATED GFR; Future  - REFERRAL TO [facility] IMPROVEMENT PROGRAMS (HIP)    2. Essential hypertension  Patient having increasing blood pressure readings.  Uncertain if headache is from elevated blood pressure or elevated blood pressure is from headache.  Will increase lisinopril to 40 mg daily.  Advised patient to monitor blood pressure at home and bring in readings to next appointment.  If her blood pressures are consistently greater than 140/90, she will come in earlier for appointment.  - lisinopril (PRINIVIL) 40 MG tablet; Take 1 tablet by mouth every day.  Dispense: 90 tablet; Refill: 1    3. Neck pain  Patient having back pain radiating down bilateral shoulders.  May be contributing to her headaches.  Will refer to physical therapy, will get x-ray.  Patient will trial gabapentin for radiating neck pain and headache.  Instructions and side effects reviewed with patient.  - REFERRAL TO PHYSICAL THERAPY  - DX-CERVICAL SPINE-2 OR 3 VIEWS; Future  - gabapentin (NEURONTIN) 300 MG Cap; Take 1 cap qhs for 1 week, then may increase to BID thereafter.  Dispense: 180 capsule; Refill: 1    4. Nonintractable headache, unspecified chronicity pattern, unspecified headache type  Patient having worsening headache.  Also presenting with ataxia and falls.  Will get MRI and refer to neurology for further evaluation.  In the meantime, patient will trial gabapentin for headache and neck pain.  ER precautions reviewed with patient.  - gabapentin (NEURONTIN) 300 MG Cap; Take 1 cap qhs for 1 week, then may increase to BID  thereafter.  Dispense: 180 capsule; Refill: 1  - MR-BRAIN-W/O; Future  - REFERRAL TO NEUROLOGY    5. Ataxia  As in #4.  - MR-BRAIN-W/O; Future  - REFERRAL TO NEUROLOGY    6. Obesity (BMI 30-39.9)  Patient wishes to consult with dietitian regarding diabetes and obesity.  Referral placed today.    - REFERRAL TO [facility] IMPROVEMENT [facility] (HIP)    7. Reactive depression  Patient reports side effects to hydroxyzine Escitalopram.  She is not wanting to try any other medications at this time.  Feels that she is doing much better with her mood and has support from family and [employer].  Advised on lifestyle measures including routine aerobic exercise as tolerated.    Patient will return to clinic in 3 months for lab review, diabetes, hypertension, and follow-up on other chronic health problems.  She will return to clinic sooner if needed.    After patient visit, patient notified via My Chart to get her BMP and est GFR rechecked in 1 week secondary to elevated BUN and decreased GFR.

## 2021-02-20 NOTE — ASSESSMENT & PLAN NOTE
"Patient reports worsening headache since she had coronavirus 2 months ago.  Seems to radiate from her neck and upper head to her bilateral temporal areas.  Was seen in ER at [facility] recently.  At that time her blood pressure was very elevated.  Also reports neck pain that radiates down bilateral shoulders.    Also reports worsening ataxia.  \"I feel like the floor is farther away than it really is.\"  Has fallen multiple times.  Massage helps.  Denies vision changes, weakness, tingling.  "

## 2021-02-24 ENCOUNTER — OFFICE VISIT (OUTPATIENT)
Dept: MEDICAL GROUP | Facility: PHYSICIAN GROUP | Age: 56
End: 2021-02-24
Payer: COMMERCIAL

## 2021-02-24 VITALS
HEIGHT: 61 IN | DIASTOLIC BLOOD PRESSURE: 80 MMHG | HEART RATE: 79 BPM | BODY MASS INDEX: 34.36 KG/M2 | RESPIRATION RATE: 12 BRPM | TEMPERATURE: 97.5 F | SYSTOLIC BLOOD PRESSURE: 138 MMHG | WEIGHT: 182 LBS | OXYGEN SATURATION: 94 %

## 2021-02-24 DIAGNOSIS — H66.90 ACUTE OTITIS MEDIA, UNSPECIFIED OTITIS MEDIA TYPE: ICD-10-CM

## 2021-02-24 DIAGNOSIS — B96.89 BACTERIAL SINUSITIS: ICD-10-CM

## 2021-02-24 DIAGNOSIS — J32.9 BACTERIAL SINUSITIS: ICD-10-CM

## 2021-02-24 DIAGNOSIS — Z86.16 HISTORY OF 2019 NOVEL CORONAVIRUS DISEASE (COVID-19): ICD-10-CM

## 2021-02-24 PROCEDURE — 99213 OFFICE O/P EST LOW 20 MIN: CPT | Performed by: NURSE PRACTITIONER

## 2021-02-24 RX ORDER — AMOXICILLIN AND CLAVULANATE POTASSIUM 875; 125 MG/1; MG/1
1 TABLET, FILM COATED ORAL 2 TIMES DAILY
Qty: 14 TABLET | Refills: 0 | Status: SHIPPED | OUTPATIENT
Start: 2021-02-24 | End: 2021-04-14

## 2021-02-24 ASSESSMENT — FIBROSIS 4 INDEX: FIB4 SCORE: 1.21

## 2021-02-24 NOTE — LETTER
February 24, 2021    To Whom It May Concern:         This is confirmation that Sonya Wei attended her scheduled appointment with DECLAN Johnson on 2/24/21.     Ms. Wei is not yet able to return to work with no restrictions due to lingering effects of COVID-19.  Hopefully, she will be able to return to work in 6 weeks.  She has pending specialty consultation and tests.         If you have any questions please do not hesitate to call me at the phone number listed below.    Sincerely,          [email or web address].  [phone]

## 2021-02-24 NOTE — PROGRESS NOTES
CC: Right otalgia, letter for work    HISTORY OF THE PRESENT ILLNESS: Patient is a 55 y.o. female. This pleasant patient is here today for evaluation and management of the following health problems.      Acute otitis media  Patient reports 5-day onset of right otalgia, sinus pressure and pain.  Denies fever.  Has continuing fatigue and malaise.  Continues to have dizziness, previously referred to neurology, but has not heard back yet.  Pending MRI of brain.    History of 2019 novel coronavirus disease (COVID-19)  Patient continues with lingering effects of COVID-19 including fatigue, dizziness, headache, weakness.  Does continue to use supplemental oxygen at night.  She was due to go back to work this week with restrictions, but they will not allow her to return to work without restrictions.  Employer requiring note stating on when patient can come back to work without restrictions.  Will hopefully be able to return to work without restrictions in 6 weeks.  Pending referral to neurology, MRI brain, physical therapy.        Allergies: Morphine    Current Outpatient Medications Ordered in Epic   Medication Sig Dispense Refill   • amoxicillin-clavulanate (AUGMENTIN) 875-125 MG Tab Take 1 tablet by mouth 2 times a day. 14 tablet 0   • lisinopril (PRINIVIL) 40 MG tablet Take 1 tablet by mouth every day. 90 tablet 1   • gabapentin (NEURONTIN) 300 MG Cap Take 1 cap qhs for 1 week, then may increase to BID thereafter. 180 capsule 1   • JANUVIA 100 MG Tab Take 1 tablet by mouth once daily 90 tablet 3   • magnesium oxide (MAG-OX) 400 MG Tab tablet Take 400 mg by mouth every day.     • pantoprazole (PROTONIX) 20 MG tablet Take 1 tablet by mouth once daily 30 Tab 5   • Insulin Pen Needle 32 G x 4 mm Use one pen needle in pen device to inject insulin once daily. 100 Each 0   • Ascorbic Acid (VITAMIN C) 1000 MG Tab Take 1 Tab by mouth every morning.     • acetaminophen (TYLENOL) 500 MG Tab Take 1,000 mg by mouth every 6 hours as  needed.     • glyBURIDE (DIABETA) 5 MG Tab Take 2 Tabs by mouth 2 times a day, with meals. 360 Tab 0   • Dulaglutide (TRULICITY) 1.5 MG/0.5ML Solution Pen-injector Inject 0.5 mL as instructed every 7 days. (Patient taking differently: Inject 0.5 mL under the skin every 7 days. Pt injects q thurs) 6 mL 1   • metFORMIN (GLUCOPHAGE) 500 MG Tab Take 1 Tab by mouth 2 times a day, with meals. 180 Tab 1   • fenofibrate (TRIGLIDE) 160 MG tablet Take 1 Tab by mouth every day. 90 Tab 1   • atorvastatin (LIPITOR) 20 MG Tab Take 1 Tab by mouth every day. 90 Tab 1   • Cholecalciferol (VITAMIN D PO) Take 2 Tabs by mouth every day. GUMMIES     • therapeutic multivitamin-minerals (THERAGRAN-M) Tab Take 1 Tab by mouth every day. 30 Tab 11   • albuterol 108 (90 Base) MCG/ACT Aero Soln inhalation aerosol Inhale 2 Puffs by mouth every 6 hours as needed for Shortness of Breath. (Patient not taking: Reported on 2/24/2021) 8.5 g 0     No current Epic-ordered facility-administered medications on file.       Past Medical History:   Diagnosis Date   • Anemia    • Bowel habit changes     constipation   • Diabetes    • Diabetes (HCC)    • GERD (gastroesophageal reflux disease)    • Healthcare maintenance 9/27/2014    Mammogram: Due   • High cholesterol    • Hyperlipidemia    • Hypertension    • Infectious disease     Cold 10/2016   • Jaundice 1999   • Psychiatric problem     depression and anxiety   • Vaginal itching 8/27/2014    With anal itching X 1 month Getting worse Burning Min vag disch       Past Surgical History:   Procedure Laterality Date   • VENTRAL HERNIA REPAIR ROBOTIC XI N/A 10/14/2016    Procedure: VENTRAL HERNIA REPAIR ROBOTIC XI FOR INCISIONAL W/MESH;  Surgeon: Edi Mendoza M.D.;  Location: SURGERY [facility];  Service:    • ABDOMINAL HYSTERECTOMY TOTAL      still has ovaries   • CHOLECYSTECTOMY     • PRIMARY C SECTION     • TUBAL COAGULATION LAPAROSCOPIC BILATERAL         Social History     Tobacco Use   • Smoking  "status: Never Smoker   • Smokeless tobacco: Never Used   Substance Use Topics   • Alcohol use: No   • Drug use: No       Family History   Problem Relation Age of Onset   • Diabetes Mother    • Hyperlipidemia Mother    • Diabetes Father    • Hypertension Father    • Hyperlipidemia Father        ROS:   As in HPI, otherwise negative for chest pain, dyspnea, abdominal pain, dysuria, blood in stool, fever           Exam: /80 (BP Location: Left arm, Patient Position: Sitting, BP Cuff Size: Adult long)   Pulse 79   Temp 36.4 °C (97.5 °F) (Temporal)   Resp 12   Ht 1.549 m (5' 1\")   Wt 82.6 kg (182 lb)   SpO2 94%  Body mass index is 34.39 kg/m².    General: Alert, pleasant, obese habitus, fatigued appearing, well nourished, well developed female in NAD  HEENT: Normocephalic. Eyes conjunctiva clear lids without ptosis, pupils equal and reactive to light, ears normal shape and contour, canals are clear bilaterally, right tympanic membrane with erythema and mild bulging.  Left tympanic membranes  Is pearly gray with good light reflex, nasal mucosa without erythema and drainage, oropharynx is without erythema, edema or exudates.   Neck: Supple without bruit. Thyroid is not enlarged.  Pulmonary: Clear to ausculation.  Normal effort. No rales, ronchi, or wheezing.  Cardiovascular: Normal rate and rhythm without murmur.  Neurologic: Grossly nonfocal  Lymph: No cervical or supraclavicular lymph nodes are palpable  Skin: Warm and dry.    Psych: Normal mood and affect. Alert and oriented. Judgment and insight is normal.    Please note that this dictation was created using voice recognition software. I have made every reasonable attempt to correct obvious errors, but I expect that there are errors of grammar and possibly content that I did not discover before finalizing the note.      Assessment/Plan  1. Acute otitis media, unspecified otitis media type  Patient has right acute otitis media and likely sinus infection.  Will " prescribe Augmentin to take twice daily.  Instructions and side effects reviewed with patient.  Patient also instructed to use her Flonase nasal spray daily.  - amoxicillin-clavulanate (AUGMENTIN) 875-125 MG Tab; Take 1 tablet by mouth 2 times a day.  Dispense: 14 tablet; Refill: 0    2. Bacterial sinusitis  As in #1  - amoxicillin-clavulanate (AUGMENTIN) 875-125 MG Tab; Take 1 tablet by mouth 2 times a day.  Dispense: 14 tablet; Refill: 0    3. History of 2019 novel coronavirus disease (COVID-19)  Patient still returned to work without restrictions due to lingering effects of Covid.  Letter provided to patient today to give to employer that states she will hopefully be able to return to work in 6 weeks.  Pending neurology referral, physical therapy, brain MRI.

## 2021-03-04 ENCOUNTER — HOSPITAL ENCOUNTER (OUTPATIENT)
Dept: LAB | Facility: MEDICAL CENTER | Age: 56
End: 2021-03-04
Attending: NURSE PRACTITIONER
Payer: COMMERCIAL

## 2021-03-04 DIAGNOSIS — R79.9 ELEVATED BUN: ICD-10-CM

## 2021-03-04 DIAGNOSIS — E11.65 UNCONTROLLED TYPE 2 DIABETES MELLITUS WITH HYPERGLYCEMIA (HCC): ICD-10-CM

## 2021-03-04 DIAGNOSIS — R94.4 DECREASED GFR: ICD-10-CM

## 2021-03-04 PROCEDURE — 36415 COLL VENOUS BLD VENIPUNCTURE: CPT

## 2021-03-04 PROCEDURE — 80048 BASIC METABOLIC PNL TOTAL CA: CPT

## 2021-03-05 LAB
ANION GAP SERPL CALC-SCNC: 9 MMOL/L (ref 7–16)
BUN SERPL-MCNC: 36 MG/DL (ref 8–22)
CALCIUM SERPL-MCNC: 10.2 MG/DL (ref 8.5–10.5)
CHLORIDE SERPL-SCNC: 102 MMOL/L (ref 96–112)
CO2 SERPL-SCNC: 23 MMOL/L (ref 20–33)
CREAT SERPL-MCNC: 0.98 MG/DL (ref 0.5–1.4)
GLUCOSE SERPL-MCNC: 117 MG/DL (ref 65–99)
POTASSIUM SERPL-SCNC: 4.8 MMOL/L (ref 3.6–5.5)
SODIUM SERPL-SCNC: 134 MMOL/L (ref 135–145)

## 2021-03-09 ENCOUNTER — HOSPITAL ENCOUNTER (OUTPATIENT)
Dept: RADIOLOGY | Facility: MEDICAL CENTER | Age: 56
End: 2021-03-09
Attending: NURSE PRACTITIONER
Payer: COMMERCIAL

## 2021-03-09 DIAGNOSIS — M54.2 NECK PAIN: ICD-10-CM

## 2021-03-09 DIAGNOSIS — R27.0 ATAXIA: ICD-10-CM

## 2021-03-09 DIAGNOSIS — R51.9 NONINTRACTABLE HEADACHE, UNSPECIFIED CHRONICITY PATTERN, UNSPECIFIED HEADACHE TYPE: ICD-10-CM

## 2021-03-09 PROCEDURE — 72040 X-RAY EXAM NECK SPINE 2-3 VW: CPT

## 2021-03-09 PROCEDURE — 70551 MRI BRAIN STEM W/O DYE: CPT

## 2021-03-16 ENCOUNTER — TELEPHONE (OUTPATIENT)
Dept: MEDICAL GROUP | Facility: PHYSICIAN GROUP | Age: 56
End: 2021-03-16

## 2021-03-17 NOTE — TELEPHONE ENCOUNTER
Regarding: imaging results  ----- Message from DECLAN Johnson sent at 3/15/2021  5:33 PM PDT -----       ----- Message sent from DECLAN Johnson to Sonya Wei at 3/11/2021  7:36 AM -----   Sreekanth Hassan,  I have reviewed the imaging reports of your brain and cervical spine.  Your cervical spine does not show any abnormalities.  Your brain MRI does not show any significant findings, though does show very mild chronic blood vessel changes.   Make sure to schedule your appointment with neurology for further follow-up.  Take care,  Marsha

## 2021-04-01 DIAGNOSIS — E11.9 TYPE 2 DIABETES MELLITUS WITHOUT COMPLICATION, WITHOUT LONG-TERM CURRENT USE OF INSULIN (HCC): ICD-10-CM

## 2021-04-02 RX ORDER — SITAGLIPTIN 100 MG/1
TABLET, FILM COATED ORAL
Qty: 90 TABLET | Refills: 0 | Status: SHIPPED | OUTPATIENT
Start: 2021-04-02 | End: 2021-04-15

## 2021-04-02 RX ORDER — GLYBURIDE 5 MG/1
TABLET ORAL
Qty: 360 TABLET | Refills: 0 | Status: SHIPPED | OUTPATIENT
Start: 2021-04-02 | End: 2021-07-09

## 2021-04-02 NOTE — TELEPHONE ENCOUNTER
Last seen by PCP 02/24/2021.     Lab Results   Component Value Date/Time    HBA1C 7.5 (H) 02/16/2021 09:01 AM      Lab Results   Component Value Date/Time    MALBCRT 2050 (H) 11/18/2020 09:58 AM    MICROALBUR 213.7 11/18/2020 09:58 AM      Lab Results   Component Value Date/Time    ALKPHOSPHAT 46 02/16/2021 09:01 AM    ASTSGOT 32 02/16/2021 09:01 AM    ALTSGPT 21 02/16/2021 09:01 AM    TBILIRUBIN 0.2 02/16/2021 09:01 AM

## 2021-04-14 ENCOUNTER — OFFICE VISIT (OUTPATIENT)
Dept: MEDICAL GROUP | Facility: PHYSICIAN GROUP | Age: 56
End: 2021-04-14
Payer: COMMERCIAL

## 2021-04-14 VITALS
RESPIRATION RATE: 16 BRPM | WEIGHT: 189.6 LBS | HEART RATE: 86 BPM | HEIGHT: 61 IN | OXYGEN SATURATION: 99 % | DIASTOLIC BLOOD PRESSURE: 84 MMHG | BODY MASS INDEX: 35.8 KG/M2 | SYSTOLIC BLOOD PRESSURE: 124 MMHG | TEMPERATURE: 98.8 F

## 2021-04-14 DIAGNOSIS — B35.1 FUNGAL TOENAIL INFECTION: ICD-10-CM

## 2021-04-14 DIAGNOSIS — G89.29 CHRONIC RIGHT SHOULDER PAIN: ICD-10-CM

## 2021-04-14 DIAGNOSIS — M25.511 CHRONIC RIGHT SHOULDER PAIN: ICD-10-CM

## 2021-04-14 DIAGNOSIS — E11.65 UNCONTROLLED TYPE 2 DIABETES MELLITUS WITH HYPERGLYCEMIA (HCC): ICD-10-CM

## 2021-04-14 DIAGNOSIS — R51.9 NONINTRACTABLE HEADACHE, UNSPECIFIED CHRONICITY PATTERN, UNSPECIFIED HEADACHE TYPE: ICD-10-CM

## 2021-04-14 DIAGNOSIS — R29.898 UPPER EXTREMITY WEAKNESS: ICD-10-CM

## 2021-04-14 DIAGNOSIS — H81.10 BENIGN PAROXYSMAL POSITIONAL VERTIGO, UNSPECIFIED LATERALITY: ICD-10-CM

## 2021-04-14 DIAGNOSIS — M54.2 NECK PAIN: ICD-10-CM

## 2021-04-14 PROCEDURE — 99214 OFFICE O/P EST MOD 30 MIN: CPT | Performed by: NURSE PRACTITIONER

## 2021-04-14 RX ORDER — GABAPENTIN 300 MG/1
CAPSULE ORAL
Qty: 180 CAPSULE | Refills: 1 | Status: SHIPPED | OUTPATIENT
Start: 2021-04-14 | End: 2022-06-16

## 2021-04-14 RX ORDER — CICLOPIROX 80 MG/ML
SOLUTION TOPICAL
Qty: 6.6 ML | Refills: 1 | Status: SHIPPED | OUTPATIENT
Start: 2021-04-14 | End: 2021-06-17 | Stop reason: SDUPTHER

## 2021-04-14 RX ORDER — NAPROXEN 500 MG/1
500 TABLET ORAL 2 TIMES DAILY WITH MEALS
Qty: 60 TABLET | Refills: 2 | Status: SHIPPED | OUTPATIENT
Start: 2021-04-14 | End: 2022-06-23

## 2021-04-14 ASSESSMENT — FIBROSIS 4 INDEX: FIB4 SCORE: 1.21

## 2021-04-14 NOTE — PATIENT INSTRUCTIONS
Ciclopirox nail solution  What is this medicine?  CICLOPIROX (sye kloe PEER ox) NAIL SOLUTION is an antifungal medicine. It used to treat fungal infections of the nails.  This medicine may be used for other purposes; ask your health care provider or pharmacist if you have questions.  COMMON BRAND NAME(S): [id number], Penlac  What should I tell my health care provider before I take this medicine?  They need to know if you have any of these conditions:  · diabetes mellitus  · history of seizures  · HIV infection  · immune system problems or organ transplant  · large areas of burned or damaged skin  · peripheral vascular disease or poor circulation  · taking corticosteroid medication (including steroid inhalers, cream, or lotion)  · an unusual or allergic reaction to ciclopirox, isopropyl alcohol, other medicines, foods, dyes, or preservatives  · pregnant or trying to get pregnant  · breast-feeding  How should I use this medicine?  This medicine is for external use only. Follow the directions that come with this medicine exactly. Wash and dry your hands before use. Avoid contact with the eyes, mouth or nose. If you do get this medicine in your eyes, rinse out with plenty of cool tap water. Contact your doctor or health care professional if eye irritation occurs. Use at regular intervals. Do not use your medicine more often than directed. Finish the full course prescribed by your doctor or health care professional even if you think you are better. Do not stop using except on your doctor's advice.  Talk to your pediatrician regarding the use of this medicine in children. While this medicine may be prescribed for children as young as 12 years for selected conditions, precautions do apply.  Overdosage: If you think you have taken too much of this medicine contact a poison control center or emergency room at once.  NOTE: This medicine is only for you. Do not share this medicine with others.  What if I miss a dose?  If you miss a  dose, use it as soon as you can. If it is almost time for your next dose, use only that dose. Do not use double or extra doses.  What may interact with this medicine?  Interactions are not expected. Do not use any other skin products without telling your doctor or health care professional.  This list may not describe all possible interactions. Give your health care provider a list of all the medicines, herbs, non-prescription drugs, or dietary supplements you use. Also tell them if you smoke, drink alcohol, or use illegal drugs. Some items may interact with your medicine.  What should I watch for while using this medicine?  Tell your doctor or health care professional if your symptoms get worse. Four to six months of treatment may be needed for the nail(s) to improve. Some people may not achieve a complete cure or clearing of the nails by this time.  Tell your doctor or health care professional if you develop sores or blisters that do not heal properly. If your nail infection returns after stopping using this product, contact your doctor or health care professional.  What side effects may I notice from receiving this medicine?  Side effects that you should report to your doctor or health care professional as soon as possible:  · allergic reactions like skin rash, itching or hives, swelling of the face, lips, or tongue  · severe irritation, redness, burning, blistering, peeling, swelling, oozing  Side effects that usually do not require medical attention (report to your doctor or health care professional if they continue or are bothersome):  · mild reddening of the skin  · nail discoloration  · temporary burning or mild stinging at the site of application  This list may not describe all possible side effects. Call your doctor for medical advice about side effects. You may report side effects to FDA at 1-231-FDA-9589.  Where should I keep my medicine?  Keep out of the reach of children.  Store at room temperature  between 15 and 30 degrees C (59 and 86 degrees F). Do not freeze. Protect from light by storing the bottle in the carton after every use. This medicine is flammable. Keep away from heat and flame. Throw away any unused medicine after the expiration date.  NOTE: This sheet is a summary. It may not cover all possible information. If you have questions about this medicine, talk to your doctor, pharmacist, or health care provider.  © 2020 [employer]vier/Gold Standard (2009-03-23 16:49:20)

## 2021-04-14 NOTE — PROGRESS NOTES
CC: Continued lingering Covid symptoms, vertigo, work note    HISTORY OF THE PRESENT ILLNESS: Patient is a 55 y.o. female. This pleasant patient is here today for evaluation and management of the following health problems.        Neck pain  Patient continues with neck pain, headache, upper extremity weakness.  Now reporting new onset of vertigo, worsening with head position change.  Almost dropped her 15 pound grandbaby due to upper extremity weakness.  Gabapentin has helped a little bit with headache, but causes some sleepiness.  Physical therapy has helped a little bit with neck pain and shoulder pain.  Patient needing another referral for continuation of treatment.  MRI of brain showed some small findings.  Has consultation with neurology tomorrow.  Symptoms began after Covid 19 illness.  Patient not yet able to return to work secondary to continuing symptoms.    Brain MRI 3/9/21  1.  No acute abnormality.  2.  A few punctate nonspecific supratentorial T2 hyperintensities likely representing nonspecific foci of gliosis/minimal chronic ischemia.    Fungal toenail infection  Patient reports toenail thickening on left great toe.  Has taken oral medication in the past with good results.  However, patient dropped something on her toe many months ago and now has a small bruise underneath plus some toenail thickening.  She is wondering if she could start on oral medication for this.      Allergies: Morphine    Current Outpatient Medications Ordered in Epic   Medication Sig Dispense Refill   • naproxen (NAPROSYN) 500 MG Tab Take 1 tablet by mouth 2 times a day with meals. 60 tablet 2   • gabapentin (NEURONTIN) 300 MG Cap Take 1 cap daily in the morning and 2 caps at bedtime. 180 capsule 1   • ciclopirox (PENLAC) 8 % solution Apply small amount nightly 6.6 mL 1   • glyBURIDE (DIABETA) 5 MG Tab TAKE 2 TABLETS BY MOUTH TWICE DAILY WITH MEALS 360 tablet 0   • JANUVIA 100 MG Tab Take 1 tablet by mouth once daily 90 tablet 0    • lisinopril (PRINIVIL) 40 MG tablet Take 1 tablet by mouth every day. 90 tablet 1   • pantoprazole (PROTONIX) 20 MG tablet Take 1 tablet by mouth once daily 30 Tab 5   • Ascorbic Acid (VITAMIN C) 1000 MG Tab Take 1 Tab by mouth every morning.     • Dulaglutide (TRULICITY) 1.5 MG/0.5ML Solution Pen-injector Inject 0.5 mL as instructed every 7 days. (Patient taking differently: Inject 0.5 mL under the skin every 7 days. Pt injects q thurs) 6 mL 1   • metFORMIN (GLUCOPHAGE) 500 MG Tab Take 1 Tab by mouth 2 times a day, with meals. 180 Tab 1   • fenofibrate (TRIGLIDE) 160 MG tablet Take 1 Tab by mouth every day. 90 Tab 1   • atorvastatin (LIPITOR) 20 MG Tab Take 1 Tab by mouth every day. 90 Tab 1   • albuterol 108 (90 Base) MCG/ACT Aero Soln inhalation aerosol Inhale 2 Puffs by mouth every 6 hours as needed for Shortness of Breath. 8.5 g 0   • Cholecalciferol (VITAMIN D PO) Take 2 Tabs by mouth every day. GUMMIES     • therapeutic multivitamin-minerals (THERAGRAN-M) Tab Take 1 Tab by mouth every day. 30 Tab 11   • magnesium oxide (MAG-OX) 400 MG Tab tablet Take 400 mg by mouth every day.     • Insulin Pen Needle 32 G x 4 mm Use one pen needle in pen device to inject insulin once daily. (Patient not taking: Reported on 4/14/2021) 100 Each 0   • acetaminophen (TYLENOL) 500 MG Tab Take 1,000 mg by mouth every 6 hours as needed.       No current Epic-ordered facility-administered medications on file.       Past Medical History:   Diagnosis Date   • Anemia    • Bowel habit changes     constipation   • Diabetes    • Diabetes (HCC)    • GERD (gastroesophageal reflux disease)    • Healthcare maintenance 9/27/2014    Mammogram: Due   • High cholesterol    • Hyperlipidemia    • Hypertension    • Infectious disease     Cold 10/2016   • Jaundice 1999   • Psychiatric problem     depression and anxiety   • Vaginal itching 8/27/2014    With anal itching X 1 month Getting worse Burning Min vag disch       Past Surgical History:  "  Procedure Laterality Date   • VENTRAL HERNIA REPAIR ROBOTIC XI N/A 10/14/2016    Procedure: VENTRAL HERNIA REPAIR ROBOTIC XI FOR INCISIONAL W/MESH;  Surgeon: Edi Mendoza M.D.;  Location: SURGERY [facility];  Service:    • ABDOMINAL HYSTERECTOMY TOTAL      still has ovaries   • CHOLECYSTECTOMY     • PRIMARY C SECTION     • TUBAL COAGULATION LAPAROSCOPIC BILATERAL         Social History     Tobacco Use   • Smoking status: Never Smoker   • Smokeless tobacco: Never Used   Substance Use Topics   • Alcohol use: No   • Drug use: No       Family History   Problem Relation Age of Onset   • Diabetes Mother    • Hyperlipidemia Mother    • Diabetes Father    • Hypertension Father    • Hyperlipidemia Father        ROS:   As in HPI, otherwise negative for chest pain, , abdominal pain, dysuria, blood in stool, fever            Exam: /84 (BP Location: Right arm, Patient Position: Sitting, BP Cuff Size: Adult long)   Pulse 86   Temp 37.1 °C (98.8 °F) (Temporal)   Resp 16   Ht 1.549 m (5' 1\")   Wt 86 kg (189 lb 9.6 oz)   SpO2 99%  Body mass index is 35.82 kg/m².    General: Alert, pleasant, obese habitus, well nourished, well developed female in NAD  HEENT: Normocephalic. Eyes conjunctiva clear lids without ptosis, pupils equal and reactive to light, ears normal shape and contour, canals are clear bilaterally, tympanic membranes are pearly gray with good light reflex, nasal mucosa without erythema and drainage, oropharynx is without erythema, edema or exudates.   Neck: Supple without bruit. Thyroid is not enlarged.  Pulmonary: Clear to ausculation.  Normal effort. No rales, ronchi, or wheezing.  Cardiovascular: Normal rate and rhythm without murmur. Carotid and radial pulses are intact and equal bilaterally.  No lower extremity edema.  Cervical spine: No erythema or edema, moderate tenderness to palpation, upper extremity strength 3/5 and equal bilaterally, diminished brachial and patellar " reflexes.  Neurologic: Grossly nonfocal  Skin: Warm and dry.    Psych: Normal mood and affect. Alert and oriented. Judgment and insight is normal.    Please note that this dictation was created using voice recognition software. I have made every reasonable attempt to correct obvious errors, but I expect that there are errors of grammar and possibly content that I did not discover before finalizing the note.      Assessment/Plan  1. Neck pain  Stable.  Continue with physical therapy.  Patient now having upper extremity weakness.  Will get MRI of cervical spine.  Patient will discuss with neurology tomorrow.  - REFERRAL TO PHYSICAL THERAPY  - MR-CERVICAL SPINE-W/O; Future  - gabapentin (NEURONTIN) 300 MG Cap; Take 1 cap daily in the morning and 2 caps at bedtime.  Dispense: 180 capsule; Refill: 1    2. Chronic right shoulder pain  As in #1  - REFERRAL TO PHYSICAL THERAPY    3. Benign paroxysmal positional vertigo, unspecified laterality  Most likely benign positional vertigo.  Will refer back to physical therapy for vestibular therapy.  Patient will discuss with neurology at consultation appointment tomorrow.  - REFERRAL TO PHYSICAL THERAPY    4. Uncontrolled type 2 diabetes mellitus with hyperglycemia (HCC)  Patient requests referral for diabetic education.  - REFERRAL TO DIABETIC EDUCATION  - Comp Metabolic Panel; Future  - ESTIMATED GFR; Future  - HEMOGLOBIN A1C; Future    5. Upper extremity weakness  As in #1  - MR-CERVICAL SPINE-W/O; Future    6. Nonintractable headache, unspecified chronicity pattern, unspecified headache type  Patient continues with headache since COVID-19.  Headache mildly improved with gabapentin.  Will increase gabapentin at bedtime, continue with same morning dose.  - gabapentin (NEURONTIN) 300 MG Cap; Take 1 cap daily in the morning and 2 caps at bedtime.  Dispense: 180 capsule; Refill: 1    7. Fungal toenail infection  Advised patient that I would like her to trial a topical therapy as  she only has 1 nail that is affected.  Patient is agreeable.  Instructions reviewed with patient.  - ciclopirox (PENLAC) 8 % solution; Apply small amount nightly  Dispense: 6.6 mL; Refill: 1    Patient continuing to have lingering symptoms from COVID-19.  Still unable to work.  Will hopefully be able to return to work in 6 weeks.  She is pending consultation with neurology and further treatment plan.  Work letter excuse was given to patient today.    Patient will return to clinic in 1 month for diabetes or sooner if needed

## 2021-04-14 NOTE — LETTER
April 14, 2021    To Whom It May Concern:         This is confirmation that Sonya Wei attended her scheduled appointment with DECLAN Johnson on 4/14/21.     Ms. Wei is not yet able to return to work with no restrictions due to lingering effects of COVID-19.  Hopefully, she will be able to return to work in 6 weeks.  She has pending specialty consultation and tests.         If you have any questions please do not hesitate to call me at the phone number listed below.    Sincerely,          [email or web address].  [phone]

## 2021-04-15 ENCOUNTER — OFFICE VISIT (OUTPATIENT)
Dept: NEUROLOGY | Facility: MEDICAL CENTER | Age: 56
End: 2021-04-15
Attending: PSYCHIATRY & NEUROLOGY
Payer: COMMERCIAL

## 2021-04-15 VITALS
BODY MASS INDEX: 35.8 KG/M2 | HEART RATE: 76 BPM | SYSTOLIC BLOOD PRESSURE: 118 MMHG | RESPIRATION RATE: 16 BRPM | WEIGHT: 189.6 LBS | OXYGEN SATURATION: 98 % | DIASTOLIC BLOOD PRESSURE: 76 MMHG | HEIGHT: 61 IN | TEMPERATURE: 98.3 F

## 2021-04-15 DIAGNOSIS — R51.9 NONINTRACTABLE HEADACHE, UNSPECIFIED CHRONICITY PATTERN, UNSPECIFIED HEADACHE TYPE: Primary | ICD-10-CM

## 2021-04-15 PROCEDURE — 99212 OFFICE O/P EST SF 10 MIN: CPT | Performed by: PSYCHIATRY & NEUROLOGY

## 2021-04-15 PROCEDURE — 99204 OFFICE O/P NEW MOD 45 MIN: CPT | Performed by: PSYCHIATRY & NEUROLOGY

## 2021-04-15 RX ORDER — AMITRIPTYLINE HYDROCHLORIDE 10 MG/1
10 TABLET, FILM COATED ORAL EVERY EVENING
Qty: 30 TABLET | Refills: 3 | Status: SHIPPED | OUTPATIENT
Start: 2021-04-15 | End: 2021-05-15

## 2021-04-15 ASSESSMENT — FIBROSIS 4 INDEX: FIB4 SCORE: 1.21

## 2021-04-15 NOTE — ASSESSMENT & PLAN NOTE
Patient continues with neck pain, headache, upper extremity weakness.  Now reporting new onset of vertigo, worsening with head position change.  Almost dropped her 15 pound grandbaby due to upper extremity weakness.  Gabapentin has helped a little bit with headache, but causes some sleepiness.  Physical therapy has helped a little bit with neck pain and shoulder pain.  Patient needing another referral for continuation of treatment.  MRI of brain showed some small findings.  Has consultation with neurology tomorrow.  Symptoms began after Covid 19 illness.  Patient not yet able to return to work secondary to continuing symptoms.    Brain MRI 3/9/21  1.  No acute abnormality.  2.  A few punctate nonspecific supratentorial T2 hyperintensities likely representing nonspecific foci of gliosis/minimal chronic ischemia.

## 2021-04-15 NOTE — PROGRESS NOTES
"[facility] NEUROLOGY  NEW PATIENT VISIT    Referral source: CHERIE Johnson    CC: \"nonintractable headache...\"    HISTORY OF ILLNESS:  Sonya Wei is a 55 y.o. woman with a history most notable for obesity, GERD, HTN, HLD, DM, depression, and COVID-19.  Today, she was unaccompanied, and she provided the following history:    Sonya developed COVID in 11/2020.  Afterward, her baseline headaches worsened.    The following is a summary of headache symptoms, presented in my standard format:    Family History: mom had migraines related to \"brain tumors\" she had headaches earlier in life  Age at onset: teenage years  Location: bi-temporal, occipital  Radiation: both proximal upper extremities  Frequency: every day; 3-4 times/day  Duration: 5-20 minutes  Headache Days/Month: 30/30  Quality: \"heavy,\" like \"someone hit her with a baseball bat\"  Intensity: 10/10  Aura: none  Photophobia/Phonophobia/Nausea/Vomiting: yes/yes/yes/no  Provoked by Physical Activity?:   Triggers: nighttime  Associated Symptoms: after washing dishes, hands \"hurt,\" dizziness, difficulty walking  Autonomic Signs (such as ptosis, miosis, conjunctival injection, rhinorrhea, increased lacrimation): none  Head Trauma:   Association with Menses: menopausal  ED Visits: yes  Hospitalizations: yes  Missed Work Days: hasn't worked since 11/2020  Sleep: 6-7 hours/night; she wakes 2-3 times/night  Caffeine Intake: none  Hydration: keeps well-hydrated  Nutrition: doesn't skip meals  Analgesic Overuse:     Current Medication Regimen:  -     Medications Tried: Response  Preventive:  -     Abortive:  -     Medications Not Tried:  -     MEDICAL AND SURGICAL HISTORY:  Past Medical History:   Diagnosis Date   • Anemia    • Bowel habit changes     constipation   • Diabetes    • Diabetes (HCC)    • GERD (gastroesophageal reflux disease)    • Healthcare maintenance 9/27/2014    Mammogram: Due   • High cholesterol    • Hyperlipidemia    • " Hypertension    • Infectious disease     Cold 10/2016   • Jaundice 1999   • Psychiatric problem     depression and anxiety   • Vaginal itching 8/27/2014    With anal itching X 1 month Getting worse Burning Min vag disch     Past Surgical History:   Procedure Laterality Date   • VENTRAL HERNIA REPAIR ROBOTIC XI N/A 10/14/2016    Procedure: VENTRAL HERNIA REPAIR ROBOTIC XI FOR INCISIONAL W/MESH;  Surgeon: Edi Mendoza M.D.;  Location: SURGERY [facility];  Service:    • ABDOMINAL HYSTERECTOMY TOTAL      still has ovaries   • CHOLECYSTECTOMY     • PRIMARY C SECTION     • TUBAL COAGULATION LAPAROSCOPIC BILATERAL       MEDICATIONS:  Current Outpatient Medications   Medication Sig   • amitriptyline (ELAVIL) 10 MG Tab Take 1 tablet by mouth every evening for 30 days.   • naproxen (NAPROSYN) 500 MG Tab Take 1 tablet by mouth 2 times a day with meals.   • gabapentin (NEURONTIN) 300 MG Cap Take 1 cap daily in the morning and 2 caps at bedtime.   • glyBURIDE (DIABETA) 5 MG Tab TAKE 2 TABLETS BY MOUTH TWICE DAILY WITH MEALS   • lisinopril (PRINIVIL) 40 MG tablet Take 1 tablet by mouth every day.   • magnesium oxide (MAG-OX) 400 MG Tab tablet Take 400 mg by mouth every day.   • pantoprazole (PROTONIX) 20 MG tablet Take 1 tablet by mouth once daily   • Insulin Pen Needle 32 G x 4 mm Use one pen needle in pen device to inject insulin once daily.   • Ascorbic Acid (VITAMIN C) 1000 MG Tab Take 1 Tab by mouth every morning.   • Dulaglutide (TRULICITY) 1.5 MG/0.5ML Solution Pen-injector Inject 0.5 mL as instructed every 7 days. (Patient taking differently: Inject 0.5 mL under the skin every 7 days. Pt injects q thurs)   • metFORMIN (GLUCOPHAGE) 500 MG Tab Take 1 Tab by mouth 2 times a day, with meals.   • fenofibrate (TRIGLIDE) 160 MG tablet Take 1 Tab by mouth every day.   • atorvastatin (LIPITOR) 20 MG Tab Take 1 Tab by mouth every day.   • albuterol 108 (90 Base) MCG/ACT Aero Soln inhalation aerosol Inhale 2 Puffs by mouth  every 6 hours as needed for Shortness of Breath.   • Cholecalciferol (VITAMIN D PO) Take 2 Tabs by mouth every day. GUMMIES   • therapeutic multivitamin-minerals (THERAGRAN-M) Tab Take 1 Tab by mouth every day.   • ciclopirox (PENLAC) 8 % solution Apply small amount nightly (Patient not taking: Reported on 4/15/2021)     SOCIAL HISTORY:  Social History     Tobacco Use   • Smoking status: Never Smoker   • Smokeless tobacco: Never Used   Substance Use Topics   • Alcohol use: No     Social History     Social History Narrative    ** Merged History Encounter **          FAMILY HISTORY:  Family History   Problem Relation Age of Onset   • Diabetes Mother    • Hyperlipidemia Mother    • Diabetes Father    • Hypertension Father    • Hyperlipidemia Father      REVIEW OF SYSTEMS:  A ROS was completed.  Pertinent positives and negatives were included in the HPI, above.  All other systems were reviewed and are negative.    PHYSICAL EXAM:  General/Medical:  - NAD  - hair, skin, nails, and joints were normal    Neuro:  MENTAL STATUS: awake and alert; no deficits of speech or language; oriented to person, place, and time; affect was appropriate to situation    CRANIAL NERVES:    II: acuity was: J1/J1; fields intact to confrontation; pupils 3/3 to 2/2 without a relative afferent pupillary defect; discs sharp    III/IV/VI: versions intact without nystagmus    V: facial sensation symmetric to light touch    VII: facial expression symmetric    VIII: hearing intact to finger rub    IX/X: palate elevates symmetrically    XI: shoulder shrug symmetric    XII: tongue midline    MOTOR:  - bulk and tone were normal throughout  Upper Extremity Strength  (R/L)    5/5 with giveaway   Elbow flexion 5/5 with giveaway   Elbow extension 5/5 with giveaway   Shoulder abduction 5/5 with giveaway     Lower Extremity Strength  (R/L)   Hip flexion 5/5 with giveaway   Knee extension 5/5 with giveaway   Knee flexion 5/5 with giveaway   Ankle  "plantarflexion 5/5 with giveaway   Ankle dorsiflexion 5/5 with giveaway     - no pronator drift; no abnormal movements    SENSATION:  - light touch: grossly intact over the upper and lower extremities  - vibration (R/L, seconds): NT at the great toes  - pinprick: NT  - proprioception: NT  - Romberg: absent    COORDINATION:  - finger to nose was normal, no ataxia on exam  - finger tapping was slowed, bilaterally    REFLEXES:  Reflex Right Left   BR 2+ 2+   Biceps 2+ 2+   Triceps 2+ 2+   Patellae 2+ 2+   Achilles NT NT   Toes NT NT     GAIT:  - slow gait  - narrow base    REVIEW OF IMAGING STUDIES: I reviewed the following studies:  MRI Brain:  Date: 3/19/2021  W/o and w/ contrast?: without  Indication: \"headache...\"  Comparison: none  Impression:  \"1) No acute abnormality.  2) A few punctate nonspecific supratentorial T2 hyperintensities likely representing nonspecific foci of gliosis/minimal chronic ischemia.\"    REVIEW OF LABORATORY STUDIES:  3/4/2021:  - BMP: WNL    ASSESSMENT:  Sonya Wei is a 55 y.o. woman with worsened headaches following COVID-19 infection.  Sonya has a history of migraine headaches, and these seem to have intensified in both frequency and severity after COVID-19.  There are no well-established guidelines regarding the treatment of headaches following COVID-19 infection.  I reviewed behavior/lifestyle factors which can help promote headache freedom.  I discussed various treatment options.  The headaches seem fairly brief, so I do not think an abortive therapy would be of much use at this time.  I offered her a trial of conservatively dosed amitriptyline to take at nighttime.  She will try this and we will follow-up in several weeks to review her headache log.  I considered steroid taper, but Sonya responded poorly to steroids when she was recently hospitalized with uncontrolled diabetes.    PLAN:  Headaches s/p COVID-19 Infection, likely Migraine:  Prevention:  - start amitriptyline " 10 mg nightly; can increase the dosage if tolerated and headaches sub-optimally controlled  - get ~8 hours of sleep per night  - drink plenty of fluids (urine should be nearly clear)  - avoid excessive caffeine intake (no more than 2 servings per day and nothing in the afternoon)  - eat regular meals (don't skip meals)  - get moderate exercise (even just a 20 minute walk daily)    :  - do not use analgesics (e.g., ibuprofen, acetaminophen) more than 2 days per week in order to avoid analgesic rebound headaches    - keep a headache log    Follow-Up:  - Return in about 2 months (around 6/15/2021).    Signed: Sebastian Parikh M.D. at 10:55 AM on 04/15/21

## 2021-04-15 NOTE — ASSESSMENT & PLAN NOTE
Patient reports toenail thickening on left great toe.  Has taken oral medication in the past with good results.  However, patient dropped something on her toe many months ago and now has a small bruise underneath plus some toenail thickening.  She is wondering if she could start on oral medication for this.

## 2021-05-13 ENCOUNTER — HOSPITAL ENCOUNTER (OUTPATIENT)
Dept: LAB | Facility: MEDICAL CENTER | Age: 56
End: 2021-05-13
Attending: NURSE PRACTITIONER
Payer: COMMERCIAL

## 2021-05-13 ENCOUNTER — NON-PROVIDER VISIT (OUTPATIENT)
Dept: HEALTH INFORMATION MANAGEMENT | Facility: MEDICAL CENTER | Age: 56
End: 2021-05-13
Payer: COMMERCIAL

## 2021-05-13 DIAGNOSIS — E11.65 UNCONTROLLED TYPE 2 DIABETES MELLITUS WITH HYPERGLYCEMIA (HCC): ICD-10-CM

## 2021-05-13 PROCEDURE — 80053 COMPREHEN METABOLIC PANEL: CPT

## 2021-05-13 PROCEDURE — 36415 COLL VENOUS BLD VENIPUNCTURE: CPT

## 2021-05-13 PROCEDURE — G0109 DIAB MANAGE TRN IND/GROUP: HCPCS | Performed by: INTERNAL MEDICINE

## 2021-05-13 PROCEDURE — 83036 HEMOGLOBIN GLYCOSYLATED A1C: CPT

## 2021-05-13 NOTE — LETTER
RE: Sonya Wei 1965    Dear: Fern CRESPO    The above referenced patient received 4 hours of diabetes education from [facility] on 2021.    Topics taught (may include but not limited to):  Introduction to diabetes, benefits and responsibilities of patient, physiology of diabetes and the diease process, benefits of blood glucose monitoring and record keeping, medication action and possible side effects, hypoglycemia, sick day management, exercise, stress reduction, disaster preparedness,, acute and chronic conditions related to diabetes.   They were also seen by the registered dietitian for the following topics.    -The basics of nutrition and how foods affect blood sugar   -The plate method for portion control (¼ plate starch, ¼ plate protein, ½ plate non-starchy vegetables)  -Appropriate snacking recommendations  -Heart-healthy eating, including controlling/managing/improving hyperlipidemia and HTN  -Food label reading, including CHO counting  Provided meter and instructed in use: no.  Patient currently testing his/her blood sugarsno    Dilated eye exam within the past year: yes Goal is for patient to have yearly.   Dental exam within the past year yes   Monofilament exam performed by HCP in the past year yes .  Patient currently checking their feet daily no   Most recent A1c 7.5 dated on 2021, goal is for A1c to be less than 7  Current Medications used to manage diabetes  Trulicity 1.5 mg weekly, Metformin 500 mg bid and Glyburide 10 mg bid.  Patient currently exercisingyes Goal is for 30 minutes per day or 150 minutes per week.   Patient made any adjustments to eating since diagnosis of diabetes  yes   Eat less sugar    Patient/caregiver appeared to understand the content as demonstrated by appropriate questions.       Thank you for allowing us to participate in this patients care.   Ashley Red RN, [facility]  Certified Diabetes Care and Education  Specialist  [facility]    Lissette Mccullough RD  Registered Dietitian  Outpatient Dietary Educator  [facility]

## 2021-05-13 NOTE — PROGRESS NOTES
Diabetes Self-Management Class   RN, CDE portion of class   Time in 1300   Time out 1510    Current Medications for diabetes: Glyburide 10 mg bid, Metformin 500 mg bid and Trulicity 1.5 mg weekly  Taking Medications appropriately: yes  Testing blood sugars: no    Exercise: walking as tolerated for 30 minutes 3 times a week, still having some residual shortness of breath from Covid  Foot Exam completed prior to education:  yes  Eye exam completed in the past year: yes  Currently on Statin:  Atorvastatin 20 mg daily  Currently on ACE/ARB Lisinopril 40 mg daily  Diabetes Education Content  Diabetes disease process and treatment options.   Define type 1, type 2, and pre-diabetes  Describe causes of diabetes  Describe role of pancreas, liver, kidney, gut, muscle and fat in glucose metabolism  Describe impact of food, exercise, stress and special factors on blood sugars.   Monitoring blood glucose, interpreting and using results  Describe rationale and importance of testing blood sugars.   Describe difference between SBGM and CGM  Describe appropriate record keeping  Describe goals for fasting, pre-prandial and post prandial blood glucose  Describe interpretation of blood glucose results and when to contact health care provider  Describe A1c results, what they mean, goal, and how often checked.   Using medications safely (content limited to medications class participants are taking)  Describe action of oral medications, when to take and potential side effects  Describe action of injectable medications, when to take and potential side effects  Describe different types of insulin, onset, peak and duration  Describe proper technique using insulin pen or vial and syringe  Describe storage of medications and disposal of sharps  Incorporating physical activity into lifestyle  Describe role of exercise in diabetes management  State relationship of exercise to blood sugar  Describe cardiovascular, resistance and stretching and  importance of each  Describe goals for exercise and precautions to take  Prevention detection and treatment of acute complications  List symptoms of hyperglycemia  Describe actions for lowering elevated blood sugars  List cause and symptoms of hypoglycemia  List steps to treat hypoglycemia  Verbalize importance to monitor when sick and when to seek medical attention  Prevention detection and treatment of chronic complications  Describe relationship between elevated blood glucose and long term complications  Discuss relationship between blood sugars, cholesterol, blood pressure on CAD, risk for stroke, retinopathy, neuropathy, nephropathy, PVD, sexual dysfunction  Discuss daily foot exams, what to look for and when to contact HCP  Developing strategies to address psychosocial issues  Describe role of stress and blood sugars.   Identify sources of stress  Identify resources and techniques for stress reduction  Discuss disaster preparedness.  Developing strategies to promote healthy/change behavior  States benefits of making appropriate lifestyle changes  Identify lifestyle behaviors participant wants to change  Identify appropriate screenings to be done and when to schedule  Define goals for cholesterol, and need for lipid panels  Define goals for blood pressure  Define goals for retinal exam  Discuss need for yearly monofilament exam

## 2021-05-14 LAB
ALBUMIN SERPL BCP-MCNC: 3.5 G/DL (ref 3.2–4.9)
ALBUMIN/GLOB SERPL: 1.3 G/DL
ALP SERPL-CCNC: 83 U/L (ref 30–99)
ALT SERPL-CCNC: 25 U/L (ref 2–50)
ANION GAP SERPL CALC-SCNC: 11 MMOL/L (ref 7–16)
AST SERPL-CCNC: 33 U/L (ref 12–45)
BILIRUB SERPL-MCNC: 0.2 MG/DL (ref 0.1–1.5)
BUN SERPL-MCNC: 34 MG/DL (ref 8–22)
CALCIUM SERPL-MCNC: 9.2 MG/DL (ref 8.5–10.5)
CHLORIDE SERPL-SCNC: 107 MMOL/L (ref 96–112)
CO2 SERPL-SCNC: 20 MMOL/L (ref 20–33)
CREAT SERPL-MCNC: 0.59 MG/DL (ref 0.5–1.4)
EST. AVERAGE GLUCOSE BLD GHB EST-MCNC: 183 MG/DL
FASTING STATUS PATIENT QL REPORTED: NORMAL
GLOBULIN SER CALC-MCNC: 2.8 G/DL (ref 1.9–3.5)
GLUCOSE SERPL-MCNC: 93 MG/DL (ref 65–99)
HBA1C MFR BLD: 8 % (ref 4–5.6)
POTASSIUM SERPL-SCNC: 5.1 MMOL/L (ref 3.6–5.5)
PROT SERPL-MCNC: 6.3 G/DL (ref 6–8.2)
SODIUM SERPL-SCNC: 138 MMOL/L (ref 135–145)

## 2021-05-14 NOTE — PROGRESS NOTES
5/13/2021                              DECLAN Johnson         50 y.o.              Time in/out: 1:02-5:00pm    Subjective:  -Here for day 2 Nutrition part of Type 2 Diabetes class     Nutrition Diagnosis (PES Statement)  · Altered nutrition related lab values related to endocrine dysfunction as evidenced by HgbA1c of 7.5    Client history:  Condition(s) associated with a diagnosis or treatment (specify): T2DM, HTN, HLD    Biochemical data, medical test and procedures  Lab Results   Component Value Date/Time    HBA1C 7.5 (H) 02/16/2021 09:01 AM   @  Lab Results   Component Value Date/Time    POCGLUCOSE 361 (H) 12/07/2020 05:01 PM     Lab Results   Component Value Date/Time    CHOLSTRLTOT 114 11/18/2020 09:58 AM    LDL 55 11/18/2020 09:58 AM    HDL 43 11/18/2020 09:58 AM    TRIGLYCERIDE 80 11/18/2020 09:58 AM         Nutrition Intervention    Comprehensive Nutrition education Instruction or training leading to in-depth nutrition related knowledge about:  Benefits to following meal plan, Combine carb, protein and fat at each meal, Eating out, Fast food, Meal timing and spacing, Menu Planning, Metabolism of carb, protein, fat, Physical activity/exercise, Portion control, Sweets and alcohol in moderation, Heart-healthy guidelines, Label Reading, Handouts provided regarding topics discussed and Theraputic diet for Type 2 DM    Monitoring & Evaluation Plan    Behavioral-Environmental:  Behavior:  Consistent CHO intake throughout the day  Physical activity:  Increase as tolerated    Food / Nutrient Intake:  Food intake: Use the plate method recommendations for portions/balance at meals  Fluid/Beverage intake: Avoid all sweetened beverages unless utilizing to treat for low blood sugar   Macronutrient intake:  Up to 30-45 grams CHO with meals for women & 45-60 grams CHO for men, up to 15 grams CHO with snacks    Physical Signs / Symptoms:  HbA1c profiles:  Within ADA guidelines or per pt's endocrinologist        Assessment Notes:  Sonya attended day 2 of the Type 2 diabetes class today.  The pt was taught that exercise works as a medication for controlling DM.  Different exercises were shown that can be done easily at home, like walking in place, lifting light weights while sitting, etc.  It was emphasized that if exercise is a consistent part of the pt's habits that DM control will be the most ideal it can be.   Food was then discussed next, with a brief review of the patient's current eating habits. Nutrition basics was reviewed and they were taught the differences between CHO/protein/fat and their effects on BG's.  This information was related to the plate method to help with meal planning/portions at meals; the pt was also taught to use their hands as measuring tools (fist for starch, palm for protein) to help when eating out or on a large plate.  Non-starchy vegetables were emphasized as foods that will help satisfy without raising BG's at meals and snacks.  I stressed to the patient to not go more than 4 hours without eating and if a snack is necessary they were taught how to construct a proper snack of ~15 grams CHO and ~7 grams protein. They were instructed that non-starchy vegetables and protein are option for between meals as well if not trying to avoid low blood sugar.  Finally, we moved onto the food label and how to effectively read a label to evaluate a food.  An alternative way of meal planning was given by using the food label and CHO counting; the patient can eat up to 45 grams CHO at meals and up to 15 grams CHO at snacks, if needed. They were encouraged to adjust CHO intake according to activity level and blood sugar readings. Lastly, the pt set goals to try to achieve in the next 3 months to help with DM control and may follow-up with me, if needed, for a more intensive meal plan and CHO counting education.  F/u prn.

## 2021-05-20 ENCOUNTER — OFFICE VISIT (OUTPATIENT)
Dept: MEDICAL GROUP | Facility: PHYSICIAN GROUP | Age: 56
End: 2021-05-20
Payer: COMMERCIAL

## 2021-05-20 VITALS
HEIGHT: 61 IN | SYSTOLIC BLOOD PRESSURE: 126 MMHG | HEART RATE: 82 BPM | DIASTOLIC BLOOD PRESSURE: 88 MMHG | BODY MASS INDEX: 35.82 KG/M2 | RESPIRATION RATE: 16 BRPM | TEMPERATURE: 98.6 F | WEIGHT: 189.7 LBS | OXYGEN SATURATION: 98 %

## 2021-05-20 DIAGNOSIS — R51.9 NONINTRACTABLE HEADACHE, UNSPECIFIED CHRONICITY PATTERN, UNSPECIFIED HEADACHE TYPE: ICD-10-CM

## 2021-05-20 DIAGNOSIS — E11.65 UNCONTROLLED TYPE 2 DIABETES MELLITUS WITH HYPERGLYCEMIA (HCC): ICD-10-CM

## 2021-05-20 DIAGNOSIS — F32.9 REACTIVE DEPRESSION: ICD-10-CM

## 2021-05-20 DIAGNOSIS — M54.2 NECK PAIN: ICD-10-CM

## 2021-05-20 DIAGNOSIS — F31.9 BIPOLAR DEPRESSION (HCC): ICD-10-CM

## 2021-05-20 DIAGNOSIS — E66.9 OBESITY (BMI 30-39.9): ICD-10-CM

## 2021-05-20 DIAGNOSIS — U09.9 COVID-19 LONG HAULER: ICD-10-CM

## 2021-05-20 DIAGNOSIS — R60.0 BILATERAL LOWER EXTREMITY EDEMA: ICD-10-CM

## 2021-05-20 PROBLEM — H66.90 ACUTE OTITIS MEDIA: Status: RESOLVED | Noted: 2021-02-24 | Resolved: 2021-05-20

## 2021-05-20 PROBLEM — U07.1 PNEUMONIA DUE TO COVID-19 VIRUS: Status: RESOLVED | Noted: 2020-12-04 | Resolved: 2021-05-20

## 2021-05-20 PROBLEM — J12.82 PNEUMONIA DUE TO COVID-19 VIRUS: Status: RESOLVED | Noted: 2020-12-04 | Resolved: 2021-05-20

## 2021-05-20 PROCEDURE — 99214 OFFICE O/P EST MOD 30 MIN: CPT | Performed by: NURSE PRACTITIONER

## 2021-05-20 RX ORDER — HYDROCHLOROTHIAZIDE 12.5 MG/1
CAPSULE, GELATIN COATED ORAL
Qty: 60 CAPSULE | Refills: 2 | Status: SHIPPED | OUTPATIENT
Start: 2021-05-20 | End: 2022-02-18

## 2021-05-20 ASSESSMENT — FIBROSIS 4 INDEX: FIB4 SCORE: 1.14

## 2021-05-20 NOTE — PATIENT INSTRUCTIONS
Talk to neurologist about headache, speech, dizziness, and your extremity weakness      Referred to pharmacology for diabetes management      Referred to medical weight management

## 2021-05-20 NOTE — ASSESSMENT & PLAN NOTE
Patient continues with lingering symptoms from COVID-19 illness, which she had in November and December 2020.  Continues with dizziness, headache, fatigue, neck pain, ataxia, bilateral leg and arm weakness.  Has been unable to return to her job due to symptoms.  Short-term disability paperwork was completed 2 weeks ago.  Patient did consult with neurology and has follow-up appointment next week.  She was started on amitriptyline with moderate improvement of headache.  Has been able to go to physical therapy 1 time only due to cost.  Was given home exercise program.  Will have reevaluation by PT next month.

## 2021-05-20 NOTE — ASSESSMENT & PLAN NOTE
Patient continuing with neck pain in addition to other symptoms.  Please see additional notes under Covid on long-hauler.  Patient has not been able to get MRI of cervical spine due to cost.

## 2021-05-20 NOTE — PROGRESS NOTES
CC: Lab review, diabetes, continuing Covid symptoms    HISTORY OF THE PRESENT ILLNESS: Patient is a 55 y.o. female. This pleasant patient is here today for evaluation and management of the following health problems.      COVID-19 long hacarlos  Patient continues with lingering symptoms from COVID-19 illness, which she had in November and December 2020.  Continues with dizziness, headache, fatigue, neck pain, ataxia, bilateral leg and arm weakness.  Has been unable to return to her job due to symptoms.  Short-term disability paperwork was completed 2 weeks ago.  Patient did consult with neurology and has follow-up appointment next week.  She was started on amitriptyline with moderate improvement of headache.  Has been able to go to physical therapy 1 time only due to cost.  Was given home exercise program.  Will have reevaluation by PT next month.    Reactive depression  Patient reports bipolar depression.  She mentions today she history of depression and was treated with medication many years ago.  Earlier this year she trialed Escitalopram, which made her very tired.  She discontinued it and did not think she needed to take medication as her symptoms were improving.  Today, she reports that she is starting to feel down again.  She is requesting referral to psychiatry. Denies SI/HI    Nonintractable headache  Please see additional notes under Covid long hauler.    Neck pain  Patient continuing with neck pain in addition to other symptoms.  Please see additional notes under Covid on long-hauler.  Patient has not been able to get MRI of cervical spine due to cost.    Diabetes mellitus type II, uncontrolled (HCC)  This is a chronic health problem that is uncontrolled with current medications and lifestyle measures.  Taking Metformin 500 mg twice a day, Januvia 100 mg daily, glyburide 10 mg twice a day, Trulicity 1.5 mg weekly.  On ACE and statin.  Denies polydipsia, polyuria.  Up-to-date on retinal scan.    Component       Latest Ref Rng & Units 2/16/2021 5/13/2021           9:01 AM  9:58 AM   Glycohemoglobin      4.0 - 5.6 % 7.5 (H) 8.0 (H)       Allergies: Morphine    Current Outpatient Medications Ordered in Epic   Medication Sig Dispense Refill   • Calcium Polycarbophil (FIBER-CAPS PO) Take  by mouth.     • hydrochlorothiazide (MICROZIDE) 12.5 MG capsule Take 1 to 2 tabs once a day if needed for leg swelling 60 capsule 2   • metFORMIN (GLUCOPHAGE) 500 MG Tab TAKE 1 TABLET BY MOUTH TWICE DAILY WITH MEALS 180 tablet 0   • naproxen (NAPROSYN) 500 MG Tab Take 1 tablet by mouth 2 times a day with meals. 60 tablet 2   • gabapentin (NEURONTIN) 300 MG Cap Take 1 cap daily in the morning and 2 caps at bedtime. 180 capsule 1   • glyBURIDE (DIABETA) 5 MG Tab TAKE 2 TABLETS BY MOUTH TWICE DAILY WITH MEALS 360 tablet 0   • lisinopril (PRINIVIL) 40 MG tablet Take 1 tablet by mouth every day. 90 tablet 1   • magnesium oxide (MAG-OX) 400 MG Tab tablet Take 400 mg by mouth every day.     • pantoprazole (PROTONIX) 20 MG tablet Take 1 tablet by mouth once daily 30 Tab 5   • Ascorbic Acid (VITAMIN C) 1000 MG Tab Take 1 Tab by mouth every morning.     • Dulaglutide (TRULICITY) 1.5 MG/0.5ML Solution Pen-injector Inject 0.5 mL as instructed every 7 days. (Patient taking differently: Inject 0.5 mL under the skin every 7 days. Pt injects q thurs) 6 mL 1   • fenofibrate (TRIGLIDE) 160 MG tablet Take 1 Tab by mouth every day. 90 Tab 1   • atorvastatin (LIPITOR) 20 MG Tab Take 1 Tab by mouth every day. 90 Tab 1   • albuterol 108 (90 Base) MCG/ACT Aero Soln inhalation aerosol Inhale 2 Puffs by mouth every 6 hours as needed for Shortness of Breath. 8.5 g 0   • Cholecalciferol (VITAMIN D PO) Take 2 Tabs by mouth every day. GUMMIES     • therapeutic multivitamin-minerals (THERAGRAN-M) Tab Take 1 Tab by mouth every day. 30 Tab 11   • ciclopirox (PENLAC) 8 % solution Apply small amount nightly (Patient not taking: Reported on 4/15/2021) 6.6 mL 1   • Insulin Pen  "Needle 32 G x 4 mm Use one pen needle in pen device to inject insulin once daily. 100 Each 0     No current [facility]-ordered facility-administered medications on file.       Past Medical History:   Diagnosis Date   • Anemia    • Bowel habit changes     constipation   • Diabetes    • Diabetes (HCC)    • GERD (gastroesophageal reflux disease)    • Healthcare maintenance 9/27/2014    Mammogram: Due   • High cholesterol    • Hyperlipidemia    • Hypertension    • Infectious disease     Cold 10/2016   • Jaundice 1999   • Psychiatric problem     depression and anxiety   • Vaginal itching 8/27/2014    With anal itching X 1 month Getting worse Burning Min vag disch       Past Surgical History:   Procedure Laterality Date   • VENTRAL HERNIA REPAIR ROBOTIC XI N/A 10/14/2016    Procedure: VENTRAL HERNIA REPAIR ROBOTIC XI FOR INCISIONAL W/MESH;  Surgeon: Edi Mendoza M.D.;  Location: SURGERY [facility];  Service:    • ABDOMINAL HYSTERECTOMY TOTAL      still has ovaries   • CHOLECYSTECTOMY     • PRIMARY C SECTION     • TUBAL COAGULATION LAPAROSCOPIC BILATERAL         Social History     Tobacco Use   • Smoking status: Never Smoker   • Smokeless tobacco: Never Used   Vaping Use   • Vaping Use: Never used   Substance Use Topics   • Alcohol use: No   • Drug use: No       Family History   Problem Relation Age of Onset   • Diabetes Mother    • Hyperlipidemia Mother    • Diabetes Father    • Hypertension Father    • Hyperlipidemia Father        ROS:   As in HPI, otherwise negative for chest pain, abdominal pain, dysuria, blood in stool, fever            Exam: /88 (BP Location: Left arm, Patient Position: Sitting, BP Cuff Size: Adult long)   Pulse 82   Temp 37 °C (98.6 °F) (Temporal)   Resp 16   Ht 1.549 m (5' 1\")   Wt 86 kg (189 lb 11.2 oz)   SpO2 98%  Body mass index is 35.84 kg/m².    General: Alert, pleasant, obese habitus, well nourished, well developed female in NAD  Neck: Supple without bruit. Thyroid is not " enlarged.  Pulmonary: Clear to ausculation.  Normal effort. No rales, ronchi, or wheezing.  Cardiovascular: Normal rate and rhythm without murmur. Carotid and radial pulses are intact and equal bilaterally.  Mild bilateral lower extremity edema.  Neurologic: Grossly nonfocal  Lymph: No cervical or supraclavicular lymph nodes are palpable  Skin: Warm and dry.    Musculoskeletal:   Knee flexion 3/5  Knee extension 3/5  Elbow flexion 3/5  Elbow extension 3/5  Psych: Normal mood and affect. Alert and oriented. Judgment and insight is normal.    Please note that this dictation was created using voice recognition software. I have made every reasonable attempt to correct obvious errors, but I expect that there are errors of grammar and possibly content that I did not discover before finalizing the note.      Assessment/Plan  1. Obesity (BMI 30-39.9)  Patient has been struggling with weight.  Also has uncontrolled diabetes.  Will refer to medical weight management as requested by patient.  - REFERRAL TO [facility] (HIP)    2. Uncontrolled type 2 diabetes mellitus with hyperglycemia (HCC)  Uncontrolled.  Will refer to pharmacology for further evaluation and treatment.  Patient already taking several medications.  No changes today.  She will continue to work on lifestyle measures in addition to current medications.  - REFERRAL TO PHARMACOTHERAPY SERVICE  - REFERRAL TO [facility]OVEMENT PROGRAMS (HIP)  - Comp Metabolic Panel; Future  - ESTIMATED GFR; Future  - HEMOGLOBIN A1C; Future  - TSH WITH REFLEX TO FT4; Future    3. Bilateral lower extremity edema  New onset.  Will trial hydrochlorothiazide as needed.  Instructions side effects reviewed with patient.  - hydrochlorothiazide (MICROZIDE) 12.5 MG capsule; Take 1 to 2 tabs once a day if needed for leg swelling  Dispense: 60 capsule; Refill: 2    4. Bipolar depression (HCC)  Patient having worsening depression.  She tells me today she has a  history of bipolar depression.  Will refer to psychiatry for further evaluation and management.  - REFERRAL TO PSYCHIATRY    5. COVID-19 long hauler  Patient continues to struggle with multiple symptoms since Covid illness.  She is physically unable to return to work for at least 3 months.  Letter to patient's workplace printed and given to her today.  I urged her to discuss her ataxia and muscle weakness with her neurologist at upcoming appointment.  I also urged her to get cervical spine MRI, previously ordered.    6. Reactive depression  As in #4.    7. Nonintractable headache, unspecified chronicity pattern, unspecified headache type  Mildly improved with amitriptyline.  Keep follow-up appointment with neurology.    8. Neck pain  Continue working on home exercise program.  Keep follow-up appointment with physical therapy.    Patient will return to clinic in 3 months or sooner if needed.

## 2021-05-20 NOTE — LETTER
May 20, 2021    To Whom It May Concern:         This is confirmation that Sonya Wei attended her scheduled appointment with DECLAN Johnson on 5/20/21.    Ms. Wei is not yet able to return to work with no restrictions due to lingering effects of COVID-19.  Hopefully, she will be able to return to work in 3 months.  She has is currently undergoing specialty workup and treatment options.         If you have any questions please do not hesitate to call me at the phone number listed below.    Sincerely,          ELIZABETH Johnson.RLoriN.  [phone]

## 2021-05-20 NOTE — ASSESSMENT & PLAN NOTE
Patient reports bipolar depression.  She mentions today she history of depression and was treated with medication many years ago.  Earlier this year she trialed Escitalopram, which made her very tired.  She discontinued it and did not think she needed to take medication as her symptoms were improving.  Today, she reports that she is starting to feel down again.  She is requesting referral to psychiatry. Denies SI/HI

## 2021-05-21 ENCOUNTER — TELEPHONE (OUTPATIENT)
Dept: VASCULAR LAB | Facility: MEDICAL CENTER | Age: 56
End: 2021-05-21

## 2021-05-21 NOTE — TELEPHONE ENCOUNTER
Left voicemail message for patient to return call to [facility] to establish care      KRYSTIAN Mckeon, Clinical Pharmacist, CDE, CACP

## 2021-06-02 ENCOUNTER — OFFICE VISIT (OUTPATIENT)
Dept: NEUROLOGY | Facility: MEDICAL CENTER | Age: 56
End: 2021-06-02
Attending: PSYCHIATRY & NEUROLOGY
Payer: COMMERCIAL

## 2021-06-02 VITALS
SYSTOLIC BLOOD PRESSURE: 140 MMHG | OXYGEN SATURATION: 95 % | WEIGHT: 189 LBS | BODY MASS INDEX: 35.68 KG/M2 | DIASTOLIC BLOOD PRESSURE: 82 MMHG | TEMPERATURE: 98.1 F | HEIGHT: 61 IN | HEART RATE: 84 BPM

## 2021-06-02 DIAGNOSIS — R51.9 NONINTRACTABLE HEADACHE, UNSPECIFIED CHRONICITY PATTERN, UNSPECIFIED HEADACHE TYPE: Primary | ICD-10-CM

## 2021-06-02 PROCEDURE — 99214 OFFICE O/P EST MOD 30 MIN: CPT | Performed by: PSYCHIATRY & NEUROLOGY

## 2021-06-02 PROCEDURE — 99212 OFFICE O/P EST SF 10 MIN: CPT | Performed by: PSYCHIATRY & NEUROLOGY

## 2021-06-02 RX ORDER — AMITRIPTYLINE HYDROCHLORIDE 25 MG/1
25 TABLET, FILM COATED ORAL EVERY EVENING
Qty: 90 TABLET | Refills: 2 | Status: SHIPPED | OUTPATIENT
Start: 2021-06-02 | End: 2021-08-31

## 2021-06-02 ASSESSMENT — FIBROSIS 4 INDEX: FIB4 SCORE: 1.14
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
## 2021-06-08 ENCOUNTER — TELEPHONE (OUTPATIENT)
Dept: MEDICAL GROUP | Facility: PHYSICIAN GROUP | Age: 56
End: 2021-06-08

## 2021-06-08 NOTE — TELEPHONE ENCOUNTER
Cristel called from [facility] stating that they need you to call the insurance company for more clinical information regarding the cervical MRI we ordered for pt.   [employer] is through [employer]. The number is [phone]. They use the insurance Id number for verifications.    Pt is scheduled this coming Sunday, so will need this down in enough time to make sure she can be covered for the MRI.

## 2021-06-10 NOTE — TELEPHONE ENCOUNTER
I have gotten authorization from insurance for MRI of C-spine. The authorization number is [id number]. Please let imaging department know.

## 2021-06-13 ENCOUNTER — APPOINTMENT (OUTPATIENT)
Dept: RADIOLOGY | Facility: MEDICAL CENTER | Age: 56
End: 2021-06-13
Attending: NURSE PRACTITIONER
Payer: COMMERCIAL

## 2021-06-14 ENCOUNTER — DOCUMENTATION (OUTPATIENT)
Dept: VASCULAR LAB | Facility: MEDICAL CENTER | Age: 56
End: 2021-06-14

## 2021-06-14 NOTE — PROGRESS NOTES
[facility] and Pharmacotherapy Programs    Received pharmacotherapy referral due to hx of DMII from Fern Lawrence on 5-20-21.    LM for patient to call back and establish care.    Insurance: [employer] Absence?  PCP: [facility]  Locations to be seen: Any    [facility] Anticoagulation/Pharmacotherapy Clinic at [phone], fax [phone]    Glenn Peralta, PaoloD, BCACP

## 2021-06-17 DIAGNOSIS — B35.1 FUNGAL TOENAIL INFECTION: ICD-10-CM

## 2021-06-18 ENCOUNTER — TELEPHONE (OUTPATIENT)
Dept: MEDICAL GROUP | Facility: PHYSICIAN GROUP | Age: 56
End: 2021-06-18

## 2021-06-18 RX ORDER — CICLOPIROX 80 MG/ML
SOLUTION TOPICAL
Qty: 6.6 ML | Refills: 1 | Status: SHIPPED | OUTPATIENT
Start: 2021-06-18 | End: 2022-02-18

## 2021-06-18 NOTE — TELEPHONE ENCOUNTER
Please refax the paperwork.  Please call Antony to make sure they received this.  Check with  in regards to chart notes.

## 2021-06-18 NOTE — TELEPHONE ENCOUNTER
Pt called in regards to the paperwork she came in with. The disability org claims they only received the first page. I will print out these pages and fax them again. They are also requesting notes from previous visits since February to the most recent visit. Pt has given us consent to send these to the [facility]. Please advise.

## 2021-06-21 ENCOUNTER — DOCUMENTATION (OUTPATIENT)
Dept: VASCULAR LAB | Facility: MEDICAL CENTER | Age: 56
End: 2021-06-21

## 2021-06-21 NOTE — PROGRESS NOTES
[facility] and Pharmacotherapy [facility]    Received pharmacotherapy referral due to hx of DMII from Fern Lawrence on 5-20-21.    S/w patient today regarding referral above.  Appt scheduled for 7-2-21 @ 8:45am.    Insurance: [employer]  [facility]  Locations to be seen: Any    [facility] Anticoagulation/Pharmacotherapy Clinic at [phone], fax [phone]    Glenn Peralta, PharmD, BCACP

## 2021-06-22 NOTE — TELEPHONE ENCOUNTER
I have faxed paperwork and sent a note letting the scottie know, they will need to request records from our medical records department with the appropriate number to do so.

## 2021-06-23 ENCOUNTER — TELEMEDICINE2 (OUTPATIENT)
Dept: BEHAVIORAL HEALTH | Facility: CLINIC | Age: 56
End: 2021-06-23
Payer: COMMERCIAL

## 2021-06-23 DIAGNOSIS — F33.1 MODERATE EPISODE OF RECURRENT MAJOR DEPRESSIVE DISORDER (HCC): ICD-10-CM

## 2021-06-23 DIAGNOSIS — R51.9 NONINTRACTABLE HEADACHE, UNSPECIFIED CHRONICITY PATTERN, UNSPECIFIED HEADACHE TYPE: ICD-10-CM

## 2021-06-23 DIAGNOSIS — E66.9 OBESITY (BMI 30-39.9): ICD-10-CM

## 2021-06-23 PROCEDURE — 90833 PSYTX W PT W E/M 30 MIN: CPT | Mod: 95 | Performed by: PSYCHIATRY & NEUROLOGY

## 2021-06-23 PROCEDURE — 99204 OFFICE O/P NEW MOD 45 MIN: CPT | Mod: 95 | Performed by: PSYCHIATRY & NEUROLOGY

## 2021-06-23 RX ORDER — SITAGLIPTIN 100 MG/1
TABLET, FILM COATED ORAL
COMMUNITY
Start: 2021-06-18 | End: 2022-03-25

## 2021-06-23 RX ORDER — ESCITALOPRAM OXALATE 10 MG/1
TABLET ORAL
Qty: 45 TABLET | Refills: 2 | Status: SHIPPED | OUTPATIENT
Start: 2021-06-23 | End: 2021-12-29
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
## 2021-06-24 ENCOUNTER — TELEPHONE (OUTPATIENT)
Dept: MEDICAL GROUP | Facility: PHYSICIAN GROUP | Age: 56
End: 2021-06-24

## 2021-06-24 NOTE — TELEPHONE ENCOUNTER
Called pt to return voicemail, I advised her to let her employer know that they will need to request records from medical records. Pt is still working with what her employer needs to be paid for disability and stated they have told her they will close her case if they do not get the records they need. I again advised her to let them know they need to request these through MR.         Pt also stated her toe nail is black again. She stated the medicine that was prescribed helped for awhile, but is back to being black again after a few weeks. Please advise.

## 2021-07-02 ENCOUNTER — TELEPHONE (OUTPATIENT)
Dept: MEDICAL GROUP | Facility: PHYSICIAN GROUP | Age: 56
End: 2021-07-02

## 2021-07-02 NOTE — TELEPHONE ENCOUNTER
Patient did not show for their appointment today.  I left a voice message for them to call us back to reschedule.    This appointment was a 45 to 60-minute diabetic new patient appointment.  In the [facility].    [facility]  Phone [phone] fax [phone]

## 2021-07-07 ENCOUNTER — DOCUMENTATION (OUTPATIENT)
Dept: VASCULAR LAB | Facility: MEDICAL CENTER | Age: 56
End: 2021-07-07

## 2021-07-07 NOTE — PROGRESS NOTES
[facility]    Pt missed initial DM visit. Called and LVM for pt to return call to reschedule.    Will attempt again at a later date.      Sean Luna, PharmD, [facility] Anticoagulation/Pharmacotherapy Clinic at [phone], fax [phone]

## 2021-07-09 ENCOUNTER — OFFICE VISIT (OUTPATIENT)
Dept: URGENT CARE | Facility: PHYSICIAN GROUP | Age: 56
End: 2021-07-09
Payer: COMMERCIAL

## 2021-07-09 VITALS
RESPIRATION RATE: 14 BRPM | WEIGHT: 184 LBS | OXYGEN SATURATION: 98 % | DIASTOLIC BLOOD PRESSURE: 82 MMHG | BODY MASS INDEX: 34.74 KG/M2 | SYSTOLIC BLOOD PRESSURE: 124 MMHG | HEIGHT: 61 IN | HEART RATE: 78 BPM | TEMPERATURE: 98.6 F

## 2021-07-09 DIAGNOSIS — J02.9 SORE THROAT: ICD-10-CM

## 2021-07-09 DIAGNOSIS — J06.9 UPPER RESPIRATORY TRACT INFECTION, UNSPECIFIED TYPE: ICD-10-CM

## 2021-07-09 DIAGNOSIS — E11.9 TYPE 2 DIABETES MELLITUS WITHOUT COMPLICATION, WITHOUT LONG-TERM CURRENT USE OF INSULIN (HCC): ICD-10-CM

## 2021-07-09 LAB
INT CON NEG: NORMAL
INT CON POS: NORMAL
S PYO AG THROAT QL: NORMAL

## 2021-07-09 PROCEDURE — 99214 OFFICE O/P EST MOD 30 MIN: CPT | Performed by: PHYSICIAN ASSISTANT

## 2021-07-09 PROCEDURE — 87880 STREP A ASSAY W/OPTIC: CPT | Performed by: PHYSICIAN ASSISTANT

## 2021-07-09 RX ORDER — GLYBURIDE 5 MG/1
TABLET ORAL
Qty: 360 TABLET | Refills: 1 | Status: SHIPPED | OUTPATIENT
Start: 2021-07-09 | End: 2021-12-29

## 2021-07-09 ASSESSMENT — FIBROSIS 4 INDEX: FIB4 SCORE: 1.14

## 2021-07-09 NOTE — PROGRESS NOTES
Chief Complaint   Patient presents with   • Sore Throat     x1 days    • Nasal Congestion       HISTORY OF PRESENT ILLNESS: Patient is a 55 y.o. female who presents today because She has a 1 day history of sore throat, mild cough.She denies any fevers, chills.  She has only been taking naproxen for her symptoms.  She is vaccinated for Covid    Patient Active Problem List    Diagnosis Date Noted   • Moderate episode of recurrent major depressive disorder (HCC) 06/23/2021   • COVID-19 long hauler 05/20/2021   • Fungal toenail infection 04/14/2021   • Nonintractable headache 02/19/2021   • History of 2019 novel coronavirus disease (COVID-19) 01/14/2021   • Reactive depression 01/14/2021   • Hospital discharge follow-up 12/16/2020   • Neck pain 08/18/2020   • Musculoskeletal pain 08/18/2020   • Epigastric pain 08/13/2020   • Obesity (BMI 30-39.9) 04/03/2019   • Mid back pain 04/02/2019   • Gastroesophageal reflux disease 01/29/2019   • Incisional hernia 10/14/2016   • Hypertension 10/01/2015   • Vitamin D deficiency 09/24/2012   • Microalbuminuria 09/24/2012   • Diabetes mellitus type II, uncontrolled ([facility]) 08/27/2012   • Hyperlipidemia 08/27/2012       Allergies:Morphine    Current Outpatient Medications Ordered in Epic   Medication Sig Dispense Refill   • Maalox Plus - Diphenhydramine - Lidocaine (MBX Oral Suspension) Take 5 mL by mouth every 6 hours as needed. 90 mL 0   • escitalopram (LEXAPRO) 10 MG Tab Take 1/2 tablet by mouth once a day for 4 days, then take 1 tablet by mouth once a day x 14 days, then 1.5 tablets by mouth once a day 45 tablet 2   • JANUVIA 100 MG Tab      • ciclopirox (PENLAC) 8 % solution Apply small amount nightly 6.6 mL 1   • amitriptyline (ELAVIL) 25 MG Tab Take 1 tablet by mouth every evening for 90 days. 90 tablet 2   • Calcium Polycarbophil (FIBER-CAPS PO) Take  by mouth.     • hydrochlorothiazide (MICROZIDE) 12.5 MG capsule Take 1 to 2 tabs once a day if needed for leg swelling 60 capsule  2   • metFORMIN (GLUCOPHAGE) 500 MG Tab TAKE 1 TABLET BY MOUTH TWICE DAILY WITH MEALS 180 tablet 0   • naproxen (NAPROSYN) 500 MG Tab Take 1 tablet by mouth 2 times a day with meals. 60 tablet 2   • gabapentin (NEURONTIN) 300 MG Cap Take 1 cap daily in the morning and 2 caps at bedtime. 180 capsule 1   • glyBURIDE (DIABETA) 5 MG Tab TAKE 2 TABLETS BY MOUTH TWICE DAILY WITH MEALS 360 tablet 0   • lisinopril (PRINIVIL) 40 MG tablet Take 1 tablet by mouth every day. 90 tablet 1   • magnesium oxide (MAG-OX) 400 MG Tab tablet Take 400 mg by mouth every day.     • pantoprazole (PROTONIX) 20 MG tablet Take 1 tablet by mouth once daily 30 Tab 5   • Ascorbic Acid (VITAMIN C) 1000 MG Tab Take 1 Tab by mouth every morning.     • Dulaglutide (TRULICITY) 1.5 MG/0.5ML Solution Pen-injector Inject 0.5 mL as instructed every 7 days. (Patient taking differently: Inject 0.5 mL under the skin every 7 days. Pt injects q thurs) 6 mL 1   • fenofibrate (TRIGLIDE) 160 MG tablet Take 1 Tab by mouth every day. 90 Tab 1   • atorvastatin (LIPITOR) 20 MG Tab Take 1 Tab by mouth every day. 90 Tab 1   • albuterol 108 (90 Base) MCG/ACT Aero Soln inhalation aerosol Inhale 2 Puffs by mouth every 6 hours as needed for Shortness of Breath. 8.5 g 0   • Cholecalciferol (VITAMIN D PO) Take 2 Tabs by mouth every day. GUMMIES     • therapeutic multivitamin-minerals (THERAGRAN-M) Tab Take 1 Tab by mouth every day. 30 Tab 11     No current Psychiatric-ordered facility-administered medications on file.       Past Medical History:   Diagnosis Date   • Anemia    • Bowel habit changes     constipation   • Diabetes    • Diabetes (HCC)    • GERD (gastroesophageal reflux disease)    • Healthcare maintenance 9/27/2014    Mammogram: Due   • High cholesterol    • Hyperlipidemia    • Hypertension    • Infectious disease     Cold 10/2016   • Jaundice 1999   • Psychiatric problem     depression and anxiety   • Vaginal itching 8/27/2014    With anal itching X 1 month Getting  "worse Burning Min vag disch       Social History     Tobacco Use   • Smoking status: Never Smoker   • Smokeless tobacco: Never Used   Vaping Use   • Vaping Use: Never used   Substance Use Topics   • Alcohol use: No   • Drug use: No       Family Status   Relation Name Status   • Mo  Alive   • Fa       Family History   Problem Relation Age of Onset   • Diabetes Mother    • Hyperlipidemia Mother    • Diabetes Father    • Hypertension Father    • Hyperlipidemia Father        ROS:  Review of Systems   Constitutional: Negative for fever, chills, weight loss and malaise/fatigue.   HENT: Negative for ear pain, nosebleeds, positive for nasal congestion, sore throat and no neck pain.    Eyes: Negative for blurred vision.   Respiratory: Positive for cough, minimal sputum production, no shortness of breath and wheezing.    Cardiovascular: Negative for chest pain, palpitations, orthopnea and leg swelling.   Gastrointestinal: Negative for heartburn, nausea, vomiting and abdominal pain.   Genitourinary: Negative for dysuria, urgency and frequency.     Exam:  /82 (BP Location: Left arm, Patient Position: Sitting, BP Cuff Size: Adult long)   Pulse 78   Temp 37 °C (98.6 °F) (Temporal)   Resp 14   Ht 1.549 m (5' 1\")   Wt 83.5 kg (184 lb)   SpO2 98%   General:  Well nourished, well developed female in NAD  Head:Normocephalic, atraumatic  Eyes: PERRLA, EOM within normal limits, no conjunctival injection, no scleral icterus, visual fields and acuity grossly intact.  Ears: Normal shape and symmetry, no tenderness, no discharge. External canals are without any significant edema or erythema. Tympanic membranes are without any inflammation, no effusion. Gross auditory acuity is intact  Nose: Symmetrical without tenderness, no discharge.  Mouth: reasonable hygiene, no erythema exudates or tonsillar enlargement.  Neck: no masses, range of motion within normal limits, no tracheal deviation. No obvious thyroid " enlargement.  Pulmonary: chest is symmetrical with respiration, no wheezes, crackles, or rhonchi.  Cardiovascular: regular rate and rhythm without murmurs, rubs, or gallops.  Extremities: no clubbing, cyanosis, or edema.    Strep negative    Please note that this dictation was created using voice recognition software. I have made every reasonable attempt to correct obvious errors, but I expect that there are errors of grammar and possibly content that I did not discover before finalizing the note.    Assessment/Plan:  1. Upper respiratory tract infection, unspecified type     2. Sore throat  POCT Rapid Strep A    Maalox Plus - Diphenhydramine - Lidocaine (MBX Oral Suspension)   Over-the-counter symptomatic relief as needed    Followup with primary care in the next 7-10 days if not significantly improving, return to the urgent care or go to the emergency room sooner for any worsening of symptoms.

## 2021-07-12 DIAGNOSIS — E11.9 TYPE 2 DIABETES MELLITUS WITHOUT COMPLICATION, WITHOUT LONG-TERM CURRENT USE OF INSULIN (HCC): ICD-10-CM

## 2021-07-12 RX ORDER — DULAGLUTIDE 1.5 MG/.5ML
INJECTION, SOLUTION SUBCUTANEOUS
Qty: 12 ML | Refills: 1 | Status: SHIPPED | OUTPATIENT
Start: 2021-07-12 | End: 2021-12-29

## 2021-07-12 NOTE — TELEPHONE ENCOUNTER
05 20 2021 last Ov     Received request via: Pharmacy    Was the patient seen in the last year in this department? Yes    Does the patient have an active prescription (recently filled or refills available) for medication(s) requested? No

## 2021-07-14 ENCOUNTER — DOCUMENTATION (OUTPATIENT)
Dept: VASCULAR LAB | Facility: MEDICAL CENTER | Age: 56
End: 2021-07-14

## 2021-07-14 NOTE — PROGRESS NOTES
[facility]    Received pharmacotherapy referral for DMII from Fern Lawrence on 5-20-21.    Patient states her pension has been denied and she is unable to afford copays associated with our visits.  She asked that we call back in a month to see if pension is re-enrolled.    Insurance: none  PCP: [facility]  Locations to be seen: Any    [facility] at [phone], fax [phone]    Glenn Peralta, PaoloD, BCACP

## 2021-07-26 DIAGNOSIS — I10 ESSENTIAL HYPERTENSION: ICD-10-CM

## 2021-07-27 RX ORDER — LISINOPRIL 40 MG/1
TABLET ORAL
Qty: 90 TABLET | Refills: 3 | Status: SHIPPED | OUTPATIENT
Start: 2021-07-27 | End: 2022-08-30 | Stop reason: SDUPTHER

## 2021-07-27 NOTE — TELEPHONE ENCOUNTER
Received request via: Pharmacy    Was the patient seen in the last year in this department? Yes    Does the patient have an active prescription (recently filled or refills available) for medication(s) requested? No    Last OV:05/20/21  Last Labs: 05/13/21    Current Outpatient Medications   Medication Sig Dispense Refill   • TRULICITY 1.5 MG/0.5ML Solution Pen-injector INJECT 1/2 (ONE-HALF) ML SUBCUTANEOUSLY  AS DIRECTED ONCE A WEEK 12 mL 1   • Maalox Plus - Diphenhydramine - Lidocaine (MBX Oral Suspension) Take 5 mL by mouth every 6 hours as needed. 90 mL 0   • glyBURIDE (DIABETA) 5 MG Tab TAKE 2 TABLETS BY MOUTH TWICE DAILY WITH MEALS 360 tablet 1   • escitalopram (LEXAPRO) 10 MG Tab Take 1/2 tablet by mouth once a day for 4 days, then take 1 tablet by mouth once a day x 14 days, then 1.5 tablets by mouth once a day 45 tablet 2   • JANUVIA 100 MG Tab      • ciclopirox (PENLAC) 8 % solution Apply small amount nightly 6.6 mL 1   • amitriptyline (ELAVIL) 25 MG Tab Take 1 tablet by mouth every evening for 90 days. 90 tablet 2   • Calcium Polycarbophil (FIBER-CAPS PO) Take  by mouth.     • hydrochlorothiazide (MICROZIDE) 12.5 MG capsule Take 1 to 2 tabs once a day if needed for leg swelling 60 capsule 2   • metFORMIN (GLUCOPHAGE) 500 MG Tab TAKE 1 TABLET BY MOUTH TWICE DAILY WITH MEALS 180 tablet 0   • naproxen (NAPROSYN) 500 MG Tab Take 1 tablet by mouth 2 times a day with meals. 60 tablet 2   • gabapentin (NEURONTIN) 300 MG Cap Take 1 cap daily in the morning and 2 caps at bedtime. 180 capsule 1   • lisinopril (PRINIVIL) 40 MG tablet Take 1 tablet by mouth every day. 90 tablet 1   • magnesium oxide (MAG-OX) 400 MG Tab tablet Take 400 mg by mouth every day.     • pantoprazole (PROTONIX) 20 MG tablet Take 1 tablet by mouth once daily 30 Tab 5   • Ascorbic Acid (VITAMIN C) 1000 MG Tab Take 1 Tab by mouth every morning.     • fenofibrate (TRIGLIDE) 160 MG tablet Take 1 Tab by mouth every day. 90 Tab 1   • atorvastatin  (LIPITOR) 20 MG Tab Take 1 Tab by mouth every day. 90 Tab 1   • albuterol 108 (90 Base) MCG/ACT Aero Soln inhalation aerosol Inhale 2 Puffs by mouth every 6 hours as needed for Shortness of Breath. 8.5 g 0   • Cholecalciferol (VITAMIN D PO) Take 2 Tabs by mouth every day. GUMMIES     • therapeutic multivitamin-minerals (THERAGRAN-M) Tab Take 1 Tab by mouth every day. 30 Tab 11     No current facility-administered medications for this visit.

## 2021-08-03 ENCOUNTER — OFFICE VISIT (OUTPATIENT)
Dept: URGENT CARE | Facility: PHYSICIAN GROUP | Age: 56
End: 2021-08-03
Payer: COMMERCIAL

## 2021-08-03 VITALS
BODY MASS INDEX: 35.68 KG/M2 | SYSTOLIC BLOOD PRESSURE: 110 MMHG | WEIGHT: 189 LBS | OXYGEN SATURATION: 99 % | RESPIRATION RATE: 16 BRPM | HEIGHT: 61 IN | HEART RATE: 78 BPM | DIASTOLIC BLOOD PRESSURE: 74 MMHG | TEMPERATURE: 97.6 F

## 2021-08-03 DIAGNOSIS — R19.5 LOOSE STOOLS: ICD-10-CM

## 2021-08-03 DIAGNOSIS — R10.13 EPIGASTRIC PAIN: ICD-10-CM

## 2021-08-03 DIAGNOSIS — R11.2 NAUSEA VOMITING AND DIARRHEA: ICD-10-CM

## 2021-08-03 DIAGNOSIS — R19.7 NAUSEA VOMITING AND DIARRHEA: ICD-10-CM

## 2021-08-03 PROCEDURE — 99214 OFFICE O/P EST MOD 30 MIN: CPT | Performed by: PHYSICIAN ASSISTANT

## 2021-08-03 RX ORDER — ONDANSETRON 4 MG/1
4 TABLET, ORALLY DISINTEGRATING ORAL EVERY 8 HOURS PRN
Qty: 20 TABLET | Refills: 0 | Status: SHIPPED | OUTPATIENT
Start: 2021-08-03 | End: 2022-02-18

## 2021-08-03 RX ORDER — ONDANSETRON 2 MG/ML
4 INJECTION INTRAMUSCULAR; INTRAVENOUS ONCE
Status: COMPLETED | OUTPATIENT
Start: 2021-08-03 | End: 2021-08-03

## 2021-08-03 RX ADMIN — ONDANSETRON 4 MG: 2 INJECTION INTRAMUSCULAR; INTRAVENOUS at 18:55

## 2021-08-03 ASSESSMENT — FIBROSIS 4 INDEX: FIB4 SCORE: 1.14

## 2021-08-04 NOTE — PROGRESS NOTES
Chief Complaint   Patient presents with   • Diarrhea     off and on, has seen a specialist in the past pt states, x3 days   • Emesis     yellow bile pt states, cant keep anything down pt states       HISTORY OF PRESENT ILLNESS: Patient is a 55 y.o. female who presents today for the following:    Diarrhea since November 2020  On and off  The longest without has been one week  Has seen GI since November 2020 and had a normal colonoscopy; did not follow-up with them again    N/V started 2 days ago  Unable to hold food/liquids  Sharp/stabbing in epigastrium  H/o the same pain requiring hospitalization; stress test for heart (admitted 8/14/20)  Lower abdominal pain  Urinary symptoms: none  Nasal congestion  Denies fever  H/o partial hysterectomy, mitesh, tubal ligation, ventral hernia repair     passed away from COVID December 2020    Patient Active Problem List    Diagnosis Date Noted   • Moderate episode of recurrent major depressive disorder (HCC) 06/23/2021   • COVID-19 long hauler 05/20/2021   • Fungal toenail infection 04/14/2021   • Nonintractable headache 02/19/2021   • History of 2019 novel coronavirus disease (COVID-19) 01/14/2021   • Reactive depression 01/14/2021   • Hospital discharge follow-up 12/16/2020   • Neck pain 08/18/2020   • Musculoskeletal pain 08/18/2020   • Epigastric pain 08/13/2020   • Obesity (BMI 30-39.9) 04/03/2019   • Mid back pain 04/02/2019   • Gastroesophageal reflux disease 01/29/2019   • Incisional hernia 10/14/2016   • Hypertension 10/01/2015   • Vitamin D deficiency 09/24/2012   • Microalbuminuria 09/24/2012   • Diabetes mellitus type II, uncontrolled ([facility]) 08/27/2012   • Hyperlipidemia 08/27/2012       Allergies:Morphine    Current Outpatient Medications Ordered in Epic   Medication Sig Dispense Refill   • ondansetron (ZOFRAN ODT) 4 MG TABLET DISPERSIBLE Take 1 tablet by mouth every 8 hours as needed for Nausea. 20 tablet 0   • lisinopril (PRINIVIL) 40 MG tablet Take 1 tablet  by mouth once daily 90 tablet 3   • TRULICITY 1.5 MG/0.5ML Solution Pen-injector INJECT 1/2 (ONE-HALF) ML SUBCUTANEOUSLY  AS DIRECTED ONCE A WEEK 12 mL 1   • glyBURIDE (DIABETA) 5 MG Tab TAKE 2 TABLETS BY MOUTH TWICE DAILY WITH MEALS 360 tablet 1   • escitalopram (LEXAPRO) 10 MG Tab Take 1/2 tablet by mouth once a day for 4 days, then take 1 tablet by mouth once a day x 14 days, then 1.5 tablets by mouth once a day 45 tablet 2   • JANUVIA 100 MG Tab      • ciclopirox (PENLAC) 8 % solution Apply small amount nightly 6.6 mL 1   • amitriptyline (ELAVIL) 25 MG Tab Take 1 tablet by mouth every evening for 90 days. 90 tablet 2   • Calcium Polycarbophil (FIBER-CAPS PO) Take  by mouth.     • hydrochlorothiazide (MICROZIDE) 12.5 MG capsule Take 1 to 2 tabs once a day if needed for leg swelling 60 capsule 2   • metFORMIN (GLUCOPHAGE) 500 MG Tab TAKE 1 TABLET BY MOUTH TWICE DAILY WITH MEALS 180 tablet 0   • naproxen (NAPROSYN) 500 MG Tab Take 1 tablet by mouth 2 times a day with meals. 60 tablet 2   • gabapentin (NEURONTIN) 300 MG Cap Take 1 cap daily in the morning and 2 caps at bedtime. 180 capsule 1   • Ascorbic Acid (VITAMIN C) 1000 MG Tab Take 1 Tab by mouth every morning.     • atorvastatin (LIPITOR) 20 MG Tab Take 1 Tab by mouth every day. 90 Tab 1   • albuterol 108 (90 Base) MCG/ACT Aero Soln inhalation aerosol Inhale 2 Puffs by mouth every 6 hours as needed for Shortness of Breath. 8.5 g 0   • Cholecalciferol (VITAMIN D PO) Take 2 Tabs by mouth every day. GUMMIES       Current Facility-Administered Medications Ordered in Epic   Medication Dose Route Frequency Provider Last Rate Last Admin   • ondansetron (ZOFRAN) syringe/vial injection 4 mg  4 mg Intramuscular Once NICOLE [email or web address].           Past Medical History:   Diagnosis Date   • Anemia    • Bowel habit changes     constipation   • Diabetes    • Diabetes (HCC)    • GERD (gastroesophageal reflux disease)    • Healthcare maintenance 9/27/2014    Mammogram:  "Due   • High cholesterol    • Hyperlipidemia    • Hypertension    • Infectious disease     Cold 10/2016   • Jaundice    • Psychiatric problem     depression and anxiety   • Vaginal itching 2014    With anal itching X 1 month Getting worse Burning Min vag disch       Social History     Tobacco Use   • Smoking status: Never Smoker   • Smokeless tobacco: Never Used   Vaping Use   • Vaping Use: Never used   Substance Use Topics   • Alcohol use: No   • Drug use: No       Family Status   Relation Name Status   • Mo  Alive   • Fa       Family History   Problem Relation Age of Onset   • Diabetes Mother    • Hyperlipidemia Mother    • Diabetes Father    • Hypertension Father    • Hyperlipidemia Father        Review of Systems:    Constitutional ROS: No unexpected change in weight, No weakness, No fatigue  Eye ROS: No recent significant change in vision, No eye pain, redness, discharge  Ear ROS: No drainage, No tinnitus or vertigo, No recent change in hearing  Mouth/Throat ROS: No teeth or gum problems, No bleeding gums, No tongue complaints  Neck ROS: No swollen glands, No significant pain in neck  Pulmonary ROS: No chronic cough, sputum, or hemoptysis, No dyspnea on exertion, No wheezing  Cardiovascular ROS: No diaphoresis, No edema, No palpitations  GI: Positive for nausea, vomiting, diarrhea.  Musculoskeletal/Extremities ROS: No peripheral edema, No pain, redness or swelling on the joints  Hematologic/Lymphatic ROS: No chills, No night sweats, No weight loss  Skin/Integumentary ROS: No edema, No evidence of rash, No itching      Exam:  /74   Pulse 78   Temp 36.4 °C (97.6 °F)   Resp 16   Ht 1.549 m (5' 1\")   Wt 85.7 kg (189 lb)   SpO2 99%   General: Well developed, well nourished. No distress.    Pulmonary: Unlabored respiratory effort. Lungs clear to auscultation, no wheezes, no rhonchi.    Cardiovascular: Regular rate and rhythm without murmur.   Neurologic: Grossly nonfocal. No facial " asymmetry noted.  Abdomen: Soft, nondistended.  Epigastric tenderness without guarding or rebound.  No lower abdominal tenderness noted.  Skin: Warm, dry, good turgor. No rashes in visible areas.   Psych: Normal mood. Alert and oriented to person, place and time.    Zofran 4 mg IM    Assessment/Plan:  Records review shows a/14/2020 hospitalization, nuc med stress test was unremarkable for any ischemia.    Referring patient back to GI for further evaluation of her loose stools.    Zofran given in clinic and sent to the pharmacy.  GI for further evaluation of epigastric pain if it does not improve.    Discussed red flags and ER precautions.    1. Loose stools  REFERRAL TO GASTROENTEROLOGY   2. Nausea vomiting and diarrhea  ondansetron (ZOFRAN ODT) 4 MG TABLET DISPERSIBLE    ondansetron (ZOFRAN) syringe/vial injection 4 mg   3. Epigastric pain

## 2021-08-06 ENCOUNTER — HOSPITAL ENCOUNTER (OUTPATIENT)
Dept: LAB | Facility: MEDICAL CENTER | Age: 56
End: 2021-08-06
Attending: NURSE PRACTITIONER
Payer: COMMERCIAL

## 2021-08-06 DIAGNOSIS — E78.5 HYPERLIPIDEMIA, UNSPECIFIED HYPERLIPIDEMIA TYPE: ICD-10-CM

## 2021-08-06 DIAGNOSIS — E11.65 UNCONTROLLED TYPE 2 DIABETES MELLITUS WITH HYPERGLYCEMIA (HCC): ICD-10-CM

## 2021-08-06 LAB
ALBUMIN SERPL BCP-MCNC: 3.8 G/DL (ref 3.2–4.9)
ALBUMIN/GLOB SERPL: 1.2 G/DL
ALP SERPL-CCNC: 103 U/L (ref 30–99)
ALT SERPL-CCNC: 19 U/L (ref 2–50)
ANION GAP SERPL CALC-SCNC: 11 MMOL/L (ref 7–16)
AST SERPL-CCNC: 19 U/L (ref 12–45)
BILIRUB SERPL-MCNC: 0.2 MG/DL (ref 0.1–1.5)
BUN SERPL-MCNC: 47 MG/DL (ref 8–22)
CALCIUM SERPL-MCNC: 9.4 MG/DL (ref 8.5–10.5)
CHLORIDE SERPL-SCNC: 102 MMOL/L (ref 96–112)
CO2 SERPL-SCNC: 23 MMOL/L (ref 20–33)
CREAT SERPL-MCNC: 1.15 MG/DL (ref 0.5–1.4)
EST. AVERAGE GLUCOSE BLD GHB EST-MCNC: 157 MG/DL
GLOBULIN SER CALC-MCNC: 3.1 G/DL (ref 1.9–3.5)
GLUCOSE SERPL-MCNC: 188 MG/DL (ref 65–99)
HBA1C MFR BLD: 7.1 % (ref 4–5.6)
POTASSIUM SERPL-SCNC: 5.4 MMOL/L (ref 3.6–5.5)
PROT SERPL-MCNC: 6.9 G/DL (ref 6–8.2)
SODIUM SERPL-SCNC: 136 MMOL/L (ref 135–145)
TSH SERPL DL<=0.005 MIU/L-ACNC: 3.4 UIU/ML (ref 0.38–5.33)

## 2021-08-06 PROCEDURE — 84443 ASSAY THYROID STIM HORMONE: CPT

## 2021-08-06 PROCEDURE — 83036 HEMOGLOBIN GLYCOSYLATED A1C: CPT

## 2021-08-06 PROCEDURE — 80053 COMPREHEN METABOLIC PANEL: CPT

## 2021-08-06 PROCEDURE — 36415 COLL VENOUS BLD VENIPUNCTURE: CPT

## 2021-08-13 ENCOUNTER — OFFICE VISIT (OUTPATIENT)
Dept: MEDICAL GROUP | Facility: PHYSICIAN GROUP | Age: 56
End: 2021-08-13
Payer: COMMERCIAL

## 2021-08-13 VITALS
OXYGEN SATURATION: 96 % | HEART RATE: 86 BPM | SYSTOLIC BLOOD PRESSURE: 126 MMHG | TEMPERATURE: 97.9 F | HEIGHT: 61 IN | BODY MASS INDEX: 35.19 KG/M2 | RESPIRATION RATE: 12 BRPM | DIASTOLIC BLOOD PRESSURE: 82 MMHG | WEIGHT: 186.4 LBS

## 2021-08-13 DIAGNOSIS — F33.1 MODERATE EPISODE OF RECURRENT MAJOR DEPRESSIVE DISORDER (HCC): ICD-10-CM

## 2021-08-13 DIAGNOSIS — E11.65 UNCONTROLLED TYPE 2 DIABETES MELLITUS WITH HYPERGLYCEMIA (HCC): ICD-10-CM

## 2021-08-13 DIAGNOSIS — R51.9 NONINTRACTABLE HEADACHE, UNSPECIFIED CHRONICITY PATTERN, UNSPECIFIED HEADACHE TYPE: ICD-10-CM

## 2021-08-13 DIAGNOSIS — U09.9 COVID-19 LONG HAULER: ICD-10-CM

## 2021-08-13 PROBLEM — F32.9 REACTIVE DEPRESSION: Status: RESOLVED | Noted: 2021-01-14 | Resolved: 2021-08-13

## 2021-08-13 PROCEDURE — 99214 OFFICE O/P EST MOD 30 MIN: CPT | Performed by: NURSE PRACTITIONER

## 2021-08-13 RX ORDER — LIDOCAINE HYDROCHLORIDE 20 MG/ML
SOLUTION OROPHARYNGEAL
COMMUNITY
Start: 2021-07-09 | End: 2022-02-19

## 2021-08-13 ASSESSMENT — FIBROSIS 4 INDEX: FIB4 SCORE: 0.75

## 2021-08-13 NOTE — PROGRESS NOTES
CC: Diabetes, lab review.    HISTORY OF THE PRESENT ILLNESS: Patient is a 55 y.o. female. This pleasant patient is here today for evaluation and management of the following health problems.      Diabetes mellitus type II, uncontrolled (HCC)  Chronic health problem, improved control.  Taking Metformin 500 mg twice a day, Januvia 100 mg daily, glyburide 10 mg twice a day, Trulicity 1.5 mg weekly.  Not checking blood sugars at home.  On exam, polyuria.  Up-to-date on retinal scan    Component      Latest Ref Rng & Units 2/16/2021 8/6/2021           9:01 AM 10:48 AM   Glycohemoglobin      4.0 - 5.6 % 7.5 (H) 7.1 (H)         Moderate episode of recurrent major depressive disorder (HCC)  Chronic health problem, improving.  Taking escitalopram.  Met with psychiatry.  Feeling much better more optimistic.  Headaches and fatigue much improved.  Denies SI/HI    Nonintractable headache  Chronic health problem, improved control.  Now taking amitriptyline 25 mg at night.  Followed by neurology.    COVID-19 long hauler  Patient reports her symptoms have greatly improved in the last month.  Headache and fatigue is resolved.  Feels that she is able to go back to work next week.  Denies chest pain, shortness of breath.  Short-term disability is an appeal.      Allergies: Morphine    Current Outpatient Medications Ordered in Epic   Medication Sig Dispense Refill   • lidocaine (XYLOCAINE) 2 % Solution      • metFORMIN (GLUCOPHAGE) 500 MG Tab TAKE 1 TABLET BY MOUTH TWICE DAILY WITH MEALS 180 tablet 1   • ondansetron (ZOFRAN ODT) 4 MG TABLET DISPERSIBLE Take 1 tablet by mouth every 8 hours as needed for Nausea. 20 tablet 0   • lisinopril (PRINIVIL) 40 MG tablet Take 1 tablet by mouth once daily 90 tablet 3   • TRULICITY 1.5 MG/0.5ML Solution Pen-injector INJECT 1/2 (ONE-HALF) ML SUBCUTANEOUSLY  AS DIRECTED ONCE A WEEK 12 mL 1   • glyBURIDE (DIABETA) 5 MG Tab TAKE 2 TABLETS BY MOUTH TWICE DAILY WITH MEALS 360 tablet 1   • escitalopram  (LEXAPRO) 10 MG Tab Take 1/2 tablet by mouth once a day for 4 days, then take 1 tablet by mouth once a day x 14 days, then 1.5 tablets by mouth once a day 45 tablet 2   • JANUVIA 100 MG Tab      • ciclopirox (PENLAC) 8 % solution Apply small amount nightly 6.6 mL 1   • amitriptyline (ELAVIL) 25 MG Tab Take 1 tablet by mouth every evening for 90 days. 90 tablet 2   • Calcium Polycarbophil (FIBER-CAPS PO) Take  by mouth.     • hydrochlorothiazide (MICROZIDE) 12.5 MG capsule Take 1 to 2 tabs once a day if needed for leg swelling 60 capsule 2   • naproxen (NAPROSYN) 500 MG Tab Take 1 tablet by mouth 2 times a day with meals. 60 tablet 2   • gabapentin (NEURONTIN) 300 MG Cap Take 1 cap daily in the morning and 2 caps at bedtime. 180 capsule 1   • Ascorbic Acid (VITAMIN C) 1000 MG Tab Take 1 Tab by mouth every morning.     • atorvastatin (LIPITOR) 20 MG Tab Take 1 Tab by mouth every day. 90 Tab 1   • albuterol 108 (90 Base) MCG/ACT Aero Soln inhalation aerosol Inhale 2 Puffs by mouth every 6 hours as needed for Shortness of Breath. 8.5 g 0   • Cholecalciferol (VITAMIN D PO) Take 2 Tabs by mouth every day. GUMMIES       No current Epic-ordered facility-administered medications on file.       Past Medical History:   Diagnosis Date   • Anemia    • Bowel habit changes     constipation   • Diabetes    • Diabetes (HCC)    • GERD (gastroesophageal reflux disease)    • Healthcare maintenance 9/27/2014    Mammogram: Due   • High cholesterol    • Hyperlipidemia    • Hypertension    • Infectious disease     Cold 10/2016   • Jaundice 1999   • Psychiatric problem     depression and anxiety   • Vaginal itching 8/27/2014    With anal itching X 1 month Getting worse Burning Min vag disch       Past Surgical History:   Procedure Laterality Date   • VENTRAL HERNIA REPAIR ROBOTIC XI N/A 10/14/2016    Procedure: VENTRAL HERNIA REPAIR ROBOTIC XI FOR INCISIONAL W/MESH;  Surgeon: Edi Mendoza M.D.;  Location: SURGERY [facility];   "Service:    • ABDOMINAL HYSTERECTOMY TOTAL      still has ovaries   • CHOLECYSTECTOMY     • PRIMARY C SECTION     • TUBAL COAGULATION LAPAROSCOPIC BILATERAL         Social History     Tobacco Use   • Smoking status: Never Smoker   • Smokeless tobacco: Never Used   Vaping Use   • Vaping Use: Never used   Substance Use Topics   • Alcohol use: No   • Drug use: No       Family History   Problem Relation Age of Onset   • Diabetes Mother    • Hyperlipidemia Mother    • Diabetes Father    • Hypertension Father    • Hyperlipidemia Father        ROS:   As in HPI, otherwise negative for chest pain, dyspnea, abdominal pain, dysuria, blood in stool, fever         Exam: /82 (BP Location: Right arm, Patient Position: Sitting, BP Cuff Size: Adult)   Pulse 86   Temp 36.6 °C (97.9 °F) (Temporal)   Resp 12   Ht 1.549 m (5' 1\")   Wt 84.6 kg (186 lb 6.4 oz)   SpO2 96%  Body mass index is 35.22 kg/m².    General: Alert, pleasant, well nourished, well developed female in NAD  HEENT: Normocephalic. Eyes conjunctiva clear lids without ptosis, pupils equal and reactive to light, ears normal shape and contour, canals are clear bilaterally, tympanic membranes are pearly gray with good light reflex, nasal mucosa without erythema and drainage, oropharynx is without erythema, edema or exudates.   Neck: Supple without bruit. Thyroid is not enlarged.  Pulmonary: Clear to ausculation.  Normal effort. No rales, ronchi, or wheezing.  Cardiovascular: Normal rate and rhythm without murmur. Carotid and radial pulses are intact and equal bilaterally.  No lower extremity edema.  Abdomen: Soft, nontender, nondistended. Normal bowel sounds. Liver and spleen are not palpable  Neurologic: Grossly nonfocal  Lymph: No cervical or supraclavicular lymph nodes are palpable  Skin: Warm and dry.    Musculoskeletal: Normal gait.   Psych: Normal mood and affect. Alert and oriented. Judgment and insight is normal.     Component      Latest Ref Rng & Units " 8/6/2021   Sodium      135 - 145 mmol/L 136   Potassium      3.6 - 5.5 mmol/L 5.4   Chloride      96 - 112 mmol/L 102   Co2      20 - 33 mmol/L 23   Anion Gap      7.0 - 16.0 11.0   Glucose      65 - 99 mg/dL 188 (H)   Bun      8 - 22 mg/dL 47 (H)   Creatinine      0.50 - 1.40 mg/dL 1.15   Calcium      8.5 - 10.5 mg/dL 9.4   AST(SGOT)      12 - 45 U/L 19   ALT(SGPT)      2 - 50 U/L 19   Alkaline Phosphatase      30 - 99 U/L 103 (H)   Total Bilirubin      0.1 - 1.5 mg/dL 0.2   Albumin      3.2 - 4.9 g/dL 3.8   Total Protein      6.0 - 8.2 g/dL 6.9   Globulin      1.9 - 3.5 g/dL 3.1   A-G Ratio      g/dL 1.2   GFR If African American      >60 mL/min/1.73 m 2 59 (A)   GFR If Non African American      >60 mL/min/1.73 m 2 49 (A)   TSH      0.380 - 5.330 uIU/mL 3.400       Please note that this dictation was created using voice recognition software. I have made every reasonable attempt to correct obvious errors, but I expect that there are errors of grammar and possibly content that I did not discover before finalizing the note.      Assessment/Plan  1. Uncontrolled type 2 diabetes mellitus with hyperglycemia (HCC)  Improved control.  Continue with Metformin, Trulicity, glyburide.  No changes to doses today.  Patient would like to work on lifestyle measures.  We will continue to work on lifestyle measures.  - Comp Metabolic Panel; Future  - ESTIMATED GFR; Future  - HEMOGLOBIN A1C; Future  - MICROALBUMIN CREAT RATIO URINE; Future    2. Moderate episode of recurrent major depressive disorder (HCC)  Improved with Escitalopram.  We will continue.  Will schedule follow-up appointment with psychiatry and establish with counseling.    3. Nonintractable headache, unspecified chronicity pattern, unspecified headache type  Significantly improved with amitriptyline.  Continue follow-up with neurology and medication.    4. COVID-19 long hauler  Much improved.  Headache, fatigue, shortness of breath greatly improved.  Patient reports  she feels ready to be back to work.  No abnormalities on exam today.  Patient is released back to work starting next week.    Patient will return to clinic in 3 months or sooner if needed

## 2021-08-13 NOTE — ASSESSMENT & PLAN NOTE
Chronic health problem, improved control.  Now taking amitriptyline 25 mg at night.  Followed by neurology.

## 2021-08-13 NOTE — LETTER
2021      Regarding:  Sonya Wei  : 1965          To Whom It May Concern:    Ms. Wei is medically able to return to work without restrictions on 21.  I will be out of the office for the next 3 weeks.  If there is paperwork that I am needed to complete, please allow patient to return to work and I will complete paperwork upon my return.    If you have any questions or concerns, please don't hesitate to call.        Sincerely,        DOMINICK Johnson.

## 2021-08-13 NOTE — ASSESSMENT & PLAN NOTE
Patient reports her symptoms have greatly improved in the last month.  Headache and fatigue is resolved.  Feels that she is able to go back to work next week.  Denies chest pain, shortness of breath.  Short-term disability is an appeal.

## 2021-08-13 NOTE — ASSESSMENT & PLAN NOTE
Chronic health problem, improved control.  Taking Metformin 500 mg twice a day, Januvia 100 mg daily, glyburide 10 mg twice a day, Trulicity 1.5 mg weekly.  Not checking blood sugars at home.  On exam, polyuria.  Up-to-date on retinal scan    Component      Latest Ref Rng & Units 2/16/2021 8/6/2021           9:01 AM 10:48 AM   Glycohemoglobin      4.0 - 5.6 % 7.5 (H) 7.1 (H)

## 2021-08-13 NOTE — ASSESSMENT & PLAN NOTE
Chronic health problem, improving.  Taking escitalopram.  Met with psychiatry.  Feeling much better more optimistic.  Headaches and fatigue much improved.  Denies SI/HI

## 2021-08-16 ENCOUNTER — DOCUMENTATION (OUTPATIENT)
Dept: VASCULAR LAB | Facility: MEDICAL CENTER | Age: 56
End: 2021-08-16

## 2021-08-16 NOTE — PROGRESS NOTES
[facility]     Received pharmacotherapy referral for DMII from Fern Lawrence on 5-20-21.     Per last conversation:  Patient states her pension has been denied and she is unable to afford copays associated with our visits.  She asked that we call back in a month to see if pension is re-enrolled.    S/w pt - she states her pension/short term disability is still pending and will be unable to afford copays. She requested we call back in 1 month     Insurance: none  PCP: [facility]  Locations to be seen: Any     [facility]ticoagulation[facility]Pharmacotherapy Clinic at [phone], fax [phone]     Felicia Pacheco, PaoloD

## 2021-09-02 ENCOUNTER — APPOINTMENT (OUTPATIENT)
Dept: NEUROLOGY | Facility: MEDICAL CENTER | Age: 56
End: 2021-09-02
Attending: PSYCHIATRY & NEUROLOGY
Payer: COMMERCIAL

## 2021-09-08 ENCOUNTER — OFFICE VISIT (OUTPATIENT)
Dept: NEUROLOGY | Facility: MEDICAL CENTER | Age: 56
End: 2021-09-08
Attending: PSYCHIATRY & NEUROLOGY
Payer: COMMERCIAL

## 2021-09-08 VITALS
BODY MASS INDEX: 34.92 KG/M2 | SYSTOLIC BLOOD PRESSURE: 128 MMHG | WEIGHT: 184.97 LBS | DIASTOLIC BLOOD PRESSURE: 84 MMHG | OXYGEN SATURATION: 95 % | TEMPERATURE: 98.2 F | HEIGHT: 61 IN | HEART RATE: 90 BPM

## 2021-09-08 DIAGNOSIS — R51.9 NONINTRACTABLE HEADACHE, UNSPECIFIED CHRONICITY PATTERN, UNSPECIFIED HEADACHE TYPE: Primary | ICD-10-CM

## 2021-09-08 PROCEDURE — 99211 OFF/OP EST MAY X REQ PHY/QHP: CPT | Performed by: PSYCHIATRY & NEUROLOGY

## 2021-09-08 PROCEDURE — 99213 OFFICE O/P EST LOW 20 MIN: CPT | Performed by: PSYCHIATRY & NEUROLOGY

## 2021-09-08 RX ORDER — SUMATRIPTAN 50 MG/1
TABLET, FILM COATED ORAL
Qty: 9 TABLET | Refills: 3 | Status: SHIPPED | OUTPATIENT
Start: 2021-09-08 | End: 2022-03-03

## 2021-09-08 RX ORDER — AMITRIPTYLINE HYDROCHLORIDE 25 MG/1
TABLET, FILM COATED ORAL
COMMUNITY
Start: 2021-09-04 | End: 2022-03-03

## 2021-09-08 ASSESSMENT — FIBROSIS 4 INDEX: FIB4 SCORE: 0.75

## 2021-09-08 NOTE — LETTER
September 8, 2021    Attn Stan:    This is confirmation that Sonya Wei attended her scheduled appointment with Sebastian Parikh M.D. on 9/08/21.    She may return to work immediately without restrictions.    If you have any questions please do not hesitate to call me at the phone number listed below.    Sincerely,      Sebastian Parikh M.D.  [phone]

## 2021-09-08 NOTE — PROGRESS NOTES
"[facility] NEUROLOGY  FOLLOW-UP VISIT    CC: headaches s/p COVID-19 Infection, likely migraine    INTERVAL HISTORY:  Sonya Wei is a 55 y.o. woman with worsened headaches following COVID-19 infection.  I last saw her in the clinic on 6/2/2021.  At that time I recommended she increase the dosage of amitriptyline to 25 mg nightly.  Today, she was unaccompanied, and she provided the following interval history:    The following is a summary of headache symptoms, presented in my standard format:     Family History: mom had migraines related to \"brain tumors\" she had headaches earlier in life  Age at onset: teenage years  Location: bi-temporal, occipital  Radiation: both proximal upper extremities  Frequency: baseline: ~twice daily, currently: maybe once monthly  Duration: baseline: 2-3 hours, currently: a few minutes  Headache Days/Month: baseline: 30/30, currently: 1/30  Quality: \"heavy,\" like \"someone hit her with a baseball bat\"  Intensity: baseline: 6-8/10, currently: 4-5/10  Aura: none  Photophobia/Phonophobia/Nausea/Vomiting: yes/yes/yes/no  Provoked by Physical Activity?:   Triggers: nighttime  Associated Symptoms: after washing dishes, hands \"hurt,\" dizziness, difficulty walking  Autonomic Signs (such as ptosis, miosis, conjunctival injection, rhinorrhea, increased lacrimation): none  Head Trauma:   Association with Menses: menopausal  ED Visits: yes  Hospitalizations: yes  Missed Work Days: hasn't worked since 11/2020  Sleep: 6-7 hours/night; she wakes 2-3 times/night  Caffeine Intake: none  Hydration: keeps well-hydrated  Nutrition: doesn't skip meals  Analgesic Overuse:      Current Medication Regimen:  - naproxen  - amitriptyline: 25 mg was very effective     Medications Tried: Response  Preventive:  -      Abortive:  -      Medications Not Tried:  -     MEDICATIONS:  Current Outpatient Medications   Medication Sig   • amitriptyline (ELAVIL) 25 MG Tab    • SUMAtriptan (IMITREX) 50 MG Tab " Take  mg at the onset of aura/HA; may re-dose x1 after 2 hrs if HA persists; MDD: 200 mg; do not use >2 days/week.   • lidocaine (XYLOCAINE) 2 % Solution    • metFORMIN (GLUCOPHAGE) 500 MG Tab TAKE 1 TABLET BY MOUTH TWICE DAILY WITH MEALS   • ondansetron (ZOFRAN ODT) 4 MG TABLET DISPERSIBLE Take 1 tablet by mouth every 8 hours as needed for Nausea.   • lisinopril (PRINIVIL) 40 MG tablet Take 1 tablet by mouth once daily   • TRULICITY 1.5 MG/0.5ML Solution Pen-injector INJECT 1/2 (ONE-HALF) ML SUBCUTANEOUSLY  AS DIRECTED ONCE A WEEK   • glyBURIDE (DIABETA) 5 MG Tab TAKE 2 TABLETS BY MOUTH TWICE DAILY WITH MEALS   • escitalopram (LEXAPRO) 10 MG Tab Take 1/2 tablet by mouth once a day for 4 days, then take 1 tablet by mouth once a day x 14 days, then 1.5 tablets by mouth once a day   • JANUVIA 100 MG Tab    • ciclopirox (PENLAC) 8 % solution Apply small amount nightly   • hydrochlorothiazide (MICROZIDE) 12.5 MG capsule Take 1 to 2 tabs once a day if needed for leg swelling   • naproxen (NAPROSYN) 500 MG Tab Take 1 tablet by mouth 2 times a day with meals.   • gabapentin (NEURONTIN) 300 MG Cap Take 1 cap daily in the morning and 2 caps at bedtime.   • Ascorbic Acid (VITAMIN C) 1000 MG Tab Take 1 Tab by mouth every morning.   • atorvastatin (LIPITOR) 20 MG Tab Take 1 Tab by mouth every day.   • albuterol 108 (90 Base) MCG/ACT Aero Soln inhalation aerosol Inhale 2 Puffs by mouth every 6 hours as needed for Shortness of Breath.   • Cholecalciferol (VITAMIN D PO) Take 2 Tabs by mouth every day. GUMMIES   • Calcium Polycarbophil (FIBER-CAPS PO) Take  by mouth. (Patient not taking: Reported on 9/8/2021)     MEDICAL, SOCIAL, AND FAMILY HISTORY:  There is no change in the patient's ROS or PFSH from their previous visit on 6/2/2021.    REVIEW OF SYSTEMS:  A ROS was completed.  Pertinent positives and negatives were included in the HPI, above.  All other systems were reviewed and are negative.    PHYSICAL  EXAM:  General/Medical:  - NAD  - hair, skin, nails, and joints were normal     Neuro:  MENTAL STATUS: awake and alert; no deficits of speech or language; oriented to person, place, and time; affect was appropriate to situation; pleasant, cooperative     CRANIAL NERVES:    II: acuity was: NT, fields: NT, pupils: NT, discs: NT    III/IV/VI: versions: grossly intact    V: facial sensation: NT    VII: facial expression: symmetric    VIII: hearing: intact to voice    IX/X: palate: NT    XI: shoulder shrug: NT    XII: tongue: NT     MOTOR:  - bulk: normal  - tone: NT  Upper Extremity Strength  (R/L)    NT   Elbow flexion NT   Elbow extension NT   Shoulder abduction NT      Lower Extremity Strength  (R/L)   Hip flexion NT   Knee extension NT   Knee flexion NT   Ankle plantarflexion NT   Ankle dorsiflexion NT      - pronator drift: NT  - abnormal movements: NT     SENSATION:  - light touch: grossly intact over the upper and lower extremities  - vibration (R/L, seconds): NT at the great toes  - pinprick: NT  - proprioception: NT  - Romberg: NT     COORDINATION:  - finger to nose: NT  - finger tapping: NT     REFLEXES:  Reflex Right Left   BR NT NT   Biceps NT NT   Triceps NT NT   Patellae NT NT   Achilles NT NT   Toes NT NT      GAIT:  - narrow base    REVIEW OF IMAGING STUDIES:  No new brain imaging since the last visit.    REVIEW OF LABORATORY STUDIES:  8/6/2021:  - CMP: within acceptable limits  - glucose: 188  - HbA1c: 7.1    ASSESSMENT:  Sonya Wei is a 55 y.o. woman with headaches s/p COVID-19.  Santos headaches are essentially resolved on amitriptyline 25 mg nightly.  I write a letter communicating my recommendation that she be allowed to return to work.  We will follow up in approximately 6 months or sooner if needed.    PLAN:  Migraine Headaches s/p COVID-19:  Prevention:  - continue amitriptyline to 25 mg nightly  - get ~8 hours of sleep per night  - drink plenty of fluids (urine should be nearly  "clear)  - avoid excessive caffeine intake (no more than 2 servings per day and nothing in the afternoon)  - eat regular meals (don't skip meals)  - get moderate exercise (even just a 20 minute walk daily)    :  - sumatriptan 50 mg: take  mg at the onset of aura/HA; may re-dose x1 after 2 hrs if HA persists; MDD: 200 mg; do not use >2 days/week.  - do not use analgesics (e.g., ibuprofen, acetaminophen) more than 2 days per week in order to avoid analgesic rebound headaches    - keep a headache log    Follow-Up:  - Return in about 6 months (around 3/8/2022).    Signed: Sebastian Parikh M.D.    BILLING DOCUMENTATION:   I spent 20 minutes reviewing the medical record, interviewing and examining the patient, discussing my impression (see \"assessment\" above), and coordinating care.  "

## 2021-09-16 ENCOUNTER — DOCUMENTATION (OUTPATIENT)
Dept: VASCULAR LAB | Facility: MEDICAL CENTER | Age: 56
End: 2021-09-16

## 2021-09-16 NOTE — PROGRESS NOTES
[facility]     Received pharmacotherapy referral for DMII from Fern Lawrence on 5-20-21.     Per last conversation:  Patient states her pension has been denied and she is unable to afford copays associated with our visits.  She asked that we call back in a month to see if pension is re-enrolled.     S/w pt - she states her pension/short term disability is still pending and will be unable to afford copays. She requested we call back in 1 month     Insurance: none  PCP: [facility]  Locations to be seen: Any     [facility]armac[facility] at [phone], fax [phone]    Veto Moctezuma, PaoloD

## 2021-09-21 ENCOUNTER — DOCUMENTATION (OUTPATIENT)
Dept: BEHAVIORAL HEALTH | Facility: CLINIC | Age: 56
End: 2021-09-21

## 2021-09-29 DIAGNOSIS — E78.5 HYPERLIPIDEMIA, UNSPECIFIED HYPERLIPIDEMIA TYPE: ICD-10-CM

## 2021-09-30 RX ORDER — ATORVASTATIN CALCIUM 20 MG/1
TABLET, FILM COATED ORAL
Qty: 90 TABLET | Refills: 3 | Status: SHIPPED | OUTPATIENT
Start: 2021-09-30 | End: 2022-08-30 | Stop reason: SDUPTHER

## 2021-10-18 ENCOUNTER — DOCUMENTATION (OUTPATIENT)
Dept: VASCULAR LAB | Facility: MEDICAL CENTER | Age: 56
End: 2021-10-18

## 2021-10-18 NOTE — PROGRESS NOTES
[facility]     Received pharmacotherapy referral for DMII from Fern Lawrence on 5-20-21.     Per last conversation:  Patient states her pension has been denied and she is unable to afford copays associated with our visits.  She asked that we call back in a month to see if pension is re-enrolled.     LVM to discuss above. Will await pt contact.    Felicia Pacheco, PaoloD

## 2021-11-17 ENCOUNTER — HOSPITAL ENCOUNTER (OUTPATIENT)
Dept: LAB | Facility: MEDICAL CENTER | Age: 56
End: 2021-11-17
Attending: NURSE PRACTITIONER
Payer: COMMERCIAL

## 2021-11-17 ENCOUNTER — TELEPHONE (OUTPATIENT)
Dept: MEDICAL GROUP | Facility: PHYSICIAN GROUP | Age: 56
End: 2021-11-17

## 2021-11-17 DIAGNOSIS — E11.65 UNCONTROLLED TYPE 2 DIABETES MELLITUS WITH HYPERGLYCEMIA (HCC): ICD-10-CM

## 2021-11-17 LAB
ALBUMIN SERPL BCP-MCNC: 3.7 G/DL (ref 3.2–4.9)
ALBUMIN/GLOB SERPL: 1.1 G/DL
ALP SERPL-CCNC: 98 U/L (ref 30–99)
ALT SERPL-CCNC: 26 U/L (ref 2–50)
ANION GAP SERPL CALC-SCNC: 9 MMOL/L (ref 7–16)
AST SERPL-CCNC: 26 U/L (ref 12–45)
BILIRUB SERPL-MCNC: 0.3 MG/DL (ref 0.1–1.5)
BUN SERPL-MCNC: 27 MG/DL (ref 8–22)
CALCIUM SERPL-MCNC: 9.1 MG/DL (ref 8.5–10.5)
CHLORIDE SERPL-SCNC: 102 MMOL/L (ref 96–112)
CO2 SERPL-SCNC: 25 MMOL/L (ref 20–33)
CREAT SERPL-MCNC: 0.89 MG/DL (ref 0.5–1.4)
CREAT UR-MCNC: 172.46 MG/DL
EST. AVERAGE GLUCOSE BLD GHB EST-MCNC: 163 MG/DL
FASTING STATUS PATIENT QL REPORTED: NORMAL
GLOBULIN SER CALC-MCNC: 3.3 G/DL (ref 1.9–3.5)
GLUCOSE SERPL-MCNC: 147 MG/DL (ref 65–99)
HBA1C MFR BLD: 7.3 % (ref 4–5.6)
MICROALBUMIN UR-MCNC: >440 MG/DL
MICROALBUMIN/CREAT UR: NORMAL MG/G (ref 0–30)
POTASSIUM SERPL-SCNC: 4.9 MMOL/L (ref 3.6–5.5)
PROT SERPL-MCNC: 7 G/DL (ref 6–8.2)
SODIUM SERPL-SCNC: 136 MMOL/L (ref 135–145)

## 2021-11-17 PROCEDURE — 82570 ASSAY OF URINE CREATININE: CPT

## 2021-11-17 PROCEDURE — 82043 UR ALBUMIN QUANTITATIVE: CPT

## 2021-11-17 PROCEDURE — 36415 COLL VENOUS BLD VENIPUNCTURE: CPT

## 2021-11-17 PROCEDURE — 83036 HEMOGLOBIN GLYCOSYLATED A1C: CPT

## 2021-11-17 PROCEDURE — 80053 COMPREHEN METABOLIC PANEL: CPT

## 2021-11-17 NOTE — TELEPHONE ENCOUNTER
is doing a psychiatric disability review and is needing to speak with you. They stated you can leave a detailed message about a synopsis of a psychiatric review regarding patient.   His direct line: [phone]. He is available 9-5 eastern time.

## 2021-11-18 NOTE — TELEPHONE ENCOUNTER
Left voicemail for Dr. Tang.  Voicemail stated that patient first mention depression on 1/14/2021 that seemed to come about after having Covid and her  passing away from Covid.  She tried Escitalopram and hydroxyzine.  Only took it for short while as it made her too tired.  She then managed with lifestyle measures until she mentioned a history of bipolar depression and worsening depression in 5/2021, which is when I referred her to psychiatry.  Patient consulted with psychiatry, Dr. Brunilda Bright, on 6/23/2021.  Those notes are not available as they are confidential.  I explained that he may want to reach out to Dr. Bright for further information.

## 2021-12-28 DIAGNOSIS — F33.1 MODERATE EPISODE OF RECURRENT MAJOR DEPRESSIVE DISORDER (HCC): ICD-10-CM

## 2021-12-28 DIAGNOSIS — E11.9 TYPE 2 DIABETES MELLITUS WITHOUT COMPLICATION, WITHOUT LONG-TERM CURRENT USE OF INSULIN (HCC): ICD-10-CM

## 2021-12-29 RX ORDER — GLYBURIDE 5 MG/1
TABLET ORAL
Qty: 360 TABLET | Refills: 0 | Status: SHIPPED | OUTPATIENT
Start: 2021-12-29 | End: 2022-03-25

## 2021-12-29 RX ORDER — ESCITALOPRAM OXALATE 10 MG/1
TABLET ORAL
Qty: 45 TABLET | Refills: 0 | Status: SHIPPED | OUTPATIENT
Start: 2021-12-29 | End: 2022-04-28

## 2021-12-29 RX ORDER — DULAGLUTIDE 1.5 MG/.5ML
INJECTION, SOLUTION SUBCUTANEOUS
Qty: 12 ML | Refills: 0 | Status: SHIPPED | OUTPATIENT
Start: 2021-12-29 | End: 2022-03-25

## 2021-12-30 NOTE — TELEPHONE ENCOUNTER
Emerald,  Last seen 6 months ago.  Provided 30-day supply, 0RF.  Will you call her to schedule a f/u please/  Thank you,  Dr. Bright

## 2022-02-02 ENCOUNTER — OFFICE VISIT (OUTPATIENT)
Dept: URGENT CARE | Facility: PHYSICIAN GROUP | Age: 57
End: 2022-02-02
Payer: COMMERCIAL

## 2022-02-02 ENCOUNTER — HOSPITAL ENCOUNTER (OUTPATIENT)
Facility: MEDICAL CENTER | Age: 57
End: 2022-02-02
Attending: FAMILY MEDICINE
Payer: COMMERCIAL

## 2022-02-02 VITALS
RESPIRATION RATE: 12 BRPM | WEIGHT: 185 LBS | HEIGHT: 60 IN | TEMPERATURE: 97.7 F | DIASTOLIC BLOOD PRESSURE: 80 MMHG | BODY MASS INDEX: 36.32 KG/M2 | OXYGEN SATURATION: 99 % | SYSTOLIC BLOOD PRESSURE: 124 MMHG | HEART RATE: 86 BPM

## 2022-02-02 DIAGNOSIS — R05.9 COUGH: ICD-10-CM

## 2022-02-02 DIAGNOSIS — M79.10 MYALGIA: ICD-10-CM

## 2022-02-02 DIAGNOSIS — Z20.822 SUSPECTED COVID-19 VIRUS INFECTION: ICD-10-CM

## 2022-02-02 LAB — COVID ORDER STATUS COVID19: NORMAL

## 2022-02-02 PROCEDURE — U0005 INFEC AGEN DETEC AMPLI PROBE: HCPCS

## 2022-02-02 PROCEDURE — 99213 OFFICE O/P EST LOW 20 MIN: CPT | Mod: CS | Performed by: FAMILY MEDICINE

## 2022-02-02 PROCEDURE — U0003 INFECTIOUS AGENT DETECTION BY NUCLEIC ACID (DNA OR RNA); SEVERE ACUTE RESPIRATORY SYNDROME CORONAVIRUS 2 (SARS-COV-2) (CORONAVIRUS DISEASE [COVID-19]), AMPLIFIED PROBE TECHNIQUE, MAKING USE OF HIGH THROUGHPUT TECHNOLOGIES AS DESCRIBED BY CMS-2020-01-R: HCPCS

## 2022-02-02 RX ORDER — BENZONATATE 100 MG/1
100 CAPSULE ORAL 3 TIMES DAILY PRN
Qty: 30 CAPSULE | Refills: 0 | Status: SHIPPED | OUTPATIENT
Start: 2022-02-02 | End: 2022-02-18

## 2022-02-02 RX ORDER — ACETAMINOPHEN 500 MG
500 TABLET ORAL EVERY 6 HOURS PRN
Qty: 30 TABLET | Refills: 0 | Status: SHIPPED | OUTPATIENT
Start: 2022-02-02 | End: 2022-05-18

## 2022-02-02 ASSESSMENT — ENCOUNTER SYMPTOMS
COUGH: 1
CHILLS: 0
FEVER: 1
NAUSEA: 0
SORE THROAT: 0
VOMITING: 0
SHORTNESS OF BREATH: 0
MYALGIAS: 1
DIZZINESS: 0
HEADACHES: 1

## 2022-02-02 ASSESSMENT — FIBROSIS 4 INDEX: FIB4 SCORE: 0.9

## 2022-02-02 NOTE — PATIENT INSTRUCTIONS
INSTRUCTIONS FOR COVID-19 OR ANY OTHER INFECTIOUS RESPIRATORY ILLNESSES    The Centers for Disease Control and Prevention (CDC) states that early indications for COVID-19 include cough, shortness of breath, difficulty breathing, or at least two of the following symptoms: chills, shaking with chills, muscle pain, headache, sore throat, and loss of taste or smell. Symptoms can range from mild to severe and may appear up to two weeks after exposure to the virus.    The practice of self-isolation and quarantine helps protect the public and your family by  preventing exposure to people who have or may have a contagious disease. Please follow the prevention steps below as based on CDC guidelines:    WHEN TO STOP ISOLATION: Persons with COVID-19 or any other infectious respiratory illness who have symptoms and were advised to care for themselves at home may discontinue home isolation under the following conditions:  · At least 24 hours have passed since recovery defined as resolution of fever without the use of fever-reducing medications; AND,  · Improvement in respiratory symptoms (e.g., cough, shortness of breath); AND,  · At least 10 days have passed since symptoms first appeared and have had no subsequent illness.    MONITOR YOUR SYMPTOMS: If your illness is worsening, seek prompt medical attention. If you have a medical emergency and need to call 911, notify the dispatch personnel that you have, or are being evaluated for confirmed or suspected COVID-19 or another infectious respiratory illness. Wear a facemask if possible.    ACTIVITY RESTRICTION: restrict activities outside your home, except for getting medical care. Do not go to work, school, or public areas. Avoid using public transportation, ride-sharing, or taxis.    SCHEDULED MEDICAL APPOINTMENTS: Notify your provider that you have, or are being evaluated for, confirmed or suspected COVID-19 or another infectious respiratory. This will help the healthcare  provider’s office safely take care of you and keep other people from getting exposed or infected.    FACEMASKS, when to wear: Anytime you are away from your home or around other people or pets. If you are unable to wear one, maintain a minimum of 6 feet distancing from others.    LIVING ENVIRONMENT: Stay in a separate room from other people and pets. If possible, use a separate bathroom, have someone else care for your pets and avoid sharing household items. Any items used should be washed thoroughly with soap and water. Clean all “high-touch” surfaces every day. Use a household cleaning spray or wipe, according to the label instructions. High touch surfaces include (but are not limited to) counters, tabletops, doorknobs, bathroom fixtures, toilets, phones, keyboards, tablets, and bedside tables.     HAND WASHING: Frequently wash hands with soap and water for at least 20 seconds,  especially after blowing your nose, coughing, or sneezing; going to the bathroom; before and after interacting with pets; and before and after eating or preparing food. If hands are visibly dirty use soap and water. If soap and water are not available, use an alcohol-based hand  with at least 60% alcohol. Avoid touching your eyes, nose, and mouth with unwashed hands. Cover your coughs and sneezes with a tissue. Throw used tissues in a lined trash can. Immediately wash your hands.    ACTIVE/FACILITATED SELF-MONITORING: Follow instructions provided by your local health department or health professionals, as appropriate. When working with your local health department check their available hours.    [facility]   Phone Number   [facility] [phone]   [facility] [place] [phone]   [place] [address]   [place] [phone]     IF YOU HAVE CONFIRMED POSITIVE COVID-19:    Those who have completely recovered from COVID-19 may have immune-boosting antibodies in their plasma--called “convalescent plasma”--that could be  used to treat critically ill COVID19 patients.    [facility] is excited to begin working with [facility] on collecting convalescent plasma from  people who have recovered from COVID-19 as part of a program to treat patients infected with the virus. This FDA-approved “emergency investigational new drug” is a special blood product containing antibodies that may give patients an extra boost to fight the virus.    To be eligible to donate convalescent plasma, you must have a prior COVID-19 diagnosis documented by a laboratory test (or a positive test result for SARS-CoV-2 antibodies) and meet additional eligibility requirements.    If you are interested in donating convalescent plasma or have any additional questions, please contact the [facility] Convalescent Plasma  at [phone] or via e-mail at [email or web address].    Cough, Adult  A cough helps to clear your throat and lungs. A cough may be a sign of an illness or another medical condition.  An acute cough may only last 2-3 weeks, while a chronic cough may last 8 or more weeks.  Many things can cause a cough. They include:  · Germs (viruses or bacteria) that attack the airway.  · Breathing in things that bother (irritate) your lungs.  · Allergies.  · Asthma.  · Mucus that runs down the back of your throat (postnasal drip).  · Smoking.  · Acid backing up from the stomach into the tube that moves food from the mouth to the stomach (gastroesophageal reflux).  · Some medicines.  · Lung problems.  · Other medical conditions, such as heart failure or a blood clot in the lung (pulmonary embolism).  Follow these instructions at home:  Medicines  · Take over-the-counter and prescription medicines only as told by your doctor.  · Talk with your doctor before you take medicines that stop a cough (coughsuppressants).  Lifestyle    · Do not smoke, and try not to be around smoke. Do not use any products that contain nicotine or tobacco, such as  cigarettes, e-cigarettes, and chewing tobacco. If you need help quitting, ask your doctor.  · Drink enough fluid to keep your pee (urine) pale yellow.  · Avoid caffeine.  · Do not drink alcohol if your doctor tells you not to drink.  General instructions    · Watch for any changes in your cough. Tell your doctor about them.  · Always cover your mouth when you cough.  · Stay away from things that make you cough, such as perfume, candles, campfire smoke, or cleaning products.  · If the air is dry, use a cool mist vaporizer or humidifier in your home.  · If your cough is worse at night, try using extra pillows to raise your head up higher while you sleep.  · Rest as needed.  · Keep all follow-up visits as told by your doctor. This is important.  Contact a doctor if:  · You have new symptoms.  · You cough up pus.  · Your cough does not get better after 2-3 weeks, or your cough gets worse.  · Cough medicine does not help your cough and you are not sleeping well.  · You have pain that gets worse or pain that is not helped with medicine.  · You have a fever.  · You are losing weight and you do not know why.  · You have night sweats.  Get help right away if:  · You cough up blood.  · You have trouble breathing.  · Your heartbeat is very fast.  These symptoms may be an emergency. Do not wait to see if the symptoms will go away. Get medical help right away. Call your local emergency services (911 in the [place] Do not drive yourself to the hospital.  Summary  · A cough helps to clear your throat and lungs. Many things can cause a cough.  · Take over-the-counter and prescription medicines only as told by your doctor.  · Always cover your mouth when you cough.  · Contact a doctor if you have new symptoms or you have a cough that does not get better or gets worse.  This information is not intended to replace advice given to you by your health care provider. Make sure you discuss any questions you have with your health care  provider.  Document Released: 08/30/2012 Document Revised: 01/06/2020 Document Reviewed: 01/06/2020  Elsevier Patient Education © 2020 [employer]

## 2022-02-02 NOTE — PROGRESS NOTES
Subjective:   Sonya Wei is a 56 y.o. female who presents for Fever (body aches, covid pos in  not feeling better since then pt states), Congestion (x2 weeks), and Headache        Fever   Chronicity: Planes of subjective fever, diffuse body aches, intermittent cough and congestion over the past 2 weeks, worse last 3 days. Associated symptoms include coughing and headaches. Pertinent negatives include no nausea, rash, sore throat or vomiting. Associated symptoms comments: There has been community-wide COVID-19 exposure, the patient denies known direct COVID-19 exposure      Reports COVID-19 in 2021. She has tried fluids (Previous naproxen for myalgias which has been discontinued) for the symptoms. The treatment provided mild relief.   Headache   Associated symptoms include coughing and a fever. Pertinent negatives include no dizziness, nausea, sore throat or vomiting.     PMH:  has a past medical history of Anemia, Bowel habit changes, Diabetes, Diabetes (), GERD (gastroesophageal reflux disease), Healthcare maintenance (2014), High cholesterol, Hyperlipidemia, Hypertension, Infectious disease, Jaundice (), Psychiatric problem, and Vaginal itching (2014).  MEDS:   Current Outpatient Medications:   •  benzonatate (TESSALON) 100 MG Cap, Take 1 Capsule by mouth 3 times a day as needed for Cough., Disp: 30 Capsule, Rfl: 0  •  acetaminophen (TYLENOL) 500 MG Tab, Take 1 Tablet by mouth every 6 hours as needed for Mild Pain or Moderate Pain., Disp: 30 Tablet, Rfl: 0  •  glyBURIDE (DIABETA) 5 MG Tab, TAKE 2 TABLETS BY MOUTH TWICE DAILY WITH MEALS, Disp: 360 Tablet, Rfl: 0  •  TRULICITY 1.5 MG/0.5ML Solution Pen-injector, INJECT 1 SYRINGE SUBCUTANEOUSLY AS DIRECTED ONCE WEEKLY, Disp: 12 mL, Rfl: 0  •  escitalopram (LEXAPRO) 10 MG Tab, TAKE1 & 1/2 TABLETS ONCE DAILY.  APPOINTMENT REQUIRED FOR ADDITIONAL REFILLS.  CALL [phone]., Disp: 45 Tablet, Rfl: 0  •  atorvastatin  (LIPITOR) 20 MG Tab, Take 1 tablet by mouth once daily, Disp: 90 Tablet, Rfl: 3  •  amitriptyline (ELAVIL) 25 MG Tab, , Disp: , Rfl:   •  SUMAtriptan (IMITREX) 50 MG Tab, Take  mg at the onset of aura/HA; may re-dose x1 after 2 hrs if HA persists; MDD: 200 mg; do not use >2 days/week., Disp: 9 Tablet, Rfl: 3  •  lidocaine (XYLOCAINE) 2 % Solution, , Disp: , Rfl:   •  metFORMIN (GLUCOPHAGE) 500 MG Tab, TAKE 1 TABLET BY MOUTH TWICE DAILY WITH MEALS, Disp: 180 tablet, Rfl: 1  •  ondansetron (ZOFRAN ODT) 4 MG TABLET DISPERSIBLE, Take 1 tablet by mouth every 8 hours as needed for Nausea., Disp: 20 tablet, Rfl: 0  •  lisinopril (PRINIVIL) 40 MG tablet, Take 1 tablet by mouth once daily, Disp: 90 tablet, Rfl: 3  •  JANUVIA 100 MG Tab, , Disp: , Rfl:   •  ciclopirox (PENLAC) 8 % solution, Apply small amount nightly, Disp: 6.6 mL, Rfl: 1  •  hydrochlorothiazide (MICROZIDE) 12.5 MG capsule, Take 1 to 2 tabs once a day if needed for leg swelling, Disp: 60 capsule, Rfl: 2  •  naproxen (NAPROSYN) 500 MG Tab, Take 1 tablet by mouth 2 times a day with meals., Disp: 60 tablet, Rfl: 2  •  gabapentin (NEURONTIN) 300 MG Cap, Take 1 cap daily in the morning and 2 caps at bedtime., Disp: 180 capsule, Rfl: 1  •  Ascorbic Acid (VITAMIN C) 1000 MG Tab, Take 1 Tab by mouth every morning., Disp: , Rfl:   •  albuterol 108 (90 Base) MCG/ACT Aero Soln inhalation aerosol, Inhale 2 Puffs by mouth every 6 hours as needed for Shortness of Breath., Disp: 8.5 g, Rfl: 0  •  Cholecalciferol (VITAMIN D PO), Take 2 Tabs by mouth every day. GUMMIES, Disp: , Rfl:   ALLERGIES:   Allergies   Allergen Reactions   • Morphine Itching     Rxn = unknown   Bumps all over body     SURGHX:   Past Surgical History:   Procedure Laterality Date   • VENTRAL HERNIA REPAIR ROBOTIC XI N/A 10/14/2016    Procedure: VENTRAL HERNIA REPAIR ROBOTIC XI FOR INCISIONAL W/MESH;  Surgeon: Edi Mendoza M.D.;  Location: SURGERY [facility];  Service:    • ABDOMINAL  HYSTERECTOMY TOTAL      still has ovaries   • CHOLECYSTECTOMY     • PRIMARY C SECTION     • TUBAL COAGULATION LAPAROSCOPIC BILATERAL       SOCHX:  reports that she has never smoked. She has never used smokeless tobacco. She reports that she does not drink alcohol and does not use drugs.  FH:   Family History   Problem Relation Age of Onset   • Diabetes Mother    • Hyperlipidemia Mother    • Diabetes Father    • Hypertension Father    • Hyperlipidemia Father      Review of Systems   Constitutional: Positive for fever. Negative for chills.   HENT: Negative for sore throat.    Respiratory: Positive for cough. Negative for shortness of breath.    Gastrointestinal: Negative for nausea and vomiting.   Musculoskeletal: Positive for myalgias.   Skin: Negative for rash.   Neurological: Positive for headaches. Negative for dizziness.        Objective:   /80   Pulse 86   Temp 36.5 °C (97.7 °F)   Resp 12   Ht 1.524 m (5')   Wt 83.9 kg (185 lb) Comment: wiht shoes  SpO2 99%   BMI 36.13 kg/m²   Physical Exam  Vitals and nursing note reviewed.   Constitutional:       General: She is not in acute distress.     Appearance: She is well-developed.   HENT:      Head: Normocephalic and atraumatic.      Right Ear: External ear normal.      Left Ear: External ear normal.      Nose: Rhinorrhea present.      Mouth/Throat:      Mouth: Mucous membranes are moist.      Pharynx: Posterior oropharyngeal erythema present. No oropharyngeal exudate.   Eyes:      Conjunctiva/sclera: Conjunctivae normal.   Cardiovascular:      Rate and Rhythm: Normal rate.   Pulmonary:      Effort: Pulmonary effort is normal. No respiratory distress.      Breath sounds: Normal breath sounds. No wheezing or rhonchi.   Abdominal:      General: There is no distension.   Musculoskeletal:         General: Normal range of motion.   Skin:     General: Skin is warm and dry.   Neurological:      General: No focal deficit present.      Mental Status: She is alert  and oriented to person, place, and time. Mental status is at baseline.      Gait: Gait (gait at baseline) normal.   Psychiatric:         Judgment: Judgment normal.           Assessment/Plan:   1. Suspected COVID-19 virus infection  - SARS-CoV-2 PCR (24 hour In-House): Collect NP swab in VTM; Future    2. Cough  - benzonatate (TESSALON) 100 MG Cap; Take 1 Capsule by mouth 3 times a day as needed for Cough.  Dispense: 30 Capsule; Refill: 0    3. Myalgia  - acetaminophen (TYLENOL) 500 MG Tab; Take 1 Tablet by mouth every 6 hours as needed for Mild Pain or Moderate Pain.  Dispense: 30 Tablet; Refill: 0      Advised routine [facility] social distancing guidelines, symptomatic and supportive measures        Medical Decision Making/Course:  In the course of preparing for this visit with review of the pertinent past medical history, recent and past clinic visits, current medications, and performing chart, immunization, medical history and medication reconciliation, and in the further course of obtaining the current history pertinent to the clinic visit today, performing an exam and evaluation, ordering and independently evaluating labs, tests including SARS CoV-2 by PCR testing   , and/or procedures, prescribing any recommended new medications as noted above, providing any pertinent counseling and education and recommending further coordination of care, at least  15 minutes of total time were spent during this encounter.      Discussed close monitoring, return precautions, and supportive measures of maintaining adequate fluid hydration and caloric intake, relative rest and symptom management as needed for pain and/or fever.    Differential diagnosis, natural history, supportive care, and indications for immediate follow-up discussed.     Advised the patient to follow-up with the primary care physician for recheck, reevaluation, and consideration of further management.    Please note that this dictation was created using voice  recognition software. I have worked with consultants from the vendor as well as technical experts from [facility] to optimize the interface. I have made every reasonable attempt to correct obvious errors, but I expect that there are errors of grammar and possibly content that I did not discover before finalizing the note.

## 2022-02-03 LAB
SARS-COV-2 RNA RESP QL NAA+PROBE: NOTDETECTED
SPECIMEN SOURCE: NORMAL

## 2022-02-18 ENCOUNTER — OFFICE VISIT (OUTPATIENT)
Dept: MEDICAL GROUP | Facility: PHYSICIAN GROUP | Age: 57
End: 2022-02-18
Payer: COMMERCIAL

## 2022-02-18 VITALS
TEMPERATURE: 98.5 F | HEIGHT: 60 IN | RESPIRATION RATE: 16 BRPM | WEIGHT: 180.2 LBS | OXYGEN SATURATION: 99 % | BODY MASS INDEX: 35.38 KG/M2 | HEART RATE: 75 BPM | DIASTOLIC BLOOD PRESSURE: 82 MMHG | SYSTOLIC BLOOD PRESSURE: 130 MMHG

## 2022-02-18 DIAGNOSIS — F33.1 MODERATE EPISODE OF RECURRENT MAJOR DEPRESSIVE DISORDER (HCC): ICD-10-CM

## 2022-02-18 DIAGNOSIS — I10 PRIMARY HYPERTENSION: ICD-10-CM

## 2022-02-18 DIAGNOSIS — E11.65 UNCONTROLLED TYPE 2 DIABETES MELLITUS WITH HYPERGLYCEMIA (HCC): ICD-10-CM

## 2022-02-18 DIAGNOSIS — Z12.31 ENCOUNTER FOR SCREENING MAMMOGRAM FOR BREAST CANCER: ICD-10-CM

## 2022-02-18 DIAGNOSIS — R51.9 NONINTRACTABLE HEADACHE, UNSPECIFIED CHRONICITY PATTERN, UNSPECIFIED HEADACHE TYPE: ICD-10-CM

## 2022-02-18 PROCEDURE — 99214 OFFICE O/P EST MOD 30 MIN: CPT | Performed by: NURSE PRACTITIONER

## 2022-02-18 ASSESSMENT — PATIENT HEALTH QUESTIONNAIRE - PHQ9
6. FEELING BAD ABOUT YOURSELF - OR THAT YOU ARE A FAILURE OR HAVE LET YOURSELF OR YOUR FAMILY DOWN: SEVERAL DAYS
SUM OF ALL RESPONSES TO PHQ9 QUESTIONS 1 AND 2: 2
5. POOR APPETITE OR OVEREATING: SEVERAL DAYS
2. FEELING DOWN, DEPRESSED, IRRITABLE, OR HOPELESS: SEVERAL DAYS
4. FEELING TIRED OR HAVING LITTLE ENERGY: MORE THAN HALF THE DAYS
9. THOUGHTS THAT YOU WOULD BE BETTER OFF DEAD, OR OF HURTING YOURSELF: SEVERAL DAYS
7. TROUBLE CONCENTRATING ON THINGS, SUCH AS READING THE NEWSPAPER OR WATCHING TELEVISION: MORE THAN HALF THE DAYS
1. LITTLE INTEREST OR PLEASURE IN DOING THINGS: SEVERAL DAYS
8. MOVING OR SPEAKING SO SLOWLY THAT OTHER PEOPLE COULD HAVE NOTICED. OR THE OPPOSITE, BEING SO FIGETY OR RESTLESS THAT YOU HAVE BEEN MOVING AROUND A LOT MORE THAN USUAL: MORE THAN HALF THE DAYS
SUM OF ALL RESPONSES TO PHQ QUESTIONS 1-9: 12
3. TROUBLE FALLING OR STAYING ASLEEP OR SLEEPING TOO MUCH: SEVERAL DAYS

## 2022-02-18 ASSESSMENT — FIBROSIS 4 INDEX: FIB4 SCORE: 0.9

## 2022-02-18 NOTE — ASSESSMENT & PLAN NOTE
This is a chronic health problem that is well controlled with current medications and lifestyle measures.  A1c is up to 7.3%.  Taking Metformin 500 mg twice a day, Januvia 100 mg daily, glyburide 10 mg twice a day, Trulicity 1.5 mg weekly.  Not monitoring blood sugars at home.  Plans on getting a new device with more affordable strips.  Had visions exam a couple months ago.  Has diabetic retinopathy with some small bleeding.  3-month follow-up.  On ACE and statin.

## 2022-02-18 NOTE — PROGRESS NOTES
CC: Lab review, chronic health problems    HISTORY OF THE PRESENT ILLNESS: Patient is a 56 y.o. female. This pleasant patient is here today for evaluation and management of the following health problems.    Health Maintenance: due      Diabetes mellitus type II, uncontrolled (HCC)  This is a chronic health problem that is well controlled with current medications and lifestyle measures.  A1c is up to 7.3%.  Taking Metformin 500 mg twice a day, Januvia 100 mg daily, glyburide 10 mg twice a day, Trulicity 1.5 mg weekly.  Not monitoring blood sugars at home.  Plans on getting a new device with more affordable strips.  Had visions exam a couple months ago.  Has diabetic retinopathy with some small bleeding.  3-month follow-up.  On ACE and statin.      Nonintractable headache  Chronic health problem.  Managed by neurology.  Taking amitriptyline 25 mg at bedtime.  Taking sumatriptan as needed.  Reports worsening occurrence of headaches since first started working again in September.  Plans on scheduling follow-up with neurology.    Hypertension  This is a chronic health problem that is well controlled with current medications and lifestyle measures.  Taking lisinopril.  Not exercising daily, but trying to stay active at work.    Moderate episode of recurrent major depressive disorder (HCC)  Chronic health problem moderately controlled with Escitalopram.  Taking 10 mg daily.  Instructions on prescription say to take 1-1/2 tabs daily.  Patient reports she was unaware.  Does report worsening symptoms since she restarted working in September.  PHQ-9 score is 12.  She does have thoughts of feeling that she would be better off dead at times.  Denies any plan for suicide.  She enjoys playing and caring with her grandchildren so much and would never harm herself.  Overdue for follow-up with psychiatry.  She will call to make follow-up appointment.      Allergies: Morphine    Current Outpatient Medications Ordered in Epic    Medication Sig Dispense Refill   • metFORMIN (GLUCOPHAGE) 500 MG Tab Take 2 Tablets by mouth 2 times a day with meals. 360 Tablet 1   • acetaminophen (TYLENOL) 500 MG Tab Take 1 Tablet by mouth every 6 hours as needed for Mild Pain or Moderate Pain. 30 Tablet 0   • glyBURIDE (DIABETA) 5 MG Tab TAKE 2 TABLETS BY MOUTH TWICE DAILY WITH MEALS 360 Tablet 0   • TRULICITY 1.5 MG/0.5ML Solution Pen-injector INJECT 1 SYRINGE SUBCUTANEOUSLY AS DIRECTED ONCE WEEKLY 12 mL 0   • escitalopram (LEXAPRO) 10 MG Tab TAKE1 & 1/2 TABLETS ONCE DAILY.  APPOINTMENT REQUIRED FOR ADDITIONAL REFILLS.  CALL [phone]. 45 Tablet 0   • atorvastatin (LIPITOR) 20 MG Tab Take 1 tablet by mouth once daily 90 Tablet 3   • amitriptyline (ELAVIL) 25 MG Tab      • SUMAtriptan (IMITREX) 50 MG Tab Take  mg at the onset of aura/HA; may re-dose x1 after 2 hrs if HA persists; MDD: 200 mg; do not use >2 days/week. 9 Tablet 3   • lisinopril (PRINIVIL) 40 MG tablet Take 1 tablet by mouth once daily 90 tablet 3   • JANUVIA 100 MG Tab      • naproxen (NAPROSYN) 500 MG Tab Take 1 tablet by mouth 2 times a day with meals. 60 tablet 2   • gabapentin (NEURONTIN) 300 MG Cap Take 1 cap daily in the morning and 2 caps at bedtime. 180 capsule 1   • Ascorbic Acid (VITAMIN C) 1000 MG Tab Take 1 Tab by mouth every morning.     • Cholecalciferol (VITAMIN D PO) Take 2 Tablets by mouth every day. GUMMIES     • lidocaine (XYLOCAINE) 2 % Solution  (Patient not taking: Reported on 2022)     • albuterol 108 (90 Base) MCG/ACT Aero Soln inhalation aerosol Inhale 2 Puffs by mouth every 6 hours as needed for Shortness of Breath. (Patient not taking: Reported on 2022) 8.5 g 0     No current Epic-ordered facility-administered medications on file.       Past Medical History:   Diagnosis Date   • Anemia    • Bowel habit changes     constipation   • Diabetes    • Diabetes (HCC)    • GERD (gastroesophageal reflux disease)    • Healthcare maintenance  9/27/2014    Mammogram: Due   • High cholesterol    • Hyperlipidemia    • Hypertension    • Infectious disease     Cold 10/2016   • Jaundice 1999   • Psychiatric problem     depression and anxiety   • Vaginal itching 8/27/2014    With anal itching X 1 month Getting worse Burning Min vag disch       Past Surgical History:   Procedure Laterality Date   • VENTRAL HERNIA REPAIR ROBOTIC XI N/A 10/14/2016    Procedure: VENTRAL HERNIA REPAIR ROBOTIC XI FOR INCISIONAL W/MESH;  Surgeon: Edi Mendoza M.D.;  Location: SURGERY [facility];  Service:    • ABDOMINAL HYSTERECTOMY TOTAL      still has ovaries   • CHOLECYSTECTOMY     • PRIMARY C SECTION     • TUBAL COAGULATION LAPAROSCOPIC BILATERAL         Social History     Tobacco Use   • Smoking status: Never Smoker   • Smokeless tobacco: Never Used   Vaping Use   • Vaping Use: Never used   Substance Use Topics   • Alcohol use: No   • Drug use: No       Family History   Problem Relation Age of Onset   • Diabetes Mother    • Hyperlipidemia Mother    • Diabetes Father    • Hypertension Father    • Hyperlipidemia Father        ROS:   As in HPI, otherwise negative for chest pain, dyspnea, abdominal pain, dysuria, blood in stool, fever    Exam: /82 (BP Location: Right arm, Patient Position: Sitting, BP Cuff Size: Adult)   Pulse 75   Temp 36.9 °C (98.5 °F) (Temporal)   Resp 16   Ht 1.524 m (5')   Wt 81.7 kg (180 lb 3.2 oz)   SpO2 99%  Body mass index is 35.19 kg/m².    General: Alert, pleasant, well nourished, well developed female in NAD  Neck: Supple without bruit. Thyroid is not enlarged.  Pulmonary: Clear to ausculation.  Normal effort. No rales, ronchi, or wheezing.  Cardiovascular: Normal rate and rhythm without murmur. Carotid and radial pulses are intact and equal bilaterally.  No lower extremity edema.  Neurologic: Grossly nonfocal  Lymph: No cervical or supraclavicular lymph nodes are palpable  Skin: Warm and dry.   Musculoskeletal: Normal gait.   Psych:  Normal mood and affect. Alert and oriented. Judgment and insight is normal.    Component      Latest Ref Rng & Units 11/17/2021   Sodium      135 - 145 mmol/L 136   Potassium      3.6 - 5.5 mmol/L 4.9   Chloride      96 - 112 mmol/L 102   Co2      20 - 33 mmol/L 25   Anion Gap      7.0 - 16.0 9.0   Glucose      65 - 99 mg/dL 147 (H)   Bun      8 - 22 mg/dL 27 (H)   Creatinine      0.50 - 1.40 mg/dL 0.89   Calcium      8.5 - 10.5 mg/dL 9.1   AST(SGOT)      12 - 45 U/L 26   ALT(SGPT)      2 - 50 U/L 26   Alkaline Phosphatase      30 - 99 U/L 98   Total Bilirubin      0.1 - 1.5 mg/dL 0.3   Albumin      3.2 - 4.9 g/dL 3.7   Total Protein      6.0 - 8.2 g/dL 7.0   Globulin      1.9 - 3.5 g/dL 3.3   A-G Ratio      g/dL 1.1   Creatinine, Urine      mg/dL 172.46   Microalbumin, Urine Random      mg/dL >440.0   Micro Alb Creat Ratio      0 - 30 mg/g see below   Glycohemoglobin      4.0 - 5.6 % 7.3 (H)   Estim. Avg Glu      mg/dL 163   GFR If African American      >60 mL/min/1.73 m 2 >60   GFR If Non African American      >60 mL/min/1.73 m 2 >60       Please note that this dictation was created using voice recognition software. I have made every reasonable attempt to correct obvious errors, but I expect that there are errors of grammar and possibly content that I did not discover before finalizing the note.      Assessment/Plan  1. Uncontrolled type 2 diabetes mellitus with hyperglycemia (HCC)  Uncontrolled.  Patient on multiple medications for diabetes.  Would like her to establish with endocrinology for further evaluation and treatment.  In the meantime will increase Metformin to 1000 mg twice a day.  Continue with Januvia, glyburide, Trulicity.  Reviewed lifestyle measures.  No abnormalities on foot exam today.  Continue follow-up with ophthalmology for diabetic retinopathy.  - Referral to Endocrinology  - metFORMIN (GLUCOPHAGE) 500 MG Tab; Take 2 Tablets by mouth 2 times a day with meals.  Dispense: 360 Tablet; Refill:  1  - Comp Metabolic Panel; Future  - ESTIMATED GFR; Future  - HEMOGLOBIN A1C; Future  - MICROALBUMIN CREAT RATIO URINE; Future  - Lipid Profile; Future    2. Encounter for screening mammogram for breast cancer  Ordered  - MA-SCREENING MAMMO BILAT W/TOMOSYNTHESIS W/CAD; Future    3. Nonintractable headache, unspecified chronicity pattern, unspecified headache type  Worsening.  Patient will schedule follow-up appointment with neurology.  Advised on supplements such as magnesium and co-Q10 for prevention.    4. Primary hypertension  Well-controlled.  Continue with lisinopril.  Advised on routine aerobic exercise.    5. Moderate episode of recurrent major depressive disorder (HCC)  Worsening symptoms since returning to work.  Patient does have thoughts of being better off dead, but denies suicidal ideation.  She contracts for safety today.  She will call psychiatry today to schedule a follow-up appointment.  She will start taking Escitalopram 1-1/2 tabs daily when she gets her refill prescription next week.    Patient will return to clinic in 3 months for follow-up or sooner if needed.

## 2022-02-18 NOTE — ASSESSMENT & PLAN NOTE
Chronic health problem moderately controlled with Escitalopram.  Taking 10 mg daily.  Instructions on prescription say to take 1-1/2 tabs daily.  Patient reports she was unaware.  Does report worsening symptoms since she restarted working in September.  PHQ-9 score is 12.  She does have thoughts of feeling that she would be better off dead at times.  Denies any plan for suicide.  She enjoys playing and caring with her grandchildren so much and would never harm herself.  Overdue for follow-up with psychiatry.  She will call to make follow-up appointment.

## 2022-02-18 NOTE — PATIENT INSTRUCTIONS
Increase Metformin to 2 tabs twice a day.  Continue with all your other medications.    I have referred you to endocrinology.  You should be getting a call to schedule in the next 2 weeks    Start CoQ10 supplement      Call for follow-up appointment with Dr. Bright, psychiatry.   APPOINTMENT REQUIRED FOR ADDITIONAL REFILLS. CALL [phone].

## 2022-02-18 NOTE — LETTER
[facility]  DECLAN Johnson  [address] [place] [address]  Fax: [phone]   Authorization for Release/Disclosure of   Protected Health Information   Name: SONYA SANDOVAL : 1965 SSN: [id number]   Address: [address] [place] [address] Phone:    [phone] (home)    I authorize the entity listed below to release/disclose the PHI below to:   [facility]/DECLAN Johnson and DECLAN Johnson   Provider or Entity Name:     Address   City, State, Zip   Phone:      Fax:     Reason for request: continuity of care   Information to be released:    [  ] LAST COLONOSCOPY,  including any PATH REPORT and follow-up  [  ] LAST FIT/COLOGUARD RESULT [  ] LAST DEXA  [  ] LAST MAMMOGRAM  [  ] LAST PAP  [  ] LAST LABS [  ] RETINA EXAM REPORT  [  ] IMMUNIZATION RECORDS  [  ] Release all info      [  ] Check here and initial the line next to each item to release ALL health information INCLUDING  _____ Care and treatment for drug and / or alcohol abuse  _____ HIV testing, infection status, or AIDS  _____ Genetic Testing    DATES OF SERVICE OR TIME PERIOD TO BE DISCLOSED: _____________  I understand and acknowledge that:  * This Authorization may be revoked at any time by you in writing, except if your health information has already been used or disclosed.  * Your health information that will be used or disclosed as a result of you signing this authorization could be re-disclosed by the recipient. If this occurs, your re-disclosed health information may no longer be protected by State or Federal laws.  * You may refuse to sign this Authorization. Your refusal will not affect your ability to obtain treatment.  * This Authorization becomes effective upon signing and will  on (date) __________.      If no date is indicated, this Authorization will  one (1) year from the signature date.    Name: Sonya Sandoval    Signature:   Date:     2022        PLEASE FAX REQUESTED RECORDS BACK TO: [phone]

## 2022-02-18 NOTE — ASSESSMENT & PLAN NOTE
This is a chronic health problem that is well controlled with current medications and lifestyle measures.  Taking lisinopril.  Not exercising daily, but trying to stay active at work.

## 2022-02-18 NOTE — ASSESSMENT & PLAN NOTE
Chronic health problem.  Managed by neurology.  Taking amitriptyline 25 mg at bedtime.  Taking sumatriptan as needed.  Reports worsening occurrence of headaches since first started working again in September.  Plans on scheduling follow-up with neurology.

## 2022-03-03 ENCOUNTER — OFFICE VISIT (OUTPATIENT)
Dept: NEUROLOGY | Facility: MEDICAL CENTER | Age: 57
End: 2022-03-03
Attending: PSYCHIATRY & NEUROLOGY
Payer: COMMERCIAL

## 2022-03-03 VITALS
OXYGEN SATURATION: 94 % | WEIGHT: 181.88 LBS | DIASTOLIC BLOOD PRESSURE: 86 MMHG | HEART RATE: 84 BPM | BODY MASS INDEX: 34.34 KG/M2 | HEIGHT: 61 IN | SYSTOLIC BLOOD PRESSURE: 138 MMHG

## 2022-03-03 DIAGNOSIS — R51.9 NONINTRACTABLE HEADACHE, UNSPECIFIED CHRONICITY PATTERN, UNSPECIFIED HEADACHE TYPE: ICD-10-CM

## 2022-03-03 DIAGNOSIS — G43.709 CHRONIC MIGRAINE WITHOUT AURA WITHOUT STATUS MIGRAINOSUS, NOT INTRACTABLE: Primary | ICD-10-CM

## 2022-03-03 PROCEDURE — 99214 OFFICE O/P EST MOD 30 MIN: CPT | Performed by: PSYCHIATRY & NEUROLOGY

## 2022-03-03 RX ORDER — SUMATRIPTAN 100 MG/1
TABLET, FILM COATED ORAL
Qty: 9 TABLET | Refills: 5 | Status: SHIPPED | OUTPATIENT
Start: 2022-03-03 | End: 2022-04-02

## 2022-03-03 RX ORDER — TOPIRAMATE 25 MG/1
TABLET ORAL
Qty: 113 TABLET | Refills: 0 | Status: SHIPPED | OUTPATIENT
Start: 2022-03-03 | End: 2022-04-28

## 2022-03-03 ASSESSMENT — FIBROSIS 4 INDEX: FIB4 SCORE: 0.9

## 2022-03-03 NOTE — PROGRESS NOTES
"[facility] NEUROLOGY  FOLLOW-UP VISIT    CC: headaches s/p COVID-19 Infection, likely migraine    INTERVAL HISTORY:  Sonya Wei is a 56 y.o. woman with worsened headaches following COVID-19 infection.  I last saw her in the clinic on 9/8/2021.  At that time I recommended she continue amitriptyline to 25 mg nightly.  Today, she was unaccompanied, and she provided the following interval history:    The following is a summary of headache symptoms, presented in my standard format:     Family History: mom had migraines related to \"brain tumors\" she had headaches earlier in life  Age at onset: teenage years  Location: bi-temporal, occipital  Radiation: both proximal upper extremities  Frequency: baseline: ~twice daily, currently: almost daily  Duration: baseline: 2-3 hours, currently: ~1 hour  Headache Days/Month: baseline: 30/30, currently: 30/30  Quality: \"like someone trying to stab her in the ear\"  Intensity: baseline: 6-8/10, currently: 4-5/10  Aura: none  Photophobia/Phonophobia/Nausea/Vomiting: yes/yes/yes/no  Provoked by Physical Activity?:   Triggers: loud noises at work  Associated Symptoms: after washing dishes, hands \"hurt,\" dizziness, difficulty walking  Autonomic Signs (such as ptosis, miosis, conjunctival injection, rhinorrhea, increased lacrimation): none  Head Trauma:   Association with Menses: menopausal  ED Visits: yes  Hospitalizations: yes  Missed Work Days: works at [employer]  Sleep: 6-7 hours/night; she wakes 2-3 times/night  Caffeine Intake: none  Hydration: keeps well-hydrated  Nutrition: doesn't skip meals  Analgesic Overuse:      Current Medication Regimen:  - naproxen  - sumatriptan: 50 mg     Medications Tried: Response  Preventive:  - amitriptyline: 25 mg was very effective     Abortive:  -      Medications Not Tried:  - nortriptyline: will avoid due to med-med interaction with escitalopram    MEDICATIONS:  Current Outpatient Medications   Medication Sig   • topiramate " (TOPAMAX) 25 MG Tab Take 1 Tablet by mouth every day for 7 days, THEN 2 Tablets every day for 53 days.   • sumatriptan (IMITREX) 100 MG tablet Take 100 mg at the onset of aura/HA; may re-dose x1 after 2 hrs if HA persists; MDD: 200 mg; do not use >2 days/week.   • metFORMIN (GLUCOPHAGE) 500 MG Tab Take 2 Tablets by mouth 2 times a day with meals.   • acetaminophen (TYLENOL) 500 MG Tab Take 1 Tablet by mouth every 6 hours as needed for Mild Pain or Moderate Pain.   • glyBURIDE (DIABETA) 5 MG Tab TAKE 2 TABLETS BY MOUTH TWICE DAILY WITH MEALS   • TRULICITY 1.5 MG/0.5ML Solution Pen-injector INJECT 1 SYRINGE SUBCUTANEOUSLY AS DIRECTED ONCE WEEKLY   • escitalopram (LEXAPRO) 10 MG Tab TAKE1 & 1/2 TABLETS ONCE DAILY.  APPOINTMENT REQUIRED FOR ADDITIONAL REFILLS.  CALL [phone].   • atorvastatin (LIPITOR) 20 MG Tab Take 1 tablet by mouth once daily   • lisinopril (PRINIVIL) 40 MG tablet Take 1 tablet by mouth once daily   • JANUVIA 100 MG Tab    • naproxen (NAPROSYN) 500 MG Tab Take 1 tablet by mouth 2 times a day with meals.   • gabapentin (NEURONTIN) 300 MG Cap Take 1 cap daily in the morning and 2 caps at bedtime.   • Ascorbic Acid (VITAMIN C) 1000 MG Tab Take 1 Tab by mouth every morning.   • albuterol 108 (90 Base) MCG/ACT Aero Soln inhalation aerosol Inhale 2 Puffs by mouth every 6 hours as needed for Shortness of Breath.   • Cholecalciferol (VITAMIN D PO) Take 2 Tablets by mouth every day. GUMMIES     MEDICAL, SOCIAL, AND FAMILY HISTORY:  There is no change in the patient's ROS or PFSH from their previous visit on 2021.    REVIEW OF SYSTEMS:  A ROS was completed.  Pertinent positives and negatives were included in the HPI, above.  All other systems were reviewed and are negative.    PHYSICAL EXAM:  General/Medical:  - NAD  - hair, skin, nails, and joints were normal     Neuro:  MENTAL STATUS: awake and alert; no deficits of speech or language; oriented to person, place, and time; affect was  appropriate to situation; pleasant, cooperative     CRANIAL NERVES:    II: acuity was: NT, fields: NT, pupils: NT, discs: NT    III/IV/VI: versions: grossly intact    V: facial sensation: NT    VII: facial expression: symmetric    VIII: hearing: intact to voice    IX/X: palate: NT    XI: shoulder shrug: NT    XII: tongue: NT     MOTOR:  - bulk: normal  - tone: NT  Upper Extremity Strength  (R/L)    NT   Elbow flexion NT   Elbow extension NT   Shoulder abduction NT      Lower Extremity Strength  (R/L)   Hip flexion NT   Knee extension NT   Knee flexion NT   Ankle plantarflexion NT   Ankle dorsiflexion NT      - pronator drift: NT  - abnormal movements: NT     SENSATION:  - light touch: grossly intact over the upper and lower extremities  - vibration (R/L, seconds): NT at the great toes  - pinprick: NT  - proprioception: NT  - Romberg: NT     COORDINATION:  - finger to nose: NT  - finger tapping: NT     REFLEXES:  Reflex Right Left   BR NT NT   Biceps NT NT   Triceps NT NT   Patellae NT NT   Achilles NT NT   Toes NT NT      GAIT:  - narrow base    REVIEW OF IMAGING STUDIES:  No new brain imaging since the last visit.    REVIEW OF LABORATORY STUDIES:  8/6/2021:  - CMP: within acceptable limits  - glucose: 188  - HbA1c: 7.1    ASSESSMENT:  Sonya Wei is a 56 y.o. woman with headaches s/p COVID-19.  Santos headaches are essentially resolved on amitriptyline 25 mg nightly.  I write a letter communicating my recommendation that she be allowed to return to work.  We will follow up in approximately 6 months or sooner if needed.    PLAN:  Migraine Headaches s/p COVID-19:  Prevention:  - remain off amitriptyline  - start topiramate with a goal dosage of 50 mg daily; most common side effects discussed  - get ~8 hours of sleep per night  - drink plenty of fluids (urine should be nearly clear)  - avoid excessive caffeine intake (no more than 2 servings per day and nothing in the afternoon)  - eat regular meals  (don't skip meals)  - get moderate exercise (even just a 20 minute walk daily)    :  - sumatriptan 100 mg: take 100 mg at the onset of aura/HA; may re-dose x1 after 2 hrs if HA persists; MDD: 200 mg; do not use >2 days/week.  - do not use analgesics (e.g., ibuprofen, acetaminophen) more than 2 days per week in order to avoid analgesic rebound headaches    - keep a headache log    Follow-Up:  - Return in about 2 months (around 5/3/2022).    Signed: Sebastian Parikh M.D.

## 2022-04-27 ENCOUNTER — HOSPITAL ENCOUNTER (EMERGENCY)
Facility: MEDICAL CENTER | Age: 57
End: 2022-04-27
Attending: STUDENT IN AN ORGANIZED HEALTH CARE EDUCATION/TRAINING PROGRAM
Payer: COMMERCIAL

## 2022-04-27 ENCOUNTER — APPOINTMENT (OUTPATIENT)
Dept: RADIOLOGY | Facility: MEDICAL CENTER | Age: 57
End: 2022-04-27
Attending: STUDENT IN AN ORGANIZED HEALTH CARE EDUCATION/TRAINING PROGRAM
Payer: COMMERCIAL

## 2022-04-27 VITALS
OXYGEN SATURATION: 95 % | HEART RATE: 82 BPM | WEIGHT: 180 LBS | SYSTOLIC BLOOD PRESSURE: 179 MMHG | TEMPERATURE: 97.5 F | RESPIRATION RATE: 20 BRPM | HEIGHT: 61 IN | DIASTOLIC BLOOD PRESSURE: 84 MMHG | BODY MASS INDEX: 33.99 KG/M2

## 2022-04-27 DIAGNOSIS — R00.2 PALPITATIONS: ICD-10-CM

## 2022-04-27 DIAGNOSIS — R07.9 CHEST PAIN, UNSPECIFIED TYPE: ICD-10-CM

## 2022-04-27 LAB
ALBUMIN SERPL BCP-MCNC: 3.9 G/DL (ref 3.2–4.9)
ALBUMIN/GLOB SERPL: 1.3 G/DL
ALP SERPL-CCNC: 104 U/L (ref 30–99)
ALT SERPL-CCNC: 24 U/L (ref 2–50)
ANION GAP SERPL CALC-SCNC: 14 MMOL/L (ref 7–16)
AST SERPL-CCNC: 27 U/L (ref 12–45)
BASOPHILS # BLD AUTO: 0.4 % (ref 0–1.8)
BASOPHILS # BLD: 0.04 K/UL (ref 0–0.12)
BILIRUB SERPL-MCNC: 0.2 MG/DL (ref 0.1–1.5)
BUN SERPL-MCNC: 37 MG/DL (ref 8–22)
CALCIUM SERPL-MCNC: 9.7 MG/DL (ref 8.5–10.5)
CHLORIDE SERPL-SCNC: 106 MMOL/L (ref 96–112)
CO2 SERPL-SCNC: 21 MMOL/L (ref 20–33)
CREAT SERPL-MCNC: 0.86 MG/DL (ref 0.5–1.4)
EKG IMPRESSION: NORMAL
EOSINOPHIL # BLD AUTO: 0.21 K/UL (ref 0–0.51)
EOSINOPHIL NFR BLD: 2 % (ref 0–6.9)
ERYTHROCYTE [DISTWIDTH] IN BLOOD BY AUTOMATED COUNT: 41 FL (ref 35.9–50)
GFR SERPLBLD CREATININE-BSD FMLA CKD-EPI: 79 ML/MIN/1.73 M 2
GLOBULIN SER CALC-MCNC: 2.9 G/DL (ref 1.9–3.5)
GLUCOSE SERPL-MCNC: 76 MG/DL (ref 65–99)
HCT VFR BLD AUTO: 34.8 % (ref 37–47)
HGB BLD-MCNC: 11.3 G/DL (ref 12–16)
IMM GRANULOCYTES # BLD AUTO: 0.07 K/UL (ref 0–0.11)
IMM GRANULOCYTES NFR BLD AUTO: 0.7 % (ref 0–0.9)
LYMPHOCYTES # BLD AUTO: 4.34 K/UL (ref 1–4.8)
LYMPHOCYTES NFR BLD: 41.9 % (ref 22–41)
MCH RBC QN AUTO: 30 PG (ref 27–33)
MCHC RBC AUTO-ENTMCNC: 32.5 G/DL (ref 33.6–35)
MCV RBC AUTO: 92.3 FL (ref 81.4–97.8)
MONOCYTES # BLD AUTO: 0.75 K/UL (ref 0–0.85)
MONOCYTES NFR BLD AUTO: 7.2 % (ref 0–13.4)
NEUTROPHILS # BLD AUTO: 4.94 K/UL (ref 2–7.15)
NEUTROPHILS NFR BLD: 47.8 % (ref 44–72)
NRBC # BLD AUTO: 0 K/UL
NRBC BLD-RTO: 0 /100 WBC
PLATELET # BLD AUTO: 281 K/UL (ref 164–446)
PMV BLD AUTO: 11 FL (ref 9–12.9)
POTASSIUM SERPL-SCNC: 4.3 MMOL/L (ref 3.6–5.5)
PROT SERPL-MCNC: 6.8 G/DL (ref 6–8.2)
RBC # BLD AUTO: 3.77 M/UL (ref 4.2–5.4)
SODIUM SERPL-SCNC: 141 MMOL/L (ref 135–145)
TROPONIN T SERPL-MCNC: 14 NG/L (ref 6–19)
TROPONIN T SERPL-MCNC: 16 NG/L (ref 6–19)
WBC # BLD AUTO: 10.4 K/UL (ref 4.8–10.8)

## 2022-04-27 PROCEDURE — 85025 COMPLETE CBC W/AUTO DIFF WBC: CPT

## 2022-04-27 PROCEDURE — 71045 X-RAY EXAM CHEST 1 VIEW: CPT

## 2022-04-27 PROCEDURE — 80053 COMPREHEN METABOLIC PANEL: CPT

## 2022-04-27 PROCEDURE — 36415 COLL VENOUS BLD VENIPUNCTURE: CPT

## 2022-04-27 PROCEDURE — 84484 ASSAY OF TROPONIN QUANT: CPT

## 2022-04-27 PROCEDURE — 93005 ELECTROCARDIOGRAM TRACING: CPT

## 2022-04-27 PROCEDURE — 99285 EMERGENCY DEPT VISIT HI MDM: CPT

## 2022-04-27 PROCEDURE — 93005 ELECTROCARDIOGRAM TRACING: CPT | Performed by: STUDENT IN AN ORGANIZED HEALTH CARE EDUCATION/TRAINING PROGRAM

## 2022-04-27 ASSESSMENT — HEART SCORE
HISTORY: SLIGHTLY SUSPICIOUS
AGE: 45-64
TROPONIN: LESS THAN OR EQUAL TO NORMAL LIMIT
RISK FACTORS: NO KNOWN RISK FACTORS
ECG: NORMAL
HEART SCORE: 1

## 2022-04-27 ASSESSMENT — FIBROSIS 4 INDEX: FIB4 SCORE: 0.9

## 2022-04-28 DIAGNOSIS — G43.709 CHRONIC MIGRAINE WITHOUT AURA WITHOUT STATUS MIGRAINOSUS, NOT INTRACTABLE: ICD-10-CM

## 2022-04-28 DIAGNOSIS — E11.9 TYPE 2 DIABETES MELLITUS WITHOUT COMPLICATION, WITHOUT LONG-TERM CURRENT USE OF INSULIN (HCC): ICD-10-CM

## 2022-04-28 DIAGNOSIS — F33.1 MODERATE EPISODE OF RECURRENT MAJOR DEPRESSIVE DISORDER (HCC): ICD-10-CM

## 2022-04-28 RX ORDER — TOPIRAMATE 25 MG/1
TABLET ORAL
Qty: 113 TABLET | Refills: 0 | Status: SHIPPED | OUTPATIENT
Start: 2022-04-28 | End: 2022-05-09

## 2022-04-28 RX ORDER — SITAGLIPTIN 100 MG/1
TABLET, FILM COATED ORAL
Qty: 30 TABLET | Refills: 0 | Status: SHIPPED | OUTPATIENT
Start: 2022-04-28 | End: 2022-05-18 | Stop reason: SDUPTHER

## 2022-04-28 RX ORDER — GLYBURIDE 5 MG/1
TABLET ORAL
Qty: 120 TABLET | Refills: 0 | Status: SHIPPED | OUTPATIENT
Start: 2022-04-28 | End: 2022-09-20

## 2022-04-28 RX ORDER — ESCITALOPRAM OXALATE 10 MG/1
TABLET ORAL
Qty: 21 TABLET | Refills: 0 | Status: SHIPPED | OUTPATIENT
Start: 2022-04-28 | End: 2022-07-07 | Stop reason: SDUPTHER

## 2022-04-28 RX ORDER — DULAGLUTIDE 1.5 MG/.5ML
INJECTION, SOLUTION SUBCUTANEOUS
Qty: 12 ML | Refills: 0 | Status: SHIPPED | OUTPATIENT
Start: 2022-04-28 | End: 2022-09-20

## 2022-04-28 NOTE — ED PROVIDER NOTES
ED Provider Note    Scribed for DEBORAH Layton* by Jeanine Curtis. 4/27/2022  8:46 PM    CHIEF COMPLAINT  Chief Complaint   Patient presents with   • Chest Pain     Pt came to the ER via [facility] from work c/o mid squeezing chest pain radiating to left arm at 5/10. Pt was given aspirin 325 mg PO, 1 spray of nitro, fentanyl 50 mcg IV, and zofran 4 mg IV pta with relief at 3/10 pressure chest pain upon arrival in the ER. Pt has known hx of chest pain 15 yrs ago and currently on daily aspirin 81 mg. Pt AOX4. No apparent distress noted with non-labored breathing at this time.       HPI  Sonya Wei is a 56 y.o. female who presents via EMS for evaluation of acute palpitations. She states that she was at work when she began having palpitations. This radiated to her left arm, and she states that her arm feels numb. She bent over to try to catch her breath but when she stood up the palpitations were worse and she developed a headache. EMS treated her with 325 mg Aspirin, 1 dose of Nitro, Fentanyl 50 mcg, and Zofran 4 mg en route. She takes Aspirin daily as well as Lisinopril, Glyburide, and vitamin D. No missed doses of her medications. Previous history of chest pain 15 years ago with a negative stress test. No caffeine or drug use.     REVIEW OF SYSTEMS  See HPI for further details. All other systems are negative.     PAST MEDICAL HISTORY   has a past medical history of Anemia, Bowel habit changes, Diabetes, Diabetes (HCC), GERD (gastroesophageal reflux disease), Healthcare maintenance (9/27/2014), High cholesterol, Hyperlipidemia, Hypertension, Infectious disease, Jaundice (1999), Psychiatric problem, and Vaginal itching (8/27/2014).    SOCIAL HISTORY  Social History     Tobacco Use   • Smoking status: Never Smoker   • Smokeless tobacco: Never Used   Vaping Use   • Vaping Use: Never used   Substance and Sexual Activity   • Alcohol use: No   • Drug use: No   • Sexual activity: Yes     Partners:  "Male     Birth control/protection: None       SURGICAL HISTORY   has a past surgical history that includes cholecystectomy; primary c section; tubal coagulation laparoscopic bilateral; abdominal hysterectomy total; and ventral hernia repair robotic xi (N/A, 10/14/2016).    CURRENT MEDICATIONS  Home Medications     Reviewed by Bozena López R.N. (Registered Nurse) on 04/27/22 at 2044  Med List Status: Partial   Medication Last Dose Status   acetaminophen (TYLENOL) 500 MG Tab  Active   albuterol 108 (90 Base) MCG/ACT Aero Soln inhalation aerosol  Active   Ascorbic Acid (VITAMIN C) 1000 MG Tab  Active   atorvastatin (LIPITOR) 20 MG Tab  Active   Cholecalciferol (VITAMIN D PO)  Active   escitalopram (LEXAPRO) 10 MG Tab  Active   gabapentin (NEURONTIN) 300 MG Cap  Active   glyBURIDE (DIABETA) 5 MG Tab  Active   JANUVIA 100 MG Tab  Active   lisinopril (PRINIVIL) 40 MG tablet  Active   metFORMIN (GLUCOPHAGE) 500 MG Tab  Active   naproxen (NAPROSYN) 500 MG Tab  Active   topiramate (TOPAMAX) 25 MG Tab  Active   TRULICITY 1.5 MG/0.5ML Solution Pen-injector  Active                ALLERGIES  Allergies   Allergen Reactions   • Morphine Itching     Rxn = unknown   Bumps all over body       FAMILY HISTORY  No pertinent family history    PHYSICAL EXAM   BP (!) 215/88   Pulse 85   Temp 36.4 °C (97.6 °F) (Temporal)   Resp 20   Ht 1.549 m (5' 1\")   Wt 81.6 kg (180 lb)   SpO2 93%   BMI 34.01 kg/m²    Pulse ox interpretation: I interpret this pulse ox as normal.  VITALS - vital signs documented prior to this note have been reviewed and noted,  GENERAL - awake, alert, oriented, GCS 15, no apparent distress, non-toxic  appearing  HEENT - normocephalic, atraumatic, pupils equal, sclera anicteric, mucus  membranes moist  NECK - supple, no meningismus, full active range of motion, trachea midline  CARDIOVASCULAR - regular rate/rhythm, no murmurs/gallops/rubs  PULMONARY - no respiratory distress, speaking in full sentences, clear " to  auscultation bilaterally, no wheezing/ronchi/rales, no accessory muscle use  GASTROINTESTINAL - soft, non-tender, non-distended, no rebound, guarding,  or peritonitis  GENITOURINARY - Deferred  NEUROLOGIC - Awake alert, normal mental status, speech fluid, cognition  normal, moves all extremities  MUSCULOSKELETAL - no obvious asymmetry or deformities present  EXTREMITIES - warm, well-perfused, no cyanosis or significant edema  DERMATOLOGIC - warm, dry, no rashes, no jaundice  PSYCHIATRIC - normal affect, normal insight, normal concentration    EKG    Interpreted by me    Rhythm:  Normal sinus rhythm   Rate: 87  Axis: normal  Ectopy: none  Conduction: normal  ST Segments: no acute change  T Waves: no acute change  Q Waves: none  Clinical Impression: Normal EKG without acute changes     LABS    Results for orders placed or performed during the hospital encounter of 04/27/22   CBC with Differential   Result Value Ref Range    WBC 10.4 4.8 - 10.8 K/uL    RBC 3.77 (L) 4.20 - 5.40 M/uL    Hemoglobin 11.3 (L) 12.0 - 16.0 g/dL    Hematocrit 34.8 (L) 37.0 - 47.0 %    MCV 92.3 81.4 - 97.8 fL    MCH 30.0 27.0 - 33.0 pg    MCHC 32.5 (L) 33.6 - 35.0 g/dL    RDW 41.0 35.9 - 50.0 fL    Platelet Count 281 164 - 446 K/uL    MPV 11.0 9.0 - 12.9 fL    Neutrophils-Polys 47.80 44.00 - 72.00 %    Lymphocytes 41.90 (H) 22.00 - 41.00 %    Monocytes 7.20 0.00 - 13.40 %    Eosinophils 2.00 0.00 - 6.90 %    Basophils 0.40 0.00 - 1.80 %    Immature Granulocytes 0.70 0.00 - 0.90 %    Nucleated RBC 0.00 /100 WBC    Neutrophils (Absolute) 4.94 2.00 - 7.15 K/uL    Lymphs (Absolute) 4.34 1.00 - 4.80 K/uL    Monos (Absolute) 0.75 0.00 - 0.85 K/uL    Eos (Absolute) 0.21 0.00 - 0.51 K/uL    Baso (Absolute) 0.04 0.00 - 0.12 K/uL    Immature Granulocytes (abs) 0.07 0.00 - 0.11 K/uL    NRBC (Absolute) 0.00 K/uL   Complete Metabolic Panel (CMP)   Result Value Ref Range    Sodium 141 135 - 145 mmol/L    Potassium 4.3 3.6 - 5.5 mmol/L    Chloride 106 96  - 112 mmol/L    Co2 21 20 - 33 mmol/L    Anion Gap 14.0 7.0 - 16.0    Glucose 76 65 - 99 mg/dL    Bun 37 (H) 8 - 22 mg/dL    Creatinine 0.86 0.50 - 1.40 mg/dL    Calcium 9.7 8.5 - 10.5 mg/dL    AST(SGOT) 27 12 - 45 U/L    ALT(SGPT) 24 2 - 50 U/L    Alkaline Phosphatase 104 (H) 30 - 99 U/L    Total Bilirubin 0.2 0.1 - 1.5 mg/dL    Albumin 3.9 3.2 - 4.9 g/dL    Total Protein 6.8 6.0 - 8.2 g/dL    Globulin 2.9 1.9 - 3.5 g/dL    A-G Ratio 1.3 g/dL   Troponin   Result Value Ref Range    Troponin T 16 6 - 19 ng/L   ESTIMATED GFR   Result Value Ref Range    GFR (CKD-EPI) 79 >60 mL/min/1.73 m 2   TROPONIN   Result Value Ref Range    Troponin T 14 6 - 19 ng/L   EKG   Result Value Ref Range    Report       [facility] Emergency Dept.    Test Date:  2022  Pt Name:    MATTIE SANDOVAL               Department: ER  MRN:        [id number]                      Room:        09  Gender:     Female                       Technician: [id number]  :        [id number]                   Requested By:ER TRIAGE PROTOCOL  Order #:    [id number]                    Reading MD:    Measurements  Intervals                                Axis  Rate:       87                           P:          52  CT:         172                          QRS:        -38  QRSD:       138                          T:          25  QT:         380  QTc:        457    Interpretive Statements  SINUS RHYTHM  RIGHT BUNDLE BRANCH BLOCK  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    RADIOLOGY  DX-CHEST-PORTABLE (1 VIEW)   Final Result      No acute cardiopulmonary abnormality.          The radiologist's interpretation of all radiological studies have been reviewed by me.    ED COURSE     Medications - No data to display    8:46 PM - Patient seen and examined at bedside. Discussed plan of care, including labs and imaging. Patient agrees to the plan of care.  Ordered for EKG, DX-chest, Troponin, CMP, and CBC with differential to evaluate her symptoms.     10:58 PM - Patient was reevaluated at bedside. Discussed lab and radiology results with the patient and informed them that they are reassuring. Discussed discharge instructions and return precautions with the patient and they were cleared for discharge. Patient was given the opportunity to ask any further questions. She is comfortable with discharge at this time.      MEDICAL DECISION MAKING  Patient presented for evaluation and palpitations    Differential initially included was not limited to pneumonia pneumothorax pleural effusion pulmonary embolism ACS costochondritis pleuritis pericarditis myocarditis arrhythmia electrolyte abnormality.  Doubt PE dissection esophageal rupture.  EKG is nonischemic labs obtained are negative delta troponin is negative.  Patient's blood pressure improved without any intervention.  No evidence of hypertension emergency or urgency.  Unclear exact etiology of the patient's chest pain and palpitations though given serial negative troponins heart score of 2 I do believe she is appropriate for outpatient management thus I considered discharge reasonable.  She will be discharged with PCP and cardiology referral.      The patient will return for new or worsening symptoms and is stable at the time of discharge.    The patient is referred to a primary physician for blood pressure management, diabetic screening, and for all other preventative health concerns.    DISPOSITION:  Patient will be discharged home in stable condition.    FOLLOW UP:  Fern Lawrence, CARLOZRLoriN.  [address]  [place] [id number] [phone]          [facility]   [address]  [facility] [address]  [phone]          OUTPATIENT MEDICATIONS:  New Prescriptions    No medications on file       FINAL IMPRESSION  1. Chest pain  2. Palpitations  3.  Hypertension      Electronically signed by: Jeanine Curtis, 4/27/2022 8:46 PM      Dictation Disclaimer  Please note this report has been produced using speech recognition software and  may contain errors related to that system, including errors seen in grammar,  punctuation and spelling, as well as words and phrases that may be inappropriate.  If there are any questions or concerns, please feel free to contact the dictating  physician for clarification.     I, Jeanine Curtis (Scribe), am scribing for, and in the presence of, DEBORAH Layton*.    Electronically signed by: Jeanine Curtis (Scribe), 4/27/2022    I, DEBORAH Layton* personally performed the services described in this documentation, as scribed by Jeanine Curtis in my presence, and it is both accurate and complete.    The note accurately reflects work and decisions made by me.  DEBORAH LaytonO.  4/27/2022  11:06 PM

## 2022-04-28 NOTE — TELEPHONE ENCOUNTER
Received request via: Pharmacy    Was the patient seen in the last year in this department? Yes    lv   3/3/22  fv   5/9/22    Does the patient have an active prescription (recently filled or refills available) for medication(s) requested? YES

## 2022-04-28 NOTE — DISCHARGE INSTRUCTIONS
schedule follow-up appointment with your PCP.  If your palpitations continue you should follow-up with your PCP or cardiology if you get dizzy passout develop any severe chest pain or shortness of breath return to the emergency department immediately

## 2022-04-28 NOTE — ED NOTES
Rounded on patient. Patient resting in gurney. No signs of distress noted and breathing is non-labored. Equal chest rise and fall. No further needs at this time. Call light within reach. Will continue to monitor pt.

## 2022-04-28 NOTE — ED NOTES
Pt resting quietly in rIgo. Pt is in no apparent distress and breathing is non-labored. VS stable and will continue to monitor pt.

## 2022-04-28 NOTE — ED NOTES
Assist RN:    Patient provided discharge instructions. Patient verbalized understanding. Patient leaving ER in stable condition. Patient ambulatory with steady gait. Wristband and IV removed.

## 2022-04-28 NOTE — ED TRIAGE NOTES
Sonya Cervantes Wei  56 y.o. female  Chief Complaint   Patient presents with   • Chest Pain     Pt came to the ER via [facility] from work c/o mid squeezing chest pain radiating to left arm at 5/10. Pt was given aspirin 325 mg PO, 1 spray of nitro, fentanyl 50 mcg IV, and zofran 4 mg IV pta with relief at 3/10 pressure chest pain upon arrival in the ER. Pt has known hx of chest pain 15 yrs ago and currently on daily aspirin 81 mg. Pt AOX4. No apparent distress noted with non-labored breathing at this time.       Pt BIB EMS for above complaint.    Pt is alert and oriented, speaking in full sentences, follows commands and responds appropriately to questions. Resp are even and unlabored.     Pt educated on triage process. Pt encouraged to alert staff for any changes. This RN masked and in appropriate PPE during encounter.     Vitals:    04/27/22 2041   BP: (!) 215/88   Pulse: 85   Resp: 20   Temp: 36.4 °C (97.6 °F)   SpO2: 93%

## 2022-05-03 ENCOUNTER — OFFICE VISIT (OUTPATIENT)
Dept: MEDICAL GROUP | Facility: PHYSICIAN GROUP | Age: 57
End: 2022-05-03
Payer: COMMERCIAL

## 2022-05-03 VITALS
OXYGEN SATURATION: 97 % | SYSTOLIC BLOOD PRESSURE: 146 MMHG | WEIGHT: 187.5 LBS | BODY MASS INDEX: 35.4 KG/M2 | HEART RATE: 80 BPM | RESPIRATION RATE: 15 BRPM | HEIGHT: 61 IN | TEMPERATURE: 98.4 F | DIASTOLIC BLOOD PRESSURE: 88 MMHG

## 2022-05-03 DIAGNOSIS — L30.9 ECZEMA, UNSPECIFIED TYPE: ICD-10-CM

## 2022-05-03 DIAGNOSIS — R94.31 ABNORMAL EKG: ICD-10-CM

## 2022-05-03 DIAGNOSIS — R06.02 SOB (SHORTNESS OF BREATH): ICD-10-CM

## 2022-05-03 DIAGNOSIS — J30.2 SEASONAL ALLERGIES: ICD-10-CM

## 2022-05-03 DIAGNOSIS — R00.2 PALPITATIONS: ICD-10-CM

## 2022-05-03 DIAGNOSIS — R06.02 SHORT OF BREATH ON EXERTION: ICD-10-CM

## 2022-05-03 PROCEDURE — 99214 OFFICE O/P EST MOD 30 MIN: CPT | Performed by: NURSE PRACTITIONER

## 2022-05-03 RX ORDER — ALBUTEROL SULFATE 90 UG/1
2 AEROSOL, METERED RESPIRATORY (INHALATION) EVERY 6 HOURS PRN
Qty: 8.5 G | Refills: 0 | Status: SHIPPED | OUTPATIENT
Start: 2022-05-03 | End: 2022-11-01 | Stop reason: SDUPTHER

## 2022-05-03 RX ORDER — SUMATRIPTAN 100 MG/1
TABLET, FILM COATED ORAL
COMMUNITY
Start: 2022-04-28 | End: 2022-05-09 | Stop reason: SDUPTHER

## 2022-05-03 ASSESSMENT — FIBROSIS 4 INDEX: FIB4 SCORE: 1.12

## 2022-05-03 NOTE — LETTER
May 3, 2022    To Whom It May Concern:         This is confirmation that Sonya Wei attended her scheduled appointment with DECLAN Johnson on 5/03/22.    Ms. Wei can return to work full-duty with no restrictions on 5/5/22.         If you have any questions please do not hesitate to call me at the phone number listed below.    Sincerely,          CARLOZ JohnsonRLoriN.  [phone]

## 2022-05-03 NOTE — ASSESSMENT & PLAN NOTE
Patient reports seasonal allergies of watery eyes and sinus congestion.  Wondering what she can take to help with symptoms.

## 2022-05-03 NOTE — ASSESSMENT & PLAN NOTE
Patient reports small areas on hands that are itchy and scaly.  Worse with gloves she has to wear with work.  However, has started to wear different gloves with improvement.

## 2022-05-03 NOTE — ASSESSMENT & PLAN NOTE
Patient presents today for ER follow-up for chest pain and palpitations on 4/27/2022.  I have reviewed ER note.  Was seen at [facility].  Troponin levels negative.  EKG did show changes from previous EKG in 2/2022, probable old inferior infarct.  Patient was reassured and sent home and told to follow-up with PCP.  Since ER visit, patient reports intermittent palpitations accompanied by shortness of breath.  She reports she has been having the symptoms on and off for several months, just started to occur more frequently in the last month.  Currently denies chest pain or palpitations.  Patient feels she can return to work without restrictions.

## 2022-05-03 NOTE — PATIENT INSTRUCTIONS
Start generic Zyrtec or Claritin or Allegra for allergies.    Continue with Flonase nasal spray daily.    Apply hydrocortisone to allergic rash twice a day.

## 2022-05-03 NOTE — PROGRESS NOTES
CC: ER follow-up.    HISTORY OF THE PRESENT ILLNESS: Patient is a 56 y.o. female. This pleasant patient is here today for evaluation and management of the following health problems.        Palpitations  Patient presents today for ER follow-up for chest pain and palpitations on 4/27/2022.  I have reviewed ER note.  Was seen at [facility].  Troponin levels negative.  EKG did show changes from previous EKG in 2/2022, probable old inferior infarct.  Patient was reassured and sent home and told to follow-up with PCP.  Since ER visit, patient reports intermittent palpitations accompanied by shortness of breath.  She reports she has been having the symptoms on and off for several months, just started to occur more frequently in the last month.  Currently denies chest pain or palpitations.  Patient feels she can return to work without restrictions.      Seasonal allergies  Patient reports seasonal allergies of watery eyes and sinus congestion.  Wondering what she can take to help with symptoms.    Eczema  Patient reports small areas on hands that are itchy and scaly.  Worse with gloves she has to wear with work.  However, has started to wear different gloves with improvement.      Allergies: Morphine    Current Outpatient Medications Ordered in Epic   Medication Sig Dispense Refill   • albuterol 108 (90 Base) MCG/ACT Aero Soln inhalation aerosol Inhale 2 Puffs every 6 hours as needed for Shortness of Breath. 8.5 g 0   • sumatriptan (IMITREX) 100 MG tablet TAKE 1 TABLET BY MOUTH AT THE ONSET OF AURA/HEADACHE. MAY REPEAT DOSE 1 TIME AFTER 2 HOURS IF HEADACHE PERSISTS. MAX DAILY DOSE 200 MG. DO NOT USE MORE THAN 2 DAYS PER WEEK     • JANUVIA 100 MG Tab Take 1 tablet by mouth once daily 30 Tablet 0   • escitalopram (LEXAPRO) 10 MG Tab TAKE 1 & 1/2 (ONE & ONE-HALF) TABLETS BY MOUTH ONCE DAILY . APPOINTMENT REQUIRED FOR FUTURE REFILLS .  CALL  SCHEDULING  [phone] 21 Tablet 0   • TRULICITY 1.5 MG/0.5ML Solution  Pen-injector INJECT 1 DOSE SUBCUTANEOUSLY ONCE A WEEK 12 mL 0   • glyBURIDE (DIABETA) 5 MG Tab TAKE 2 TABLETS BY MOUTH TWICE DAILY WITH MEALS 120 Tablet 0   • topiramate (TOPAMAX) 25 MG Tab TAKE 1 TABLET BY MOUTH EVERY DAY FOR 7 DAYS, THEN 2 TABS EVERY DAY FOR 53 DAYS 113 Tablet 0   • metFORMIN (GLUCOPHAGE) 500 MG Tab Take 2 Tablets by mouth 2 times a day with meals. 360 Tablet 1   • acetaminophen (TYLENOL) 500 MG Tab Take 1 Tablet by mouth every 6 hours as needed for Mild Pain or Moderate Pain. 30 Tablet 0   • atorvastatin (LIPITOR) 20 MG Tab Take 1 tablet by mouth once daily 90 Tablet 3   • lisinopril (PRINIVIL) 40 MG tablet Take 1 tablet by mouth once daily 90 tablet 3   • naproxen (NAPROSYN) 500 MG Tab Take 1 tablet by mouth 2 times a day with meals. 60 tablet 2   • gabapentin (NEURONTIN) 300 MG Cap Take 1 cap daily in the morning and 2 caps at bedtime. 180 capsule 1   • Ascorbic Acid (VITAMIN C) 1000 MG Tab Take 1 Tab by mouth every morning.     • Cholecalciferol (VITAMIN D PO) Take 2 Tablets by mouth every day. GUMMIES       No current Epic-ordered facility-administered medications on file.       Past Medical History:   Diagnosis Date   • Anemia    • Bowel habit changes     constipation   • Diabetes    • Diabetes (HCC)    • GERD (gastroesophageal reflux disease)    • Healthcare maintenance 9/27/2014    Mammogram: Due   • High cholesterol    • Hyperlipidemia    • Hypertension    • Infectious disease     Cold 10/2016   • Jaundice 1999   • Psychiatric problem     depression and anxiety   • Vaginal itching 8/27/2014    With anal itching X 1 month Getting worse Burning Min vag disch       Past Surgical History:   Procedure Laterality Date   • VENTRAL HERNIA REPAIR ROBOTIC XI N/A 10/14/2016    Procedure: VENTRAL HERNIA REPAIR ROBOTIC XI FOR INCISIONAL W/MESH;  Surgeon: Edi Mendoza M.D.;  Location: SURGERY [facility];  Service:    • ABDOMINAL HYSTERECTOMY TOTAL      still has ovaries   • CHOLECYSTECTOMY    "  • PRIMARY C SECTION     • TUBAL COAGULATION LAPAROSCOPIC BILATERAL         Social History     Tobacco Use   • Smoking status: Never Smoker   • Smokeless tobacco: Never Used   Vaping Use   • Vaping Use: Never used   Substance Use Topics   • Alcohol use: No   • Drug use: No       Family History   Problem Relation Age of Onset   • Diabetes Mother    • Hyperlipidemia Mother    • Diabetes Father    • Hypertension Father    • Hyperlipidemia Father        ROS:   As in HPI, otherwise negative for chest pain, dyspnea, abdominal pain, dysuria, blood in stool, fever           Exam: /88 (BP Location: Right arm, Patient Position: Sitting, BP Cuff Size: Large adult)   Pulse 80   Temp 36.9 °C (98.4 °F)   Resp 15   Ht 1.549 m (5' 1\")   Wt 85 kg (187 lb 8 oz)   SpO2 97%  Body mass index is 35.43 kg/m².    General: Alert, pleasant, well nourished, well developed female in NAD  Neck: Supple without bruit. Thyroid is not enlarged.  Pulmonary: Clear to ausculation.  Normal effort. No rales, ronchi, or wheezing.  Cardiovascular: Normal rate and rhythm without murmur. Carotid and radial pulses are intact and equal bilaterally.  No lower extremity edema.  Neurologic: Grossly nonfocal  Lymph: No cervical or supraclavicular lymph nodes are palpable  Skin: Warm and dry.  1 cm spot scaly rash with excoriation on right hand  Musculoskeletal: Normal gait.   Psych: Normal mood and affect. Alert and oriented. Judgment and insight is normal.    Please note that this dictation was created using voice recognition software. I have made every reasonable attempt to correct obvious errors, but I expect that there are errors of grammar and possibly content that I did not discover before finalizing the note.      Assessment/Plan  1. Short of breath on exertion  As in #2  - REFERRAL TO CARDIOLOGY  - EC-ECHOCARDIOGRAM COMPLETE W/O CONT; Future    2. Palpitations  Patient continues to have intermittent palpitations and shortness of breath with " exertion at times.  New finding of probable old infarct on EKG.  Will refer to cardiology for further evaluation and treatment.  We will get echocardiogram and updated TSH level with next labs.  Continue with baby aspirin daily.  - REFERRAL TO CARDIOLOGY  - TSH WITH REFLEX TO FT4; Future    3. Abnormal EKG  As in #2  - REFERRAL TO CARDIOLOGY    4. SOB (shortness of breath)  Patient requesting refill today.  - albuterol 108 (90 Base) MCG/ACT Aero Soln inhalation aerosol; Inhale 2 Puffs every 6 hours as needed for Shortness of Breath.  Dispense: 8.5 g; Refill: 0    5. Seasonal allergies  Advised on second-generation antihistamine and to continue with Flonase nasal spray.    6. Eczema, unspecified type  Advised on hydrocortisone twice a day.    Patient will return to clinic as previously scheduled on 5/18/2022 for lab review and follow-up on chronic health problems or sooner if needed.

## 2022-05-09 ENCOUNTER — TELEMEDICINE (OUTPATIENT)
Dept: NEUROLOGY | Facility: MEDICAL CENTER | Age: 57
End: 2022-05-09
Attending: PSYCHIATRY & NEUROLOGY
Payer: COMMERCIAL

## 2022-05-09 VITALS — HEIGHT: 61 IN | BODY MASS INDEX: 33.99 KG/M2 | WEIGHT: 180 LBS

## 2022-05-09 DIAGNOSIS — G43.709 CHRONIC MIGRAINE WITHOUT AURA WITHOUT STATUS MIGRAINOSUS, NOT INTRACTABLE: Primary | ICD-10-CM

## 2022-05-09 PROCEDURE — 99212 OFFICE O/P EST SF 10 MIN: CPT | Mod: 95 | Performed by: PSYCHIATRY & NEUROLOGY

## 2022-05-09 PROCEDURE — 99442 PR PHYSICIAN TELEPHONE EVALUATION 11-20 MIN: CPT | Mod: 95 | Performed by: PSYCHIATRY & NEUROLOGY

## 2022-05-09 RX ORDER — SUMATRIPTAN 100 MG/1
TABLET, FILM COATED ORAL
Qty: 9 TABLET | Refills: 5 | Status: SHIPPED | OUTPATIENT
Start: 2022-05-09 | End: 2022-06-09

## 2022-05-09 RX ORDER — TOPIRAMATE 50 MG/1
50 TABLET, FILM COATED ORAL 2 TIMES DAILY
Qty: 60 TABLET | Refills: 5 | Status: SHIPPED | OUTPATIENT
Start: 2022-05-09 | End: 2022-06-08

## 2022-05-09 ASSESSMENT — PAIN SCALES - GENERAL: PAINLEVEL: 3=SLIGHT PAIN

## 2022-05-09 ASSESSMENT — FIBROSIS 4 INDEX: FIB4 SCORE: 1.12

## 2022-05-11 ENCOUNTER — APPOINTMENT (OUTPATIENT)
Dept: RADIOLOGY | Facility: IMAGING CENTER | Age: 57
End: 2022-05-11
Attending: PHYSICIAN ASSISTANT
Payer: COMMERCIAL

## 2022-05-11 ENCOUNTER — HOSPITAL ENCOUNTER (OUTPATIENT)
Facility: MEDICAL CENTER | Age: 57
End: 2022-05-11
Attending: PHYSICIAN ASSISTANT
Payer: COMMERCIAL

## 2022-05-11 ENCOUNTER — OFFICE VISIT (OUTPATIENT)
Dept: URGENT CARE | Facility: PHYSICIAN GROUP | Age: 57
End: 2022-05-11
Payer: COMMERCIAL

## 2022-05-11 VITALS
HEART RATE: 88 BPM | OXYGEN SATURATION: 98 % | SYSTOLIC BLOOD PRESSURE: 146 MMHG | TEMPERATURE: 96.5 F | RESPIRATION RATE: 16 BRPM | HEIGHT: 61 IN | BODY MASS INDEX: 33.99 KG/M2 | WEIGHT: 180 LBS | DIASTOLIC BLOOD PRESSURE: 86 MMHG

## 2022-05-11 DIAGNOSIS — R05.9 COUGH: ICD-10-CM

## 2022-05-11 DIAGNOSIS — J06.9 VIRAL URI WITH COUGH: ICD-10-CM

## 2022-05-11 DIAGNOSIS — J02.9 PHARYNGITIS, UNSPECIFIED ETIOLOGY: ICD-10-CM

## 2022-05-11 DIAGNOSIS — Z20.818 EXPOSURE TO STREP THROAT: ICD-10-CM

## 2022-05-11 LAB
COVID ORDER STATUS COVID19: NORMAL
EXTERNAL QUALITY CONTROL: NORMAL
FLUAV+FLUBV AG SPEC QL IA: NEGATIVE
INT CON NEG: NORMAL
INT CON NEG: NORMAL
INT CON POS: NORMAL
INT CON POS: NORMAL
S PYO AG THROAT QL: NEGATIVE
SARS-COV+SARS-COV-2 AG RESP QL IA.RAPID: NEGATIVE

## 2022-05-11 PROCEDURE — 71046 X-RAY EXAM CHEST 2 VIEWS: CPT | Mod: TC,FY | Performed by: RADIOLOGY

## 2022-05-11 PROCEDURE — 87804 INFLUENZA ASSAY W/OPTIC: CPT | Performed by: PHYSICIAN ASSISTANT

## 2022-05-11 PROCEDURE — U0005 INFEC AGEN DETEC AMPLI PROBE: HCPCS

## 2022-05-11 PROCEDURE — 99214 OFFICE O/P EST MOD 30 MIN: CPT | Mod: CS | Performed by: PHYSICIAN ASSISTANT

## 2022-05-11 PROCEDURE — U0003 INFECTIOUS AGENT DETECTION BY NUCLEIC ACID (DNA OR RNA); SEVERE ACUTE RESPIRATORY SYNDROME CORONAVIRUS 2 (SARS-COV-2) (CORONAVIRUS DISEASE [COVID-19]), AMPLIFIED PROBE TECHNIQUE, MAKING USE OF HIGH THROUGHPUT TECHNOLOGIES AS DESCRIBED BY CMS-2020-01-R: HCPCS

## 2022-05-11 PROCEDURE — 87880 STREP A ASSAY W/OPTIC: CPT | Performed by: PHYSICIAN ASSISTANT

## 2022-05-11 PROCEDURE — 87426 SARSCOV CORONAVIRUS AG IA: CPT | Performed by: PHYSICIAN ASSISTANT

## 2022-05-11 RX ORDER — BENZONATATE 100 MG/1
100 CAPSULE ORAL 3 TIMES DAILY PRN
Qty: 30 CAPSULE | Refills: 0 | Status: SHIPPED | OUTPATIENT
Start: 2022-05-11 | End: 2022-06-23

## 2022-05-11 ASSESSMENT — ENCOUNTER SYMPTOMS
DIARRHEA: 1
COUGH: 1
SHORTNESS OF BREATH: 1
BLURRED VISION: 0
NAUSEA: 1
ABDOMINAL PAIN: 0
PALPITATIONS: 0
SINUS PAIN: 0
CHILLS: 1
EYE PAIN: 0
SPUTUM PRODUCTION: 0
MYALGIAS: 1
SORE THROAT: 1
HEADACHES: 1
VOMITING: 0
FEVER: 0
DIZZINESS: 1

## 2022-05-11 ASSESSMENT — FIBROSIS 4 INDEX: FIB4 SCORE: 1.12

## 2022-05-11 NOTE — PROGRESS NOTES
Subjective     Sonya Wei is a 56 y.o. female who presents with Pharyngitis (Grandkids were Positive for strep  last week )    HPI:  Sonya Wei is a 56 y.o. female who presents today for evaluation of sore throat and URI symptoms.  Patient started to get sick on Monday of this week.  She has had headache, ear pain, sore throat, cough, nasal congestion, mild shortness of breath, chills, and body aches.  No fever.  She is also had some diarrhea and nausea but no vomiting.  She has had a few episodes of lightheadedness as well.  She has been taking DayQuil and NyQuil with minimal relief of her symptoms.  She notes that her grandkids tested positive for strep throat last week and she is not sure if that is related or not.      Review of Systems   Constitutional: Positive for chills and malaise/fatigue. Negative for fever.   HENT: Positive for congestion and sore throat. Negative for ear pain and sinus pain.    Eyes: Negative for blurred vision and pain.   Respiratory: Positive for cough and shortness of breath. Negative for sputum production.    Cardiovascular: Negative for chest pain and palpitations.   Gastrointestinal: Positive for diarrhea and nausea. Negative for abdominal pain and vomiting.   Musculoskeletal: Positive for myalgias.   Skin: Negative for rash.   Neurological: Positive for dizziness and headaches.         PMH:  has a past medical history of Anemia, Bowel habit changes, Diabetes, Diabetes (HCC), GERD (gastroesophageal reflux disease), Healthcare maintenance (9/27/2014), High cholesterol, Hyperlipidemia, Hypertension, Infectious disease, Jaundice (1999), Psychiatric problem, and Vaginal itching (8/27/2014).  MEDS:   Current Outpatient Medications:   •  topiramate (TOPAMAX) 50 MG tablet, Take 1 Tablet by mouth 2 times a day for 30 days., Disp: 60 Tablet, Rfl: 5  •  sumatriptan (IMITREX) 100 MG tablet, Take 100 mg at the onset of aura/HA; may re-dose x1 after 2 hrs if HA persists; MDD:  200 mg; do not use >2 days/week., Disp: 9 Tablet, Rfl: 5  •  albuterol 108 (90 Base) MCG/ACT Aero Soln inhalation aerosol, Inhale 2 Puffs every 6 hours as needed for Shortness of Breath., Disp: 8.5 g, Rfl: 0  •  JANUVIA 100 MG Tab, Take 1 tablet by mouth once daily, Disp: 30 Tablet, Rfl: 0  •  escitalopram (LEXAPRO) 10 MG Tab, TAKE 1 & 1/2 (ONE & ONE-HALF) TABLETS BY MOUTH ONCE DAILY . APPOINTMENT REQUIRED FOR FUTURE REFILLS .  CALL  SCHEDULING  [phone], Disp: 21 Tablet, Rfl: 0  •  TRULICITY 1.5 MG/0.5ML Solution Pen-injector, INJECT 1 DOSE SUBCUTANEOUSLY ONCE A WEEK, Disp: 12 mL, Rfl: 0  •  glyBURIDE (DIABETA) 5 MG Tab, TAKE 2 TABLETS BY MOUTH TWICE DAILY WITH MEALS, Disp: 120 Tablet, Rfl: 0  •  metFORMIN (GLUCOPHAGE) 500 MG Tab, Take 2 Tablets by mouth 2 times a day with meals., Disp: 360 Tablet, Rfl: 1  •  acetaminophen (TYLENOL) 500 MG Tab, Take 1 Tablet by mouth every 6 hours as needed for Mild Pain or Moderate Pain., Disp: 30 Tablet, Rfl: 0  •  atorvastatin (LIPITOR) 20 MG Tab, Take 1 tablet by mouth once daily, Disp: 90 Tablet, Rfl: 3  •  lisinopril (PRINIVIL) 40 MG tablet, Take 1 tablet by mouth once daily, Disp: 90 tablet, Rfl: 3  •  naproxen (NAPROSYN) 500 MG Tab, Take 1 tablet by mouth 2 times a day with meals., Disp: 60 tablet, Rfl: 2  •  gabapentin (NEURONTIN) 300 MG Cap, Take 1 cap daily in the morning and 2 caps at bedtime., Disp: 180 capsule, Rfl: 1  •  Ascorbic Acid (VITAMIN C) 1000 MG Tab, Take 1 Tab by mouth every morning., Disp: , Rfl:   •  Cholecalciferol (VITAMIN D PO), Take 2 Tablets by mouth every day. GUMMIES, Disp: , Rfl:   ALLERGIES:   Allergies   Allergen Reactions   • Morphine Itching     Rxn = unknown   Bumps all over body     SURGHX:   Past Surgical History:   Procedure Laterality Date   • VENTRAL HERNIA REPAIR ROBOTIC XI N/A 10/14/2016    Procedure: VENTRAL HERNIA REPAIR ROBOTIC XI FOR INCISIONAL W/MESH;  Surgeon: Edi Mendoza M.D.;  Location: SURGERY [facility];   "Service:    • ABDOMINAL HYSTERECTOMY TOTAL      still has ovaries   • CHOLECYSTECTOMY     • PRIMARY C SECTION     • TUBAL COAGULATION LAPAROSCOPIC BILATERAL       SOCHX:  reports that she has never smoked. She has never used smokeless tobacco. She reports that she does not drink alcohol and does not use drugs.  FH: Family history was reviewed, no pertinent findings to report      Objective     BP (!) 146/86   Pulse 88   Temp 35.8 °C (96.5 °F) (Temporal)   Resp 16   Ht 1.549 m (5' 1\")   Wt 81.6 kg (180 lb)   SpO2 98%   BMI 34.01 kg/m²      Physical Exam  Constitutional:       Appearance: She is well-developed.   HENT:      Head: Normocephalic and atraumatic.      Right Ear: Tympanic membrane, ear canal and external ear normal.      Left Ear: Tympanic membrane, ear canal and external ear normal.      Nose: Mucosal edema and congestion present. No rhinorrhea.      Mouth/Throat:      Lips: Pink.      Mouth: Mucous membranes are moist.      Pharynx: Oropharynx is clear.   Eyes:      Conjunctiva/sclera: Conjunctivae normal.      Pupils: Pupils are equal, round, and reactive to light.   Cardiovascular:      Rate and Rhythm: Normal rate and regular rhythm.      Heart sounds: Normal heart sounds. No murmur heard.  Pulmonary:      Effort: Pulmonary effort is normal.      Breath sounds: Examination of the right-lower field reveals decreased breath sounds. Decreased breath sounds present. No wheezing, rhonchi or rales.   Musculoskeletal:      Cervical back: Normal range of motion.   Lymphadenopathy:      Cervical: No cervical adenopathy.   Skin:     General: Skin is warm and dry.      Capillary Refill: Capillary refill takes less than 2 seconds.   Neurological:      Mental Status: She is alert and oriented to person, place, and time.   Psychiatric:         Behavior: Behavior normal.         Judgment: Judgment normal.       POCT Rapid Strep A - Negative      POCT Influenza A/B - Negative      POCT SARS-COV Antigen LEROY " (Symptomatic only) - Negative        DX-CHEST-2 VIEWS  FINDINGS:  LUNGS: The lungs are clear.     HEART and MEDIASTINUM: normal in size.     Pleura: There are no pleural effusion or pneumothoraces.     Osseous structures: No significant bony abnormality.        IMPRESSION:  Negative two views of the chest.    Assessment & Plan     1. Pharyngitis, unspecified etiology  - POCT Rapid Strep A  - SARS-CoV-2, PCR (In-House); Future    2. Exposure to strep throat  - POCT Rapid Strep A    3. Viral URI with cough  - DX-CHEST-2 VIEWS; Future  - POCT Influenza A/B  - POCT SARS-COV Antigen LEROY (Symptomatic only)  - benzonatate (TESSALON) 100 MG Cap; Take 1 Capsule by mouth 3 times a day as needed for Cough.  Dispense: 30 Capsule; Refill: 0  - SARS-CoV-2, PCR (In-House); Future  - Try to avoid use of decongestants, however, as these can raise blood pressure.  -Supportive care discussed include use of saline nasal rinses, steam inhalation, and the use of a cool-mist humidifier in the bedroom at night.  - PO fluids  - Rest  - Tylenol or ibuprofen as needed for fever > 100.4 F  *Exam showed mildly decreased breath sounds on the right side with auscultation but chest x-ray was negative for any acute cardiopulmonary process.        *Patient had a nasal swab to test for COVID-19 virus.  Patient was advised to stay home and self isolate/self quarantine while awaiting the results.  Supportive care was reiterated.  Return/ER precautions discussed.         My total time spent caring for the patient on the day of the encounter was 35 minutes.   This does not include time spent on separately billable procedures/tests.          Differential Diagnosis, natural history, and supportive care discussed. Return to the Urgent Care or follow up with your PCP if symptoms fail to resolve, or for any new or worsening symptoms. Emergency room precautions discussed. Patient and/or family appears understanding of information.

## 2022-05-12 LAB
SARS-COV-2 RNA RESP QL NAA+PROBE: NOTDETECTED
SPECIMEN SOURCE: NORMAL

## 2022-05-16 ENCOUNTER — HOSPITAL ENCOUNTER (OUTPATIENT)
Dept: LAB | Facility: MEDICAL CENTER | Age: 57
End: 2022-05-16
Attending: NURSE PRACTITIONER
Payer: COMMERCIAL

## 2022-05-16 DIAGNOSIS — E11.65 UNCONTROLLED TYPE 2 DIABETES MELLITUS WITH HYPERGLYCEMIA (HCC): ICD-10-CM

## 2022-05-16 DIAGNOSIS — R00.2 PALPITATIONS: ICD-10-CM

## 2022-05-16 LAB
ALBUMIN SERPL BCP-MCNC: 3.6 G/DL (ref 3.2–4.9)
ALBUMIN/GLOB SERPL: 1.1 G/DL
ALP SERPL-CCNC: 106 U/L (ref 30–99)
ALT SERPL-CCNC: 17 U/L (ref 2–50)
ANION GAP SERPL CALC-SCNC: 10 MMOL/L (ref 7–16)
AST SERPL-CCNC: 22 U/L (ref 12–45)
BILIRUB SERPL-MCNC: 0.3 MG/DL (ref 0.1–1.5)
BUN SERPL-MCNC: 40 MG/DL (ref 8–22)
CALCIUM SERPL-MCNC: 9.3 MG/DL (ref 8.5–10.5)
CHLORIDE SERPL-SCNC: 104 MMOL/L (ref 96–112)
CHOLEST SERPL-MCNC: 167 MG/DL (ref 100–199)
CO2 SERPL-SCNC: 20 MMOL/L (ref 20–33)
CREAT SERPL-MCNC: 1.01 MG/DL (ref 0.5–1.4)
CREAT UR-MCNC: 83.78 MG/DL
EST. AVERAGE GLUCOSE BLD GHB EST-MCNC: 174 MG/DL
FASTING STATUS PATIENT QL REPORTED: NORMAL
GFR SERPLBLD CREATININE-BSD FMLA CKD-EPI: 65 ML/MIN/1.73 M 2
GLOBULIN SER CALC-MCNC: 3.4 G/DL (ref 1.9–3.5)
GLUCOSE SERPL-MCNC: 114 MG/DL (ref 65–99)
HBA1C MFR BLD: 7.7 % (ref 4–5.6)
HDLC SERPL-MCNC: 44 MG/DL
LDLC SERPL CALC-MCNC: 88 MG/DL
MICROALBUMIN UR-MCNC: 169 MG/DL
MICROALBUMIN/CREAT UR: 2017 MG/G (ref 0–30)
POTASSIUM SERPL-SCNC: 5.1 MMOL/L (ref 3.6–5.5)
PROT SERPL-MCNC: 7 G/DL (ref 6–8.2)
SODIUM SERPL-SCNC: 134 MMOL/L (ref 135–145)
TRIGL SERPL-MCNC: 174 MG/DL (ref 0–149)
TSH SERPL DL<=0.005 MIU/L-ACNC: 4.64 UIU/ML (ref 0.38–5.33)

## 2022-05-16 PROCEDURE — 82570 ASSAY OF URINE CREATININE: CPT

## 2022-05-16 PROCEDURE — 83036 HEMOGLOBIN GLYCOSYLATED A1C: CPT

## 2022-05-16 PROCEDURE — 80061 LIPID PANEL: CPT

## 2022-05-16 PROCEDURE — 36415 COLL VENOUS BLD VENIPUNCTURE: CPT

## 2022-05-16 PROCEDURE — 80053 COMPREHEN METABOLIC PANEL: CPT

## 2022-05-16 PROCEDURE — 84443 ASSAY THYROID STIM HORMONE: CPT

## 2022-05-16 PROCEDURE — 82043 UR ALBUMIN QUANTITATIVE: CPT

## 2022-05-18 ENCOUNTER — OFFICE VISIT (OUTPATIENT)
Dept: MEDICAL GROUP | Facility: PHYSICIAN GROUP | Age: 57
End: 2022-05-18
Payer: COMMERCIAL

## 2022-05-18 VITALS
BODY MASS INDEX: 34.11 KG/M2 | DIASTOLIC BLOOD PRESSURE: 80 MMHG | OXYGEN SATURATION: 98 % | HEART RATE: 75 BPM | SYSTOLIC BLOOD PRESSURE: 128 MMHG | HEIGHT: 61 IN | WEIGHT: 180.7 LBS | TEMPERATURE: 97.5 F | RESPIRATION RATE: 16 BRPM

## 2022-05-18 DIAGNOSIS — R80.9 MICROALBUMINURIA: ICD-10-CM

## 2022-05-18 DIAGNOSIS — R11.2 NON-INTRACTABLE VOMITING WITH NAUSEA, UNSPECIFIED VOMITING TYPE: ICD-10-CM

## 2022-05-18 DIAGNOSIS — M54.12 CERVICAL RADICULOPATHY: ICD-10-CM

## 2022-05-18 DIAGNOSIS — E11.65 UNCONTROLLED TYPE 2 DIABETES MELLITUS WITH HYPERGLYCEMIA (HCC): ICD-10-CM

## 2022-05-18 DIAGNOSIS — F33.1 MODERATE EPISODE OF RECURRENT MAJOR DEPRESSIVE DISORDER (HCC): ICD-10-CM

## 2022-05-18 DIAGNOSIS — Z11.59 NEED FOR HEPATITIS C SCREENING TEST: ICD-10-CM

## 2022-05-18 DIAGNOSIS — R19.5 LOOSE STOOLS: ICD-10-CM

## 2022-05-18 DIAGNOSIS — R00.2 PALPITATIONS: ICD-10-CM

## 2022-05-18 DIAGNOSIS — M54.2 NECK PAIN: ICD-10-CM

## 2022-05-18 PROBLEM — R11.10 NON-INTRACTABLE VOMITING: Status: ACTIVE | Noted: 2022-05-18

## 2022-05-18 PROCEDURE — 99214 OFFICE O/P EST MOD 30 MIN: CPT | Performed by: NURSE PRACTITIONER

## 2022-05-18 RX ORDER — ONDANSETRON 4 MG/1
4 TABLET, FILM COATED ORAL EVERY 4 HOURS PRN
Qty: 20 TABLET | Refills: 0 | Status: SHIPPED | OUTPATIENT
Start: 2022-05-18 | End: 2022-08-30

## 2022-05-18 RX ORDER — OMEPRAZOLE 20 MG/1
20 CAPSULE, DELAYED RELEASE ORAL DAILY
Qty: 30 CAPSULE | Refills: 0 | Status: SHIPPED | OUTPATIENT
Start: 2022-05-18 | End: 2022-11-01 | Stop reason: SDUPTHER

## 2022-05-18 ASSESSMENT — FIBROSIS 4 INDEX: FIB4 SCORE: 1.08

## 2022-05-18 NOTE — ASSESSMENT & PLAN NOTE
Chronic Problem, moderately controlled with current medications and lifestyle measures.  A1c is increased to 7.7%.  Metformin was increased to 1000 mg twice a day at last appointment.  Continues with glyburide 10 mg twice a day, Trulicity 1.5 mg weekly.  Reports she has been without her Januvia for 6 to 8 weeks, unclear reason.  Reportedly her pharmacy said they dispensed it to her, but she does not have it at home.  She will be due for refill on 5/28/2022.  On ACE and statin.  Denies polydipsia, polyuria, vision changes.

## 2022-05-18 NOTE — ASSESSMENT & PLAN NOTE
Still having palpitations.  No chest pain.  Has not scheduled with cardiology for consult, previously referred.  Has not been able to access her MyChart.

## 2022-05-18 NOTE — ASSESSMENT & PLAN NOTE
Patient continues with neck pain radiating down both shoulders with left arm weakness since COVID 19 illness in early December 2020.  Right arm strength has improved.  Quality of pain as pressure and aching, radiating down both arms.  Cannot identify any triggering factors except for may be lifting.  Denies numbness and tingling.  Was previously unable to get MRI of cervical spine a year ago due to cost.  Did attend a couple sessions of physical therapy, but unaffordable.  Has been working on HEP.

## 2022-05-18 NOTE — ASSESSMENT & PLAN NOTE
Chronic health problem, managing with escitalopram 10 mg daily.  At last appointment, was reminded to increase to 15 mg a day and to schedule follow-up appointment with her psychiatrist.  Patient has not done so, stating she forgot.  Directions were on her after visit summary.

## 2022-05-18 NOTE — PATIENT INSTRUCTIONS
Call cardiology to schedule appointment:  [facility]  [address], [address]  [place] [address]  Phone: [phone]    Referred to gastroenterology for your stomach issues.    Take omeprazole once a day for 2 weeks.  Prescription sent to pharmacy.    Take ondansetron as needed for nausea.  Use sparingly.  Prescription sent to pharmacy.    Call Dr. Brunilda Bright office to make appointment for f/u on depression:  [phone]    Preventing Diabetes Mellitus Complications  You can take action to prevent or slow down problems that are caused by diabetes (diabetes mellitus). Following your diabetes plan and taking care of yourself can reduce your risk of serious or life-threatening complications.  What actions can I take to prevent diabetes complications?  Manage your diabetes    · Follow instructions from your health care providers about managing your diabetes. Your diabetes may be managed by a team of health care providers who can teach you how to care for yourself and can answer questions that you have.  · Educate yourself about your condition so you can make healthy choices about eating and physical activity.  · Check your blood sugar (glucose) levels as often as directed. Your health care provider will help you decide how often to check your blood glucose level depending on your treatment goals and how well you are meeting them.  · Ask your health care provider if you should take low-dose aspirin daily and what dose is recommended for you. Taking low-dose aspirin daily is recommended to help prevent cardiovascular disease.  Do not use nicotine or tobacco  Do not use any products that contain nicotine or tobacco, such as cigarettes and e-cigarettes. If you need help quitting, ask your health care provider. Nicotine raises your risk for diabetes problems. If you quit using nicotine:  · You will lower your risk for heart attack, stroke, nerve disease, and kidney disease.  · Your cholesterol and blood pressure  may improve.  · Your blood circulation will improve.  Keep your blood pressure under control  Your personal target blood pressure is determined based on:  · Your age.  · Your medicines.  · How long you have had diabetes.  · Any other medical conditions you have.  To control your blood pressure:  · Follow instructions from your health care provider about meal planning, exercise, and medicines.  · Make sure your health care provider checks your blood pressure at every medical visit.  · Monitor your blood pressure at home as told by your health care provider.    Keep your cholesterol under control  To control your cholesterol:  · Follow instructions from your health care provider about meal planning, exercise, and medicines.  · Have your cholesterol checked at least once a year.  · You may be prescribed medicine to lower cholesterol (statin). If you are not taking a statin, ask your health care provider if you should be.  Controlling your cholesterol may:  · Help prevent heart disease and stroke. These are the most common health problems for people with diabetes.  · Improve your blood flow.  Schedule and keep yearly physical exams and eye exams  Your health care provider will tell you how often you need medical visits depending on your diabetes management plan. Keep all follow-up visits as directed. This is important so possible problems can be identified early and complications can be avoided or treated.  · Every visit with your health care provider should include measuring your:  ? Weight.  ? Blood pressure.  ? Blood glucose control.  · Your A1c (hemoglobin A1c) level should be checked:  ? At least 2 times a year, if you are meeting your treatment goals.  ? 4 times a year, if you are not meeting treatment goals or if your treatment goals have changed.  · Your blood lipids (lipid profile) should be checked yearly. You should also be checked yearly for protein in your urine (urine microalbumin).  · If you have type 1  diabetes, get an eye exam 3-5 years after you are diagnosed, and then once a year after your first exam.  · If you have type 2 diabetes, get an eye exam as soon as you are diagnosed, and then once a year after your first exam.  Keep your vaccines current  It is recommended that you receive:  · A flu (influenza) vaccine every year.  · A pneumonia (pneumococcal) vaccine and a hepatitis B vaccine. If you are age 65 or older, you may get the pneumonia vaccine as a series of two separate shots.  Ask your health care provider which other vaccines may be recommended.  Take care of your feet  Diabetes may cause you to have poor blood circulation to your legs and feet. Because of this, taking care of your feet is very important. Diabetes can cause:  · The skin on the feet to get thinner, break more easily, and heal more slowly.  · Nerve damage in your legs and feet, which results in decreased feeling. You may not notice minor injuries that could lead to serious problems.  To avoid foot problems:  · Check your skin and feet every day for cuts, bruises, redness, blisters, or sores.  · Schedule a foot exam with your health care provider once every year. This exam includes:  ? Inspecting of the structure and skin of your feet.  ? Checking the pulses and sensation in your feet.  · Make sure that your health care provider performs a visual foot exam at every medical visit.    Take care of your teeth  People with poorly controlled diabetes are more likely to have gum (periodontal) disease. Diabetes can make periodontal diseases harder to control. If not treated, periodontal diseases can lead to tooth loss. To prevent this:  · Brush your teeth twice a day.  · Floss at least once a day.  · Visit your dentist 2 times a year.  Drink responsibly  Limit alcohol intake to no more than 1 drink a day for nonpregnant women and 2 drinks a day for men. One drink equals 12 oz of beer, 5 oz of wine, or 1½ oz of hard liquor.   It is important to  eat food when you drink alcohol to avoid low blood glucose (hypoglycemia). Avoid alcohol if you:  · Have a history of alcohol abuse or dependence.  · Are pregnant.  · Have liver disease, pancreatitis, advanced neuropathy, or severe hypertriglyceridemia.  Lessen stress  Living with diabetes can be stressful. When you are experiencing stress, your blood glucose may be affected in two ways:  · Stress hormones may cause your blood glucose to rise.  · You may be distracted from taking good care of yourself.  Be aware of your stress level and make changes to help you manage challenging situations. To lower your stress levels:  · Consider joining a support group.  · Do planned relaxation or meditation.  · Do a hobby that you enjoy.  · Maintain healthy relationships.  · Exercise regularly.  · Work with your health care provider or a mental health professional.  Summary  · You can take action to prevent or slow down problems that are caused by diabetes (diabetes mellitus). Following your diabetes plan and taking care of yourself can reduce your risk of serious or life-threatening complications.  · Follow instructions from your health care providers about managing your diabetes. Your diabetes may be managed by a team of health care providers who can teach you how to care for yourself and can answer questions that you have.  · Your health care provider will tell you how often you need medical visits depending on your diabetes management plan. Keep all follow-up visits as directed. This is important so possible problems can be identified early and complications can be avoided or treated.  This information is not intended to replace advice given to you by your health care provider. Make sure you discuss any questions you have with your health care provider.  Document Released: 09/04/2012 Document Revised: 03/18/2019 Document Reviewed: 09/16/2017  Elsevier Patient Education © 2020 [employer]

## 2022-05-18 NOTE — ASSESSMENT & PLAN NOTE
Patient reports intermittent nausea and vomiting and loose stools over the last year.  Did have a flareup a few weeks ago from a virus (COVID-negative).  Seem to get better for 5 days, then returned yesterday.  Denies blood in stool, dark black stool.  Was referred to gastroenterology almost year ago by urgent care provider, but patient never did follow-up as symptoms seem to resolve.  She is not taking anything to help with symptoms.

## 2022-05-18 NOTE — PROGRESS NOTES
CC: Lab review, nausea and diarrhea, chronic health problems    HISTORY OF THE PRESENT ILLNESS: Patient is a 56 y.o. female. This pleasant patient is here today for evaluation and management of the following health problems.    Health Maintenance: due      Palpitations  Still having palpitations.  No chest pain.  Has not scheduled with cardiology for consult, previously referred.  Has not been able to access her MyChart.    Moderate episode of recurrent major depressive disorder (HCC)  Chronic health problem, managing with escitalopram 10 mg daily.  At last appointment, was reminded to increase to 15 mg a day and to schedule follow-up appointment with her psychiatrist.  Patient has not done so, stating she forgot.  Directions were on her after visit summary.    Microalbuminuria  Continues with microalbuminuria.  Taking lisinopril 40 mg daily.    Diabetes mellitus type II, uncontrolled (HCC)  Chronic Problem, moderately controlled with current medications and lifestyle measures.  A1c is increased to 7.7%.  Metformin was increased to 1000 mg twice a day at last appointment.  Continues with glyburide 10 mg twice a day, Trulicity 1.5 mg weekly.  Reports she has been without her Januvia for 6 to 8 weeks, unclear reason.  Reportedly her pharmacy said they dispensed it to her, but she does not have it at home.  She will be due for refill on 5/28/2022.  On ACE and statin.  Denies polydipsia, polyuria, vision changes.    Neck pain  Patient continues with neck pain radiating down both shoulders with left arm weakness since COVID 19 illness in early December 2020.  Right arm strength has improved.  Quality of pain as pressure and aching, radiating down both arms.  Cannot identify any triggering factors except for may be lifting.  Denies numbness and tingling.  Was previously unable to get MRI of cervical spine a year ago due to cost.  Did attend a couple sessions of physical therapy, but unaffordable.  Has been working on  HEP.      Non-intractable vomiting  Patient reports intermittent nausea and vomiting and loose stools over the last year.  Did have a flareup a few weeks ago from a virus (COVID-negative).  Seem to get better for 5 days, then returned yesterday.  Denies blood in stool, dark black stool.  Was referred to gastroenterology almost year ago by urgent care provider, but patient never did follow-up as symptoms seem to resolve.  She is not taking anything to help with symptoms.      Allergies: Morphine    Current Outpatient Medications Ordered in Epic   Medication Sig Dispense Refill   • omeprazole (PRILOSEC) 20 MG delayed-release capsule Take 1 Capsule by mouth every day. 30 Capsule 0   • ondansetron (ZOFRAN) 4 MG Tab tablet Take 1 Tablet by mouth every four hours as needed for Nausea/Vomiting. 20 Tablet 0   • SITagliptin (JANUVIA) 100 MG Tab Take 1 Tablet by mouth every day. 90 Tablet 1   • benzonatate (TESSALON) 100 MG Cap Take 1 Capsule by mouth 3 times a day as needed for Cough. 30 Capsule 0   • topiramate (TOPAMAX) 50 MG tablet Take 1 Tablet by mouth 2 times a day for 30 days. 60 Tablet 5   • sumatriptan (IMITREX) 100 MG tablet Take 100 mg at the onset of aura/HA; may re-dose x1 after 2 hrs if HA persists; MDD: 200 mg; do not use >2 days/week. 9 Tablet 5   • albuterol 108 (90 Base) MCG/ACT Aero Soln inhalation aerosol Inhale 2 Puffs every 6 hours as needed for Shortness of Breath. 8.5 g 0   • escitalopram (LEXAPRO) 10 MG Tab TAKE 1 & 1/2 (ONE & ONE-HALF) TABLETS BY MOUTH ONCE DAILY . APPOINTMENT REQUIRED FOR FUTURE REFILLS .  CALL  SCHEDULING  [phone] 21 Tablet 0   • TRULICITY 1.5 MG/0.5ML Solution Pen-injector INJECT 1 DOSE SUBCUTANEOUSLY ONCE A WEEK 12 mL 0   • glyBURIDE (DIABETA) 5 MG Tab TAKE 2 TABLETS BY MOUTH TWICE DAILY WITH MEALS 120 Tablet 0   • metFORMIN (GLUCOPHAGE) 500 MG Tab Take 2 Tablets by mouth 2 times a day with meals. 360 Tablet 1   • atorvastatin (LIPITOR) 20 MG Tab Take 1 tablet by mouth once  daily 90 Tablet 3   • lisinopril (PRINIVIL) 40 MG tablet Take 1 tablet by mouth once daily 90 tablet 3   • naproxen (NAPROSYN) 500 MG Tab Take 1 tablet by mouth 2 times a day with meals. 60 tablet 2   • gabapentin (NEURONTIN) 300 MG Cap Take 1 cap daily in the morning and 2 caps at bedtime. 180 capsule 1   • Ascorbic Acid (VITAMIN C) 1000 MG Tab Take 1 Tab by mouth every morning.     • Cholecalciferol (VITAMIN D PO) Take 2 Tablets by mouth every day. GUMMIES     • acetaminophen (TYLENOL) 500 MG Tab Take 1 Tablet by mouth every 6 hours as needed for Mild Pain or Moderate Pain. (Patient not taking: Reported on 5/18/2022) 30 Tablet 0     No current Epic-ordered facility-administered medications on file.       Past Medical History:   Diagnosis Date   • Anemia    • Bowel habit changes     constipation   • Diabetes    • Diabetes (HCC)    • GERD (gastroesophageal reflux disease)    • Healthcare maintenance 9/27/2014    Mammogram: Due   • High cholesterol    • Hyperlipidemia    • Hypertension    • Infectious disease     Cold 10/2016   • Jaundice 1999   • Psychiatric problem     depression and anxiety   • Vaginal itching 8/27/2014    With anal itching X 1 month Getting worse Burning Min vag disch       Past Surgical History:   Procedure Laterality Date   • VENTRAL HERNIA REPAIR ROBOTIC XI N/A 10/14/2016    Procedure: VENTRAL HERNIA REPAIR ROBOTIC XI FOR INCISIONAL W/MESH;  Surgeon: Edi Mendoza M.D.;  Location: SURGERY [facility];  Service:    • ABDOMINAL HYSTERECTOMY TOTAL      still has ovaries   • CHOLECYSTECTOMY     • PRIMARY C SECTION     • TUBAL COAGULATION LAPAROSCOPIC BILATERAL         Social History     Tobacco Use   • Smoking status: Never Smoker   • Smokeless tobacco: Never Used   Vaping Use   • Vaping Use: Never used   Substance Use Topics   • Alcohol use: No   • Drug use: No       Family History   Problem Relation Age of Onset   • Diabetes Mother    • Hyperlipidemia Mother    • Diabetes Father    •  "Hypertension Father    • Hyperlipidemia Father        ROS:  As in HPI, otherwise negative for chest pain, dyspnea, abdominal pain, dysuria, blood in stool, fever            Exam: /80 (BP Location: Left arm, Patient Position: Sitting, BP Cuff Size: Adult)   Pulse 75   Temp 36.4 °C (97.5 °F) (Temporal)   Resp 16   Ht 1.549 m (5' 1\")   Wt 82 kg (180 lb 11.2 oz)   SpO2 98%  Body mass index is 34.14 kg/m².    General: Alert, pleasant, well nourished, well developed female in NAD  Neck: Supple without bruit. Thyroid is not enlarged.  Pulmonary: Clear to ausculation.  Normal effort. No rales, ronchi, or wheezing.  Cardiovascular: Normal rate and rhythm without murmur. Carotid and radial pulses are intact and equal bilaterally.  No lower extremity edema.  Abdomen: Soft, nontender, nondistended. Normal bowel sounds. Liver and spleen are not palpable  Neurologic: Grossly nonfocal  Lymph: No cervical or supraclavicular lymph nodes are palpable  Neck: No erythema or edema, no tenderness to palpation paraspinals bilaterally, full active range of motion, left upper extremity strength 3/5 and right 5/5  Skin: Warm and dry.    Musculoskeletal: Normal gait.   Psych: Normal mood and affect. Alert and oriented. Judgment and insight is normal.    Component      Latest Ref Rng & Units 5/16/2022   Sodium      135 - 145 mmol/L 134 (L)   Potassium      3.6 - 5.5 mmol/L 5.1   Chloride      96 - 112 mmol/L 104   Co2      20 - 33 mmol/L 20   Anion Gap      7.0 - 16.0 10.0   Glucose      65 - 99 mg/dL 114 (H)   Bun      8 - 22 mg/dL 40 (H)   Creatinine      0.50 - 1.40 mg/dL 1.01   Calcium      8.5 - 10.5 mg/dL 9.3   AST(SGOT)      12 - 45 U/L 22   ALT(SGPT)      2 - 50 U/L 17   Alkaline Phosphatase      30 - 99 U/L 106 (H)   Total Bilirubin      0.1 - 1.5 mg/dL 0.3   Albumin      3.2 - 4.9 g/dL 3.6   Total Protein      6.0 - 8.2 g/dL 7.0   Globulin      1.9 - 3.5 g/dL 3.4   A-G Ratio      g/dL 1.1   Cholesterol,Tot      100 - 199 " mg/dL 167   Triglycerides      0 - 149 mg/dL 174 (H)   HDL      >=40 mg/dL 44   LDL      <100 mg/dL 88   Creatinine, Urine      mg/dL 83.78   Microalbumin, Urine Random      mg/dL 169.0   Micro Alb Creat Ratio      0 - 30 mg/g 2017 (H)   Glycohemoglobin      4.0 - 5.6 % 7.7 (H)   Estim. Avg Glu      mg/dL 174   TSH      0.380 - 5.330 uIU/mL 4.640   GFR (CKD-EPI)      >60 mL/min/1.73 m 2 65       Please note that this dictation was created using voice recognition software. I have made every reasonable attempt to correct obvious errors, but I expect that there are errors of grammar and possibly content that I did not discover before finalizing the note.      Assessment/Plan  1. Loose stools  Will trial 2-week burst of omeprazole.  Take ondansetron for nausea.  Advised on probiotic.  Will refer to gastroenterology at this is repeating intermittent problem.  - omeprazole (PRILOSEC) 20 MG delayed-release capsule; Take 1 Capsule by mouth every day.  Dispense: 30 Capsule; Refill: 0  - Referral to Gastroenterology    2. Non-intractable vomiting with nausea, unspecified vomiting type  As in #1  - omeprazole (PRILOSEC) 20 MG delayed-release capsule; Take 1 Capsule by mouth every day.  Dispense: 30 Capsule; Refill: 0  - ondansetron (ZOFRAN) 4 MG Tab tablet; Take 1 Tablet by mouth every four hours as needed for Nausea/Vomiting.  Dispense: 20 Tablet; Refill: 0  - Referral to Gastroenterology    3. Uncontrolled type 2 diabetes mellitus with hyperglycemia (HCC)  Worsening control.  However has been out of Januvia.  Patient will let me know if she is unable to fill Januvia prescription.  Continue with Trulicity, glimepiride, metformin, Januvia no changes.  We will continue to monitor.  - Comp Metabolic Panel; Future  - ESTIMATED GFR; Future  - HEMOGLOBIN A1C; Future  - SITagliptin (JANUVIA) 100 MG Tab; Take 1 Tablet by mouth every day.  Dispense: 90 Tablet; Refill: 1    4. Cervical radiculopathy  Continues with weakness in left  upper extremity in addition to bilateral neck pain.  Will get MRI of cervical spine.  We will also refer to interventional pain management for further evaluation and treatment options.  Continue with HEP.  Advised on heat, ice.  - MR-CERVICAL SPINE-W/O; Future  - Referral to Pain Clinic    5. Neck pain    - MR-CERVICAL SPINE-W/O; Future  - Referral to Pain Clinic    6. Need for hepatitis C screening test    - HCV Scrn ( [id number] [employer]); Future    7. Palpitations  Patient will call for appointment with cardiology.    8. Moderate episode of recurrent major depressive disorder (HCC)  Patient will call to schedule appointment with psychiatry.    9. Microalbuminuria  Continue to monitor.  Consider referral to nephrology.      Patient instructions given:  Call cardiology to schedule appointment:  [facility]  [address], [facility] [address]  [facility] [address]  Phone: [phone]    Referred to gastroenterology for your stomach issues.    Take omeprazole once a day for 2 weeks.  Prescription sent to pharmacy.    Take ondansetron as needed for nausea.  Use sparingly.  Prescription sent to pharmacy.    Call Dr. Brunilda Bright office to make appointment for f/u on depression:  [phone]      Patient will return to clinic in 3 months for lab review and follow-up on chronic health problems or sooner if needed.

## 2022-05-24 ENCOUNTER — APPOINTMENT (OUTPATIENT)
Dept: RADIOLOGY | Facility: MEDICAL CENTER | Age: 57
End: 2022-05-24
Attending: EMERGENCY MEDICINE
Payer: COMMERCIAL

## 2022-05-24 ENCOUNTER — HOSPITAL ENCOUNTER (EMERGENCY)
Facility: MEDICAL CENTER | Age: 57
End: 2022-05-24
Attending: EMERGENCY MEDICINE
Payer: COMMERCIAL

## 2022-05-24 VITALS
HEIGHT: 61 IN | DIASTOLIC BLOOD PRESSURE: 78 MMHG | OXYGEN SATURATION: 95 % | BODY MASS INDEX: 34.55 KG/M2 | HEART RATE: 74 BPM | RESPIRATION RATE: 16 BRPM | TEMPERATURE: 97.4 F | WEIGHT: 182.98 LBS | SYSTOLIC BLOOD PRESSURE: 156 MMHG

## 2022-05-24 DIAGNOSIS — R00.2 PALPITATIONS: ICD-10-CM

## 2022-05-24 DIAGNOSIS — F41.9 ANXIETY: ICD-10-CM

## 2022-05-24 DIAGNOSIS — R73.9 HYPERGLYCEMIA: ICD-10-CM

## 2022-05-24 LAB
ALBUMIN SERPL BCP-MCNC: 3.4 G/DL (ref 3.2–4.9)
ALBUMIN/GLOB SERPL: 1 G/DL
ALP SERPL-CCNC: 122 U/L (ref 30–99)
ALT SERPL-CCNC: 19 U/L (ref 2–50)
ANION GAP SERPL CALC-SCNC: 12 MMOL/L (ref 7–16)
APPEARANCE UR: CLEAR
AST SERPL-CCNC: 28 U/L (ref 12–45)
BACTERIA #/AREA URNS HPF: NEGATIVE /HPF
BASOPHILS # BLD AUTO: 0.6 % (ref 0–1.8)
BASOPHILS # BLD: 0.05 K/UL (ref 0–0.12)
BILIRUB SERPL-MCNC: <0.2 MG/DL (ref 0.1–1.5)
BILIRUB UR QL STRIP.AUTO: NEGATIVE
BUN SERPL-MCNC: 37 MG/DL (ref 8–22)
CALCIUM SERPL-MCNC: 9.2 MG/DL (ref 8.5–10.5)
CHLORIDE SERPL-SCNC: 107 MMOL/L (ref 96–112)
CO2 SERPL-SCNC: 17 MMOL/L (ref 20–33)
COLOR UR: YELLOW
CREAT SERPL-MCNC: 0.97 MG/DL (ref 0.5–1.4)
EKG IMPRESSION: NORMAL
EOSINOPHIL # BLD AUTO: 0.21 K/UL (ref 0–0.51)
EOSINOPHIL NFR BLD: 2.4 % (ref 0–6.9)
EPI CELLS #/AREA URNS HPF: NEGATIVE /HPF
ERYTHROCYTE [DISTWIDTH] IN BLOOD BY AUTOMATED COUNT: 40.1 FL (ref 35.9–50)
GFR SERPLBLD CREATININE-BSD FMLA CKD-EPI: 68 ML/MIN/1.73 M 2
GLOBULIN SER CALC-MCNC: 3.3 G/DL (ref 1.9–3.5)
GLUCOSE SERPL-MCNC: 350 MG/DL (ref 65–99)
GLUCOSE UR STRIP.AUTO-MCNC: >=1000 MG/DL
HCT VFR BLD AUTO: 34.4 % (ref 37–47)
HGB BLD-MCNC: 11.9 G/DL (ref 12–16)
HYALINE CASTS #/AREA URNS LPF: ABNORMAL /LPF
IMM GRANULOCYTES # BLD AUTO: 0.04 K/UL (ref 0–0.11)
IMM GRANULOCYTES NFR BLD AUTO: 0.5 % (ref 0–0.9)
KETONES UR STRIP.AUTO-MCNC: NEGATIVE MG/DL
LEUKOCYTE ESTERASE UR QL STRIP.AUTO: NEGATIVE
LIPASE SERPL-CCNC: 89 U/L (ref 11–82)
LYMPHOCYTES # BLD AUTO: 2.47 K/UL (ref 1–4.8)
LYMPHOCYTES NFR BLD: 28.3 % (ref 22–41)
MCH RBC QN AUTO: 30.6 PG (ref 27–33)
MCHC RBC AUTO-ENTMCNC: 34.6 G/DL (ref 33.6–35)
MCV RBC AUTO: 88.4 FL (ref 81.4–97.8)
MICRO URNS: ABNORMAL
MONOCYTES # BLD AUTO: 0.65 K/UL (ref 0–0.85)
MONOCYTES NFR BLD AUTO: 7.4 % (ref 0–13.4)
NEUTROPHILS # BLD AUTO: 5.32 K/UL (ref 2–7.15)
NEUTROPHILS NFR BLD: 60.8 % (ref 44–72)
NITRITE UR QL STRIP.AUTO: NEGATIVE
NRBC # BLD AUTO: 0 K/UL
NRBC BLD-RTO: 0 /100 WBC
PH UR STRIP.AUTO: 5 [PH] (ref 5–8)
PLATELET # BLD AUTO: 299 K/UL (ref 164–446)
PMV BLD AUTO: 10.8 FL (ref 9–12.9)
POTASSIUM SERPL-SCNC: 5.6 MMOL/L (ref 3.6–5.5)
PROT SERPL-MCNC: 6.7 G/DL (ref 6–8.2)
PROT UR QL STRIP: 300 MG/DL
RBC # BLD AUTO: 3.89 M/UL (ref 4.2–5.4)
RBC # URNS HPF: ABNORMAL /HPF
RBC UR QL AUTO: NEGATIVE
SODIUM SERPL-SCNC: 136 MMOL/L (ref 135–145)
SP GR UR STRIP.AUTO: 1.02
TROPONIN T SERPL-MCNC: 14 NG/L (ref 6–19)
TROPONIN T SERPL-MCNC: 14 NG/L (ref 6–19)
UROBILINOGEN UR STRIP.AUTO-MCNC: 0.2 MG/DL
WBC # BLD AUTO: 8.7 K/UL (ref 4.8–10.8)
WBC #/AREA URNS HPF: ABNORMAL /HPF

## 2022-05-24 PROCEDURE — 84484 ASSAY OF TROPONIN QUANT: CPT

## 2022-05-24 PROCEDURE — 99284 EMERGENCY DEPT VISIT MOD MDM: CPT

## 2022-05-24 PROCEDURE — 93005 ELECTROCARDIOGRAM TRACING: CPT | Performed by: EMERGENCY MEDICINE

## 2022-05-24 PROCEDURE — 80053 COMPREHEN METABOLIC PANEL: CPT

## 2022-05-24 PROCEDURE — 82962 GLUCOSE BLOOD TEST: CPT

## 2022-05-24 PROCEDURE — 36415 COLL VENOUS BLD VENIPUNCTURE: CPT

## 2022-05-24 PROCEDURE — 81001 URINALYSIS AUTO W/SCOPE: CPT

## 2022-05-24 PROCEDURE — 83690 ASSAY OF LIPASE: CPT

## 2022-05-24 PROCEDURE — 85025 COMPLETE CBC W/AUTO DIFF WBC: CPT

## 2022-05-24 PROCEDURE — 93005 ELECTROCARDIOGRAM TRACING: CPT

## 2022-05-24 PROCEDURE — 700102 HCHG RX REV CODE 250 W/ 637 OVERRIDE(OP): Performed by: EMERGENCY MEDICINE

## 2022-05-24 PROCEDURE — 71045 X-RAY EXAM CHEST 1 VIEW: CPT

## 2022-05-24 PROCEDURE — A9270 NON-COVERED ITEM OR SERVICE: HCPCS | Performed by: EMERGENCY MEDICINE

## 2022-05-24 PROCEDURE — 700105 HCHG RX REV CODE 258: Performed by: EMERGENCY MEDICINE

## 2022-05-24 RX ORDER — SODIUM CHLORIDE 9 MG/ML
1000 INJECTION, SOLUTION INTRAVENOUS ONCE
Status: COMPLETED | OUTPATIENT
Start: 2022-05-24 | End: 2022-05-24

## 2022-05-24 RX ORDER — LORAZEPAM 1 MG/1
1 TABLET ORAL ONCE
Status: COMPLETED | OUTPATIENT
Start: 2022-05-24 | End: 2022-05-24

## 2022-05-24 RX ADMIN — LORAZEPAM 1 MG: 1 TABLET ORAL at 22:24

## 2022-05-24 RX ADMIN — SODIUM CHLORIDE 1000 ML: 9 INJECTION, SOLUTION INTRAVENOUS at 22:24

## 2022-05-24 ASSESSMENT — FIBROSIS 4 INDEX: FIB4 SCORE: 1.08

## 2022-05-25 ENCOUNTER — PATIENT OUTREACH (OUTPATIENT)
Dept: SCHEDULING | Facility: IMAGING CENTER | Age: 57
End: 2022-05-25
Payer: COMMERCIAL

## 2022-05-25 LAB — GLUCOSE BLD STRIP.AUTO-MCNC: 325 MG/DL (ref 65–99)

## 2022-05-25 NOTE — DISCHARGE PLANNING
Alert Team:    Spoke with patient at bedside regarding individual therapy and grief counseling resources. Provided printed resource list fro patient to follow up. Patient reports she is currently on a wait list at [facility] for a therapist. Also reports good social support through family and friends. No other needs vocalized at this time.

## 2022-05-25 NOTE — ED PROVIDER NOTES
ED Provider Note    Scribed for Gahzal Tobar M.D. by Silver Herrera. 5/24/2022  9:28 PM    Means of arrival: Walk-in  History obtained from: Patient  History limited by: None      CHIEF COMPLAINT  Chief Complaint   Patient presents with   • Palpitations   • Dizziness   • Generalized Body Aches   • Diarrhea       HPI  Sonya Wei is a 56 y.o. female with past medical history of hypertension, diabetes who presents to the Emergency Department for evaluation of worsening palpitations onset today. She states that her  passed away a year ago, and since then she was been experiencing increased anxiety and depression. Around this time, she began having episodes of palpitations, and notes one time she was brought into the ED for a suspected heart attack. But it was determined she did not have one. However, she notes that today her symptoms were different. She had new associated symptoms of trembling, dizziness, and difficulty speaking. She also notes that a few days ago she had an episode of blurry vision, but it has since resolved. She has a history of diabetes. She has not been able to monitor her blood sugar recently as she lost her machine. She is also currently taking lexapro for her depression and anxiety. However, she notes there was a period when she stopped taking it due to increased fatigue. She has seen a  psychiatrist before, but denies seeing a therapist regularly. She has been unable to present to work recently due to her symptoms. She has not been evaluated by a cardiologist for her symptoms, but she has an appointment in July. She also notes she is supposed to be evaluated by GI as well, and all of these issues and not being able to work has been contributing to her anxiety and depression. There are no known alleviating or exacerbating factors.     REVIEW OF SYSTEMS  Pertinent positive include palpitations, trembling, dizziness, and difficulty speaking.  All other systems reviewed and are  negative.      PAST MEDICAL HISTORY   has a past medical history of Anemia, Bowel habit changes, Diabetes, Diabetes (HCC), GERD (gastroesophageal reflux disease), Healthcare maintenance (9/27/2014), High cholesterol, Hyperlipidemia, Hypertension, Infectious disease, Jaundice (1999), Psychiatric problem, and Vaginal itching (8/27/2014).    SOCIAL HISTORY  Social History     Tobacco Use   • Smoking status: Never Smoker   • Smokeless tobacco: Never Used   Vaping Use   • Vaping Use: Never used   Substance and Sexual Activity   • Alcohol use: No   • Drug use: No   • Sexual activity: Yes     Partners: Male     Birth control/protection: None       SURGICAL HISTORY   has a past surgical history that includes cholecystectomy; primary c section; tubal coagulation laparoscopic bilateral; abdominal hysterectomy total; and ventral hernia repair robotic xi (N/A, 10/14/2016).    CURRENT MEDICATIONS  Home Medications     Reviewed by Everardo Garnica R.N. (Registered Nurse) on 05/24/22 at 1830  Med List Status: Partial   Medication Last Dose Status   albuterol 108 (90 Base) MCG/ACT Aero Soln inhalation aerosol  Active   Ascorbic Acid (VITAMIN C) 1000 MG Tab  Active   atorvastatin (LIPITOR) 20 MG Tab  Active   benzonatate (TESSALON) 100 MG Cap  Active   Cholecalciferol (VITAMIN D PO)  Active   escitalopram (LEXAPRO) 10 MG Tab  Active   gabapentin (NEURONTIN) 300 MG Cap  Active   glyBURIDE (DIABETA) 5 MG Tab  Active   lisinopril (PRINIVIL) 40 MG tablet  Active   metFORMIN (GLUCOPHAGE) 500 MG Tab  Active   naproxen (NAPROSYN) 500 MG Tab  Active   omeprazole (PRILOSEC) 20 MG delayed-release capsule  Active   ondansetron (ZOFRAN) 4 MG Tab tablet  Active   SITagliptin (JANUVIA) 100 MG Tab  Active   sumatriptan (IMITREX) 100 MG tablet  Active   topiramate (TOPAMAX) 50 MG tablet  Active   TRULICITY 1.5 MG/0.5ML Solution Pen-injector  Active                ALLERGIES  Allergies   Allergen Reactions   • Morphine Itching     Rxn = unknown  "  Bumps all over body       PHYSICAL EXAM   VITAL SIGNS: BP (!) 146/87   Pulse 84   Temp 36.2 °C (97.2 °F) (Temporal)   Resp 16   Ht 1.549 m (5' 1\")   Wt 83 kg (182 lb 15.7 oz)   SpO2 96%   BMI 34.57 kg/m²    Constitutional: Nontoxic appearing older female, alert in no apparent distress.  HENT: Normocephalic, Atraumatic. Bilateral external ears normal. Nose normal.  Moist mucous membranes.  Oropharynx clear.  Eyes: Pupils are equal and reactive. Conjunctiva normal.   Neck: Supple, full range of motion  Heart: Regular rate and rhythm.  No murmurs.    Lungs: No respiratory distress, normal work of breathing. Lungs clear to auscultation bilaterally.  Abdomen Soft, no distention.  No tenderness to palpation.  Musculoskeletal: Atraumatic. No obvious deformities noted.  No lower extremity edema.  Skin: Warm, Dry.  No erythema, No rash.   Neurologic: Alert and oriented x3.  Cranial nerves II-XII intact.  Strength 5/5 and sensation intact throughout all 4 extremities.  No pronator drift.  No dysmetria.  Normal speech. Normal gait. Moving all extremities spontaneously without focal deficits.  Psychiatric: Affect normal, Mood normal, Appears appropriate and not intoxicated.      DIAGNOSTIC STUDIES    EKG  EKG personally reviewed by myself in the absence of a cardiologist showed:  NSR with rate of 89  Normal axis and intervals  No ectopy or hypertrophy  No ST or T wave change  Impression: Right bundle branch block, uncharged from prior April, 2022.       LABS  Personally reviewed by me  Labs Reviewed   CBC WITH DIFFERENTIAL - Abnormal; Notable for the following components:       Result Value    RBC 3.89 (*)     Hemoglobin 11.9 (*)     Hematocrit 34.4 (*)     All other components within normal limits   COMP METABOLIC PANEL - Abnormal; Notable for the following components:    Potassium 5.6 (*)     Co2 17 (*)     Glucose 350 (*)     Bun 37 (*)     Alkaline Phosphatase 122 (*)     All other components within normal limits "   LIPASE - Abnormal; Notable for the following components:    Lipase 89 (*)     All other components within normal limits   URINALYSIS - Abnormal; Notable for the following components:    Glucose >=1000 (*)     Protein 300 (*)     All other components within normal limits   URINE MICROSCOPIC (W/UA) - Abnormal; Notable for the following components:    WBC 5-10 (*)     All other components within normal limits   TROPONIN   ESTIMATED GFR   TROPONIN         RADIOLOGY  Personally reviewed by me  DX-CHEST-PORTABLE (1 VIEW)   Final Result         1.  No acute cardiopulmonary disease.            ED COURSE  Vitals:    05/24/22 2038 05/24/22 2121 05/24/22 2200 05/24/22 2300   BP: (!) 156/73 (!) 146/87 (!) 175/86 (!) 156/78   Pulse: 88 84 88 74   Resp: 16 16 18 16   Temp:    36.3 °C (97.4 °F)   TempSrc:    Temporal   SpO2: 99% 96% 93% 95%   Weight:       Height:             Medications administered:  Medications   NS infusion 1,000 mL (1,000 mL Intravenous New Bag 5/24/22 2224)   LORazepam (ATIVAN) tablet 1 mg (1 mg Oral Given 5/24/22 2224)     Patient was given IV fluids for dehydration, high blood sugar.  IV hydration was used because oral hydration was not adequate alone.  Following fluid administration patient's symptoms were improved.    9:28 PM Patient seen and examined at bedside. The patient presents with palpitations. Ordered for Troponin, Urine Microscopic, CBC with differential, CMP, Lipase, Urinalysis, and EKG to evaluate. Patient will be treated with NS infusion 1,000 mL and ativan tablet 1 mg for her symptoms.       MEDICAL DECISION MAKING  Patient with history of hypertension, diabetes, depression/anxiety who presents with ongoing symptoms of palpitations for some time.  She is also had some nonspecific lightheadedness generalized body aches and diarrhea and was recently seen by her primary care physician for similar complaints.  She is afebrile, hypertensive on arrival with otherwise reassuring vital signs.   She has no focal neurologic deficits on exam concerning for stroke or TIA.  EKG shows right bundle branch block without change from prior.  Troponins are normal x2, symptoms do not seem consistent with ACS or pulmonary embolism.  Her labs show hyperglycemia without associated elevated anion gap or other electrolyte abnormalities.  No evidence of infectious process.  Abdominal exam is benign.  I feel that symptoms are more chronic in nature and may be related to increased stress/depression/anxiety.  She has a plan for outpatient follow-up with cardiology and mental health provider.  We discussed importance of ongoing continued outpatient care      11:21 PM- Upon reassessment, patient is resting comfortably with normal vital signs.  No new complaints at this time.  Discussed results with patient and/or family as well as importance of primary care follow up.  Given resources from behavioral health for therapy.  Patient understands plan of care and strict return precautions for new or changing symptoms.           The patient will return for new or worsening symptoms and is stable at the time of discharge.      DISPOSITION:  Patient will be discharged home in stable condition.    FOLLOW UP:  Fern Lawrence, [email or web address]  [address] [facility] [address] [phone]    Schedule an appointment as soon as possible for a visit       [facility], Emergency Dept  [address] [facility] [address] [phone]    If symptoms worsen        IMPRESSION  (R00.2) Palpitations  (R73.9) Hyperglycemia  (F41.9) Anxiety    Results, diagnoses, and treatment options were discussed with the patient and/or family. Patient verbalized understanding of plan of care.       Silver QUIROGA), am scribing for, and in the presence of, Ghazal Tobar M.D..    Electronically signed by: Silver Marie), 5/24/2022    Ghazal QUIROGA M.D. personally performed the services described in this  documentation, as scribed by Silver Herrera in my presence, and it is both accurate and complete.    The note accurately reflects work and decisions made by me.  Ghazal Tobar M.D.  5/25/2022  1:18 AM

## 2022-05-25 NOTE — DISCHARGE INSTRUCTIONS
You were seen in the Emergency Department for palpitations.    EKG, labs, chest xray were completed without significant acute abnormalities.    Please use 1,000mg of tylenol or 600mg of ibuprofen every 6 hours as needed for pain.    Please follow up with your primary care physician, mental health provider, and cardiology.    Return to the Emergency Department with chest pain, trouble breathing, fainting, or other concerns.

## 2022-05-26 ENCOUNTER — OFFICE VISIT (OUTPATIENT)
Dept: MEDICAL GROUP | Facility: PHYSICIAN GROUP | Age: 57
End: 2022-05-26
Payer: COMMERCIAL

## 2022-05-26 VITALS
BODY MASS INDEX: 34.63 KG/M2 | DIASTOLIC BLOOD PRESSURE: 82 MMHG | SYSTOLIC BLOOD PRESSURE: 130 MMHG | TEMPERATURE: 98.3 F | OXYGEN SATURATION: 96 % | HEIGHT: 61 IN | WEIGHT: 183.4 LBS | RESPIRATION RATE: 16 BRPM | HEART RATE: 91 BPM

## 2022-05-26 DIAGNOSIS — R00.2 PALPITATIONS: ICD-10-CM

## 2022-05-26 DIAGNOSIS — F33.1 MODERATE EPISODE OF RECURRENT MAJOR DEPRESSIVE DISORDER (HCC): ICD-10-CM

## 2022-05-26 DIAGNOSIS — R11.2 NON-INTRACTABLE VOMITING WITH NAUSEA, UNSPECIFIED VOMITING TYPE: ICD-10-CM

## 2022-05-26 PROCEDURE — 99213 OFFICE O/P EST LOW 20 MIN: CPT | Performed by: NURSE PRACTITIONER

## 2022-05-26 RX ORDER — HYDROXYZINE HYDROCHLORIDE 25 MG/1
12.5-25 TABLET, FILM COATED ORAL 3 TIMES DAILY PRN
Qty: 30 TABLET | Refills: 0 | Status: SHIPPED | OUTPATIENT
Start: 2022-05-26 | End: 2023-11-14 | Stop reason: SDUPTHER

## 2022-05-26 RX ORDER — MULTIVITAMIN
1 TABLET ORAL
COMMUNITY

## 2022-05-26 ASSESSMENT — FIBROSIS 4 INDEX: FIB4 SCORE: 1.22

## 2022-05-26 NOTE — ASSESSMENT & PLAN NOTE
Just picked up omeprazole yesterday for 2-week trial.  Not heard about scheduling consult with GI.  
Patient presents today for ER follow-up for palpitations.  Troponin levels, EKG, chest x-ray showed no cardiac etiology.  She reports that EKG was done while she was having that sensation, and no abnormalities were seen.  Patient reports continued intermittent palpitations.  Echocardiogram was ordered about 3 weeks ago.  Patient has not heard about scheduling.  She wonders if it is due to anxiety.  She does admit to not taking her Lexapro at times as it has caused extreme fatigue the next day.  This is affecting her work.  She has an upcoming appointment with her psychiatrist, Dr. Bright, next month.  She does admit to sometimes feeling like she is the 1 who should have  from COVID instead of her .  He passed away from [employer] in 2020.  
Please see additional notes under palpitations.  
Statement Selected

## 2022-05-26 NOTE — PATIENT INSTRUCTIONS
Call the main [facility] number ([phone]) and ask to schedule echocardiogram (ultrasound of your heart).    Take only a half a tab of escitalopram once a day, usually around dinnertime.  See if you are still really tired the next day.  It is okay to message Dr. Bright on your MyChart if it still is making you tired.    Try hydroxyzine (prescription sent to pharmacy) I have to a whole tab only if needed for anxiety, up to 3 times a day.

## 2022-05-26 NOTE — PROGRESS NOTES
CC: ER follow-up    HISTORY OF THE PRESENT ILLNESS: Patient is a 56 y.o. female. This pleasant patient is here today for evaluation and management of the following health problems.      Palpitations  Patient presents today for ER follow-up for palpitations.  Troponin levels, EKG, chest x-ray showed no cardiac etiology.  She reports that EKG was done while she was having that sensation, and no abnormalities were seen.  Patient reports continued intermittent palpitations.  Echocardiogram was ordered about 3 weeks ago.  Patient has not heard about scheduling.  She wonders if it is due to anxiety.  She does admit to not taking her Lexapro at times as it has caused extreme fatigue the next day.  This is affecting her work.  She has an upcoming appointment with her psychiatrist, Dr. Bright, next month.  She does admit to sometimes feeling like she is the 1 who should have  from COVID instead of her .  He passed away from [email or web address] in 2020.    Non-intractable vomiting  Just picked up omeprazole yesterday for 2-week trial.  Not heard about scheduling consult with GI.    Moderate episode of recurrent major depressive disorder (HCC)  Please see additional notes under palpitations.      Allergies: Morphine    Current Outpatient Medications Ordered in Epic   Medication Sig Dispense Refill   • Multiple Vitamin (MULTI VITAMIN DAILY) Tab Take  by mouth.     • hydrOXYzine HCl (ATARAX) 25 MG Tab Take 0.5-1 Tablets by mouth 3 times a day as needed for Anxiety. 30 Tablet 0   • omeprazole (PRILOSEC) 20 MG delayed-release capsule Take 1 Capsule by mouth every day. 30 Capsule 0   • ondansetron (ZOFRAN) 4 MG Tab tablet Take 1 Tablet by mouth every four hours as needed for Nausea/Vomiting. 20 Tablet 0   • benzonatate (TESSALON) 100 MG Cap Take 1 Capsule by mouth 3 times a day as needed for Cough. 30 Capsule 0   • topiramate (TOPAMAX) 50 MG tablet Take 1 Tablet by mouth 2 times a day for 30 days. 60 Tablet 5   • sumatriptan  (IMITREX) 100 MG tablet Take 100 mg at the onset of aura/HA; may re-dose x1 after 2 hrs if HA persists; MDD: 200 mg; do not use >2 days/week. 9 Tablet 5   • albuterol 108 (90 Base) MCG/ACT Aero Soln inhalation aerosol Inhale 2 Puffs every 6 hours as needed for Shortness of Breath. 8.5 g 0   • escitalopram (LEXAPRO) 10 MG Tab TAKE 1 & 1/2 (ONE & ONE-HALF) TABLETS BY MOUTH ONCE DAILY . APPOINTMENT REQUIRED FOR FUTURE REFILLS .  CALL  SCHEDULING  [phone] 21 Tablet 0   • TRULICITY 1.5 MG/0.5ML Solution Pen-injector INJECT 1 DOSE SUBCUTANEOUSLY ONCE A WEEK 12 mL 0   • glyBURIDE (DIABETA) 5 MG Tab TAKE 2 TABLETS BY MOUTH TWICE DAILY WITH MEALS 120 Tablet 0   • metFORMIN (GLUCOPHAGE) 500 MG Tab Take 2 Tablets by mouth 2 times a day with meals. 360 Tablet 1   • atorvastatin (LIPITOR) 20 MG Tab Take 1 tablet by mouth once daily 90 Tablet 3   • lisinopril (PRINIVIL) 40 MG tablet Take 1 tablet by mouth once daily 90 tablet 3   • gabapentin (NEURONTIN) 300 MG Cap Take 1 cap daily in the morning and 2 caps at bedtime. 180 capsule 1   • Ascorbic Acid (VITAMIN C) 1000 MG Tab Take 1 Tab by mouth every morning.     • Cholecalciferol (VITAMIN D PO) Take 2 Tablets by mouth every day. GUMMIES     • SITagliptin (JANUVIA) 100 MG Tab Take 1 Tablet by mouth every day. 90 Tablet 1   • naproxen (NAPROSYN) 500 MG Tab Take 1 tablet by mouth 2 times a day with meals. (Patient not taking: Reported on 5/26/2022) 60 tablet 2     No current Epic-ordered facility-administered medications on file.       Past Medical History:   Diagnosis Date   • Anemia    • Bowel habit changes     constipation   • Diabetes    • Diabetes (HCC)    • GERD (gastroesophageal reflux disease)    • Healthcare maintenance 9/27/2014    Mammogram: Due   • High cholesterol    • Hyperlipidemia    • Hypertension    • Infectious disease     Cold 10/2016   • Jaundice 1999   • Psychiatric problem     depression and anxiety   • Vaginal itching 8/27/2014    With anal itching X 1  "month Getting worse Burning Min vag disch       Past Surgical History:   Procedure Laterality Date   • VENTRAL HERNIA REPAIR ROBOTIC XI N/A 10/14/2016    Procedure: VENTRAL HERNIA REPAIR ROBOTIC XI FOR INCISIONAL W/MESH;  Surgeon: Edi Mendoza M.D.;  Location: SURGERY [facility];  Service:    • ABDOMINAL HYSTERECTOMY TOTAL      still has ovaries   • CHOLECYSTECTOMY     • PRIMARY C SECTION     • TUBAL COAGULATION LAPAROSCOPIC BILATERAL         Social History     Tobacco Use   • Smoking status: Never Smoker   • Smokeless tobacco: Never Used   Vaping Use   • Vaping Use: Never used   Substance Use Topics   • Alcohol use: No   • Drug use: No       Family History   Problem Relation Age of Onset   • Diabetes Mother    • Hyperlipidemia Mother    • Diabetes Father    • Hypertension Father    • Hyperlipidemia Father        ROS:   As in HPI, otherwise negative for chest pain, dyspnea, abdominal pain, dysuria, blood in stool, fever          Exam: /82   Pulse 91   Temp 36.8 °C (98.3 °F) (Temporal)   Resp 16   Ht 1.549 m (5' 1\")   Wt 83.2 kg (183 lb 6.4 oz)   SpO2 96%  Body mass index is 34.65 kg/m².    General: Alert, pleasant, well nourished, well developed female in NAD  Neck: Supple without bruit. Thyroid is not enlarged.  Pulmonary: Clear to ausculation.  Normal effort. No rales, ronchi, or wheezing.  Cardiovascular: Normal rate and rhythm without murmur. Carotid and radial pulses are intact and equal bilaterally.  No lower extremity edema.  Abdomen: Soft, nontender, nondistended. Normal bowel sounds. Liver and spleen are not palpable  Neurologic: Grossly nonfocal  Lymph: No cervical or supraclavicular lymph nodes are palpable  Skin: Warm and dry.    Musculoskeletal: Normal gait.   Psych: Normal mood and affect. Alert and oriented. Judgment and insight is normal.    Please note that this dictation was created using voice recognition software. I have made every reasonable attempt to correct obvious errors, " but I expect that there are errors of grammar and possibly content that I did not discover before finalizing the note.      Assessment/Plan  1. Palpitations  Has cardiology appointment set up for July.  She is on a waiting list.  In the meantime, patient will call and schedule echocardiogram that was previously ordered.  Sounds like this is most likely anxiety related.  Please see additional notes under #3.  Call the main [facility] number ([phone]) and ask to schedule echocardiogram (ultrasound of your heart).    2. Non-intractable vomiting with nausea, unspecified vomiting type  Patient will start omeprazole and ondansetron.  Referral to GI is still being processed by our referral department.    3. Moderate episode of recurrent major depressive disorder (HCC)  We will cut escitalopram to 5 mg in the evening.  I am hesitant to switch to a different antidepressant as she may have worsening side effects with next antidepressant.  Would like her to consult with her psychiatrist.  Palpitations may be related to anxiety.  She will trial hydroxyzine as needed.  I did caution her that it could cause drowsiness and to avoid driving a car or working if it makes her drowsy.  Take a half a tab to whole tab if needed.  Take only a half a tab of escitalopram once a day, usually around dinnertime.  See if you are still really tired the next day.  It is okay to message Dr. Bright on your MyChart if it still is making you tired.    Try hydroxyzine (prescription sent to pharmacy) I have to a whole tab only if needed for anxiety, up to 3 times a day.    Patient feels that she is okay to return to work.  No normalities on exam today.  Letter for return to work given to patient today.

## 2022-05-26 NOTE — LETTER
May 26, 2022    To Whom It May Concern:         This is confirmation that Sonya Wei attended her scheduled appointment with DECLAN Johnson on 5/26/22.     Ms. Wei is able to return to work 5/26/22 without restrictions.         If you have any questions please do not hesitate to call me at the phone number listed below.    Sincerely,          ELIZABETH Johnson.RLoriN.  [phone]

## 2022-06-12 DIAGNOSIS — M54.2 NECK PAIN: ICD-10-CM

## 2022-06-12 DIAGNOSIS — R51.9 NONINTRACTABLE HEADACHE, UNSPECIFIED CHRONICITY PATTERN, UNSPECIFIED HEADACHE TYPE: ICD-10-CM

## 2022-06-16 RX ORDER — GABAPENTIN 300 MG/1
CAPSULE ORAL
Qty: 180 CAPSULE | Refills: 1 | Status: SHIPPED | OUTPATIENT
Start: 2022-06-16 | End: 2022-08-01

## 2022-06-16 NOTE — TELEPHONE ENCOUNTER
Received request via: Pharmacy    Was the patient seen in the last year in this department? Yes, 5/26/2022    Does the patient have an active prescription (recently filled or refills available) for medication(s) requested? No

## 2022-06-20 ENCOUNTER — OFFICE VISIT (OUTPATIENT)
Dept: PHYSICAL MEDICINE AND REHAB | Facility: MEDICAL CENTER | Age: 57
End: 2022-06-20
Payer: COMMERCIAL

## 2022-06-20 VITALS
WEIGHT: 190.48 LBS | DIASTOLIC BLOOD PRESSURE: 88 MMHG | SYSTOLIC BLOOD PRESSURE: 140 MMHG | HEIGHT: 61 IN | TEMPERATURE: 97.6 F | BODY MASS INDEX: 35.96 KG/M2 | HEART RATE: 80 BPM | OXYGEN SATURATION: 80 %

## 2022-06-20 DIAGNOSIS — R20.0 NUMBNESS AND TINGLING IN LEFT ARM: ICD-10-CM

## 2022-06-20 DIAGNOSIS — R20.2 NUMBNESS AND TINGLING OF RIGHT ARM: ICD-10-CM

## 2022-06-20 DIAGNOSIS — M79.10 MYALGIA: ICD-10-CM

## 2022-06-20 DIAGNOSIS — Z13.31 POSITIVE DEPRESSION SCREENING: ICD-10-CM

## 2022-06-20 DIAGNOSIS — R20.0 NUMBNESS AND TINGLING OF RIGHT ARM: ICD-10-CM

## 2022-06-20 DIAGNOSIS — Z59.9 FINANCIAL DIFFICULTIES: ICD-10-CM

## 2022-06-20 DIAGNOSIS — R20.2 NUMBNESS AND TINGLING IN LEFT ARM: ICD-10-CM

## 2022-06-20 DIAGNOSIS — M54.2 CERVICALGIA: ICD-10-CM

## 2022-06-20 PROCEDURE — 99204 OFFICE O/P NEW MOD 45 MIN: CPT | Performed by: STUDENT IN AN ORGANIZED HEALTH CARE EDUCATION/TRAINING PROGRAM

## 2022-06-20 RX ORDER — TIZANIDINE 4 MG/1
2-4 TABLET ORAL NIGHTLY PRN
Qty: 30 TABLET | Refills: 2 | Status: SHIPPED | OUTPATIENT
Start: 2022-06-20 | End: 2023-11-14 | Stop reason: SDUPTHER

## 2022-06-20 SDOH — ECONOMIC STABILITY - INCOME SECURITY: PROBLEM RELATED TO HOUSING AND ECONOMIC CIRCUMSTANCES, UNSPECIFIED: Z59.9

## 2022-06-20 ASSESSMENT — PATIENT HEALTH QUESTIONNAIRE - PHQ9
5. POOR APPETITE OR OVEREATING: 1 - SEVERAL DAYS
CLINICAL INTERPRETATION OF PHQ2 SCORE: 2
SUM OF ALL RESPONSES TO PHQ QUESTIONS 1-9: 10

## 2022-06-20 ASSESSMENT — FIBROSIS 4 INDEX: FIB4 SCORE: 1.22

## 2022-06-20 ASSESSMENT — PAIN SCALES - GENERAL: PAINLEVEL: 2=MINIMAL-SLIGHT
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
## 2022-06-23 ENCOUNTER — OFFICE VISIT (OUTPATIENT)
Dept: URGENT CARE | Facility: PHYSICIAN GROUP | Age: 57
End: 2022-06-23
Payer: COMMERCIAL

## 2022-06-23 VITALS
BODY MASS INDEX: 35.87 KG/M2 | TEMPERATURE: 99.1 F | DIASTOLIC BLOOD PRESSURE: 90 MMHG | SYSTOLIC BLOOD PRESSURE: 178 MMHG | OXYGEN SATURATION: 95 % | HEART RATE: 96 BPM | WEIGHT: 190 LBS | RESPIRATION RATE: 16 BRPM | HEIGHT: 61 IN

## 2022-06-23 DIAGNOSIS — U07.1 COVID-19 VIRUS INFECTION: ICD-10-CM

## 2022-06-23 DIAGNOSIS — G43.909 MIGRAINE WITHOUT STATUS MIGRAINOSUS, NOT INTRACTABLE, UNSPECIFIED MIGRAINE TYPE: ICD-10-CM

## 2022-06-23 DIAGNOSIS — I10 HYPERTENSION, UNSPECIFIED TYPE: ICD-10-CM

## 2022-06-23 DIAGNOSIS — R05.9 COUGH: ICD-10-CM

## 2022-06-23 LAB
EXTERNAL QUALITY CONTROL: ABNORMAL
FLUAV+FLUBV AG SPEC QL IA: NEGATIVE
INT CON NEG: NORMAL
INT CON POS: NORMAL
SARS-COV+SARS-COV-2 AG RESP QL IA.RAPID: POSITIVE

## 2022-06-23 PROCEDURE — 87804 INFLUENZA ASSAY W/OPTIC: CPT | Performed by: PHYSICIAN ASSISTANT

## 2022-06-23 PROCEDURE — 99214 OFFICE O/P EST MOD 30 MIN: CPT | Mod: CS | Performed by: PHYSICIAN ASSISTANT

## 2022-06-23 PROCEDURE — 87426 SARSCOV CORONAVIRUS AG IA: CPT | Performed by: PHYSICIAN ASSISTANT

## 2022-06-23 RX ORDER — PREDNISONE 10 MG/1
30 TABLET ORAL 2 TIMES DAILY
Qty: 30 TABLET | Refills: 0 | Status: SHIPPED | OUTPATIENT
Start: 2022-06-23 | End: 2022-06-23

## 2022-06-23 RX ORDER — DEXAMETHASONE SODIUM PHOSPHATE 10 MG/ML
10 INJECTION INTRAMUSCULAR; INTRAVENOUS ONCE
Status: COMPLETED | OUTPATIENT
Start: 2022-06-23 | End: 2022-06-23

## 2022-06-23 RX ORDER — KETOROLAC TROMETHAMINE 30 MG/ML
30 INJECTION, SOLUTION INTRAMUSCULAR; INTRAVENOUS ONCE
Status: COMPLETED | OUTPATIENT
Start: 2022-06-23 | End: 2022-06-23

## 2022-06-23 RX ORDER — DIPHENHYDRAMINE HYDROCHLORIDE 50 MG/ML
25 INJECTION INTRAMUSCULAR; INTRAVENOUS ONCE
Status: COMPLETED | OUTPATIENT
Start: 2022-06-23 | End: 2022-06-23

## 2022-06-23 RX ADMIN — DEXAMETHASONE SODIUM PHOSPHATE 10 MG: 10 INJECTION INTRAMUSCULAR; INTRAVENOUS at 16:46

## 2022-06-23 RX ADMIN — KETOROLAC TROMETHAMINE 30 MG: 30 INJECTION, SOLUTION INTRAMUSCULAR; INTRAVENOUS at 16:47

## 2022-06-23 RX ADMIN — DIPHENHYDRAMINE HYDROCHLORIDE 25 MG: 50 INJECTION INTRAMUSCULAR; INTRAVENOUS at 16:48

## 2022-06-23 ASSESSMENT — ENCOUNTER SYMPTOMS
NAUSEA: 0
FEVER: 1
SHORTNESS OF BREATH: 1
WHEEZING: 0
VOMITING: 0
DIARRHEA: 0
DIZZINESS: 0
SINUS PAIN: 0
COUGH: 1
CHILLS: 1
EYE REDNESS: 0
CONSTIPATION: 0
EYE PAIN: 0
DIAPHORESIS: 1
EYE DISCHARGE: 0
HEADACHES: 0
SORE THROAT: 0
ABDOMINAL PAIN: 0

## 2022-06-23 ASSESSMENT — PAIN SCALES - GENERAL: PAINLEVEL: 6=MODERATE PAIN

## 2022-06-23 ASSESSMENT — FIBROSIS 4 INDEX: FIB4 SCORE: 1.22

## 2022-06-23 NOTE — PROGRESS NOTES
Subjective:     Sonya Wei  is a 56 y.o. female who presents for Cough (Pt states lungs hurts while coughing ), Wheezing, Migraine (/), and Body Aches (Pt states whole body hurts )       She presents today with chills/sweats, headache, cough, sneezing, subjective fever that has been ongoing for the last 2 days.  She has associated chest pain shortness of breath during his coughing episodes.  At this time she denies nausea/vomiting, abdominal pain.  She has multiple close sick contacts with COVID.  She is requesting a COVID test at this time.  Denies underlying asthma or COPD       She does feel that her current headache is similar to those previously experienced.  She does have a history of migraines and does take medications for the migraines.  She has trialed these medications without symptom improvement.     Review of Systems   Constitutional: Positive for chills, diaphoresis and fever. Negative for malaise/fatigue.   HENT: Negative for congestion, ear discharge, sinus pain and sore throat.    Eyes: Negative for pain, discharge and redness.   Respiratory: Positive for cough and shortness of breath (With coughing episodes). Negative for wheezing.    Cardiovascular: Positive for chest pain (With coughing episodes).   Gastrointestinal: Negative for abdominal pain, constipation, diarrhea, nausea and vomiting.   Genitourinary: Negative for dysuria, frequency and urgency.   Neurological: Negative for dizziness and headaches.      Allergies   Allergen Reactions   • Morphine Itching     Rxn = unknown   Bumps all over body     Past Medical History:   Diagnosis Date   • Anemia    • Bowel habit changes     constipation   • Diabetes    • Diabetes (HCC)    • GERD (gastroesophageal reflux disease)    • Healthcare maintenance 9/27/2014    Mammogram: Due   • High cholesterol    • Hyperlipidemia    • Hypertension    • Infectious disease     Cold 10/2016   • Jaundice 1999   • Psychiatric problem     depression and  "anxiety   • Vaginal itching 8/27/2014    With anal itching X 1 month Getting worse Burning Min vag disch        Objective:   BP (!) 178/90   Pulse 96   Temp 37.3 °C (99.1 °F) (Temporal)   Resp 16   Ht 1.549 m (5' 1\")   Wt 86.2 kg (190 lb)   SpO2 95%   BMI 35.90 kg/m²   Physical Exam  Vitals and nursing note reviewed.   Constitutional:       General: She is not in acute distress.     Appearance: Normal appearance. She is not ill-appearing, toxic-appearing or diaphoretic.   HENT:      Head: Normocephalic.      Right Ear: Tympanic membrane, ear canal and external ear normal. There is no impacted cerumen.      Left Ear: Tympanic membrane, ear canal and external ear normal. There is no impacted cerumen.      Nose: No congestion or rhinorrhea.      Mouth/Throat:      Mouth: Mucous membranes are moist.      Pharynx: No oropharyngeal exudate or posterior oropharyngeal erythema.   Eyes:      General:         Right eye: No discharge.         Left eye: No discharge.      Conjunctiva/sclera: Conjunctivae normal.   Cardiovascular:      Rate and Rhythm: Normal rate and regular rhythm.   Pulmonary:      Effort: Pulmonary effort is normal. No respiratory distress.      Breath sounds: Normal breath sounds. No stridor. No wheezing or rhonchi.   Musculoskeletal:      Cervical back: Neck supple.   Lymphadenopathy:      Cervical: No cervical adenopathy.   Neurological:      General: No focal deficit present.      Mental Status: She is alert and oriented to person, place, and time.   Psychiatric:         Mood and Affect: Mood normal.         Behavior: Behavior normal.         Thought Content: Thought content normal.         Judgment: Judgment normal.             Diagnostic testing:    POC COVID-positive    POC influenza-negative    Assessment/Plan:     Encounter Diagnoses   Name Primary?   • Cough    • COVID-19 virus infection    • Hypertension, unspecified type         Plan for care for today's complaint includes Toradol and " Benadryl injection today in office for her migraine.  Dexamethasone 10 mg oral drink in office today for her respiratory symptoms.  She does have a history of diabetes and is taking multiple diabetic medications, I did discuss the risks of doing an oral steroid drink in office today, patient states that she will continue to monitor her blood sugars at home.  She should return to the urgent care or follow-up in the emergency department if blood sugars get significantly elevated or if she experiences signs of DKA..    Patient does have hypertension at today's visit, we did discuss risks of untreated hypertension.  We will have her follow-up with her primary care within the next 1-2 weeks for hypertension.    See AVS Instructions below for written guidance provided to patient on after-visit management and care in addition to our verbal discussion during the visit.    Please note that this dictation was created using voice recognition software. I have attempted to correct all errors, but there may be sound-alike, spelling, grammar and possibly content errors that I did not discover before finalizing the note.    [place] PA-C

## 2022-06-27 ENCOUNTER — TELEPHONE (OUTPATIENT)
Dept: URGENT CARE | Facility: PHYSICIAN GROUP | Age: 57
End: 2022-06-27

## 2022-06-27 DIAGNOSIS — U07.1 COVID-19 VIRUS INFECTION: ICD-10-CM

## 2022-06-27 DIAGNOSIS — R05.9 COUGH: ICD-10-CM

## 2022-06-27 RX ORDER — BENZONATATE 100 MG/1
100 CAPSULE ORAL 3 TIMES DAILY PRN
Qty: 60 CAPSULE | Refills: 0 | Status: SHIPPED | OUTPATIENT
Start: 2022-06-27 | End: 2022-08-30

## 2022-06-27 NOTE — TELEPHONE ENCOUNTER
Did reach out to the patient via phone call, did discuss the prescriptions.  A cough medication was sent to the pharmacy

## 2022-06-27 NOTE — PROGRESS NOTES
Patient did reach out via phone call, I did return her phone call and after discussing her current symptoms she is having an ongoing cough.  I will prescribe Tessalon Perles at this time.  Did inform the patient that I will be sending it to the pharmacy and that she can pick it up once it is available.  If symptoms remain ongoing she should return to the urgent care at that time for reevaluation

## 2022-06-28 ENCOUNTER — APPOINTMENT (OUTPATIENT)
Dept: BEHAVIORAL HEALTH | Facility: CLINIC | Age: 57
End: 2022-06-28
Payer: COMMERCIAL

## 2022-07-07 ENCOUNTER — TELEMEDICINE (OUTPATIENT)
Dept: BEHAVIORAL HEALTH | Facility: CLINIC | Age: 57
End: 2022-07-07
Payer: COMMERCIAL

## 2022-07-07 VITALS — SYSTOLIC BLOOD PRESSURE: 128 MMHG | OXYGEN SATURATION: 96 % | HEART RATE: 82 BPM | DIASTOLIC BLOOD PRESSURE: 88 MMHG

## 2022-07-07 DIAGNOSIS — E11.65 UNCONTROLLED TYPE 2 DIABETES MELLITUS WITH HYPERGLYCEMIA (HCC): ICD-10-CM

## 2022-07-07 DIAGNOSIS — R51.9 NONINTRACTABLE HEADACHE, UNSPECIFIED CHRONICITY PATTERN, UNSPECIFIED HEADACHE TYPE: ICD-10-CM

## 2022-07-07 DIAGNOSIS — F33.1 MODERATE EPISODE OF RECURRENT MAJOR DEPRESSIVE DISORDER (HCC): ICD-10-CM

## 2022-07-07 DIAGNOSIS — R00.2 PALPITATIONS: ICD-10-CM

## 2022-07-07 PROCEDURE — 90833 PSYTX W PT W E/M 30 MIN: CPT | Mod: GT | Performed by: PSYCHIATRY & NEUROLOGY

## 2022-07-07 PROCEDURE — 99214 OFFICE O/P EST MOD 30 MIN: CPT | Mod: GT | Performed by: PSYCHIATRY & NEUROLOGY

## 2022-07-07 RX ORDER — ESCITALOPRAM OXALATE 10 MG/1
TABLET ORAL
Qty: 90 TABLET | Refills: 1 | Status: SHIPPED | OUTPATIENT
Start: 2022-07-07 | End: 2022-10-10
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
## 2022-07-12 ENCOUNTER — TELEPHONE (OUTPATIENT)
Dept: CARDIOLOGY | Facility: MEDICAL CENTER | Age: 57
End: 2022-07-12
Payer: COMMERCIAL

## 2022-07-12 NOTE — TELEPHONE ENCOUNTER
Chart Prep    LVM for patient to CB in regards to requesting outside records for NP appt with Dr. Franco.  Primarily records pertaining to prior cardiologist, cardiac testing/imaging done outside of [facility] and when/where the patient most recently had labs done.  LVM to CB @[phone].

## 2022-07-18 ENCOUNTER — NON-PROVIDER VISIT (OUTPATIENT)
Dept: CARDIOLOGY | Facility: MEDICAL CENTER | Age: 57
End: 2022-07-18
Payer: COMMERCIAL

## 2022-07-18 ENCOUNTER — OFFICE VISIT (OUTPATIENT)
Dept: CARDIOLOGY | Facility: MEDICAL CENTER | Age: 57
End: 2022-07-18
Attending: NURSE PRACTITIONER
Payer: COMMERCIAL

## 2022-07-18 VITALS
OXYGEN SATURATION: 96 % | HEART RATE: 89 BPM | HEIGHT: 61 IN | RESPIRATION RATE: 13 BRPM | WEIGHT: 181 LBS | SYSTOLIC BLOOD PRESSURE: 110 MMHG | DIASTOLIC BLOOD PRESSURE: 70 MMHG | BODY MASS INDEX: 34.17 KG/M2

## 2022-07-18 DIAGNOSIS — I10 ESSENTIAL HYPERTENSION: ICD-10-CM

## 2022-07-18 DIAGNOSIS — R00.2 PALPITATIONS: ICD-10-CM

## 2022-07-18 DIAGNOSIS — E78.5 DYSLIPIDEMIA: ICD-10-CM

## 2022-07-18 DIAGNOSIS — I45.10 RBBB: ICD-10-CM

## 2022-07-18 DIAGNOSIS — I49.1 APC (ATRIAL PREMATURE CONTRACTIONS): ICD-10-CM

## 2022-07-18 DIAGNOSIS — I49.3 PVC'S (PREMATURE VENTRICULAR CONTRACTIONS): ICD-10-CM

## 2022-07-18 DIAGNOSIS — E11.9 TYPE 2 DIABETES MELLITUS WITHOUT COMPLICATION, WITHOUT LONG-TERM CURRENT USE OF INSULIN (HCC): ICD-10-CM

## 2022-07-18 LAB — EKG IMPRESSION: NORMAL

## 2022-07-18 PROCEDURE — 99204 OFFICE O/P NEW MOD 45 MIN: CPT | Performed by: INTERNAL MEDICINE

## 2022-07-18 PROCEDURE — 93000 ELECTROCARDIOGRAM COMPLETE: CPT | Performed by: INTERNAL MEDICINE

## 2022-07-18 ASSESSMENT — ENCOUNTER SYMPTOMS
EXCESSIVE DAYTIME SLEEPINESS: 0
DIZZINESS: 0
FLANK PAIN: 0
SHORTNESS OF BREATH: 1
SLEEP DISTURBANCES DUE TO BREATHING: 0
NEAR-SYNCOPE: 0
HEADACHES: 1
COUGH: 1
DIAPHORESIS: 0
IRREGULAR HEARTBEAT: 0
PARESTHESIAS: 0
BLURRED VISION: 0
PALPITATIONS: 1
HEARTBURN: 1
DYSPNEA ON EXERTION: 0
MYALGIAS: 1
BLOATING: 0
NUMBNESS: 0
LIGHT-HEADEDNESS: 0
BACK PAIN: 1
DIARRHEA: 1
WEAKNESS: 1
VOMITING: 1
DOUBLE VISION: 0
CHILLS: 1
WHEEZING: 0
ORTHOPNEA: 0
SYNCOPE: 0
NIGHT SWEATS: 0
CONSTIPATION: 1
NECK PAIN: 1
SORE THROAT: 0
PND: 0
FEVER: 0
ABDOMINAL PAIN: 1
LOSS OF BALANCE: 0
NAUSEA: 1
FALLS: 0
DECREASED APPETITE: 0

## 2022-07-18 ASSESSMENT — FIBROSIS 4 INDEX: FIB4 SCORE: 1.22

## 2022-07-18 NOTE — PROGRESS NOTES
Patient enrolled in 14 day XT Zio Patch program per DA.  In clinic hookup.  >Currently pending EOS.  Monitor serial number: [id number]

## 2022-07-18 NOTE — PROGRESS NOTES
Cardiology Initial Consultation Note    Date of note:    7/18/2022    Primary Care Provider: DECLAN Johnson  Referring Provider: Fern Lawrence, *     Patient Name: Sonya Wei   YOB: 1965  MRN:              [id number]    Chief Complaint   Patient presents with   • Palpitations   • Hypertension   • Abnormal EKG       History of Present Illness: Ms. Sonya Wei is a 56 y.o. female whose current medical problems include HTN and DM who is here for cardiac consultation for chest pain and palpitations.    Patient states that she has been dealing with chest pain and palpitations for several years.  Has had multiple stress tests in the past 10 years with 3 nuclear stress tests in 2015, 2018 and 2020 which showed no ischemia or infarction.  Went to [facility] ER in April 2022 with concern for a heart attack.  Cardiac workup was unremarkable and she was discharged home.    In regards to palpitations, has had on and off palpitations for many years.  Has never had a cardiac event monitor to evaluate underlying rhythm during these events.  Currently, has episodes 2-3 times per week with no alleviating or exacerbating factors and no clear triggers either.  Denies associated symptoms of lightheadedness, dizziness, presyncope or syncopal events.    Cardiovascular Risk Factors:  1. Smoking status: Never smoker  2. Type II Diabetes Mellitus: Yes  Lab Results   Component Value Date/Time    HBA1C 7.7 (H) 05/16/2022 09:59 AM    HBA1C 7.3 (H) 11/17/2021 01:06 PM     3. Hypertension: Yes  4. Dyslipidemia: Yes  Cholesterol,Tot   Date Value Ref Range Status   05/16/2022 167 100 - 199 mg/dL Final     LDL   Date Value Ref Range Status   05/16/2022 88 <100 mg/dL Final     HDL   Date Value Ref Range Status   05/16/2022 44 >=40 mg/dL Final     Triglycerides   Date Value Ref Range Status   05/16/2022 174 (H) 0 - 149 mg/dL Final     5. Family history of early Coronary Artery Disease in a first  degree relative (Male less than 55 years of age; Female less than 65 years of age): Denies  6.  Obesity and/or Metabolic Syndrome: BMI 34  7. Sedentary lifestyle: No    Review of Systems   Constitutional: Positive for chills. Negative for decreased appetite, diaphoresis, fever, malaise/fatigue and night sweats.   HENT: Positive for tinnitus. Negative for congestion and sore throat.    Eyes: Negative for blurred vision and double vision.   Cardiovascular: Positive for chest pain and palpitations. Negative for cyanosis, dyspnea on exertion, irregular heartbeat, leg swelling, near-syncope, orthopnea, paroxysmal nocturnal dyspnea and syncope.   Respiratory: Positive for cough and shortness of breath. Negative for sleep disturbances due to breathing and wheezing.    Endocrine: Negative for cold intolerance and heat intolerance.   Musculoskeletal: Positive for back pain, myalgias and neck pain. Negative for falls.   Gastrointestinal: Positive for abdominal pain, constipation, diarrhea, heartburn, nausea and vomiting. Negative for bloating.   Genitourinary: Negative for dysuria and flank pain.   Neurological: Positive for headaches and weakness. Negative for excessive daytime sleepiness, dizziness, light-headedness, loss of balance, numbness and paresthesias.       Past Medical History:   Diagnosis Date   • Anemia    • Bowel habit changes     constipation   • Diabetes    • Diabetes (HCC)    • GERD (gastroesophageal reflux disease)    • Healthcare maintenance 9/27/2014    Mammogram: Due   • High cholesterol    • Hyperlipidemia    • Hypertension    • Infectious disease     Cold 10/2016   • Jaundice 1999   • Psychiatric problem     depression and anxiety   • Vaginal itching 8/27/2014    With anal itching X 1 month Getting worse Burning Min vag disch         Past Surgical History:   Procedure Laterality Date   • VENTRAL HERNIA REPAIR ROBOTIC XI N/A 10/14/2016    Procedure: VENTRAL HERNIA REPAIR ROBOTIC XI FOR INCISIONAL  W/MESH;  Surgeon: Edi Mendoza M.D.;  Location: SURGERY [facility];  Service:    • ABDOMINAL HYSTERECTOMY TOTAL      still has ovaries   • CHOLECYSTECTOMY     • PRIMARY C SECTION     • TUBAL COAGULATION LAPAROSCOPIC BILATERAL           Current Outpatient Medications   Medication Sig Dispense Refill   • Cyanocobalamin (VITAMIN B-12 PO) Take 1 Tablet by mouth every day.     • escitalopram (LEXAPRO) 10 MG Tab TAKE 1 TABLET BY MOUTH ONCE DAILY 90 Tablet 1   • benzonatate (TESSALON) 100 MG Cap Take 1 Capsule by mouth 3 times a day as needed for Cough. 60 Capsule 0   • ASPIRIN 81 PO Take 81 mg by mouth every day.     • tizanidine (ZANAFLEX) 4 MG Tab Take 0.5-1 Tablets by mouth at bedtime as needed (muscle spasms). 30 Tablet 2   • gabapentin (NEURONTIN) 300 MG Cap Take 1 capsule in the morning and 2 capsules at bedtime. 180 Capsule 1   • Multiple Vitamin (MULTI VITAMIN DAILY) Tab Take  by mouth.     • hydrOXYzine HCl (ATARAX) 25 MG Tab Take 0.5-1 Tablets by mouth 3 times a day as needed for Anxiety. 30 Tablet 0   • omeprazole (PRILOSEC) 20 MG delayed-release capsule Take 1 Capsule by mouth every day. 30 Capsule 0   • ondansetron (ZOFRAN) 4 MG Tab tablet Take 1 Tablet by mouth every four hours as needed for Nausea/Vomiting. 20 Tablet 0   • SITagliptin (JANUVIA) 100 MG Tab Take 1 Tablet by mouth every day. 90 Tablet 1   • albuterol 108 (90 Base) MCG/ACT Aero Soln inhalation aerosol Inhale 2 Puffs every 6 hours as needed for Shortness of Breath. 8.5 g 0   • TRULICITY 1.5 MG/0.5ML Solution Pen-injector INJECT 1 DOSE SUBCUTANEOUSLY ONCE A WEEK 12 mL 0   • glyBURIDE (DIABETA) 5 MG Tab TAKE 2 TABLETS BY MOUTH TWICE DAILY WITH MEALS 120 Tablet 0   • metFORMIN (GLUCOPHAGE) 500 MG Tab Take 2 Tablets by mouth 2 times a day with meals. 360 Tablet 1   • atorvastatin (LIPITOR) 20 MG Tab Take 1 tablet by mouth once daily 90 Tablet 3   • lisinopril (PRINIVIL) 40 MG tablet Take 1 tablet by mouth once daily 90 tablet 3   • Ascorbic  "Acid (VITAMIN C) 1000 MG Tab Take 1 Tab by mouth every morning.     • Cholecalciferol (VITAMIN D PO) Take 2 Tablets by mouth every day. GUMMIES       No current facility-administered medications for this visit.         Allergies   Allergen Reactions   • Morphine Itching     Rxn = unknown   Bumps all over body         Family History   Problem Relation Age of Onset   • Diabetes Mother    • Hyperlipidemia Mother    • Diabetes Father    • Hypertension Father    • Hyperlipidemia Father          Social History     Socioeconomic History   • Marital status:      Spouse name: Not on file   • Number of children: Not on file   • Years of education: Not on file   • Highest education level: Not on file   Occupational History   • Not on file   Tobacco Use   • Smoking status: Never Smoker   • Smokeless tobacco: Never Used   Vaping Use   • Vaping Use: Never used   Substance and Sexual Activity   • Alcohol use: No   • Drug use: No   • Sexual activity: Yes     Partners: Male     Birth control/protection: None   Other Topics Concern   • Not on file   Social History Narrative    ** Merged History Encounter **          Social Determinants of Health     Financial Resource Strain: Not on file   Food Insecurity: Not on file   Transportation Needs: Not on file   Physical Activity: Not on file   Stress: Not on file   Social Connections: Not on file   Intimate Partner Violence: Not on file   Housing Stability: Not on file         Physical Exam:  Ambulatory Vitals  /70 (BP Location: Left arm, Patient Position: Sitting, BP Cuff Size: Adult)   Pulse 89   Resp 13   Ht 1.549 m (5' 1\")   Wt 82.1 kg (181 lb)   SpO2 96%    Oxygen Therapy:  Pulse Oximetry: 96 %  BP Readings from Last 4 Encounters:   07/18/22 110/70   06/23/22 (!) 178/90   06/20/22 (!) 140/88   05/26/22 130/82       Weight/BMI: Body mass index is 34.2 kg/m².  Wt Readings from Last 4 Encounters:   07/18/22 82.1 kg (181 lb)   06/23/22 86.2 kg (190 lb)   06/20/22 86.4 kg " (190 lb 7.6 oz)   05/26/22 83.2 kg (183 lb 6.4 oz)       General: No acute distress. Well nourished.  HEENT: EOM grossly intact, no scleral icterus, no pharyngeal erythema.   Neck:  Trachea midline, no visible masses  CVS: RRR. No JVD at 90  Resp: Normal respiratory effort. Normal appearing chest.  Abdomen: Soft, NT, ND  MSK/Ext: No clubbing or cyanosis.  Skin: Dry appearing, no rashes.  Neurological: CN III-XII grossly intact. No focal deficits.   Psych: A&O x 3, appropriate affect, good judgement      Lab Data Review:  Lab Results   Component Value Date/Time    CHOLSTRLTOT 167 05/16/2022 09:59 AM    LDL 88 05/16/2022 09:59 AM    HDL 44 05/16/2022 09:59 AM    TRIGLYCERIDE 174 (H) 05/16/2022 09:59 AM       Lab Results   Component Value Date/Time    SODIUM 136 05/24/2022 06:51 PM    POTASSIUM 5.6 (H) 05/24/2022 06:51 PM    CHLORIDE 107 05/24/2022 06:51 PM    CO2 17 (L) 05/24/2022 06:51 PM    GLUCOSE 350 (H) 05/24/2022 06:51 PM    BUN 37 (H) 05/24/2022 06:51 PM    CREATININE 0.97 05/24/2022 06:51 PM     Lab Results   Component Value Date/Time    ALKPHOSPHAT 122 (H) 05/24/2022 06:51 PM    ASTSGOT 28 05/24/2022 06:51 PM    ALTSGPT 19 05/24/2022 06:51 PM    TBILIRUBIN <0.2 05/24/2022 06:51 PM      Lab Results   Component Value Date/Time    WBC 8.7 05/24/2022 06:51 PM     Lab Results   Component Value Date/Time    HBA1C 7.7 (H) 05/16/2022 09:59 AM    HBA1C 7.3 (H) 11/17/2021 01:06 PM         Cardiac Imaging and Procedures Review:    EKG dated 7/18/2022: My personal interpretation is sinus rhythm with RBBB    EKG dated 8/13/2020: My personal interpretation is sinus rhythm with RBBB    Nuclear Perfusion Imaging (8/13/2020):   No evidence of ischemia or infarction    Nuclear Perfusion Imaging (10/20/2018):   No evidence of ischemia or infarction      Radiology test Review:  CXR: No acute cardiopulmonary abnormality noted.      Assessment & Plan     1. RBBB  EKG    EC-ECHOCARDIOGRAM COMPLETE W/O CONT   2. Palpitations   Cardiac Event Monitor    EC-ECHOCARDIOGRAM COMPLETE W/O CONT   3. Type 2 diabetes mellitus without complication, without long-term current use of insulin (HCC)     4. Essential hypertension     5. Dyslipidemia           Shared Medical Decision Making:  Obtain transthoracic echocardiogram to evaluate underlying cardiac structure and function given ongoing episodes of palpitations and right bundle branch block.  Rule out structural or valvular heart abnormality.    Obtain 14 days cardiac event monitor to rule out any underlying arrhythmia associated with symptoms.  Encouraged patient to trigger the device and write down her symptoms to best correlate them with underlying rhythm to which she voices understanding.    Discussed A1c which has increased from before.  Is aware and has made lifestyle modifications to try and decrease A1c to less than 7.    Blood pressure well controlled.  Continue lisinopril 40 mg daily.    Lipid panel reviewed.  Lipitor was recently increased to 20 mg.  Advised to follow-up in 3 months with goal LDL less than 70.      All of patient's excellent questions were answered to the best of my knowledge and to her satisfaction.  It was a pleasure seeing Ms. Sonya Wei in my clinic today. Return in about 1 year (around 7/18/2023). Patient is aware to call the cardiology clinic with any questions or concerns.      Philipp Franco MD  [facility]  [facility], [address]  [address] [address]  Phone: [phone]  Fax: [phone]    Please note that this dictation was created using voice recognition software. I have made every reasonable attempt to correct obvious errors, but it is possible there are errors of grammar and possibly content that I did not discover before finalizing the note.

## 2022-07-20 ENCOUNTER — OFFICE VISIT (OUTPATIENT)
Dept: MEDICAL GROUP | Facility: PHYSICIAN GROUP | Age: 57
End: 2022-07-20
Payer: COMMERCIAL

## 2022-07-20 VITALS
HEART RATE: 92 BPM | RESPIRATION RATE: 14 BRPM | SYSTOLIC BLOOD PRESSURE: 118 MMHG | TEMPERATURE: 97.4 F | DIASTOLIC BLOOD PRESSURE: 80 MMHG | WEIGHT: 178 LBS | BODY MASS INDEX: 33.61 KG/M2 | HEIGHT: 61 IN | OXYGEN SATURATION: 96 %

## 2022-07-20 DIAGNOSIS — F33.1 MODERATE EPISODE OF RECURRENT MAJOR DEPRESSIVE DISORDER (HCC): ICD-10-CM

## 2022-07-20 DIAGNOSIS — E66.9 OBESITY (BMI 30-39.9): ICD-10-CM

## 2022-07-20 DIAGNOSIS — H92.02 LEFT EAR PAIN: ICD-10-CM

## 2022-07-20 DIAGNOSIS — H66.90 ACUTE OTITIS MEDIA, UNSPECIFIED OTITIS MEDIA TYPE: ICD-10-CM

## 2022-07-20 DIAGNOSIS — L30.9 ECZEMA, UNSPECIFIED TYPE: ICD-10-CM

## 2022-07-20 PROCEDURE — 99214 OFFICE O/P EST MOD 30 MIN: CPT | Performed by: NURSE PRACTITIONER

## 2022-07-20 RX ORDER — AMOXICILLIN 500 MG/1
500 CAPSULE ORAL 3 TIMES DAILY
Qty: 21 CAPSULE | Refills: 0 | Status: SHIPPED | OUTPATIENT
Start: 2022-07-20 | End: 2022-08-30

## 2022-07-20 ASSESSMENT — FIBROSIS 4 INDEX: FIB4 SCORE: 1.22

## 2022-07-20 NOTE — ASSESSMENT & PLAN NOTE
Patient reports 2-day onset of left-sided otalgia, sore throat, sinus congestion.  Denies fever.  Had COVID-19 about a month ago and has fully recovered.

## 2022-07-20 NOTE — ASSESSMENT & PLAN NOTE
Chronic health problem, worsening.  Established with psychiatry, Dr. Bright whom patient last saw on 7/7/2022.  At that time patient was feeling better and it was agreed that she would continue with Lexapro 5 to 10 mg daily.  However, patient reports she had a very disturbing dream recently about her  who said that he had faked his death.  He actually did die 2 years ago from COVID-19.  This has put patient back into a deep depression and she is currently not able to work.  She is taking 6 weeks off and needing FMLA paperwork completed.  I have reviewed the FMLA paperwork which is very directed at mental health.  I would rather patient psychiatrist complete the paperwork with the patient.  Patient may also need to have medication adjustment.  She is agreeable to scheduling appointment with psychiatry.

## 2022-07-20 NOTE — ASSESSMENT & PLAN NOTE
Patient continues to have atopic dermatitis with gloves at work even if she wears liners.  Has been applying triamcinolone cream, but does not prevent the rash and pruritus from starting.  Patient is hoping to transfer to another area at work where she does not have to wear gloves.  She also notes that she has allergic rhinitis whenever she goes to work.  Cannot take second-generation antihistamines prior to work as they cause drowsiness.

## 2022-07-20 NOTE — PROGRESS NOTES
CC: Paperwork completion, sore throat/ear pain    HISTORY OF THE PRESENT ILLNESS: Patient is a 56 y.o. female. This pleasant patient is here today for evaluation and management of the following health problems.        Moderate episode of recurrent major depressive disorder (HCC)  Chronic health problem, worsening.  Established with psychiatry, Dr. Bright whom patient last saw on 7/7/2022.  At that time patient was feeling better and it was agreed that she would continue with Lexapro 5 to 10 mg daily.  However, patient reports she had a very disturbing dream recently about her  who said that he had faked his death.  He actually did die 2 years ago from COVID-19.  This has put patient back into a deep depression and she is currently not able to work.  She is taking 6 weeks off and needing FMLA paperwork completed.  I have reviewed the [place] paperwork which is very directed at mental health.  I would rather patient psychiatrist complete the paperwork with the patient.  Patient may also need to have medication adjustment.  She is agreeable to scheduling appointment with psychiatry.    Left ear pain  Patient reports 2-day onset of left-sided otalgia, sore throat, sinus congestion.  Denies fever.  Had COVID-19 about a month ago and has fully recovered.    Eczema  Patient continues to have atopic dermatitis with gloves at work even if she wears liners.  Has been applying triamcinolone cream, but does not prevent the rash and pruritus from starting.  Patient is hoping to transfer to another area at work where she does not have to wear gloves.  She also notes that she has allergic rhinitis whenever she goes to work.  Cannot take second-generation antihistamines prior to work as they cause drowsiness.      Allergies: Morphine    Current Outpatient Medications Ordered in Epic   Medication Sig Dispense Refill   • amoxicillin (AMOXIL) 500 MG Cap Take 1 Capsule by mouth 3 times a day. 21 Capsule 0   • Cyanocobalamin  (VITAMIN B-12 PO) Take 1 Tablet by mouth every day.     • escitalopram (LEXAPRO) 10 MG Tab TAKE 1 TABLET BY MOUTH ONCE DAILY 90 Tablet 1   • benzonatate (TESSALON) 100 MG Cap Take 1 Capsule by mouth 3 times a day as needed for Cough. 60 Capsule 0   • ASPIRIN 81 PO Take 81 mg by mouth every day.     • tizanidine (ZANAFLEX) 4 MG Tab Take 0.5-1 Tablets by mouth at bedtime as needed (muscle spasms). 30 Tablet 2   • gabapentin (NEURONTIN) 300 MG Cap Take 1 capsule in the morning and 2 capsules at bedtime. 180 Capsule 1   • Multiple Vitamin (MULTI VITAMIN DAILY) Tab Take  by mouth.     • hydrOXYzine HCl (ATARAX) 25 MG Tab Take 0.5-1 Tablets by mouth 3 times a day as needed for Anxiety. 30 Tablet 0   • omeprazole (PRILOSEC) 20 MG delayed-release capsule Take 1 Capsule by mouth every day. 30 Capsule 0   • ondansetron (ZOFRAN) 4 MG Tab tablet Take 1 Tablet by mouth every four hours as needed for Nausea/Vomiting. 20 Tablet 0   • SITagliptin (JANUVIA) 100 MG Tab Take 1 Tablet by mouth every day. 90 Tablet 1   • albuterol 108 (90 Base) MCG/ACT Aero Soln inhalation aerosol Inhale 2 Puffs every 6 hours as needed for Shortness of Breath. 8.5 g 0   • TRULICITY 1.5 MG/0.5ML Solution Pen-injector INJECT 1 DOSE SUBCUTANEOUSLY ONCE A WEEK 12 mL 0   • glyBURIDE (DIABETA) 5 MG Tab TAKE 2 TABLETS BY MOUTH TWICE DAILY WITH MEALS 120 Tablet 0   • metFORMIN (GLUCOPHAGE) 500 MG Tab Take 2 Tablets by mouth 2 times a day with meals. 360 Tablet 1   • atorvastatin (LIPITOR) 20 MG Tab Take 1 tablet by mouth once daily 90 Tablet 3   • lisinopril (PRINIVIL) 40 MG tablet Take 1 tablet by mouth once daily 90 tablet 3   • Ascorbic Acid (VITAMIN C) 1000 MG Tab Take 1 Tab by mouth every morning.     • Cholecalciferol (VITAMIN D PO) Take 2 Tablets by mouth every day. GUMMIES       No current Epic-ordered facility-administered medications on file.       Past Medical History:   Diagnosis Date   • Anemia    • Bowel habit changes     constipation   • Diabetes  "   • Diabetes (HCC)    • GERD (gastroesophageal reflux disease)    • Healthcare maintenance 9/27/2014    Mammogram: Due   • High cholesterol    • Hyperlipidemia    • Hypertension    • Infectious disease     Cold 10/2016   • Jaundice 1999   • Psychiatric problem     depression and anxiety   • Vaginal itching 8/27/2014    With anal itching X 1 month Getting worse Burning Min vag disch       Past Surgical History:   Procedure Laterality Date   • VENTRAL HERNIA REPAIR ROBOTIC XI N/A 10/14/2016    Procedure: VENTRAL HERNIA REPAIR ROBOTIC XI FOR INCISIONAL W/MESH;  Surgeon: Edi Mendoza M.D.;  Location: SURGERY [facility];  Service:    • ABDOMINAL HYSTERECTOMY TOTAL      still has ovaries   • CHOLECYSTECTOMY     • PRIMARY C SECTION     • TUBAL COAGULATION LAPAROSCOPIC BILATERAL         Social History     Tobacco Use   • Smoking status: Never Smoker   • Smokeless tobacco: Never Used   Vaping Use   • Vaping Use: Never used   Substance Use Topics   • Alcohol use: No   • Drug use: No       Family History   Problem Relation Age of Onset   • Diabetes Mother    • Hyperlipidemia Mother    • Diabetes Father    • Hypertension Father    • Hyperlipidemia Father        ROS:  As in HPI, otherwise negative for chest pain, dyspnea, abdominal pain, dysuria, blood in stool, fever           Exam: /80   Pulse 92   Temp 36.3 °C (97.4 °F) (Temporal)   Resp 14   Ht 1.549 m (5' 1\")   Wt 80.7 kg (178 lb)   SpO2 96%  Body mass index is 33.63 kg/m².    General: Alert, pleasant, well nourished, well developed female in NAD  Neck: Supple without bruit. Thyroid is not enlarged.  HEENT: Eyes conjunctiva is clear, lids without ptosis, pupils equal round and reactive to light and accommodation.  Ears normal shape and contour, canals are clear bilaterally, left TM with erythema and mild bulging, right TM with good light reflex and appear normal.  Nasal mucosa pale and edematous with clear rhinorrhea.  Oropharynx benign.  Sinuses " (frontal and maxillary) nontender to palpation.  Pulmonary: Clear to ausculation.  Normal effort. No rales, ronchi, or wheezing.  Cardiovascular: Normal rate and rhythm without murmur.   Neurologic: Grossly nonfocal  Lymph: No cervical or supraclavicular lymph nodes are palpable  Skin: Warm and dry.    Musculoskeletal: Normal gait.   Psych: Normal mood and affect. Alert and oriented. Judgment and insight is normal.    Please note that this dictation was created using voice recognition software. I have made every reasonable attempt to correct obvious errors, but I expect that there are errors of grammar and possibly content that I did not discover before finalizing the note.      Assessment/Plan  1. Acute otitis media, unspecified otitis media type  We will treat with amoxicillin.  Advised on instructions and side effects.  Continue with Tylenol or Advil for comfort.  - amoxicillin (AMOXIL) 500 MG Cap; Take 1 Capsule by mouth 3 times a day.  Dispense: 21 Capsule; Refill: 0    2. Moderate episode of recurrent major depressive disorder (HCC)  Patient having an exacerbation of depression.  Provided support.  Explained to patient that the short-term disability paperwork is aimed at mental health assessment which would be best completed by her psychiatrist.  Advised patient to make appointment with her psychiatrist for paperwork completion and consideration of medication adjustment.  Patient verbalized understanding.    3. Left ear pain  As in #1    4. Eczema, unspecified type  Patient continues to breakout on her hands at work while wearing gloves, even with liners.  Would be best for patient to work in a different area that does not require gloves.    5. Obesity (BMI 30-39.9)    - Patient identified as having weight management issue.  Appropriate orders and counseling given.    Patient will return to clinic as previously scheduled next month.

## 2022-07-26 ENCOUNTER — HOSPITAL ENCOUNTER (OUTPATIENT)
Dept: RADIOLOGY | Facility: MEDICAL CENTER | Age: 57
End: 2022-07-26
Attending: NURSE PRACTITIONER
Payer: COMMERCIAL

## 2022-07-26 DIAGNOSIS — M54.2 NECK PAIN: ICD-10-CM

## 2022-07-26 DIAGNOSIS — M54.12 CERVICAL RADICULOPATHY: ICD-10-CM

## 2022-07-26 PROCEDURE — 72141 MRI NECK SPINE W/O DYE: CPT

## 2022-07-28 ENCOUNTER — TELEPHONE (OUTPATIENT)
Dept: MEDICAL GROUP | Facility: PHYSICIAN GROUP | Age: 57
End: 2022-07-28
Payer: COMMERCIAL

## 2022-07-28 DIAGNOSIS — E04.1 THYROID NODULE GREATER THAN OR EQUAL TO 1 CM IN DIAMETER INCIDENTALLY NOTED ON IMAGING STUDY: ICD-10-CM

## 2022-08-01 ENCOUNTER — TELEMEDICINE (OUTPATIENT)
Dept: BEHAVIORAL HEALTH | Facility: CLINIC | Age: 57
End: 2022-08-01
Payer: COMMERCIAL

## 2022-08-01 DIAGNOSIS — G47.00 INSOMNIA, UNSPECIFIED TYPE: ICD-10-CM

## 2022-08-01 DIAGNOSIS — G47.09 OTHER INSOMNIA: ICD-10-CM

## 2022-08-01 DIAGNOSIS — F33.1 MODERATE EPISODE OF RECURRENT MAJOR DEPRESSIVE DISORDER (HCC): ICD-10-CM

## 2022-08-01 PROCEDURE — 90833 PSYTX W PT W E/M 30 MIN: CPT | Mod: GT | Performed by: PSYCHIATRY & NEUROLOGY

## 2022-08-01 PROCEDURE — 99214 OFFICE O/P EST MOD 30 MIN: CPT | Mod: GT | Performed by: PSYCHIATRY & NEUROLOGY

## 2022-08-01 RX ORDER — GABAPENTIN 100 MG/1
100 CAPSULE ORAL NIGHTLY PRN
Qty: 90 CAPSULE | Refills: 0 | Status: SHIPPED | OUTPATIENT
Start: 2022-08-01 | End: 2022-09-07 | Stop reason: SDUPTHER
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
## 2022-08-12 ENCOUNTER — HOSPITAL ENCOUNTER (OUTPATIENT)
Dept: RADIOLOGY | Facility: MEDICAL CENTER | Age: 57
End: 2022-08-12
Attending: NURSE PRACTITIONER
Payer: COMMERCIAL

## 2022-08-12 ENCOUNTER — OFFICE VISIT (OUTPATIENT)
Dept: PHYSICAL MEDICINE AND REHAB | Facility: MEDICAL CENTER | Age: 57
End: 2022-08-12
Payer: COMMERCIAL

## 2022-08-12 VITALS
OXYGEN SATURATION: 96 % | SYSTOLIC BLOOD PRESSURE: 148 MMHG | HEART RATE: 92 BPM | HEIGHT: 61 IN | BODY MASS INDEX: 35.21 KG/M2 | TEMPERATURE: 98.6 F | WEIGHT: 186.51 LBS | DIASTOLIC BLOOD PRESSURE: 72 MMHG

## 2022-08-12 DIAGNOSIS — M79.10 MYALGIA: ICD-10-CM

## 2022-08-12 DIAGNOSIS — M54.2 CERVICALGIA: ICD-10-CM

## 2022-08-12 DIAGNOSIS — Z59.9 FINANCIAL DIFFICULTIES: ICD-10-CM

## 2022-08-12 DIAGNOSIS — E04.1 THYROID NODULE GREATER THAN OR EQUAL TO 1 CM IN DIAMETER INCIDENTALLY NOTED ON IMAGING STUDY: ICD-10-CM

## 2022-08-12 DIAGNOSIS — R20.0 NUMBNESS AND TINGLING OF RIGHT ARM: ICD-10-CM

## 2022-08-12 DIAGNOSIS — R20.2 NUMBNESS AND TINGLING OF RIGHT ARM: ICD-10-CM

## 2022-08-12 DIAGNOSIS — R20.2 NUMBNESS AND TINGLING IN LEFT ARM: ICD-10-CM

## 2022-08-12 DIAGNOSIS — R20.0 NUMBNESS AND TINGLING IN LEFT ARM: ICD-10-CM

## 2022-08-12 PROCEDURE — 76536 US EXAM OF HEAD AND NECK: CPT

## 2022-08-12 PROCEDURE — 99214 OFFICE O/P EST MOD 30 MIN: CPT | Performed by: STUDENT IN AN ORGANIZED HEALTH CARE EDUCATION/TRAINING PROGRAM

## 2022-08-12 SDOH — ECONOMIC STABILITY - INCOME SECURITY: PROBLEM RELATED TO HOUSING AND ECONOMIC CIRCUMSTANCES, UNSPECIFIED: Z59.9

## 2022-08-12 ASSESSMENT — PATIENT HEALTH QUESTIONNAIRE - PHQ9
SUM OF ALL RESPONSES TO PHQ QUESTIONS 1-9: 16
CLINICAL INTERPRETATION OF PHQ2 SCORE: 2
5. POOR APPETITE OR OVEREATING: 2 - MORE THAN HALF THE DAYS

## 2022-08-12 ASSESSMENT — PAIN SCALES - GENERAL: PAINLEVEL: 5=MODERATE PAIN

## 2022-08-12 ASSESSMENT — FIBROSIS 4 INDEX: FIB4 SCORE: 1.22
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
## 2022-08-12 NOTE — PATIENT INSTRUCTIONS
We discussed trigger point injections for your muscle pain and a left carpal tunnel injection.    You could apply diclofenac gel also known as voltaren gel    Get a left wrist splint and wear it at night.

## 2022-08-17 ENCOUNTER — TELEMEDICINE (OUTPATIENT)
Dept: BEHAVIORAL HEALTH | Facility: CLINIC | Age: 57
End: 2022-08-17
Payer: COMMERCIAL

## 2022-08-17 ENCOUNTER — TELEPHONE (OUTPATIENT)
Dept: CARDIOLOGY | Facility: MEDICAL CENTER | Age: 57
End: 2022-08-17
Payer: COMMERCIAL

## 2022-08-17 DIAGNOSIS — F33.1 MODERATE EPISODE OF RECURRENT MAJOR DEPRESSIVE DISORDER (HCC): ICD-10-CM

## 2022-08-17 PROCEDURE — 93248 EXT ECG>7D<15D REV&INTERPJ: CPT | Performed by: INTERNAL MEDICINE

## 2022-08-17 PROCEDURE — 93246 EXT ECG>7D<15D RECORDING: CPT | Performed by: INTERNAL MEDICINE

## 2022-08-17 PROCEDURE — 99213 OFFICE O/P EST LOW 20 MIN: CPT | Mod: GT | Performed by: PSYCHIATRY & NEUROLOGY
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
## 2022-08-18 ENCOUNTER — TELEMEDICINE (OUTPATIENT)
Dept: BEHAVIORAL HEALTH | Facility: CLINIC | Age: 57
End: 2022-08-18
Payer: COMMERCIAL

## 2022-08-18 DIAGNOSIS — F33.2 SEVERE EPISODE OF RECURRENT MAJOR DEPRESSIVE DISORDER, WITHOUT PSYCHOTIC FEATURES (HCC): ICD-10-CM

## 2022-08-18 DIAGNOSIS — G47.09 OTHER INSOMNIA: ICD-10-CM

## 2022-08-18 PROCEDURE — 90833 PSYTX W PT W E/M 30 MIN: CPT | Mod: 95 | Performed by: PSYCHIATRY & NEUROLOGY

## 2022-08-18 PROCEDURE — 99214 OFFICE O/P EST MOD 30 MIN: CPT | Mod: 95 | Performed by: PSYCHIATRY & NEUROLOGY
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
## 2022-08-23 NOTE — LETTER
August 23, 2022              Regarding: Sonya Wei        To Whom it May Concern:    This letter serves as an order, and informs you, her employer, that Ms. Sonya Wei is released back to work, immediately, without any restrictions.      Sincerely,                  Brunilda Bright M.D.

## 2022-08-23 NOTE — PROGRESS NOTES
Will write a letter on the patient's behalf releasing her back to work without restrictions, per her request.

## 2022-08-30 ENCOUNTER — OFFICE VISIT (OUTPATIENT)
Dept: MEDICAL GROUP | Facility: PHYSICIAN GROUP | Age: 57
End: 2022-08-30
Payer: COMMERCIAL

## 2022-08-30 VITALS
WEIGHT: 189.1 LBS | OXYGEN SATURATION: 97 % | HEIGHT: 61 IN | DIASTOLIC BLOOD PRESSURE: 82 MMHG | HEART RATE: 87 BPM | TEMPERATURE: 97.8 F | BODY MASS INDEX: 35.7 KG/M2 | RESPIRATION RATE: 18 BRPM | SYSTOLIC BLOOD PRESSURE: 138 MMHG

## 2022-08-30 DIAGNOSIS — R11.2 NON-INTRACTABLE VOMITING WITH NAUSEA, UNSPECIFIED VOMITING TYPE: ICD-10-CM

## 2022-08-30 DIAGNOSIS — G56.02 CARPAL TUNNEL SYNDROME OF LEFT WRIST: ICD-10-CM

## 2022-08-30 DIAGNOSIS — E78.5 HYPERLIPIDEMIA, UNSPECIFIED HYPERLIPIDEMIA TYPE: ICD-10-CM

## 2022-08-30 DIAGNOSIS — M54.2 NECK PAIN: ICD-10-CM

## 2022-08-30 DIAGNOSIS — Z12.31 ENCOUNTER FOR SCREENING MAMMOGRAM FOR BREAST CANCER: ICD-10-CM

## 2022-08-30 DIAGNOSIS — R10.13 EPIGASTRIC PAIN: ICD-10-CM

## 2022-08-30 DIAGNOSIS — I10 ESSENTIAL HYPERTENSION: ICD-10-CM

## 2022-08-30 DIAGNOSIS — E04.1 THYROID NODULE: ICD-10-CM

## 2022-08-30 DIAGNOSIS — E11.65 UNCONTROLLED TYPE 2 DIABETES MELLITUS WITH HYPERGLYCEMIA (HCC): ICD-10-CM

## 2022-08-30 DIAGNOSIS — Z23 NEED FOR PNEUMOCOCCAL VACCINATION: ICD-10-CM

## 2022-08-30 PROBLEM — R11.10 NON-INTRACTABLE VOMITING: Status: RESOLVED | Noted: 2022-05-18 | Resolved: 2022-08-30

## 2022-08-30 LAB
HBA1C MFR BLD: 7.8 % (ref 0–5.6)
INT CON NEG: ABNORMAL
INT CON POS: ABNORMAL

## 2022-08-30 PROCEDURE — 83036 HEMOGLOBIN GLYCOSYLATED A1C: CPT | Performed by: NURSE PRACTITIONER

## 2022-08-30 PROCEDURE — 90471 IMMUNIZATION ADMIN: CPT | Performed by: NURSE PRACTITIONER

## 2022-08-30 PROCEDURE — 99214 OFFICE O/P EST MOD 30 MIN: CPT | Mod: 25 | Performed by: NURSE PRACTITIONER

## 2022-08-30 PROCEDURE — 90677 PCV20 VACCINE IM: CPT | Performed by: NURSE PRACTITIONER

## 2022-08-30 RX ORDER — ATORVASTATIN CALCIUM 20 MG/1
20 TABLET, FILM COATED ORAL DAILY
Qty: 90 TABLET | Refills: 3 | Status: SHIPPED | OUTPATIENT
Start: 2022-08-30 | End: 2023-12-08

## 2022-08-30 RX ORDER — LISINOPRIL 40 MG/1
40 TABLET ORAL DAILY
Qty: 90 TABLET | Refills: 3 | Status: SHIPPED | OUTPATIENT
Start: 2022-08-30 | End: 2022-11-01

## 2022-08-30 ASSESSMENT — FIBROSIS 4 INDEX: FIB4 SCORE: 1.22

## 2022-08-30 NOTE — ASSESSMENT & PLAN NOTE
This is a chronic health problem that is uncontrolled with current medications and lifestyle measures.  Point-of-care A1c is 7.8%.  Patient does admit to not taking her medications for days at a time.  Has been struggling with depression.  Has been following up with her psychiatrist and feels like she is now back on track.  Taking metformin 1000 mg twice a day, Trulicity 1.5 mg weekly, glyburide 10 mg twice a day, Januvia 100 mg daily.  Up-to-date on retinal scan.  On ACE and statin.  Denies polydipsia, polyuria, vision changes.

## 2022-08-30 NOTE — ASSESSMENT & PLAN NOTE
Thyroid nodule incidentally found on cervical spine MRI.  Follow-up ultrasound done.  Nodule less than 1.5 cm.  Follow-up ultrasound and fine-needle biopsy not recommended.  Patient denies difficult swallowing or hoarse voice.

## 2022-08-30 NOTE — ASSESSMENT & PLAN NOTE
Chronic health problem, improving.  Has been trying different pillows.  MRI C-spine shows no cervical spine abnormalities.  Consulted with pain management.  Found to have left carpal tunnel syndrome.  Patient is considering injection.  Was prescribed tizanidine.  Taking occasionally with good relief.  Also taking gabapentin.

## 2022-08-30 NOTE — PROGRESS NOTES
CC: Diabetes, follow-up on imaging    HISTORY OF THE PRESENT ILLNESS: Patient is a 56 y.o. female. This pleasant patient is here today for evaluation and management of the following health problems.    Health Maintenance: due      Diabetes mellitus type II, uncontrolled (HCC)  This is a chronic health problem that is uncontrolled with current medications and lifestyle measures.  Point-of-care A1c is 7.8%.  Patient does admit to not taking her medications for days at a time.  Has been struggling with depression.  Has been following up with her psychiatrist and feels like she is now back on track.  Taking metformin 1000 mg twice a day, Trulicity 1.5 mg weekly, glyburide 10 mg twice a day, Januvia 100 mg daily.  Up-to-date on retinal scan.  On ACE and statin.  Denies polydipsia, polyuria, vision changes.    Non-intractable vomiting  Resolved.    Epigastric pain  Well-controlled.  Takes omeprazole as needed.    Neck pain  Chronic health problem, improving.  Has been trying different pillows.  MRI C-spine shows no cervical spine abnormalities.  Consulted with pain management.  Found to have left carpal tunnel syndrome.  Patient is considering injection.  Was prescribed tizanidine.  Taking occasionally with good relief.  Also taking gabapentin.    Carpal tunnel syndrome of left wrist  Please see additional notes.    Thyroid nodule  Thyroid nodule incidentally found on cervical spine MRI.  Follow-up ultrasound done.  Nodule less than 1.5 cm.  Follow-up ultrasound and fine-needle biopsy not recommended.  Patient denies difficult swallowing or hoarse voice.    Allergies: Morphine    Current Outpatient Medications Ordered in Epic   Medication Sig Dispense Refill    lisinopril (PRINIVIL) 40 MG tablet Take 1 Tablet by mouth every day. 90 Tablet 3    atorvastatin (LIPITOR) 20 MG Tab Take 1 Tablet by mouth every day. 90 Tablet 3    gabapentin (NEURONTIN) 100 MG Cap Take 1 Capsule by mouth at bedtime as needed (insomnia). 90  Capsule 0    Cyanocobalamin (VITAMIN B-12 PO) Take 1 Tablet by mouth every day.      escitalopram (LEXAPRO) 10 MG Tab TAKE 1 TABLET BY MOUTH ONCE DAILY 90 Tablet 1    ASPIRIN 81 PO Take 81 mg by mouth every day.      tizanidine (ZANAFLEX) 4 MG Tab Take 0.5-1 Tablets by mouth at bedtime as needed (muscle spasms). 30 Tablet 2    Multiple Vitamin (MULTI VITAMIN DAILY) Tab Take  by mouth.      hydrOXYzine HCl (ATARAX) 25 MG Tab Take 0.5-1 Tablets by mouth 3 times a day as needed for Anxiety. 30 Tablet 0    omeprazole (PRILOSEC) 20 MG delayed-release capsule Take 1 Capsule by mouth every day. 30 Capsule 0    SITagliptin (JANUVIA) 100 MG Tab Take 1 Tablet by mouth every day. 90 Tablet 1    albuterol 108 (90 Base) MCG/ACT Aero Soln inhalation aerosol Inhale 2 Puffs every 6 hours as needed for Shortness of Breath. 8.5 g 0    TRULICITY 1.5 MG/0.5ML Solution Pen-injector INJECT 1 DOSE SUBCUTANEOUSLY ONCE A WEEK 12 mL 0    glyBURIDE (DIABETA) 5 MG Tab TAKE 2 TABLETS BY MOUTH TWICE DAILY WITH MEALS 120 Tablet 0    metFORMIN (GLUCOPHAGE) 500 MG Tab Take 2 Tablets by mouth 2 times a day with meals. 360 Tablet 1    Ascorbic Acid (VITAMIN C) 1000 MG Tab Take 1 Tab by mouth every morning.      Cholecalciferol (VITAMIN D PO) Take 2 Tablets by mouth every day. GUMMIES       No current Epic-ordered facility-administered medications on file.       Past Medical History:   Diagnosis Date    Anemia     Bowel habit changes     constipation    Diabetes     Diabetes (HCC)     GERD (gastroesophageal reflux disease)     Healthcare maintenance 9/27/2014    Mammogram: Due    High cholesterol     Hyperlipidemia     Hypertension     Infectious disease     Cold 10/2016    Jaundice 1999    Psychiatric problem     depression and anxiety    Vaginal itching 8/27/2014    With anal itching X 1 month Getting worse Burning Min vag disch       Past Surgical History:   Procedure Laterality Date    VENTRAL HERNIA REPAIR ROBOTIC XI N/A 10/14/2016    Procedure:  "VENTRAL HERNIA REPAIR ROBOTIC XI FOR INCISIONAL W/MESH;  Surgeon: Edi Mendoza M.D.;  [facility];  Service:     ABDOMINAL HYSTERECTOMY TOTAL      still has ovaries    CHOLECYSTECTOMY      PRIMARY C SECTION      TUBAL COAGULATION LAPAROSCOPIC BILATERAL         Social History     Tobacco Use    Smoking status: Never    Smokeless tobacco: Never   Vaping Use    Vaping Use: Never used   Substance Use Topics    Alcohol use: No    Drug use: No       Family History   Problem Relation Age of Onset    Diabetes Mother     Hyperlipidemia Mother     Diabetes Father     Hypertension Father     Hyperlipidemia Father        ROS: As in HPI, otherwise negative for chest pain, dyspnea, abdominal pain, dysuria, blood in stool, fever          Exam: /82 (BP Location: Right arm, Patient Position: Sitting, BP Cuff Size: Adult long)   Pulse 87   Temp 36.6 °C (97.8 °F) (Temporal)   Resp 18   Ht 1.549 m (5' 1\")   Wt 85.8 kg (189 lb 1.6 oz)   SpO2 97%  Body mass index is 35.73 kg/m².    General: Alert, pleasant, well nourished, well developed female in NAD  Neck: Supple without bruit. Thyroid is not enlarged.  Pulmonary: Clear to ausculation.  Normal effort. No rales, ronchi, or wheezing.  Cardiovascular: Normal rate and rhythm without murmur. Carotid and radial pulses are intact and equal bilaterally.  No lower extremity edema.  Abdomen: Soft, nontender, nondistended. Normal bowel sounds. Liver and spleen are not palpable  Neurologic: Grossly nonfocal  Lymph: No cervical or supraclavicular lymph nodes are palpable  Skin: Warm and dry.    Musculoskeletal: Normal gait.   Psych: Normal mood and affect. Alert and oriented. Judgment and insight is normal.    Please note that this dictation was created using voice recognition software. I have made every reasonable attempt to correct obvious errors, but I expect that there are errors of grammar and possibly content that I did not discover before finalizing the " note.      Assessment/Plan  1. Uncontrolled type 2 diabetes mellitus with hyperglycemia (HCC)  Uncontrolled.  Patient admits to missing her medications for days at a time.  She feels she is now back on track as her depression is getting under control.  We will continue with current medications, no changes.  Advised on lifestyle measures.  We will continue to monitor.  - POCT  A1C    2. Encounter for screening mammogram for breast cancer  Ordered  - MA-SCREENING MAMMO BILAT W/TOMOSYNTHESIS W/CAD; Future    3. Need for pneumococcal vaccination  Given  - Pneumococcal Conjugate Vaccine 20-Valent (19 yrs+)    4. Essential hypertension  Requesting refill  - lisinopril (PRINIVIL) 40 MG tablet; Take 1 Tablet by mouth every day.  Dispense: 90 Tablet; Refill: 3    5. Hyperlipidemia, unspecified hyperlipidemia type  Requesting refill  - atorvastatin (LIPITOR) 20 MG Tab; Take 1 Tablet by mouth every day.  Dispense: 90 Tablet; Refill: 3    6. Non-intractable vomiting with nausea, unspecified vomiting type  Resolved    7. Epigastric pain  Continue with omeprazole as needed.    8. Neck pain  MRI shows no significant abnormalities.  Continue follow-up with pain management for arm symptoms.    9. Carpal tunnel syndrome of left wrist  Patient will continue follow-up with pain management.  She is considering steroid injection.  We will give her a carpal tunnel brace to wear at night.    10. Thyroid nodule  Reviewed findings and recommendations with patient.  She will continue to monitor.  Consider repeat ultrasound in a year.      Patient will return to clinic in 3 months for lab review and diabetes follow-up.  She will return to clinic later this week for work accommodation paperwork completion.

## 2022-08-31 ENCOUNTER — TELEPHONE (OUTPATIENT)
Dept: MEDICAL GROUP | Facility: PHYSICIAN GROUP | Age: 57
End: 2022-08-31

## 2022-08-31 ENCOUNTER — TELEMEDICINE (OUTPATIENT)
Dept: MEDICAL GROUP | Facility: PHYSICIAN GROUP | Age: 57
End: 2022-08-31
Payer: COMMERCIAL

## 2022-08-31 DIAGNOSIS — Z91.09 ENVIRONMENTAL ALLERGIES: ICD-10-CM

## 2022-08-31 DIAGNOSIS — L30.9 ECZEMA, UNSPECIFIED TYPE: ICD-10-CM

## 2022-08-31 DIAGNOSIS — G56.02 CARPAL TUNNEL SYNDROME OF LEFT WRIST: ICD-10-CM

## 2022-08-31 PROCEDURE — 99213 OFFICE O/P EST LOW 20 MIN: CPT | Mod: 95 | Performed by: NURSE PRACTITIONER

## 2022-08-31 NOTE — ASSESSMENT & PLAN NOTE
As a means of avoiding spread of COVID-19, this visit is being conducted by video.  Left carpal tunnel pain is aggravated by work.  Patient needs to push heavy items and place heavy items.  Requesting work accommodation form completion today.

## 2022-08-31 NOTE — PROGRESS NOTES
Virtual Visit: Established Patient   This visit was conducted via  [employer]  using secure and encrypted videoconferencing technology.   The patient was in their home in the [facility].    The patient's identity was confirmed and verbal consent was obtained for this virtual visit.     Subjective:   CC:   Chief Complaint   Patient presents with    Paperwork     Paperwork        Sonya Wei is a 56 y.o. female presenting for evaluation and management of:    Carpal tunnel syndrome of left wrist  As a means of avoiding spread of COVID-19, this visit is being conducted by video.  Left carpal tunnel pain is aggravated by work.  Patient needs to push heavy items and place heavy items.  Requesting work accommodation form completion today.    Eczema  As a means of avoiding spread of COVID-19, this visit is being conducted by video.    HPI from 7/21/22: Patient continues to have atopic dermatitis with gloves at work even if she wears liners.  Has been applying triamcinolone cream, but does not prevent the rash and pruritus from starting.  Patient is hoping to transfer to another area at work where she does not have to wear gloves.  She also notes that she has allergic rhinitis whenever she goes to work.  Cannot take second-generation antihistamines prior to work as they cause drowsiness.    Today's HPI: No changes.  Has tried multiple gloves.  All cause rash and itching.  Unable to work effectively.  Requesting work accommodation paperwork completion today.    Environmental allergies  Please see additional notes under eczema.     ROS   As in HPI, otherwise negative for chest pain, dyspnea, fever      Current medicines (including changes today)  Current Outpatient Medications   Medication Sig Dispense Refill    lisinopril (PRINIVIL) 40 MG tablet Take 1 Tablet by mouth every day. 90 Tablet 3    atorvastatin (LIPITOR) 20 MG Tab Take 1 Tablet by mouth every day. 90 Tablet 3    gabapentin (NEURONTIN) 100 MG Cap Take 1  Capsule by mouth at bedtime as needed (insomnia). 90 Capsule 0    Cyanocobalamin (VITAMIN B-12 PO) Take 1 Tablet by mouth every day.      escitalopram (LEXAPRO) 10 MG Tab TAKE 1 TABLET BY MOUTH ONCE DAILY 90 Tablet 1    ASPIRIN 81 PO Take 81 mg by mouth every day.      tizanidine (ZANAFLEX) 4 MG Tab Take 0.5-1 Tablets by mouth at bedtime as needed (muscle spasms). 30 Tablet 2    Multiple Vitamin (MULTI VITAMIN DAILY) Tab Take  by mouth.      hydrOXYzine HCl (ATARAX) 25 MG Tab Take 0.5-1 Tablets by mouth 3 times a day as needed for Anxiety. 30 Tablet 0    omeprazole (PRILOSEC) 20 MG delayed-release capsule Take 1 Capsule by mouth every day. 30 Capsule 0    SITagliptin (JANUVIA) 100 MG Tab Take 1 Tablet by mouth every day. 90 Tablet 1    albuterol 108 (90 Base) MCG/ACT Aero Soln inhalation aerosol Inhale 2 Puffs every 6 hours as needed for Shortness of Breath. 8.5 g 0    TRULICITY 1.5 MG/0.5ML Solution Pen-injector INJECT 1 DOSE SUBCUTANEOUSLY ONCE A WEEK 12 mL 0    glyBURIDE (DIABETA) 5 MG Tab TAKE 2 TABLETS BY MOUTH TWICE DAILY WITH MEALS 120 Tablet 0    metFORMIN (GLUCOPHAGE) 500 MG Tab Take 2 Tablets by mouth 2 times a day with meals. 360 Tablet 1    Ascorbic Acid (VITAMIN C) 1000 MG Tab Take 1 Tab by mouth every morning.      Cholecalciferol (VITAMIN D PO) Take 2 Tablets by mouth every day. GUMMIES       No current facility-administered medications for this visit.       Patient Active Problem List    Diagnosis Date Noted    Environmental allergies 08/31/2022    Carpal tunnel syndrome of left wrist 08/30/2022    Thyroid nodule 08/30/2022    Severe episode of recurrent major depressive disorder, without psychotic features (HCC) 08/18/2022    Other insomnia 08/01/2022    Left ear pain 07/20/2022    Palpitations 05/03/2022    Seasonal allergies 05/03/2022    Eczema 05/03/2022    Moderate episode of recurrent major depressive disorder (HCC) 06/23/2021    COVID-19 long hauler 05/20/2021    Fungal toenail infection  04/14/2021    Nonintractable headache 02/19/2021    History of 2019 novel coronavirus disease (COVID-19) 01/14/2021    Hospital discharge follow-up 12/16/2020    Neck pain 08/18/2020    Musculoskeletal pain 08/18/2020    Epigastric pain 08/13/2020    Obesity (BMI 30-39.9) 04/03/2019    Mid back pain 04/02/2019    Gastroesophageal reflux disease 01/29/2019    Incisional hernia 10/14/2016    Hypertension 10/01/2015    Vitamin D deficiency 09/24/2012    Microalbuminuria 09/24/2012    Diabetes mellitus type II, uncontrolled (HCC) 08/27/2012    Hyperlipidemia 08/27/2012        Objective:   There were no vitals taken for this visit.    Physical Exam:  Constitutional: Alert, no distress, well-groomed.  Skin: No rashes in visible areas.  Eye: Round. Conjunctiva clear, lids normal. No icterus.   ENMT: Lips pink without lesions, good dentition, moist mucous membranes. Phonation normal.  Neck: No masses, no thyromegaly. Moves freely without pain.  Respiratory: Unlabored respiratory effort, no cough or audible wheeze  Psych: Alert and oriented x3, normal affect and mood.     Assessment and Plan:   The following treatment plan was discussed:     1. Carpal tunnel syndrome of left wrist      2. Eczema, unspecified type      3. Environmental allergies      Clinical decision making: Work accommodation paperwork completed today.  Patient asking for accommodation to be to transfer to an area at work that does not require gloves or mask or heavy lifting or pushing.  Paperwork will be faxed to [place], copy scanned into chart, copy sent to patient via [employer].      Follow-up: No follow-ups on file.    My total time spent caring for the patient on the day of the encounter was 26 minutes.   This does not include time spent on separately billable procedures/tests.

## 2022-08-31 NOTE — ASSESSMENT & PLAN NOTE
As a means of avoiding spread of COVID-19, this visit is being conducted by video.    HPI from 7/21/22: Patient continues to have atopic dermatitis with gloves at work even if she wears liners.  Has been applying triamcinolone cream, but does not prevent the rash and pruritus from starting.  Patient is hoping to transfer to another area at work where she does not have to wear gloves.  She also notes that she has allergic rhinitis whenever she goes to work.  Cannot take second-generation antihistamines prior to work as they cause drowsiness.    Today's HPI: No changes.  Has tried multiple gloves.  All cause rash and itching.  Unable to work effectively.  Requesting work accommodation paperwork completion today.

## 2022-09-07 ENCOUNTER — TELEMEDICINE (OUTPATIENT)
Dept: BEHAVIORAL HEALTH | Facility: CLINIC | Age: 57
End: 2022-09-07
Payer: COMMERCIAL

## 2022-09-07 ENCOUNTER — HOSPITAL ENCOUNTER (OUTPATIENT)
Dept: RADIOLOGY | Facility: MEDICAL CENTER | Age: 57
End: 2022-09-07
Attending: NURSE PRACTITIONER
Payer: COMMERCIAL

## 2022-09-07 DIAGNOSIS — G47.00 INSOMNIA, UNSPECIFIED TYPE: ICD-10-CM

## 2022-09-07 DIAGNOSIS — Z12.31 ENCOUNTER FOR SCREENING MAMMOGRAM FOR BREAST CANCER: ICD-10-CM

## 2022-09-07 PROCEDURE — 77063 BREAST TOMOSYNTHESIS BI: CPT

## 2022-09-07 PROCEDURE — 99214 OFFICE O/P EST MOD 30 MIN: CPT | Mod: GT | Performed by: PSYCHIATRY & NEUROLOGY

## 2022-09-07 PROCEDURE — 90833 PSYTX W PT W E/M 30 MIN: CPT | Mod: GT | Performed by: PSYCHIATRY & NEUROLOGY

## 2022-09-07 RX ORDER — GABAPENTIN 100 MG/1
100 CAPSULE ORAL NIGHTLY PRN
Qty: 90 CAPSULE | Refills: 0 | Status: SHIPPED | OUTPATIENT
Start: 2022-09-07 | End: 2023-11-14 | Stop reason: SDUPTHER
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
## 2022-09-16 ENCOUNTER — OFFICE VISIT (OUTPATIENT)
Dept: URGENT CARE | Facility: PHYSICIAN GROUP | Age: 57
End: 2022-09-16
Payer: COMMERCIAL

## 2022-09-16 VITALS
HEIGHT: 72 IN | WEIGHT: 184 LBS | OXYGEN SATURATION: 94 % | DIASTOLIC BLOOD PRESSURE: 86 MMHG | TEMPERATURE: 98.6 F | SYSTOLIC BLOOD PRESSURE: 134 MMHG | RESPIRATION RATE: 18 BRPM | HEART RATE: 93 BPM | BODY MASS INDEX: 24.92 KG/M2

## 2022-09-16 DIAGNOSIS — R05.9 COUGH: ICD-10-CM

## 2022-09-16 DIAGNOSIS — B96.89 ACUTE BACTERIAL SINUSITIS: ICD-10-CM

## 2022-09-16 DIAGNOSIS — J01.90 ACUTE BACTERIAL SINUSITIS: ICD-10-CM

## 2022-09-16 PROCEDURE — 99213 OFFICE O/P EST LOW 20 MIN: CPT | Performed by: FAMILY MEDICINE

## 2022-09-16 RX ORDER — AMOXICILLIN AND CLAVULANATE POTASSIUM 875; 125 MG/1; MG/1
TABLET, FILM COATED ORAL
Qty: 20 TABLET | Refills: 0 | Status: SHIPPED | OUTPATIENT
Start: 2022-09-16 | End: 2022-09-26

## 2022-09-16 RX ORDER — BENZONATATE 200 MG/1
CAPSULE ORAL
Qty: 30 CAPSULE | Refills: 0 | Status: SHIPPED | OUTPATIENT
Start: 2022-09-16 | End: 2023-04-10

## 2022-09-16 RX ORDER — DEXTROMETHORPHAN HYDROBROMIDE AND PROMETHAZINE HYDROCHLORIDE 15; 6.25 MG/5ML; MG/5ML
5 SYRUP ORAL EVERY 6 HOURS PRN
Qty: 140 ML | Refills: 0 | Status: SHIPPED | OUTPATIENT
Start: 2022-09-16 | End: 2022-09-23

## 2022-09-16 ASSESSMENT — FIBROSIS 4 INDEX: FIB4 SCORE: 1.22

## 2022-09-17 NOTE — PROGRESS NOTES
Chief Complaint:    Chief Complaint   Patient presents with    Cough     X2weeks     Shortness of Breath       History of Present Illness:    Symptoms x 2 weeks. Has persisting nasal symptoms, sore throat, and cough. No fever. Augmentin and Tessalon have worked and been tolerated in the past.      Past Medical History:    Past Medical History:   Diagnosis Date    Anemia     Bowel habit changes     constipation    Diabetes     Diabetes (HCC)     GERD (gastroesophageal reflux disease)     Healthcare maintenance 9/27/2014    Mammogram: Due    High cholesterol     Hyperlipidemia     Hypertension     Infectious disease     Cold 10/2016    Jaundice 1999    Psychiatric problem     depression and anxiety    Vaginal itching 8/27/2014    With anal itching X 1 month Getting worse Burning Min vag disch     Past Surgical History:    Past Surgical History:   Procedure Laterality Date    VENTRAL HERNIA REPAIR ROBOTIC XI N/A 10/14/2016    Procedure: VENTRAL HERNIA REPAIR ROBOTIC XI FOR INCISIONAL W/MESH;  Surgeon: Edi Mendoza M.D.;  Location: SURGERY [facility];  Service:     ABDOMINAL HYSTERECTOMY TOTAL      still has ovaries    CHOLECYSTECTOMY      PRIMARY C SECTION      TUBAL COAGULATION LAPAROSCOPIC BILATERAL       Social History:    Social History     Socioeconomic History    Marital status:      Spouse name: Not on file    Number of children: Not on file    Years of education: Not on file    Highest education level: Not on file   Occupational History    Not on file   Tobacco Use    Smoking status: Never    Smokeless tobacco: Never   Vaping Use    Vaping Use: Never used   Substance and Sexual Activity    Alcohol use: No    Drug use: No    Sexual activity: Yes     Partners: Male     Birth control/protection: None   Other Topics Concern    Not on file   Social History Narrative    ** Merged History Encounter **          Social Determinants of Health     Financial Resource Strain: Not on file   Food Insecurity:  Not on file   Transportation Needs: Not on file   Physical Activity: Not on file   Stress: Not on file   Social Connections: Not on file   Intimate Partner Violence: Not on file   Housing Stability: Not on file     Family History:    Family History   Problem Relation Age of Onset    Diabetes Mother     Hyperlipidemia Mother     Diabetes Father     Hypertension Father     Hyperlipidemia Father      Medications:    Current Outpatient Medications on File Prior to Visit   Medication Sig Dispense Refill    gabapentin (NEURONTIN) 100 MG Cap Take 1 Capsule by mouth at bedtime as needed (insomnia). 90 Capsule 0    lisinopril (PRINIVIL) 40 MG tablet Take 1 Tablet by mouth every day. 90 Tablet 3    atorvastatin (LIPITOR) 20 MG Tab Take 1 Tablet by mouth every day. 90 Tablet 3    Cyanocobalamin (VITAMIN B-12 PO) Take 1 Tablet by mouth every day.      escitalopram (LEXAPRO) 10 MG Tab TAKE 1 TABLET BY MOUTH ONCE DAILY 90 Tablet 1    ASPIRIN 81 PO Take 81 mg by mouth every day.      tizanidine (ZANAFLEX) 4 MG Tab Take 0.5-1 Tablets by mouth at bedtime as needed (muscle spasms). 30 Tablet 2    Multiple Vitamin (MULTI VITAMIN DAILY) Tab Take  by mouth.      hydrOXYzine HCl (ATARAX) 25 MG Tab Take 0.5-1 Tablets by mouth 3 times a day as needed for Anxiety. 30 Tablet 0    omeprazole (PRILOSEC) 20 MG delayed-release capsule Take 1 Capsule by mouth every day. 30 Capsule 0    SITagliptin (JANUVIA) 100 MG Tab Take 1 Tablet by mouth every day. 90 Tablet 1    albuterol 108 (90 Base) MCG/ACT Aero Soln inhalation aerosol Inhale 2 Puffs every 6 hours as needed for Shortness of Breath. 8.5 g 0    TRULICITY 1.5 MG/0.5ML Solution Pen-injector INJECT 1 DOSE SUBCUTANEOUSLY ONCE A WEEK 12 mL 0    glyBURIDE (DIABETA) 5 MG Tab TAKE 2 TABLETS BY MOUTH TWICE DAILY WITH MEALS 120 Tablet 0    metFORMIN (GLUCOPHAGE) 500 MG Tab Take 2 Tablets by mouth 2 times a day with meals. 360 Tablet 1    Ascorbic Acid (VITAMIN C) 1000 MG Tab Take 1 Tab by mouth every  "morning.      Cholecalciferol (VITAMIN D PO) Take 2 Tablets by mouth every day. GUMMIES       No current facility-administered medications on file prior to visit.     Allergies:    Allergies   Allergen Reactions    Morphine Itching     Rxn = unknown   Bumps all over body       Vitals:    Vitals:    22 1737   BP: 134/86   Pulse: 93   Resp: 18   Temp: 37 °C (98.6 °F)   TempSrc: Temporal   SpO2: 94%   Weight: 83.5 kg (184 lb)   Height: 1.854 m (6' 1\")       Physical Exam:    Constitutional: Vital signs reviewed. Appears well-developed and well-nourished. Fatigued, frequent cough.  Eyes: Sclera white, conjunctivae clear.   ENT: TTP all sinus regions bilaterally. External ears normal. External auditory canals normal without discharge. TMs translucent and non-bulging. Hearing normal. Lips/teeth are normal. Oral mucosa pink and moist. Posterior pharynx: WNL.  Neck: Neck supple.   Cardiovascular: Regular rate and rhythm. No murmur.  Pulmonary/Chest: Respirations non-labored. Clear to auscultation bilaterally.  Musculoskeletal: Normal gait. No muscular atrophy or weakness.  Neurological: Alert and oriented to person, place, and time. Muscle tone normal. Coordination normal.   Skin: No rashes or lesions. Warm, dry, normal turgor.  Psychiatric: Normal mood and affect. Behavior is normal. Judgment and thought content normal.       Medical Decision Makin. Acute bacterial sinusitis  - amoxicillin-clavulanate (AUGMENTIN) 875-125 MG Tab; 1 TAB BY MOUTH TWICE A DAY X 10 DAYS. TAKE WITH FOOD.  Dispense: 20 Tablet; Refill: 0    2. Cough  - benzonatate (TESSALON) 200 MG capsule; 1 CAP BY MOUTH UP TO 3 TIMES A DAY ONLY IF NEEDED FOR COUGH.  Dispense: 30 Capsule; Refill: 0  - promethazine-dextromethorphan (PROMETHAZINE-DM) 6.25-15 MG/5ML syrup; Take 5 mL by mouth every 6 hours as needed for Cough for up to 7 days.  Dispense: 140 mL; Refill: 0       Discussed with her DDX, management options, and risks, benefits, and " alternatives to treatment plan agreed upon.    Symptoms x 2 weeks. Has persisting nasal symptoms, sore throat, and cough. No fever. Augmentin and Tessalon have worked and been tolerated in the past.    Fatigued, frequent cough. TTP all sinus regions bilaterally.    Agreeable to medications prescribed.    Discussed expected course of duration, time for improvement, and to seek follow-up in Emergency Room, urgent care, or with PCP if getting worse at any time or not improving within expected time frame.

## 2022-09-20 DIAGNOSIS — E11.9 TYPE 2 DIABETES MELLITUS WITHOUT COMPLICATION, WITHOUT LONG-TERM CURRENT USE OF INSULIN (HCC): ICD-10-CM

## 2022-09-20 DIAGNOSIS — E11.65 UNCONTROLLED TYPE 2 DIABETES MELLITUS WITH HYPERGLYCEMIA (HCC): ICD-10-CM

## 2022-09-20 RX ORDER — DULAGLUTIDE 1.5 MG/.5ML
INJECTION, SOLUTION SUBCUTANEOUS
Qty: 12 ML | Refills: 0 | Status: SHIPPED | OUTPATIENT
Start: 2022-09-20 | End: 2022-12-02

## 2022-09-20 RX ORDER — GLYBURIDE 5 MG/1
TABLET ORAL
Qty: 120 TABLET | Refills: 0 | Status: SHIPPED | OUTPATIENT
Start: 2022-09-20 | End: 2022-11-16

## 2022-09-20 NOTE — TELEPHONE ENCOUNTER
Received request via: Pharmacy    Was the patient seen in the last year in this department? Yes    Does the patient have an active prescription (recently filled or refills available) for medication(s) requested? No      Last OV 8/31/2022  Next OV 11/30/2022

## 2022-10-10 DIAGNOSIS — F33.1 MODERATE EPISODE OF RECURRENT MAJOR DEPRESSIVE DISORDER (HCC): ICD-10-CM

## 2022-10-10 RX ORDER — ESCITALOPRAM OXALATE 10 MG/1
TABLET ORAL
Qty: 90 TABLET | Refills: 0 | Status: SHIPPED | OUTPATIENT
Start: 2022-10-10 | End: 2023-12-01

## 2022-10-12 ENCOUNTER — APPOINTMENT (OUTPATIENT)
Dept: RADIOLOGY | Facility: MEDICAL CENTER | Age: 57
End: 2022-10-12
Payer: COMMERCIAL

## 2022-10-12 ENCOUNTER — OFFICE VISIT (OUTPATIENT)
Dept: URGENT CARE | Facility: PHYSICIAN GROUP | Age: 57
End: 2022-10-12
Payer: COMMERCIAL

## 2022-10-12 ENCOUNTER — HOSPITAL ENCOUNTER (EMERGENCY)
Facility: MEDICAL CENTER | Age: 57
End: 2022-10-12
Attending: EMERGENCY MEDICINE
Payer: COMMERCIAL

## 2022-10-12 ENCOUNTER — APPOINTMENT (OUTPATIENT)
Dept: RADIOLOGY | Facility: MEDICAL CENTER | Age: 57
End: 2022-10-12
Attending: EMERGENCY MEDICINE
Payer: COMMERCIAL

## 2022-10-12 VITALS
DIASTOLIC BLOOD PRESSURE: 71 MMHG | BODY MASS INDEX: 34.76 KG/M2 | HEART RATE: 86 BPM | SYSTOLIC BLOOD PRESSURE: 155 MMHG | OXYGEN SATURATION: 97 % | TEMPERATURE: 97 F | HEIGHT: 61 IN | WEIGHT: 184.08 LBS | RESPIRATION RATE: 16 BRPM

## 2022-10-12 VITALS
BODY MASS INDEX: 34.74 KG/M2 | HEART RATE: 85 BPM | SYSTOLIC BLOOD PRESSURE: 156 MMHG | WEIGHT: 184 LBS | RESPIRATION RATE: 16 BRPM | TEMPERATURE: 96.5 F | DIASTOLIC BLOOD PRESSURE: 96 MMHG | HEIGHT: 61 IN | OXYGEN SATURATION: 98 %

## 2022-10-12 DIAGNOSIS — R51.9 ACUTE NONINTRACTABLE HEADACHE, UNSPECIFIED HEADACHE TYPE: ICD-10-CM

## 2022-10-12 DIAGNOSIS — R94.31 ABNORMAL EKG: ICD-10-CM

## 2022-10-12 DIAGNOSIS — R03.0 ELEVATED BLOOD PRESSURE READING: ICD-10-CM

## 2022-10-12 DIAGNOSIS — E16.2 HYPOGLYCEMIA: ICD-10-CM

## 2022-10-12 DIAGNOSIS — R42 DIZZINESS: ICD-10-CM

## 2022-10-12 LAB
ALBUMIN SERPL BCP-MCNC: 3.6 G/DL (ref 3.2–4.9)
ALBUMIN/GLOB SERPL: 1.1 G/DL
ALP SERPL-CCNC: 92 U/L (ref 30–99)
ALT SERPL-CCNC: 21 U/L (ref 2–50)
ANION GAP SERPL CALC-SCNC: 13 MMOL/L (ref 7–16)
AST SERPL-CCNC: 27 U/L (ref 12–45)
BASOPHILS # BLD AUTO: 0.3 % (ref 0–1.8)
BASOPHILS # BLD: 0.03 K/UL (ref 0–0.12)
BILIRUB SERPL-MCNC: <0.2 MG/DL (ref 0.1–1.5)
BUN SERPL-MCNC: 30 MG/DL (ref 8–22)
CALCIUM SERPL-MCNC: 9.4 MG/DL (ref 8.5–10.5)
CHLORIDE SERPL-SCNC: 109 MMOL/L (ref 96–112)
CO2 SERPL-SCNC: 16 MMOL/L (ref 20–33)
CREAT SERPL-MCNC: 0.87 MG/DL (ref 0.5–1.4)
EKG 4674: NORMAL
EKG IMPRESSION: NORMAL
EOSINOPHIL # BLD AUTO: 0.21 K/UL (ref 0–0.51)
EOSINOPHIL NFR BLD: 2.3 % (ref 0–6.9)
ERYTHROCYTE [DISTWIDTH] IN BLOOD BY AUTOMATED COUNT: 41.9 FL (ref 35.9–50)
GFR SERPLBLD CREATININE-BSD FMLA CKD-EPI: 77 ML/MIN/1.73 M 2
GLOBULIN SER CALC-MCNC: 3.4 G/DL (ref 1.9–3.5)
GLUCOSE BLD STRIP.AUTO-MCNC: 78 MG/DL (ref 65–99)
GLUCOSE BLD-MCNC: 103 MG/DL (ref 70–100)
GLUCOSE SERPL-MCNC: 85 MG/DL (ref 65–99)
HCT VFR BLD AUTO: 34.4 % (ref 37–47)
HGB BLD-MCNC: 11.3 G/DL (ref 12–16)
IMM GRANULOCYTES # BLD AUTO: 0.06 K/UL (ref 0–0.11)
IMM GRANULOCYTES NFR BLD AUTO: 0.7 % (ref 0–0.9)
LYMPHOCYTES # BLD AUTO: 2.98 K/UL (ref 1–4.8)
LYMPHOCYTES NFR BLD: 33.3 % (ref 22–41)
MCH RBC QN AUTO: 29.7 PG (ref 27–33)
MCHC RBC AUTO-ENTMCNC: 32.8 G/DL (ref 33.6–35)
MCV RBC AUTO: 90.3 FL (ref 81.4–97.8)
MONOCYTES # BLD AUTO: 0.64 K/UL (ref 0–0.85)
MONOCYTES NFR BLD AUTO: 7.2 % (ref 0–13.4)
NEUTROPHILS # BLD AUTO: 5.02 K/UL (ref 2–7.15)
NEUTROPHILS NFR BLD: 56.2 % (ref 44–72)
NRBC # BLD AUTO: 0 K/UL
NRBC BLD-RTO: 0 /100 WBC
PLATELET # BLD AUTO: 282 K/UL (ref 164–446)
PMV BLD AUTO: 10.8 FL (ref 9–12.9)
POTASSIUM SERPL-SCNC: 4.5 MMOL/L (ref 3.6–5.5)
PROT SERPL-MCNC: 7 G/DL (ref 6–8.2)
RBC # BLD AUTO: 3.81 M/UL (ref 4.2–5.4)
SODIUM SERPL-SCNC: 138 MMOL/L (ref 135–145)
TROPONIN T SERPL-MCNC: 14 NG/L (ref 6–19)
WBC # BLD AUTO: 8.9 K/UL (ref 4.8–10.8)

## 2022-10-12 PROCEDURE — 82962 GLUCOSE BLOOD TEST: CPT | Performed by: PHYSICIAN ASSISTANT

## 2022-10-12 PROCEDURE — 700111 HCHG RX REV CODE 636 W/ 250 OVERRIDE (IP): Performed by: EMERGENCY MEDICINE

## 2022-10-12 PROCEDURE — 71045 X-RAY EXAM CHEST 1 VIEW: CPT

## 2022-10-12 PROCEDURE — 80053 COMPREHEN METABOLIC PANEL: CPT

## 2022-10-12 PROCEDURE — 84484 ASSAY OF TROPONIN QUANT: CPT

## 2022-10-12 PROCEDURE — 99214 OFFICE O/P EST MOD 30 MIN: CPT | Performed by: PHYSICIAN ASSISTANT

## 2022-10-12 PROCEDURE — 700105 HCHG RX REV CODE 258: Performed by: EMERGENCY MEDICINE

## 2022-10-12 PROCEDURE — 82962 GLUCOSE BLOOD TEST: CPT

## 2022-10-12 PROCEDURE — A9270 NON-COVERED ITEM OR SERVICE: HCPCS | Performed by: EMERGENCY MEDICINE

## 2022-10-12 PROCEDURE — 36415 COLL VENOUS BLD VENIPUNCTURE: CPT

## 2022-10-12 PROCEDURE — 96375 TX/PRO/DX INJ NEW DRUG ADDON: CPT

## 2022-10-12 PROCEDURE — 96374 THER/PROPH/DIAG INJ IV PUSH: CPT

## 2022-10-12 PROCEDURE — 93005 ELECTROCARDIOGRAM TRACING: CPT | Performed by: EMERGENCY MEDICINE

## 2022-10-12 PROCEDURE — 93005 ELECTROCARDIOGRAM TRACING: CPT

## 2022-10-12 PROCEDURE — 85025 COMPLETE CBC W/AUTO DIFF WBC: CPT

## 2022-10-12 PROCEDURE — 700102 HCHG RX REV CODE 250 W/ 637 OVERRIDE(OP): Performed by: EMERGENCY MEDICINE

## 2022-10-12 PROCEDURE — 99284 EMERGENCY DEPT VISIT MOD MDM: CPT

## 2022-10-12 RX ORDER — PROCHLORPERAZINE EDISYLATE 5 MG/ML
10 INJECTION INTRAMUSCULAR; INTRAVENOUS ONCE
Status: COMPLETED | OUTPATIENT
Start: 2022-10-12 | End: 2022-10-12

## 2022-10-12 RX ORDER — SUMATRIPTAN 100 MG/1
TABLET, FILM COATED ORAL
COMMUNITY
Start: 2022-09-20 | End: 2023-03-25

## 2022-10-12 RX ORDER — HYDRALAZINE HYDROCHLORIDE 20 MG/ML
10 INJECTION INTRAMUSCULAR; INTRAVENOUS ONCE
Status: COMPLETED | OUTPATIENT
Start: 2022-10-12 | End: 2022-10-12

## 2022-10-12 RX ORDER — SODIUM CHLORIDE 9 MG/ML
1000 INJECTION, SOLUTION INTRAVENOUS ONCE
Status: COMPLETED | OUTPATIENT
Start: 2022-10-12 | End: 2022-10-12

## 2022-10-12 RX ORDER — ONDANSETRON 2 MG/ML
4 INJECTION INTRAMUSCULAR; INTRAVENOUS ONCE
Status: COMPLETED | OUTPATIENT
Start: 2022-10-12 | End: 2022-10-12

## 2022-10-12 RX ORDER — ACETAMINOPHEN 325 MG/1
650 TABLET ORAL ONCE
Status: COMPLETED | OUTPATIENT
Start: 2022-10-12 | End: 2022-10-12

## 2022-10-12 RX ADMIN — ACETAMINOPHEN 650 MG: 325 TABLET, FILM COATED ORAL at 18:58

## 2022-10-12 RX ADMIN — SODIUM CHLORIDE 1000 ML: 9 INJECTION, SOLUTION INTRAVENOUS at 18:47

## 2022-10-12 RX ADMIN — HYDRALAZINE HYDROCHLORIDE 10 MG: 20 INJECTION INTRAMUSCULAR; INTRAVENOUS at 19:37

## 2022-10-12 RX ADMIN — ONDANSETRON 4 MG: 2 INJECTION, SOLUTION INTRAMUSCULAR; INTRAVENOUS at 18:58

## 2022-10-12 RX ADMIN — PROCHLORPERAZINE EDISYLATE 10 MG: 5 INJECTION INTRAMUSCULAR; INTRAVENOUS at 18:58

## 2022-10-12 ASSESSMENT — FIBROSIS 4 INDEX
FIB4 SCORE: 1.22
FIB4 SCORE: 1.22

## 2022-10-12 ASSESSMENT — ENCOUNTER SYMPTOMS
COUGH: 1
BLURRED VISION: 0
EYE DISCHARGE: 0
VOMITING: 0
HEADACHES: 1
NAUSEA: 0
PALPITATIONS: 1
EYE REDNESS: 0
DOUBLE VISION: 0
FEVER: 0
DIZZINESS: 1
SHORTNESS OF BREATH: 0

## 2022-10-12 NOTE — PROGRESS NOTES
"poSubjective     Sonya Wei is a 56 y.o. female who presents with Headache (Dizziness, shaking, rapid HR. Lightheaded. Bp 215/159 sugar was 67. Taken today at work. Refused ambulance earlier at work.  )            HPI    This is a new problem.  The patient presents to clinic complaining of an elevated blood pressure.  The patient states her blood pressure became elevated earlier today while at work.  The patient states she developed a headache, dizziness, palpitations, and subtle vision changes.  The patient states this prompted her to check her blood pressure, which was found to be elevated at 215/159.  The patient states that she also checked her blood sugar which was found to be slightly low at 67.  The patient states the symptoms started at approximately 10 AM.  The patient states she was scheduled for a break at 1015 where she was planning on taking her blood pressure medication and eating breakfast.  The patient states that she was able to eat something and took her blood pressure medication as prescribed with improvement of her symptoms.  The patient states that she is currently prescribed lisinopril 20mg daily for her hypertension.  The patient states she is currently experiencing a headache and fatigue.  The patient states the subtle vision changes, which she describes as a \"darkening\" of her vision have resolved.  The patient states her palpitations have also resolved.  The patient reports no numbness, tingling, or weakness of her extremities.  She also reports no chest pain or shortness of breath.  The patient denies nausea/vomiting.  The patient has not taken any additional OTC medications for her current symptoms.      PMH:  has a past medical history of Anemia, Bowel habit changes, Diabetes, Diabetes (HCC), GERD (gastroesophageal reflux disease), Healthcare maintenance (9/27/2014), High cholesterol, Hyperlipidemia, Hypertension, Infectious disease, Jaundice (1999), Psychiatric problem, and " Vaginal itching (8/27/2014).  MEDS:   Current Outpatient Medications:     sumatriptan (IMITREX) 100 MG tablet, TAKE 1 TABLET BY MOUTH AT THE ONSET OF AURA/HEADACHE. MAY REPEAT DOSE 1 TIME AFTER 2 HOURS IF HEADACHE PERSISTS. MAX DAILY DOSE 200 MG. DO NOT USE MORE THAN 2 DAYS PER WEEK, Disp: , Rfl:     escitalopram (LEXAPRO) 10 MG Tab, Take 1 tablet by mouth once daily, Disp: 90 Tablet, Rfl: 0    TRULICITY 1.5 MG/0.5ML Solution Pen-injector, INJECT 1 DOSE SUBCUTANEOUSLY ONCE A WEEK, Disp: 12 mL, Rfl: 0    glyBURIDE (DIABETA) 5 MG Tab, TAKE 2 TABLETS BY MOUTH TWICE DAILY WITH MEALS, Disp: 120 Tablet, Rfl: 0    metFORMIN (GLUCOPHAGE) 500 MG Tab, TAKE 2 TABLETS BY MOUTH TWICE DAILY WITH MEALS, Disp: 360 Tablet, Rfl: 0    benzonatate (TESSALON) 200 MG capsule, 1 CAP BY MOUTH UP TO 3 TIMES A DAY ONLY IF NEEDED FOR COUGH., Disp: 30 Capsule, Rfl: 0    gabapentin (NEURONTIN) 100 MG Cap, Take 1 Capsule by mouth at bedtime as needed (insomnia)., Disp: 90 Capsule, Rfl: 0    lisinopril (PRINIVIL) 40 MG tablet, Take 1 Tablet by mouth every day., Disp: 90 Tablet, Rfl: 3    atorvastatin (LIPITOR) 20 MG Tab, Take 1 Tablet by mouth every day., Disp: 90 Tablet, Rfl: 3    Cyanocobalamin (VITAMIN B-12 PO), Take 1 Tablet by mouth every day., Disp: , Rfl:     ASPIRIN 81 PO, Take 81 mg by mouth every day., Disp: , Rfl:     tizanidine (ZANAFLEX) 4 MG Tab, Take 0.5-1 Tablets by mouth at bedtime as needed (muscle spasms)., Disp: 30 Tablet, Rfl: 2    Multiple Vitamin (MULTI VITAMIN DAILY) Tab, Take  by mouth., Disp: , Rfl:     hydrOXYzine HCl (ATARAX) 25 MG Tab, Take 0.5-1 Tablets by mouth 3 times a day as needed for Anxiety., Disp: 30 Tablet, Rfl: 0    omeprazole (PRILOSEC) 20 MG delayed-release capsule, Take 1 Capsule by mouth every day., Disp: 30 Capsule, Rfl: 0    SITagliptin (JANUVIA) 100 MG Tab, Take 1 Tablet by mouth every day., Disp: 90 Tablet, Rfl: 1    albuterol 108 (90 Base) MCG/ACT Aero Soln inhalation aerosol, Inhale 2 Puffs every  "6 hours as needed for Shortness of Breath., Disp: 8.5 g, Rfl: 0    Ascorbic Acid (VITAMIN C) 1000 MG Tab, Take 1 Tab by mouth every morning., Disp: , Rfl:     Cholecalciferol (VITAMIN D PO), Take 2 Tablets by mouth every day. GUMMIES, Disp: , Rfl:   ALLERGIES:   Allergies   Allergen Reactions    Morphine Itching     Rxn = unknown   Bumps all over body     SURGHX:   Past Surgical History:   Procedure Laterality Date    VENTRAL HERNIA REPAIR ROBOTIC XI N/A 10/14/2016    Procedure: VENTRAL HERNIA REPAIR ROBOTIC XI FOR INCISIONAL W/MESH;  Surgeon: Edi Mendoza M.D.;  Location: SURGERY [facility];  Service:     ABDOMINAL HYSTERECTOMY TOTAL      still has ovaries    CHOLECYSTECTOMY      PRIMARY C SECTION      TUBAL COAGULATION LAPAROSCOPIC BILATERAL       SOCHX:  reports that she has never smoked. She has never used smokeless tobacco. She reports that she does not drink alcohol and does not use drugs.  FH: Family history was reviewed, no pertinent findings to report    Review of Systems   Constitutional:  Positive for malaise/fatigue. Negative for fever.   HENT:  Positive for congestion.    Eyes:  Negative for blurred vision, double vision, discharge and redness.   Respiratory:  Positive for cough. Negative for shortness of breath.    Cardiovascular:  Positive for palpitations (now improved). Negative for chest pain.   Gastrointestinal:  Negative for nausea and vomiting.   Neurological:  Positive for dizziness and headaches.            Objective     BP (!) 156/96   Pulse 85   Temp 35.8 °C (96.5 °F) (Temporal)   Resp 16   Ht 1.549 m (5' 1\")   Wt 83.5 kg (184 lb)   SpO2 98%   BMI 34.77 kg/m²      Physical Exam  Constitutional:       General: She is not in acute distress.     Appearance: Normal appearance. She is not ill-appearing.   HENT:      Head: Normocephalic and atraumatic.      Right Ear: Tympanic membrane, ear canal and external ear normal.      Left Ear: Tympanic membrane, ear canal and external ear " normal.      Nose: Nose normal.      Mouth/Throat:      Mouth: Mucous membranes are moist.      Pharynx: Oropharynx is clear. No posterior oropharyngeal erythema.   Eyes:      Extraocular Movements: Extraocular movements intact.      Conjunctiva/sclera: Conjunctivae normal.      Pupils: Pupils are equal, round, and reactive to light.   Cardiovascular:      Rate and Rhythm: Normal rate and regular rhythm.      Heart sounds: Normal heart sounds.   Pulmonary:      Effort: Pulmonary effort is normal. No respiratory distress.      Breath sounds: Normal breath sounds. No wheezing.   Musculoskeletal:         General: Normal range of motion.      Cervical back: Normal range of motion and neck supple.   Skin:     General: Skin is warm and dry.   Neurological:      General: No focal deficit present.      Mental Status: She is alert and oriented to person, place, and time.             Progress:  EKG:  EKG Interpretation   Interpreted by me   Rhythm: normal sinus   Rate: normal   Axis: normal   Ectopy: none   Conduction: right bundle branch block  ST Segments: non-specific ST segment changes  T Waves: no acute change   Q Waves: none   Clinical Impression: Abnormal EKG  This is changed from the patient's previous EKG on 7/18/2022.     POCT Glucose: 103           Assessment & Plan        1. Dizziness  - EKG  - POCT Glucose    2. Acute nonintractable headache, unspecified headache type    3. Elevated blood pressure reading  - EKG    4. Abnormal EKG    The patient's presenting symptoms and physical exam findings are consistent with dizziness and an acute headache likely related to her elevated blood pressure.  The patient states she developed a headache, dizziness, palpitations, and subtle vision changes.  The patient states this prompted her to check her blood pressure, which was found to be elevated at 215/159.  The patient states that she also checked her blood sugar which was found to be slightly low at 67.  The patient states  her symptoms have improved with the exception of dizziness and an acute headache.  The patient's physical exam today in clinic was normal.  No focal neurological deficits were appreciated.  The patient is nontoxic appears in no acute distress.  The patient's vital signs are stable and within normal limits, with the exception of an elevated blood pressure.  The patient's blood pressure was found to be elevated at 156/96 today in clinic.  She is afebrile.  An EKG was obtained to further evaluate the patient's current symptoms. The patient's EKG today in clinic showed normal sinus rhythm with a heart rate of 79.  A right bundle branch block was present.  The patient also had nonspecific ST segment elevation.  This is an abnormal EKG this changed from the patient's prior EKG on 7/18/2022.  Patient's POCT random glucose in clinic was 103.  Based on the patient's presenting symptoms, presenting symptoms, and abnormal EKG, I believe the patient would benefit from a higher level of care.  Therefore, I recommend the patient be transferred to the [facility] ED for further evaluation and management.  The patient agrees with this plan.  The patient declined transfer via EMS at this time.  The patient states her daughter will transport her to the [facility] ED.  Advised the patient of the associated risks of not seeking further care for her current symptoms, and she verbalized understanding.    Plan:   Transfer patient to the [facility] for further evaluation management.    Please note that this dictation was created using voice recognition software. I have made every reasonable attempt to correct obvious errors, but I expect that there may be errors of grammar and possibly content that I did not discover before finalizing the note.     This note was electronically signed by Ange Juarez PA-C

## 2022-10-12 NOTE — ED TRIAGE NOTES
"Chief Complaint   Patient presents with    Dizziness     Pt c/o dizziness, lightheadedness, and palpitations while she was at work. Pt sent from  for evaluation d/t hypertension. Pt currently takes lisinopril.     BP (!) 182/91   Pulse 83   Temp 36.1 °C (96.9 °F) (Temporal)   Resp 14   Ht 1.549 m (5' 1\")   Wt 83.5 kg (184 lb 1.4 oz)   SpO2 98%   BMI 34.78 kg/m²     Pt ambulatory to triage for above.   "

## 2022-10-13 NOTE — ED PROVIDER NOTES
ED Provider Note    Scribed for Glenn Michelle M.D. by Guerita Nugent. 10/12/2022, 6:15 PM.    Primary care provider: DECLAN Johnson  Means of arrival: Walk in   History obtained from: Patient  History limited by: None    CHIEF COMPLAINT  Chief Complaint   Patient presents with    Dizziness     Pt c/o dizziness, lightheadedness, and palpitations while she was at work. Pt sent from  for evaluation d/t hypertension. Pt currently takes lisinopril.       HPI  Sonya Wei is a 56 y.o. female with a history hypertension and diabetes who presents to the Emergency Department for evaluation of dizziness onset today. Patient reports that she woke up this morning with a severe posterior headache; the headache did not wake her up from sleep. Sonya's headache was a 7/10 in Urgent Care, and a 5/10 currently at bedside. She then went to work, and at around 10AM, patient started to feel lightheaded and dizzy. She then started feeling shaky with associated vision dimming that has since resolved. Sonya went to Urgent Care and her sugar was 67 and her blood pressure was 215. She was then prompted to come to the ED. Patient reports of a similar episode in the past and during this time, her lisinopril dose was increased. She admits to associated symptoms of left arm numbness, but denies abdominal pain, chest pain, shortness of breath, difficulty moving extremities, or difficulty finding words. No alleviating factors were reported. Patient takes Lisinopril. Sonya has a history of migraines since getting to COVID in 2020. Patient has a follow up with her neurologist soon.     REVIEW OF SYSTEMS  Pertinent positives include hypertensive, lightheadedness, dizziness, headache, shaking, vision changes, light arm numbness. Pertinent negatives include abdominal pain, chest pain, shortness of breath, difficulty moving extremities, or difficulty finding words. All other systems negative.    PAST MEDICAL HISTORY    has a past medical history of Anemia, Bowel habit changes, Diabetes, Diabetes (HCC), GERD (gastroesophageal reflux disease), Healthcare maintenance (9/27/2014), High cholesterol, Hyperlipidemia, Hypertension, Infectious disease, Jaundice (1999), Psychiatric problem, and Vaginal itching (8/27/2014).    SURGICAL HISTORY   has a past surgical history that includes cholecystectomy; primary c section; tubal coagulation laparoscopic bilateral; abdominal hysterectomy total; and ventral hernia repair robotic xi (N/A, 10/14/2016).    SOCIAL HISTORY  Social History     Tobacco Use    Smoking status: Never    Smokeless tobacco: Never   Vaping Use    Vaping Use: Never used   Substance Use Topics    Alcohol use: No    Drug use: No      Social History     Substance and Sexual Activity   Drug Use No       FAMILY HISTORY  Family History   Problem Relation Age of Onset    Diabetes Mother     Hyperlipidemia Mother     Diabetes Father     Hypertension Father     Hyperlipidemia Father        CURRENT MEDICATIONS  Home Medications       Reviewed by Whitney Hinojosa R.N. (Registered Nurse) on 10/12/22 at 1641  Med List Status: Partial     Medication Last Dose Status   albuterol 108 (90 Base) MCG/ACT Aero Soln inhalation aerosol  Active   Ascorbic Acid (VITAMIN C) 1000 MG Tab  Active   ASPIRIN 81 PO  Active   atorvastatin (LIPITOR) 20 MG Tab  Active   benzonatate (TESSALON) 200 MG capsule  Active   Cholecalciferol (VITAMIN D PO)  Active   Cyanocobalamin (VITAMIN B-12 PO)  Active   escitalopram (LEXAPRO) 10 MG Tab  Active   gabapentin (NEURONTIN) 100 MG Cap  Active   glyBURIDE (DIABETA) 5 MG Tab  Active   hydrOXYzine HCl (ATARAX) 25 MG Tab  Active   lisinopril (PRINIVIL) 40 MG tablet  Active   metFORMIN (GLUCOPHAGE) 500 MG Tab  Active   Multiple Vitamin (MULTI VITAMIN DAILY) Tab  Active   omeprazole (PRILOSEC) 20 MG delayed-release capsule  Active   SITagliptin (JANUVIA) 100 MG Tab  Active   sumatriptan (IMITREX) 100 MG tablet  Active  "  tizanidine (ZANAFLEX) 4 MG Tab  Active   TRULICITY 1.5 MG/0.5ML Solution Pen-injector  Active                    ALLERGIES  Allergies   Allergen Reactions    Morphine Itching     Rxn = unknown   Bumps all over body       PHYSICAL EXAM  VITAL SIGNS: BP (!) 182/91   Pulse 83   Temp 36.1 °C (96.9 °F) (Temporal)   Resp 14   Ht 1.549 m (5' 1\")   Wt 83.5 kg (184 lb 1.4 oz)   SpO2 98%   BMI 34.78 kg/m²     Constitutional: Well developed, Well nourished, mild distress.   HENT: Normocephalic, Atraumatic, mask in place.  Eyes: Conjunctiva normal, No discharge.   Neck: Supple, No stridor   Cardiovascular: Normal heart rate, Normal rhythm, No murmurs, equal pulses.   Pulmonary: Normal breath sounds, No respiratory distress, No wheezing, No rales, No rhonchi.  Chest: No chest wall tenderness or deformity.   Abdomen:Soft, No tenderness, No masses, no rebound, no guarding.   Back: No CVA tenderness.   Musculoskeletal: No major deformities noted, No tenderness. 5/5 strength upper and lower extremity bilaterally.   Skin: Warm, Dry, No erythema, No rash.   Neurologic: Alert & oriented x 3, Normal motor function,  No focal deficits noted. Normal finger to nose, Normal cranial nerves II-XII, No pronator drift. Equal strength in upper and lower extremities bilaterally. No facial droop.   Psychiatric: Affect normal, Judgment normal, Mood normal.         LABS  Results for orders placed or performed during the hospital encounter of 10/12/22   CBC with Differential   Result Value Ref Range    WBC 8.9 4.8 - 10.8 K/uL    RBC 3.81 (L) 4.20 - 5.40 M/uL    Hemoglobin 11.3 (L) 12.0 - 16.0 g/dL    Hematocrit 34.4 (L) 37.0 - 47.0 %    MCV 90.3 81.4 - 97.8 fL    MCH 29.7 27.0 - 33.0 pg    MCHC 32.8 (L) 33.6 - 35.0 g/dL    RDW 41.9 35.9 - 50.0 fL    Platelet Count 282 164 - 446 K/uL    MPV 10.8 9.0 - 12.9 fL    Neutrophils-Polys 56.20 44.00 - 72.00 %    Lymphocytes 33.30 22.00 - 41.00 %    Monocytes 7.20 0.00 - 13.40 %    Eosinophils 2.30 " 0.00 - 6.90 %    Basophils 0.30 0.00 - 1.80 %    Immature Granulocytes 0.70 0.00 - 0.90 %    Nucleated RBC 0.00 /100 WBC    Neutrophils (Absolute) 5.02 2.00 - 7.15 K/uL    Lymphs (Absolute) 2.98 1.00 - 4.80 K/uL    Monos (Absolute) 0.64 0.00 - 0.85 K/uL    Eos (Absolute) 0.21 0.00 - 0.51 K/uL    Baso (Absolute) 0.03 0.00 - 0.12 K/uL    Immature Granulocytes (abs) 0.06 0.00 - 0.11 K/uL    NRBC (Absolute) 0.00 K/uL   Complete Metabolic Panel (CMP)   Result Value Ref Range    Sodium 138 135 - 145 mmol/L    Potassium 4.5 3.6 - 5.5 mmol/L    Chloride 109 96 - 112 mmol/L    Co2 16 (L) 20 - 33 mmol/L    Anion Gap 13.0 7.0 - 16.0    Glucose 85 65 - 99 mg/dL    Bun 30 (H) 8 - 22 mg/dL    Creatinine 0.87 0.50 - 1.40 mg/dL    Calcium 9.4 8.5 - 10.5 mg/dL    AST(SGOT) 27 12 - 45 U/L    ALT(SGPT) 21 2 - 50 U/L    Alkaline Phosphatase 92 30 - 99 U/L    Total Bilirubin <0.2 0.1 - 1.5 mg/dL    Albumin 3.6 3.2 - 4.9 g/dL    Total Protein 7.0 6.0 - 8.2 g/dL    Globulin 3.4 1.9 - 3.5 g/dL    A-G Ratio 1.1 g/dL   Troponin   Result Value Ref Range    Troponin T 14 6 - 19 ng/L   ESTIMATED GFR   Result Value Ref Range    GFR (CKD-EPI) 77 >60 mL/min/1.73 m 2   EKG   Result Value Ref Range    Report       [facility] Emergency Dept.    Test Date:  2022-10-12  Pt Name:    MATTIE SANDOVAL               Department: ER  MRN:        [id number]                      Room:  Gender:     Female                       Technician: [id number]  :        [id number]                   Requested By:ER TRIAGE PROTOCOL  Order #:    [id number]                    Reading MD: MECHE OQUENDO MD    Measurements  Intervals                                Axis  Rate:       80                           P:          66  OR:         159                          QRS:        -17  QRSD:       136                          T:          29  QT:         378  QTc:        436    Interpretive Statements  Sinus rhythm, rate of 80, normal axis, no st  elevation.  Right bundle branch block  Compared to ECG 07/18/2022 12:30:32  No significant changes  Electronically Signed On 10- 18:17:04 PDT by MECHE OQUENDO MD     POCT glucose device results   Result Value Ref Range    POC Glucose, Blood 78 65 - 99 mg/dL       All labs reviewed by me.    EKG  12 Lead EKG interpreted by me as shown above.     RADIOLOGY  DX-CHEST-PORTABLE (1 VIEW)   Final Result         No acute cardiac or pulmonary abnormality is identified.        The radiologist's interpretation of all radiological studies have been reviewed by me.    COURSE & MEDICAL DECISION MAKING  Pertinent Labs & Imaging studies reviewed. (See chart for details)    6:15 PM - Patient seen and examined at bedside. Patient will be treated with Tylenol 650 mg, Compazine injection 10 mg, Zofran injection 4 mg, and NS infusion 1,000 mL. The patient will receive IV fluids for dehydration. Ordered DX-chest, CBC with diff, CMP, Troponin, and EKG to evaluate her symptoms. The differential diagnoses include but are not limited to: Migraine headache vs Hypoglycemia vs Hypertensive urgency.      7:25 PM - Patient was reevaluated at bedside. She does not have chest pain. Patient is feeling much better after eating and her headache has improved.     7:33PM - Patient will be treated with Apresoline injection 10 mg.     8:53 PM - Patient was reevaluated at bedside. Patient's blood pressure has stabilized. She is feeling better. I discussed plan for discharge and follow up as outlined below. The patient is stable for discharge at this time and will return for any new or worsening symptoms. Patient verbalizes understanding and support with my plan for discharge.     HYDRATION: Based on the patient's presentation of Dehydration the patient was given IV fluids. IV Hydration was used because oral hydration was not as rapid as required. Upon recheck following hydration, the patient was mildly improved.      Medical Decision Making:  At this point time I think the patient most likely had some hypoglycemia leading to her dizziness.  As far as her headache this sounds like more like a migraine.  She was treated with a migraine cocktail and with that her headache is gone away.  Patient also had an elevated blood pressure here.  With her headache going away after treatment for migraine cocktail her blood pressure coming down I do not think she needs a CT of her head.  I have discussed with her that she needs to do a blood pressure journal to see if she needs to adjust her blood pressure medications with her primary.     The patient will return for new or worsening symptoms and is stable at the time of discharge.    The patient is referred to a primary physician for blood pressure management, diabetic screening, and for all other preventative health concerns.        DISPOSITION:  Patient will be discharged home in stable condition.    FOLLOW UP:  DECLAN Johnson  [address]  [facility] [address]  [phone]    Schedule an appointment as soon as possible for a visit in 1 week  have them recheck your blood pressure.      FINAL IMPRESSION  1. Acute nonintractable headache, unspecified headache type    2. Hypoglycemia    3. Dizziness    4. Elevated blood pressure reading          Guerita QUIROGA (Betsyibheather), am scribing for, and in the presence of, Glenn Michelle M.D.    Electronically signed by: Guerita Nugent (Ta), 10/12/2022    Glenn QUIROGA M.D. personally performed the services described in this documentation, as scribed by Guerita Nugent in my presence, and it is both accurate and complete.    The note accurately reflects work and decisions made by me.  Glenn Michelle M.D.  10/13/2022  1:44 AM

## 2022-10-13 NOTE — ED NOTES
"Pt discharged to lobby with steady gait, AO x4. IV discontinued and gauze placed, pt in possession of belongings. Pt provided discharge education and information pertaining to medications and follow up appointments. Pt received copy of discharge instructions and verbalized understanding. /63   Pulse 85   Temp 36.1 °C (97 °F)   Resp 16   Ht 1.549 m (5' 1\")   Wt 83.5 kg (184 lb 1.4 oz)   SpO2 99%   BMI 34.78 kg/m²   "

## 2022-10-13 NOTE — DISCHARGE INSTRUCTIONS
Have your blood pressure rechecked by your doctor next week.  You may need to have your blood pressure medications adjusted.  Return the emergency department if you have new or different headache, confusion, visual changes, difficulty speaking, unilateral weakness, chest pain or new shortness of breath.

## 2022-10-14 ENCOUNTER — OFFICE VISIT (OUTPATIENT)
Dept: MEDICAL GROUP | Facility: PHYSICIAN GROUP | Age: 57
End: 2022-10-14
Payer: COMMERCIAL

## 2022-10-14 VITALS
HEART RATE: 82 BPM | RESPIRATION RATE: 14 BRPM | SYSTOLIC BLOOD PRESSURE: 134 MMHG | TEMPERATURE: 97.4 F | WEIGHT: 183.3 LBS | OXYGEN SATURATION: 96 % | HEIGHT: 61 IN | BODY MASS INDEX: 34.61 KG/M2 | DIASTOLIC BLOOD PRESSURE: 82 MMHG

## 2022-10-14 DIAGNOSIS — Z23 NEED FOR VACCINATION: ICD-10-CM

## 2022-10-14 DIAGNOSIS — I10 PRIMARY HYPERTENSION: ICD-10-CM

## 2022-10-14 PROCEDURE — 90471 IMMUNIZATION ADMIN: CPT | Performed by: NURSE PRACTITIONER

## 2022-10-14 PROCEDURE — 90686 IIV4 VACC NO PRSV 0.5 ML IM: CPT | Performed by: NURSE PRACTITIONER

## 2022-10-14 PROCEDURE — 99214 OFFICE O/P EST MOD 30 MIN: CPT | Mod: 25 | Performed by: NURSE PRACTITIONER

## 2022-10-14 RX ORDER — HYDROCHLOROTHIAZIDE 12.5 MG/1
12.5 TABLET ORAL DAILY
Qty: 90 TABLET | Refills: 1 | Status: SHIPPED | OUTPATIENT
Start: 2022-10-14 | End: 2023-03-25 | Stop reason: SDUPTHER

## 2022-10-14 RX ORDER — TOPIRAMATE 50 MG/1
50 TABLET, FILM COATED ORAL 2 TIMES DAILY
COMMUNITY
Start: 2022-09-20 | End: 2023-11-14 | Stop reason: SDUPTHER

## 2022-10-14 ASSESSMENT — FIBROSIS 4 INDEX: FIB4 SCORE: 1.19

## 2022-10-14 NOTE — ASSESSMENT & PLAN NOTE
Patient presents today for ER follow-up.  Was seen at [facility] ER on 10/12/2022 for severe headache and hypertension.  I have reviewed ER note.  Was found to be mildly hypoglycemic, likely had migraine that was relieved with cocktail.  Blood pressure slowly came down with migraine treatment, hypoglycemia treatment, Apresoline injection.  Does admit to not taking her lisinopril that morning and to having a stressful encounter with a coworker just prior to symptoms.  Patient does not monitor blood pressure at home.  Blood pressures at past few appointments have been in the 130s/80s.  Continuing with lisinopril 40 mg daily.  The patient denies chest pain, shortness of breath, headache, vision changes, epistaxis, or dyspnea on exertion.

## 2022-10-14 NOTE — PROGRESS NOTES
CC: ER follow-up for hypertension    HISTORY OF THE PRESENT ILLNESS: Patient is a 56 y.o. female. This pleasant patient is here today for evaluation and management of the following health problems.    Health Maintenance: due      Hypertension  Patient presents today for ER follow-up.  Was seen at [facility] ER on 10/12/2022 for severe headache and hypertension.  I have reviewed ER note.  Was found to be mildly hypoglycemic, likely had migraine that was relieved with cocktail.  Blood pressure slowly came down with migraine treatment, hypoglycemia treatment, Apresoline injection.  Does admit to not taking her lisinopril that morning and to having a stressful encounter with a coworker just prior to symptoms.  Patient does not monitor blood pressure at home.  Blood pressures at past few appointments have been in the 130s/80s.  Continuing with lisinopril 40 mg daily.  The patient denies chest pain, shortness of breath, headache, vision changes, epistaxis, or dyspnea on exertion.    Allergies: Morphine    Current Outpatient Medications Ordered in Epic   Medication Sig Dispense Refill    topiramate (TOPAMAX) 50 MG tablet Take 50 mg by mouth 2 times a day.      hydroCHLOROthiazide (HYDRODIURIL) 12.5 MG tablet Take 1 Tablet by mouth every day. 90 Tablet 1    Misc. Devices Misc Please dispense one blood pressure cuff 1 Each 0    sumatriptan (IMITREX) 100 MG tablet TAKE 1 TABLET BY MOUTH AT THE ONSET OF AURA/HEADACHE. MAY REPEAT DOSE 1 TIME AFTER 2 HOURS IF HEADACHE PERSISTS. MAX DAILY DOSE 200 MG. DO NOT USE MORE THAN 2 DAYS PER WEEK      escitalopram (LEXAPRO) 10 MG Tab Take 1 tablet by mouth once daily 90 Tablet 0    TRULICITY 1.5 MG/0.5ML Solution Pen-injector INJECT 1 DOSE SUBCUTANEOUSLY ONCE A WEEK 12 mL 0    glyBURIDE (DIABETA) 5 MG Tab TAKE 2 TABLETS BY MOUTH TWICE DAILY WITH MEALS 120 Tablet 0    metFORMIN (GLUCOPHAGE) 500 MG Tab TAKE 2 TABLETS BY MOUTH TWICE DAILY WITH MEALS 360 Tablet 0    benzonatate  (TESSALON) 200 MG capsule 1 CAP BY MOUTH UP TO 3 TIMES A DAY ONLY IF NEEDED FOR COUGH. 30 Capsule 0    gabapentin (NEURONTIN) 100 MG Cap Take 1 Capsule by mouth at bedtime as needed (insomnia). 90 Capsule 0    lisinopril (PRINIVIL) 40 MG tablet Take 1 Tablet by mouth every day. 90 Tablet 3    atorvastatin (LIPITOR) 20 MG Tab Take 1 Tablet by mouth every day. 90 Tablet 3    Cyanocobalamin (VITAMIN B-12 PO) Take 1 Tablet by mouth every day.      ASPIRIN 81 PO Take 81 mg by mouth every day.      tizanidine (ZANAFLEX) 4 MG Tab Take 0.5-1 Tablets by mouth at bedtime as needed (muscle spasms). 30 Tablet 2    Multiple Vitamin (MULTI VITAMIN DAILY) Tab Take  by mouth.      hydrOXYzine HCl (ATARAX) 25 MG Tab Take 0.5-1 Tablets by mouth 3 times a day as needed for Anxiety. 30 Tablet 0    omeprazole (PRILOSEC) 20 MG delayed-release capsule Take 1 Capsule by mouth every day. 30 Capsule 0    SITagliptin (JANUVIA) 100 MG Tab Take 1 Tablet by mouth every day. 90 Tablet 1    albuterol 108 (90 Base) MCG/ACT Aero Soln inhalation aerosol Inhale 2 Puffs every 6 hours as needed for Shortness of Breath. 8.5 g 0    Ascorbic Acid (VITAMIN C) 1000 MG Tab Take 1 Tab by mouth every morning.      Cholecalciferol (VITAMIN D PO) Take 2 Tablets by mouth every day. GUMMIES       No current Epic-ordered facility-administered medications on file.       Past Medical History:   Diagnosis Date    Anemia     Bowel habit changes     constipation    Diabetes     Diabetes (HCC)     GERD (gastroesophageal reflux disease)     Healthcare maintenance 9/27/2014    Mammogram: Due    High cholesterol     Hyperlipidemia     Hypertension     Infectious disease     Cold 10/2016    Jaundice 1999    Psychiatric problem     depression and anxiety    Vaginal itching 8/27/2014    With anal itching X 1 month Getting worse Burning Min vag disch       Past Surgical History:   Procedure Laterality Date    VENTRAL HERNIA REPAIR ROBOTIC XI N/A 10/14/2016    Procedure: VENTRAL  "HERNIA REPAIR ROBOTIC XI FOR INCISIONAL W/MESH;  Surgeon: Edi Mendoza M.D.;  Location: SURGERY [facility];  Service:     ABDOMINAL HYSTERECTOMY TOTAL      still has ovaries    CHOLECYSTECTOMY      PRIMARY C SECTION      TUBAL COAGULATION LAPAROSCOPIC BILATERAL         Social History     Tobacco Use    Smoking status: Never    Smokeless tobacco: Never   Vaping Use    Vaping Use: Never used   Substance Use Topics    Alcohol use: No    Drug use: No       Family History   Problem Relation Age of Onset    Diabetes Mother     Hyperlipidemia Mother     Diabetes Father     Hypertension Father     Hyperlipidemia Father        ROS: As in HPI, otherwise negative for chest pain, dyspnea, abdominal pain, dysuria, blood in stool, fever          Exam: /82 (BP Location: Left arm, Patient Position: Sitting, BP Cuff Size: Adult long)   Pulse 82   Temp 36.3 °C (97.4 °F) (Temporal)   Resp 14   Ht 1.549 m (5' 1\")   Wt 83.1 kg (183 lb 4.8 oz)   SpO2 96%  Body mass index is 34.63 kg/m².    General: Alert, pleasant, well nourished, well developed female in NAD  Neck: Supple without bruit. Thyroid is not enlarged.  Pulmonary: Clear to ausculation.  Normal effort. No rales, ronchi, or wheezing.  Cardiovascular: Normal rate and rhythm without murmur. Carotid and radial pulses are intact and equal bilaterally.  No lower extremity edema.  Neurologic: Grossly nonfocal  Lymph: No cervical or supraclavicular lymph nodes are palpable  Skin: Warm and dry.  Musculoskeletal: Normal gait.   Psych: Normal mood and affect. Alert and oriented. Judgment and insight is normal.    Please note that this dictation was created using voice recognition software. I have made every reasonable attempt to correct obvious errors, but I expect that there are errors of grammar and possibly content that I did not discover before finalizing the note.      Assessment/Plan  1. Need for vaccination  Given  - INFLUENZA VACCINE QUAD INJ (PF)    2. Primary " hypertension  Blood pressure is only elevated without 1 dose of lisinopril and stress level event.  Blood pressure readings have been more elevated at checkup appointments.  Will add hydrochlorothiazide.  Instructions side effects reviewed with patient.  Continue with lisinopril.  Consider low-dose amlodipine if hydrochlorothiazide effects are affecting patient's work.  Instructed to monitor blood pressure at home.  However, she cannot afford a blood pressure cuff.  I have provided her with a prescription today to see if she can get it covered through her insurance.  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  Advised on lifestyle measures including low-salt diet, weight loss, routine aerobic exercise as tolerated.  ER precautions reviewed with patient.  - hydroCHLOROthiazide (HYDRODIURIL) 12.5 MG tablet; Take 1 Tablet by mouth every day.  Dispense: 90 Tablet; Refill: 1  - Misc. Devices Misc; Please dispense one blood pressure cuff  Dispense: 1 Each; Refill: 0    Will return to clinic as previously scheduled in 11/2022 or sooner if needed.

## 2022-10-21 ENCOUNTER — HOSPITAL ENCOUNTER (EMERGENCY)
Facility: MEDICAL CENTER | Age: 57
End: 2022-10-21
Attending: EMERGENCY MEDICINE
Payer: COMMERCIAL

## 2022-10-21 VITALS
SYSTOLIC BLOOD PRESSURE: 162 MMHG | RESPIRATION RATE: 17 BRPM | HEART RATE: 72 BPM | OXYGEN SATURATION: 95 % | HEIGHT: 61 IN | TEMPERATURE: 98.1 F | WEIGHT: 183.86 LBS | BODY MASS INDEX: 34.71 KG/M2 | DIASTOLIC BLOOD PRESSURE: 73 MMHG

## 2022-10-21 DIAGNOSIS — I16.0 HYPERTENSIVE URGENCY: ICD-10-CM

## 2022-10-21 DIAGNOSIS — I10 PRIMARY HYPERTENSION: ICD-10-CM

## 2022-10-21 LAB — EKG IMPRESSION: NORMAL

## 2022-10-21 PROCEDURE — 93005 ELECTROCARDIOGRAM TRACING: CPT | Performed by: EMERGENCY MEDICINE

## 2022-10-21 PROCEDURE — A9270 NON-COVERED ITEM OR SERVICE: HCPCS | Performed by: EMERGENCY MEDICINE

## 2022-10-21 PROCEDURE — 700102 HCHG RX REV CODE 250 W/ 637 OVERRIDE(OP): Performed by: EMERGENCY MEDICINE

## 2022-10-21 PROCEDURE — 99285 EMERGENCY DEPT VISIT HI MDM: CPT

## 2022-10-21 RX ORDER — METOPROLOL SUCCINATE 50 MG/1
50 TABLET, EXTENDED RELEASE ORAL DAILY
Qty: 30 TABLET | Refills: 0 | Status: SHIPPED | OUTPATIENT
Start: 2022-10-21 | End: 2022-11-01 | Stop reason: SDUPTHER

## 2022-10-21 RX ORDER — CLONIDINE HYDROCHLORIDE 0.1 MG/1
0.2 TABLET ORAL ONCE
Status: COMPLETED | OUTPATIENT
Start: 2022-10-21 | End: 2022-10-21

## 2022-10-21 RX ORDER — METOPROLOL SUCCINATE 50 MG/1
50 TABLET, EXTENDED RELEASE ORAL ONCE
Status: COMPLETED | OUTPATIENT
Start: 2022-10-21 | End: 2022-10-21

## 2022-10-21 RX ORDER — IBUPROFEN 600 MG/1
600 TABLET ORAL ONCE
Status: COMPLETED | OUTPATIENT
Start: 2022-10-21 | End: 2022-10-21

## 2022-10-21 RX ORDER — ACETAMINOPHEN 325 MG/1
650 TABLET ORAL ONCE
Status: COMPLETED | OUTPATIENT
Start: 2022-10-21 | End: 2022-10-21

## 2022-10-21 RX ADMIN — CLONIDINE HYDROCHLORIDE 0.2 MG: 0.1 TABLET ORAL at 10:14

## 2022-10-21 RX ADMIN — ACETAMINOPHEN 650 MG: 325 TABLET, FILM COATED ORAL at 11:17

## 2022-10-21 RX ADMIN — METOPROLOL SUCCINATE 50 MG: 50 TABLET, EXTENDED RELEASE ORAL at 10:13

## 2022-10-21 RX ADMIN — IBUPROFEN 600 MG: 600 TABLET, FILM COATED ORAL at 11:19

## 2022-10-21 ASSESSMENT — FIBROSIS 4 INDEX: FIB4 SCORE: 1.19

## 2022-10-21 NOTE — ED PROVIDER NOTES
ED Provider Note    Scribed for Bogdan Whitney M.D. by Blade Cortes. 10/21/2022  9:11 AM    Primary care provider: DECLAN Johnson  Means of arrival: Walk-In  History obtained from: Patient  History limited by: None    CHIEF COMPLAINT  Chief Complaint   Patient presents with    Hypertension     Patient reports high blood pressure readings since last night. Patient was seen in ED 10/12 for the same, followed up with PCP on 10/14 and prescribed HCTZ. Patient reports taking medication this morning.     HPI  Sonya Wei is a 56 y.o. female who presents to the Emergency Department for worsening symptomatic hypertension onset last night. Patient states she has a history of hypertension, hyperlipidemia, GERD, and vertigo. Patient states she is on Lisinopril for hypertension management. Patient states she has had persistent elevated blood pressure. She states she was at work yesterday when she went to a work clinic due to having heart palpitations. Patient states she was found to have hypertension with a systolic of 190. She states she was then referred to the ED. Patient reports associated malaise, heart palpitations, intermittent periods of chest discomfort, and cough. She states chest discomfort episodes are intermittent and last only several seconds per episode. She further states palpitations are intermittent. There are no exacerbating factors. Patient states after her headache she attempted to alleviate her headache with Imitrex. She denies associated decreased urination. Patient denies a history of smoking.     REVIEW OF SYSTEMS  Pertinent negatives include no decreased urination. As above, all other systems reviewed and are negative.   See HPI for further details.     PAST MEDICAL HISTORY   has a past medical history of Anemia, Bowel habit changes, Diabetes, Diabetes (HCC), GERD (gastroesophageal reflux disease), Healthcare maintenance (9/27/2014), High cholesterol, Hyperlipidemia, Hypertension,  Infectious disease, Jaundice (1999), Psychiatric problem, and Vaginal itching (8/27/2014).    SURGICAL HISTORY   has a past surgical history that includes cholecystectomy; primary c section; tubal coagulation laparoscopic bilateral; abdominal hysterectomy total; and ventral hernia repair robotic xi (N/A, 10/14/2016).    SOCIAL HISTORY  Social History     Tobacco Use    Smoking status: Never    Smokeless tobacco: Never   Vaping Use    Vaping Use: Never used   Substance Use Topics    Alcohol use: No    Drug use: No      Social History     Substance and Sexual Activity   Drug Use No       FAMILY HISTORY  Family History   Problem Relation Age of Onset    Diabetes Mother     Hyperlipidemia Mother     Diabetes Father     Hypertension Father     Hyperlipidemia Father        CURRENT MEDICATIONS  Home Medications       Reviewed by Mary Encinas R.N. (Registered Nurse) on 10/21/22 at 0830  Med List Status: <None>     Medication Last Dose Status   albuterol 108 (90 Base) MCG/ACT Aero Soln inhalation aerosol  Active   Ascorbic Acid (VITAMIN C) 1000 MG Tab  Active   ASPIRIN 81 PO  Active   atorvastatin (LIPITOR) 20 MG Tab  Active   benzonatate (TESSALON) 200 MG capsule  Active   Cholecalciferol (VITAMIN D PO)  Active   Cyanocobalamin (VITAMIN B-12 PO)  Active   escitalopram (LEXAPRO) 10 MG Tab  Active   gabapentin (NEURONTIN) 100 MG Cap  Active   glyBURIDE (DIABETA) 5 MG Tab  Active   hydroCHLOROthiazide (HYDRODIURIL) 12.5 MG tablet  Active   hydrOXYzine HCl (ATARAX) 25 MG Tab  Active   lisinopril (PRINIVIL) 40 MG tablet  Active   metFORMIN (GLUCOPHAGE) 500 MG Tab  Active   Misc. Devices Misc  Active   Multiple Vitamin (MULTI VITAMIN DAILY) Tab  Active   omeprazole (PRILOSEC) 20 MG delayed-release capsule  Active   SITagliptin (JANUVIA) 100 MG Tab  Active   sumatriptan (IMITREX) 100 MG tablet  Active   tizanidine (ZANAFLEX) 4 MG Tab  Active   topiramate (TOPAMAX) 50 MG tablet  Active   TRULICITY 1.5 MG/0.5ML Solution  "Pen-injector  Active                    ALLERGIES  Allergies   Allergen Reactions    Morphine Itching     Rxn = unknown   Bumps all over body       PHYSICAL EXAM  VITAL SIGNS: BP (!) 172/100   Pulse 85   Temp 36.3 °C (97.4 °F) (Temporal)   Resp 14   Ht 1.549 m (5' 1\")   Wt 83.4 kg (183 lb 13.8 oz)   SpO2 97%   BMI 34.74 kg/m²     Constitutional: Well developed, Well nourished, No acute distress, Non-toxic appearance.   HENT: Normocephalic, Atraumatic, Bilateral external ears normal, Oropharynx is clear mucous membranes are moist. No oral exudates or nasal discharge.   Eyes: Pupils are equal round and reactive, EOMI, Conjunctiva normal, No discharge.   Neck: Normal range of motion, No tenderness, Supple, No stridor. No meningismus.  Lymphatic: No lymphadenopathy noted.   Cardiovascular: Regular rate and rhythm without murmur rub or gallop.  Thorax & Lungs: Clear breath sounds bilaterally without wheezes, rhonchi or rales. There is no chest wall tenderness.   Abdomen: Soft non-tender non-distended. There is no rebound or guarding. No organomegaly is appreciated. Bowel sounds are normal.  Skin: Normal without rash.   Back: No CVA or spinal tenderness.   Extremities: Intact distal pulses, No edema, No tenderness, No cyanosis, No clubbing. Capillary refill is less than 2 seconds.  Musculoskeletal: Good range of motion in all major joints. No tenderness to palpation or major deformities noted.   Neurologic: Alert & oriented x 3, Normal motor function, Normal sensory function, No focal deficits noted. Reflexes are normal.  Psychiatric: Affect normal, Judgment normal, Mood normal. There is no suicidal ideation or patient reported hallucinations.     DIAGNOSTIC STUDIES / PROCEDURES    EKG Interpretation:  Results for orders placed or performed during the hospital encounter of 10/21/22   EKG   Result Value Ref Range    Report       [facility] Emergency Dept.    Test Date:  2022-10-21  Pt Name:    " MATTIE SANDOVAL               Department: ER  MRN:        [id number]                      Room:       [facility]  Gender:     Female                       Technician: [id number]  :        1965                   Requested By:CHERIE SPRAGUE  Order #:    [id number]                    Reading MD: CHERIE SPRAGUE MD    Measurements  Intervals                                Axis  Rate:       73                           P:          60  CA:         158                          QRS:        -24  QRSD:       137                          T:          14  QT:         387  QTc:        427    Interpretive Statements  Sinus rhythm  Right bundle branch block  Inferior infarct, old  Compared to ECG 10/12/2022 16:54:19  Myocardial infarct finding now present  ST (T wave) deviation no longer present  Electronically Signed On 10- 11:33:45 PDT by CHERIE SPRAGUE MD        COURSE & MEDICAL DECISION MAKING  Nursing notes, VS, PMSFHx reviewed in chart.    9:11 AM - Patient seen and examined at bedside. Ordered EKG. Discussed plan of care with patient. I informed them that labs will be ordered to evaluate symptoms. Patient is understanding and agreeable with plan.      EKG reveals no evidence of acute ischemic changes or dysrhythmia.  There is old right bundle branch block.    9:54 AM - Patient was reevaluated at bedside. Updated patient on findings and plan.     9:57 AM - Patient to be treated with Catapres 0.2 mg.     9:58 AM - Patient to be treated with metoprolol 50 mg    11:01 AM - Patient was reevaluated at bedside. Discussed findings and plan with patient. Patient to be treated with Motrin 600 mg, and Tylenol 650 mg.     Patient has had high blood pressure while in the emergency department, felt likely secondary to medical condition. Counseled patient to monitor blood pressure at home and follow up with primary care physician.      At 11:30 AM the patient finally had a decent blood pressure and felt better.  She understands the need to go  off of lisinopril completely and continue to take HCTZ and start metoprolol therapy and follow-up with her doctor    DISPOSITION:  Patient will be discharged home in stable condition.    FINAL IMPRESSION  1. Hypertensive urgency    2. Primary hypertension          Blade QUIROGA (Ta), am scribing for, and in the presence of, Bogdan Whitney M.D..    Electronically signed by: Blade Cortes (Ta), 10/21/2022    Bogdan QUIROGA M.D. personally performed the services described in this documentation, as scribed by Blade Cortes in my presence, and it is both accurate and complete.    The note accurately reflects work and decisions made by me.  Bogdan Whitney M.D.  10/21/2022  11:34 AM

## 2022-10-21 NOTE — DISCHARGE INSTRUCTIONS
Please stop taking lisinopril as this is probably producing your chronic cough    Instead start taking metoprolol daily as well as hydrochlorothiazide and avoid caffeine and stimulants    Avoid taking Imitrex for headache as this may be producing higher blood pressure.  Lets get your blood pressure under control before you resume this medicine on an as-needed basis

## 2022-10-21 NOTE — ED NOTES
DC home with written and verbal instructions regarding f/u, activity and RX to  at pharmacy.  Verbalized understanding, no further questions, ambulated out of ED with a steady gait with all belongings.

## 2022-10-21 NOTE — ED TRIAGE NOTES
"Chief Complaint   Patient presents with    Hypertension     Patient reports high blood pressure readings since last night. Patient was seen in ED 10/12 for the same, followed up with PCP on 10/14 and prescribed HCTZ. Patient reports taking medication this morning.       55 yo female to triage for above complaint. Patient reports she has been taking medications as prescribed, but states yesterday she had reading of 180s/90s and today patient 170s/100s.   Patient denies chest pain or SOB, but endorses some lightheadedness that started last night.    Pt is alert and oriented, speaking in full sentences, follows commands and responds appropriately to questions.     Patient placed back in lobby and educated on triage process. Asked to inform RN of any changes.    BP (!) 172/100   Pulse 85   Temp 36.3 °C (97.4 °F) (Temporal)   Resp 14   Ht 1.549 m (5' 1\")   Wt 83.4 kg (183 lb 13.8 oz)   SpO2 97%   BMI 34.74 kg/m²     "

## 2022-10-25 ENCOUNTER — HOSPITAL ENCOUNTER (EMERGENCY)
Facility: MEDICAL CENTER | Age: 57
End: 2022-10-25
Attending: STUDENT IN AN ORGANIZED HEALTH CARE EDUCATION/TRAINING PROGRAM
Payer: COMMERCIAL

## 2022-10-25 ENCOUNTER — APPOINTMENT (OUTPATIENT)
Dept: RADIOLOGY | Facility: MEDICAL CENTER | Age: 57
End: 2022-10-25
Attending: STUDENT IN AN ORGANIZED HEALTH CARE EDUCATION/TRAINING PROGRAM
Payer: COMMERCIAL

## 2022-10-25 VITALS
DIASTOLIC BLOOD PRESSURE: 74 MMHG | HEIGHT: 61 IN | WEIGHT: 183.42 LBS | SYSTOLIC BLOOD PRESSURE: 147 MMHG | RESPIRATION RATE: 16 BRPM | HEART RATE: 79 BPM | TEMPERATURE: 97.7 F | BODY MASS INDEX: 34.63 KG/M2 | OXYGEN SATURATION: 97 %

## 2022-10-25 DIAGNOSIS — R51.9 NONINTRACTABLE HEADACHE, UNSPECIFIED CHRONICITY PATTERN, UNSPECIFIED HEADACHE TYPE: ICD-10-CM

## 2022-10-25 DIAGNOSIS — I10 HYPERTENSION, UNSPECIFIED TYPE: ICD-10-CM

## 2022-10-25 LAB
ALBUMIN SERPL BCP-MCNC: 4 G/DL (ref 3.2–4.9)
ALBUMIN/GLOB SERPL: 1.2 G/DL
ALP SERPL-CCNC: 78 U/L (ref 30–99)
ALT SERPL-CCNC: 19 U/L (ref 2–50)
ANION GAP SERPL CALC-SCNC: 10 MMOL/L (ref 7–16)
AST SERPL-CCNC: 24 U/L (ref 12–45)
BASOPHILS # BLD AUTO: 0.4 % (ref 0–1.8)
BASOPHILS # BLD: 0.04 K/UL (ref 0–0.12)
BILIRUB SERPL-MCNC: 0.3 MG/DL (ref 0.1–1.5)
BUN SERPL-MCNC: 41 MG/DL (ref 8–22)
CALCIUM SERPL-MCNC: 9.9 MG/DL (ref 8.5–10.5)
CHLORIDE SERPL-SCNC: 104 MMOL/L (ref 96–112)
CO2 SERPL-SCNC: 21 MMOL/L (ref 20–33)
CREAT SERPL-MCNC: 1.09 MG/DL (ref 0.5–1.4)
EKG IMPRESSION: NORMAL
EOSINOPHIL # BLD AUTO: 0.25 K/UL (ref 0–0.51)
EOSINOPHIL NFR BLD: 2.4 % (ref 0–6.9)
ERYTHROCYTE [DISTWIDTH] IN BLOOD BY AUTOMATED COUNT: 41 FL (ref 35.9–50)
GFR SERPLBLD CREATININE-BSD FMLA CKD-EPI: 59 ML/MIN/1.73 M 2
GLOBULIN SER CALC-MCNC: 3.4 G/DL (ref 1.9–3.5)
GLUCOSE SERPL-MCNC: 63 MG/DL (ref 65–99)
HCT VFR BLD AUTO: 35.8 % (ref 37–47)
HGB BLD-MCNC: 11.9 G/DL (ref 12–16)
IMM GRANULOCYTES # BLD AUTO: 0.05 K/UL (ref 0–0.11)
IMM GRANULOCYTES NFR BLD AUTO: 0.5 % (ref 0–0.9)
LYMPHOCYTES # BLD AUTO: 4.03 K/UL (ref 1–4.8)
LYMPHOCYTES NFR BLD: 39.4 % (ref 22–41)
MCH RBC QN AUTO: 30 PG (ref 27–33)
MCHC RBC AUTO-ENTMCNC: 33.2 G/DL (ref 33.6–35)
MCV RBC AUTO: 90.2 FL (ref 81.4–97.8)
MONOCYTES # BLD AUTO: 0.63 K/UL (ref 0–0.85)
MONOCYTES NFR BLD AUTO: 6.2 % (ref 0–13.4)
NEUTROPHILS # BLD AUTO: 5.23 K/UL (ref 2–7.15)
NEUTROPHILS NFR BLD: 51.1 % (ref 44–72)
NRBC # BLD AUTO: 0 K/UL
NRBC BLD-RTO: 0 /100 WBC
NT-PROBNP SERPL IA-MCNC: 162 PG/ML (ref 0–125)
PLATELET # BLD AUTO: 326 K/UL (ref 164–446)
PMV BLD AUTO: 10.9 FL (ref 9–12.9)
POTASSIUM SERPL-SCNC: 5.1 MMOL/L (ref 3.6–5.5)
PROT SERPL-MCNC: 7.4 G/DL (ref 6–8.2)
RBC # BLD AUTO: 3.97 M/UL (ref 4.2–5.4)
SODIUM SERPL-SCNC: 135 MMOL/L (ref 135–145)
TROPONIN T SERPL-MCNC: 16 NG/L (ref 6–19)
WBC # BLD AUTO: 10.2 K/UL (ref 4.8–10.8)

## 2022-10-25 PROCEDURE — 80053 COMPREHEN METABOLIC PANEL: CPT

## 2022-10-25 PROCEDURE — 96374 THER/PROPH/DIAG INJ IV PUSH: CPT

## 2022-10-25 PROCEDURE — 83880 ASSAY OF NATRIURETIC PEPTIDE: CPT

## 2022-10-25 PROCEDURE — 70450 CT HEAD/BRAIN W/O DYE: CPT

## 2022-10-25 PROCEDURE — 93005 ELECTROCARDIOGRAM TRACING: CPT

## 2022-10-25 PROCEDURE — 84484 ASSAY OF TROPONIN QUANT: CPT

## 2022-10-25 PROCEDURE — 700111 HCHG RX REV CODE 636 W/ 250 OVERRIDE (IP): Performed by: STUDENT IN AN ORGANIZED HEALTH CARE EDUCATION/TRAINING PROGRAM

## 2022-10-25 PROCEDURE — 36415 COLL VENOUS BLD VENIPUNCTURE: CPT

## 2022-10-25 PROCEDURE — 96375 TX/PRO/DX INJ NEW DRUG ADDON: CPT

## 2022-10-25 PROCEDURE — 93005 ELECTROCARDIOGRAM TRACING: CPT | Performed by: STUDENT IN AN ORGANIZED HEALTH CARE EDUCATION/TRAINING PROGRAM

## 2022-10-25 PROCEDURE — 85025 COMPLETE CBC W/AUTO DIFF WBC: CPT

## 2022-10-25 PROCEDURE — 99284 EMERGENCY DEPT VISIT MOD MDM: CPT

## 2022-10-25 RX ORDER — DIPHENHYDRAMINE HYDROCHLORIDE 50 MG/ML
25 INJECTION INTRAMUSCULAR; INTRAVENOUS ONCE
Status: COMPLETED | OUTPATIENT
Start: 2022-10-25 | End: 2022-10-25

## 2022-10-25 RX ORDER — PROCHLORPERAZINE EDISYLATE 5 MG/ML
10 INJECTION INTRAMUSCULAR; INTRAVENOUS ONCE
Status: COMPLETED | OUTPATIENT
Start: 2022-10-25 | End: 2022-10-25

## 2022-10-25 RX ADMIN — DIPHENHYDRAMINE HYDROCHLORIDE 25 MG: 50 INJECTION, SOLUTION INTRAMUSCULAR; INTRAVENOUS at 20:21

## 2022-10-25 RX ADMIN — PROCHLORPERAZINE EDISYLATE 10 MG: 5 INJECTION INTRAMUSCULAR; INTRAVENOUS at 20:21

## 2022-10-25 ASSESSMENT — FIBROSIS 4 INDEX: FIB4 SCORE: 1.19

## 2022-10-26 NOTE — ED TRIAGE NOTES
.  Chief Complaint   Patient presents with    Hypertension    Headache    Nausea    Neck Pain    Evaluation Of Medication Change   Onset this morning at approximately 0630.  No chest pain or difficulty breathing.  + minor dizziness.  + nausea, no vomiting.  Pt seen at [facility] ED on 10/21/22 for similar symptoms.  Pt instructed to stop Lisinopril and start Metoprolol.  Pt to continue taking HCTZ.

## 2022-10-26 NOTE — DISCHARGE INSTRUCTIONS
Return to the ER if you have worsening pain, nausea, vomiting, facial droop, unilateral weakness, numbness, difficulty with coordination, speech changes in speech or difficulty walking.  Please follow-up with your primary doctor and return to the ER sooner if worsening.

## 2022-10-26 NOTE — ED PROVIDER NOTES
ED Provider Note    CHIEF COMPLAINT  Chief Complaint   Patient presents with    Hypertension    Headache    Nausea    Neck Pain    Evaluation Of Medication Change       HPI  Sonya Wei is a 56 y.o. female who presents for evaluation of headache and hypertension.  Patient was seen in the emergency department on 21 October.  At that point she was having symptomatic hypertension.  I reviewed the medical documentation from that visit and she had a negative work-up at that time was treated with Catapres and metoprolol with improvement in her blood pressure.  She was advised to start taking hydrochlorothiazide & metoprolol.    She states that she returns today for persistent headaches.  She describes them as severe, mostly posterior and they are worse in the morning but pain is constant.  She also endorses some nausea.  It is about a 7-8 out of 10 in severity.  She had one episode of vomiting last week but none since.  She denies any photophobia or phonophobia.  She is under significant stress. She has been taking ibuprofen and Tylenol at home without significant relief.  She does have a history of migraines but states this feels more severe.  She said that her blood pressure was high earlier today around 170 despite being compliant with her medications. No has no fever, chills or recent illness.     REVIEW OF SYSTEMS  As per HPI, otherwise a 10 point review of systems is negative    PAST MEDICAL HISTORY  Past Medical History:   Diagnosis Date    Anemia     Bowel habit changes     constipation    Diabetes     Diabetes (HCC)     GERD (gastroesophageal reflux disease)     Healthcare maintenance 9/27/2014    Mammogram: Due    High cholesterol     Hyperlipidemia     Hypertension     Infectious disease     Cold 10/2016    Jaundice 1999    Psychiatric problem     depression and anxiety    Vaginal itching 8/27/2014    With anal itching X 1 month Getting worse Burning Min vag disch       SOCIAL HISTORY  Social History  "    Tobacco Use    Smoking status: Never    Smokeless tobacco: Never   Vaping Use    Vaping Use: Never used   Substance Use Topics    Alcohol use: No    Drug use: No       SURGICAL HISTORY  Past Surgical History:   Procedure Laterality Date    VENTRAL HERNIA REPAIR ROBOTIC XI N/A 10/14/2016    Procedure: VENTRAL HERNIA REPAIR ROBOTIC XI FOR INCISIONAL W/MESH;  Surgeon: Edi Mendoza M.D.;  Location: SURGERY [facility];  Service:     ABDOMINAL HYSTERECTOMY TOTAL      still has ovaries    CHOLECYSTECTOMY      PRIMARY C SECTION      TUBAL COAGULATION LAPAROSCOPIC BILATERAL         CURRENT MEDICATIONS  Home Medications    **Home medications have not yet been reviewed for this encounter**         ALLERGIES  Allergies   Allergen Reactions    Morphine Itching     Rxn = unknown   Bumps all over body       PHYSICAL EXAM  VITAL SIGNS: BP (!) 147/74   Pulse 79   Temp 36.5 °C (97.7 °F) (Temporal)   Resp 16   Ht 1.549 m (5' 1\")   Wt 83.2 kg (183 lb 6.8 oz)   SpO2 97%   BMI 34.66 kg/m²    Constitutional: Awake and alert . Uncomfortable but no distress  HENT: Normal inspection  moist mucous membranes  Eyes: Normal inspection  Neck: Grossly normal range of motion. No meningismus  Cardiovascular: Normal heart rate, Normal rhythm.  Symmetric peripheral pulses.   Thorax & Lungs: No respiratory distress, No wheezing, No rales, No rhonchi, No chest tenderness.   Abdomen: Soft, non-distended, nontender, no mass  Skin: No obvious rash.  Back: No tenderness, No CVA tenderness.   Extremities: Warm, well perfused. No clubbing, cyanosis, edema,   Neurologic:  A&O x 4   CN II-XII tested and intact.  Sensation intact to light touch in all extremities.  Motor: Normal tone and bulk. No abnormal movements appreciated. No pronator drift. Strength tested and 5/5 in bilateral wrist flexion/extension, elbow flexion/extension, shoulder abduction, straight leg raise, knee flexion/extension, ankle dorsiflexion/plantarflexion. Patient " ambulates with a steady gait.  Psychiatric: Normal for situation    RADIOLOGY/PROCEDURES  CT-HEAD W/O   Final Result      Negative noncontrast CT scan of the head / brain.              Imaging is interpreted by radiologist    Labs:  Results for orders placed or performed during the hospital encounter of 10/25/22   CBC WITH DIFFERENTIAL   Result Value Ref Range    WBC 10.2 4.8 - 10.8 K/uL    RBC 3.97 (L) 4.20 - 5.40 M/uL    Hemoglobin 11.9 (L) 12.0 - 16.0 g/dL    Hematocrit 35.8 (L) 37.0 - 47.0 %    MCV 90.2 81.4 - 97.8 fL    MCH 30.0 27.0 - 33.0 pg    MCHC 33.2 (L) 33.6 - 35.0 g/dL    RDW 41.0 35.9 - 50.0 fL    Platelet Count 326 164 - 446 K/uL    MPV 10.9 9.0 - 12.9 fL    Neutrophils-Polys 51.10 44.00 - 72.00 %    Lymphocytes 39.40 22.00 - 41.00 %    Monocytes 6.20 0.00 - 13.40 %    Eosinophils 2.40 0.00 - 6.90 %    Basophils 0.40 0.00 - 1.80 %    Immature Granulocytes 0.50 0.00 - 0.90 %    Nucleated RBC 0.00 /100 WBC    Neutrophils (Absolute) 5.23 2.00 - 7.15 K/uL    Lymphs (Absolute) 4.03 1.00 - 4.80 K/uL    Monos (Absolute) 0.63 0.00 - 0.85 K/uL    Eos (Absolute) 0.25 0.00 - 0.51 K/uL    Baso (Absolute) 0.04 0.00 - 0.12 K/uL    Immature Granulocytes (abs) 0.05 0.00 - 0.11 K/uL    NRBC (Absolute) 0.00 K/uL   COMP METABOLIC PANEL   Result Value Ref Range    Sodium 135 135 - 145 mmol/L    Potassium 5.1 3.6 - 5.5 mmol/L    Chloride 104 96 - 112 mmol/L    Co2 21 20 - 33 mmol/L    Anion Gap 10.0 7.0 - 16.0    Glucose 63 (L) 65 - 99 mg/dL    Bun 41 (H) 8 - 22 mg/dL    Creatinine 1.09 0.50 - 1.40 mg/dL    Calcium 9.9 8.5 - 10.5 mg/dL    AST(SGOT) 24 12 - 45 U/L    ALT(SGPT) 19 2 - 50 U/L    Alkaline Phosphatase 78 30 - 99 U/L    Total Bilirubin 0.3 0.1 - 1.5 mg/dL    Albumin 4.0 3.2 - 4.9 g/dL    Total Protein 7.4 6.0 - 8.2 g/dL    Globulin 3.4 1.9 - 3.5 g/dL    A-G Ratio 1.2 g/dL   TROPONIN   Result Value Ref Range    Troponin T 16 6 - 19 ng/L   proBrain Natriuretic Peptide, NT   Result Value Ref Range    NT-proBNP 162  (H) 0 - 125 pg/mL   ESTIMATED GFR   Result Value Ref Range    GFR (CKD-EPI) 59 (A) >60 mL/min/1.73 m 2   EKG (Now)   Result Value Ref Range    Report       [facility] Emergency Dept.    Test Date:  2022-10-25  Pt Name:    MATTIE SANDOVAL               Department: ER  MRN:        [id number]                      Room:  Gender:     Female                       Technician: [id number]  :        1965                   Requested By:ER TRIAGE PROTOCOL  Order #:    [id number]                    Reading MD:    Measurements  Intervals                                Axis  Rate:       76                           P:          59  AZ:         156                          QRS:        5  QRSD:       139                          T:          36  QT:         375  QTc:        422    Interpretive Statements  Sinus rhythm  Right bundle branch block  Minimal ST elevation, inferior leads  Compared to ECG 10/21/2022 10:04:20  ST (T wave) deviation now present  Myocardial infarct finding no longer present       I reviewed the EKG and do not appreciate any ST elevations as above.  She has normal rate, normal rhythm right bundle branch block.  No ST wave elevations or depressions.    Medications   prochlorperazine (COMPAZINE) injection 10 mg (10 mg Intravenous Given 10/25/22 2021)   diphenhydrAMINE (BENADRYL) injection 25 mg (25 mg Intravenous Given 10/25/22 2021)       The patient has known hypertension and is referred to her primary doctor for management of her blood pressure.    COURSE & MEDICAL DECISION MAKING    This is a 56-year-old female with hypertension who presents with headaches.  She arrives mildly hypertensive but otherwise has normal vital signs.  She is afebrile and systemically well-appearing.  She is alert, oriented with a normal mental status and no focal neurological deficit.  Her labs are overall reassuring she does not have a leukocytosis, renal dysfunction or other concerning finding.  EKG nonischemic  and troponin is normal.  She has no chest pain to suggest ACS or aortic pathology.  I did obtain a CT head to evaluate for intracranial bleed or mass, this was fortunately negative.  I doubt subarachnoid hemorrhage given headache not maximal at onset and chronicity of symptoms.  I doubt CNS infection as she is afebrile with normal mental status.  She has no other signs or symptoms to suggest hypertensive emergency.  She was treated with a migraine cocktail with improvement in her symptoms.  I stressed the importance of close PCP follow-up and return sooner if worsening.   She expresses understanding of strict return precautions especially in the setting of diagnostic uncertainty and was discharged home in good condition.    Patient referred to primary provider for blood pressure management    FINAL IMPRESSION  1. Nonintractable headache, unspecified chronicity pattern, unspecified headache type        2. Hypertension, unspecified type                This dictation was created using voice recognition software. The accuracy of the dictation is limited to the abilities of the software.  The nursing notes were reviewed and certain aspects of this information were incorporated into this note.      Electronically signed by: Zohreh Artis M.D., 10/25/2022 7:30 PM

## 2022-11-01 ENCOUNTER — OFFICE VISIT (OUTPATIENT)
Dept: MEDICAL GROUP | Facility: PHYSICIAN GROUP | Age: 57
End: 2022-11-01
Payer: COMMERCIAL

## 2022-11-01 VITALS
TEMPERATURE: 97.5 F | WEIGHT: 181 LBS | HEART RATE: 82 BPM | HEIGHT: 61 IN | DIASTOLIC BLOOD PRESSURE: 70 MMHG | SYSTOLIC BLOOD PRESSURE: 118 MMHG | OXYGEN SATURATION: 97 % | RESPIRATION RATE: 16 BRPM | BODY MASS INDEX: 34.17 KG/M2

## 2022-11-01 DIAGNOSIS — K21.9 GASTROESOPHAGEAL REFLUX DISEASE, UNSPECIFIED WHETHER ESOPHAGITIS PRESENT: ICD-10-CM

## 2022-11-01 DIAGNOSIS — I16.0 HYPERTENSIVE URGENCY: ICD-10-CM

## 2022-11-01 DIAGNOSIS — J45.40 MODERATE PERSISTENT REACTIVE AIRWAY DISEASE WITHOUT COMPLICATION: ICD-10-CM

## 2022-11-01 DIAGNOSIS — R19.5 LOOSE STOOLS: ICD-10-CM

## 2022-11-01 DIAGNOSIS — I10 PRIMARY HYPERTENSION: ICD-10-CM

## 2022-11-01 PROBLEM — J45.909 REACTIVE AIRWAY DISEASE: Status: ACTIVE | Noted: 2022-11-01

## 2022-11-01 PROBLEM — H92.02 LEFT EAR PAIN: Status: RESOLVED | Noted: 2022-07-20 | Resolved: 2022-11-01

## 2022-11-01 PROCEDURE — 99214 OFFICE O/P EST MOD 30 MIN: CPT | Performed by: NURSE PRACTITIONER

## 2022-11-01 RX ORDER — CLONIDINE HYDROCHLORIDE 0.1 MG/1
TABLET ORAL
Qty: 30 TABLET | Refills: 1 | Status: SHIPPED | OUTPATIENT
Start: 2022-11-01 | End: 2023-12-01

## 2022-11-01 RX ORDER — FLUTICASONE PROPIONATE AND SALMETEROL 100; 50 UG/1; UG/1
1 POWDER RESPIRATORY (INHALATION) EVERY 12 HOURS
Qty: 60 EACH | Refills: 2 | Status: SHIPPED | OUTPATIENT
Start: 2022-11-01 | End: 2023-12-01

## 2022-11-01 RX ORDER — METOPROLOL SUCCINATE 50 MG/1
50 TABLET, EXTENDED RELEASE ORAL DAILY
Qty: 90 TABLET | Refills: 1 | Status: SHIPPED | OUTPATIENT
Start: 2022-11-01 | End: 2023-11-14 | Stop reason: SDUPTHER

## 2022-11-01 RX ORDER — ALBUTEROL SULFATE 90 UG/1
2 AEROSOL, METERED RESPIRATORY (INHALATION) EVERY 6 HOURS PRN
Qty: 8.5 G | Refills: 2 | Status: SHIPPED | OUTPATIENT
Start: 2022-11-01 | End: 2023-12-01

## 2022-11-01 RX ORDER — OMEPRAZOLE 20 MG/1
20 CAPSULE, DELAYED RELEASE ORAL DAILY
Qty: 90 CAPSULE | Refills: 3 | Status: SHIPPED | OUTPATIENT
Start: 2022-11-01 | End: 2023-12-01

## 2022-11-01 ASSESSMENT — FIBROSIS 4 INDEX: FIB4 SCORE: 0.96

## 2022-11-01 NOTE — ASSESSMENT & PLAN NOTE
This is a chronic health problem that is uncontrolled with current medications and lifestyle measures.  Has been to the ER twice since her last appointment with me a couple weeks ago.  Having symptomatic hypertension while at work.  Difficult to tell if migraine is causing hypertension or hypertension causing migraine.  Last ER visit, lisinopril was discontinued and patient was started on metoprolol SR 50 mg daily.  Lisinopril was discontinued due to chronic cough.  Patient reports she still has cough.  Please see additional notes.  Has been monitoring blood pressure at home.  Blood pressure readings have been in the 140s/80s.  Blood pressure today shows good control.  Continues with hydrochlorothiazide.

## 2022-11-01 NOTE — PROGRESS NOTES
CC: Hypertension    HISTORY OF THE PRESENT ILLNESS: Patient is a 56 y.o. female. This pleasant patient is here today for evaluation and management of the following health problems.        Hypertension  This is a chronic health problem that is uncontrolled with current medications and lifestyle measures.  Has been to the ER twice since her last appointment with me a couple weeks ago.  Having symptomatic hypertension while at work.  Difficult to tell if migraine is causing hypertension or hypertension causing migraine.  Last ER visit, lisinopril was discontinued and patient was started on metoprolol SR 50 mg daily.  Lisinopril was discontinued due to chronic cough.  Patient reports she still has cough.  Please see additional notes.  Has been monitoring blood pressure at home.  Blood pressure readings have been in the 140s/80s.  Blood pressure today shows good control.  Continues with hydrochlorothiazide.    Gastroesophageal reflux disease  This is a chronic health problem that is uncontrolled with current medications and lifestyle measures.  Taking omeprazole once a day.  Ran out over a month ago.  Complaining of diarrhea, need to belch, gastric pain.  Also having asthma symptoms.  Establish with gastroenterology and due for follow-up.    Reactive airway disease  She reports chronic dry cough since 6/2022 when she had COVID for the second time.  Some relief with albuterol.  Out of albuterol inhaler.  Also has GERD, uncontrolled.  Stopped lisinopril a week ago to see if this helps.  So far still has cough.    Allergies: Morphine and Other misc    Current Outpatient Medications Ordered in Epic   Medication Sig Dispense Refill    albuterol 108 (90 Base) MCG/ACT Aero Soln inhalation aerosol Inhale 2 Puffs every 6 hours as needed for Shortness of Breath. 8.5 g 2    fluticasone-salmeterol (ADVAIR) 100-50 MCG/ACT AEROSOL POWDER, BREATH ACTIVATED Inhale 1 Puff every 12 hours. 60 Each 2    omeprazole (PRILOSEC) 20 MG  delayed-release capsule Take 1 Capsule by mouth every day. 90 Capsule 3    metoprolol SR (TOPROL XL) 50 MG TABLET SR 24 HR Take 1 Tablet by mouth every day. 90 Tablet 1    cloNIDine (CATAPRES) 0.1 MG Tab Take one tablet up to once a day ONLY for blood pressure greater than 160/90 30 Tablet 1    topiramate (TOPAMAX) 50 MG tablet Take 50 mg by mouth 2 times a day.      hydroCHLOROthiazide (HYDRODIURIL) 12.5 MG tablet Take 1 Tablet by mouth every day. 90 Tablet 1    Misc. Devices Misc Please dispense one blood pressure cuff 1 Each 0    sumatriptan (IMITREX) 100 MG tablet TAKE 1 TABLET BY MOUTH AT THE ONSET OF AURA/HEADACHE. MAY REPEAT DOSE 1 TIME AFTER 2 HOURS IF HEADACHE PERSISTS. MAX DAILY DOSE 200 MG. DO NOT USE MORE THAN 2 DAYS PER WEEK      escitalopram (LEXAPRO) 10 MG Tab Take 1 tablet by mouth once daily 90 Tablet 0    TRULICITY 1.5 MG/0.5ML Solution Pen-injector INJECT 1 DOSE SUBCUTANEOUSLY ONCE A WEEK 12 mL 0    glyBURIDE (DIABETA) 5 MG Tab TAKE 2 TABLETS BY MOUTH TWICE DAILY WITH MEALS 120 Tablet 0    metFORMIN (GLUCOPHAGE) 500 MG Tab TAKE 2 TABLETS BY MOUTH TWICE DAILY WITH MEALS 360 Tablet 0    benzonatate (TESSALON) 200 MG capsule 1 CAP BY MOUTH UP TO 3 TIMES A DAY ONLY IF NEEDED FOR COUGH. 30 Capsule 0    gabapentin (NEURONTIN) 100 MG Cap Take 1 Capsule by mouth at bedtime as needed (insomnia). 90 Capsule 0    atorvastatin (LIPITOR) 20 MG Tab Take 1 Tablet by mouth every day. 90 Tablet 3    Cyanocobalamin (VITAMIN B-12 PO) Take 1 Tablet by mouth every day.      ASPIRIN 81 PO Take 81 mg by mouth every day.      tizanidine (ZANAFLEX) 4 MG Tab Take 0.5-1 Tablets by mouth at bedtime as needed (muscle spasms). 30 Tablet 2    Multiple Vitamin (MULTI VITAMIN DAILY) Tab Take  by mouth.      hydrOXYzine HCl (ATARAX) 25 MG Tab Take 0.5-1 Tablets by mouth 3 times a day as needed for Anxiety. 30 Tablet 0    SITagliptin (JANUVIA) 100 MG Tab Take 1 Tablet by mouth every day. 90 Tablet 1    Ascorbic Acid (VITAMIN C)  "1000 MG Tab Take 1 Tab by mouth every morning.      Cholecalciferol (VITAMIN D PO) Take 2 Tablets by mouth every day. GUMMIES       No current Epic-ordered facility-administered medications on file.       Past Medical History:   Diagnosis Date    Anemia     Bowel habit changes     constipation    Diabetes     Diabetes (HCC)     GERD (gastroesophageal reflux disease)     Healthcare maintenance 9/27/2014    Mammogram: Due    High cholesterol     Hyperlipidemia     Hypertension     Infectious disease     Cold 10/2016    Jaundice 1999    Psychiatric problem     depression and anxiety    Vaginal itching 8/27/2014    With anal itching X 1 month Getting worse Burning Min vag disch       Past Surgical History:   Procedure Laterality Date    VENTRAL HERNIA REPAIR ROBOTIC XI N/A 10/14/2016    Procedure: VENTRAL HERNIA REPAIR ROBOTIC XI FOR INCISIONAL W/MESH;  Surgeon: Edi Mendoza M.D.;  Location: SURGERY [facility];  Service:     ABDOMINAL HYSTERECTOMY TOTAL      still has ovaries    CHOLECYSTECTOMY      PRIMARY C SECTION      TUBAL COAGULATION LAPAROSCOPIC BILATERAL         Social History     Tobacco Use    Smoking status: Never    Smokeless tobacco: Never   Vaping Use    Vaping Use: Never used   Substance Use Topics    Alcohol use: No    Drug use: No       Family History   Problem Relation Age of Onset    Diabetes Mother     Hyperlipidemia Mother     Diabetes Father     Hypertension Father     Hyperlipidemia Father        ROS: As in HPI, otherwise negative for chest pain, dyspnea, abdominal pain, dysuria, blood in stool, fever          Exam: /70 (BP Location: Left arm)   Pulse 82   Temp 36.4 °C (97.5 °F)   Resp 16   Ht 1.549 m (5' 1\")   Wt 82.1 kg (181 lb)   SpO2 97%  Body mass index is 34.2 kg/m².    General: Alert, pleasant, well nourished, well developed female in NAD  Neck: Supple without bruit. Thyroid is not enlarged.  Pulmonary: Clear to ausculation.  Normal effort. No rales, ronchi, or " wheezing.  Cardiovascular: Normal rate and rhythm without murmur.   Abdomen: Soft, nontender, nondistended. Normal bowel sounds. Liver and spleen are not palpable  Neurologic: Grossly nonfocal  Skin: Warm and dry.    Musculoskeletal: Normal gait.   Psych: Normal mood and affect. Alert and oriented. Judgment and insight is normal.    Please note that this dictation was created using voice recognition software. I have made every reasonable attempt to correct obvious errors, but I expect that there are errors of grammar and possibly content that I did not discover before finalizing the note.      Assessment/Plan  1. Moderate persistent reactive airway disease without complication  Patient having persistent reactive airway disease.  Will prescribe Advair inhaler.  Rationale for maintenance inhaler reviewed with patient.  We will continue with albuterol as needed.  - albuterol 108 (90 Base) MCG/ACT Aero Soln inhalation aerosol; Inhale 2 Puffs every 6 hours as needed for Shortness of Breath.  Dispense: 8.5 g; Refill: 2  - fluticasone-salmeterol (ADVAIR) 100-50 MCG/ACT AEROSOL POWDER, BREATH ACTIVATED; Inhale 1 Puff every 12 hours.  Dispense: 60 Each; Refill: 2    2. Loose stools  As in #4  - omeprazole (PRILOSEC) 20 MG delayed-release capsule; Take 1 Capsule by mouth every day.  Dispense: 90 Capsule; Refill: 3    3.  Gastroesophageal reflux disease, unspecified whether esophagitis present  Uncontrolled.  Will restart omeprazole.  GERD may be contributing to reactive airway.  Patient will call and schedule follow-up with gastroenterology.  - omeprazole (PRILOSEC) 20 MG delayed-release capsule; Take 1 Capsule by mouth every day.  Dispense: 90 Capsule; Refill: 3    4. Hypertensive urgency  As in #5  - metoprolol SR (TOPROL XL) 50 MG TABLET SR 24 HR; Take 1 Tablet by mouth every day.  Dispense: 90 Tablet; Refill: 1    5.  Primary hypertension  Uncontrolled.  Repeated visits to the ER.  Continue with metoprolol and  hydrochlorothiazide.  We will add clonidine blood pressure greater than 160/90.  Instructions and side effects reviewed with patient.  She will have upcoming echocardiogram completed.  She will schedule follow-up appointment with cardiology.  - metoprolol SR (TOPROL XL) 50 MG TABLET SR 24 HR; Take 1 Tablet by mouth every day.  Dispense: 90 Tablet; Refill: 1  - cloNIDine (CATAPRES) 0.1 MG Tab; Take one tablet up to once a day ONLY for blood pressure greater than 160/90  Dispense: 30 Tablet; Refill: 1    Patient will return to clinic as previously scheduled in 1 month or sooner if needed.

## 2022-11-01 NOTE — ASSESSMENT & PLAN NOTE
This is a chronic health problem that is uncontrolled with current medications and lifestyle measures.  Taking omeprazole once a day.  Ran out over a month ago.  Complaining of diarrhea, need to belch, gastric pain.  Also having asthma symptoms.  Establish with gastroenterology and due for follow-up.

## 2022-11-01 NOTE — ASSESSMENT & PLAN NOTE
She reports chronic dry cough since 6/2022 when she had COVID for the second time.  Some relief with albuterol.  Out of albuterol inhaler.  Also has GERD, uncontrolled.  Stopped lisinopril a week ago to see if this helps.  So far still has cough.

## 2022-11-16 DIAGNOSIS — E11.65 UNCONTROLLED TYPE 2 DIABETES MELLITUS WITH HYPERGLYCEMIA (HCC): ICD-10-CM

## 2022-11-16 DIAGNOSIS — E11.9 TYPE 2 DIABETES MELLITUS WITHOUT COMPLICATION, WITHOUT LONG-TERM CURRENT USE OF INSULIN (HCC): ICD-10-CM

## 2022-11-16 RX ORDER — GLYBURIDE 5 MG/1
TABLET ORAL
Qty: 120 TABLET | Refills: 3 | Status: SHIPPED | OUTPATIENT
Start: 2022-11-16 | End: 2023-12-08

## 2022-11-16 NOTE — TELEPHONE ENCOUNTER
Last ov 11/1/22    Received request via: Pharmacy    Was the patient seen in the last year in this department? Yes    Does the patient have an active prescription (recently filled or refills available) for medication(s) requested? No    Does the patient have [facility] Plus and need 100 day supply (blood pressure, diabetes and cholesterol meds only)? Patient does not have SCP

## 2022-11-18 ENCOUNTER — HOSPITAL ENCOUNTER (OUTPATIENT)
Dept: CARDIOLOGY | Facility: MEDICAL CENTER | Age: 57
End: 2022-11-18
Attending: NURSE PRACTITIONER
Payer: COMMERCIAL

## 2022-11-18 ENCOUNTER — OFFICE VISIT (OUTPATIENT)
Dept: NEUROLOGY | Facility: MEDICAL CENTER | Age: 57
End: 2022-11-18
Attending: PSYCHIATRY & NEUROLOGY
Payer: COMMERCIAL

## 2022-11-18 VITALS
HEIGHT: 61 IN | BODY MASS INDEX: 37.09 KG/M2 | DIASTOLIC BLOOD PRESSURE: 86 MMHG | TEMPERATURE: 96.9 F | OXYGEN SATURATION: 98 % | WEIGHT: 196.43 LBS | HEART RATE: 82 BPM | SYSTOLIC BLOOD PRESSURE: 172 MMHG

## 2022-11-18 DIAGNOSIS — R06.02 SHORT OF BREATH ON EXERTION: ICD-10-CM

## 2022-11-18 DIAGNOSIS — G43.709 CHRONIC MIGRAINE WITHOUT AURA WITHOUT STATUS MIGRAINOSUS, NOT INTRACTABLE: ICD-10-CM

## 2022-11-18 LAB
LV EJECT FRACT  99904: 65
LV EJECT FRACT MOD 2C 99903: 65.68
LV EJECT FRACT MOD 4C 99902: 62.81
LV EJECT FRACT MOD BP 99901: 64.18

## 2022-11-18 PROCEDURE — 93306 TTE W/DOPPLER COMPLETE: CPT

## 2022-11-18 PROCEDURE — 99214 OFFICE O/P EST MOD 30 MIN: CPT | Performed by: PSYCHIATRY & NEUROLOGY

## 2022-11-18 PROCEDURE — 99212 OFFICE O/P EST SF 10 MIN: CPT | Performed by: PSYCHIATRY & NEUROLOGY

## 2022-11-18 PROCEDURE — 93306 TTE W/DOPPLER COMPLETE: CPT | Mod: 26 | Performed by: INTERNAL MEDICINE

## 2022-11-18 ASSESSMENT — FIBROSIS 4 INDEX: FIB4 SCORE: 0.96

## 2022-11-18 NOTE — PROGRESS NOTES
"[facility] NEUROLOGY  FOLLOW-UP VISIT    CC: headaches s/p COVID-19 Infection, likely migraine    INTERVAL HISTORY:  Sonya Wei is a 56 y.o. woman with worsened headaches following COVID-19 infection.  I last saw her via Zoom on 5/9/2022.  At that time I recommended she increase the dosage of topiramate to 50 mg BID.  Today, she was unaccompanied, and she provided the following interval history:    The following is a summary of headache symptoms, presented in my standard format:    Family History: mom had migraines related to \"brain tumors\" she had headaches earlier in life  Age at onset: teenage years  Location: bi-temporal, occipital  Radiation: both proximal upper extremities  Frequency: baseline: ~twice daily, currently: daily  Duration: baseline: 2-3 hours, currently: 1+ hours  Headache Days/Month: baseline: 30/30, currently: 30/30  Quality: \"like someone trying to stab her in the ear,\" \"pressure\"  Intensity: baseline: 6-8/10, currently: 7-8/10  Aura: none  Photophobia/Phonophobia/Nausea/Vomiting: yes/yes/yes/no  Provoked by Physical Activity?:   Triggers: loud noises at work  Associated Symptoms: \"vertigo\"  Autonomic Signs (such as ptosis, miosis, conjunctival injection, rhinorrhea, increased lacrimation): none  Head Trauma:   Association with Menses: menopausal  ED Visits: yes  Hospitalizations: yes  Missed Work Days: works at [employer]  Sleep: 6-7 hours/night; she wakes 2-3 times/night  Caffeine Intake: none  Hydration: keeps well-hydrated  Nutrition: eats regular meals  Analgesic Overuse:      Current Medication Regimen:  - topiramate: 50 mg BID: helpful, associated with some sleepiness  - naproxen  - sumatriptan: 100 mg is helpful, she doesn't have to re-dose     Medications Tried: Response  Preventive:  - amitriptyline: 25 mg was very effective, caused excessive sedation  - topiramate 50 mg daily: sub-optimally effective    Abortive:  -     Medications Not Tried:  - nortriptyline: will " avoid due to med-med interaction with escitalopram    MEDICATIONS:  Current Outpatient Medications   Medication Sig    glyBURIDE (DIABETA) 5 MG Tab TAKE 2 TABLETS BY MOUTH TWICE DAILY WITH MEALS    metFORMIN (GLUCOPHAGE) 500 MG Tab TAKE 2 TABLETS BY MOUTH TWICE DAILY WITH MEALS    albuterol 108 (90 Base) MCG/ACT Aero Soln inhalation aerosol Inhale 2 Puffs every 6 hours as needed for Shortness of Breath.    fluticasone-salmeterol (ADVAIR) 100-50 MCG/ACT AEROSOL POWDER, BREATH ACTIVATED Inhale 1 Puff every 12 hours.    omeprazole (PRILOSEC) 20 MG delayed-release capsule Take 1 Capsule by mouth every day.    metoprolol SR (TOPROL XL) 50 MG TABLET SR 24 HR Take 1 Tablet by mouth every day.    cloNIDine (CATAPRES) 0.1 MG Tab Take one tablet up to once a day ONLY for blood pressure greater than 160/90    topiramate (TOPAMAX) 50 MG tablet Take 1 Tablet by mouth 2 times a day.    hydroCHLOROthiazide (HYDRODIURIL) 12.5 MG tablet Take 1 Tablet by mouth every day.    Misc. Devices Misc Please dispense one blood pressure cuff    sumatriptan (IMITREX) 100 MG tablet TAKE 1 TABLET BY MOUTH AT THE ONSET OF AURA/HEADACHE. MAY REPEAT DOSE 1 TIME AFTER 2 HOURS IF HEADACHE PERSISTS. MAX DAILY DOSE 200 MG. DO NOT USE MORE THAN 2 DAYS PER WEEK    escitalopram (LEXAPRO) 10 MG Tab Take 1 tablet by mouth once daily    TRULICITY 1.5 MG/0.5ML Solution Pen-injector INJECT 1 DOSE SUBCUTANEOUSLY ONCE A WEEK    benzonatate (TESSALON) 200 MG capsule 1 CAP BY MOUTH UP TO 3 TIMES A DAY ONLY IF NEEDED FOR COUGH.    gabapentin (NEURONTIN) 100 MG Cap Take 1 Capsule by mouth at bedtime as needed (insomnia).    atorvastatin (LIPITOR) 20 MG Tab Take 1 Tablet by mouth every day.    Cyanocobalamin (VITAMIN B-12 PO) Take 1 Tablet by mouth every day.    ASPIRIN 81 PO Take 81 mg by mouth every day.    tizanidine (ZANAFLEX) 4 MG Tab Take 0.5-1 Tablets by mouth at bedtime as needed (muscle spasms).    Multiple Vitamin (MULTI VITAMIN DAILY) Tab Take  by mouth.     hydrOXYzine HCl (ATARAX) 25 MG Tab Take 0.5-1 Tablets by mouth 3 times a day as needed for Anxiety.    SITagliptin (JANUVIA) 100 MG Tab Take 1 Tablet by mouth every day.    Ascorbic Acid (VITAMIN C) 1000 MG Tab Take 1 Tablet by mouth every morning.    Cholecalciferol (VITAMIN D PO) Take 2 Tablets by mouth every day. GUMMIES     MEDICAL, SOCIAL, AND FAMILY HISTORY:  There is no change in the patient's ROS or PFSH from their previous visit on 5/9/2022.    REVIEW OF SYSTEMS:  A ROS was completed.  Pertinent positives and negatives were included in the HPI, above.  All other systems were reviewed and are negative.    PHYSICAL EXAM:  General/Medical:  - NAD    Neuro:  MENTAL STATUS: awake and alert; no deficits of speech or language; oriented to conversation; affect was appropriate to situation; pleasant, cooperative    CRANIAL NERVES:    II: acuity: NT, fields: NT, pupils: NT, discs: NT    III/IV/VI: versions: grossly intact    V: facial sensation: NT    VII: facial expression: symmetric    VIII: hearing: intact to voice    IX/X: palate: NT    XI: shoulder shrug: NT    XII: tongue: NT    MOTOR:  - bulk: NT  - tone: NT  Upper Extremity Strength  (R/L)    NT   Elbow flexion NT   Elbow extension NT   Shoulder abduction NT     Lower Extremity Strength  (R/L)   Hip flexion NT   Knee extension NT   Knee flexion NT   Ankle plantarflexion NT   Ankle dorsiflexion NT     - pronator drift: NT  - abnormal movements: none    SENSATION:  - light touch: NT  - vibration (R/L, seconds): NT at the great toes  - pinprick: NT  - proprioception: NT  - Romberg: absent    COORDINATION:  - finger to nose: NT  - finger tapping: NT    REFLEXES:  Reflex Right Left   BR NT NT   Biceps NT NT   Triceps NT NT   Patellae NT NT   Achilles NT NT   Toes NT NT     GAIT:  - NT    REVIEW OF LABORATORY STUDIES:  10/25/2022:  - CBC w/ diff: notable for mild anemia  - CMP: unremarkable    ASSESSMENT:  Sonya Wei is a 56 y.o. woman with headaches  (mgraine-like) s/p COVID-19.  Her headaches are worse lately, so we will add Botox to her prevention regimen.  Plans/recommenadtions as follows:    PLAN:  Migraine Headaches s/p COVID-19:  Prevention:  - start Botox: plan to inject 155 units according to the dosing/injection paradigm currently mandated by the FDA for chronic migraine as follows:  - 10 units of BOTOX divided into 2 sites into the   - 5 units into the Procerus  - 20 units of Botox divided into 4 units into the Frontalis  - 40 units of Botox divided into 8 sites (4 sites to the right Temporalis and 4 sites to the left Temporalis)  - 30 units divided into 6 units (3 units to the right Occipitalis and 3 units to left Occipitalis)  - 20 units divided into 4 units (2 units to the right Cervical paraspinals, 2 units to the left Cervical paraspinals)  - 30 units of Botox divided into 6 units (3 units to right trapezius, 3 units to the left trapezius).  - continue topiramate to 50 mg BID for now  - get ~8 hours of sleep per night  - drink plenty of fluids (urine should be nearly clear)  - avoid excessive caffeine intake (no more than 2 servings per day and nothing in the afternoon)  - eat regular meals (don't skip meals)  - get moderate exercise (even just a 20 minute walk daily)    :  - sumatriptan 100 mg: take 100 mg at the onset of aura/HA; may re-dose x1 after 2 hrs if HA persists; MDD: 200 mg; do not use >2 days/week.  - do not use analgesics (e.g., ibuprofen, acetaminophen) more than 2 days per week in order to avoid analgesic rebound headaches    - keep a headache log    Follow-Up:  - Return in about 5 months (around 2023).    Signed: Sebastian Parikh M.D.

## 2022-11-21 DIAGNOSIS — G43.709 CHRONIC MIGRAINE WITHOUT AURA WITHOUT STATUS MIGRAINOSUS, NOT INTRACTABLE: ICD-10-CM

## 2022-11-25 DIAGNOSIS — E11.9 TYPE 2 DIABETES MELLITUS WITHOUT COMPLICATION, WITHOUT LONG-TERM CURRENT USE OF INSULIN (HCC): ICD-10-CM

## 2022-11-29 NOTE — TELEPHONE ENCOUNTER
LAST OV 11/1/22    Received request via: Pharmacy    Was the patient seen in the last year in this department? Yes    Does the patient have an active prescription (recently filled or refills available) for medication(s) requested? No    Does the patient have [facility] Plus and need 100 day supply (blood pressure, diabetes and cholesterol meds only)? Patient does not have SCP

## 2022-11-30 ENCOUNTER — OFFICE VISIT (OUTPATIENT)
Dept: MEDICAL GROUP | Facility: PHYSICIAN GROUP | Age: 57
End: 2022-11-30
Payer: COMMERCIAL

## 2022-11-30 VITALS
OXYGEN SATURATION: 96 % | HEART RATE: 78 BPM | HEIGHT: 61 IN | RESPIRATION RATE: 18 BRPM | WEIGHT: 195 LBS | TEMPERATURE: 97.1 F | SYSTOLIC BLOOD PRESSURE: 138 MMHG | DIASTOLIC BLOOD PRESSURE: 70 MMHG | BODY MASS INDEX: 36.82 KG/M2

## 2022-11-30 DIAGNOSIS — G56.03 BILATERAL CARPAL TUNNEL SYNDROME: ICD-10-CM

## 2022-11-30 DIAGNOSIS — J45.40 MODERATE PERSISTENT REACTIVE AIRWAY DISEASE WITHOUT COMPLICATION: ICD-10-CM

## 2022-11-30 DIAGNOSIS — I10 PRIMARY HYPERTENSION: ICD-10-CM

## 2022-11-30 DIAGNOSIS — E11.9 TYPE 2 DIABETES MELLITUS WITHOUT COMPLICATION, WITH LONG-TERM CURRENT USE OF INSULIN (HCC): ICD-10-CM

## 2022-11-30 DIAGNOSIS — Z79.4 TYPE 2 DIABETES MELLITUS WITHOUT COMPLICATION, WITH LONG-TERM CURRENT USE OF INSULIN (HCC): ICD-10-CM

## 2022-11-30 LAB
HBA1C MFR BLD: 7.5 % (ref 0–5.6)
INT CON NEG: ABNORMAL
INT CON POS: ABNORMAL

## 2022-11-30 PROCEDURE — 99214 OFFICE O/P EST MOD 30 MIN: CPT | Performed by: NURSE PRACTITIONER

## 2022-11-30 PROCEDURE — 83036 HEMOGLOBIN GLYCOSYLATED A1C: CPT | Performed by: NURSE PRACTITIONER

## 2022-11-30 ASSESSMENT — FIBROSIS 4 INDEX: FIB4 SCORE: 0.96

## 2022-11-30 NOTE — ASSESSMENT & PLAN NOTE
Chronic health problem, improved control with addition of clonidine as needed.  Patient reports she has had to take it a couple times with good results.  Continues with hydrochlorothiazide metoprolol SR 50 mg daily.  Has not followed up with cardiology as instructed.  Had echo done, unremarkable.  The patient denies chest pain, shortness of breath, headache, vision changes, epistaxis, or dyspnea on exertion.

## 2022-11-30 NOTE — PATIENT INSTRUCTIONS
Call for appt:    Philipp Franco MD  [facility]  [facility], [address]  [address] [address]  [place] [address]  Phone: [phone]

## 2022-11-30 NOTE — ASSESSMENT & PLAN NOTE
This is a chronic health problem that is well controlled with current medications and lifestyle measures.  Maintenance inhaler, Advair, was added a month ago.  Patient reports improved results.  Rarely needing to use albuterol inhaler and cough has greatly improved.

## 2022-11-30 NOTE — ASSESSMENT & PLAN NOTE
This is a chronic health problem that is well controlled with current medications and lifestyle measures.  Point-of-care A1c is 7.5%.  This is down from 7.8% at last appointment.  Patient has been more diligent about lifestyle measures.  Continues to take city injection 1.5 mg weekly, glyburide 5 mg twice a day, Januvia 100 mg daily.  Med monitoring blood sugar at home.  Has misplaced glucometer during recent move.  Had retinal scan done in the last 6 months at [facility] in [place].  We will request records.  On ACE and statin.  Denies polydipsia, polyuria, vision changes.  [facility] interested in [facility] with [facility] in [place].

## 2022-11-30 NOTE — PROGRESS NOTES
CC: Diabetes, wrist pain, follow-up on other chronic health issues    HISTORY OF THE PRESENT ILLNESS: Patient is a 56 y.o. female. This pleasant patient is here today for evaluation and management of the following health problems.    Health Maintenance: due      Type 2 diabetes mellitus without complication, with long-term current use of insulin (HCC)  This is a chronic health problem that is well controlled with current medications and lifestyle measures.  Point-of-care A1c is 7.5%.  This is down from 7.8% at last appointment.  Patient has been more diligent about lifestyle measures.  Continues to take city injection 1.5 mg weekly, glyburide 5 mg twice a day, Januvia 100 mg daily.  Med monitoring blood sugar at home.  Has misplaced glucometer during recent move.  Had retinal scan done in the last 6 months at [facility] in [place].  We will request records.  On ACE and statin.  Denies polydipsia, polyuria, vision changes.  Patient interested in establishing with [facility] in [place].        Bilateral carpal tunnel syndrome  She reports worsening pain in right wrist.  Very much like the pain she was having in her left wrist, which is carpal tunnel.  Left wrist has improved with wearing splint during working.  Patient is requesting carpal splint for right wrist.  Denies numbness and tingling, erythema.  Pain location is dorsal and ventral aspect of wrist.    Hypertension  Chronic health problem, improved control with addition of clonidine as needed.  Patient reports she has had to take it a couple times with good results.  Continues with hydrochlorothiazide metoprolol SR 50 mg daily.  Has not followed up with cardiology as instructed.  Had echo done, unremarkable.  The patient denies chest pain, shortness of breath, headache, vision changes, epistaxis, or dyspnea on exertion.      Reactive airway disease  This is a chronic health problem that is well controlled with current medications and  lifestyle measures.  Maintenance inhaler, Advair, was added a month ago.  Patient reports improved results.  Rarely needing to use albuterol inhaler and cough has greatly improved.    Allergies: Morphine and Other misc    Current Outpatient Medications Ordered in Epic   Medication Sig Dispense Refill    glyBURIDE (DIABETA) 5 MG Tab TAKE 2 TABLETS BY MOUTH TWICE DAILY WITH MEALS 120 Tablet 3    metFORMIN (GLUCOPHAGE) 500 MG Tab TAKE 2 TABLETS BY MOUTH TWICE DAILY WITH MEALS 360 Tablet 3    albuterol 108 (90 Base) MCG/ACT Aero Soln inhalation aerosol Inhale 2 Puffs every 6 hours as needed for Shortness of Breath. 8.5 g 2    fluticasone-salmeterol (ADVAIR) 100-50 MCG/ACT AEROSOL POWDER, BREATH ACTIVATED Inhale 1 Puff every 12 hours. 60 Each 2    omeprazole (PRILOSEC) 20 MG delayed-release capsule Take 1 Capsule by mouth every day. 90 Capsule 3    metoprolol SR (TOPROL XL) 50 MG TABLET SR 24 HR Take 1 Tablet by mouth every day. 90 Tablet 1    cloNIDine (CATAPRES) 0.1 MG Tab Take one tablet up to once a day ONLY for blood pressure greater than 160/90 30 Tablet 1    topiramate (TOPAMAX) 50 MG tablet Take 1 Tablet by mouth 2 times a day.      hydroCHLOROthiazide (HYDRODIURIL) 12.5 MG tablet Take 1 Tablet by mouth every day. 90 Tablet 1    Misc. Devices Misc Please dispense one blood pressure cuff 1 Each 0    sumatriptan (IMITREX) 100 MG tablet TAKE 1 TABLET BY MOUTH AT THE ONSET OF AURA/HEADACHE. MAY REPEAT DOSE 1 TIME AFTER 2 HOURS IF HEADACHE PERSISTS. MAX DAILY DOSE 200 MG. DO NOT USE MORE THAN 2 DAYS PER WEEK      escitalopram (LEXAPRO) 10 MG Tab Take 1 tablet by mouth once daily 90 Tablet 0    TRULICITY 1.5 MG/0.5ML Solution Pen-injector INJECT 1 DOSE SUBCUTANEOUSLY ONCE A WEEK 12 mL 0    benzonatate (TESSALON) 200 MG capsule 1 CAP BY MOUTH UP TO 3 TIMES A DAY ONLY IF NEEDED FOR COUGH. 30 Capsule 0    gabapentin (NEURONTIN) 100 MG Cap Take 1 Capsule by mouth at bedtime as needed (insomnia). 90 Capsule 0     atorvastatin (LIPITOR) 20 MG Tab Take 1 Tablet by mouth every day. 90 Tablet 3    Cyanocobalamin (VITAMIN B-12 PO) Take 1 Tablet by mouth every day.      ASPIRIN 81 PO Take 81 mg by mouth every day.      tizanidine (ZANAFLEX) 4 MG Tab Take 0.5-1 Tablets by mouth at bedtime as needed (muscle spasms). 30 Tablet 2    Multiple Vitamin (MULTI VITAMIN DAILY) Tab Take  by mouth.      hydrOXYzine HCl (ATARAX) 25 MG Tab Take 0.5-1 Tablets by mouth 3 times a day as needed for Anxiety. 30 Tablet 0    SITagliptin (JANUVIA) 100 MG Tab Take 1 Tablet by mouth every day. 90 Tablet 1    Ascorbic Acid (VITAMIN C) 1000 MG Tab Take 1 Tablet by mouth every morning.      Cholecalciferol (VITAMIN D PO) Take 2 Tablets by mouth every day. GUMMIES       No current Epic-ordered facility-administered medications on file.       Past Medical History:   Diagnosis Date    Anemia     Bowel habit changes     constipation    Diabetes     Diabetes (HCC)     GERD (gastroesophageal reflux disease)     Healthcare maintenance 9/27/2014    Mammogram: Due    High cholesterol     Hyperlipidemia     Hypertension     Infectious disease     Cold 10/2016    Jaundice 1999    Psychiatric problem     depression and anxiety    Vaginal itching 8/27/2014    With anal itching X 1 month Getting worse Burning Min vag disch       Past Surgical History:   Procedure Laterality Date    VENTRAL HERNIA REPAIR ROBOTIC XI N/A 10/14/2016    Procedure: VENTRAL HERNIA REPAIR ROBOTIC XI FOR INCISIONAL W/MESH;  Surgeon: Edi Mendoza M.D.;  Location: SURGERY [facility];  Service:     ABDOMINAL HYSTERECTOMY TOTAL      still has ovaries    CHOLECYSTECTOMY      PRIMARY C SECTION      TUBAL COAGULATION LAPAROSCOPIC BILATERAL         Social History     Tobacco Use    Smoking status: Never    Smokeless tobacco: Never   Vaping Use    Vaping Use: Never used   Substance Use Topics    Alcohol use: No    Drug use: No       Family History   Problem Relation Age of Onset    Diabetes Mother  "    Hyperlipidemia Mother     Diabetes Father     Hypertension Father     Hyperlipidemia Father        ROS: As in HPI, otherwise negative for chest pain, dyspnea, abdominal pain, dysuria, blood in stool, fever          Exam: /70 (BP Location: Left arm)   Pulse 78   Temp 36.2 °C (97.1 °F) (Temporal)   Resp 18   Ht 1.549 m (5' 1\")   Wt 88.5 kg (195 lb)   SpO2 96%  Body mass index is 36.84 kg/m².    General: Alert, pleasant, well nourished, well developed female in NAD  HEENT: Normocephalic. Eyes conjunctiva clear lids without ptosis, pupils equal and reactive to light, ears normal shape and contour, canals are clear bilaterally, tympanic membranes are pearly gray with good light reflex, nasal mucosa without erythema and drainage, oropharynx is without erythema, edema or exudates.   Neck: Supple without bruit. Thyroid is not enlarged.  Pulmonary: Clear to ausculation.  Normal effort. No rales, ronchi, or wheezing.  Cardiovascular: Normal rate and rhythm without murmur. Carotid and radial pulses are intact and equal bilaterally.  No lower extremity edema.  Neurologic: Grossly nonfocal  Lymph: No cervical or supraclavicular lymph nodes are palpable  Skin: Warm and dry.    Musculoskeletal: Normal gait.   Right wrist: No erythema or edema, full active range of motion, nontender to palpation, negative Tinel's and Phalen's  Psych: Normal mood and affect. Alert and oriented. Judgment and insight is normal.    Please note that this dictation was created using voice recognition software. I have made every reasonable attempt to correct obvious errors, but I expect that there are errors of grammar and possibly content that I did not discover before finalizing the note.      Assessment/Plan  1. Type 2 diabetes mellitus without complication, with long-term current use of insulin (HCC)  Improved control.  Continue with current medications, no changes.  Will refer to pharmacy specialist for help in management of her " diabetes.  Records requested for retinal scan.  Continue with lifestyle measures.  - POCT  A1C  - Referral to Pharmacotherapy Service  - Comp Metabolic Panel; Future  - ESTIMATED GFR; Future  - HEMOGLOBIN A1C; Future  - Lipid Profile; Future    2. Bilateral carpal tunnel syndrome  Not having symptoms in right wrist.  Carpal tunnel brace given to patient today for right wrist as it did also help with left.  Consider referral to physical therapy and/or hand specialist.    3. Primary hypertension  Well-controlled.  Continue with metoprolol, no changes.  Continue with clonidine as needed.  Instructed patient to schedule follow-up appointment with cardiology.    4. Moderate persistent reactive airway disease without complication  Improved control with atorvastatin.  Continue.  Continues albuterol as needed.    Will return to clinic in 3 months or sooner if needed.

## 2022-11-30 NOTE — ASSESSMENT & PLAN NOTE
She reports worsening pain in right wrist.  Very much like the pain she was having in her left wrist, which is carpal tunnel.  Left wrist has improved with wearing splint during working.  Patient is requesting carpal splint for right wrist.  Denies numbness and tingling, erythema.  Pain location is dorsal and ventral aspect of wrist.

## 2022-12-01 ENCOUNTER — DOCUMENTATION (OUTPATIENT)
Dept: VASCULAR LAB | Facility: MEDICAL CENTER | Age: 57
End: 2022-12-01
Payer: COMMERCIAL

## 2022-12-01 NOTE — PROGRESS NOTES
[facility]      Called and left msg for pt to call back to establish care regarding pharmacotherapy referral for DM management  from Fren CRESPO on 11/30/22.      Requested patient call the clinic back to schedule NP appt.     Insurance: Gamal  PCP: Alexis = Fern CRESPO  Locations to be seen: ANY     Phone number left for return call or any questions or concerns.  [facility] at [phone], fax [phone]      Antoinette BooneD

## 2022-12-02 RX ORDER — DULAGLUTIDE 1.5 MG/.5ML
INJECTION, SOLUTION SUBCUTANEOUS
Qty: 12 ML | Refills: 3 | Status: SHIPPED | OUTPATIENT
Start: 2022-12-02 | End: 2023-12-08

## 2022-12-08 ENCOUNTER — TELEPHONE (OUTPATIENT)
Dept: VASCULAR LAB | Facility: MEDICAL CENTER | Age: 57
End: 2022-12-08
Payer: COMMERCIAL

## 2022-12-08 NOTE — TELEPHONE ENCOUNTER
[facility]     Called and left msg for pt to call back to establish care regarding pharmacotherapy referral for DM management  from Fern CRESPO on 11/30/22.     Called pt to schedule appt to establish care - no answer. LVM.    Sent letter.     Insurance: Gamal  PCP: Alexis = Fern CRESPO  Locations to be seen: ANY     Phone number left for return call or any questions or concerns.  [facility] at [phone], fax [phone]    Veto Moctezuma, PharmD, BCACP

## 2022-12-08 NOTE — LETTER
[address]  [place] [address]        Dear Sonya Wei ,    We have been unsuccessful in our attempts to contact you regarding your Diabetes Clinical Pharmacist referral.  Please contact us if you would like to schedule an appointment for help managing your blood sugar.    Please contact our clinic so we may assist you.  We are open Monday-Friday 8 am until 5 pm.  You may reach our Service at [phone].        Sincerely,    Douglas Madrigal, PaoloD, [facility]  Clinic Supervisor  [facility]  Outpatient Anticoagulation Service

## 2022-12-15 ENCOUNTER — TELEPHONE (OUTPATIENT)
Dept: VASCULAR LAB | Facility: MEDICAL CENTER | Age: 57
End: 2022-12-15
Payer: COMMERCIAL

## 2022-12-15 NOTE — TELEPHONE ENCOUNTER
[facility]    Unable to reach pt and emergency contact today.  Left VM with both.    Pt was already sent letter.    Will discharge from our clinic unless we hear back from the pt at a later time.    Douglas Madrigal, PharmD    CC  Dr Fern Lawrence

## 2023-01-12 ENCOUNTER — OFFICE VISIT (OUTPATIENT)
Dept: URGENT CARE | Facility: PHYSICIAN GROUP | Age: 58
End: 2023-01-12
Payer: COMMERCIAL

## 2023-01-12 VITALS
DIASTOLIC BLOOD PRESSURE: 92 MMHG | WEIGHT: 195 LBS | OXYGEN SATURATION: 98 % | SYSTOLIC BLOOD PRESSURE: 146 MMHG | HEIGHT: 61 IN | HEART RATE: 83 BPM | BODY MASS INDEX: 36.82 KG/M2 | TEMPERATURE: 98.7 F | RESPIRATION RATE: 16 BRPM

## 2023-01-12 DIAGNOSIS — R53.81 MALAISE AND FATIGUE: ICD-10-CM

## 2023-01-12 DIAGNOSIS — R53.83 MALAISE AND FATIGUE: ICD-10-CM

## 2023-01-12 LAB
EXTERNAL QUALITY CONTROL: NORMAL
FLUAV+FLUBV AG SPEC QL IA: NEGATIVE
INT CON NEG: NORMAL
INT CON NEG: NORMAL
INT CON POS: NORMAL
INT CON POS: NORMAL
SARS-COV+SARS-COV-2 AG RESP QL IA.RAPID: NEGATIVE

## 2023-01-12 PROCEDURE — 87426 SARSCOV CORONAVIRUS AG IA: CPT | Performed by: FAMILY MEDICINE

## 2023-01-12 PROCEDURE — 99213 OFFICE O/P EST LOW 20 MIN: CPT | Performed by: FAMILY MEDICINE

## 2023-01-12 PROCEDURE — 87804 INFLUENZA ASSAY W/OPTIC: CPT | Performed by: FAMILY MEDICINE

## 2023-01-12 ASSESSMENT — ENCOUNTER SYMPTOMS
FEVER: 1
CHILLS: 1
GASTROINTESTINAL NEGATIVE: 1
CARDIOVASCULAR NEGATIVE: 1
COUGH: 1
EYES NEGATIVE: 1

## 2023-01-12 ASSESSMENT — FIBROSIS 4 INDEX: FIB4 SCORE: 0.98

## 2023-01-12 NOTE — LETTER
[facility]  [facility] [address] [address]     January 12, 2023    Patient: Sonya Wei   YOB: 1965   Date of Visit: 1/12/2023       To Whom It May Concern:    Sonya Wei was seen and treated in our department on 1/12/2023. Sonya may return to work 1/16/2023.    Sincerely,     Patrick Preston M.D.

## 2023-01-12 NOTE — PROGRESS NOTES
"Subjective:   Sonya Wei is a 57 y.o. female who presents for Coronavirus Screening (/Cough, runny nose, fatigue, ear pain bilateral, began Tuesday)      HPI    Review of Systems   Constitutional:  Positive for chills, fever and malaise/fatigue.   HENT:  Positive for congestion.    Eyes: Negative.    Respiratory:  Positive for cough.    Cardiovascular: Negative.    Gastrointestinal: Negative.    Skin: Negative.      Medications, Allergies, and current problem list reviewed today in Epic.     Objective:     BP (!) 146/92   Pulse 83   Temp 37.1 °C (98.7 °F) (Temporal)   Resp 16   Ht 1.549 m (5' 1\")   Wt 88.5 kg (195 lb)   SpO2 98%     Physical Exam  Vitals and nursing note reviewed.   Constitutional:       Appearance: Normal appearance.   HENT:      Head: Normocephalic and atraumatic.      Right Ear: Tympanic membrane normal.      Left Ear: Tympanic membrane normal.      Nose: Congestion present.      Mouth/Throat:      Pharynx: Oropharynx is clear.   Cardiovascular:      Rate and Rhythm: Normal rate and regular rhythm.      Pulses: Normal pulses.      Heart sounds: Normal heart sounds.   Pulmonary:      Effort: Pulmonary effort is normal.      Breath sounds: Normal breath sounds.   Abdominal:      General: Abdomen is flat. Bowel sounds are normal.      Palpations: Abdomen is soft.   Musculoskeletal:      Cervical back: Normal range of motion and neck supple.   Neurological:      Mental Status: She is alert.       Assessment/Plan:     Diagnosis and associated orders:     1. Malaise and fatigue  POCT Influenza A/B    POCT SARS-COV Antigen LEROY (Symptomatic only)         Comments/MDM:     Flu and Covid test neg, patient to rest, ibuprofen, fluids note for work written.         Differential diagnosis, natural history, supportive care, and indications for immediate follow-up discussed.    Advised the patient to follow-up with the primary care physician for recheck, reevaluation, and consideration of further " management.    Please note that this dictation was created using voice recognition software. I have made a reasonable attempt to correct obvious errors, but I expect that there are errors of grammar and possibly content that I did not discover before finalizing the note.    This note was electronically signed by Patrick Preston M.D.

## 2023-02-19 ENCOUNTER — HOSPITAL ENCOUNTER (EMERGENCY)
Facility: MEDICAL CENTER | Age: 58
End: 2023-02-19
Attending: EMERGENCY MEDICINE
Payer: COMMERCIAL

## 2023-02-19 VITALS
TEMPERATURE: 98.5 F | WEIGHT: 195.11 LBS | HEART RATE: 76 BPM | SYSTOLIC BLOOD PRESSURE: 153 MMHG | BODY MASS INDEX: 36.84 KG/M2 | RESPIRATION RATE: 18 BRPM | OXYGEN SATURATION: 98 % | DIASTOLIC BLOOD PRESSURE: 90 MMHG | HEIGHT: 61 IN

## 2023-02-19 DIAGNOSIS — T14.8XXA MUSCLE STRAIN: ICD-10-CM

## 2023-02-19 DIAGNOSIS — M79.605 LEFT LEG PAIN: ICD-10-CM

## 2023-02-19 PROCEDURE — 99283 EMERGENCY DEPT VISIT LOW MDM: CPT

## 2023-02-19 ASSESSMENT — FIBROSIS 4 INDEX: FIB4 SCORE: 0.98

## 2023-02-19 ASSESSMENT — PAIN DESCRIPTION - DESCRIPTORS: DESCRIPTORS: THROBBING;TENDER

## 2023-02-19 NOTE — LETTER
"  FORM C-4:  EMPLOYEE’S CLAIM FOR COMPENSATION/ REPORT OF INITIAL TREATMENT  EMPLOYEE’S CLAIM - PROVIDE ALL INFORMATION REQUESTED   First Name Sonya Last Name Eriberto Birthdate 1965  Sex female Claim Number   Home Address [address]             [place] [address]                                   Age  57 y.o. Height  1.549 m (5' 1\") Weight  88.5 kg (195 lb 1.7 oz) [facility]     Mailing Address [address]              Zip [address] Telephone  [phone] (home)  Primary Language Spoken  English   Insurer   Third Party   [employer] Employee's Occupation (Job Title) When Injury or Occupational Disease Occurred     Employer's Name [employer] Telephone [phone]    Employer Address [address] [facility] [address]] Zip [address]   Date of Injury  2/19/2023       Hour of Injury  5:20 PM Date Employer Notified  2/19/2023 Last Day of Work after Injury or Occupational Disease  2/19/2023 Supervisor to Whom Injury Reported  Sowmya   Address or Location of Accident (if applicable) Work [1]   What were you doing at the time of accident? (if applicable) Working in front of my machine    How did this injury or occupational disease occur? Be specific and answer in detail. Use additional sheet if necessary)  I tried to reach for my machine but my leg didn't respond It didn't moved and I heard it poped and it started hurting and I started limping and imneately told Job Martinez my shift lead and he called safety-and they came and brought me to the clinic   If you believe that you have an occupational disease, when did you first have knowledge of the disability and it relationship to your employment? No Witnesses to the Accident  No Body   Nature of Injury or Occupational Disease  Workers' Compensation Part(s) of Body Injured or Affected  Lower Leg (L), Upper Leg (L), Hip (L)    I CERTIFY THAT THE ABOVE IS TRUE " AND CORRECT TO THE BEST OF MY KNOWLEDGE AND THAT I HAVE PROVIDED THIS INFORMATION IN ORDER TO OBTAIN THE BENEFITS OF [place]).  I HEREBY AUTHORIZE ANY PHYSICIAN, CHIROPRACTOR, SURGEON, PRACTITIONER, OR OTHER PERSON, ANY HOSPITAL, INCLUDING [facility] OR [facility], ANY MEDICAL SERVICE ORGANIZATION, ANY INSURANCE COMPANY, OR OTHER INSTITUTION OR ORGANIZATION TO RELEASE TO EACH OTHER, ANY MEDICAL OR OTHER INFORMATION, INCLUDING BENEFITS PAID OR PAYABLE, PERTINENT TO THIS INJURY OR DISEASE, EXCEPT INFORMATION RELATIVE TO DIAGNOSIS, TREATMENT AND/OR COUNSELING FOR AIDS, PSYCHOLOGICAL CONDITIONS, ALCOHOL OR CONTROLLED SUBSTANCES, FOR WHICH I MUST GIVE SPECIFIC AUTHORIZATION.  A PHOTOSTAT OF THIS AUTHORIZATION SHALL BE AS VALID AS THE ORIGINAL.  Date  2/19/2023        Place  [facility]   Employee’s Signature   THIS REPORT MUST BE COMPLETED AND MAILED WITHIN 3 WORKING DAYS OF TREATMENT   Place [facility], EMERGENCY DEPT                       Name of Facility [facility]   Date  2/19/2023 Diagnosis  (M79.605) Left leg pain  (T14.8XXA) Muscle strain Is there evidence the injured employee was under the influence of alcohol and/or another controlled substance at the time of accident?   Hour  8:59 PM Description of Injury or Disease  Left leg pain  Muscle strain No   Treatment  Medical screening exam  Have you advised the patient to remain off work five days or more?         No   X-Ray Findings    If Yes   From Date    To Date      From information given by the employee, together with medical evidence, can you directly connect this injury or occupational disease as job incurred? Yes If No, is employee capable of: Full Duty  Yes Modified Duty      Is additional medical care by a physician indicated? No If Modified Duty, Specify any Limitations / Restrictions       Do you  "know of any previous injury or disease contributing to this condition or occupational disease? No    Date 2/19/2023 Print Doctor’s Name Stanton Cordoba I certify the employer’s copy of this form was mailed on:   Address [address] [place] [address]  ADINA MALONEY [id number] [phone] INSURER’S USE ONLY   Provider’s Tax ID Number [id number] Telephone Dept: [phone]    Doctor’s Signature STANTON Coulter M.D. Degree  MD      Form C-4 (rev.10/07)                                                                         BRIEF DESCRIPTION OF RIGHTS AND BENEFITS  (Pursuant to [place] 616C.050)    Notice of Injury or Occupational Disease (Incident Report Form C-1): If an injury or occupational disease (OD) arises out of and in the course of employment, you must provide written notice to your employer as soon as practicable, but no later than 7 days after the accident or OD. Your employer shall maintain a sufficient supply of the required forms.    Claim for Compensation (Form C-4): If medical treatment is sought, the form C-4 is available at the place of initial treatment. A completed \"Claim for Compensation\" (Form C-4) must be filed within 90 days after an accident or OD. The treating physician or chiropractor must, within 3 working days after treatment, complete and mail to the employer, the employer's insurer and third-party , the Claim for Compensation.    Medical Treatment: If you require medical treatment for your on-the-job injury or OD, you may be required to select a physician or chiropractor from a list provided by your workers’ compensation insurer, if it has contracted with an Organization for Managed Care (MCO) or Preferred Provider Organization (PPO) or providers of health care. If your employer has not entered into a contract with an MCO or PPO, you may select a physician or chiropractor from the Panel of Physicians and Chiropractors. Any medical costs related to your industrial injury or OD will be paid by " your insurer.    Temporary Total Disability (TTD): If your doctor has certified that you are unable to work for a period of at least 5 consecutive days, or 5 cumulative days in a 20-day period, or places restrictions on you that your employer does not accommodate, you may be entitled to TTD compensation.    Temporary Partial Disability (TPD): If the wage you receive upon reemployment is less than the compensation for TTD to which you are entitled, the insurer may be required to pay you TPD compensation to make up the difference. TPD can only be paid for a maximum of 24 months.    Permanent Partial Disability (PPD): When your medical condition is stable and there is an indication of a PPD as a result of your injury or OD, within 30 days, your insurer must arrange for an evaluation by a rating physician or chiropractor to determine the degree of your PPD. The amount of your PPD award depends on the date of injury, the results of the PPD evaluation, your age and wage.    Permanent Total Disability (PTD): If you are medically certified by a treating physician or chiropractor as permanently and totally disabled and have been granted a PTD status by your insurer, you are entitled to receive monthly benefits not to exceed 66 2/3% of your average monthly wage. The amount of your PTD payments is subject to reduction if you previously received a lump-sum PPD award.    Vocational Rehabilitation Services: You may be eligible for vocational rehabilitation services if you are unable to return to the job due to a permanent physical impairment or permanent restrictions as a result of your injury or occupational disease.    Transportation and Per Garcia Reimbursement: You may be eligible for travel expenses and per garcia associated with medical treatment.    Reopening: You may be able to reopen your claim if your condition worsens after claim closure.     Appeal Process: If you disagree with a written determination issued by the  insurer or the insurer does not respond to your request, you may appeal to the Department of Administration, , by following the instructions contained in your determination letter. You must appeal the determination within 70 days from the date of the determination letter at [address], [address], [place], [place] [address], or [address], [address], [place], [place] [address]. If you disagree with the  decision, you may appeal to the Department of Administration, . You must file your appeal within 30 days from the date of the  decision letter at [address], [address], [place], [place] [address], or [address], [address], [place], [place] [address]. If you disagree with a decision of an , you may file a petition for judicial review with the District Court. You must do so within 30 days of the Appeal Officer’s decision. You may be represented by an  at your own expense or you may contact the [facility] for possible representation.    [place]  for Injured Workers (NAIW): If you disagree with a  decision, you may request that NAIW represent you without charge at an  Hearing. For information regarding denial of benefits, you may contact the NA at: [address], [address], [place] [address], [phone], or [address], [address], [place] [address], [phone]    To File a Complaint with the Division: If you wish to file a complaint with the  of the Division of Industrial Relations (DIR),  please contact the Workers’ Compensation Section, [address], [place], [place] [address], telephone [phone], or [address], [place], [place] [address], telephone [phone].    For assistance with Workers’ Compensation Issues: You may contact the [facility] Office for Consumer Health  Assistance, [address] [place] [address], [address], [place] [address], Toll Free 1-205.644.5797, Web site: http://[email or web address].[email or web address].gov/Programs/DOMONIQUE E-mail: domonique@[facility].[place].gov  D-2 (rev. 10/20)              __________________________________________________________________                                    ____2/19/2023___            Employee Name / Signature                                                                                                                            Date

## 2023-02-20 NOTE — ED TRIAGE NOTES
"Chief Complaint   Patient presents with    Leg Pain     Pt BIBA from work. Pt was standing and turned, felt a pop in left leg, upper part of leg. Pt seen at onsite clinic at work and given tylenol and motrin. -fall, -head injury, -LOC. Work sent her here for further eval      BP (!) 191/107   Pulse 79   Temp 36.7 °C (98 °F) (Temporal)   Resp 18   Ht 1.549 m (5' 1\")   Wt 88.5 kg (195 lb 1.7 oz)   SpO2 98%   BMI 36.87 kg/m²     Pt BIBA to triage for above cc  Pain in L upper leg 5/10, GCS 15, A&O x4, EMS reported no visible shortening or rotation  Pt able to ambulated w/ a limp on L side    Pt to lobby, by WC, educated on rooming process   "

## 2023-02-20 NOTE — ED PROVIDER NOTES
ED Provider Note    CHIEF COMPLAINT  Chief Complaint   Patient presents with    Leg Pain     Pt BIBA from work. Pt was standing and turned, felt a pop in left leg, upper part of leg. Pt seen at onsite clinic at work and given tylenol and motrin. -fall, -head injury, -LOC. Work sent her here for further eval        EXTERNAL RECORDS REVIEWED  Inpatient Notes seen in ER in oct '22 for htn and ha    HPI/ROS  LIMITATION TO HISTORY   Select: : None  OUTSIDE HISTORIAN(S):  none    Sonya Wei is a 57 y.o. female who presents to the ER with c/o left leg pain; primarily in left calf.  She explains that she was working at [employer] today had her station.  She went to reach for a computer module with a twisting and stepped like motion.  She then felt a strain across her left calf and outer left thigh.  She then went to her work medical facility which then sent her here for further work comp evaluation.  She states that she was provided with over-the-counter medications not feeling significant better although not completely absent.  Pain is only currently minimal and mostly with ambulation.  There was no fall.  No other direct trauma.  Denies any prior injury to the leg.  She does note that she has recently been working with work comp physical therapist due to previous fall on ice in the parking lot for left upper extremity injury.  She states that her left lower extremity at that injury time was not affected.    PAST MEDICAL HISTORY   has a past medical history of Anemia, Bowel habit changes, Diabetes, Diabetes (HCC), GERD (gastroesophageal reflux disease), Healthcare maintenance (9/27/2014), High cholesterol, Hyperlipidemia, Hypertension, Infectious disease, Jaundice (1999), Psychiatric problem, and Vaginal itching (8/27/2014).    SURGICAL HISTORY   has a past surgical history that includes cholecystectomy; primary c section; tubal coagulation laparoscopic bilateral; abdominal hysterectomy total; and ventral hernia  repair robotic xi (N/A, 10/14/2016).    FAMILY HISTORY  Family History   Problem Relation Age of Onset    Diabetes Mother     Hyperlipidemia Mother     Diabetes Father     Hypertension Father     Hyperlipidemia Father        SOCIAL HISTORY  Social History     Tobacco Use    Smoking status: Never    Smokeless tobacco: Never   Vaping Use    Vaping Use: Never used   Substance and Sexual Activity    Alcohol use: No    Drug use: No    Sexual activity: Yes     Partners: Male     Birth control/protection: None       CURRENT MEDICATIONS  Home Medications       Reviewed by Anju Killian R.N. (Registered Nurse) on 02/19/23 at 1921  Med List Status: Partial     Medication Last Dose Status   albuterol 108 (90 Base) MCG/ACT Aero Soln inhalation aerosol  Active   Ascorbic Acid (VITAMIN C) 1000 MG Tab  Active   ASPIRIN 81 PO  Active   atorvastatin (LIPITOR) 20 MG Tab  Active   benzonatate (TESSALON) 200 MG capsule  Active   Cholecalciferol (VITAMIN D PO)  Active   cloNIDine (CATAPRES) 0.1 MG Tab  Active   Cyanocobalamin (VITAMIN B-12 PO)  Active   Dulaglutide (TRULICITY) 1.5 MG/0.5ML Solution Pen-injector  Active   escitalopram (LEXAPRO) 10 MG Tab  Active   fluticasone-salmeterol (ADVAIR) 100-50 MCG/ACT AEROSOL POWDER, BREATH ACTIVATED  Active   gabapentin (NEURONTIN) 100 MG Cap  Active   glyBURIDE (DIABETA) 5 MG Tab  Active   hydroCHLOROthiazide (HYDRODIURIL) 12.5 MG tablet  Active   hydrOXYzine HCl (ATARAX) 25 MG Tab  Active   metFORMIN (GLUCOPHAGE) 500 MG Tab  Active   metoprolol SR (TOPROL XL) 50 MG TABLET SR 24 HR  Active   Misc. Devices Misc  Active   Multiple Vitamin (MULTI VITAMIN DAILY) Tab  Active   omeprazole (PRILOSEC) 20 MG delayed-release capsule  Active   SITagliptin (JANUVIA) 100 MG Tab  Active   sumatriptan (IMITREX) 100 MG tablet  Active   tizanidine (ZANAFLEX) 4 MG Tab  Active   topiramate (TOPAMAX) 50 MG tablet  Active                    ALLERGIES  Allergies   Allergen Reactions    Morphine Itching      "Rxn = unknown   Bumps all over body    Other Misc Rash     Rash from gloves at work       PHYSICAL EXAM  VITAL SIGNS: BP (!) 191/107   Pulse 79   Temp 36.7 °C (98 °F) (Temporal)   Resp 18   Ht 1.549 m (5' 1\")   Wt 88.5 kg (195 lb 1.7 oz)   SpO2 98%   BMI 36.87 kg/m²        Pulse ox interpretation: I interpret this pulse ox as normal.  Constitutional: Alert in no apparent distress.  HENT: No signs of trauma, Bilateral external ears normal, Nose normal.   Eyes: Pupils are equal and reactive  Neck: Normal range of motion, No tenderness, Supple  Cardiovascular: Regular rate and rhythm, no murmurs.   Thorax & Lungs: Normal breath sounds, No respiratory distress, No wheezing, No chest tenderness.   Abdomen: Bowel sounds normal, Soft, No tenderness  Skin: Warm, Dry, No erythema, No rash.   Extremities: Intact distal pulses, No edema, No tenderness to bone or muscle to include affected left lower extremity  Musculoskeletal: Good range of motion in all major joints. No tenderness to palpation or major deformities noted.   Neurologic: Alert , Normal motor function, Normal sensory function, No focal deficits noted.   Psychiatric: Affect normal, Judgment normal, Mood normal.         DIAGNOSTIC STUDIES / PROCEDURES      RADIOLOGY  Deferred through shared decision making    COURSE & MEDICAL DECISION MAKING    ED Observation Status? No; Patient does not meet criteria for ED Observation.     INITIAL ASSESSMENT, COURSE AND PLAN  Care Narrative: 57-year-old female presented emerged department for work comp evaluation of left lower extremity discomfort.  Please see history as above.  At this point I have a high suspicion for more muscular strains/brain pathology.  Very low suspicion for acute bony injury such as fracture or dislocation.  Patient feeling better after even a single dose of anti-inflammatories.        DISPOSITION AND DISCUSSIONS  I have discussed management of the patient with the following physicians and MAHSA's: " None    Discussion of management with other [facility] or appropriate source(s): None     Escalation of care considered, and ultimately not performed:blood analysis, diagnostic imaging, and acute inpatient care management, however at this time, the patient is most appropriate for outpatient management    Barriers to care at this time: Work comp    57-year-old female presented emerged department with the above presentation.  At this point we have elected to defer from any imaging given her rapid improvement.  She is optimistic that she can continue to consult with her work comp facility and medical staff at work should she need any further therapy which is what she is currently expecting.  She is also understanding of potential need for further outpatient imaging study should she not have any resolution.  She is also understanding ability to return here.  In the meantime she will continue with over-the-counter medications for comfort.    FINAL DIAGNOSIS  1. Left leg pain    2. Muscle strain           Electronically signed by: Stanton Cordoba M.D., 2/19/2023 8:48 PM

## 2023-02-20 NOTE — ED NOTES
Pt discharged independent and ambulatory with steady gait with all belongings to the lobby. Discharge instructions reviewed with pt and pt has no follow up questions. Vitals and condition stable for discharge.

## 2023-03-14 ENCOUNTER — APPOINTMENT (OUTPATIENT)
Dept: RADIOLOGY | Facility: MEDICAL CENTER | Age: 58
End: 2023-03-14
Attending: PHYSICIAN ASSISTANT
Payer: COMMERCIAL

## 2023-03-21 ENCOUNTER — HOSPITAL ENCOUNTER (OUTPATIENT)
Dept: RADIOLOGY | Facility: MEDICAL CENTER | Age: 58
End: 2023-03-21
Attending: PHYSICIAN ASSISTANT
Payer: COMMERCIAL

## 2023-03-21 DIAGNOSIS — R11.2 NAUSEA AND VOMITING, UNSPECIFIED VOMITING TYPE: ICD-10-CM

## 2023-03-21 DIAGNOSIS — R19.4 CHANGE IN BOWEL HABITS: ICD-10-CM

## 2023-03-21 DIAGNOSIS — K21.9 GASTROESOPHAGEAL REFLUX DISEASE WITHOUT ESOPHAGITIS: ICD-10-CM

## 2023-03-21 DIAGNOSIS — R12 HEARTBURN: ICD-10-CM

## 2023-03-21 PROCEDURE — A9541 TC99M SULFUR COLLOID: HCPCS

## 2023-03-25 ENCOUNTER — HOSPITAL ENCOUNTER (EMERGENCY)
Facility: MEDICAL CENTER | Age: 58
End: 2023-03-25
Attending: EMERGENCY MEDICINE
Payer: COMMERCIAL

## 2023-03-25 VITALS
BODY MASS INDEX: 33.74 KG/M2 | HEART RATE: 70 BPM | DIASTOLIC BLOOD PRESSURE: 81 MMHG | TEMPERATURE: 97.5 F | WEIGHT: 178.57 LBS | RESPIRATION RATE: 16 BRPM | SYSTOLIC BLOOD PRESSURE: 181 MMHG | OXYGEN SATURATION: 96 %

## 2023-03-25 DIAGNOSIS — G43.909 MIGRAINE WITHOUT STATUS MIGRAINOSUS, NOT INTRACTABLE, UNSPECIFIED MIGRAINE TYPE: ICD-10-CM

## 2023-03-25 DIAGNOSIS — I10 HYPERTENSION, UNSPECIFIED TYPE: ICD-10-CM

## 2023-03-25 DIAGNOSIS — I10 PRIMARY HYPERTENSION: ICD-10-CM

## 2023-03-25 PROCEDURE — 96374 THER/PROPH/DIAG INJ IV PUSH: CPT

## 2023-03-25 PROCEDURE — 99284 EMERGENCY DEPT VISIT MOD MDM: CPT

## 2023-03-25 PROCEDURE — 96375 TX/PRO/DX INJ NEW DRUG ADDON: CPT

## 2023-03-25 PROCEDURE — 700111 HCHG RX REV CODE 636 W/ 250 OVERRIDE (IP): Performed by: EMERGENCY MEDICINE

## 2023-03-25 PROCEDURE — 700105 HCHG RX REV CODE 258: Performed by: EMERGENCY MEDICINE

## 2023-03-25 RX ORDER — HYDROCHLOROTHIAZIDE 12.5 MG/1
25 TABLET ORAL DAILY
Qty: 90 TABLET | Refills: 1 | Status: SHIPPED | OUTPATIENT
Start: 2023-03-25 | End: 2023-12-01

## 2023-03-25 RX ORDER — DIPHENHYDRAMINE HYDROCHLORIDE 50 MG/ML
25 INJECTION INTRAMUSCULAR; INTRAVENOUS ONCE
Status: COMPLETED | OUTPATIENT
Start: 2023-03-25 | End: 2023-03-25

## 2023-03-25 RX ORDER — KETOROLAC TROMETHAMINE 30 MG/ML
15 INJECTION, SOLUTION INTRAMUSCULAR; INTRAVENOUS ONCE
Status: COMPLETED | OUTPATIENT
Start: 2023-03-25 | End: 2023-03-25

## 2023-03-25 RX ORDER — SUMATRIPTAN 20 MG/1
1 SPRAY NASAL PRN
Qty: 1 EACH | Refills: 3 | Status: SHIPPED | OUTPATIENT
Start: 2023-03-25 | End: 2023-12-01

## 2023-03-25 RX ORDER — SODIUM CHLORIDE 9 MG/ML
1000 INJECTION, SOLUTION INTRAVENOUS ONCE
Status: COMPLETED | OUTPATIENT
Start: 2023-03-25 | End: 2023-03-25

## 2023-03-25 RX ORDER — KETOROLAC TROMETHAMINE 30 MG/ML
15 INJECTION, SOLUTION INTRAMUSCULAR; INTRAVENOUS ONCE
Status: DISCONTINUED | OUTPATIENT
Start: 2023-03-25 | End: 2023-03-25

## 2023-03-25 RX ORDER — METOCLOPRAMIDE HYDROCHLORIDE 5 MG/ML
10 INJECTION INTRAMUSCULAR; INTRAVENOUS ONCE
Status: COMPLETED | OUTPATIENT
Start: 2023-03-25 | End: 2023-03-25

## 2023-03-25 RX ADMIN — SODIUM CHLORIDE 1000 ML: 9 INJECTION, SOLUTION INTRAVENOUS at 09:51

## 2023-03-25 RX ADMIN — METOCLOPRAMIDE 10 MG: 5 INJECTION, SOLUTION INTRAMUSCULAR; INTRAVENOUS at 09:58

## 2023-03-25 RX ADMIN — DIPHENHYDRAMINE HYDROCHLORIDE 25 MG: 50 INJECTION, SOLUTION INTRAMUSCULAR; INTRAVENOUS at 09:57

## 2023-03-25 RX ADMIN — KETOROLAC TROMETHAMINE 15 MG: 30 INJECTION, SOLUTION INTRAMUSCULAR at 09:57

## 2023-03-25 ASSESSMENT — FIBROSIS 4 INDEX: FIB4 SCORE: 0.98

## 2023-03-25 NOTE — ED NOTES
Pt is also photosensitive. Lights turned off. Rails of gurney up and locked. Call light within reach.  MD at bedside.

## 2023-03-25 NOTE — DISCHARGE INSTRUCTIONS
I have prescribed you the nasal spray Imitrex, you can take this as needed for your headache.  Your blood pressure is very elevated, I have changed your hydrochlorothiazide dose from 12.5 to 25.  So you can take 2 of these pills twice daily.  Talk to your primary care physician about possibly changing her clonidine, this medication can sometimes cause very large swings in your blood pressure and may be contributing to your headaches.

## 2023-03-25 NOTE — ED NOTES
Verbalized relief of head ache. Now rates as 2/10. No acute changes noted. Neuro remains intact. No chest pain/sob/vision changes reported.

## 2023-03-25 NOTE — ED PROVIDER NOTES
ED Provider Note    CHIEF COMPLAINT  Chief Complaint   Patient presents with    Migraine     Pt c/o headache/migraine x 2 days     Hypertension       EXTERNAL RECORDS REVIEWED  Prior head CT, prior MRI, prior ER visits mostly in October with complaints of headache and high blood pressure.    HPI/ROS  LIMITATION TO HISTORY   none      Sonya Wei is a 57 y.o. female who presents with chief complaint of headache and high blood pressure.  Patient history of diabetes, hypertension and chronic headaches.  Patient reports that she would often get headaches in the setting of her elevated blood pressure.  Per EHR review patient has been seen for this in the past, she has had a CT head in October 2022 which was unremarkable, she also has an MRI in 2021 of her brain which failed to reveal any acute pathology, outside of some findings of some chronic ischemic changes.  Patient reports headache is identical to prior although this is slightly more localized on the left.  She reports it is pulsating.  Patient denies any change in her vision.  She does report associated photophobia and phonophobia.  She reports very mild nausea but has not vomited.  Patient denies any associated neck pain or neck stiffness.  She denies exertional chest pain or shortness of breath.  Patient denies any history abdominal pain.  She has no other complaints at this point.  She denies any associated focal weakness or numbness.    PAST MEDICAL HISTORY   has a past medical history of Anemia, Bowel habit changes, Diabetes, Diabetes (HCC), GERD (gastroesophageal reflux disease), Healthcare maintenance (9/27/2014), High cholesterol, Hyperlipidemia, Hypertension, Infectious disease, Jaundice (1999), Psychiatric problem, and Vaginal itching (8/27/2014).    SURGICAL HISTORY   has a past surgical history that includes cholecystectomy; primary c section; tubal coagulation laparoscopic bilateral; abdominal hysterectomy total; and ventral hernia repair  robotic xi (N/A, 10/14/2016).    FAMILY HISTORY  Family History   Problem Relation Age of Onset    Diabetes Mother     Hyperlipidemia Mother     Diabetes Father     Hypertension Father     Hyperlipidemia Father        SOCIAL HISTORY  Social History     Tobacco Use    Smoking status: Never    Smokeless tobacco: Never   Vaping Use    Vaping Use: Never used   Substance and Sexual Activity    Alcohol use: No    Drug use: No    Sexual activity: Yes     Partners: Male     Birth control/protection: None       CURRENT MEDICATIONS  Home Medications       Reviewed by Tash Saez R.N. (Registered Nurse) on 03/25/23 at 0857  Med List Status: <None>     Medication Last Dose Status   albuterol 108 (90 Base) MCG/ACT Aero Soln inhalation aerosol  Active   Ascorbic Acid (VITAMIN C) 1000 MG Tab  Active   ASPIRIN 81 PO  Active   atorvastatin (LIPITOR) 20 MG Tab  Active   benzonatate (TESSALON) 200 MG capsule  Active   Cholecalciferol (VITAMIN D PO)  Active   cloNIDine (CATAPRES) 0.1 MG Tab  Active   Cyanocobalamin (VITAMIN B-12 PO)  Active   Dulaglutide (TRULICITY) 1.5 MG/0.5ML Solution Pen-injector  Active   escitalopram (LEXAPRO) 10 MG Tab  Active   fluticasone-salmeterol (ADVAIR) 100-50 MCG/ACT AEROSOL POWDER, BREATH ACTIVATED  Active   gabapentin (NEURONTIN) 100 MG Cap  Active   glyBURIDE (DIABETA) 5 MG Tab  Active   hydroCHLOROthiazide (HYDRODIURIL) 12.5 MG tablet  Active   hydrOXYzine HCl (ATARAX) 25 MG Tab  Active   metFORMIN (GLUCOPHAGE) 500 MG Tab  Active   metoprolol SR (TOPROL XL) 50 MG TABLET SR 24 HR  Active   Misc. Devices Misc  Active   Multiple Vitamin (MULTI VITAMIN DAILY) Tab  Active   omeprazole (PRILOSEC) 20 MG delayed-release capsule  Active   SITagliptin (JANUVIA) 100 MG Tab  Active   sumatriptan (IMITREX) 100 MG tablet  Active   tizanidine (ZANAFLEX) 4 MG Tab  Active   topiramate (TOPAMAX) 50 MG tablet  Active                    ALLERGIES  Allergies   Allergen Reactions    Morphine Itching     Rxn = unknown    Bumps all over body    Other Misc Rash     Rash from gloves at work       PHYSICAL EXAM  VITAL SIGNS: BP (!) 187/81   Pulse 72   Temp 36.2 °C (97.1 °F) (Temporal)   Resp 16   Wt 81 kg (178 lb 9.2 oz)   SpO2 97%   BMI 33.74 kg/m²    Physical Exam  Constitutional:       Appearance: She is well-developed.   HENT:      Head: Normocephalic and atraumatic.      Comments: No associated tenderness of the temporal artery  Eyes:      Extraocular Movements: Extraocular movements intact.      Conjunctiva/sclera: Conjunctivae normal.      Pupils: Pupils are equal, round, and reactive to light.   Cardiovascular:      Rate and Rhythm: Normal rate and regular rhythm.   Pulmonary:      Effort: Pulmonary effort is normal. No accessory muscle usage or respiratory distress.      Breath sounds: Normal breath sounds.   Abdominal:      General: Bowel sounds are normal.      Palpations: Abdomen is soft. There is no mass.      Tenderness: There is no abdominal tenderness.   Musculoskeletal:         General: Normal range of motion.   Skin:     General: Skin is warm.      Capillary Refill: Capillary refill takes less than 2 seconds.   Neurological:      General: No focal deficit present.      Mental Status: She is alert and oriented to person, place, and time.      Comments: Distal and proximal strength 5/5 in upper and lower extremities. Normal gait. No dysmetria. No sensation deficits. No visual field deficits. Cranial nerves intact.        Psychiatric:         Mood and Affect: Mood normal. Mood is not anxious.         DIAGNOSTIC STUDIES / PROCEDURES      COURSE & MEDICAL DECISION MAKING      INITIAL ASSESSMENT, COURSE AND PLAN  Care Narrative: Patient here with recurrent headache.  She has a pulsatile headache, she has photophobia and phonophobia, she has mild associated nausea, it is unilateral all these features are highly typical of migraine headache.  She has no associated neck pain or neck stiffness.  Her exam is otherwise  unremarkable with a normal neurologic exam.  Patient very well-appearing otherwise.  Patient will be treated with migraine cocktail.  I believe patient's hypertension could be caused by patient's migraine.  She reports her headache has resolved following the migraine cocktail.  She is requesting discharge home.  Patient's neurologic exam remains unremarkable and unchanged.  Patient does have some mild hypertension that is ongoing here.  I have asked her to increase her hydrochlorothiazide and discuss with her primary care physician about potentially changing her off the clonidine as I believe that this is likely causing nadirs and spikes in her pressure.  Patient without any associated chest pain, abdominal pain, urinary symptoms or any other symptoms outside of her presenting headache which is now resolved to suggest endorgan failure.  Patient otherwise appears very well.      ADDITIONAL PROBLEM LIST  Diabetes, hypertension  DISPOSITION AND DISCUSSIONS    Escalation of care considered, and ultimately not performed: Further work-up was deferred as patient's headache entirely resolved, my suspicion of SAH or intracranial mass was very low, therefore imaging was deferred.  Given patient's lack of any other symptoms and longstanding history of hypertension emergent evaluation with basic labs was deferred, patient will follow-up primary care physician.      FINAL DIAGNOSIS  1. Migraine without status migrainosus, not intractable, unspecified migraine type    2. Hypertension, unspecified type    3. Primary hypertension

## 2023-03-25 NOTE — ED NOTES
Neuro intact. Has frontal head ache rates as 7/10. Also reports nasal congestion and pain behind ear. Denies chest pain/sob/blurred vision. Call light within reach. Instructed to call staff for assistance.

## 2023-03-25 NOTE — ED TRIAGE NOTES
Brought back in the room by ed tech. Noted ambulated w/steady gait. Pt changing into a hospital gown.

## 2023-03-25 NOTE — ED TRIAGE NOTES
Pt to triage .  Chief Complaint   Patient presents with    Migraine     Pt c/o headache/migraine x 2 days     Hypertension

## 2023-03-31 ENCOUNTER — OFFICE VISIT (OUTPATIENT)
Dept: MEDICAL GROUP | Facility: PHYSICIAN GROUP | Age: 58
End: 2023-03-31
Payer: COMMERCIAL

## 2023-03-31 VITALS
RESPIRATION RATE: 16 BRPM | HEIGHT: 61 IN | DIASTOLIC BLOOD PRESSURE: 88 MMHG | BODY MASS INDEX: 34.17 KG/M2 | OXYGEN SATURATION: 99 % | SYSTOLIC BLOOD PRESSURE: 140 MMHG | HEART RATE: 74 BPM | TEMPERATURE: 97.5 F | WEIGHT: 181 LBS

## 2023-03-31 DIAGNOSIS — G43.901 STATUS MIGRAINOSUS: ICD-10-CM

## 2023-03-31 DIAGNOSIS — U09.9 POST-COVID CHRONIC HEADACHE: ICD-10-CM

## 2023-03-31 DIAGNOSIS — I10 PRIMARY HYPERTENSION: ICD-10-CM

## 2023-03-31 DIAGNOSIS — R51.9 POST-COVID CHRONIC HEADACHE: ICD-10-CM

## 2023-03-31 DIAGNOSIS — G89.29 POST-COVID CHRONIC HEADACHE: ICD-10-CM

## 2023-03-31 PROCEDURE — 99214 OFFICE O/P EST MOD 30 MIN: CPT | Performed by: NURSE PRACTITIONER

## 2023-03-31 RX ORDER — KETOROLAC TROMETHAMINE 30 MG/ML
30 INJECTION, SOLUTION INTRAMUSCULAR; INTRAVENOUS ONCE
Status: COMPLETED | OUTPATIENT
Start: 2023-03-31 | End: 2023-03-31

## 2023-03-31 RX ORDER — LOSARTAN POTASSIUM 25 MG/1
25 TABLET ORAL DAILY
Qty: 30 TABLET | Refills: 2 | Status: SHIPPED | OUTPATIENT
Start: 2023-03-31 | End: 2023-06-20

## 2023-03-31 RX ADMIN — KETOROLAC TROMETHAMINE 30 MG: 30 INJECTION, SOLUTION INTRAMUSCULAR; INTRAVENOUS at 10:41

## 2023-03-31 ASSESSMENT — PATIENT HEALTH QUESTIONNAIRE - PHQ9: CLINICAL INTERPRETATION OF PHQ2 SCORE: 0

## 2023-03-31 ASSESSMENT — FIBROSIS 4 INDEX: FIB4 SCORE: 0.98

## 2023-03-31 NOTE — PROGRESS NOTES
CC: ER follow-up for migraine and hypertension, migraine today    HISTORY OF THE PRESENT ILLNESS: Patient is a 57 y.o. female. This pleasant patient is here today for evaluation and management of the following health problems.      Hypertension  Patient was evaluated at [facility] on 3/25/2023 for migraine and hypertension.  Was seen frequently at the ER in 10/2023 for headaches and hypertension.  Physical exam and previous imaging unremarkable.  She was treated with a migraine cocktail.  Blood pressure and migraine seemed to improve after migraine cocktail.  Uncertain if migraine is causing elevated blood pressure or elevated blood pressure causing migraine.  ER provider increased patient's hydrochlorothiazide to 25 mg daily.  Also taking metoprolol XL 50 mg daily.  Does take clonidine 0.1 mg as needed for blood pressures greater than 160/90.  Patient reports she rarely takes clonidine.  ER provider recommended she no longer take it as it may be contributing to her migraines.  Patient is at risk of poor historian and difficult to correlate if migraines are occurring after she takes clonidine.  Today patient presents with ongoing migraine for the last 24 hours, not relieved with Imitrex yesterday.  Blood pressure is mildly elevated at 160/80 and 30 minutes later at 140/88.  Describes migraine on left-sided head and associated with light sensitivity.  Patient is established with neurology for migraines.  She is due for follow-up.  Taking topiramate 50 mg twice daily.  Patient was to start Botox injections, but she had not heard back about insurance approval or next steps.  Currently denies one-sided muscle weakness, vision changes, slurred speech.    Post-COVID chronic headache  Please see additional notes under hypertension.    Allergies: Morphine and Other misc    Current Outpatient Medications Ordered in Epic   Medication Sig Dispense Refill    losartan (COZAAR) 25 MG Tab Take 1 Tablet by mouth every day. 30 Tablet  2    sumatriptan (IMITREX) 20 MG/ACT nasal spray Administer 1 Spray into affected nostril(S) as needed for Migraine. 1 Each 3    hydroCHLOROthiazide (HYDRODIURIL) 12.5 MG tablet Take 2 Tablets by mouth every day. 90 Tablet 1    Dulaglutide (TRULICITY) 1.5 MG/0.5ML Solution Pen-injector INJECT 1 DOSE SUBCUTANEOUSLY ONCE A WEEK 12 mL 3    glyBURIDE (DIABETA) 5 MG Tab TAKE 2 TABLETS BY MOUTH TWICE DAILY WITH MEALS 120 Tablet 3    metFORMIN (GLUCOPHAGE) 500 MG Tab TAKE 2 TABLETS BY MOUTH TWICE DAILY WITH MEALS 360 Tablet 3    albuterol 108 (90 Base) MCG/ACT Aero Soln inhalation aerosol Inhale 2 Puffs every 6 hours as needed for Shortness of Breath. 8.5 g 2    fluticasone-salmeterol (ADVAIR) 100-50 MCG/ACT AEROSOL POWDER, BREATH ACTIVATED Inhale 1 Puff every 12 hours. 60 Each 2    omeprazole (PRILOSEC) 20 MG delayed-release capsule Take 1 Capsule by mouth every day. 90 Capsule 3    metoprolol SR (TOPROL XL) 50 MG TABLET SR 24 HR Take 1 Tablet by mouth every day. 90 Tablet 1    cloNIDine (CATAPRES) 0.1 MG Tab Take one tablet up to once a day ONLY for blood pressure greater than 160/90 30 Tablet 1    topiramate (TOPAMAX) 50 MG tablet Take 1 Tablet by mouth 2 times a day.      Misc. Devices Misc Please dispense one blood pressure cuff 1 Each 0    escitalopram (LEXAPRO) 10 MG Tab Take 1 tablet by mouth once daily 90 Tablet 0    benzonatate (TESSALON) 200 MG capsule 1 CAP BY MOUTH UP TO 3 TIMES A DAY ONLY IF NEEDED FOR COUGH. 30 Capsule 0    gabapentin (NEURONTIN) 100 MG Cap Take 1 Capsule by mouth at bedtime as needed (insomnia). 90 Capsule 0    atorvastatin (LIPITOR) 20 MG Tab Take 1 Tablet by mouth every day. 90 Tablet 3    Cyanocobalamin (VITAMIN B-12 PO) Take 1 Tablet by mouth every day.      ASPIRIN 81 PO Take 81 mg by mouth every day.      tizanidine (ZANAFLEX) 4 MG Tab Take 0.5-1 Tablets by mouth at bedtime as needed (muscle spasms). 30 Tablet 2    Multiple Vitamin (MULTI VITAMIN DAILY) Tab Take  by mouth.       "hydrOXYzine HCl (ATARAX) 25 MG Tab Take 0.5-1 Tablets by mouth 3 times a day as needed for Anxiety. 30 Tablet 0    SITagliptin (JANUVIA) 100 MG Tab Take 1 Tablet by mouth every day. 90 Tablet 1    Ascorbic Acid (VITAMIN C) 1000 MG Tab Take 1 Tablet by mouth every morning.      Cholecalciferol (VITAMIN D PO) Take 2 Tablets by mouth every day. GUMMIES       No current Epic-ordered facility-administered medications on file.       Past Medical History:   Diagnosis Date    Anemia     Bowel habit changes     constipation    Diabetes     Diabetes (HCC)     GERD (gastroesophageal reflux disease)     Healthcare maintenance 9/27/2014    Mammogram: Due    High cholesterol     Hyperlipidemia     Hypertension     Infectious disease     Cold 10/2016    Jaundice 1999    Psychiatric problem     depression and anxiety    Vaginal itching 8/27/2014    With anal itching X 1 month Getting worse Burning Min vag disch       Past Surgical History:   Procedure Laterality Date    VENTRAL HERNIA REPAIR ROBOTIC XI N/A 10/14/2016    Procedure: VENTRAL HERNIA REPAIR ROBOTIC XI FOR INCISIONAL W/MESH;  Surgeon: Edi Mendoza M.D.;  Location: SURGERY [facility];  Service:     ABDOMINAL HYSTERECTOMY TOTAL      still has ovaries    CHOLECYSTECTOMY      PRIMARY C SECTION      TUBAL COAGULATION LAPAROSCOPIC BILATERAL         Social History     Tobacco Use    Smoking status: Never    Smokeless tobacco: Never   Vaping Use    Vaping Use: Never used   Substance Use Topics    Alcohol use: No    Drug use: No       Family History   Problem Relation Age of Onset    Diabetes Mother     Hyperlipidemia Mother     Diabetes Father     Hypertension Father     Hyperlipidemia Father        ROS: As in HPI, otherwise negative for chest pain, dyspnea, abdominal pain, dysuria, blood in stool, fever         Exam: BP (!) 140/88   Pulse 74   Temp 36.4 °C (97.5 °F) (Temporal)   Resp 16   Ht 1.549 m (5' 1\")   Wt 82.1 kg (181 lb)   SpO2 99%  Body mass index is 34.2 " kg/m².    General: Alert, pleasant, well nourished, well developed female in NAD  HEENT: Normocephalic. Eyes conjunctiva clear lids without ptosis   Neck: Supple without bruit. Thyroid is not enlarged.  Pulmonary: Clear to ausculation.  Normal effort. No rales, ronchi, or wheezing.  Cardiovascular: Normal rate and rhythm without murmur.   Neuro: CN 2-12 tested and grossly intact, strength testing in upper and lower extremities is 5/5 and equal bilaterally, Gross motor movement intact in all 4 extremities, Gross sensation intact to extremities and trunk, Gait grossly normal and not antalgic,Lymph: No cervical or supraclavicular lymph nodes are palpable  Skin: Warm and dry.    Musculoskeletal: Normal gait.   Psych: Normal mood and affect. Alert and oriented. Judgment and insight is normal.    Diabetic Foot Exam: No ulcers or skin lesions present, patient tested with a 10 g force and is sensitive bilaterally throughout the ball of the foot, great toe and heel.  Dorsalis pedis and posterior tibial pulses are present and intact bilaterally      Please note that this dictation was created using voice recognition software. I have made every reasonable attempt to correct obvious errors, but I expect that there are errors of grammar and possibly content that I did not discover before finalizing the note.      Assessment/Plan  1. Primary hypertension    - losartan (COZAAR) 25 MG Tab; Take 1 Tablet by mouth every day.  Dispense: 30 Tablet; Refill: 2    2. Status migrainosus    - ketorolac (TORADOL) injection 30 mg    3. Post-COVID chronic headache      Shared clinical decision making: ER notes reviewed.  Based on patient's symptoms today as she has a migraine, but blood pressure is only mildly elevated, not certain if migraine is causing hypertension or hypertension causing migraine or even if they are related.  We will go ahead and treat hypertension by adding losartan 25 mg daily.  Continue with metoprolol and  hydrochlorothiazide at current doses.  Advised on lifestyle measures including routine aerobic exercise as tolerated, DASH diet, weight loss.  Migraine headache treated with Toradol injection today.  Patient tolerated injection.  Patient instructed to reach out to neurology regarding next steps for Botox injections, and if not covered by insurance she will return to neurology for an appointment.  ER precautions reviewed with patient.  No abnormalities on foot exam today.    Call Pharmacist at [facility] for diabetes:  We talked about it at your last appointment with me and thought it was a good idea to establish with them.  Please give them a call to schedule ([phone]).    Have your labs done before seeing them.    Call Neurology for follow-up    Adding losartan for blood pressure.  Continue with metoprolol and hydrochlorathiazide.

## 2023-03-31 NOTE — PATIENT INSTRUCTIONS
Call Pharmacist at [facility] for diabetes:  We talked about it at your last appointment with me and thought it was a good idea to establish with them.  Please give them a call to schedule ([phone]).    Have your labs done before seeing them.    Call Neurology for follow-up

## 2023-04-03 PROBLEM — G89.29 POST-COVID CHRONIC HEADACHE: Status: ACTIVE | Noted: 2021-02-19

## 2023-04-03 PROBLEM — U09.9 POST-COVID CHRONIC HEADACHE: Status: ACTIVE | Noted: 2021-02-19

## 2023-04-03 NOTE — ASSESSMENT & PLAN NOTE
Patient was evaluated at [facility] on 3/25/2023 for migraine and hypertension.  Was seen frequently at the ER in 10/2023 for headaches and hypertension.  Physical exam and previous imaging unremarkable.  She was treated with a migraine cocktail.  Blood pressure and migraine seemed to improve after migraine cocktail.  Uncertain if migraine is causing elevated blood pressure or elevated blood pressure causing migraine.  ER provider increased patient's hydrochlorothiazide to 25 mg daily.  Also taking metoprolol XL 50 mg daily.  Does take clonidine 0.1 mg as needed for blood pressures greater than 160/90.  Patient reports she rarely takes clonidine.  ER provider recommended she no longer take it as it may be contributing to her migraines.  Patient is at risk of poor historian and difficult to correlate if migraines are occurring after she takes clonidine.  Today patient presents with ongoing migraine for the last 24 hours, not relieved with Imitrex yesterday.  Blood pressure is mildly elevated at 160/80 and 30 minutes later at 140/88.  Describes migraine on left-sided head and associated with light sensitivity.  Patient is established with neurology for migraines.  She is due for follow-up.  Taking topiramate 50 mg twice daily.  Patient was to start Botox injections, but she had not heard back about insurance approval or next steps.  Currently denies one-sided muscle weakness, vision changes, slurred speech.

## 2023-04-10 ENCOUNTER — OFFICE VISIT (OUTPATIENT)
Dept: NEUROLOGY | Facility: MEDICAL CENTER | Age: 58
End: 2023-04-10
Attending: PSYCHIATRY & NEUROLOGY
Payer: COMMERCIAL

## 2023-04-10 VITALS
RESPIRATION RATE: 16 BRPM | OXYGEN SATURATION: 99 % | WEIGHT: 176.37 LBS | TEMPERATURE: 97.3 F | BODY MASS INDEX: 33.3 KG/M2 | DIASTOLIC BLOOD PRESSURE: 76 MMHG | HEART RATE: 77 BPM | SYSTOLIC BLOOD PRESSURE: 138 MMHG | HEIGHT: 61 IN

## 2023-04-10 DIAGNOSIS — G43.709 CHRONIC MIGRAINE WITHOUT AURA WITHOUT STATUS MIGRAINOSUS, NOT INTRACTABLE: Primary | ICD-10-CM

## 2023-04-10 PROCEDURE — 64615 CHEMODENERV MUSC MIGRAINE: CPT | Performed by: PSYCHIATRY & NEUROLOGY

## 2023-04-10 PROCEDURE — 700111 HCHG RX REV CODE 636 W/ 250 OVERRIDE (IP): Performed by: PSYCHIATRY & NEUROLOGY

## 2023-04-10 PROCEDURE — 700101 HCHG RX REV CODE 250: Performed by: PSYCHIATRY & NEUROLOGY

## 2023-04-10 PROCEDURE — 99212 OFFICE O/P EST SF 10 MIN: CPT | Performed by: PSYCHIATRY & NEUROLOGY

## 2023-04-10 RX ADMIN — SODIUM CHLORIDE 155 UNITS: 9 INJECTION, SOLUTION INTRAMUSCULAR; INTRAVENOUS; SUBCUTANEOUS at 08:39

## 2023-04-10 ASSESSMENT — FIBROSIS 4 INDEX: FIB4 SCORE: 0.98

## 2023-04-10 NOTE — PROGRESS NOTES
"[facility] NEUROLOGY  FOLLOW-UP VISIT    CC: headaches s/p COVID-19 Infection, likely migraine    INTERVAL HISTORY:  Sonya Wei is a 57 y.o. woman with worsened headaches following COVID-19 infection.  I last saw her in the clinic on 11/18/2022.  At that time I recommended she start Botox.  Today, she was unaccompanied, and she provided the following interval history:    The following is a summary of headache symptoms, presented in my standard format:    Family History: mom had migraines related to \"brain tumors\" she had headaches earlier in life  Age at onset: teenage years  Location: bi-temporal, occipital  Radiation: both proximal upper extremities  Frequency: baseline: ~twice daily, currently: daily  Duration: baseline: 2-3 hours, currently: 4 hours  Headache Days/Month: baseline: 30/30, currently: 30/30  Quality: \"like someone trying to stab her in the ear,\" \"pressure\"  Intensity: baseline: 6-8/10, currently: 7-8/10  Aura: none  Photophobia/Phonophobia/Nausea/Vomiting: yes/yes/yes/sometimes  Provoked by Physical Activity?:   Triggers: loud noises at work  Associated Symptoms: \"vertigo\"  Autonomic Signs (such as ptosis, miosis, conjunctival injection, rhinorrhea, increased lacrimation): none  Head Trauma:   Association with Menses: menopausal  ED Visits: yes  Hospitalizations: yes  Missed Work Days: works at [employer]  Sleep: up to 8 hours/night  Caffeine Intake: none  Hydration: keeps well-hydrated  Nutrition: eats regular meals  Analgesic Overuse:      Current Medication Regimen:  - topiramate: 50 mg BID: probably helpful, but incompletely effective  - naproxen  - sumatriptan 20 mg nasal spray: works faster than the tablets     Medications Tried: Response  Preventive:  - amitriptyline: 25 mg was very effective, caused excessive sedation  - metoprolol: didn't help headaches  - topiramate 50 mg daily: sub-optimally effective    Abortive:  - sumatriptan: 100 mg is helpful, she doesn't have to " re-dose    Medications Not Tried:  - nortriptyline: will avoid due to med-med interaction with escitalopram    MEDICATIONS:  Current Outpatient Medications   Medication Sig    losartan (COZAAR) 25 MG Tab Take 1 Tablet by mouth every day.    sumatriptan (IMITREX) 20 MG/ACT nasal spray Administer 1 Spray into affected nostril(S) as needed for Migraine.    hydroCHLOROthiazide (HYDRODIURIL) 12.5 MG tablet Take 2 Tablets by mouth every day.    Dulaglutide (TRULICITY) 1.5 MG/0.5ML Solution Pen-injector INJECT 1 DOSE SUBCUTANEOUSLY ONCE A WEEK    glyBURIDE (DIABETA) 5 MG Tab TAKE 2 TABLETS BY MOUTH TWICE DAILY WITH MEALS    metFORMIN (GLUCOPHAGE) 500 MG Tab TAKE 2 TABLETS BY MOUTH TWICE DAILY WITH MEALS    albuterol 108 (90 Base) MCG/ACT Aero Soln inhalation aerosol Inhale 2 Puffs every 6 hours as needed for Shortness of Breath.    fluticasone-salmeterol (ADVAIR) 100-50 MCG/ACT AEROSOL POWDER, BREATH ACTIVATED Inhale 1 Puff every 12 hours.    omeprazole (PRILOSEC) 20 MG delayed-release capsule Take 1 Capsule by mouth every day.    metoprolol SR (TOPROL XL) 50 MG TABLET SR 24 HR Take 1 Tablet by mouth every day.    cloNIDine (CATAPRES) 0.1 MG Tab Take one tablet up to once a day ONLY for blood pressure greater than 160/90    topiramate (TOPAMAX) 50 MG tablet Take 1 Tablet by mouth 2 times a day.    Misc. Devices Misc Please dispense one blood pressure cuff    escitalopram (LEXAPRO) 10 MG Tab Take 1 tablet by mouth once daily    gabapentin (NEURONTIN) 100 MG Cap Take 1 Capsule by mouth at bedtime as needed (insomnia).    atorvastatin (LIPITOR) 20 MG Tab Take 1 Tablet by mouth every day.    Cyanocobalamin (VITAMIN B-12 PO) Take 1 Tablet by mouth every day.    ASPIRIN 81 PO Take 81 mg by mouth every day.    tizanidine (ZANAFLEX) 4 MG Tab Take 0.5-1 Tablets by mouth at bedtime as needed (muscle spasms).    Multiple Vitamin (MULTI VITAMIN DAILY) Tab Take  by mouth.    hydrOXYzine HCl (ATARAX) 25 MG Tab Take 0.5-1 Tablets by  mouth 3 times a day as needed for Anxiety.    SITagliptin (JANUVIA) 100 MG Tab Take 1 Tablet by mouth every day.    Ascorbic Acid (VITAMIN C) 1000 MG Tab Take 1 Tablet by mouth every morning.    Cholecalciferol (VITAMIN D PO) Take 2 Tablets by mouth every day. GUMMIES     MEDICAL, SOCIAL, AND FAMILY HISTORY:  There is no change in the patient's ROS or PFSH from their previous visit on 11/18/2022.    REVIEW OF SYSTEMS:  A ROS was completed.  Pertinent positives and negatives were included in the HPI, above.  All other systems were reviewed and are negative.    PHYSICAL EXAM:  General/Medical:  - NAD    Neuro:  MENTAL STATUS: awake and alert; no deficits of speech or language; oriented to conversation; affect was appropriate to situation; pleasant, cooperative    CRANIAL NERVES:    II: acuity: NT, fields: NT, pupils: NT, discs: NT    III/IV/VI: versions: grossly intact    V: facial sensation: NT    VII: facial expression: symmetric    VIII: hearing: intact to voice    IX/X: palate: NT    XI: shoulder shrug: NT    XII: tongue: NT    MOTOR:  - bulk: NT  - tone: NT  Upper Extremity Strength  (R/L)    NT   Elbow flexion NT   Elbow extension NT   Shoulder abduction NT     Lower Extremity Strength  (R/L)   Hip flexion NT   Knee extension NT   Knee flexion NT   Ankle plantarflexion NT   Ankle dorsiflexion NT     - pronator drift: NT  - abnormal movements: none    SENSATION:  - light touch: NT  - vibration (R/L, seconds): NT at the great toes  - pinprick: NT  - proprioception: NT  - Romberg: absent    COORDINATION:  - finger to nose: NT  - finger tapping: NT    REFLEXES:  Reflex Right Left   BR NT NT   Biceps NT NT   Triceps NT NT   Patellae NT NT   Achilles NT NT   Toes NT NT     GAIT:  - narrow base, normal    REVIEW OF LABORATORY STUDIES:  10/25/2022:  - CBC w/ diff: notable for mild anemia  - CMP: unremarkable    ASSESSMENT:  Sonya Wei is a 56 y.o. woman with chronic migraine and a history otherwise notable  for HTN, HLD, T2DM, and MDD.  Her headaches remain poorly controlled.  I had recommended Botox at the last visit, but she never received this.  I administered a sample today.  Plans/recommenadtions as follows:    PLAN:  Migraine Headaches s/p COVID-19:  Prevention:  - start Botox: plan to inject 155 units according to the dosing/injection paradigm currently mandated by the FDA for chronic migraine as follows:  - 10 units of BOTOX divided into 2 sites into the   - 5 units into the Procerus  - 20 units of Botox divided into 4 units into the Frontalis  - 40 units of Botox divided into 8 sites (4 sites to the right Temporalis and 4 sites to the left Temporalis)  - 30 units divided into 6 units (3 units to the right Occipitalis and 3 units to left Occipitalis)  - 20 units divided into 4 units (2 units to the right Cervical paraspinals, 2 units to the left Cervical paraspinals)  - 30 units of Botox divided into 6 units (3 units to right trapezius, 3 units to the left trapezius).    - sample of Botox administered today    - continue topiramate to 50 mg BID for now  - get ~8 hours of sleep per night  - drink plenty of fluids (urine should be nearly clear)  - avoid excessive caffeine intake (no more than 2 servings per day and nothing in the afternoon)  - eat regular meals (don't skip meals)  - get moderate exercise (even just a 20 minute walk daily)    :  - sumatriptan 20 mg nasal spray: take at the onset of aura/HA; may re-dose x1 after 1 hrs if HA persists; do not use >2 days/week.  - do not use analgesics (e.g., ibuprofen, acetaminophen) more than 2 days per week in order to avoid analgesic rebound headaches    - keep a headache log    Follow-Up:  - Return in about 5 months (around 9/10/2023).    Signed: Sebastian Parikh M.D.    [facility]  BOTOX PROCEDURE NOTE    Chronic Migraine:  Botox therapy has reduced patient’s migraines by more than 7 days and/or 100 hours per month.     I treated Sonya Cervantes  Eriberto in clinic today with BotoxA 155 units according to the dosing/injection paradigm currently mandated by the FDA for the management of chronic migraine.  Specifically, I injected:  - 5 units to the procerus,  - 5 units to the corrugators (bilaterally),  - a total of 20 units to the frontalis musculature,  - 20 units to the temporalis (bilaterally),  - 15 units to the occipitalis (bilaterally),  - 10 units to the cervical paraspinals (bilaterally), and  - 15 units to the trapezius musculature (bilaterally).    The remainder of the Botox was discarded as wastage per FDA guidelines  Consent on file.  Patient identify verified with 2 patient identifiers.     Frequency of headaches is >15 days monthly with at least 8 migraines monthly.    Migraines include at least two of the following: worsened with activity or avoidance of activity with migraines (ie they go lie down), moderate to severe pain intensity, pulsing headache, unilateral headache and has  have either nausea or vomiting OR sensitivity to light and sound.     Although Sonya Wei is responding to botox s/he is NOT migraine free.  I recommend that Botox be continued at this time.    Sonya Wei has chronic migraines, defined as having 15 or more headaches days per month, 8 of which are migraines, over a minimum of the last three months.  Episodes last more than 4 hours (untreated).  Pt has 2 or more of following (see initial note):  - headache worsened with activity  - pain is moderate to severe intensity  - pulsing in nature  - unilateral   and patient either has nausea/vomiting OR sensitivity to light and sound.    No adverse effect of Botox noted at conclusion of today's appointment.    Follow up in 12 weeks for Botox or sooner if needed.    Signed: Sebastian Parikh M.D.

## 2023-05-01 NOTE — DISCHARGE SUMMARY
SUBJECTIVE:   CC: Tammie is an 33 year old who presents for preventive health visit.       Patient has been advised of split billing requirements and indicates understanding: Yes     Healthy Habits:     Getting at least 3 servings of Calcium per day:  Yes    Bi-annual eye exam:  NO    Dental care twice a year:  Yes    Sleep apnea or symptoms of sleep apnea:  None    Diet:  Regular (no restrictions)    Frequency of exercise:  2-3 days/week    Duration of exercise:  30-45 minutes    Taking medications regularly:  Yes    Medication side effects:  Not applicable    PHQ-2 Total Score: 0    Additional concerns today:  No         Today's PHQ-2 Score:       2023    11:33 AM   PHQ-2 (  [employer])   Q1: Little interest or pleasure in doing things 0   Q2: Feeling down, depressed or hopeless 0   PHQ-2 Score 0       Social History     Tobacco Use     Smoking status: Never     Passive exposure: Never     Smokeless tobacco: Never   Vaping Use     Vaping status: Not on file   Substance Use Topics     Alcohol use: Never         2023    11:31 AM   Alcohol Use   Prescreen: >3 drinks/day or >7 drinks/week? Not Applicable     Reviewed orders with patient.  Reviewed health maintenance and updated orders accordingly - Yes  Lab work is in process  Labs reviewed in EPIC  BP Readings from Last 3 Encounters:   23 102/66   22 108/70   22 114/76    Wt Readings from Last 3 Encounters:   23 75.6 kg (166 lb 11.2 oz)   22 72.2 kg (159 lb 3.2 oz)   22 70.8 kg (156 lb)                  Patient Active Problem List   Diagnosis     Pregnancy test positive     Overweight (BMI 25.0-29.9)     Iron deficiency anemia due to chronic blood loss     Pregnancy     Iron deficiency     Iron deficiency anemia, unspecified     Chronic fatigue     Past Surgical History:   Procedure Laterality Date     C/SECTION, LOW TRANSVERSE        SECTION N/A 2021    Procedure: REPEAT  SECTION;  Surgeon: Wesley  Discharge Summary    CHIEF COMPLAINT ON ADMISSION  Chief Complaint   Patient presents with   • Shortness of Breath     Tested positive for COVID last Tues 11/26; tested spO2 at home 77% on RA, 84% in triage. Placed on 3L NC.    • Flu Like Symptoms     C/o HA, chills, nausea, diarrhea, malaise x last week       Reason for Admission  Cough      Admission Date  12/3/2020    CODE STATUS  Full Code    HPI & HOSPITAL COURSE    54 y.o. female who presented 12/3/2020 with complaints of worsening shortness of breath.  Patient was diagnosed with COVID-19 November 24.  Her  is currently admitted to the hospital on the seventh floor with COVID-19.  She complains of recent diarrhea which is now resolved, cough with sputum production, fevers, chills, myalgias.  She currently denies otherwise melena, hematochezia, constipation.      On presentation patient had the following vital signs: 98.0, 86, 16, 154/82, 84% on room air.  She subsequently treated with nasal cannula and had oxygen saturations at 99% on 3 L nasal cannula.  She was also noted to be tachypneic and hypertensive subsequently.     Labs on presentation reveal lymphocytopenia, neutrophilia, thrombocytosis, moderate normocytic anemia.  CMP reveals moderate hyponatremia, hyperglycemia at 439, mild elevation in AST at 63, hypoalbuminemia at 3.1 and otherwise unremarkable.  Urinalysis was performed and revealed concentrated urine with a specific gravity 1.033, glucose greater than 1000 and trace ketones and protein greater than 300.  Microscopic analysis reviews mild pyuria with no bacteria.     Patient in agreement with inpatient admission to medical [facility] floor for COVID-19 pneumonia.  She agrees to full CODE STATUS and agrees with above treatment plan.    Patient is hypoxic requiring oxygen supplementation.  She has been started on the steroids.  Significant hyperglycemia noted.  We have initiated Lantus 5 units nightly.  Patient is advised to continue nightly  Alan Agudelo MD;  Location: [facility] OR       Social History     Tobacco Use     Smoking status: Never     Passive exposure: Never     Smokeless tobacco: Never   Vaping Use     Vaping status: Not on file   Substance Use Topics     Alcohol use: Never     Family History   Problem Relation Age of Onset     No Known Problems Mother      No Known Problems Father      No Known Problems Maternal Grandmother      No Known Problems Maternal Grandfather      No Known Problems Paternal Grandmother      No Known Problems Paternal Grandfather      No Known Problems Brother      No Known Problems Sister      No Known Problems Son      No Known Problems Daughter      No Known Problems Maternal Half-Brother      No Known Problems Maternal Half-Sister      No Known Problems Paternal Half-Brother      No Known Problems Paternal Half-Sister      No Known Problems Niece      No Known Problems Nephew      No Known Problems Cousin      No Known Problems Other      Diabetes No family hx of      Coronary Artery Disease No family hx of      Hypertension No family hx of      Hyperlipidemia No family hx of      Cerebrovascular Disease No family hx of      Breast Cancer No family hx of      Colon Cancer No family hx of      Prostate Cancer No family hx of      Depression No family hx of      Anxiety Disorder No family hx of      Mental Illness No family hx of      Substance Abuse No family hx of      Anesthesia Reaction No family hx of      Asthma No family hx of      Osteoporosis No family hx of      Genetic Disorder No family hx of      Thyroid Disease No family hx of      Obesity No family hx of      Unknown/Adopted No family hx of          Current Outpatient Medications   Medication Sig Dispense Refill     chlorhexidine (PERIDEX) 0.12 % solution          Breast Cancer Screening:  Any new diagnosis of family breast, ovarian, or bowel cancer? No    FHS-7:        View : No data to display.                Patient under 40 years of age:  insulin use while on steroids and to monitor blood sugar closely.  She did have an episode of hypoglycemia, and insulin has been adjusted.    Given significant respiratory improvement, she is cleared for discharge to home to complete a course of oral steroids.    Therefore, she is discharged in good and stable condition to home with close outpatient follow-up.    The patient met 2-midnight criteria for an inpatient stay at the time of discharge.    Discharge Date  12/7    FOLLOW UP ITEMS POST DISCHARGE  Decadron x 8 days  Cont lantus x 8 days, then back to home insulin once completed decadron  F/u with pcp in 1 week    DISCHARGE DIAGNOSES  Principal Problem:    Pneumonia due to COVID-19 virus POA: Yes  Active Problems:    Diabetes mellitus type II, uncontrolled (HCC) POA: Yes    Hyponatremia POA: Yes      Overview: Discussed water restriction. Patient admits to drinking a lot of water.    Hyperlipidemia POA: Yes      Overview: Pravastatin.    Hypertension POA: Yes    Hypoalbuminemia POA: Yes  Resolved Problems:    * No resolved hospital problems. *      FOLLOW UP  Future Appointments   Date Time Provider Department Center   12/23/2020  4:00 PM DECLAN Johnson None     No follow-up provider specified.    MEDICATIONS ON DISCHARGE     Medication List      START taking these medications      Instructions   dexamethasone 6 MG Tabs  Commonly known as: DECADRON   Take 1 Tab by mouth every day for 8 days.  Dose: 6 mg     guaiFENesin dextromethorphan 100-10 MG/5ML Syrp syrup  Commonly known as: ROBITUSSIN DM   Take 10 mL by mouth every 6 hours as needed for Cough.  Dose: 10 mL     insulin glargine 100 UNIT/ML Sopn injection  Generic drug: insulin glargine   Inject 5 Units under the skin every evening.  Dose: 5 Units     Insulin Pen Needle 32 G x 4 mm   Doctor's comments: Per patient/formulary preference. ICD-10 code: E11.65 Uncontrolled type 2 Diabetes Mellitus  Use one pen needle in pen device to inject  "Routine Mammogram Screening not recommended.   Pertinent mammograms are reviewed under the imaging tab.    History of abnormal Pap smear: NO - age 30-65 PAP every 5 years with negative HPV co-testing recommended     Reviewed and updated as needed this visit by clinical staff   Tobacco  Allergies  Meds  Problems  Med Hx  Surg Hx  Fam Hx          Reviewed and updated as needed this visit by Provider   Tobacco  Allergies  Meds  Problems  Med Hx  Surg Hx  Fam Hx         History reviewed. No pertinent past medical history.   Past Surgical History:   Procedure Laterality Date     C/SECTION, LOW TRANSVERSE        SECTION N/A 2021    Procedure: REPEAT  SECTION;  Surgeon: Alan Olson MD;  Location: [facility]       Review of Systems   Constitutional: Negative for fever.   HENT: Negative for congestion, ear pain, hearing loss and sore throat.    Eyes: Negative for pain and visual disturbance.   Respiratory: Negative for cough and shortness of breath.    Cardiovascular: Negative for chest pain, palpitations and peripheral edema.   Gastrointestinal: Negative for abdominal pain, constipation, diarrhea, heartburn, hematochezia and nausea.   Breasts:  Negative for tenderness, breast mass and discharge.   Genitourinary: Negative for dysuria, frequency, genital sores, hematuria, pelvic pain, urgency, vaginal bleeding and vaginal discharge.   Musculoskeletal: Positive for myalgias. Negative for arthralgias and joint swelling.   Skin: Positive for rash.   Neurological: Negative for dizziness, weakness, headaches and paresthesias.   Psychiatric/Behavioral: Negative for mood changes. The patient is not nervous/anxious.      OBJECTIVE:   /66 (BP Location: Right arm, Patient Position: Sitting, Cuff Size: Adult Regular)   Pulse 84   Temp 97.7  F (36.5  C) (Temporal)   Resp 18   Ht 1.594 m (5' 2.75\")   Wt 75.6 kg (166 lb 11.2 oz)   LMP  (LMP Unknown)   SpO2 100%   BMI " insulin once daily.        CHANGE how you take these medications      Instructions   Trulicity 1.5 MG/0.5ML Sopn  What changed: additional instructions  Generic drug: Dulaglutide   Inject 0.5 mL as instructed every 7 days.  Dose: 0.5 mL        CONTINUE taking these medications      Instructions   acetaminophen 500 MG Tabs  Commonly known as: TYLENOL   Take 1,000 mg by mouth every 6 hours as needed.  Dose: 1,000 mg     albuterol 108 (90 Base) MCG/ACT Aers inhalation aerosol   Inhale 2 Puffs by mouth every 6 hours as needed for Shortness of Breath.  Dose: 2 Puff     aspirin EC 81 MG Tbec  Commonly known as: ECOTRIN   Take 1 Tab by mouth every day.  Dose: 81 mg     atorvastatin 20 MG Tabs  Commonly known as: LIPITOR   Take 1 Tab by mouth every day.  Dose: 20 mg     DAYQUIL MULTI-SYMPTOM COLD/FLU PO   Take 30 mL by mouth 1 time a day as needed.  Dose: 30 mL     fenofibrate 160 MG tablet  Commonly known as: TRIGLIDE   Take 1 Tab by mouth every day.  Dose: 160 mg     glyBURIDE 5 MG Tabs  Commonly known as: DIABETA   Take 2 Tabs by mouth 2 times a day, with meals.  Dose: 10 mg     lisinopril 20 MG Tabs  Commonly known as: PRINIVIL   Take 1 Tab by mouth every day.  Dose: 20 mg     metFORMIN 500 MG Tabs  Commonly known as: GLUCOPHAGE   Take 1 Tab by mouth 2 times a day, with meals.  Dose: 500 mg     NyQuil HBP Cold & Flu 15-6. MG/15ML Liqd  Generic drug: DM-Doxylamine-Acetaminophen   Take 30 mL by mouth at bedtime as needed.  Dose: 30 mL     pantoprazole 20 MG tablet  Commonly known as: PROTONIX   Take 1 Tab by mouth every day.  Dose: 20 mg     SITagliptin 100 MG Tabs  Commonly known as: Januvia   Take 1 Tab by mouth every day.  Dose: 100 mg     therapeutic multivitamin-minerals Tabs   Take 1 Tab by mouth every day.  Dose: 1 Tab     Vitamin C 1000 MG Tabs   Take 1 Tab by mouth every morning.  Dose: 1 Tab     VITAMIN D PO   Take 2 Tabs by mouth every day. GUMMIES  Dose: 2 Tab            Allergies  Allergies   Allergen  29.77 kg/m    Physical Exam  GENERAL: healthy, alert and no distress  NECK: no adenopathy, no asymmetry, masses, or scars and thyroid normal to palpation  RESP: lungs clear to auscultation - no rales, rhonchi or wheezes  CV: regular rate and rhythm, no murmur, click or rub, no peripheral edema and peripheral pulses strong  ABDOMEN: soft, nontender, no hepatosplenomegaly, no masses and bowel sounds normal  MS: no gross musculoskeletal defects noted, no edema  No obvious ligament laxity noted  Mild swelling of both knees  No obvious gait abnormalities  Muscle strength 5/5 in both lower lower extremities  Mild lateral tracking of the patella bilaterally  PSYCH: mentation appears normal, affect normal.    ASSESSMENT/PLAN:       ICD-10-CM    1. Routine general medical examination at a health care facility  Z00.00 TDAP VACCINE (Adacel, Boostrix)     INFLUENZA VACCINE IM > 6 MONTHS VALENT IIV4 (AFLURIA/FLUZONE)     TSH with free T4 reflex     CBC with platelets     Comprehensive metabolic panel (BMP + Alb, Alk Phos, ALT, AST, Total. Bili, TP)     Ferritin     Hemoglobin A1c     Lipid Profile (Chol, Trig, HDL, LDL calc)     Vitamin D Deficiency      2. Nexplanon in place  Z97.5       3. Chronic pain of both knees  M25.561 XR Knee Bilateral 1/2 Views    M25.562     G89.29       4. History of iron deficiency anemia  Z86.2             Home life: lives with kids and    Occupation: works at [employer]   Sexually active:yes, no concerns   Safety concerns:none identified   Diet/exercise: needs updated eye exam   Family history of cancers:none   Eye exam/dental exam: Needs updated eye exam.  UTD on dental.  Alcohol use: none   Tobacco use/marijuana use/drug use:none   Mental health:stable, (+) anxiety about health    Vaccines: Declines COVID.  Okay for tetanus and flu.  Blood work: Ordered      Menses/menopause:  nexplanon in place since, March 2022   Last Pap smear: Patient notes she got the Pap smear done when she was pregnant  Reactions   • Morphine Itching     Rxn = unknown   Bumps all over body       DIET  Orders Placed This Encounter   Procedures   • Diet Order Diet: Consistent CHO (Diabetic)     Standing Status:   Standing     Number of Occurrences:   1     Order Specific Question:   Diet:     Answer:   Consistent CHO (Diabetic) [4]       ACTIVITY  As tolerated.  Weight bearing as tolerated    CONSULTATIONS  none    PROCEDURES  none    LABORATORY  Lab Results   Component Value Date    SODIUM 137 12/06/2020    POTASSIUM 4.2 12/06/2020    CHLORIDE 103 12/06/2020    CO2 27 12/06/2020    GLUCOSE 153 (H) 12/06/2020    BUN 44 (H) 12/06/2020    CREATININE 0.96 12/06/2020        Lab Results   Component Value Date    WBC 9.0 12/06/2020    HEMOGLOBIN 8.8 (L) 12/06/2020    HEMATOCRIT 26.9 (L) 12/06/2020    PLATELETCT 730 (H) 12/06/2020        Total time of the discharge process exceeds 45 minutes.   "with her child in 2021.  She went to [facility].  We will have her sign an YURI so we can get the records.  Patient denies any history of abnormal Pap smear.    Bilateral knee pain:   Chronic  Has been ongoing for several years and she feels like when she is going from sitting to standing, that is when the joint will ache and she feels like it is getting \"stuck together\".  Did go to PT but it did not help in the past.  Plan:  - We will proceed with x-rays as a starting point    Hx of MARK during pregnancy requiring IV iron transfusions:  Patient has not been anemic since she delivered.  Last set of blood work about a year ago was without anemia.  Patient is asymptomatic today.  Continue to monitor.       was used for this interaction.    Patient has been advised of split billing requirements and indicates understanding: Yes      COUNSELING:  Reviewed preventive health counseling, as reflected in patient instructions        She reports that she has never smoked. She has never been exposed to tobacco smoke. She has never used smokeless tobacco.    Alyse Ortega DO  [facility]  "

## 2023-06-20 DIAGNOSIS — I10 PRIMARY HYPERTENSION: ICD-10-CM

## 2023-06-20 RX ORDER — LOSARTAN POTASSIUM 25 MG/1
TABLET ORAL
Qty: 30 TABLET | Refills: 0 | Status: SHIPPED | OUTPATIENT
Start: 2023-06-20 | End: 2023-07-17

## 2023-06-20 NOTE — TELEPHONE ENCOUNTER
LAST OV 3/31/23    Received request via: Pharmacy    Was the patient seen in the last year in this department? Yes    Does the patient have an active prescription (recently filled or refills available) for medication(s) requested? No    Does the patient have retirement Plus and need 100 day supply (blood pressure, diabetes and cholesterol meds only)? Patient does not have SCP

## 2023-07-14 DIAGNOSIS — I10 PRIMARY HYPERTENSION: ICD-10-CM

## 2023-07-17 RX ORDER — LOSARTAN POTASSIUM 25 MG/1
TABLET ORAL
Qty: 90 TABLET | Refills: 3 | Status: SHIPPED | OUTPATIENT
Start: 2023-07-17 | End: 2023-12-01

## 2023-08-08 ENCOUNTER — HOSPITAL ENCOUNTER (OUTPATIENT)
Dept: LAB | Facility: MEDICAL CENTER | Age: 58
End: 2023-08-08
Attending: NURSE PRACTITIONER
Payer: COMMERCIAL

## 2023-08-08 DIAGNOSIS — E11.9 TYPE 2 DIABETES MELLITUS WITHOUT COMPLICATION, WITH LONG-TERM CURRENT USE OF INSULIN (HCC): ICD-10-CM

## 2023-08-08 DIAGNOSIS — Z79.4 TYPE 2 DIABETES MELLITUS WITHOUT COMPLICATION, WITH LONG-TERM CURRENT USE OF INSULIN (HCC): ICD-10-CM

## 2023-08-08 LAB
ALBUMIN SERPL BCP-MCNC: 3.7 G/DL (ref 3.2–4.9)
ALBUMIN/GLOB SERPL: 1.2 G/DL
ALP SERPL-CCNC: 107 U/L (ref 30–99)
ALT SERPL-CCNC: 18 U/L (ref 2–50)
ANION GAP SERPL CALC-SCNC: 8 MMOL/L (ref 7–16)
AST SERPL-CCNC: 24 U/L (ref 12–45)
BILIRUB SERPL-MCNC: 0.3 MG/DL (ref 0.1–1.5)
BUN SERPL-MCNC: 35 MG/DL (ref 8–22)
CALCIUM ALBUM COR SERPL-MCNC: 9.6 MG/DL (ref 8.5–10.5)
CALCIUM SERPL-MCNC: 9.4 MG/DL (ref 8.5–10.5)
CHLORIDE SERPL-SCNC: 105 MMOL/L (ref 96–112)
CHOLEST SERPL-MCNC: 250 MG/DL (ref 100–199)
CO2 SERPL-SCNC: 24 MMOL/L (ref 20–33)
CREAT SERPL-MCNC: 0.95 MG/DL (ref 0.5–1.4)
EST. AVERAGE GLUCOSE BLD GHB EST-MCNC: 232 MG/DL
FASTING STATUS PATIENT QL REPORTED: NORMAL
GFR SERPLBLD CREATININE-BSD FMLA CKD-EPI: 69 ML/MIN/1.73 M 2
GLOBULIN SER CALC-MCNC: 3.2 G/DL (ref 1.9–3.5)
GLUCOSE SERPL-MCNC: 147 MG/DL (ref 65–99)
HBA1C MFR BLD: 9.7 % (ref 4–5.6)
HDLC SERPL-MCNC: 46 MG/DL
LDLC SERPL CALC-MCNC: 152 MG/DL
POTASSIUM SERPL-SCNC: 5.4 MMOL/L (ref 3.6–5.5)
PROT SERPL-MCNC: 6.9 G/DL (ref 6–8.2)
SODIUM SERPL-SCNC: 137 MMOL/L (ref 135–145)
TRIGL SERPL-MCNC: 259 MG/DL (ref 0–149)

## 2023-08-08 PROCEDURE — 83036 HEMOGLOBIN GLYCOSYLATED A1C: CPT

## 2023-08-08 PROCEDURE — 80053 COMPREHEN METABOLIC PANEL: CPT

## 2023-08-08 PROCEDURE — 36415 COLL VENOUS BLD VENIPUNCTURE: CPT

## 2023-08-08 PROCEDURE — 80061 LIPID PANEL: CPT

## 2023-11-14 ENCOUNTER — OFFICE VISIT (OUTPATIENT)
Dept: MEDICAL GROUP | Facility: PHYSICIAN GROUP | Age: 58
End: 2023-11-14
Payer: COMMERCIAL

## 2023-11-14 ENCOUNTER — HOSPITAL ENCOUNTER (OUTPATIENT)
Facility: MEDICAL CENTER | Age: 58
End: 2023-11-14
Attending: FAMILY MEDICINE
Payer: COMMERCIAL

## 2023-11-14 VITALS
DIASTOLIC BLOOD PRESSURE: 80 MMHG | SYSTOLIC BLOOD PRESSURE: 122 MMHG | OXYGEN SATURATION: 97 % | TEMPERATURE: 97.9 F | BODY MASS INDEX: 33 KG/M2 | WEIGHT: 174.8 LBS | HEART RATE: 91 BPM | HEIGHT: 61 IN | RESPIRATION RATE: 18 BRPM

## 2023-11-14 DIAGNOSIS — I10 PRIMARY HYPERTENSION: ICD-10-CM

## 2023-11-14 DIAGNOSIS — Z11.1 SCREENING-PULMONARY TB: ICD-10-CM

## 2023-11-14 DIAGNOSIS — G47.00 INSOMNIA, UNSPECIFIED TYPE: ICD-10-CM

## 2023-11-14 DIAGNOSIS — K21.9 GASTROESOPHAGEAL REFLUX DISEASE, UNSPECIFIED WHETHER ESOPHAGITIS PRESENT: ICD-10-CM

## 2023-11-14 DIAGNOSIS — I16.0 HYPERTENSIVE URGENCY: ICD-10-CM

## 2023-11-14 DIAGNOSIS — Z11.4 ENCOUNTER FOR SCREENING FOR HIV: ICD-10-CM

## 2023-11-14 DIAGNOSIS — E11.65 UNCONTROLLED TYPE 2 DIABETES MELLITUS WITH HYPERGLYCEMIA (HCC): ICD-10-CM

## 2023-11-14 DIAGNOSIS — Z11.59 ENCOUNTER FOR HEPATITIS C SCREENING TEST FOR LOW RISK PATIENT: ICD-10-CM

## 2023-11-14 DIAGNOSIS — Z23 NEED FOR VACCINATION: ICD-10-CM

## 2023-11-14 DIAGNOSIS — E11.9 TYPE 2 DIABETES MELLITUS WITHOUT COMPLICATION, WITHOUT LONG-TERM CURRENT USE OF INSULIN (HCC): ICD-10-CM

## 2023-11-14 DIAGNOSIS — Z12.31 ENCOUNTER FOR SCREENING MAMMOGRAM FOR MALIGNANT NEOPLASM OF BREAST: ICD-10-CM

## 2023-11-14 LAB
CREAT UR-MCNC: 295.63 MG/DL
MICROALBUMIN UR-MCNC: >440 MG/DL
MICROALBUMIN/CREAT UR: NORMAL MG/G (ref 0–30)

## 2023-11-14 PROCEDURE — 82570 ASSAY OF URINE CREATININE: CPT

## 2023-11-14 PROCEDURE — 3074F SYST BP LT 130 MM HG: CPT | Performed by: FAMILY MEDICINE

## 2023-11-14 PROCEDURE — 90686 IIV4 VACC NO PRSV 0.5 ML IM: CPT | Performed by: FAMILY MEDICINE

## 2023-11-14 PROCEDURE — 3079F DIAST BP 80-89 MM HG: CPT | Performed by: FAMILY MEDICINE

## 2023-11-14 PROCEDURE — 90471 IMMUNIZATION ADMIN: CPT | Performed by: FAMILY MEDICINE

## 2023-11-14 PROCEDURE — 82043 UR ALBUMIN QUANTITATIVE: CPT

## 2023-11-14 PROCEDURE — 99214 OFFICE O/P EST MOD 30 MIN: CPT | Mod: 25 | Performed by: FAMILY MEDICINE

## 2023-11-14 RX ORDER — HYDROXYZINE HYDROCHLORIDE 25 MG/1
12.5-25 TABLET, FILM COATED ORAL 3 TIMES DAILY PRN
Qty: 30 TABLET | Refills: 5 | Status: SHIPPED | OUTPATIENT
Start: 2023-11-14

## 2023-11-14 RX ORDER — GLYBURIDE 5 MG/1
10 TABLET ORAL 2 TIMES DAILY WITH MEALS
Qty: 120 TABLET | Refills: 3 | Status: CANCELLED | OUTPATIENT
Start: 2023-11-14

## 2023-11-14 RX ORDER — TOPIRAMATE 50 MG/1
50 TABLET, FILM COATED ORAL 2 TIMES DAILY
Qty: 180 TABLET | Refills: 3 | Status: SHIPPED | OUTPATIENT
Start: 2023-11-14

## 2023-11-14 RX ORDER — GABAPENTIN 100 MG/1
100 CAPSULE ORAL NIGHTLY PRN
Qty: 90 CAPSULE | Refills: 3 | Status: SHIPPED | OUTPATIENT
Start: 2023-11-14

## 2023-11-14 RX ORDER — TIZANIDINE 4 MG/1
2-4 TABLET ORAL NIGHTLY PRN
Qty: 30 TABLET | Refills: 5 | Status: SHIPPED | OUTPATIENT
Start: 2023-11-14 | End: 2023-12-01

## 2023-11-14 RX ORDER — METOPROLOL SUCCINATE 50 MG/1
50 TABLET, EXTENDED RELEASE ORAL DAILY
Qty: 90 TABLET | Refills: 3 | Status: SHIPPED | OUTPATIENT
Start: 2023-11-14

## 2023-11-14 ASSESSMENT — PATIENT HEALTH QUESTIONNAIRE - PHQ9
SUM OF ALL RESPONSES TO PHQ9 QUESTIONS 1 AND 2: 5
SUM OF ALL RESPONSES TO PHQ QUESTIONS 1-9: 16
3. TROUBLE FALLING OR STAYING ASLEEP OR SLEEPING TOO MUCH: NEARLY EVERY DAY
2. FEELING DOWN, DEPRESSED, IRRITABLE, OR HOPELESS: NEARLY EVERY DAY
5. POOR APPETITE OR OVEREATING: SEVERAL DAYS
4. FEELING TIRED OR HAVING LITTLE ENERGY: MORE THAN HALF THE DAYS
7. TROUBLE CONCENTRATING ON THINGS, SUCH AS READING THE NEWSPAPER OR WATCHING TELEVISION: SEVERAL DAYS
1. LITTLE INTEREST OR PLEASURE IN DOING THINGS: MORE THAN HALF THE DAYS
8. MOVING OR SPEAKING SO SLOWLY THAT OTHER PEOPLE COULD HAVE NOTICED. OR THE OPPOSITE, BEING SO FIGETY OR RESTLESS THAT YOU HAVE BEEN MOVING AROUND A LOT MORE THAN USUAL: MORE THAN HALF THE DAYS
6. FEELING BAD ABOUT YOURSELF - OR THAT YOU ARE A FAILURE OR HAVE LET YOURSELF OR YOUR FAMILY DOWN: SEVERAL DAYS
9. THOUGHTS THAT YOU WOULD BE BETTER OFF DEAD, OR OF HURTING YOURSELF: SEVERAL DAYS

## 2023-11-14 ASSESSMENT — FIBROSIS 4 INDEX: FIB4 SCORE: 1.01

## 2023-11-14 NOTE — ASSESSMENT & PLAN NOTE
Patient has chronic worsening GERD, constipation and diarrhea, she had extensive work up with GI with EGD, colonoscpy (normal except for mild gastritis) had delayed/abnormal gastric emptying study, normal stool cdiff and hpylori.   It was thought gI symptoms were due to metformin and trulicity, but in the last 2 months since her last PCP left, she was out of all her medications and still has the GI symptoms.   She notes her random diarrhea and constipation and burping are not related to her metformin.   Eating anything could trigger diarrhea or sometimes vomiting.

## 2023-11-14 NOTE — LETTER
[facility]  Nu Eason M.D.  [address] [place] [address]  Fax: [phone]   Authorization for Release/Disclosure of   Protected Health Information   Name: SONYA WEI : 1965 SSN: [id number]   Address: [address]  [place] [address] Phone:    [phone] (home)    I authorize the entity listed below to release/disclose the PHI below to:   [facility]/Nu Eason M.D. and Nu Eason M.D.   Provider or Entity Name:[facility]  or [facility]      Address   City, State, Zip   Phone:      Fax:     Reason for request: continuity of care   Information to be released:    [  ] LAST COLONOSCOPY,  including any PATH REPORT and follow-up  [  ] LAST FIT/COLOGUARD RESULT [  ] LAST DEXA  [  ] LAST MAMMOGRAM  [  ] LAST PAP  [  ] LAST LABS [  ] RETINA EXAM REPORT  [  ] IMMUNIZATION RECORDS  [  ] Release all info      [  ] Check here and initial the line next to each item to release ALL health information INCLUDING  _____ Care and treatment for drug and / or alcohol abuse  _____ HIV testing, infection status, or AIDS  _____ Genetic Testing    DATES OF SERVICE OR TIME PERIOD TO BE DISCLOSED: _____________  I understand and acknowledge that:  * This Authorization may be revoked at any time by you in writing, except if your health information has already been used or disclosed.  * Your health information that will be used or disclosed as a result of you signing this authorization could be re-disclosed by the recipient. If this occurs, your re-disclosed health information may no longer be protected by State or Federal laws.  * You may refuse to sign this Authorization. Your refusal will not affect your ability to obtain treatment.  * This Authorization becomes effective upon signing and will  on (date) __________.      If no date is indicated, this Authorization will  one (1) year from the signature date.    Name: Sonya Wei  Signature: Date:    11/14/2023     PLEASE FAX REQUESTED RECORDS BACK TO: [phone]

## 2023-11-16 NOTE — PROGRESS NOTES
Subjective:   Sonya Wei is a 57 y.o. female here today for evaluation and management of:     Gastroesophageal reflux disease  Patient has chronic worsening GERD, constipation and diarrhea, she had extensive work up with GI with EGD, colonoscpy (normal except for mild gastritis) had delayed/abnormal gastric emptying study, normal stool cdiff and hpylori.   It was thought gI symptoms were due to metformin and trulicity, but in the last 2 months since her last PCP left, she was out of all her medications and still has the GI symptoms.   She notes her random diarrhea and constipation and burping are not related to her metformin.   Eating anything could trigger diarrhea or sometimes vomiting.              Current medicines (including changes today)  Current Outpatient Medications   Medication Sig Dispense Refill    gabapentin (NEURONTIN) 100 MG Cap Take 1 Capsule by mouth at bedtime as needed (insomnia). 90 Capsule 3    hydrOXYzine HCl (ATARAX) 25 MG Tab Take 0.5-1 Tablets by mouth 3 times a day as needed for Anxiety. 30 Tablet 5    SITagliptin (JANUVIA) 100 MG Tab Take 1 Tablet by mouth every day. 90 Tablet 3    tizanidine (ZANAFLEX) 4 MG Tab Take 0.5-1 Tablets by mouth at bedtime as needed (muscle spasms). 30 Tablet 5    topiramate (TOPAMAX) 50 MG tablet Take 1 Tablet by mouth 2 times a day. 180 Tablet 3    metoprolol SR (TOPROL XL) 50 MG TABLET SR 24 HR Take 1 Tablet by mouth every day. 90 Tablet 3    metFORMIN (GLUCOPHAGE) 500 MG Tab Take 2 Tablets by mouth 2 times a day with meals. 360 Tablet 3    Empagliflozin 25 MG Tab Take 1 Tablet by mouth every day. 90 Tablet 3    insulin glargine 100 UNIT/ML SC SOPN injection Inject 5 Units under the skin every evening. 3 mL 5    Insulin Pen Needle 32 G x 4 mm Use daily for injection of insulin. 90 Each 3    losartan (COZAAR) 25 MG Tab Take 1 tablet by mouth once daily 90 Tablet 3    sumatriptan (IMITREX) 20 MG/ACT nasal spray Administer 1 Spray into affected  nostril(S) as needed for Migraine. 1 Each 3    hydroCHLOROthiazide (HYDRODIURIL) 12.5 MG tablet Take 2 Tablets by mouth every day. 90 Tablet 1    Dulaglutide (TRULICITY) 1.5 MG/0.5ML Solution Pen-injector INJECT 1 DOSE SUBCUTANEOUSLY ONCE A WEEK 12 mL 3    glyBURIDE (DIABETA) 5 MG Tab TAKE 2 TABLETS BY MOUTH TWICE DAILY WITH MEALS 120 Tablet 3    albuterol 108 (90 Base) MCG/ACT Aero Soln inhalation aerosol Inhale 2 Puffs every 6 hours as needed for Shortness of Breath. 8.5 g 2    fluticasone-salmeterol (ADVAIR) 100-50 MCG/ACT AEROSOL POWDER, BREATH ACTIVATED Inhale 1 Puff every 12 hours. 60 Each 2    omeprazole (PRILOSEC) 20 MG delayed-release capsule Take 1 Capsule by mouth every day. 90 Capsule 3    cloNIDine (CATAPRES) 0.1 MG Tab Take one tablet up to once a day ONLY for blood pressure greater than 160/90 30 Tablet 1    Misc. Devices Misc Please dispense one blood pressure cuff 1 Each 0    escitalopram (LEXAPRO) 10 MG Tab Take 1 tablet by mouth once daily 90 Tablet 0    atorvastatin (LIPITOR) 20 MG Tab Take 1 Tablet by mouth every day. 90 Tablet 3    Cyanocobalamin (VITAMIN B-12 PO) Take 1 Tablet by mouth every day.      ASPIRIN 81 PO Take 81 mg by mouth every day.      Multiple Vitamin (MULTI VITAMIN DAILY) Tab Take  by mouth.      Ascorbic Acid (VITAMIN C) 1000 MG Tab Take 1 Tablet by mouth every morning.      Cholecalciferol (VITAMIN D PO) Take 2 Tablets by mouth every day. GUMMIES       No current facility-administered medications for this visit.     She  has a past medical history of Anemia, Bowel habit changes, Diabetes, Diabetes (HCC), GERD (gastroesophageal reflux disease), Healthcare maintenance (9/27/2014), High cholesterol, Hyperlipidemia, Hypertension, Infectious disease, Jaundice (1999), Psychiatric problem, and Vaginal itching (8/27/2014).    ROS  No chest pain, no shortness of breath, no abdominal pain       Objective:     /80 (BP Location: Right arm, Patient Position: Sitting, BP Cuff Size:  "Large adult)   Pulse 91   Temp 36.6 °C (97.9 °F) (Temporal)   Resp 18   Ht 1.549 m (5' 1\")   Wt 79.3 kg (174 lb 12.8 oz)   SpO2 97%  Body mass index is 33.03 kg/m².   Physical Exam:  Constitutional: Alert, no distress.  Skin: Warm, dry, good turgor, no rashes in visible areas.  Eye: Equal, round and reactive, conjunctiva clear, lids normal.  ENMT: Lips without lesions, good dentition, oropharynx clear.  Neck: Trachea midline, no masses, no thyromegaly. No cervical or supraclavicular lymphadenopathy  Respiratory: Unlabored respiratory effort, lungs clear to auscultation, no wheezes, no ronchi.  Cardiovascular: Normal S1, S2, no murmur, no edema.  Abdomen: Soft, non-tender, no masses, no hepatosplenomegaly.  Psych: Alert and oriented x3, normal affect and mood.        Assessment and Plan:   The following treatment plan was discussed    1. Insomnia, unspecified type  - gabapentin (NEURONTIN) 100 MG Cap; Take 1 Capsule by mouth at bedtime as needed (insomnia).  Dispense: 90 Capsule; Refill: 3    2. Uncontrolled type 2 diabetes mellitus with hyperglycemia (HCC)  - SITagliptin (JANUVIA) 100 MG Tab; Take 1 Tablet by mouth every day.  Dispense: 90 Tablet; Refill: 3  - metFORMIN (GLUCOPHAGE) 500 MG Tab; Take 2 Tablets by mouth 2 times a day with meals.  Dispense: 360 Tablet; Refill: 3  - Comp Metabolic Panel; Future  - HEMOGLOBIN A1C; Future  - Lipid Profile; Future  - Referral to Pharmacotherapy Service  - MICROALBUMIN CREAT RATIO URINE; Future    3. Type 2 diabetes mellitus without complication, without long-term current use of insulin (HCC)    4. Hypertensive urgency  - metoprolol SR (TOPROL XL) 50 MG TABLET SR 24 HR; Take 1 Tablet by mouth every day.  Dispense: 90 Tablet; Refill: 3    5. Primary hypertension  - metoprolol SR (TOPROL XL) 50 MG TABLET SR 24 HR; Take 1 Tablet by mouth every day.  Dispense: 90 Tablet; Refill: 3    6. Gastroesophageal reflux disease, unspecified whether esophagitis present  - CBC WITH " DIFFERENTIAL; Future    7. Need for vaccination  - Influenza Vaccine Quad Injection (PF)    8. Encounter for screening mammogram for malignant neoplasm of breast  - MA-SCREENING MAMMO BILAT W/TOMOSYNTHESIS W/CAD; Future    9. Screening-pulmonary TB  - Quantiferon Gold TB (PPD); Future    10. Encounter for screening for HIV  - HIV AG/AB COMBO ASSAY SCREENING; Future    11. Encounter for hepatitis C screening test for low risk patient  - HEP C VIRUS ANTIBODY; Future    Other orders  - hydrOXYzine HCl (ATARAX) 25 MG Tab; Take 0.5-1 Tablets by mouth 3 times a day as needed for Anxiety.  Dispense: 30 Tablet; Refill: 5  - tizanidine (ZANAFLEX) 4 MG Tab; Take 0.5-1 Tablets by mouth at bedtime as needed (muscle spasms).  Dispense: 30 Tablet; Refill: 5  - topiramate (TOPAMAX) 50 MG tablet; Take 1 Tablet by mouth 2 times a day.  Dispense: 180 Tablet; Refill: 3  - Empagliflozin 25 MG Tab; Take 1 Tablet by mouth every day.  Dispense: 90 Tablet; Refill: 3  - insulin glargine 100 UNIT/ML SC SOPN injection; Inject 5 Units under the skin every evening.  Dispense: 3 mL; Refill: 5  - Insulin Pen Needle 32 G x 4 mm; Use daily for injection of insulin.  Dispense: 90 Each; Refill: 3  - Obtain Results: Retinal screening; Obtain Results [facility]: [facility] (see comment[facility]      Followup: Return in about 3 months (around 2/14/2024) for Diabetes, Lab Review.

## 2023-11-28 ENCOUNTER — APPOINTMENT (OUTPATIENT)
Dept: RADIOLOGY | Facility: MEDICAL CENTER | Age: 58
End: 2023-11-28
Attending: FAMILY MEDICINE
Payer: COMMERCIAL

## 2023-11-28 ENCOUNTER — TELEPHONE (OUTPATIENT)
Dept: MEDICAL GROUP | Facility: PHYSICIAN GROUP | Age: 58
End: 2023-11-28
Payer: COMMERCIAL

## 2023-12-01 ENCOUNTER — APPOINTMENT (OUTPATIENT)
Dept: RADIOLOGY | Facility: MEDICAL CENTER | Age: 58
End: 2023-12-01
Attending: EMERGENCY MEDICINE
Payer: COMMERCIAL

## 2023-12-01 ENCOUNTER — HOSPITAL ENCOUNTER (OUTPATIENT)
Facility: MEDICAL CENTER | Age: 58
End: 2023-12-02
Attending: EMERGENCY MEDICINE | Admitting: STUDENT IN AN ORGANIZED HEALTH CARE EDUCATION/TRAINING PROGRAM
Payer: COMMERCIAL

## 2023-12-01 DIAGNOSIS — E11.9 TYPE 2 DIABETES MELLITUS WITHOUT COMPLICATION, WITH LONG-TERM CURRENT USE OF INSULIN (HCC): ICD-10-CM

## 2023-12-01 DIAGNOSIS — I16.0 HYPERTENSIVE URGENCY: ICD-10-CM

## 2023-12-01 DIAGNOSIS — J45.40 MODERATE PERSISTENT REACTIVE AIRWAY DISEASE WITHOUT COMPLICATION: ICD-10-CM

## 2023-12-01 DIAGNOSIS — Z79.4 TYPE 2 DIABETES MELLITUS WITHOUT COMPLICATION, WITH LONG-TERM CURRENT USE OF INSULIN (HCC): ICD-10-CM

## 2023-12-01 DIAGNOSIS — R07.9 CHEST PAIN, UNSPECIFIED TYPE: ICD-10-CM

## 2023-12-01 PROBLEM — M54.2 NECK PAIN: Status: RESOLVED | Noted: 2020-08-18 | Resolved: 2023-12-01

## 2023-12-01 PROBLEM — Z09 HOSPITAL DISCHARGE FOLLOW-UP: Status: RESOLVED | Noted: 2020-12-16 | Resolved: 2023-12-01

## 2023-12-01 PROBLEM — M54.9 MID BACK PAIN: Status: RESOLVED | Noted: 2019-04-02 | Resolved: 2023-12-01

## 2023-12-01 PROBLEM — I24.9 ACS (ACUTE CORONARY SYNDROME) (HCC): Status: ACTIVE | Noted: 2023-12-01

## 2023-12-01 PROBLEM — R10.13 EPIGASTRIC PAIN: Status: RESOLVED | Noted: 2020-08-13 | Resolved: 2023-12-01

## 2023-12-01 PROBLEM — L30.9 ECZEMA: Status: RESOLVED | Noted: 2022-05-03 | Resolved: 2023-12-01

## 2023-12-01 PROBLEM — M79.18 MUSCULOSKELETAL PAIN: Status: RESOLVED | Noted: 2020-08-18 | Resolved: 2023-12-01

## 2023-12-01 PROBLEM — Z86.16 HISTORY OF 2019 NOVEL CORONAVIRUS DISEASE (COVID-19): Status: RESOLVED | Noted: 2021-01-14 | Resolved: 2023-12-01

## 2023-12-01 PROBLEM — E66.811 OBESITY (BMI 30.0-34.9): Status: ACTIVE | Noted: 2019-04-03

## 2023-12-01 LAB
ALBUMIN SERPL BCP-MCNC: 3.8 G/DL (ref 3.2–4.9)
ALBUMIN/GLOB SERPL: 1.3 G/DL
ALP SERPL-CCNC: 110 U/L (ref 30–99)
ALT SERPL-CCNC: 18 U/L (ref 2–50)
ANION GAP SERPL CALC-SCNC: 10 MMOL/L (ref 7–16)
APTT PPP: 28.3 SEC (ref 24.7–36)
AST SERPL-CCNC: 21 U/L (ref 12–45)
BASOPHILS # BLD AUTO: 0.4 % (ref 0–1.8)
BASOPHILS # BLD: 0.03 K/UL (ref 0–0.12)
BILIRUB SERPL-MCNC: 0.2 MG/DL (ref 0.1–1.5)
BUN SERPL-MCNC: 36 MG/DL (ref 8–22)
CALCIUM ALBUM COR SERPL-MCNC: 9.3 MG/DL (ref 8.5–10.5)
CALCIUM SERPL-MCNC: 9.1 MG/DL (ref 8.5–10.5)
CHLORIDE SERPL-SCNC: 98 MMOL/L (ref 96–112)
CHOLEST SERPL-MCNC: 371 MG/DL (ref 100–199)
CO2 SERPL-SCNC: 24 MMOL/L (ref 20–33)
CREAT SERPL-MCNC: 1.04 MG/DL (ref 0.5–1.4)
EKG IMPRESSION: NORMAL
EOSINOPHIL # BLD AUTO: 0.15 K/UL (ref 0–0.51)
EOSINOPHIL NFR BLD: 2 % (ref 0–6.9)
ERYTHROCYTE [DISTWIDTH] IN BLOOD BY AUTOMATED COUNT: 37.7 FL (ref 35.9–50)
EST. AVERAGE GLUCOSE BLD GHB EST-MCNC: 260 MG/DL
GFR SERPLBLD CREATININE-BSD FMLA CKD-EPI: 62 ML/MIN/1.73 M 2
GLOBULIN SER CALC-MCNC: 2.9 G/DL (ref 1.9–3.5)
GLUCOSE BLD STRIP.AUTO-MCNC: 330 MG/DL (ref 65–99)
GLUCOSE BLD STRIP.AUTO-MCNC: 353 MG/DL (ref 65–99)
GLUCOSE SERPL-MCNC: 387 MG/DL (ref 65–99)
HBA1C MFR BLD: 10.7 % (ref 4–5.6)
HCT VFR BLD AUTO: 36.3 % (ref 37–47)
HDLC SERPL-MCNC: 45 MG/DL
HGB BLD-MCNC: 12.4 G/DL (ref 12–16)
IMM GRANULOCYTES # BLD AUTO: 0.06 K/UL (ref 0–0.11)
IMM GRANULOCYTES NFR BLD AUTO: 0.8 % (ref 0–0.9)
INR PPP: 0.95 (ref 0.87–1.13)
LDLC SERPL CALC-MCNC: ABNORMAL MG/DL
LYMPHOCYTES # BLD AUTO: 3.05 K/UL (ref 1–4.8)
LYMPHOCYTES NFR BLD: 39.7 % (ref 22–41)
MCH RBC QN AUTO: 29.5 PG (ref 27–33)
MCHC RBC AUTO-ENTMCNC: 34.2 G/DL (ref 32.2–35.5)
MCV RBC AUTO: 86.4 FL (ref 81.4–97.8)
MONOCYTES # BLD AUTO: 0.56 K/UL (ref 0–0.85)
MONOCYTES NFR BLD AUTO: 7.3 % (ref 0–13.4)
NEUTROPHILS # BLD AUTO: 3.83 K/UL (ref 1.82–7.42)
NEUTROPHILS NFR BLD: 49.8 % (ref 44–72)
NRBC # BLD AUTO: 0 K/UL
NRBC BLD-RTO: 0 /100 WBC (ref 0–0.2)
NT-PROBNP SERPL IA-MCNC: 292 PG/ML (ref 0–125)
PLATELET # BLD AUTO: 270 K/UL (ref 164–446)
PMV BLD AUTO: 11.1 FL (ref 9–12.9)
POTASSIUM SERPL-SCNC: 4.6 MMOL/L (ref 3.6–5.5)
PROT SERPL-MCNC: 6.7 G/DL (ref 6–8.2)
PROTHROMBIN TIME: 12.8 SEC (ref 12–14.6)
RBC # BLD AUTO: 4.2 M/UL (ref 4.2–5.4)
SODIUM SERPL-SCNC: 132 MMOL/L (ref 135–145)
TRIGL SERPL-MCNC: 534 MG/DL (ref 0–149)
TROPONIN T SERPL-MCNC: 15 NG/L (ref 6–19)
TROPONIN T SERPL-MCNC: 15 NG/L (ref 6–19)
WBC # BLD AUTO: 7.7 K/UL (ref 4.8–10.8)

## 2023-12-01 PROCEDURE — 80061 LIPID PANEL: CPT

## 2023-12-01 PROCEDURE — 93005 ELECTROCARDIOGRAM TRACING: CPT | Performed by: EMERGENCY MEDICINE

## 2023-12-01 PROCEDURE — 80053 COMPREHEN METABOLIC PANEL: CPT

## 2023-12-01 PROCEDURE — G0378 HOSPITAL OBSERVATION PER HR: HCPCS

## 2023-12-01 PROCEDURE — 700102 HCHG RX REV CODE 250 W/ 637 OVERRIDE(OP): Performed by: STUDENT IN AN ORGANIZED HEALTH CARE EDUCATION/TRAINING PROGRAM

## 2023-12-01 PROCEDURE — A9270 NON-COVERED ITEM OR SERVICE: HCPCS | Performed by: STUDENT IN AN ORGANIZED HEALTH CARE EDUCATION/TRAINING PROGRAM

## 2023-12-01 PROCEDURE — 71045 X-RAY EXAM CHEST 1 VIEW: CPT

## 2023-12-01 PROCEDURE — 96375 TX/PRO/DX INJ NEW DRUG ADDON: CPT

## 2023-12-01 PROCEDURE — 99223 1ST HOSP IP/OBS HIGH 75: CPT | Performed by: STUDENT IN AN ORGANIZED HEALTH CARE EDUCATION/TRAINING PROGRAM

## 2023-12-01 PROCEDURE — 96374 THER/PROPH/DIAG INJ IV PUSH: CPT

## 2023-12-01 PROCEDURE — 83036 HEMOGLOBIN GLYCOSYLATED A1C: CPT

## 2023-12-01 PROCEDURE — 85025 COMPLETE CBC W/AUTO DIFF WBC: CPT

## 2023-12-01 PROCEDURE — 84484 ASSAY OF TROPONIN QUANT: CPT

## 2023-12-01 PROCEDURE — 99285 EMERGENCY DEPT VISIT HI MDM: CPT

## 2023-12-01 PROCEDURE — 82962 GLUCOSE BLOOD TEST: CPT

## 2023-12-01 PROCEDURE — 96372 THER/PROPH/DIAG INJ SC/IM: CPT

## 2023-12-01 PROCEDURE — 700111 HCHG RX REV CODE 636 W/ 250 OVERRIDE (IP): Mod: JZ | Performed by: EMERGENCY MEDICINE

## 2023-12-01 PROCEDURE — 71275 CT ANGIOGRAPHY CHEST: CPT

## 2023-12-01 PROCEDURE — 700117 HCHG RX CONTRAST REV CODE 255: Performed by: EMERGENCY MEDICINE

## 2023-12-01 PROCEDURE — 93005 ELECTROCARDIOGRAM TRACING: CPT

## 2023-12-01 PROCEDURE — 700102 HCHG RX REV CODE 250 W/ 637 OVERRIDE(OP): Performed by: EMERGENCY MEDICINE

## 2023-12-01 PROCEDURE — 700111 HCHG RX REV CODE 636 W/ 250 OVERRIDE (IP): Mod: JZ | Performed by: STUDENT IN AN ORGANIZED HEALTH CARE EDUCATION/TRAINING PROGRAM

## 2023-12-01 PROCEDURE — A9270 NON-COVERED ITEM OR SERVICE: HCPCS | Performed by: EMERGENCY MEDICINE

## 2023-12-01 PROCEDURE — 85610 PROTHROMBIN TIME: CPT

## 2023-12-01 PROCEDURE — A9270 NON-COVERED ITEM OR SERVICE: HCPCS | Performed by: NURSE PRACTITIONER

## 2023-12-01 PROCEDURE — 36415 COLL VENOUS BLD VENIPUNCTURE: CPT

## 2023-12-01 PROCEDURE — 700102 HCHG RX REV CODE 250 W/ 637 OVERRIDE(OP): Performed by: NURSE PRACTITIONER

## 2023-12-01 PROCEDURE — 85730 THROMBOPLASTIN TIME PARTIAL: CPT

## 2023-12-01 PROCEDURE — 83880 ASSAY OF NATRIURETIC PEPTIDE: CPT

## 2023-12-01 PROCEDURE — 94760 N-INVAS EAR/PLS OXIMETRY 1: CPT

## 2023-12-01 RX ORDER — HYDRALAZINE HYDROCHLORIDE 20 MG/ML
10 INJECTION INTRAMUSCULAR; INTRAVENOUS ONCE
Status: DISCONTINUED | OUTPATIENT
Start: 2023-12-01 | End: 2023-12-01

## 2023-12-01 RX ORDER — OMEPRAZOLE 20 MG/1
20 CAPSULE, DELAYED RELEASE ORAL DAILY
Status: DISCONTINUED | OUTPATIENT
Start: 2023-12-02 | End: 2023-12-01

## 2023-12-01 RX ORDER — MORPHINE SULFATE 4 MG/ML
4 INJECTION INTRAVENOUS ONCE
Status: DISCONTINUED | OUTPATIENT
Start: 2023-12-01 | End: 2023-12-02 | Stop reason: HOSPADM

## 2023-12-01 RX ORDER — ALBUTEROL SULFATE 90 UG/1
2 AEROSOL, METERED RESPIRATORY (INHALATION) EVERY 6 HOURS PRN
Status: DISCONTINUED | OUTPATIENT
Start: 2023-12-01 | End: 2023-12-02 | Stop reason: HOSPADM

## 2023-12-01 RX ORDER — AMLODIPINE BESYLATE 10 MG/1
10 TABLET ORAL
Status: DISCONTINUED | OUTPATIENT
Start: 2023-12-01 | End: 2023-12-02 | Stop reason: HOSPADM

## 2023-12-01 RX ORDER — AMOXICILLIN 250 MG
2 CAPSULE ORAL 2 TIMES DAILY
Status: DISCONTINUED | OUTPATIENT
Start: 2023-12-02 | End: 2023-12-02 | Stop reason: HOSPADM

## 2023-12-01 RX ORDER — HYDROCHLOROTHIAZIDE 25 MG/1
25 TABLET ORAL DAILY
Status: DISCONTINUED | OUTPATIENT
Start: 2023-12-02 | End: 2023-12-02 | Stop reason: HOSPADM

## 2023-12-01 RX ORDER — ONDANSETRON 2 MG/ML
4 INJECTION INTRAMUSCULAR; INTRAVENOUS ONCE
Status: COMPLETED | OUTPATIENT
Start: 2023-12-01 | End: 2023-12-01

## 2023-12-01 RX ORDER — ONDANSETRON 2 MG/ML
4 INJECTION INTRAMUSCULAR; INTRAVENOUS ONCE
Status: DISCONTINUED | OUTPATIENT
Start: 2023-12-01 | End: 2023-12-02 | Stop reason: HOSPADM

## 2023-12-01 RX ORDER — HYDRALAZINE HYDROCHLORIDE 20 MG/ML
10 INJECTION INTRAMUSCULAR; INTRAVENOUS EVERY 4 HOURS PRN
Status: DISCONTINUED | OUTPATIENT
Start: 2023-12-01 | End: 2023-12-02 | Stop reason: HOSPADM

## 2023-12-01 RX ORDER — GABAPENTIN 100 MG/1
100 CAPSULE ORAL NIGHTLY PRN
Status: DISCONTINUED | OUTPATIENT
Start: 2023-12-01 | End: 2023-12-02 | Stop reason: HOSPADM

## 2023-12-01 RX ORDER — ASPIRIN 81 MG/1
81 TABLET, CHEWABLE ORAL ONCE
Status: COMPLETED | OUTPATIENT
Start: 2023-12-01 | End: 2023-12-01

## 2023-12-01 RX ORDER — HYDRALAZINE HYDROCHLORIDE 25 MG/1
25 TABLET, FILM COATED ORAL EVERY 8 HOURS
Status: DISCONTINUED | OUTPATIENT
Start: 2023-12-01 | End: 2023-12-02 | Stop reason: HOSPADM

## 2023-12-01 RX ORDER — POLYETHYLENE GLYCOL 3350 17 G/17G
1 POWDER, FOR SOLUTION ORAL
Status: DISCONTINUED | OUTPATIENT
Start: 2023-12-01 | End: 2023-12-02 | Stop reason: HOSPADM

## 2023-12-01 RX ORDER — ATORVASTATIN CALCIUM 40 MG/1
40 TABLET, FILM COATED ORAL EVERY EVENING
Status: DISCONTINUED | OUTPATIENT
Start: 2023-12-01 | End: 2023-12-02 | Stop reason: HOSPADM

## 2023-12-01 RX ORDER — ENOXAPARIN SODIUM 100 MG/ML
40 INJECTION SUBCUTANEOUS DAILY
Status: DISCONTINUED | OUTPATIENT
Start: 2023-12-01 | End: 2023-12-02 | Stop reason: HOSPADM

## 2023-12-01 RX ORDER — BISACODYL 10 MG
10 SUPPOSITORY, RECTAL RECTAL
Status: DISCONTINUED | OUTPATIENT
Start: 2023-12-01 | End: 2023-12-02 | Stop reason: HOSPADM

## 2023-12-01 RX ORDER — HYDROCHLOROTHIAZIDE 12.5 MG/1
12.5 TABLET ORAL DAILY
Status: ON HOLD | COMMUNITY
End: 2023-12-02

## 2023-12-01 RX ORDER — LOSARTAN POTASSIUM 25 MG/1
25 TABLET ORAL DAILY
Status: DISCONTINUED | OUTPATIENT
Start: 2023-12-02 | End: 2023-12-02 | Stop reason: HOSPADM

## 2023-12-01 RX ORDER — ASPIRIN 81 MG/1
81 TABLET ORAL DAILY
Status: DISCONTINUED | OUTPATIENT
Start: 2023-12-02 | End: 2023-12-02 | Stop reason: HOSPADM

## 2023-12-01 RX ORDER — METOPROLOL SUCCINATE 50 MG/1
50 TABLET, EXTENDED RELEASE ORAL DAILY
Status: DISCONTINUED | OUTPATIENT
Start: 2023-12-02 | End: 2023-12-02 | Stop reason: HOSPADM

## 2023-12-01 RX ORDER — TOPIRAMATE 25 MG/1
50 TABLET ORAL 2 TIMES DAILY
Status: DISCONTINUED | OUTPATIENT
Start: 2023-12-01 | End: 2023-12-02 | Stop reason: HOSPADM

## 2023-12-01 RX ORDER — ACETAMINOPHEN 325 MG/1
650 TABLET ORAL EVERY 6 HOURS PRN
Status: DISCONTINUED | OUTPATIENT
Start: 2023-12-01 | End: 2023-12-02 | Stop reason: HOSPADM

## 2023-12-01 RX ORDER — ESCITALOPRAM OXALATE 10 MG/1
10 TABLET ORAL DAILY
Status: DISCONTINUED | OUTPATIENT
Start: 2023-12-02 | End: 2023-12-01

## 2023-12-01 RX ADMIN — HYDRALAZINE HYDROCHLORIDE 10 MG: 20 INJECTION, SOLUTION INTRAMUSCULAR; INTRAVENOUS at 19:52

## 2023-12-01 RX ADMIN — ASPIRIN 81 MG: 81 TABLET, CHEWABLE ORAL at 19:01

## 2023-12-01 RX ADMIN — IOHEXOL 100 ML: 350 INJECTION, SOLUTION INTRAVENOUS at 18:42

## 2023-12-01 RX ADMIN — ENOXAPARIN SODIUM 40 MG: 100 INJECTION SUBCUTANEOUS at 21:10

## 2023-12-01 RX ADMIN — MOMETASONE FUROATE AND FORMOTEROL FUMARATE DIHYDRATE 2 PUFF: 100; 5 AEROSOL RESPIRATORY (INHALATION) at 21:18

## 2023-12-01 RX ADMIN — INSULIN GLARGINE-YFGN 5 UNITS: 100 INJECTION, SOLUTION SUBCUTANEOUS at 21:12

## 2023-12-01 RX ADMIN — FENTANYL CITRATE 50 MCG: 50 INJECTION, SOLUTION INTRAMUSCULAR; INTRAVENOUS at 18:31

## 2023-12-01 RX ADMIN — ATORVASTATIN CALCIUM 40 MG: 40 TABLET, FILM COATED ORAL at 21:10

## 2023-12-01 RX ADMIN — NITROGLYCERIN 0.5 INCH: 20 OINTMENT TOPICAL at 19:16

## 2023-12-01 RX ADMIN — ONDANSETRON 4 MG: 2 INJECTION INTRAMUSCULAR; INTRAVENOUS at 19:01

## 2023-12-01 RX ADMIN — HYDRALAZINE HYDROCHLORIDE 25 MG: 25 TABLET, FILM COATED ORAL at 21:10

## 2023-12-01 RX ADMIN — TOPIRAMATE 50 MG: 25 TABLET, FILM COATED ORAL at 21:18

## 2023-12-01 RX ADMIN — AMLODIPINE BESYLATE 10 MG: 10 TABLET ORAL at 21:10

## 2023-12-01 RX ADMIN — INSULIN HUMAN 8 UNITS: 100 INJECTION, SOLUTION PARENTERAL at 21:13

## 2023-12-01 ASSESSMENT — ENCOUNTER SYMPTOMS
HEADACHES: 0
ORTHOPNEA: 0
COUGH: 0
DOUBLE VISION: 0
TINGLING: 0
DIARRHEA: 0
VOMITING: 0
PALPITATIONS: 0
TREMORS: 0
ABDOMINAL PAIN: 0
CLAUDICATION: 0
SHORTNESS OF BREATH: 0
HEMOPTYSIS: 0
BLURRED VISION: 0
DIZZINESS: 0
NECK PAIN: 0
SINUS PAIN: 0
NAUSEA: 0
WHEEZING: 0
DEPRESSION: 0
SENSORY CHANGE: 0
FEVER: 0
CHILLS: 0
BACK PAIN: 0
SPUTUM PRODUCTION: 0

## 2023-12-01 ASSESSMENT — LIFESTYLE VARIABLES
DOES PATIENT WANT TO STOP DRINKING: NO
TOTAL SCORE: 0
HAVE PEOPLE ANNOYED YOU BY CRITICIZING YOUR DRINKING: NO
EVER FELT BAD OR GUILTY ABOUT YOUR DRINKING: NO
EVER HAD A DRINK FIRST THING IN THE MORNING TO STEADY YOUR NERVES TO GET RID OF A HANGOVER: NO
CONSUMPTION TOTAL: INCOMPLETE
HAVE YOU EVER FELT YOU SHOULD CUT DOWN ON YOUR DRINKING: NO
ALCOHOL_USE: NO

## 2023-12-01 ASSESSMENT — FIBROSIS 4 INDEX
FIB4 SCORE: 1.06
FIB4 SCORE: 1.06
FIB4 SCORE: 1.01
FIB4 SCORE: 1.06

## 2023-12-01 ASSESSMENT — PAIN DESCRIPTION - PAIN TYPE: TYPE: ACUTE PAIN

## 2023-12-02 ENCOUNTER — PHARMACY VISIT (OUTPATIENT)
Dept: PHARMACY | Facility: MEDICAL CENTER | Age: 58
End: 2023-12-02
Payer: COMMERCIAL

## 2023-12-02 ENCOUNTER — APPOINTMENT (OUTPATIENT)
Dept: CARDIOLOGY | Facility: MEDICAL CENTER | Age: 58
End: 2023-12-02
Payer: COMMERCIAL

## 2023-12-02 ENCOUNTER — APPOINTMENT (OUTPATIENT)
Dept: RADIOLOGY | Facility: MEDICAL CENTER | Age: 58
End: 2023-12-02
Attending: STUDENT IN AN ORGANIZED HEALTH CARE EDUCATION/TRAINING PROGRAM
Payer: COMMERCIAL

## 2023-12-02 VITALS
SYSTOLIC BLOOD PRESSURE: 160 MMHG | TEMPERATURE: 97.8 F | RESPIRATION RATE: 16 BRPM | OXYGEN SATURATION: 94 % | HEART RATE: 66 BPM | DIASTOLIC BLOOD PRESSURE: 72 MMHG | BODY MASS INDEX: 34.84 KG/M2 | WEIGHT: 184.53 LBS | HEIGHT: 61 IN

## 2023-12-02 PROBLEM — I24.9 ACS (ACUTE CORONARY SYNDROME) (HCC): Status: RESOLVED | Noted: 2023-12-01 | Resolved: 2023-12-02

## 2023-12-02 PROBLEM — I16.0 HYPERTENSIVE URGENCY: Status: RESOLVED | Noted: 2023-12-01 | Resolved: 2023-12-02

## 2023-12-02 LAB
ALBUMIN SERPL BCP-MCNC: 3.3 G/DL (ref 3.2–4.9)
ALBUMIN/GLOB SERPL: 1.1 G/DL
ALP SERPL-CCNC: 102 U/L (ref 30–99)
ALT SERPL-CCNC: 18 U/L (ref 2–50)
ANION GAP SERPL CALC-SCNC: 11 MMOL/L (ref 7–16)
AST SERPL-CCNC: 19 U/L (ref 12–45)
BILIRUB SERPL-MCNC: 0.2 MG/DL (ref 0.1–1.5)
BUN SERPL-MCNC: 33 MG/DL (ref 8–22)
CALCIUM ALBUM COR SERPL-MCNC: 9.7 MG/DL (ref 8.5–10.5)
CALCIUM SERPL-MCNC: 9.1 MG/DL (ref 8.5–10.5)
CHLORIDE SERPL-SCNC: 101 MMOL/L (ref 96–112)
CO2 SERPL-SCNC: 22 MMOL/L (ref 20–33)
CREAT SERPL-MCNC: 1 MG/DL (ref 0.5–1.4)
ERYTHROCYTE [DISTWIDTH] IN BLOOD BY AUTOMATED COUNT: 38 FL (ref 35.9–50)
GFR SERPLBLD CREATININE-BSD FMLA CKD-EPI: 65 ML/MIN/1.73 M 2
GLOBULIN SER CALC-MCNC: 3.1 G/DL (ref 1.9–3.5)
GLUCOSE BLD STRIP.AUTO-MCNC: 273 MG/DL (ref 65–99)
GLUCOSE BLD STRIP.AUTO-MCNC: 410 MG/DL (ref 65–99)
GLUCOSE BLD STRIP.AUTO-MCNC: 410 MG/DL (ref 65–99)
GLUCOSE BLD STRIP.AUTO-MCNC: 425 MG/DL (ref 65–99)
GLUCOSE SERPL-MCNC: 311 MG/DL (ref 65–99)
HCT VFR BLD AUTO: 36.7 % (ref 37–47)
HGB BLD-MCNC: 12.5 G/DL (ref 12–16)
LV EJECT FRACT  99904: 65
LV EJECT FRACT MOD 2C 99903: 62.61
LV EJECT FRACT MOD 4C 99902: 59.85
LV EJECT FRACT MOD BP 99901: 61.16
MCH RBC QN AUTO: 29.6 PG (ref 27–33)
MCHC RBC AUTO-ENTMCNC: 34.1 G/DL (ref 32.2–35.5)
MCV RBC AUTO: 87 FL (ref 81.4–97.8)
PLATELET # BLD AUTO: 268 K/UL (ref 164–446)
PMV BLD AUTO: 11.3 FL (ref 9–12.9)
POTASSIUM SERPL-SCNC: 4.3 MMOL/L (ref 3.6–5.5)
PROT SERPL-MCNC: 6.4 G/DL (ref 6–8.2)
RBC # BLD AUTO: 4.22 M/UL (ref 4.2–5.4)
SODIUM SERPL-SCNC: 134 MMOL/L (ref 135–145)
WBC # BLD AUTO: 8 K/UL (ref 4.8–10.8)

## 2023-12-02 PROCEDURE — 700102 HCHG RX REV CODE 250 W/ 637 OVERRIDE(OP): Performed by: STUDENT IN AN ORGANIZED HEALTH CARE EDUCATION/TRAINING PROGRAM

## 2023-12-02 PROCEDURE — 93306 TTE W/DOPPLER COMPLETE: CPT

## 2023-12-02 PROCEDURE — 700102 HCHG RX REV CODE 250 W/ 637 OVERRIDE(OP): Performed by: NURSE PRACTITIONER

## 2023-12-02 PROCEDURE — 93306 TTE W/DOPPLER COMPLETE: CPT | Mod: 26 | Performed by: INTERNAL MEDICINE

## 2023-12-02 PROCEDURE — 82962 GLUCOSE BLOOD TEST: CPT | Mod: 91

## 2023-12-02 PROCEDURE — 99239 HOSP IP/OBS DSCHRG MGMT >30: CPT | Performed by: HOSPITALIST

## 2023-12-02 PROCEDURE — 80053 COMPREHEN METABOLIC PANEL: CPT

## 2023-12-02 PROCEDURE — 85027 COMPLETE CBC AUTOMATED: CPT

## 2023-12-02 PROCEDURE — 78452 HT MUSCLE IMAGE SPECT MULT: CPT

## 2023-12-02 PROCEDURE — 700105 HCHG RX REV CODE 258

## 2023-12-02 PROCEDURE — 96372 THER/PROPH/DIAG INJ SC/IM: CPT

## 2023-12-02 PROCEDURE — 700111 HCHG RX REV CODE 636 W/ 250 OVERRIDE (IP)

## 2023-12-02 PROCEDURE — A9270 NON-COVERED ITEM OR SERVICE: HCPCS | Performed by: STUDENT IN AN ORGANIZED HEALTH CARE EDUCATION/TRAINING PROGRAM

## 2023-12-02 PROCEDURE — RXMED WILLOW AMBULATORY MEDICATION CHARGE

## 2023-12-02 PROCEDURE — A9270 NON-COVERED ITEM OR SERVICE: HCPCS | Performed by: NURSE PRACTITIONER

## 2023-12-02 PROCEDURE — 700111 HCHG RX REV CODE 636 W/ 250 OVERRIDE (IP): Performed by: STUDENT IN AN ORGANIZED HEALTH CARE EDUCATION/TRAINING PROGRAM

## 2023-12-02 PROCEDURE — G0378 HOSPITAL OBSERVATION PER HR: HCPCS

## 2023-12-02 RX ORDER — HYDROCHLOROTHIAZIDE 25 MG/1
25 TABLET ORAL DAILY
Qty: 30 TABLET | Refills: 1 | Status: SHIPPED | OUTPATIENT
Start: 2023-12-03 | End: 2024-01-19 | Stop reason: SDUPTHER

## 2023-12-02 RX ORDER — REGADENOSON 0.08 MG/ML
INJECTION, SOLUTION INTRAVENOUS
Status: COMPLETED
Start: 2023-12-02 | End: 2023-12-02

## 2023-12-02 RX ORDER — GLUCOSAMINE HCL/CHONDROITIN SU 500-400 MG
CAPSULE ORAL
Qty: 100 EACH | Refills: 1 | Status: SHIPPED | OUTPATIENT
Start: 2023-12-02

## 2023-12-02 RX ORDER — HYDRALAZINE HYDROCHLORIDE 25 MG/1
25 TABLET, FILM COATED ORAL EVERY 8 HOURS
Qty: 90 TABLET | Refills: 1 | Status: SHIPPED | OUTPATIENT
Start: 2023-12-02

## 2023-12-02 RX ORDER — FLUTICASONE PROPIONATE AND SALMETEROL 100; 50 UG/1; UG/1
1 POWDER RESPIRATORY (INHALATION) EVERY 12 HOURS
Qty: 60 EACH | Refills: 2
Start: 2023-12-02

## 2023-12-02 RX ORDER — REGADENOSON 0.08 MG/ML
0.4 INJECTION, SOLUTION INTRAVENOUS ONCE
Status: COMPLETED | OUTPATIENT
Start: 2023-12-02 | End: 2023-12-02

## 2023-12-02 RX ORDER — AMINOPHYLLINE 25 MG/ML
100 INJECTION, SOLUTION INTRAVENOUS
Status: COMPLETED | OUTPATIENT
Start: 2023-12-02 | End: 2023-12-02

## 2023-12-02 RX ORDER — SODIUM CHLORIDE 9 MG/ML
1000 INJECTION, SOLUTION INTRAVENOUS ONCE
Status: COMPLETED | OUTPATIENT
Start: 2023-12-02 | End: 2023-12-02

## 2023-12-02 RX ORDER — AMLODIPINE BESYLATE 10 MG/1
10 TABLET ORAL DAILY
Qty: 30 TABLET | Refills: 1 | Status: SHIPPED | OUTPATIENT
Start: 2023-12-03 | End: 2024-01-19 | Stop reason: SDUPTHER

## 2023-12-02 RX ORDER — LABETALOL HYDROCHLORIDE 5 MG/ML
10 INJECTION, SOLUTION INTRAVENOUS EVERY 4 HOURS PRN
Status: DISCONTINUED | OUTPATIENT
Start: 2023-12-02 | End: 2023-12-02 | Stop reason: HOSPADM

## 2023-12-02 RX ORDER — AMINOPHYLLINE 25 MG/ML
INJECTION, SOLUTION INTRAVENOUS
Status: DISCONTINUED
Start: 2023-12-02 | End: 2023-12-02 | Stop reason: HOSPADM

## 2023-12-02 RX ORDER — INSULIN GLARGINE 100 [IU]/ML
5 INJECTION, SOLUTION SUBCUTANEOUS EVERY EVENING
Qty: 3 ML | Refills: 1 | Status: SHIPPED | OUTPATIENT
Start: 2023-12-02 | End: 2024-01-19 | Stop reason: SDUPTHER

## 2023-12-02 RX ORDER — LOSARTAN POTASSIUM 25 MG/1
25 TABLET ORAL DAILY
Qty: 30 TABLET | Refills: 1 | Status: SHIPPED | OUTPATIENT
Start: 2023-12-03 | End: 2024-02-02 | Stop reason: SDUPTHER

## 2023-12-02 RX ADMIN — HYDROCHLOROTHIAZIDE 25 MG: 25 TABLET ORAL at 05:33

## 2023-12-02 RX ADMIN — METOPROLOL SUCCINATE 50 MG: 50 TABLET, EXTENDED RELEASE ORAL at 05:33

## 2023-12-02 RX ADMIN — ACETAMINOPHEN 650 MG: 325 TABLET, FILM COATED ORAL at 07:35

## 2023-12-02 RX ADMIN — LOSARTAN POTASSIUM 25 MG: 25 TABLET, FILM COATED ORAL at 05:34

## 2023-12-02 RX ADMIN — TOPIRAMATE 50 MG: 25 TABLET, FILM COATED ORAL at 05:33

## 2023-12-02 RX ADMIN — SODIUM CHLORIDE 1000 ML: 9 INJECTION, SOLUTION INTRAVENOUS at 15:09

## 2023-12-02 RX ADMIN — DOCUSATE SODIUM 50 MG AND SENNOSIDES 8.6 MG 2 TABLET: 8.6; 5 TABLET, FILM COATED ORAL at 05:34

## 2023-12-02 RX ADMIN — REGADENOSON 0.4 MG: 0.08 INJECTION, SOLUTION INTRAVENOUS at 09:01

## 2023-12-02 RX ADMIN — ASPIRIN 81 MG: 81 TABLET, COATED ORAL at 05:33

## 2023-12-02 RX ADMIN — HYDRALAZINE HYDROCHLORIDE 25 MG: 25 TABLET, FILM COATED ORAL at 13:08

## 2023-12-02 RX ADMIN — HYDRALAZINE HYDROCHLORIDE 25 MG: 25 TABLET, FILM COATED ORAL at 05:33

## 2023-12-02 RX ADMIN — INSULIN HUMAN 9 UNITS: 100 INJECTION, SOLUTION PARENTERAL at 10:14

## 2023-12-02 RX ADMIN — MOMETASONE FUROATE AND FORMOTEROL FUMARATE DIHYDRATE 2 PUFF: 100; 5 AEROSOL RESPIRATORY (INHALATION) at 10:18

## 2023-12-02 RX ADMIN — AMINOPHYLLINE 100 MG: 25 INJECTION, SOLUTION INTRAVENOUS at 09:05

## 2023-12-02 ASSESSMENT — PAIN DESCRIPTION - PAIN TYPE: TYPE: ACUTE PAIN

## 2023-12-02 ASSESSMENT — LIFESTYLE VARIABLES
HAVE YOU EVER FELT YOU SHOULD CUT DOWN ON YOUR DRINKING: NO
TOTAL SCORE: 0
EVER HAD A DRINK FIRST THING IN THE MORNING TO STEADY YOUR NERVES TO GET RID OF A HANGOVER: NO
EVER FELT BAD OR GUILTY ABOUT YOUR DRINKING: NO
ALCOHOL_USE: NO
TOTAL SCORE: 0
AVERAGE NUMBER OF DAYS PER WEEK YOU HAVE A DRINK CONTAINING ALCOHOL: 0
HOW MANY TIMES IN THE PAST YEAR HAVE YOU HAD 5 OR MORE DRINKS IN A DAY: 0
CONSUMPTION TOTAL: NEGATIVE
TOTAL SCORE: 0
HAVE PEOPLE ANNOYED YOU BY CRITICIZING YOUR DRINKING: NO
ON A TYPICAL DAY WHEN YOU DRINK ALCOHOL HOW MANY DRINKS DO YOU HAVE: 0

## 2023-12-02 NOTE — ED NOTES
Pt resting in [facility], pt reports no pain at this time.  POC BG performed.  . MD notified.

## 2023-12-02 NOTE — ASSESSMENT & PLAN NOTE
Patient presents with 1 to 2-day history of chest pain.  EKG unremarkable.  Troponin negative.  She has had multiple stress test in the past 10 years -- with 3 nuclear stress tests in 2015, 2018 and 2020 which showed no ischemia or infarction.   Follow-up repeat troponin  Follow-up echocardiogram  Follow-up nuclear stress test  Continue with aspirin and statin

## 2023-12-02 NOTE — ED TRIAGE NOTES
Chief Complaint   Patient presents with    Shortness of Breath     Patient reports while at work she bent down and when she stood back up developed SOB.     Back Pain     Patient reports back pain after bending down at work today.

## 2023-12-02 NOTE — ED PROVIDER NOTES
ED Provider Note    CHIEF COMPLAINT  Chief Complaint   Patient presents with    Shortness of Breath     Patient reports while at work she bent down and when she stood back up developed SOB.     Back Pain     Patient reports back pain after bending down at work today.        EXTERNAL RECORDS REVIEWED  Recent outpatient evaluations for GERD no previous stress test or angiogram    HPI/ROS  LIMITATION TO HISTORY   Select: : None  OUTSIDE HISTORIAN(S):      Sonya Wei is a 57 y.o. female who presents to the emergency department with chief complaint of upper back pain.  Patient reports that this pain began in her central upper back and radiated into her chest that began after she had bent down yesterday morning.  She states that it somewhat waxed and waned throughout the last day.  She does report that it was worse with onset.  She reports it is worse positionally and is also worse with exertion.  Minor shortness of breath no nausea no diaphoresis.  Patient does have a history of hypertension and diabetes, last stress test was 3 years prior    PAST MEDICAL HISTORY   has a past medical history of Anemia, Bowel habit changes, Diabetes, Diabetes (HCC), GERD (gastroesophageal reflux disease), Healthcare maintenance (9/27/2014), High cholesterol, Hyperlipidemia, Hypertension, Infectious disease, Jaundice (1999), Psychiatric problem, and Vaginal itching (8/27/2014).    SURGICAL HISTORY   has a past surgical history that includes cholecystectomy; primary c section; tubal coagulation laparoscopic bilateral; abdominal hysterectomy total; and ventral hernia repair robotic xi (N/A, 10/14/2016).    FAMILY HISTORY  Family History   Problem Relation Age of Onset    Diabetes Mother     Hyperlipidemia Mother     Diabetes Father     Hypertension Father     Hyperlipidemia Father        SOCIAL HISTORY  Social History     Tobacco Use    Smoking status: Never    Smokeless tobacco: Never   Vaping Use    Vaping Use: Never used  "  Substance and Sexual Activity    Alcohol use: No    Drug use: No    Sexual activity: Yes     Partners: Male     Birth control/protection: None       CURRENT MEDICATIONS  Home Medications       Reviewed by Crissy Thomas R.N. (Registered Nurse) on 12/01/23 at 1746  Med List Status: Partial     Medication Last Dose Status   albuterol 108 (90 Base) MCG/ACT Aero Soln inhalation aerosol  Active   Ascorbic Acid (VITAMIN C) 1000 MG Tab  Active   ASPIRIN 81 PO  Active   atorvastatin (LIPITOR) 20 MG Tab  Active   Cholecalciferol (VITAMIN D PO)  Active   cloNIDine (CATAPRES) 0.1 MG Tab  Active   Cyanocobalamin (VITAMIN B-12 PO)  Active   Dulaglutide (TRULICITY) 1.5 MG/0.5ML Solution Pen-injector  Active   Empagliflozin 25 MG Tab  Active   escitalopram (LEXAPRO) 10 MG Tab  Active   fluticasone-salmeterol (ADVAIR) 100-50 MCG/ACT AEROSOL POWDER, BREATH ACTIVATED  Active   gabapentin (NEURONTIN) 100 MG Cap  Active   glyBURIDE (DIABETA) 5 MG Tab  Active   hydroCHLOROthiazide (HYDRODIURIL) 12.5 MG tablet  Active   hydrOXYzine HCl (ATARAX) 25 MG Tab  Active   insulin glargine 100 UNIT/ML SC SOPN injection  Active   Insulin Pen Needle 32 G x 4 mm  Active   losartan (COZAAR) 25 MG Tab  Active   metFORMIN (GLUCOPHAGE) 500 MG Tab  Active   metoprolol SR (TOPROL XL) 50 MG TABLET SR 24 HR  Active   Misc. Devices Misc  Active   Multiple Vitamin (MULTI VITAMIN DAILY) Tab  Active   omeprazole (PRILOSEC) 20 MG delayed-release capsule  Active   SITagliptin (JANUVIA) 100 MG Tab  Active   sumatriptan (IMITREX) 20 MG/ACT nasal spray  Active   tizanidine (ZANAFLEX) 4 MG Tab  Active   topiramate (TOPAMAX) 50 MG tablet  Active                    ALLERGIES  Allergies   Allergen Reactions    Latex Rash    Morphine Itching     Rxn = unknown   Bumps all over body    Other Misc Rash     Rash from gloves at work       PHYSICAL EXAM  VITAL SIGNS: BP (!) 196/99   Pulse 75   Temp 36.6 °C (97.9 °F) (Oral)   Resp 16   Ht 1.549 m (5' 1\")   Wt 80.4 kg " (177 lb 4 oz)   SpO2 98%   BMI 33.49 kg/m²      Pulse ox interpretation: I interpret this pulse ox as normal.  Constitutional: Alert and oriented x 3, water distress  HEENT: Atraumatic normocephalic, pupils are equal round reactive to light extraocular movements are intact. The nares is clear, external ears are normal, mouth shows moist mucous membranes normal dentition for age  Neck: Supple, no JVD no tracheal deviation  Cardiovascular: Regular rate and rhythm no murmur rub or gallop 2+ pulses peripherally x4  Thorax & Lungs: No respiratory distress, no wheezes rales or rhonchi, No chest tenderness.   GI: Soft nontender nondistended positive bowel sounds, no peritoneal signs  Skin: Warm dry no acute rash or lesion  Musculoskeletal: Moving all extremities with full range and 5 of 5 strength no acute  deformity  Neurologic: Cranial nerves III through XII are grossly intact no sensory deficit no cerebellar dysfunction   Psychiatric: Appropriate affect for situation at this time      DIAGNOSTIC STUDIES / PROCEDURES  Results for orders placed or performed during the hospital encounter of 12/01/23   CBC with Differential   Result Value Ref Range    WBC 7.7 4.8 - 10.8 K/uL    RBC 4.20 4.20 - 5.40 M/uL    Hemoglobin 12.4 12.0 - 16.0 g/dL    Hematocrit 36.3 (L) 37.0 - 47.0 %    MCV 86.4 81.4 - 97.8 fL    MCH 29.5 27.0 - 33.0 pg    MCHC 34.2 32.2 - 35.5 g/dL    RDW 37.7 35.9 - 50.0 fL    Platelet Count 270 164 - 446 K/uL    MPV 11.1 9.0 - 12.9 fL    Neutrophils-Polys 49.80 44.00 - 72.00 %    Lymphocytes 39.70 22.00 - 41.00 %    Monocytes 7.30 0.00 - 13.40 %    Eosinophils 2.00 0.00 - 6.90 %    Basophils 0.40 0.00 - 1.80 %    Immature Granulocytes 0.80 0.00 - 0.90 %    Nucleated RBC 0.00 0.00 - 0.20 /100 WBC    Neutrophils (Absolute) 3.83 1.82 - 7.42 K/uL    Lymphs (Absolute) 3.05 1.00 - 4.80 K/uL    Monos (Absolute) 0.56 0.00 - 0.85 K/uL    Eos (Absolute) 0.15 0.00 - 0.51 K/uL    Baso (Absolute) 0.03 0.00 - 0.12 K/uL     Immature Granulocytes (abs) 0.06 0.00 - 0.11 K/uL    NRBC (Absolute) 0.00 K/uL   proBrain Natriuretic Peptide, NT   Result Value Ref Range    NT-proBNP 292 (H) 0 - 125 pg/mL   Troponin - STAT Once   Result Value Ref Range    Troponin T 15 6 - 19 ng/L   PT/INR   Result Value Ref Range    PT 12.8 12.0 - 14.6 sec    INR 0.95 0.87 - 1.13   APTT   Result Value Ref Range    APTT 28.3 24.7 - 36.0 sec   Comp Metabolic Panel   Result Value Ref Range    Sodium 132 (L) 135 - 145 mmol/L    Potassium 4.6 3.6 - 5.5 mmol/L    Chloride 98 96 - 112 mmol/L    Co2 24 20 - 33 mmol/L    Anion Gap 10.0 7.0 - 16.0    Glucose 387 (H) 65 - 99 mg/dL    Bun 36 (H) 8 - 22 mg/dL    Creatinine 1.04 0.50 - 1.40 mg/dL    Calcium 9.1 8.5 - 10.5 mg/dL    Correct Calcium 9.3 8.5 - 10.5 mg/dL    AST(SGOT) 21 12 - 45 U/L    ALT(SGPT) 18 2 - 50 U/L    Alkaline Phosphatase 110 (H) 30 - 99 U/L    Total Bilirubin 0.2 0.1 - 1.5 mg/dL    Albumin 3.8 3.2 - 4.9 g/dL    Total Protein 6.7 6.0 - 8.2 g/dL    Globulin 2.9 1.9 - 3.5 g/dL    A-G Ratio 1.3 g/dL   ESTIMATED GFR   Result Value Ref Range    GFR (CKD-EPI) 62 >60 mL/min/1.73 m 2   EKG   Result Value Ref Range    Report       [facility] Emergency Dept.    Test Date:  2023  Pt Name:    MATTIE SANDOVAL               Department: ER  MRN:        [id number]                      Room:  Gender:     Female                       Technician: [id number]  :        [id number]                   Requested By:ER TRIAGE PROTOCOL  Order #:    [id number]                    Reading MD: PRINCESS WALKER MD    Measurements  Intervals                                Axis  Rate:       72                           P:          60  ID:         [id number]                          QRS:        8  QRSD:       137                          T:          22  QT:         391  QTc:        428    Interpretive Statements  Sinus rhythm  Right bundle branch block  Compared to ECG 10/25/2022 18:08:36  ST (T wave) deviation no longer  present  Electronically Signed On 12- 19:04:53 PST by PRINCESS WALKER MD        RADIOLOGY  I have independently interpreted the diagnostic imaging associated with this visit and am waiting the final reading from the radiologist.   My preliminary interpretation is as follows:   CTA of the thoracic aorta upon my evaluation in the radiology suite shows no evidence of large aneurysm or dissection.      Radiologist interpretation:   CT-CTA COMPLETE THORACOABDOMINAL AORTA   Final Result      1.  No aortic aneurysm or dissection identified.   2.  Hepatomegaly.   3.  Status post cholecystectomy.   4.  Atherosclerosis with coronary artery disease.                        DX-CHEST-PORTABLE (1 VIEW)   Final Result         1. No acute cardiopulmonary abnormalities are identified.      EC-ECHOCARDIOGRAM COMPLETE W/O CONT    (Results Pending)   NM-CARDIAC STRESS TEST    (Results Pending)         COURSE & MEDICAL DECISION MAKING    ED Observation Status? No; Patient does not meet criteria for ED Observation.     ASSESSMENT, COURSE AND PLAN    Care Narrative: Very pleasant 57-year-old female with 24 hours of upper back and chest pain extremely hypertensive at arrival with pulse discrepancy on arrival.  Code aorta activated.  18-gauge established in the left AC protocol labs sent for evaluation including slightly elevated BNP at 292 normal troponin.  EKG is nonischemic.  I accompanied the patient to the CAT scan for her CT thoracic aorta and do not see any evidence of ruptured aneurysm or dissection at this time.  Patient is however extremely high risk/high heart score due to her hypertension diabetes and suspicious story.  Has not had any stress testing in 3 years.  Her CAT scan also demonstrates coronary artery atherosclerosis.  Patient is given 325 aspirin and half inch of Nitropaste.  Will order as needed hydralazine should Nitropaste not appropriately decrease her blood pressure.  Discussed case up to this point with  hospitalist Dr. Herrera patient will be admitted for ongoing evaluation and treatment.          FINAL DIAGNOSIS  1. Chest pain, unspecified type Active   2. Hypertensive urgency Active   3.  Upper back pain  4.  Hyperglycemia       Electronically signed by: Deangelo Mahoney M.D.

## 2023-12-02 NOTE — PROGRESS NOTES
Med rec has been updated and completed per pt's home pharmacy ([facility])     Per [facility] pt has not filled Losartan sine July   Per [facility] pt is prescribed to take Topiramate twice a day

## 2023-12-02 NOTE — PROGRESS NOTES
4 Eyes Skin Assessment Completed by NANO Tafoya and Dana RN.    Head WDL  Ears WDL  Nose WDL  Mouth WDL  Neck WDL  Breast/Chest WDL  Shoulder Blades WDL  Spine WDL  (R) Arm/Elbow/Hand WDL  (L) Arm/Elbow/Hand WDL  Abdomen WDL  Groin WDL  Scrotum/Coccyx/Buttocks WDL  (R) Leg WDL  (L) Leg WDL  (R) Heel/Foot/Toe WDL  (L) Heel/Foot/Toe WDL          Devices In Places Tele Box and Blood Pressure Cuff      Interventions In Place N/A    Possible Skin Injury No    Pictures Uploaded Into Epic N/A  Wound Consult Placed N/A  RN Wound Prevention Protocol Ordered No

## 2023-12-02 NOTE — ASSESSMENT & PLAN NOTE
Insulin sliding scale  Continue home dose glargine  Diabetic diet  Hypoglycemia protocol  Follow-up hemoglobin A1c

## 2023-12-02 NOTE — CARE PLAN
Problem: Pain - Standard  Goal: Alleviation of pain or a reduction in pain to the patient’s comfort goal  Outcome: Progressing     Problem: Knowledge Deficit - Standard  Goal: Patient and family/care givers will demonstrate understanding of plan of care, disease process/condition, diagnostic tests and medications  Outcome: Progressing     Problem: Hemodynamics  Goal: Patient's hemodynamics, fluid balance and neurologic status will be stable or improve  Outcome: Progressing   The patient is Stable - Low risk of patient condition declining or worsening    Shift Goals  Clinical Goals: control B/P and insulin, Echo  Patient Goals: discharge  Family Goals: yrn    Progress made toward(s) clinical / shift goals:  Patient is able to verbalize her plan of care.    Patient is not progressing towards the following goals: N/A

## 2023-12-02 NOTE — ASSESSMENT & PLAN NOTE
Patient presents with systolic blood pressure greater than 200 emergency department.  She is getting a dose of fentanyl and nitroglycerin in the emergency department.   If no improvement, will start as needed antihypertensives  Resume home blood pressure medication

## 2023-12-02 NOTE — ED NOTES
Med Rec partial complete per patient   Allergies reviewed  Antibiotics in the past 30 days:no  Anticoagulant in past 14 days:no  Pharmacy patient utilizes:[facility] in [place]    Pt unable to verify if she takes Losartan (removed from med rec)  Pt is also unsure if Topiramate is BID and states she thinks it's QD   Pharmacy is currently closed at this time    Pt states her new Dr discontinued Glyburide 5 mg but pt states she would like to go back on it because states it helped her BS    Pt states she was unable to inject Trulicity today

## 2023-12-02 NOTE — H&P
Hospital Medicine History & Physical Note    Date of Service  12/1/2023    Primary Care Physician  Nu Eason M.D.        Code Status  Full Code    Chief Complaint  Chief Complaint   Patient presents with    Shortness of Breath     Patient reports while at work she bent down and when she stood back up developed SOB.     Back Pain     Patient reports back pain after bending down at work today.        History of Presenting Illness  Sonya Wei is a 57 y.o. female with past medical history of hypertension, obesity, type 2 diabetes who presented 12/1/2023 with chest pain.  Patient states that the pain began yesterday, it was localized between her shoulder blades.  She states that all day today she started experiencing some substernal chest pain.  States that it is pressure-like in nature.  She denies any numbness or tingling rated on her arms.  She does have some shortness of breath which concerned with the pain.  The pain does worsen with movement, especially when she bends over lift things at work.  Patient denies any chest pain on exertion. In the emergency department, EKG and troponin were unremarkable.  CTA was obtained and was negative for dissection.  She was persistently hypertensive in the emergency department, was given fentanyl and nitro glycerin.  Patient will be admitted for ACS rule out.    I discussed the plan of care with patient.    Review of Systems  Review of Systems   Constitutional:  Negative for chills and fever.   HENT:  Negative for congestion and sinus pain.    Eyes:  Negative for blurred vision and double vision.   Respiratory:  Negative for cough, hemoptysis, sputum production, shortness of breath and wheezing.    Cardiovascular:  Positive for chest pain. Negative for palpitations, orthopnea, claudication and leg swelling.   Gastrointestinal:  Negative for abdominal pain, diarrhea, nausea and vomiting.   Musculoskeletal:  Negative for back pain and neck pain.   Neurological:   Negative for dizziness, tingling, tremors, sensory change and headaches.   Psychiatric/Behavioral:  Negative for depression and suicidal ideas.    All other systems reviewed and are negative.      Past Medical History   has a past medical history of Anemia, Bowel habit changes, Diabetes, Diabetes (HCC), GERD (gastroesophageal reflux disease), Healthcare maintenance (9/27/2014), High cholesterol, Hyperlipidemia, Hypertension, Infectious disease, Jaundice (1999), Psychiatric problem, and Vaginal itching (8/27/2014).    Surgical History   has a past surgical history that includes cholecystectomy; primary c section; tubal coagulation laparoscopic bilateral; abdominal hysterectomy total; and ventral hernia repair robotic xi (N/A, 10/14/2016).     Family History  family history includes Diabetes in her father and mother; Hyperlipidemia in her father and mother; Hypertension in her father.   Family history reviewed with patient. There is no family history that is pertinent to the chief complaint.     Social History   reports that she has never smoked. She has never used smokeless tobacco. She reports that she does not drink alcohol and does not use drugs.    Allergies  Allergies   Allergen Reactions    Latex Rash    Morphine Itching     Rxn = unknown   Bumps all over body    Other Misc Rash     Rash from gloves at work       Medications  Prior to Admission Medications   Prescriptions Last Dose Informant Patient Reported? Taking?   ASPIRIN 81 PO   Yes No   Sig: Take 81 mg by mouth every day.   Ascorbic Acid (VITAMIN C) 1000 MG Tab  Patient Yes No   Sig: Take 1 Tablet by mouth every morning.   Cholecalciferol (VITAMIN D PO)  Patient Yes No   Sig: Take 2 Tablets by mouth every day. GUMMIES   Cyanocobalamin (VITAMIN B-12 PO)   Yes No   Sig: Take 1 Tablet by mouth every day.   Dulaglutide (TRULICITY) 1.5 MG/0.5ML Solution Pen-injector   No No   Sig: INJECT 1 DOSE SUBCUTANEOUSLY ONCE A WEEK   Empagliflozin 25 MG Tab   No No    Sig: Take 1 Tablet by mouth every day.   Insulin Pen Needle 32 G x 4 mm   No No   Sig: Use daily for injection of insulin.   Misc. Devices Misc   No No   Sig: Please dispense one blood pressure cuff   Multiple Vitamin (MULTI VITAMIN DAILY) Tab   Yes No   Sig: Take  by mouth.   SITagliptin (JANUVIA) 100 MG Tab   No No   Sig: Take 1 Tablet by mouth every day.   albuterol 108 (90 Base) MCG/ACT Aero Soln inhalation aerosol   No No   Sig: Inhale 2 Puffs every 6 hours as needed for Shortness of Breath.   atorvastatin (LIPITOR) 20 MG Tab   No No   Sig: Take 1 Tablet by mouth every day.   cloNIDine (CATAPRES) 0.1 MG Tab   No No   Sig: Take one tablet up to once a day ONLY for blood pressure greater than 160/90   escitalopram (LEXAPRO) 10 MG Tab   No No   Sig: Take 1 tablet by mouth once daily   fluticasone-salmeterol (ADVAIR) 100-50 MCG/ACT AEROSOL POWDER, BREATH ACTIVATED   No No   Sig: Inhale 1 Puff every 12 hours.   gabapentin (NEURONTIN) 100 MG Cap   No No   Sig: Take 1 Capsule by mouth at bedtime as needed (insomnia).   glyBURIDE (DIABETA) 5 MG Tab   No No   Sig: TAKE 2 TABLETS BY MOUTH TWICE DAILY WITH MEALS   hydrOXYzine HCl (ATARAX) 25 MG Tab   No No   Sig: Take 0.5-1 Tablets by mouth 3 times a day as needed for Anxiety.   hydroCHLOROthiazide (HYDRODIURIL) 12.5 MG tablet   No No   Sig: Take 2 Tablets by mouth every day.   insulin glargine 100 UNIT/ML SC SOPN injection   No No   Sig: Inject 5 Units under the skin every evening.   losartan (COZAAR) 25 MG Tab   No No   Sig: Take 1 tablet by mouth once daily   metFORMIN (GLUCOPHAGE) 500 MG Tab   No No   Sig: Take 2 Tablets by mouth 2 times a day with meals.   metoprolol SR (TOPROL XL) 50 MG TABLET SR 24 HR   No No   Sig: Take 1 Tablet by mouth every day.   omeprazole (PRILOSEC) 20 MG delayed-release capsule   No No   Sig: Take 1 Capsule by mouth every day.   sumatriptan (IMITREX) 20 MG/ACT nasal spray   No No   Sig: Administer 1 Spray into affected nostril(S) as  needed for Migraine.   tizanidine (ZANAFLEX) 4 MG Tab   No No   Sig: Take 0.5-1 Tablets by mouth at bedtime as needed (muscle spasms).   topiramate (TOPAMAX) 50 MG tablet   No No   Sig: Take 1 Tablet by mouth 2 times a day.      Facility-Administered Medications: None       Physical Exam  Temp:  [36.6 °C (97.9 °F)] 36.6 °C (97.9 °F)  Pulse:  [71-81] 73  Resp:  [16-17] 17  BP: (179-208)/(84-99) 208/99  SpO2:  [98 %] 98 %  Blood Pressure: (!) 208/99   Temperature: 36.6 °C (97.9 °F)   Pulse: 73   Respiration: 17   Pulse Oximetry: 98 %       Physical Exam  Constitutional:       General: She is not in acute distress.     Appearance: Normal appearance. She is normal weight. She is not ill-appearing, toxic-appearing or diaphoretic.   HENT:      Head: Normocephalic and atraumatic.      Nose: Nose normal.      Mouth/Throat:      Mouth: Mucous membranes are moist.   Eyes:      Extraocular Movements: Extraocular movements intact.      Pupils: Pupils are equal, round, and reactive to light.   Cardiovascular:      Rate and Rhythm: Normal rate and regular rhythm.      Pulses: Normal pulses.      Heart sounds: Normal heart sounds. No murmur heard.     No friction rub. No gallop.   Pulmonary:      Effort: Pulmonary effort is normal. No respiratory distress.      Breath sounds: No stridor. No wheezing, rhonchi or rales.   Chest:      Chest wall: No tenderness.   Abdominal:      General: Abdomen is flat. There is no distension.      Palpations: Abdomen is soft. There is no mass.      Tenderness: There is no abdominal tenderness. There is no guarding or rebound.      Hernia: No hernia is present.   Musculoskeletal:         General: No swelling, tenderness, deformity or signs of injury.      Right lower leg: No edema.      Left lower leg: No edema.      Comments:      Skin:     General: Skin is warm and dry.      Capillary Refill: Capillary refill takes less than 2 seconds.      Coloration: Skin is not jaundiced or pale.      Findings:  No bruising, erythema, lesion or rash.   Neurological:      General: No focal deficit present.      Mental Status: She is alert and oriented to person, place, and time. Mental status is at baseline.      Cranial Nerves: No cranial nerve deficit.      Sensory: No sensory deficit.      Motor: No weakness.      Coordination: Coordination normal.   Psychiatric:         Mood and Affect: Mood normal.         Behavior: Behavior normal.         Laboratory:  Recent Labs     12/01/23  1814   WBC 7.7   RBC 4.20   HEMOGLOBIN 12.4   HEMATOCRIT 36.3*   MCV 86.4   MCH 29.5   MCHC 34.2   RDW 37.7   PLATELETCT 270   MPV 11.1     Recent Labs     12/01/23  1814   SODIUM 132*   POTASSIUM 4.6   CHLORIDE 98   CO2 24   GLUCOSE 387*   BUN 36*   CREATININE 1.04   CALCIUM 9.1     Recent Labs     12/01/23  1814   ALTSGPT 18   ASTSGOT 21   ALKPHOSPHAT 110*   TBILIRUBIN 0.2   GLUCOSE 387*     Recent Labs     12/01/23  1814   APTT 28.3   INR 0.95     Recent Labs     12/01/23  1814   NTPROBNP 292*         Recent Labs     12/01/23  1814   TROPONINT 15       Imaging:  CT-CTA COMPLETE THORACOABDOMINAL AORTA   Final Result      1.  No aortic aneurysm or dissection identified.   2.  Hepatomegaly.   3.  Status post cholecystectomy.   4.  Atherosclerosis with coronary artery disease.                        DX-CHEST-PORTABLE (1 VIEW)   Final Result         1. No acute cardiopulmonary abnormalities are identified.      EC-ECHOCARDIOGRAM COMPLETE W/O CONT    (Results Pending)   NM-CARDIAC STRESS TEST    (Results Pending)       X-Ray:  I have personally reviewed the images and compared with prior images.  EKG:  I have personally reviewed the images and compared with prior images.    Assessment/Plan:  Justification for Admission Status  I anticipate this patient is appropriate for observation status at this time because ACS rule out requiring echocardiogram and stress test    Patient will need a Telemetry bed on MEDICAL service .  The need is secondary to  ACS rule out.    * ACS (acute coronary syndrome) (HCC)- (present on admission)  Assessment & Plan  Patient presents with 1 to 2-day history of chest pain.  EKG unremarkable.  Troponin negative.  She has had multiple stress test in the past 10 years -- with 3 nuclear stress tests in 2015, 2018 and 2020 which showed no ischemia or infarction.   Follow-up repeat troponin  Follow-up echocardiogram  Follow-up nuclear stress test  Continue with aspirin and statin    Hypertensive urgency  Assessment & Plan  Patient presents with systolic blood pressure greater than 200 emergency department.  She is getting a dose of fentanyl and nitroglycerin in the emergency department.   If no improvement, will start as needed antihypertensives  Resume home blood pressure medication    Obesity (BMI 30.0-34.9)- (present on admission)  Assessment & Plan  Talk to patient about weight loss strategies including diet and exercise.  She will need outpatient follow-up with primary care physician    Hyperlipidemia- (present on admission)  Assessment & Plan  Continue with atorvastatin    Type 2 diabetes mellitus without complication, with long-term current use of insulin ([facility])- (present on admission)  Assessment & Plan  Insulin sliding scale  Continue home dose glargine  Diabetic diet  Hypoglycemia protocol  Follow-up hemoglobin A1c        VTE prophylaxis: enoxaparin ppx

## 2023-12-02 NOTE — CARE PLAN
The patient is Stable - Low risk of patient condition declining or worsening    Shift Goals  Clinical Goals: Monitor Blood pressure, pain control  Patient Goals: comfort, rest  Family Goals: yrn    Progress made toward(s) clinical / shift goals:  Patient is alert and oriented, assessed for changes in neurological status, because of critical blood pressure. No change noted. Relayed with hospitalist , additional blood pressure medications effective. Hourly rounds to ensure safety and comfort. Call bell within reach.    Patient is not progressing towards the following goals:

## 2023-12-02 NOTE — ED NOTES
Pt to P71. Pt is A&Ox4 and awake in bed. Pt placed on pulse ox, and automatic BP. Pt hypertensive, saturating above 95% on RA, heart rate in the 70s. Call light within reach and chart up for ERP.

## 2023-12-02 NOTE — PROGRESS NOTES
Report received from NANO Tafoya.  Assumed care of patient.  Assessment complete.  Plan of care gone over with the patient and all concerns addressed.  Patient sitting up in bed.  Patient A & O  x 4.  No apparent signs of distress.  Safety precautions in place.  Patient educated to call for assistance.  Hourly rounding in place.

## 2023-12-02 NOTE — ED NOTES
ERP at bedside, RN x2 at bedside, pt has 20+ pt systolic difference between arms with back pain. Code aorta called.

## 2023-12-02 NOTE — ASSESSMENT & PLAN NOTE
Talk to patient about weight loss strategies including diet and exercise.  She will need outpatient follow-up with primary care physician

## 2023-12-03 NOTE — DISCHARGE SUMMARY
Discharge Summary    CHIEF COMPLAINT ON ADMISSION  Chief Complaint   Patient presents with    Shortness of Breath     Patient reports while at work she bent down and when she stood back up developed SOB.     Back Pain     Patient reports back pain after bending down at work today.        Reason for Admission  Back Pain; Difficulty Breathing     Admission Date  12/1/2023    CODE STATUS  Prior    HPI & HOSPITAL COURSE    Sonya Wei is a 57 y.o. female with past medical history of hypertension, obesity, type 2 diabetes who presented 12/1/2023 with chest pain.      Patient states that the pain began yesterday, it was localized between her shoulder blades.  She states that all day today she started experiencing some substernal chest pain.  States that it is pressure-like in nature.  She denies any numbness or tingling rated on her arms.  She does have some shortness of breath which concerned with the pain.  The pain does worsen with movement, especially when she bends over lift things at work.  Patient denies any chest pain on exertion.     In the emergency department, EKG and troponin were unremarkable.  CTA was obtained and was negative for dissection.  She was persistently hypertensive in the emergency department, was given fentanyl and nitroglycerin.  Patient was admitted to hospitalist for ACS rule out.     Repeat troponin remained non-elevated. She went for pharmacologic nuclear stress test which reported normal myocardial perfusion with no ischemia. Echocardiogram revealed LVEF 65%, no regional wall motion abnormalities. She did have elevated blood sugar, likely related to holding her home oral medications, which resolved with subcutaneous insulin and IV fluids. For hypertension she was restarted on home medications. Hydralazine and amlodipine were added.     All new prescriptions as well as refills of her insulin, pen needles, and oral diabetes medications were sent to our local pharmacy, verified cost  with patient, and delivered to patient's hand at bedside. As cardiac workup reassuring, she is cleared to discharge home to follow with her PCP as already scheduled later this week.     Therefore, she is discharged in good and stable condition to home with close outpatient follow-up.    The patient recovered much more quickly than anticipated on admission.    Discharge Date  12/2/2023    FOLLOW UP ITEMS POST DISCHARGE      DISCHARGE DIAGNOSES  Principal Problem (Resolved):    ACS (acute coronary syndrome) (HCC) (POA: Yes)  Active Problems:    Type 2 diabetes mellitus without complication, with long-term current use of insulin (HCC) (POA: Yes)    Hyperlipidemia (POA: Yes)      Overview: Pravastatin.    Obesity (BMI 30.0-34.9) (POA: Yes)  Resolved Problems:    Hypertensive urgency (POA: Unknown)      FOLLOW UP  Future Appointments   Date Time Provider Department Center   12/8/2023 10:30 AM [facility]   3/19/2024 11:40 AM Nu Eason M.D. [facility] Eason M.D.  [address] [place] [address]  [phone]    Follow up on 12/8/2023        MEDICATIONS ON DISCHARGE     Medication List        START taking these medications        Instructions   Alcohol Prep 70 % Pads   Doctor's comments: Per formulary preference. ICD-10 code: E11.65 Uncontrolled type 2 Diabetes Mellitus  Wipe site with prep pad prior to injection.     amLODIPine 10 MG Tabs  Commonly known as: Norvasc   Take 1 Tablet by mouth every day.  Dose: 10 mg     hydrALAZINE 25 MG Tabs  Commonly known as: Apresoline   Take 1 Tablet by mouth every 8 hours.  Dose: 25 mg     Unifine Pentips 32G X 4 MM  Generic drug: Insulin Pen Needle 32 G x 4 mm   Doctor's comments: Per patient/formulary preference. ICD-10 code: E11.65 Uncontrolled type 2 Diabetes Mellitus  Use one pen needle in pen device to inject insulin once daily.            CHANGE how you take these medications        Instructions   hydroCHLOROthiazide 25 MG  Tabs  What changed:   medication strength  Another medication with the same name was removed. Continue taking this medication, and follow the directions you see here.   Take 1 Tablet by mouth every day.  Dose: 25 mg            CONTINUE taking these medications        Instructions   ASPIRIN 81 PO   Take 81 mg by mouth every evening.  Dose: 81 mg     atorvastatin 20 MG Tabs  Commonly known as: Lipitor   Take 1 Tablet by mouth every day.  Dose: 20 mg     Empagliflozin 25 MG Tabs   Take 1 Tablet by mouth every day.  Dose: 1 Tablet     fluticasone-salmeterol 100-50 MCG/ACT Aepb  Commonly known as: Advair   Inhale 1 Puff every 12 hours.  Dose: 1 Puff     gabapentin 100 MG Caps  Commonly known as: Neurontin   Take 1 Capsule by mouth at bedtime as needed (insomnia).  Dose: 100 mg     glyBURIDE 5 MG Tabs  Commonly known as: Diabeta   TAKE 2 TABLETS BY MOUTH TWICE DAILY WITH MEALS     hydrOXYzine HCl 25 MG Tabs  Commonly known as: Atarax   Take 0.5-1 Tablets by mouth 3 times a day as needed for Anxiety.  Dose: 12.5-25 mg     insulin glargine 100 UNIT/ML Sopn injection  Generic drug: insulin glargine   Inject 5 Units under the skin every evening.  Dose: 5 Units     losartan 25 MG Tabs  Commonly known as: Cozaar   Take 1 Tablet by mouth every day.  Dose: 25 mg     metFORMIN 500 MG Tabs  Commonly known as: Glucophage   Take 2 Tablets by mouth 2 times a day with meals.  Dose: 1,000 mg     metoprolol SR 50 MG Tb24  Commonly known as: Toprol XL   Take 1 Tablet by mouth every day.  Dose: 50 mg     Multi Vitamin Daily Tabs   Take 1 Tablet by mouth every day.  Dose: 1 Tablet     SITagliptin 100 MG Tabs  Commonly known as: Januvia   Take 1 Tablet by mouth every day.  Dose: 100 mg     topiramate 50 MG tablet  Commonly known as: Topamax   Take 1 Tablet by mouth 2 times a day.  Dose: 50 mg     Trulicity 1.5 MG/0.5ML Sopn  Generic drug: Dulaglutide   INJECT 1 DOSE SUBCUTANEOUSLY ONCE A WEEK     VITAMIN B-12 PO   Take 1 Tablet by mouth  every day.  Dose: 1 Tablet     Vitamin C 1000 MG Tabs   Take 1 Tablet by mouth every morning.  Dose: 1 Tablet     VITAMIN D PO   Take 1 Tablet by mouth 2 times a day.  Dose: 1 Tablet              Allergies  Allergies   Allergen Reactions    Latex Rash    Morphine Itching     Rxn = unknown   Bumps all over body    Other Misc Rash     Rash from gloves at work       DIET  No orders of the defined types were placed in this encounter.      ACTIVITY  As tolerated.  Weight bearing as tolerated    CONSULTATIONS      PROCEDURES      LABORATORY  Lab Results   Component Value Date    SODIUM 134 (L) 12/02/2023    POTASSIUM 4.3 12/02/2023    CHLORIDE 101 12/02/2023    CO2 22 12/02/2023    GLUCOSE 311 (H) 12/02/2023    BUN 33 (H) 12/02/2023    CREATININE 1.00 12/02/2023        Lab Results   Component Value Date    WBC 8.0 12/02/2023    HEMOGLOBIN 12.5 12/02/2023    HEMATOCRIT 36.7 (L) 12/02/2023    PLATELETCT 268 12/02/2023        Total time of the discharge process exceeds 38  minutes.

## 2023-12-03 NOTE — HOSPITAL COURSE
Sonya Wei is a 57 y.o. female with past medical history of hypertension, obesity, type 2 diabetes who presented 12/1/2023 with chest pain.      Patient states that the pain began yesterday, it was localized between her shoulder blades.  She states that all day today she started experiencing some substernal chest pain.  States that it is pressure-like in nature.  She denies any numbness or tingling rated on her arms.  She does have some shortness of breath which concerned with the pain.  The pain does worsen with movement, especially when she bends over lift things at work.  Patient denies any chest pain on exertion.     In the emergency department, EKG and troponin were unremarkable.  CTA was obtained and was negative for dissection.  She was persistently hypertensive in the emergency department, was given fentanyl and nitroglycerin.  Patient was admitted to hospitalist for ACS rule out.     Repeat troponin remained non-elevated. She went for pharmacologic nuclear stress test which reported normal myocardial perfusion with no ischemia. Echocardiogram revealed LVEF 65%, no regional wall motion abnormalities. She did have elevated blood sugar, likely related to holding her home oral medications, which resolved with subcutaneous insulin and IV fluids. For hypertension she was restarted on home medications. Hydralazine and amlodipine were added.     All new prescriptions as well as refills of her insulin, pen needles, and oral diabetes medications were sent to our local pharmacy, verified cost with patient, and delivered to patient's hand at bedside. As cardiac workup reassuring, she is cleared to discharge home to follow with her PCP as already scheduled later this week.

## 2023-12-03 NOTE — PROGRESS NOTES
[id number] delivered to the patient and demonstration for the insulin pen given to the patient as well as daughter. [facility] recheck 273; CHERIE Nava has cleared the patient for discharge. The patient understands the need to go to [facility] for a new glucometer.

## 2023-12-03 NOTE — DISCHARGE INSTRUCTIONS
Continue to check your blood sugars at home with your glucometer  Check blood pressure daily and keep a log. Call your doctor if your BP is running below 100 or above 160.  Follow with your PCP this week on your high blood pressure and high blood sugars

## 2023-12-06 ENCOUNTER — HOSPITAL ENCOUNTER (EMERGENCY)
Facility: MEDICAL CENTER | Age: 58
End: 2023-12-06
Attending: STUDENT IN AN ORGANIZED HEALTH CARE EDUCATION/TRAINING PROGRAM
Payer: COMMERCIAL

## 2023-12-06 VITALS
DIASTOLIC BLOOD PRESSURE: 93 MMHG | HEART RATE: 72 BPM | WEIGHT: 175.04 LBS | BODY MASS INDEX: 33.05 KG/M2 | SYSTOLIC BLOOD PRESSURE: 165 MMHG | RESPIRATION RATE: 16 BRPM | OXYGEN SATURATION: 97 % | HEIGHT: 61 IN | TEMPERATURE: 98.3 F

## 2023-12-06 DIAGNOSIS — R20.2 PARESTHESIAS: ICD-10-CM

## 2023-12-06 DIAGNOSIS — M54.2 NECK PAIN: ICD-10-CM

## 2023-12-06 DIAGNOSIS — R11.0 NAUSEA: ICD-10-CM

## 2023-12-06 DIAGNOSIS — E86.0 DEHYDRATION: ICD-10-CM

## 2023-12-06 LAB
ALBUMIN SERPL BCP-MCNC: 4.1 G/DL (ref 3.2–4.9)
ALBUMIN/GLOB SERPL: 1.2 G/DL
ALP SERPL-CCNC: 111 U/L (ref 30–99)
ALT SERPL-CCNC: 23 U/L (ref 2–50)
ANION GAP SERPL CALC-SCNC: 13 MMOL/L (ref 7–16)
AST SERPL-CCNC: 26 U/L (ref 12–45)
BASOPHILS # BLD AUTO: 0.4 % (ref 0–1.8)
BASOPHILS # BLD: 0.03 K/UL (ref 0–0.12)
BILIRUB SERPL-MCNC: 0.4 MG/DL (ref 0.1–1.5)
BUN SERPL-MCNC: 49 MG/DL (ref 8–22)
CALCIUM ALBUM COR SERPL-MCNC: 9.7 MG/DL (ref 8.5–10.5)
CALCIUM SERPL-MCNC: 9.8 MG/DL (ref 8.4–10.2)
CHLORIDE SERPL-SCNC: 100 MMOL/L (ref 96–112)
CO2 SERPL-SCNC: 22 MMOL/L (ref 20–33)
CREAT SERPL-MCNC: 1.2 MG/DL (ref 0.5–1.4)
EKG IMPRESSION: NORMAL
EOSINOPHIL # BLD AUTO: 0.15 K/UL (ref 0–0.51)
EOSINOPHIL NFR BLD: 1.9 % (ref 0–6.9)
ERYTHROCYTE [DISTWIDTH] IN BLOOD BY AUTOMATED COUNT: 38.1 FL (ref 35.9–50)
GFR SERPLBLD CREATININE-BSD FMLA CKD-EPI: 52 ML/MIN/1.73 M 2
GLOBULIN SER CALC-MCNC: 3.3 G/DL (ref 1.9–3.5)
GLUCOSE SERPL-MCNC: 218 MG/DL (ref 65–99)
HCT VFR BLD AUTO: 38.7 % (ref 37–47)
HGB BLD-MCNC: 13.1 G/DL (ref 12–16)
IMM GRANULOCYTES # BLD AUTO: 0.05 K/UL (ref 0–0.11)
IMM GRANULOCYTES NFR BLD AUTO: 0.6 % (ref 0–0.9)
LYMPHOCYTES # BLD AUTO: 2.71 K/UL (ref 1–4.8)
LYMPHOCYTES NFR BLD: 34.6 % (ref 22–41)
MCH RBC QN AUTO: 29.8 PG (ref 27–33)
MCHC RBC AUTO-ENTMCNC: 33.9 G/DL (ref 32.2–35.5)
MCV RBC AUTO: 88 FL (ref 81.4–97.8)
MONOCYTES # BLD AUTO: 0.44 K/UL (ref 0–0.85)
MONOCYTES NFR BLD AUTO: 5.6 % (ref 0–13.4)
NEUTROPHILS # BLD AUTO: 4.45 K/UL (ref 1.82–7.42)
NEUTROPHILS NFR BLD: 56.9 % (ref 44–72)
NRBC # BLD AUTO: 0 K/UL
NRBC BLD-RTO: 0 /100 WBC (ref 0–0.2)
PLATELET # BLD AUTO: 285 K/UL (ref 164–446)
PMV BLD AUTO: 11.2 FL (ref 9–12.9)
POTASSIUM SERPL-SCNC: 4.7 MMOL/L (ref 3.6–5.5)
PROT SERPL-MCNC: 7.4 G/DL (ref 6–8.2)
RBC # BLD AUTO: 4.4 M/UL (ref 4.2–5.4)
SODIUM SERPL-SCNC: 135 MMOL/L (ref 135–145)
TROPONIN T SERPL-MCNC: 19 NG/L (ref 6–19)
TROPONIN T SERPL-MCNC: 20 NG/L (ref 6–19)
WBC # BLD AUTO: 7.8 K/UL (ref 4.8–10.8)

## 2023-12-06 PROCEDURE — A9270 NON-COVERED ITEM OR SERVICE: HCPCS | Performed by: STUDENT IN AN ORGANIZED HEALTH CARE EDUCATION/TRAINING PROGRAM

## 2023-12-06 PROCEDURE — 36415 COLL VENOUS BLD VENIPUNCTURE: CPT

## 2023-12-06 PROCEDURE — 84484 ASSAY OF TROPONIN QUANT: CPT

## 2023-12-06 PROCEDURE — 99284 EMERGENCY DEPT VISIT MOD MDM: CPT

## 2023-12-06 PROCEDURE — 96375 TX/PRO/DX INJ NEW DRUG ADDON: CPT

## 2023-12-06 PROCEDURE — 80053 COMPREHEN METABOLIC PANEL: CPT

## 2023-12-06 PROCEDURE — 93005 ELECTROCARDIOGRAM TRACING: CPT | Performed by: STUDENT IN AN ORGANIZED HEALTH CARE EDUCATION/TRAINING PROGRAM

## 2023-12-06 PROCEDURE — 93005 ELECTROCARDIOGRAM TRACING: CPT

## 2023-12-06 PROCEDURE — 85025 COMPLETE CBC W/AUTO DIFF WBC: CPT

## 2023-12-06 PROCEDURE — 700105 HCHG RX REV CODE 258: Performed by: STUDENT IN AN ORGANIZED HEALTH CARE EDUCATION/TRAINING PROGRAM

## 2023-12-06 PROCEDURE — 700111 HCHG RX REV CODE 636 W/ 250 OVERRIDE (IP): Performed by: STUDENT IN AN ORGANIZED HEALTH CARE EDUCATION/TRAINING PROGRAM

## 2023-12-06 PROCEDURE — 700102 HCHG RX REV CODE 250 W/ 637 OVERRIDE(OP): Performed by: STUDENT IN AN ORGANIZED HEALTH CARE EDUCATION/TRAINING PROGRAM

## 2023-12-06 PROCEDURE — 96374 THER/PROPH/DIAG INJ IV PUSH: CPT

## 2023-12-06 RX ORDER — ACETAMINOPHEN 500 MG
1000 TABLET ORAL ONCE
Status: COMPLETED | OUTPATIENT
Start: 2023-12-06 | End: 2023-12-06

## 2023-12-06 RX ORDER — IBUPROFEN 600 MG/1
600 TABLET ORAL EVERY 6 HOURS PRN
Qty: 30 TABLET | Refills: 0 | Status: SHIPPED | OUTPATIENT
Start: 2023-12-06

## 2023-12-06 RX ORDER — METHOCARBAMOL 500 MG/1
500 TABLET, FILM COATED ORAL 3 TIMES DAILY
Qty: 21 TABLET | Refills: 0 | Status: SHIPPED | OUTPATIENT
Start: 2023-12-06 | End: 2023-12-14 | Stop reason: SDUPTHER

## 2023-12-06 RX ORDER — ACETAMINOPHEN 500 MG
500-1000 TABLET ORAL EVERY 6 HOURS PRN
Qty: 30 TABLET | Refills: 0 | Status: SHIPPED | OUTPATIENT
Start: 2023-12-06

## 2023-12-06 RX ORDER — ONDANSETRON 2 MG/ML
4 INJECTION INTRAMUSCULAR; INTRAVENOUS ONCE
Status: COMPLETED | OUTPATIENT
Start: 2023-12-06 | End: 2023-12-06

## 2023-12-06 RX ORDER — SODIUM CHLORIDE 9 MG/ML
1000 INJECTION, SOLUTION INTRAVENOUS ONCE
Status: COMPLETED | OUTPATIENT
Start: 2023-12-06 | End: 2023-12-06

## 2023-12-06 RX ORDER — LIDOCAINE 4 G/G
1 PATCH TOPICAL EVERY 12 HOURS
Qty: 1 PATCH | Refills: 0 | Status: SHIPPED | OUTPATIENT
Start: 2023-12-06

## 2023-12-06 RX ORDER — KETOROLAC TROMETHAMINE 30 MG/ML
15 INJECTION, SOLUTION INTRAMUSCULAR; INTRAVENOUS ONCE
Status: COMPLETED | OUTPATIENT
Start: 2023-12-06 | End: 2023-12-06

## 2023-12-06 RX ADMIN — SODIUM CHLORIDE 1000 ML: 9 INJECTION, SOLUTION INTRAVENOUS at 18:13

## 2023-12-06 RX ADMIN — KETOROLAC TROMETHAMINE 15 MG: 30 INJECTION, SOLUTION INTRAMUSCULAR; INTRAVENOUS at 17:40

## 2023-12-06 RX ADMIN — ACETAMINOPHEN 1000 MG: 500 TABLET ORAL at 17:41

## 2023-12-06 RX ADMIN — ONDANSETRON 4 MG: 2 INJECTION INTRAMUSCULAR; INTRAVENOUS at 17:41

## 2023-12-06 ASSESSMENT — FIBROSIS 4 INDEX: FIB4 SCORE: 0.97

## 2023-12-06 ASSESSMENT — HEART SCORE
HISTORY: SLIGHTLY SUSPICIOUS
AGE: 45-64
RISK FACTORS: 1-2 RISK FACTORS
TROPONIN: LESS THAN OR EQUAL TO NORMAL LIMIT
HEART SCORE: 3
ECG: NON-SPECIFIC REPOLARIZATION DISTURBANCE

## 2023-12-07 NOTE — ED TRIAGE NOTES
"57 yr old female to triage  Chief Complaint   Patient presents with    Headache     Patient report she was at work felt sweaty with dizziness and tingling sensation to both hands and feet only that lasted a few minutes. \"It started around 1ish but not sure\"  Patient was recently discharge on Saturday and she does not recall why she was admitted but remembers her blood pressure and blood sugar was high.  AAOX,4. Steady gait.  Equal .  No facial drooping or slurred speech.     Neck Pain    Dizziness    Tingling     BP (!) 180/92   Pulse 76   Temp 36.8 °C (98.3 °F) (Temporal)   Resp 18   Ht 1.549 m (5' 1\")   Wt 79.4 kg (175 lb 0.7 oz)   SpO2 97%   BMI 33.07 kg/m²     "

## 2023-12-07 NOTE — ED PROVIDER NOTES
"Serial troponins CHIEF COMPLAINT  Chief Complaint   Patient presents with    Headache     Patient report she was at work felt sweaty with dizziness and tingling sensation to both hands and feet only that lasted a few minutes. \"It started around 1ish but not sure\"  Patient was recently discharge on Saturday and she does not recall why she was admitted but remembers her blood pressure and blood sugar was high.  AAOX,4. Steady gait.  Equal .  No facial drooping or slurred speech.     Neck Pain    Dizziness    Tingling       LIMITATION TO HISTORY   Select: None    HPI    Sonya Wei is a 57 y.o. female who presents to the Emergency Department for evaluation of upper neck pain.  Patient stated that she was at work, when she developed pain in her neck described as a sharp achy sensation that is worse with movement.  No trauma no falls no urinary tension loss of bowel or bladder control no saddle anesthesias.  No history of IV drug use no malignancy.  She stated while she was at work and due to the pain she began to feel sweaty, her hands and feet became tingly and cramped up bilaterally.  Denied any associated chest pain or shortness of breath.  Was recently admitted in the beginning of December for hypertensive urgency, had a stress test and echo at that time which were normal does have a known history of type 2 diabetes and hypertension    OUTSIDE HISTORIAN(S):  Select: None available    EXTERNAL RECORDS REVIEWED  Select: Other discharge summary 12/2/2023, patient was admitted for an ACS rule out, had a negative stress test and an echo with a normal ejection fraction, CTA 12/1/2023 negative for dissection or aneurysm      PAST MEDICAL HISTORY  Past Medical History:   Diagnosis Date    Anemia     Bowel habit changes     constipation    Diabetes     Diabetes (HCC)     GERD (gastroesophageal reflux disease)     Healthcare maintenance 9/27/2014    Mammogram: Due    High cholesterol     Hyperlipidemia     " Hypertension     Infectious disease     Cold 10/2016    Jaundice 1999    Psychiatric problem     depression and anxiety    Vaginal itching 8/27/2014    With anal itching X 1 month Getting worse Burning Min vag disch     .    SURGICAL HISTORY  Past Surgical History:   Procedure Laterality Date    VENTRAL HERNIA REPAIR ROBOTIC XI N/A 10/14/2016    Procedure: VENTRAL HERNIA REPAIR ROBOTIC XI FOR INCISIONAL W/MESH;  Surgeon: Edi Mendoza M.D.;  Location: SURGERY [facility];  Service:     ABDOMINAL HYSTERECTOMY TOTAL      still has ovaries    CHOLECYSTECTOMY      PRIMARY C SECTION      TUBAL COAGULATION LAPAROSCOPIC BILATERAL           FAMILY HISTORY  Family History   Problem Relation Age of Onset    Diabetes Mother     Hyperlipidemia Mother     Diabetes Father     Hypertension Father     Hyperlipidemia Father           SOCIAL HISTORY  Social History     Socioeconomic History    Marital status:      Spouse name: Not on file    Number of children: Not on file    Years of education: Not on file    Highest education level: Not on file   Occupational History    Not on file   Tobacco Use    Smoking status: Never    Smokeless tobacco: Never   Vaping Use    Vaping Use: Never used   Substance and Sexual Activity    Alcohol use: No    Drug use: No    Sexual activity: Yes     Partners: Male     Birth control/protection: None   Other Topics Concern    Not on file   Social History Narrative    ** Merged History Encounter **          Social Determinants of Health     Financial Resource Strain: Not on file   Food Insecurity: Not on file   Transportation Needs: Not on file   Physical Activity: Not on file   Stress: Not on file   Social Connections: Not on file   Intimate Partner Violence: Not on file   Housing Stability: Not on file         CURRENT MEDICATIONS  No current facility-administered medications on file prior to encounter.     Current Outpatient Medications on File Prior to Encounter   Medication Sig Dispense  Refill    hydroCHLOROthiazide 25 MG Tab Take 1 Tablet by mouth every day. 30 Tablet 1    losartan (COZAAR) 25 MG Tab Take 1 Tablet by mouth every day. 30 Tablet 1    insulin glargine 100 UNIT/ML Solution Pen-injector injection Inject 5 Units under the skin every evening. 3 mL 1    Insulin Pen Needle 32 G x 4 mm Use one pen needle in pen device to inject insulin once daily. 100 Each 1    Alcohol Swabs Wipe site with prep pad prior to injection. 100 Each 1    amLODIPine (NORVASC) 10 MG Tab Take 1 Tablet by mouth every day. 30 Tablet 1    hydrALAZINE (APRESOLINE) 25 MG Tab Take 1 Tablet by mouth every 8 hours. 90 Tablet 1    fluticasone-salmeterol (ADVAIR) 100-50 MCG/ACT AEROSOL POWDER, BREATH ACTIVATED Inhale 1 Puff every 12 hours. 60 Each 2    gabapentin (NEURONTIN) 100 MG Cap Take 1 Capsule by mouth at bedtime as needed (insomnia). 90 Capsule 3    hydrOXYzine HCl (ATARAX) 25 MG Tab Take 0.5-1 Tablets by mouth 3 times a day as needed for Anxiety. 30 Tablet 5    SITagliptin (JANUVIA) 100 MG Tab Take 1 Tablet by mouth every day. 90 Tablet 3    topiramate (TOPAMAX) 50 MG tablet Take 1 Tablet by mouth 2 times a day. 180 Tablet 3    metoprolol SR (TOPROL XL) 50 MG TABLET SR 24 HR Take 1 Tablet by mouth every day. 90 Tablet 3    metFORMIN (GLUCOPHAGE) 500 MG Tab Take 2 Tablets by mouth 2 times a day with meals. 360 Tablet 3    Empagliflozin 25 MG Tab Take 1 Tablet by mouth every day. 90 Tablet 3    Dulaglutide (TRULICITY) 1.5 MG/0.5ML Solution Pen-injector INJECT 1 DOSE SUBCUTANEOUSLY ONCE A WEEK 12 mL 3    glyBURIDE (DIABETA) 5 MG Tab TAKE 2 TABLETS BY MOUTH TWICE DAILY WITH MEALS 120 Tablet 3    atorvastatin (LIPITOR) 20 MG Tab Take 1 Tablet by mouth every day. 90 Tablet 3    Cyanocobalamin (VITAMIN B-12 PO) Take 1 Tablet by mouth every day.      ASPIRIN 81 PO Take 81 mg by mouth every evening.      Multiple Vitamin (MULTI VITAMIN DAILY) Tab Take 1 Tablet by mouth every day.      Ascorbic Acid (VITAMIN C) 1000 MG Tab  "Take 1 Tablet by mouth every morning.      Cholecalciferol (VITAMIN D PO) Take 1 Tablet by mouth 2 times a day.             ALLERGIES  Allergies   Allergen Reactions    Latex Rash    Morphine Itching     Rxn = unknown   Bumps all over body    Other Misc Rash     Rash from gloves at work       PHYSICAL EXAM  VITAL SIGNS:BP (!) 165/93   Pulse 72   Temp 36.8 °C (98.3 °F) (Temporal)   Resp 16   Ht 1.549 m (5' 1\")   Wt 79.4 kg (175 lb 0.7 oz)   SpO2 97%   BMI 33.07 kg/m²       VITALS - vital signs documented prior to this note have been reviewed and noted,  GENERAL - awake, alert, oriented, GCS 15, no apparent distress, non-toxic  appearing  HEENT - normocephalic, atraumatic, pupils equal, sclera anicteric, mucus  membranes moist  NECK - supple tenderness with palpation of the paraspinal musculature in the cervical spine no step-offs no deformities, no meningismus, full active range of motion, trachea midline  CARDIOVASCULAR - regular rate/rhythm, no murmurs/gallops/rubs  PULMONARY - no respiratory distress, speaking in full sentences, clear to  auscultation bilaterally, no wheezing/ronchi/rales, no accessory muscle use  GASTROINTESTINAL - soft, non-tender, non-distended, no rebound, guarding,  or peritonitis  GENITOURINARY - Deferred  NEUROLOGIC - Awake alert, normal mental status, speech fluid, cognition  normal, moves all extremities upper extremity strength with elbow flexion extension is 5 out of 5 knee flexion extension is 5 out of 5 she is ambulatory with a steady gait dorsi flexion and plantarflexion strength is 5-5 bilaterally sensation is grossly intact in lower extremities patellar DTRs are 2+ bilaterally  MUSCULOSKELETAL - no obvious asymmetry or deformities present  EXTREMITIES - warm, well-perfused, no cyanosis or significant edema  DERMATOLOGIC - warm, dry, no rashes, no jaundice  PSYCHIATRIC - normal affect, normal insight, normal concentration    DIAGNOSTIC STUDIES / PROCEDURES  EKG  I have " independently interpreted this EKG  Interpreted below by myself as no ischemic changes      Report   Date Value Ref Range Status   2023       [facility] Emergency Dept.    Test Date:  2023  Pt Name:    MATTIE SANDOVAL               Department: [facility]  MRN:        [id number]                      Room:  Gender:     Female                       Technician: AYANNA  :        [id number]                   Requested By:ER TRIAGE PROTOCOL  Order #:    [id number]                    Reading MD: Arthur Gallagher    Measurements  Intervals                                Axis  Rate:       76                           P:          55  AR:         152                          QRS:        -19  QRSD:       144                          T:          31  QT:         386  QTc:        435    Interpretive Statements  Sinus rhythm  Right bundle branch block  Baseline wander in lead(s) V1  Compared to ECG 2023 17:56:11  No significant changes  no acute ischemic changes  Electronically Signed On 2023 17:16:38 PST by Arthur Gallagher                LABS  Labs Reviewed   COMP METABOLIC PANEL - Abnormal; Notable for the following components:       Result Value    Glucose 218 (*)     Bun 49 (*)     Alkaline Phosphatase 111 (*)     All other components within normal limits    Narrative:     Biotin intake of greater than 5 mg per day may interfere with                  troponin levels, causing false low values.   TROPONIN - Abnormal; Notable for the following components:    Troponin T 20 (*)     All other components within normal limits    Narrative:     Biotin intake of greater than 5 mg per day may interfere with                  troponin levels, causing false low values.   ESTIMATED GFR - Abnormal; Notable for the following components:    GFR (CKD-EPI) 52 (*)     All other components within normal limits    Narrative:     Biotin intake of greater than 5 mg per day may interfere with                   troponin levels, causing false low values.   CBC WITH DIFFERENTIAL    Narrative:     Biotin intake of greater than 5 mg per day may interfere with                  troponin levels, causing false low values.   TROPONIN    Narrative:     Biotin intake of greater than 5 mg per day may interfere with                  troponin levels, causing false low values.       Initial troponin mildly elevated at 20 repeat troponins flat at 19 lowering concern for ACS        Radiologist interpretation:   No orders to display        COURSE & MEDICAL DECISION MAKING    ED COURSE:    ED Observation Status? Yes;I am placing the patient in to an observation status due to a diagnostic uncertainty as well as therapeutic intensity. Patient placed in observation status at 5:21 PM 12/6/2023    Observation plan is as follows:    Reevaluation at 1807 patient was complaining of nausea Zofran and IV fluids will be ordered    INTERVENTIONS BY ME:  Medications   ketorolac (Toradol) injection 15 mg (15 mg Intravenous Given 12/6/23 1740)   acetaminophen (Tylenol) tablet 1,000 mg (1,000 mg Oral Given 12/6/23 1741)   ondansetron (Zofran) syringe/vial injection 4 mg (4 mg Intravenous Given 12/6/23 1741)   NS (Bolus) 0.9 % infusion 1,000 mL (0 mL Intravenous Stopped 12/6/23 2011)       Response on recheck:.      Patient discharged from ED observation at 9:55 PM 12/06/23  .      @HTN/IDDM FOLLOW UP:  The patient has known hypertension and is being followed by their primary care doctor@    INITIAL ASSESSMENT, COURSE AND PLAN  Care Narrative: Patient presented for evaluation of neck pain, lightheadedness and transient paresthesias of her upper extremities,  Patient denies any back pain red flags, no urinary tension also bladder control saddle anesthesias, no trauma no falls no known history of malignancy, no midline tenderness.  Lower concern for underlying space-occupying lesion cauda equina epidural abscess epidural bleed thus will defer imaging at this  time.  Pain is reproducible on examination believe it may be musculoskeletal in nature.  Will try treating conservatively with Tylenol Toradol.  In regards to the patient's paresthesias they have since completely resolved, normal neurologic exam otherwise, did describe feeling lightheaded prior to this happening, perhaps represented a vasovagal event.  An EKG was obtained did not demonstrate any evidence of an arrhythmia, initial troponin mildly elevated at 20 however she has no complaints of chest pain nonischemic EKG, and a recent stress test within the week which was negative, and delta troponin was negative lowering concern for ACS.  Heart score is 3.  She did have a complaint of nausea, was treated with Zofran and given a p.o. trial with improvement of her nausea.  Toradol Motrin believe her neck pain may be musculoskeletal in nature.  Patient did feel better after.  Given that the patient's symptoms are resolved she is feeling better tolerating p.o., will discharge patient.  Will trial the patient on Robaxin, Tylenol Motrin lidocaine patches for suspected musculoskeletal nature of her neck pain.  Return precautions were discussed and she was discharged in stable condition           ADDITIONAL PROBLEM LIST    DISPOSITION AND DISCUSSIONS    Escalation of care considered, and ultimately not performed:acute inpatient care management, however at this time, the patient is most appropriate for outpatient management    Decision tools and prescription drugs considered including, but not limited to: HEART Score 3 .    FINAL DIAGNOSIS  1. Neck pain Acute   2. Nausea Acute   3. Paresthesias    4. Dehydration Acute            Electronically signed by: Arthur Gallagher DO ,9:55 PM 12/06/23

## 2023-12-08 ENCOUNTER — NON-PROVIDER VISIT (OUTPATIENT)
Dept: MEDICAL GROUP | Facility: PHYSICIAN GROUP | Age: 58
End: 2023-12-08
Payer: COMMERCIAL

## 2023-12-08 VITALS
HEART RATE: 86 BPM | BODY MASS INDEX: 32.31 KG/M2 | WEIGHT: 171 LBS | DIASTOLIC BLOOD PRESSURE: 65 MMHG | SYSTOLIC BLOOD PRESSURE: 125 MMHG

## 2023-12-08 PROCEDURE — 99402 PREV MED CNSL INDIV APPRX 30: CPT | Performed by: FAMILY MEDICINE

## 2023-12-08 RX ORDER — BLOOD-GLUCOSE SENSOR
1 EACH MISCELLANEOUS
Qty: 2 EACH | Refills: 6 | Status: SHIPPED | OUTPATIENT
Start: 2023-12-08 | End: 2024-01-19 | Stop reason: SDUPTHER

## 2023-12-08 RX ORDER — ATORVASTATIN CALCIUM 80 MG/1
80 TABLET, FILM COATED ORAL NIGHTLY
Qty: 90 TABLET | Refills: 1 | Status: SHIPPED | OUTPATIENT
Start: 2023-12-08 | End: 2024-01-19 | Stop reason: SDUPTHER

## 2023-12-08 RX ORDER — ICOSAPENT ETHYL 1000 MG/1
2 CAPSULE ORAL 2 TIMES DAILY
Qty: 120 CAPSULE | Refills: 6 | Status: SHIPPED | OUTPATIENT
Start: 2023-12-08 | End: 2024-01-19 | Stop reason: SDUPTHER

## 2023-12-08 RX ORDER — DULAGLUTIDE 3 MG/.5ML
0.5 INJECTION, SOLUTION SUBCUTANEOUS
Qty: 2 ML | Refills: 6 | Status: SHIPPED | OUTPATIENT
Start: 2023-12-08 | End: 2024-01-19

## 2023-12-08 ASSESSMENT — FIBROSIS 4 INDEX: FIB4 SCORE: 1.1

## 2023-12-08 NOTE — PROGRESS NOTES
Patient Consult Note     TIME IN: 1029am  TIME OUT: 1055am       Primary care physician: Nu Eason M.D.    Reason for consult: Management of Uncontrolled Type 2 Diabetes    HPI:  Sonya Wei is a 57 y.o. old patient who comes in today for evaluation of above stated problem.    Allergies:  Latex, Morphine, and Other misc    Physical Examination:  Vital signs: There were no vitals taken for this visit. There is no height or weight on file to calculate BMI.    Most Recent labs, A1c, and immunizations:    Lab Results   Component Value Date/Time    HBA1C 10.7 (H) 12/01/2023 06:14 PM          Lab Results   Component Value Date/Time    CHOLSTRLTOT 371 (H) 12/01/2023 06:14 PM    LDL see below 12/01/2023 06:14 PM    HDL 45 12/01/2023 06:14 PM    TRIGLYCERIDE 534 (H) 12/01/2023 06:14 PM       Lab Results   Component Value Date/Time    SODIUM 135 12/06/2023 05:24 PM    POTASSIUM 4.7 12/06/2023 05:24 PM    CHLORIDE 100 12/06/2023 05:24 PM    CO2 22 12/06/2023 05:24 PM    GLUCOSE 218 (H) 12/06/2023 05:24 PM    BUN 49 (H) 12/06/2023 05:24 PM    CREATININE 1.20 12/06/2023 05:24 PM     Lab Results   Component Value Date/Time    ALKPHOSPHAT 111 (H) 12/06/2023 05:24 PM    ASTSGOT 26 12/06/2023 05:24 PM    ALTSGPT 23 12/06/2023 05:24 PM    TBILIRUBIN 0.4 12/06/2023 05:24 PM    INR 0.95 12/01/2023 06:14 PM    ALBUMIN 4.1 12/06/2023 05:24 PM      Lab Results   Component Value Date/Time    MALBCRT see below 11/14/2023 02:21 PM    MICROALBUR >440.0 11/14/2023 02:21 PM      Latest Reference Range & Units 12/06/23 17:24   Creatinine 0.50 - 1.40 mg/dL 1.20   GFR (CKD-EPI) >60 mL/min/1.73 m 2 52 !   !: Data is abnormal        Medications:    Current Outpatient Medications:     acetaminophen, 500-1,000 mg, Oral, Q6HRS PRN    lidocaine, 1 Patch, Transdermal, Q12HRS    ibuprofen, 600 mg, Oral, Q6HRS PRN    methocarbamol, 500 mg, Oral, TID    hydroCHLOROthiazide, 25 mg, Oral, DAILY    losartan, 25 mg, Oral, DAILY    insulin  glargine, 5 Units, Subcutaneous, Q EVENING    Insulin Pen Needle 32 G x 4 mm, Use one pen needle in pen device to inject insulin once daily.    Alcohol Swabs, Wipe site with prep pad prior to injection.    amLODIPine, 10 mg, Oral, DAILY    hydrALAZINE, 25 mg, Oral, Q8HRS    fluticasone-salmeterol, 1 Puff, Inhalation, Q12HRS    gabapentin, 100 mg, Oral, HS PRN    hydrOXYzine HCl, 12.5-25 mg, Oral, TID PRN    SITagliptin, 100 mg, Oral, DAILY    topiramate, 50 mg, Oral, BID    metoprolol SR, 50 mg, Oral, DAILY    metFORMIN, 1,000 mg, Oral, BID WITH MEALS    Empagliflozin, 1 Tablet, Oral, DAILY    Trulicity, INJECT 1 DOSE SUBCUTANEOUSLY ONCE A WEEK    glyBURIDE, TAKE 2 TABLETS BY MOUTH TWICE DAILY WITH MEALS    atorvastatin, 20 mg, Oral, DAILY    Cyanocobalamin (VITAMIN B-12 PO), 1 Tablet, Oral, DAILY    ASPIRIN 81 PO, 81 mg, Oral, Q EVENING    Multi Vitamin Daily, 1 Tablet, Oral, QDAY    Vitamin C, 1 Tablet, Oral, QAM    Cholecalciferol (VITAMIN D PO), 1 Tablet, Oral, BID    Assessment and Plan:  1. DM2   Most recent A1c is: >10  Social support: y  Food insecurity: no  Is Medication cost affordable: yes  Preventive care   Immunizations discussed with pt:y  Retinal scan due on: y  On a ACE/ARB: y  On statin Y  Any hypoglycemia: n  15:15 Rule discussed with patient  Kidney function:  52 GFR  62 CrCl  SMBG:  AM:170  Before meals:-  PM: -        Medication(s) recommended after today's visit:   Stop Januvia   Increase Trulicity to 3mg every 7 days   Continue Jardiance 25 mg once daily  Continue metformin 500 mg 2 tabs twice daily  Continue insulin glargine 5 units at bedtime and adjust accordingly      Long-acting insulin:   Adjust dose according to FASTING BLOOD GLUCOSE target 100-130 mg/dL  (1) Increase the insulin dose every 3-4 days as needed.   (2) Increase by 2 units if FBG average concentration is 131-170 mg/dL.   (3) Increase by 4 units if FBG average concentration is 171-210 mg/dL.   (4) Increase by 6 units if FBG  average concentration is 221-260 mg/dL.   (5) Increase by 8 units if FBG average concentration is greater than 261mg/dL and call us.   Consider cutting back by 1-2 units to previous dose if glucose concentration is below 100 mg/dL or symptoms of hypoglycemia.       2.  Cardiovascular  Is LDL <100: no  Is Blood pressure <130/80:  yes    Medication(s) recommended.   Start atorvastatin 80 mg once daily (can start at 40 mg daily for 2 weeks then increase to 80 mg)  Start Vascepa 2 g twice daily    3.  Lifestyle changes   Focus on eating a DASH/Mediterranean-style diet.   Exercise 30 minutes daily at least 5 days/week, as tolerated.  https://www.niddk.nih.gov/bwp    Follow-up appointment in 6 week(s)    Anibal Rossa, PharmD, MS, BCACP, [facility]  Phone [phone] fax [phone]    This note was created using voice recognition software (Dragon). The accuracy of the dictation is limited by the abilities of the software. I have reviewed the note prior to signing, however some errors in grammar and context are still possible. If you have any questions related to this note please do not hesitate to contact our office.     CC:   Nu Eason M.D.  Nu Eason M.D.  Dr. Gianluca Willson

## 2023-12-08 NOTE — PATIENT INSTRUCTIONS
Long-acting insulin:   Adjust dose according to FASTING BLOOD GLUCOSE target 100-130 mg/dL  (1) Increase the insulin dose every 3-4 days as needed.   (2) Increase by 2 units if FBG average concentration is 131-170 mg/dL.   (3) Increase by 4 units if FBG average concentration is 171-210 mg/dL.   (4) Increase by 6 units if FBG average concentration is 221-260 mg/dL.   (5) Increase by 8 units if FBG average concentration is greater than 261mg/dL and call us.   Consider cutting back by 1-2 units to previous dose if glucose concentration is below 100 mg/dL or symptoms of hypoglycemia.

## 2023-12-14 ENCOUNTER — OFFICE VISIT (OUTPATIENT)
Dept: MEDICAL GROUP | Facility: PHYSICIAN GROUP | Age: 58
End: 2023-12-14
Payer: COMMERCIAL

## 2023-12-14 VITALS
DIASTOLIC BLOOD PRESSURE: 78 MMHG | HEART RATE: 77 BPM | TEMPERATURE: 98.6 F | BODY MASS INDEX: 32.28 KG/M2 | RESPIRATION RATE: 18 BRPM | OXYGEN SATURATION: 97 % | SYSTOLIC BLOOD PRESSURE: 118 MMHG | WEIGHT: 171 LBS | HEIGHT: 61 IN

## 2023-12-14 DIAGNOSIS — J02.9 SORE THROAT: ICD-10-CM

## 2023-12-14 DIAGNOSIS — M54.2 NECK PAIN: ICD-10-CM

## 2023-12-14 DIAGNOSIS — Z09 HOSPITAL DISCHARGE FOLLOW-UP: ICD-10-CM

## 2023-12-14 LAB
FLUAV RNA SPEC QL NAA+PROBE: NEGATIVE
FLUBV RNA SPEC QL NAA+PROBE: NEGATIVE
RSV RNA SPEC QL NAA+PROBE: NEGATIVE
S PYO DNA SPEC NAA+PROBE: NOT DETECTED
SARS-COV-2 RNA RESP QL NAA+PROBE: NEGATIVE

## 2023-12-14 PROCEDURE — 0241U POCT CEPHEID COV-2, FLU A/B, RSV - PCR: CPT | Performed by: FAMILY MEDICINE

## 2023-12-14 PROCEDURE — 99214 OFFICE O/P EST MOD 30 MIN: CPT | Mod: 25 | Performed by: FAMILY MEDICINE

## 2023-12-14 PROCEDURE — 3078F DIAST BP <80 MM HG: CPT | Performed by: FAMILY MEDICINE

## 2023-12-14 PROCEDURE — 20552 NJX 1/MLT TRIGGER POINT 1/2: CPT | Performed by: FAMILY MEDICINE

## 2023-12-14 PROCEDURE — 87651 STREP A DNA AMP PROBE: CPT | Performed by: FAMILY MEDICINE

## 2023-12-14 PROCEDURE — 3074F SYST BP LT 130 MM HG: CPT | Performed by: FAMILY MEDICINE

## 2023-12-14 RX ORDER — LIDOCAINE HYDROCHLORIDE 20 MG/ML
10 INJECTION, SOLUTION INFILTRATION; PERINEURAL ONCE
Status: DISCONTINUED | OUTPATIENT
Start: 2023-12-14 | End: 2023-12-14

## 2023-12-14 RX ORDER — METHOCARBAMOL 500 MG/1
500 TABLET, FILM COATED ORAL 3 TIMES DAILY
Qty: 90 TABLET | Refills: 0 | Status: SHIPPED | OUTPATIENT
Start: 2023-12-14 | End: 2024-03-26

## 2023-12-14 RX ADMIN — Medication 10 ML: at 10:23

## 2023-12-14 ASSESSMENT — FIBROSIS 4 INDEX: FIB4 SCORE: 1.12

## 2023-12-14 NOTE — ASSESSMENT & PLAN NOTE
Patient was seen in the hospital twice:   12/1/2023 - 12/2/2023 (23 hours)  [facility]      For shortness of breath and back pain, acs was ruled out. Normal ekgs, troponins    And for headache and neck pain on 12/6/2023 (3 hours)  [facility], Emergency Dept      She feels headache, muscle aches, nausea, has sick contacts at home with influenza.   Neurologic no deficits.     Trigger point injections provided for headache.   Skin cleaned with alcohol pads and 1% lidocaine inejcted to occipital muscles, paraspinal cervical muscles b/l.       Sore throat and flu like symptoms  Supportive are advised  Check strep, flu a/b, rsv, covid swabs. All negative in clinic

## 2023-12-14 NOTE — LETTER
December 14, 2023    To whom it may concern:     Sonya Wei was seen by me in clinic today, please excuse her from work 12/14/23 and 12/15/23.     Please contact me with any questions.     Thank you,     Electronically signed    Nu Eason M.D.

## 2023-12-14 NOTE — PROGRESS NOTES
Subjective:   Sonya Wei is a 58 y.o. female here today for evaluation and management of:     Hospital discharge follow-up  Patient was seen in the hospital twice:   12/1/2023 - 12/2/2023 (23 hours)  [facility]      For shotness of breath and back pain, acs was ruled out.     And for headache and neck pain on 12/6/2023 (3 hours)  [facility], Emergency Dept      She feels headache, muscle aches, nausea, has sick contacts at home with influenza.     Trigger point injections provided for headache.   Skin cleaned with alcohol pads and 1% lidocaine inejcted to occipital muscles, paraspinal cervical muscles b/l.       Sore throat and flu like symptoms  Supportive are advised  Check strep, flu a/b, rsv, covid swabs.                Current medicines (including changes today)  Current Outpatient Medications   Medication Sig Dispense Refill    methocarbamol (ROBAXIN) 500 MG Tab Take 1 Tablet by mouth 3 times a day. 90 Tablet 0    Continuous Blood Gluc Sensor (FREESTYLE KEN 3 SENSOR) Misc Apply 1 Each topically every 14 days. 2 Each 6    Dulaglutide (TRULICITY) 3 MG/0.5ML Solution Pen-injector Inject 0.5 mL under the skin every 7 days. 2 mL 6    atorvastatin (LIPITOR) 80 MG tablet Take 1 Tablet by mouth every evening. 90 Tablet 1    Icosapent Ethyl (VASCEPA) 1 g Cap Take 2 g by mouth 2 times a day. 120 Capsule 6    acetaminophen (TYLENOL) 500 MG Tab Take 1-2 Tablets by mouth every 6 hours as needed for Moderate Pain. 30 Tablet 0    lidocaine (ASPERFLEX) 4 % Patch Place 1 Patch on the skin every 12 hours. 1 Patch 0    ibuprofen (MOTRIN) 600 MG Tab Take 1 Tablet by mouth every 6 hours as needed for Mild Pain or Moderate Pain. 30 Tablet 0    hydroCHLOROthiazide 25 MG Tab Take 1 Tablet by mouth every day. 30 Tablet 1    losartan (COZAAR) 25 MG Tab Take 1 Tablet by mouth every day. 30 Tablet 1    insulin glargine 100 UNIT/ML Solution Pen-injector injection Inject 5 Units under  the skin every evening. 3 mL 1    Insulin Pen Needle 32 G x 4 mm Use one pen needle in pen device to inject insulin once daily. 100 Each 1    Alcohol Swabs Wipe site with prep pad prior to injection. 100 Each 1    amLODIPine (NORVASC) 10 MG Tab Take 1 Tablet by mouth every day. 30 Tablet 1    hydrALAZINE (APRESOLINE) 25 MG Tab Take 1 Tablet by mouth every 8 hours. 90 Tablet 1    fluticasone-salmeterol (ADVAIR) 100-50 MCG/ACT AEROSOL POWDER, BREATH ACTIVATED Inhale 1 Puff every 12 hours. 60 Each 2    gabapentin (NEURONTIN) 100 MG Cap Take 1 Capsule by mouth at bedtime as needed (insomnia). 90 Capsule 3    hydrOXYzine HCl (ATARAX) 25 MG Tab Take 0.5-1 Tablets by mouth 3 times a day as needed for Anxiety. 30 Tablet 5    topiramate (TOPAMAX) 50 MG tablet Take 1 Tablet by mouth 2 times a day. 180 Tablet 3    metoprolol SR (TOPROL XL) 50 MG TABLET SR 24 HR Take 1 Tablet by mouth every day. 90 Tablet 3    metFORMIN (GLUCOPHAGE) 500 MG Tab Take 2 Tablets by mouth 2 times a day with meals. 360 Tablet 3    Empagliflozin 25 MG Tab Take 1 Tablet by mouth every day. 90 Tablet 3    Cyanocobalamin (VITAMIN B-12 PO) Take 1 Tablet by mouth every day.      ASPIRIN 81 PO Take 81 mg by mouth every evening.      Multiple Vitamin (MULTI VITAMIN DAILY) Tab Take 1 Tablet by mouth every day.      Ascorbic Acid (VITAMIN C) 1000 MG Tab Take 1 Tablet by mouth every morning.      Cholecalciferol (VITAMIN D PO) Take 1 Tablet by mouth 2 times a day.       Current Facility-Administered Medications   Medication Dose Route Frequency Provider Last Rate Last Admin    lidocaine (Xylocaine) 2 % injection 10 mL  10 mL Other Once Nu Eason M.D.         She  has a past medical history of Anemia, Bowel habit changes, Diabetes, Diabetes (HCC), GERD (gastroesophageal reflux disease), Healthcare maintenance (9/27/2014), High cholesterol, Hyperlipidemia, Hypertension, Infectious disease, Jaundice (1999), Psychiatric problem, and Vaginal itching  (8/27/2014).    ROS  No chest pain, no shortness of breath, no abdominal pain       Objective:     There were no vitals taken for this visit. There is no height or weight on file to calculate BMI.   Physical Exam:  Constitutional: Alert, no distress.  Skin: Warm, dry, good turgor, no rashes in visible areas.  Eye: Equal, round and reactive, conjunctiva clear, lids normal.  ENMT: Lips without lesions, good dentition, oropharynx clear.  Neck: Trachea midline, no masses, no thyromegaly. No cervical or supraclavicular lymphadenopathy  Respiratory: Unlabored respiratory effort, lungs clear to auscultation, no wheezes, no ronchi.  Cardiovascular: Normal S1, S2, no murmur, no edema.  Abdomen: Soft, non-tender, no masses, no hepatosplenomegaly.  Psych: Alert and oriented x3, normal affect and mood.        Assessment and Plan:   The following treatment plan was discussed    1. Sore throat  - POCT CEPHEID COV-2, FLU A/B, RSV - PCR  - POCT CEPHEID GROUP A STREP - PCR    2. Neck pain  - methocarbamol (ROBAXIN) 500 MG Tab; Take 1 Tablet by mouth 3 times a day.  Dispense: 90 Tablet; Refill: 0    3. Hospital discharge follow-up    Other orders  - lidocaine (Xylocaine) 2 % injection 10 mL      Followup: Return for letter for pt pls. , As Scheduled.

## 2023-12-18 ENCOUNTER — APPOINTMENT (OUTPATIENT)
Dept: RADIOLOGY | Facility: MEDICAL CENTER | Age: 58
End: 2023-12-18
Attending: EMERGENCY MEDICINE
Payer: COMMERCIAL

## 2023-12-18 ENCOUNTER — OFFICE VISIT (OUTPATIENT)
Dept: URGENT CARE | Facility: PHYSICIAN GROUP | Age: 58
End: 2023-12-18
Payer: COMMERCIAL

## 2023-12-18 ENCOUNTER — HOSPITAL ENCOUNTER (EMERGENCY)
Facility: MEDICAL CENTER | Age: 58
End: 2023-12-18
Attending: EMERGENCY MEDICINE
Payer: COMMERCIAL

## 2023-12-18 VITALS
DIASTOLIC BLOOD PRESSURE: 67 MMHG | TEMPERATURE: 98 F | BODY MASS INDEX: 31.43 KG/M2 | HEART RATE: 92 BPM | SYSTOLIC BLOOD PRESSURE: 150 MMHG | WEIGHT: 166.45 LBS | OXYGEN SATURATION: 96 % | HEIGHT: 61 IN | RESPIRATION RATE: 18 BRPM

## 2023-12-18 VITALS
OXYGEN SATURATION: 98 % | TEMPERATURE: 97.5 F | HEART RATE: 100 BPM | RESPIRATION RATE: 18 BRPM | DIASTOLIC BLOOD PRESSURE: 68 MMHG | WEIGHT: 165 LBS | HEIGHT: 61 IN | BODY MASS INDEX: 31.15 KG/M2 | SYSTOLIC BLOOD PRESSURE: 126 MMHG

## 2023-12-18 DIAGNOSIS — R19.7 VOMITING AND DIARRHEA: ICD-10-CM

## 2023-12-18 DIAGNOSIS — R20.2 PARESTHESIAS: ICD-10-CM

## 2023-12-18 DIAGNOSIS — R11.10 VOMITING AND DIARRHEA: ICD-10-CM

## 2023-12-18 DIAGNOSIS — M54.2 NECK PAIN: ICD-10-CM

## 2023-12-18 LAB
ABO + RH BLD: NORMAL
ABO GROUP BLD: NORMAL
ALBUMIN SERPL BCP-MCNC: 4.1 G/DL (ref 3.2–4.9)
ALBUMIN/GLOB SERPL: 1.1 G/DL
ALP SERPL-CCNC: 88 U/L (ref 30–99)
ALT SERPL-CCNC: 26 U/L (ref 2–50)
ANION GAP SERPL CALC-SCNC: 18 MMOL/L (ref 7–16)
APTT PPP: 33.2 SEC (ref 24.7–36)
AST SERPL-CCNC: 25 U/L (ref 12–45)
BASOPHILS # BLD AUTO: 0.5 % (ref 0–1.8)
BASOPHILS # BLD: 0.05 K/UL (ref 0–0.12)
BILIRUB SERPL-MCNC: 0.5 MG/DL (ref 0.1–1.5)
BLD GP AB SCN SERPL QL: NORMAL
BUN SERPL-MCNC: 57 MG/DL (ref 8–22)
CALCIUM ALBUM COR SERPL-MCNC: 9.6 MG/DL (ref 8.5–10.5)
CALCIUM SERPL-MCNC: 9.7 MG/DL (ref 8.5–10.5)
CHLORIDE SERPL-SCNC: 98 MMOL/L (ref 96–112)
CO2 SERPL-SCNC: 17 MMOL/L (ref 20–33)
CREAT SERPL-MCNC: 1.27 MG/DL (ref 0.5–1.4)
EKG IMPRESSION: NORMAL
EOSINOPHIL # BLD AUTO: 0.13 K/UL (ref 0–0.51)
EOSINOPHIL NFR BLD: 1.2 % (ref 0–6.9)
ERYTHROCYTE [DISTWIDTH] IN BLOOD BY AUTOMATED COUNT: 40.2 FL (ref 35.9–50)
GFR SERPLBLD CREATININE-BSD FMLA CKD-EPI: 49 ML/MIN/1.73 M 2
GLOBULIN SER CALC-MCNC: 3.6 G/DL (ref 1.9–3.5)
GLUCOSE BLD STRIP.AUTO-MCNC: 162 MG/DL (ref 65–99)
GLUCOSE SERPL-MCNC: 165 MG/DL (ref 65–99)
HCT VFR BLD AUTO: 37 % (ref 37–47)
HGB BLD-MCNC: 12.7 G/DL (ref 12–16)
IMM GRANULOCYTES # BLD AUTO: 0.13 K/UL (ref 0–0.11)
IMM GRANULOCYTES NFR BLD AUTO: 1.2 % (ref 0–0.9)
INR PPP: 0.96 (ref 0.87–1.13)
LYMPHOCYTES # BLD AUTO: 3.09 K/UL (ref 1–4.8)
LYMPHOCYTES NFR BLD: 28.7 % (ref 22–41)
MCH RBC QN AUTO: 30.2 PG (ref 27–33)
MCHC RBC AUTO-ENTMCNC: 34.3 G/DL (ref 32.2–35.5)
MCV RBC AUTO: 88.1 FL (ref 81.4–97.8)
MONOCYTES # BLD AUTO: 0.67 K/UL (ref 0–0.85)
MONOCYTES NFR BLD AUTO: 6.2 % (ref 0–13.4)
NEUTROPHILS # BLD AUTO: 6.69 K/UL (ref 1.82–7.42)
NEUTROPHILS NFR BLD: 62.2 % (ref 44–72)
NRBC # BLD AUTO: 0 K/UL
NRBC BLD-RTO: 0 /100 WBC (ref 0–0.2)
PLATELET # BLD AUTO: 383 K/UL (ref 164–446)
PMV BLD AUTO: 11.2 FL (ref 9–12.9)
POTASSIUM SERPL-SCNC: 4.7 MMOL/L (ref 3.6–5.5)
PROT SERPL-MCNC: 7.7 G/DL (ref 6–8.2)
PROTHROMBIN TIME: 12.9 SEC (ref 12–14.6)
RBC # BLD AUTO: 4.2 M/UL (ref 4.2–5.4)
RH BLD: NORMAL
SODIUM SERPL-SCNC: 133 MMOL/L (ref 135–145)
TROPONIN T SERPL-MCNC: 25 NG/L (ref 6–19)
TROPONIN T SERPL-MCNC: 31 NG/L (ref 6–19)
WBC # BLD AUTO: 10.8 K/UL (ref 4.8–10.8)

## 2023-12-18 PROCEDURE — 3074F SYST BP LT 130 MM HG: CPT | Performed by: FAMILY MEDICINE

## 2023-12-18 PROCEDURE — 96375 TX/PRO/DX INJ NEW DRUG ADDON: CPT

## 2023-12-18 PROCEDURE — 700101 HCHG RX REV CODE 250: Performed by: EMERGENCY MEDICINE

## 2023-12-18 PROCEDURE — 70498 CT ANGIOGRAPHY NECK: CPT

## 2023-12-18 PROCEDURE — 80053 COMPREHEN METABOLIC PANEL: CPT

## 2023-12-18 PROCEDURE — 71045 X-RAY EXAM CHEST 1 VIEW: CPT

## 2023-12-18 PROCEDURE — 99285 EMERGENCY DEPT VISIT HI MDM: CPT

## 2023-12-18 PROCEDURE — 700111 HCHG RX REV CODE 636 W/ 250 OVERRIDE (IP): Performed by: EMERGENCY MEDICINE

## 2023-12-18 PROCEDURE — 86901 BLOOD TYPING SEROLOGIC RH(D): CPT

## 2023-12-18 PROCEDURE — 96374 THER/PROPH/DIAG INJ IV PUSH: CPT

## 2023-12-18 PROCEDURE — 700117 HCHG RX CONTRAST REV CODE 255: Performed by: EMERGENCY MEDICINE

## 2023-12-18 PROCEDURE — 84484 ASSAY OF TROPONIN QUANT: CPT

## 2023-12-18 PROCEDURE — 85025 COMPLETE CBC W/AUTO DIFF WBC: CPT

## 2023-12-18 PROCEDURE — 85730 THROMBOPLASTIN TIME PARTIAL: CPT

## 2023-12-18 PROCEDURE — 70496 CT ANGIOGRAPHY HEAD: CPT

## 2023-12-18 PROCEDURE — 94760 N-INVAS EAR/PLS OXIMETRY 1: CPT

## 2023-12-18 PROCEDURE — 36415 COLL VENOUS BLD VENIPUNCTURE: CPT

## 2023-12-18 PROCEDURE — 70450 CT HEAD/BRAIN W/O DYE: CPT

## 2023-12-18 PROCEDURE — 99213 OFFICE O/P EST LOW 20 MIN: CPT | Performed by: FAMILY MEDICINE

## 2023-12-18 PROCEDURE — 82962 GLUCOSE BLOOD TEST: CPT

## 2023-12-18 PROCEDURE — 700105 HCHG RX REV CODE 258: Performed by: EMERGENCY MEDICINE

## 2023-12-18 PROCEDURE — 99283 EMERGENCY DEPT VISIT LOW MDM: CPT | Performed by: STUDENT IN AN ORGANIZED HEALTH CARE EDUCATION/TRAINING PROGRAM

## 2023-12-18 PROCEDURE — 85610 PROTHROMBIN TIME: CPT

## 2023-12-18 PROCEDURE — 3078F DIAST BP <80 MM HG: CPT | Performed by: FAMILY MEDICINE

## 2023-12-18 PROCEDURE — 86850 RBC ANTIBODY SCREEN: CPT

## 2023-12-18 PROCEDURE — 86900 BLOOD TYPING SEROLOGIC ABO: CPT

## 2023-12-18 PROCEDURE — 93005 ELECTROCARDIOGRAM TRACING: CPT | Performed by: EMERGENCY MEDICINE

## 2023-12-18 RX ORDER — PROCHLORPERAZINE EDISYLATE 5 MG/ML
10 INJECTION INTRAMUSCULAR; INTRAVENOUS ONCE
Status: COMPLETED | OUTPATIENT
Start: 2023-12-18 | End: 2023-12-18

## 2023-12-18 RX ORDER — ONDANSETRON 4 MG/1
4 TABLET, ORALLY DISINTEGRATING ORAL EVERY 8 HOURS PRN
Qty: 10 TABLET | Refills: 0 | Status: SHIPPED | OUTPATIENT
Start: 2023-12-18

## 2023-12-18 RX ORDER — SODIUM CHLORIDE, SODIUM LACTATE, POTASSIUM CHLORIDE, CALCIUM CHLORIDE 600; 310; 30; 20 MG/100ML; MG/100ML; MG/100ML; MG/100ML
1000 INJECTION, SOLUTION INTRAVENOUS ONCE
Status: COMPLETED | OUTPATIENT
Start: 2023-12-18 | End: 2023-12-18

## 2023-12-18 RX ADMIN — KETAMINE HYDROCHLORIDE 25 MG: 100 INJECTION, SOLUTION, CONCENTRATE INTRAMUSCULAR; INTRAVENOUS at 17:19

## 2023-12-18 RX ADMIN — PROCHLORPERAZINE EDISYLATE 10 MG: 5 INJECTION, SOLUTION INTRAMUSCULAR; INTRAVENOUS at 16:36

## 2023-12-18 RX ADMIN — IOHEXOL 70 ML: 350 INJECTION, SOLUTION INTRAVENOUS at 17:00

## 2023-12-18 RX ADMIN — SODIUM CHLORIDE, POTASSIUM CHLORIDE, SODIUM LACTATE AND CALCIUM CHLORIDE 1000 ML: 600; 310; 30; 20 INJECTION, SOLUTION INTRAVENOUS at 16:37

## 2023-12-18 ASSESSMENT — FIBROSIS 4 INDEX
FIB4 SCORE: 1.12
FIB4 SCORE: 1.12

## 2023-12-18 NOTE — PROGRESS NOTES
"  Subjective:      58 y.o. female presents to urgent care for neck pain that started about one week ago. There was no inciting event or trauma at that time. She was seen 12/14/2023 had trigger injections for migraines. Unfortunately she continues to experience constant neck pain, it's described as feeling pressure like, currently rated 7/10. Robaxin makes her sleep, so she has slowed down with this, but continues with tylenol and ibuprofen. No numbness or weakness to upper extremities bilaterally.    She denies any other questions or concerns at this time.    Current problem list, medication, and past medical/surgical history were reviewed in Epic.    ROS  See HPI     Objective:      /68   Pulse 100   Temp 36.4 °C (97.5 °F) (Temporal)   Resp 18   Ht 1.549 m (5' 1\")   Wt 74.8 kg (165 lb)   SpO2 98%   BMI 31.18 kg/m²     Physical Exam  Constitutional:       General: She is not in acute distress.     Appearance: She is not diaphoretic.   Cardiovascular:      Rate and Rhythm: Normal rate and regular rhythm.      Heart sounds: Normal heart sounds.   Pulmonary:      Effort: Pulmonary effort is normal. No respiratory distress.      Breath sounds: Normal breath sounds.   Musculoskeletal:      Comments: No discolorations or deformities noted to inspection of neck/back.  No step-offs or areas of tenderness to palpation of back.  She is tender to palpation of her trapezius bilaterally.   Neurological:      Mental Status: She is alert.      Comments: Equal strength and sensation to upper extremities bilaterally.  Normal gait.   Psychiatric:         Mood and Affect: Affect normal.         Judgment: Judgment normal.       Assessment/Plan:     1. Neck pain  She was encouraged to continue with Tylenol, ibuprofen, heat, and regular neck stretches.      Instructed to return to Urgent Care or nearest Emergency Department if symptoms fail to improve, for any change in condition, further concerns, or new concerning " symptoms. Patient states understanding of the plan of care and discharge instructions.    Devika Perez M.D.

## 2023-12-18 NOTE — LETTER
December 18, 2023    To Whom It May Concern:         This is confirmation that Sonya Wei attended her scheduled appointment with Devika Perez M.D. on 12/18/23. She may return to work 12/23/2023 without any restrictions.          If you have any questions please do not hesitate to call me at the phone number listed below.    Sincerely,          Devika Perez M.D.  [phone]

## 2023-12-18 NOTE — ED TRIAGE NOTES
".  Chief Complaint   Patient presents with    Neck Pain     Pt report neck pain that radiates to her shoulder down to her back for 2weeks now. Pt describe pain as pressure, 8/10.        Pt ambulatory to triage. Pt A&Ox, for above complaint.   Pt was seen at  today for blurry vision. Pt also complains of L sided numbness and tingling.     Pt educated on alerting staff in changes to condition. Pt verbalized understanding.   Blood Pressure (Abnormal) 144/78   Pulse 95   Temperature 36.5 °C (97.7 °F) (Oral)   Respiration 18   Height 1.549 m (5' 1\")   Weight 75.5 kg (166 lb 7.2 oz)   Oxygen Saturation 97%   Body Mass Index 31.45 kg/m²    "

## 2023-12-19 NOTE — DISCHARGE INSTRUCTIONS
You were seen in the emergency department for vomiting and diarrhea. You had labs performed, which were reassuring.  Your physical exam was also reassuring. You were treated with IV fluids and medications to help treat your symptoms. You are being sent home with medications to help prevent further vomiting.  Please eat a bland diet, such as bananas, rice, applesauce, toast.  Please drink plenty of fluids.    The cause of your symptoms is most likely from a viral illness, however there may be an early bacterial infection or other cause for your symptoms.    For pain you can take acetaminophen (Tylenol), 1000mg every 8 hours as needed for pain. Do not take more than 3000mg of acetaminophen in any 24 hour period. You can also take  ibuprofen (Motrin), 600mg every 6 hours as needed for pain (take with food to avoid GI upset).  Taking these medications regularly during the day can be very effective in controlling pain.    Return to the emergency department if you develop fevers, abdominal pain, blood in the vomit, blood in the stool, or other concerning findings.

## 2023-12-19 NOTE — ED PROVIDER NOTES
"ED Provider Note    Scribed for Dr. Degroot by Cassia Orozco. 12/18/2023,  4:03 PM.      CHIEF COMPLAINT  Chief Complaint   Patient presents with    Neck Pain     Pt report neck pain that radiates to her shoulder down to her back for 2weeks now. Pt describe pain as pressure, 8/10.        EXTERNAL RECORDS REVIEWED  Inpatient Notes Patient last seen at the [facility] ED on 12/6/23 for headache, neck pain, dizziness and tingling to both hands. and Outpatient Notes Patient seen at the [facility] this morning for neck pain. She was encouraged to present to the ED if symptoms worsen and was discharged with instructions to control pain with ibuprofen.    HPI  LIMITATION TO HISTORY   Select: : None  OUTSIDE HISTORIAN(S):  None    Sonya Wei is a 58 y.o. female who presents to the Emergency Department for a stroke assessment due to left-sided neck pain and tingling that radiates down to her shoulder and back. Her last known well was at 2 PM prior to near-syncope at [facility]. She states that she has been experiencing ongoing neck pain, diarrhea and vomiting since last night. She was seen this morning at  for these symptoms and they discharged her with instructions to treat pain symptoms with a heating pad, Tylenol and Motrin. She went home and had progressive vomiting. She went to [facility] to get herself food and had a near syncopal event. She states that \"everything went dark\", she had an acute onset of dizziness, and she fell against a wall while at [facility]. She began to feel diminished sensation to the left arm following this incident. She went back to urgent care for diminished sensation to the left side of her neck, mouth, and hands following this event. They redirected her here for further work up and concerning symptoms. At this time she states that she is lightheaded, and last night she was experiencing diarrhea and vomiting. She also reports that the last time she felt normal was in November. She has been seen for " similar symptoms on 12/6/23. She denies any abdominal pain or fevers at this time.     REVIEW OF SYSTEMS  See HPI for further details. All other systems are negative.     PAST MEDICAL HISTORY     Past Medical History:   Diagnosis Date    Anemia     Bowel habit changes     constipation    Diabetes     Diabetes (HCC)     GERD (gastroesophageal reflux disease)     Healthcare maintenance 9/27/2014    Mammogram: Due    High cholesterol     Hyperlipidemia     Hypertension     Infectious disease     Cold 10/2016    Jaundice 1999    Psychiatric problem     depression and anxiety    Vaginal itching 8/27/2014    With anal itching X 1 month Getting worse Burning Min vag disch       SURGICAL HISTORY  Past Surgical History:   Procedure Laterality Date    VENTRAL HERNIA REPAIR ROBOTIC XI N/A 10/14/2016    Procedure: VENTRAL HERNIA REPAIR ROBOTIC XI FOR INCISIONAL W/MESH;  Surgeon: Edi Mendoza M.D.;  Location: SURGERY [facility];  Service:     ABDOMINAL HYSTERECTOMY TOTAL      still has ovaries    CHOLECYSTECTOMY      PRIMARY C SECTION      TUBAL COAGULATION LAPAROSCOPIC BILATERAL         FAMILY HISTORY  Family History   Problem Relation Age of Onset    Diabetes Mother     Hyperlipidemia Mother     Diabetes Father     Hypertension Father     Hyperlipidemia Father        SOCIAL HISTORY    reports that she has never smoked. She has never used smokeless tobacco. She reports that she does not drink alcohol and does not use drugs.    CURRENT MEDICATIONS  Home Medications       Reviewed by Sherif Juárez R.N. (Registered Nurse) on 12/18/23 at 1546  Med List Status: Not Addressed     Medication Last Dose Status   acetaminophen (TYLENOL) 500 MG Tab  Active   Alcohol Swabs  Active   amLODIPine (NORVASC) 10 MG Tab  Active   Ascorbic Acid (VITAMIN C) 1000 MG Tab  Active   ASPIRIN 81 PO  Active   atorvastatin (LIPITOR) 80 MG tablet  Active   Cholecalciferol (VITAMIN D PO)  Active   Continuous Blood Gluc Sensor (FREESTYLE KEN 3  "SENSOR) Misc  Active   Cyanocobalamin (VITAMIN B-12 PO)  Active   Dulaglutide (TRULICITY) 3 MG/0.5ML Solution Pen-injector  Active   Empagliflozin 25 MG Tab  Active   fluticasone-salmeterol (ADVAIR) 100-50 MCG/ACT AEROSOL POWDER, BREATH ACTIVATED  Active   gabapentin (NEURONTIN) 100 MG Cap  Active   hydrALAZINE (APRESOLINE) 25 MG Tab  Active   hydroCHLOROthiazide 25 MG Tab  Active   hydrOXYzine HCl (ATARAX) 25 MG Tab  Active   ibuprofen (MOTRIN) 600 MG Tab  Active   Icosapent Ethyl (VASCEPA) 1 g Cap  Active   insulin glargine 100 UNIT/ML Solution Pen-injector injection  Active   Insulin Pen Needle 32 G x 4 mm  Active   lidocaine (ASPERFLEX) 4 % Patch  Active   losartan (COZAAR) 25 MG Tab  Active   metFORMIN (GLUCOPHAGE) 500 MG Tab  Active   methocarbamol (ROBAXIN) 500 MG Tab  Active   metoprolol SR (TOPROL XL) 50 MG TABLET SR 24 HR  Active   Multiple Vitamin (MULTI VITAMIN DAILY) Tab  Active   topiramate (TOPAMAX) 50 MG tablet  Active             ALLERGIES  Allergies   Allergen Reactions    Latex Rash    Morphine Itching     Rxn = unknown   Bumps all over body    Other Misc Rash     Rash from gloves at work       PHYSICAL EXAM  VITAL SIGNS: BP (!) 144/78   Pulse 95   Temp 36.5 °C (97.7 °F) (Oral)   Resp 18   Ht 1.549 m (5' 1\")   Wt 75.5 kg (166 lb 7.2 oz)   SpO2 97%   BMI 31.45 kg/m²     Gen: Alert, no acute distress  HEENT: ATNC  Eyes: PERRL, EOMI, normal conjunctiva  Neck: trachea midline, nontender, spontaneously ranges neck  Resp: no respiratory distress, CTAB  CV: No JVD, regular rate and rhythm, no m/r/g  Abd: non-distended, soft, nontender  Ext: No deformities  Neuro: speech fluent. NIH stroke scale: 2 for diminished sensation on left side, possible left facial droop.    DIAGNOSTIC STUDIES / PROCEDURES  EKG  I independently interpreted this EKG.  Results for orders placed or performed during the hospital encounter of 12/18/23   EKG (NOW)   Result Value Ref Range    Report       [facility] " [place] Emergency Dept.    Test Date:  2023  Pt Name:    MATTIE SANDOVAL               Department: ER  MRN:        [id number]                      Room:        05  Gender:     Female                       Technician: [id number]  :        1965                   Requested By:MELVIN DEGROOT  Order #:    [id number]                    Reading MD: Melvin Degroot    Measurements  Intervals                                Axis  Rate:       91                           P:          50  WV:         143                          QRS:        -12  QRSD:       139                          T:          7  QT:         378  QTc:        466    Interpretive Statements  Sinus rhythm  Right bundle branch block  Compared to ECG 2023 16:12:04  No significant changes  Electronically Signed On 2023 18:42:29 PST by Melvin Degroot         LABS  Labs Reviewed   CBC WITH DIFFERENTIAL - Abnormal; Notable for the following components:       Result Value    Immature Granulocytes 1.20 (*)     Immature Granulocytes (abs) 0.13 (*)     All other components within normal limits    Narrative:     Indicate which anticoagulants the patient is on:->UNKNOWN  Biotin intake of greater than 5 mg per day may interfere with  troponin levels, causing false low values.   COMP METABOLIC PANEL - Abnormal; Notable for the following components:    Sodium 133 (*)     Co2 17 (*)     Anion Gap 18.0 (*)     Glucose 165 (*)     Bun 57 (*)     Globulin 3.6 (*)     All other components within normal limits    Narrative:     Indicate which anticoagulants the patient is on:->UNKNOWN  Biotin intake of greater than 5 mg per day may interfere with  troponin levels, causing false low values.   TROPONIN - Abnormal; Notable for the following components:    Troponin T 31 (*)     All other components within normal limits    Narrative:     Indicate which anticoagulants the patient is on:->UNKNOWN  Biotin intake of greater than 5 mg per day may interfere with  troponin levels,  causing false low values.   ESTIMATED GFR - Abnormal; Notable for the following components:    GFR (CKD-EPI) 49 (*)     All other components within normal limits    Narrative:     Indicate which anticoagulants the patient is on:->UNKNOWN  Biotin intake of greater than 5 mg per day may interfere with  troponin levels, causing false low values.   TROPONIN - Abnormal; Notable for the following components:    Troponin T 25 (*)     All other components within normal limits    Narrative:     Biotin intake of greater than 5 mg per day may interfere with  troponin levels, causing false low values.   POCT GLUCOSE DEVICE RESULTS - Abnormal; Notable for the following components:    POC Glucose, Blood 162 (*)     All other components within normal limits   PROTHROMBIN TIME    Narrative:     Indicate which anticoagulants the patient is on:->UNKNOWN  Biotin intake of greater than 5 mg per day may interfere with  troponin levels, causing false low values.   APTT    Narrative:     Indicate which anticoagulants the patient is on:->UNKNOWN  Biotin intake of greater than 5 mg per day may interfere with  troponin levels, causing false low values.   COD (ADULT)   ABO RH CONFIRM         RADIOLOGY  I have independently interpreted the diagnostic imaging associated with this visit:  Chest x-ray: No pneumonia    DX-CHEST-PORTABLE (1 VIEW)   Final Result      1.  There is no acute cardiopulmonary process.      CT-CTA NECK WITH & W/O-POST PROCESSING   Final Result      No high-grade stenosis, dissection or occlusion of the cervical carotid or vertebral arteries.      CT-CTA HEAD WITH & W/O-POST PROCESS   Final Result      No intracranial aneurysm, focal stenosis or abrupt large vessel cut off.      CT-HEAD W/O   Final Result         No acute process.             COURSE & MEDICAL DECISION MAKING  Pertinent Labs & Imaging studies were reviewed. (See chart for details)    ED Observation Status? Yes  Patient admitted to ED observation at 4:03 PM  on 12/18/2023    Observation plan: labs/imaging    After observation the patient is improved and will be discharged  Patient discharged from ED Observation at 6:44 PM on 12/18/2023  -----------    4:03 PM Patient seen and examined at charge desk as a stroke rule out from triage. Neuro exam performed and history taken. Discussed plan of care including imaging to rule out stroke. Patient verbalizes understanding and agreement to this plan of care.     4:25 PM- Neurologist at bedside. Informs me that CTA is negative.     6:45 PM - Patient reevaluated at bedside. NPO trial prior to discharge. Discussed plan for discharge; I advised the patient to follow-up with PCP as needed, and to return to the [facility] ED with any new or worsening symptoms. Patient was given the opportunity for questions. I addressed all questions or concerns at this time and they verbalize agreement to the plan of care.     INITIAL ASSESSMENT AND PLAN  Medical Decision Making: Patient arrives as a code stroke with report of diminished sensation to the left upper extremity.  She also appears to have some slight weakness to smile on the left-hand side, however there also appears to be some volitional component to this as when encouraged to try harder she is able to improve her smile.    Patient has a low NIHSS, negative van score, low suspicion for large vessel obstruction, however she does have neck pain, will obtain CTA head neck to evaluate for dissection, I do not believe she has a large vessel occlusion to require perfusion.    CAT scan reassuring.  Case discussed with neurology, who agreed low likelihood for stroke.        Regarding the patient's near syncope, EKG demonstrates no high risk features for cardiac syncope, including ischemia, high-grade heart block, Brugada syndrome, Jonathan-Parkinson-White syndrome, arrhythmogenic right ventricular dysplasia, long QT, short QT, hypertrophic obstructive cardiomyopathy.    No evidence of  DVT/PE.    Troponin minimally elevated, however unchanged on serial troponins.    Patient appears to have a depressed affect, was given ketamine for her ongoing shoulder pain, is feeling improved afterwards.  She is tolerating oral intake.  Likely ongoing shoulder pain, complicated by viral gastroenteritis.  Benign abdominal examination, low suspicion for intra-abdominal infection, bowel obstruction.    ADDITIONAL PROBLEM LIST AND DISPOSITION      I have discussed management of the patient with the following medical professionals:  Dr Wilder, neurology    Escalation of care considered, and ultimately not performed: acute inpatient care management, however at this time, the patient is most appropriate for outpatient management.       Decision tools and prescription drugs considered including, but not limited to: NIH Stroke Scale 2 .    Heart score:4    Hydration: Patient received IV fluids for vomiting, dehydration. Oral hydration alone was not sufficient. After IV fluids patient is improved.       The patient will return for new or worsening symptoms and is stable at the time of discharge.    The patient is referred to a primary physician for blood pressure management, diabetic screening, and for all other preventative health concerns.      DISPOSITION:  Patient will be discharged home in stable condition.    FOLLOW UP:  Nu Eason M.D.  [address] [place] [address] [phone]    Schedule an appointment as soon as possible for a visit       [facility], Emergency Dept  [address] [address] [phone]    If symptoms worsen      OUTPATIENT MEDICATIONS:  Discharge Medication List as of 12/18/2023  7:06 PM        START taking these medications    Details   ondansetron (ZOFRAN ODT) 4 MG TABLET DISPERSIBLE Take 1 Tablet by mouth every 8 hours as needed for Nausea/Vomiting., Disp-10 Tablet, R-0, Normal                 FINAL IMPRESSION  1. Vomiting  and diarrhea    2. Paresthesias             I, Cassia Orozco (Scribe), am scribing for, and in the presence of, Douglas Degroot M.D..    Electronically signed by: Cassia Orozco (Ta), 12/18/2023    Douglas QUIROGA M.D. personally performed the services described in this documentation, as scribed by Cassia Orozco in my presence, and it is both accurate and complete.    The note accurately reflects work and decisions made by me.  Douglas Degroot M.D.  12/18/2023  6:44 PM      This dictation was created using voice recognition software. The accuracy of the dictation is limited to the abilities of the software. I expect there may be some errors of grammar and possibly content. The nursing notes were reviewed and certain aspects of this information were incorporated into this note.

## 2023-12-19 NOTE — CONSULTS
Neurology Initial Consult H&P  Neurohospitalist Service, [facility]iences    Referring Physician: Douglas Degroot M.D.    Chief Complaint   Patient presents with    Neck Pain     Pt report neck pain that radiates to her shoulder down to her back for 2weeks now. Pt describe pain as pressure, 8/10.        HPI: Sonya Wei is a 58 y.o. F who presents with chronic neck pain, headache, near syncope and transient left hand and lip numbness and tingling. Patient has had chronic neck pain for weeks and has been using multiple doses of liquid tylenol and other NSAIDs for relieve. She saw an outside physician for some type of neck injection that failed to relieve her pain. Patient reports yesterday evening nausea, vomiting and diarrhea and thus went to urgent care for her symptoms. Patient treated symptomatically there and when she left and went to [facility] she experienced near syncope. She returned to urgent care and at that time developed transient left hand and lip numbness and tingling. She was encouraged to present to the ED for further evaluation. Patient received emergent head imaging without evidence of acute ischemia or LVO.     Review of systems: In addition to what is detailed in the HPI above, all other systems reviewed and are negative.    Past Medical History:    has a past medical history of Anemia, Bowel habit changes, Diabetes, Diabetes (HCC), GERD (gastroesophageal reflux disease), Healthcare maintenance (9/27/2014), High cholesterol, Hyperlipidemia, Hypertension, Infectious disease, Jaundice (1999), Psychiatric problem, and Vaginal itching (8/27/2014).    FHx:  family history includes Diabetes in her father and mother; Hyperlipidemia in her father and mother; Hypertension in her father.    SHx:   reports that she has never smoked. She has never used smokeless tobacco. She reports that she does not drink alcohol and does not use drugs.    Allergies:  Allergies   Allergen Reactions     Latex Rash    Morphine Itching     Rxn = unknown   Bumps all over body    Other Misc Rash     Rash from gloves at work       Medications:  No current facility-administered medications for this encounter.    Current Outpatient Medications:     methocarbamol (ROBAXIN) 500 MG Tab, Take 1 Tablet by mouth 3 times a day., Disp: 90 Tablet, Rfl: 0    Continuous Blood Gluc Sensor (FREESTYLE KEN 3 SENSOR) Misc, Apply 1 Each topically every 14 days., Disp: 2 Each, Rfl: 6    Dulaglutide (TRULICITY) 3 MG/0.5ML Solution Pen-injector, Inject 0.5 mL under the skin every 7 days., Disp: 2 mL, Rfl: 6    atorvastatin (LIPITOR) 80 MG tablet, Take 1 Tablet by mouth every evening., Disp: 90 Tablet, Rfl: 1    Icosapent Ethyl (VASCEPA) 1 g Cap, Take 2 g by mouth 2 times a day., Disp: 120 Capsule, Rfl: 6    acetaminophen (TYLENOL) 500 MG Tab, Take 1-2 Tablets by mouth every 6 hours as needed for Moderate Pain., Disp: 30 Tablet, Rfl: 0    lidocaine (ASPERFLEX) 4 % Patch, Place 1 Patch on the skin every 12 hours., Disp: 1 Patch, Rfl: 0    ibuprofen (MOTRIN) 600 MG Tab, Take 1 Tablet by mouth every 6 hours as needed for Mild Pain or Moderate Pain., Disp: 30 Tablet, Rfl: 0    hydroCHLOROthiazide 25 MG Tab, Take 1 Tablet by mouth every day., Disp: 30 Tablet, Rfl: 1    losartan (COZAAR) 25 MG Tab, Take 1 Tablet by mouth every day., Disp: 30 Tablet, Rfl: 1    insulin glargine 100 UNIT/ML Solution Pen-injector injection, Inject 5 Units under the skin every evening., Disp: 3 mL, Rfl: 1    Insulin Pen Needle 32 G x 4 mm, Use one pen needle in pen device to inject insulin once daily., Disp: 100 Each, Rfl: 1    Alcohol Swabs, Wipe site with prep pad prior to injection., Disp: 100 Each, Rfl: 1    amLODIPine (NORVASC) 10 MG Tab, Take 1 Tablet by mouth every day., Disp: 30 Tablet, Rfl: 1    hydrALAZINE (APRESOLINE) 25 MG Tab, Take 1 Tablet by mouth every 8 hours., Disp: 90 Tablet, Rfl: 1    fluticasone-salmeterol (ADVAIR) 100-50 MCG/ACT AEROSOL POWDER,  BREATH ACTIVATED, Inhale 1 Puff every 12 hours., Disp: 60 Each, Rfl: 2    gabapentin (NEURONTIN) 100 MG Cap, Take 1 Capsule by mouth at bedtime as needed (insomnia)., Disp: 90 Capsule, Rfl: 3    hydrOXYzine HCl (ATARAX) 25 MG Tab, Take 0.5-1 Tablets by mouth 3 times a day as needed for Anxiety., Disp: 30 Tablet, Rfl: 5    topiramate (TOPAMAX) 50 MG tablet, Take 1 Tablet by mouth 2 times a day., Disp: 180 Tablet, Rfl: 3    metoprolol SR (TOPROL XL) 50 MG TABLET SR 24 HR, Take 1 Tablet by mouth every day., Disp: 90 Tablet, Rfl: 3    metFORMIN (GLUCOPHAGE) 500 MG Tab, Take 2 Tablets by mouth 2 times a day with meals., Disp: 360 Tablet, Rfl: 3    Empagliflozin 25 MG Tab, Take 1 Tablet by mouth every day., Disp: 90 Tablet, Rfl: 3    Cyanocobalamin (VITAMIN B-12 PO), Take 1 Tablet by mouth every day., Disp: , Rfl:     ASPIRIN 81 PO, Take 81 mg by mouth every evening., Disp: , Rfl:     Multiple Vitamin (MULTI VITAMIN DAILY) Tab, Take 1 Tablet by mouth every day., Disp: , Rfl:     Ascorbic Acid (VITAMIN C) 1000 MG Tab, Take 1 Tablet by mouth every morning., Disp: , Rfl:     Cholecalciferol (VITAMIN D PO), Take 1 Tablet by mouth 2 times a day., Disp: , Rfl:       NEUROLOGICAL EXAM:     MENTAL STATUS: Awake, alert and oriented to person, place, time and situation  LANG/SPEECH: Naming and repetition intact, fluent speech, follows simple commands    CRANIAL NERVES:  II: Pupils equal and reactive, no VF deficits  III, IV, VI: EOM intact, no gaze preference or deviation, no nystagmus.  V: normal sensation in V1, V2, and V3 segments bilaterally  VII: no asymmetry, no nasolabial fold flattening  VIII: normal hearing to speech  IX, X: normal palatal elevation, no uvular deviation  XI: 5/5 head turn and 5/5 shoulder shrug bilaterally  XII: midline tongue protrusion    MOTOR: Antigravity movement in bilateral upper and lower extremities. Full and symmetric power in proximal and distal muscle groups in bilateral upper and lower  extremities    REFLEXES: no clonus  SENSORY: Normal to fine touch and pinprick in all extremities, no extinction to double sided stimulation (visual & tactile)  COORD: Normal finger to nose, no tremor, no dysmetria  GAIT: Deferred      NIHSS: National Institutes of Health Stroke Scale    [0] 1a:Level of Consciousness    0-alert 1-drowsy   2-stupor   3-coma  [0] 1b:LOC Questions                  0-both  1-one      2-neither  [0] 1c:LOC Commands                   0-both  1-one      2-neither  [0] 2: Best Gaze                     0-nl    1-partial  2-forced  [0] 3: Visual Fields                   0-nl    1-partial  2-complete 3-bilat  [0] 4: Facial Paresis                0-nl    1-minor    2-partial  3-full  MOTOR                       0-nl  [0] 5: Right Arm           1-drift  [0] 6: Left Arm             2-some effort vs gravity  [0] 7: Right Leg           3-no effort vs gravity  [0] 8: Left Leg             4-no movement                             x-untestable  [0] 9: Limb Ataxia                    0-abs   1-1_limb   2-2+_limbs       x-untestable  [0] 10:Sensory                        0-nl    1-partial  2-dense  [0] 11:Best Language/Aphasia         0-nl    1-mild/mod 2-severe   3-mute  [0] 12:Dysarthria                     0-nl    1-mild/mod 2-severe       x-untestable  [0] 13:Neglect/Inattention            0-none  1-partial  2-complete  [0] TOTAL      Objective Data:    Labs:  Lab Results   Component Value Date/Time    PROTHROMBTM 12.8 12/01/2023 06:14 PM    INR 0.95 12/01/2023 06:14 PM      Lab Results   Component Value Date/Time    WBC 7.8 12/06/2023 05:24 PM    RBC 4.40 12/06/2023 05:24 PM    HEMOGLOBIN 13.1 12/06/2023 05:24 PM    HEMATOCRIT 38.7 12/06/2023 05:24 PM    MCV 88.0 12/06/2023 05:24 PM    MCH 29.8 12/06/2023 05:24 PM    MCHC 33.9 12/06/2023 05:24 PM    MPV 11.2 12/06/2023 05:24 PM    NEUTSPOLYS 56.90 12/06/2023 05:24 PM    LYMPHOCYTES 34.60 12/06/2023 05:24 PM    MONOCYTES 5.60 12/06/2023 05:24 PM     EOSINOPHILS 1.90 12/06/2023 05:24 PM    BASOPHILS 0.40 12/06/2023 05:24 PM    HYPOCHROMIA 1+ 09/11/2013 08:34 AM    ANISOCYTOSIS 1+ 12/03/2020 09:45 PM      Lab Results   Component Value Date/Time    SODIUM 135 12/06/2023 05:24 PM    POTASSIUM 4.7 12/06/2023 05:24 PM    CHLORIDE 100 12/06/2023 05:24 PM    CO2 22 12/06/2023 05:24 PM    GLUCOSE 218 (H) 12/06/2023 05:24 PM    BUN 49 (H) 12/06/2023 05:24 PM    CREATININE 1.20 12/06/2023 05:24 PM      Lab Results   Component Value Date/Time    CHOLSTRLTOT 371 (H) 12/01/2023 06:14 PM    LDL see below 12/01/2023 06:14 PM    HDL 45 12/01/2023 06:14 PM    TRIGLYCERIDE 534 (H) 12/01/2023 06:14 PM       Lab Results   Component Value Date/Time    ALKPHOSPHAT 111 (H) 12/06/2023 05:24 PM    ASTSGOT 26 12/06/2023 05:24 PM    ALTSGPT 23 12/06/2023 05:24 PM    TBILIRUBIN 0.4 12/06/2023 05:24 PM        Imaging/Testing:    I interpreted and/or reviewed the patient's neuroimaging    DX-CHEST-PORTABLE (1 VIEW)    (Results Pending)   CT-HEAD W/O    (Results Pending)   CT-CTA HEAD WITH & W/O-POST PROCESS    (Results Pending)   CT-CTA NECK WITH & W/O-POST PROCESSING    (Results Pending)       Assessment and Plan:  Sonya Wei is a 58 y.o. F who presents with chronic neck pain, headache, near syncope and transient left hand and lip numbness and tingling. Patient had CTH/CTA head and neck without acute findings. NIH 0 and sensory testing without evidence of sensory loss. Acute symptoms are not felt to represent acute ischemic event at this time. Lip numbness more likely anxiety or electrolyte abnormality. Left hand numbness could be referable to chronic neck pain or additionally could be symptom of underlying anxiety from unclear pain in neck.     Problem list:    Recommendations:  -- No acute neurovascular interventions recommended; symptoms do not represent pathology referable to ischemic stroke  -- No further work up or imaging recommended from neurology perspective; will sign off;  please call with questions or concerns    Denzel Wilder III, MD  Vascular Neurology, [facility]

## 2023-12-19 NOTE — ED NOTES
Pt evaluated by ERP, to CT scan with RN, then to red 5, bedside report given to RN. Pt placed on monitoring. Neuro at bedside.

## 2023-12-19 NOTE — ED NOTES
Pt discharged to home. Pt was given follow up instructions and prescriptions for Zofran. All lines removed prior to discharge. Pt verbalized understanding of all instructions for discharge and is ambulatory out of ED with steady gait. AOx4

## 2023-12-20 ENCOUNTER — HOSPITAL ENCOUNTER (OUTPATIENT)
Dept: LAB | Facility: MEDICAL CENTER | Age: 58
End: 2023-12-20
Attending: FAMILY MEDICINE
Payer: COMMERCIAL

## 2023-12-20 DIAGNOSIS — Z11.1 SCREENING-PULMONARY TB: ICD-10-CM

## 2023-12-20 PROCEDURE — 86480 TB TEST CELL IMMUN MEASURE: CPT

## 2023-12-20 PROCEDURE — 36415 COLL VENOUS BLD VENIPUNCTURE: CPT

## 2023-12-22 LAB
GAMMA INTERFERON BACKGROUND BLD IA-ACNC: 0.06 IU/ML
M TB IFN-G BLD-IMP: NEGATIVE
M TB IFN-G CD4+ BCKGRND COR BLD-ACNC: 0 IU/ML
MITOGEN IGNF BCKGRD COR BLD-ACNC: >10 IU/ML
QFT TB2 - NIL TBQ2: 0 IU/ML

## 2024-01-08 ENCOUNTER — OFFICE VISIT (OUTPATIENT)
Dept: URGENT CARE | Facility: PHYSICIAN GROUP | Age: 59
End: 2024-01-08
Payer: COMMERCIAL

## 2024-01-08 VITALS
HEART RATE: 96 BPM | WEIGHT: 166 LBS | HEIGHT: 61 IN | SYSTOLIC BLOOD PRESSURE: 128 MMHG | OXYGEN SATURATION: 94 % | BODY MASS INDEX: 31.34 KG/M2 | DIASTOLIC BLOOD PRESSURE: 88 MMHG | RESPIRATION RATE: 16 BRPM | TEMPERATURE: 97.9 F

## 2024-01-08 DIAGNOSIS — U07.1 COVID: ICD-10-CM

## 2024-01-08 LAB
FLUAV RNA SPEC QL NAA+PROBE: NEGATIVE
FLUBV RNA SPEC QL NAA+PROBE: NEGATIVE
RSV RNA SPEC QL NAA+PROBE: NEGATIVE
SARS-COV-2 RNA RESP QL NAA+PROBE: POSITIVE

## 2024-01-08 PROCEDURE — 99213 OFFICE O/P EST LOW 20 MIN: CPT

## 2024-01-08 PROCEDURE — 3079F DIAST BP 80-89 MM HG: CPT

## 2024-01-08 PROCEDURE — 0241U POCT CEPHEID COV-2, FLU A/B, RSV - PCR: CPT

## 2024-01-08 PROCEDURE — 3074F SYST BP LT 130 MM HG: CPT

## 2024-01-08 RX ORDER — DEXTROMETHORPHAN HYDROBROMIDE AND PROMETHAZINE HYDROCHLORIDE 15; 6.25 MG/5ML; MG/5ML
5 SYRUP ORAL EVERY 6 HOURS PRN
Qty: 118 ML | Refills: 0 | Status: SHIPPED | OUTPATIENT
Start: 2024-01-08 | End: 2024-01-15

## 2024-01-08 ASSESSMENT — ENCOUNTER SYMPTOMS
VOMITING: 0
NAUSEA: 0
SORE THROAT: 1
SHORTNESS OF BREATH: 0
MYALGIAS: 1
WHEEZING: 1
COUGH: 1
FEVER: 1
DIARRHEA: 1
SPUTUM PRODUCTION: 0
ABDOMINAL PAIN: 0
CHILLS: 1

## 2024-01-08 ASSESSMENT — FIBROSIS 4 INDEX: FIB4 SCORE: 0.76

## 2024-01-08 NOTE — LETTER
January 8, 2024    To Whom It May Concern:         This is confirmation that Sonya Cervantes Eriberto attended her scheduled appointment with DECLAN Vera on 1/08/24. Please excuse her from work 1/11/24.         If you have any questions please do not hesitate to call me at the phone number listed below.    Sincerely,          DOMINICK Vera.  [phone]

## 2024-01-09 NOTE — PROGRESS NOTES
Subjective:   Sonya Wei is a 58 y.o. female who presents for Cough (Wheezing x 2 days ), Diarrhea, and Fever      HPI: This is a 58-year-old female who presents today for cough, sore throat, body aches, chills, diarrhea, and wheezing.  This is a new problem.  Patient reports symptoms developed 2 days ago.  She reports that she has been using over-the-counter cough syrup and her albuterol inhaler which has helped with wheezing.  She reports cough is dry nonproductive.  She reports that her daughter has recently been ill.  She denies any underlying history of asthma or COPD.      Review of Systems   Constitutional:  Positive for chills, fever and malaise/fatigue.   HENT:  Positive for congestion and sore throat.    Respiratory:  Positive for cough and wheezing. Negative for sputum production and shortness of breath.    Gastrointestinal:  Positive for diarrhea. Negative for abdominal pain, nausea and vomiting.   Musculoskeletal:  Positive for myalgias.       Medications:    Current Outpatient Medications on File Prior to Visit   Medication Sig Dispense Refill    ondansetron (ZOFRAN ODT) 4 MG TABLET DISPERSIBLE Take 1 Tablet by mouth every 8 hours as needed for Nausea/Vomiting. 10 Tablet 0    methocarbamol (ROBAXIN) 500 MG Tab Take 1 Tablet by mouth 3 times a day. 90 Tablet 0    Continuous Blood Gluc Sensor (FREESTYLE KEN 3 SENSOR) Misc Apply 1 Each topically every 14 days. 2 Each 6    Dulaglutide (TRULICITY) 3 MG/0.5ML Solution Pen-injector Inject 0.5 mL under the skin every 7 days. 2 mL 6    atorvastatin (LIPITOR) 80 MG tablet Take 1 Tablet by mouth every evening. 90 Tablet 1    Icosapent Ethyl (VASCEPA) 1 g Cap Take 2 g by mouth 2 times a day. 120 Capsule 6    acetaminophen (TYLENOL) 500 MG Tab Take 1-2 Tablets by mouth every 6 hours as needed for Moderate Pain. 30 Tablet 0    lidocaine (ASPERFLEX) 4 % Patch Place 1 Patch on the skin every 12 hours. 1 Patch 0    ibuprofen (MOTRIN) 600 MG Tab Take 1 Tablet  by mouth every 6 hours as needed for Mild Pain or Moderate Pain. 30 Tablet 0    hydroCHLOROthiazide 25 MG Tab Take 1 Tablet by mouth every day. 30 Tablet 1    losartan (COZAAR) 25 MG Tab Take 1 Tablet by mouth every day. 30 Tablet 1    insulin glargine 100 UNIT/ML Solution Pen-injector injection Inject 5 Units under the skin every evening. 3 mL 1    Insulin Pen Needle 32 G x 4 mm Use one pen needle in pen device to inject insulin once daily. 100 Each 1    Alcohol Swabs Wipe site with prep pad prior to injection. 100 Each 1    amLODIPine (NORVASC) 10 MG Tab Take 1 Tablet by mouth every day. 30 Tablet 1    hydrALAZINE (APRESOLINE) 25 MG Tab Take 1 Tablet by mouth every 8 hours. 90 Tablet 1    fluticasone-salmeterol (ADVAIR) 100-50 MCG/ACT AEROSOL POWDER, BREATH ACTIVATED Inhale 1 Puff every 12 hours. 60 Each 2    gabapentin (NEURONTIN) 100 MG Cap Take 1 Capsule by mouth at bedtime as needed (insomnia). 90 Capsule 3    hydrOXYzine HCl (ATARAX) 25 MG Tab Take 0.5-1 Tablets by mouth 3 times a day as needed for Anxiety. 30 Tablet 5    topiramate (TOPAMAX) 50 MG tablet Take 1 Tablet by mouth 2 times a day. 180 Tablet 3    metoprolol SR (TOPROL XL) 50 MG TABLET SR 24 HR Take 1 Tablet by mouth every day. 90 Tablet 3    metFORMIN (GLUCOPHAGE) 500 MG Tab Take 2 Tablets by mouth 2 times a day with meals. 360 Tablet 3    Empagliflozin 25 MG Tab Take 1 Tablet by mouth every day. 90 Tablet 3    Cyanocobalamin (VITAMIN B-12 PO) Take 1 Tablet by mouth every day.      ASPIRIN 81 PO Take 81 mg by mouth every evening.      Multiple Vitamin (MULTI VITAMIN DAILY) Tab Take 1 Tablet by mouth every day.      Ascorbic Acid (VITAMIN C) 1000 MG Tab Take 1 Tablet by mouth every morning.      Cholecalciferol (VITAMIN D PO) Take 1 Tablet by mouth 2 times a day.       No current facility-administered medications on file prior to visit.        Allergies:   Latex, Morphine, and Other misc    Problem List:   Patient Active Problem List   Diagnosis     "Type 2 diabetes mellitus without complication, with long-term current use of insulin (HCC)    Hyperlipidemia    Vitamin D deficiency    Microalbuminuria    Hypertension    Incisional hernia    Gastroesophageal reflux disease    Obesity (BMI 30.0-34.9)    Hospital discharge follow-up    Post-COVID chronic headache    Fungal toenail infection    COVID-19 long hauler    Moderate episode of recurrent major depressive disorder (HCC)    Palpitations    Seasonal allergies    Other insomnia    Severe episode of recurrent major depressive disorder, without psychotic features (HCC)    Bilateral carpal tunnel syndrome    Thyroid nodule    Environmental allergies    Reactive airway disease        Surgical History:  Past Surgical History:   Procedure Laterality Date    VENTRAL HERNIA REPAIR ROBOTIC XI N/A 10/14/2016    Procedure: VENTRAL HERNIA REPAIR ROBOTIC XI FOR INCISIONAL W/MESH;  Surgeon: Edi Mendoza M.D.;  Location: SURGERY [facility];  Service:     ABDOMINAL HYSTERECTOMY TOTAL      still has ovaries    CHOLECYSTECTOMY      PRIMARY C SECTION      TUBAL COAGULATION LAPAROSCOPIC BILATERAL         Past Social Hx:   Social History     Tobacco Use    Smoking status: Never    Smokeless tobacco: Never   Vaping Use    Vaping Use: Never used   Substance Use Topics    Alcohol use: No    Drug use: No          Problem list, medications, and allergies reviewed by myself today in Epic.     Objective:     /88   Pulse 96   Temp 36.6 °C (97.9 °F) (Temporal)   Resp 16   Ht 1.549 m (5' 1\")   Wt 75.3 kg (166 lb)   SpO2 94%   BMI 31.37 kg/m²     Physical Exam  Vitals and nursing note reviewed.   Constitutional:       General: She is not in acute distress.     Appearance: Normal appearance. She is normal weight. She is not ill-appearing, toxic-appearing or diaphoretic.   HENT:      Head: Normocephalic and atraumatic.      Right Ear: Tympanic membrane, ear canal and external ear normal. There is no impacted cerumen.      " Left Ear: Tympanic membrane, ear canal and external ear normal. There is no impacted cerumen.      Nose: Congestion and rhinorrhea present.      Mouth/Throat:      Mouth: Mucous membranes are moist.      Pharynx: Oropharynx is clear. Uvula midline. No oropharyngeal exudate or posterior oropharyngeal erythema.      Tonsils: No tonsillar exudate. 0 on the right. 0 on the left.   Cardiovascular:      Rate and Rhythm: Normal rate and regular rhythm.      Pulses: Normal pulses.      Heart sounds: Normal heart sounds. No murmur heard.     No friction rub. No gallop.   Pulmonary:      Effort: Pulmonary effort is normal. No respiratory distress.      Breath sounds: Normal breath sounds. No stridor. No wheezing, rhonchi or rales.   Chest:      Chest wall: No tenderness.   Musculoskeletal:      Cervical back: Neck supple. No tenderness.   Lymphadenopathy:      Cervical: No cervical adenopathy.   Skin:     General: Skin is warm and dry.      Capillary Refill: Capillary refill takes less than 2 seconds.   Neurological:      General: No focal deficit present.      Mental Status: She is alert and oriented to person, place, and time. Mental status is at baseline.      Cranial Nerves: No cranial nerve deficit.      Motor: No weakness.      Gait: Gait normal.   Psychiatric:         Mood and Affect: Mood normal.         Behavior: Behavior normal.         Thought Content: Thought content normal.         Judgment: Judgment normal.         Assessment/Plan:     Diagnosis and associated orders:   1. COVID  POCT CoV-2, Flu A/B, RSV by PCR    promethazine-dextromethorphan (PROMETHAZINE-DM) 6.25-15 MG/5ML syrup    Nirmatrelvir&Ritonavir 300/100 20 x 150 MG & 10 x 100MG Tablet Therapy Pack         Results for orders placed or performed in visit on 01/08/24   POCT CoV-2, Flu A/B, RSV by PCR   Result Value Ref Range    SARS-CoV-2 by PCR Positive (A) Negative, Invalid    Influenza virus A RNA Negative Negative, Invalid    Influenza virus B, PCR  Negative Negative, Invalid    RSV, PCR Negative Negative, Invalid          Comments/MDM:   Pt is clinically stable at today's acute urgent care visit.  No acute distress noted. Appropriate for outpatient management at this time.     Acute problem.  Vital signs are stable in clinic today.  COVID testing is positive.  These results were called to patient.  Discussed antiviral with patient to include potential side effects and drug interactions.  Discussed holding statin while taking Paxlovid.  Additionally, discussed current CDC guidelines regarding isolation requirements for COVID.  I have recommended ; taking Paxlovid as prescribed, increased fluids, rest, alternating Tylenol and/or ibuprofen per manufactures instructions for symptomatic relief and fevers, and the use of a humidifier. Patient is to return to UC or go to ER for any new or worsening s/s, and follow up with PCP for recheck. Patient is agreeable with plan of care and verbalized good understanding.              Discussed DDx, management options (risks,benefits, and alternatives to planned treatment), natural progression and supportive care.  Expressed understanding and the treatment plan was agreed upon. Questions were encouraged and answered   Return to urgent care prn if new or worsening sx or if there is no improvement in condition prn.    Educated in Red flags and indications to immediately call 911 or present to the Emergency Department.   Advised the patient to follow-up with the primary care physician for recheck, reevaluation, and consideration of further management.    I personally reviewed prior external notes and test results pertinent to today's visit.  I have independently reviewed and interpreted all diagnostics ordered during this urgent care acute visit.         Please note that this dictation was created using voice recognition software. I have made a reasonable attempt to correct obvious errors, but I expect that there are errors of  grammar and possibly content that I did not discover before finalizing the note.    This note was electronically signed by CHERIE Spear

## 2024-01-19 ENCOUNTER — NON-PROVIDER VISIT (OUTPATIENT)
Dept: MEDICAL GROUP | Facility: PHYSICIAN GROUP | Age: 59
End: 2024-01-19
Payer: COMMERCIAL

## 2024-01-19 VITALS
BODY MASS INDEX: 31.34 KG/M2 | SYSTOLIC BLOOD PRESSURE: 123 MMHG | HEART RATE: 77 BPM | HEIGHT: 61 IN | DIASTOLIC BLOOD PRESSURE: 77 MMHG | WEIGHT: 166 LBS | RESPIRATION RATE: 16 BRPM

## 2024-01-19 DIAGNOSIS — Z79.4 TYPE 2 DIABETES MELLITUS WITHOUT COMPLICATION, WITH LONG-TERM CURRENT USE OF INSULIN (HCC): ICD-10-CM

## 2024-01-19 DIAGNOSIS — I16.0 HYPERTENSIVE URGENCY: ICD-10-CM

## 2024-01-19 DIAGNOSIS — E11.9 TYPE 2 DIABETES MELLITUS WITHOUT COMPLICATION, WITH LONG-TERM CURRENT USE OF INSULIN (HCC): ICD-10-CM

## 2024-01-19 PROCEDURE — 99401 PREV MED CNSL INDIV APPRX 15: CPT | Performed by: FAMILY MEDICINE

## 2024-01-19 RX ORDER — INSULIN GLARGINE 100 [IU]/ML
5 INJECTION, SOLUTION SUBCUTANEOUS EVERY EVENING
Qty: 3 ML | Refills: 1 | Status: SHIPPED | OUTPATIENT
Start: 2024-01-19

## 2024-01-19 RX ORDER — BLOOD-GLUCOSE SENSOR
1 EACH MISCELLANEOUS
Qty: 2 EACH | Refills: 6 | Status: SHIPPED | OUTPATIENT
Start: 2024-01-19

## 2024-01-19 RX ORDER — HYDROCHLOROTHIAZIDE 25 MG/1
25 TABLET ORAL DAILY
Qty: 30 TABLET | Refills: 1 | Status: SHIPPED | OUTPATIENT
Start: 2024-01-19 | End: 2024-03-26

## 2024-01-19 RX ORDER — AMLODIPINE BESYLATE 10 MG/1
10 TABLET ORAL DAILY
Qty: 30 TABLET | Refills: 1 | Status: SHIPPED | OUTPATIENT
Start: 2024-01-19 | End: 2024-03-26

## 2024-01-19 RX ORDER — ATORVASTATIN CALCIUM 80 MG/1
80 TABLET, FILM COATED ORAL NIGHTLY
Qty: 90 TABLET | Refills: 1 | Status: SHIPPED | OUTPATIENT
Start: 2024-01-19

## 2024-01-19 RX ORDER — ICOSAPENT ETHYL 1000 MG/1
2 CAPSULE ORAL 2 TIMES DAILY
Qty: 120 CAPSULE | Refills: 6 | Status: SHIPPED | OUTPATIENT
Start: 2024-01-19

## 2024-01-19 RX ORDER — DULAGLUTIDE 4.5 MG/.5ML
0.5 INJECTION, SOLUTION SUBCUTANEOUS
Qty: 2 ML | Refills: 6 | Status: SHIPPED | OUTPATIENT
Start: 2024-01-19

## 2024-01-19 ASSESSMENT — FIBROSIS 4 INDEX: FIB4 SCORE: 0.76

## 2024-01-19 NOTE — PROGRESS NOTES
"Patient Consult Note     TIME IN: 950am  TIME OUT: 1000am       Primary care physician: Nu Eason M.D.    Reason for consult: Management of Uncontrolled Type 2 Diabetes    HPI:  Sonya Wei is a 57 y.o. old patient who comes in today for evaluation of above stated problem.    Allergies:  Latex, Morphine, and Other misc    Physical Examination:  Vital signs: /77   Pulse 77   Resp 16   Ht 1.549 m (5' 1\")   Wt 75.3 kg (166 lb)   BMI 31.37 kg/m²  Body mass index is 31.37 kg/m².    Most Recent labs, A1c, and immunizations:    Lab Results   Component Value Date/Time    HBA1C 10.7 (H) 12/01/2023 06:14 PM          Lab Results   Component Value Date/Time    CHOLSTRLTOT 371 (H) 12/01/2023 06:14 PM    LDL see below 12/01/2023 06:14 PM    HDL 45 12/01/2023 06:14 PM    TRIGLYCERIDE 534 (H) 12/01/2023 06:14 PM       Lab Results   Component Value Date/Time    SODIUM 133 (L) 12/18/2023 04:07 PM    POTASSIUM 4.7 12/18/2023 04:07 PM    CHLORIDE 98 12/18/2023 04:07 PM    CO2 17 (L) 12/18/2023 04:07 PM    GLUCOSE 165 (H) 12/18/2023 04:07 PM    BUN 57 (H) 12/18/2023 04:07 PM    CREATININE 1.27 12/18/2023 04:07 PM     Lab Results   Component Value Date/Time    ALKPHOSPHAT 88 12/18/2023 04:07 PM    ASTSGOT 25 12/18/2023 04:07 PM    ALTSGPT 26 12/18/2023 04:07 PM    TBILIRUBIN 0.5 12/18/2023 04:07 PM    INR 0.96 12/18/2023 04:07 PM    ALBUMIN 4.1 12/18/2023 04:07 PM      Lab Results   Component Value Date/Time    MALBCRT see below 11/14/2023 02:21 PM    MICROALBUR >440.0 11/14/2023 02:21 PM      Latest Reference Range & Units 12/06/23 17:24   Creatinine 0.50 - 1.40 mg/dL 1.20   GFR (CKD-EPI) >60 mL/min/1.73 m 2 52 !   !: Data is abnormal        Medications:    Current Outpatient Medications:     amLODIPine, 10 mg, Oral, DAILY    atorvastatin, 80 mg, Oral, Nightly    FreeStyle Portillo 3 Sensor, 1 Each, Topical, Q14 DAYS    Empagliflozin, 1 Tablet, Oral, DAILY    hydroCHLOROthiazide, 25 mg, Oral, DAILY    Icosapent " Ethyl, 2 g, Oral, BID    insulin glargine, 5 Units, Subcutaneous, Q EVENING    Insulin Pen Needle 32 G x 4 mm, Use one pen needle in pen device to inject insulin once daily.    Trulicity, 0.5 mL, Subcutaneous, Q7 DAYS    Nirmatrelvir&Ritonavir 300/100, Take 300 mg nirmatrelvir (two 150 mg tablets) with 100 mg ritonavir (one 100 mg tablet) by mouth, with all three tablets taken together twice daily for 5 days.    ondansetron, 4 mg, Oral, Q8HRS PRN    methocarbamol, 500 mg, Oral, TID    acetaminophen, 500-1,000 mg, Oral, Q6HRS PRN    lidocaine, 1 Patch, Transdermal, Q12HRS    ibuprofen, 600 mg, Oral, Q6HRS PRN    losartan, 25 mg, Oral, DAILY    Alcohol Swabs, Wipe site with prep pad prior to injection.    hydrALAZINE, 25 mg, Oral, Q8HRS    fluticasone-salmeterol, 1 Puff, Inhalation, Q12HRS    gabapentin, 100 mg, Oral, HS PRN    hydrOXYzine HCl, 12.5-25 mg, Oral, TID PRN    topiramate, 50 mg, Oral, BID    metoprolol SR, 50 mg, Oral, DAILY    metFORMIN, 1,000 mg, Oral, BID WITH MEALS    Cyanocobalamin (VITAMIN B-12 PO), 1 Tablet, Oral, DAILY    ASPIRIN 81 PO, 81 mg, Oral, Q EVENING    Multi Vitamin Daily, 1 Tablet, Oral, QDAY    Vitamin C, 1 Tablet, Oral, QAM    Cholecalciferol (VITAMIN D PO), 1 Tablet, Oral, BID    Assessment and Plan:  1. DM2   Most recent A1c is: >10  Social support: y  Food insecurity: no  Is Medication cost affordable: yes  Preventive care   Immunizations discussed with pt:y  Retinal scan due on: y  On a ACE/ARB: y  On statin Y  Any hypoglycemia: n  15:15 Rule discussed with patient  Kidney function:  52 GFR  62 CrCl  SMBG:  AM:170  Before meals:-  PM: -        Medication(s) recommended after today's visit:   Stop Januvia   Increase  Trulicity to 4.5mg every 7 days   Continue Jardiance 25 mg once daily  Continue metformin 500 mg 2 tabs twice daily  Continue insulin glargine 5 units at bedtime and adjust accordingly      Long-acting insulin:   Adjust dose according to FASTING BLOOD GLUCOSE target  100-130 mg/dL  (1) Increase the insulin dose every 3-4 days as needed.   (2) Increase by 2 units if FBG average concentration is 131-170 mg/dL.   (3) Increase by 4 units if FBG average concentration is 171-210 mg/dL.   (4) Increase by 6 units if FBG average concentration is 221-260 mg/dL.   (5) Increase by 8 units if FBG average concentration is greater than 261mg/dL and call us.   Consider cutting back by 1-2 units to previous dose if glucose concentration is below 100 mg/dL or symptoms of hypoglycemia.       2.  Cardiovascular  Is LDL <100: no  Is Blood pressure <130/80:  yes    Medication(s) recommended.   Start atorvastatin 80 mg once daily (can start at 40 mg daily for 2 weeks then increase to 80 mg)  Start Vascepa 2 g twice daily    3.  Lifestyle changes   Focus on eating a DASH/Mediterranean-style diet.   Exercise 30 minutes daily at least 5 days/week, as tolerated.  https://www.niddk.nih.gov/bwp    Follow-up appointment in 6 week(s)    Anibal Rosas, PharmD, MS, BCACP, C  [facility]  Phone [phone] fax [phone]    This note was created using voice recognition software (Dragon). The accuracy of the dictation is limited by the abilities of the software. I have reviewed the note prior to signing, however some errors in grammar and context are still possible. If you have any questions related to this note please do not hesitate to contact our office.     CC:   Nu Eason M.D.  Nu Eason M.D.  Dr. Gianluca Willson

## 2024-02-02 DIAGNOSIS — I16.0 HYPERTENSIVE URGENCY: ICD-10-CM

## 2024-02-02 RX ORDER — LOSARTAN POTASSIUM 25 MG/1
25 TABLET ORAL DAILY
Qty: 90 TABLET | Refills: 3 | Status: SHIPPED | OUTPATIENT
Start: 2024-02-02

## 2024-02-02 NOTE — TELEPHONE ENCOUNTER
Requested Prescriptions     Pending Prescriptions Disp Refills    losartan (COZAAR) 25 MG Tab 90 Tablet 3     Sig: Take 1 Tablet by mouth every day.      Last office visit: 12/14/23  Last lab: 12/18/23

## 2024-03-19 ENCOUNTER — HOSPITAL ENCOUNTER (OUTPATIENT)
Dept: LAB | Facility: MEDICAL CENTER | Age: 59
End: 2024-03-19
Attending: FAMILY MEDICINE
Payer: COMMERCIAL

## 2024-03-19 ENCOUNTER — APPOINTMENT (OUTPATIENT)
Dept: MEDICAL GROUP | Facility: PHYSICIAN GROUP | Age: 59
End: 2024-03-19
Payer: COMMERCIAL

## 2024-03-19 DIAGNOSIS — Z11.4 ENCOUNTER FOR SCREENING FOR HIV: ICD-10-CM

## 2024-03-19 DIAGNOSIS — K21.9 GASTROESOPHAGEAL REFLUX DISEASE, UNSPECIFIED WHETHER ESOPHAGITIS PRESENT: ICD-10-CM

## 2024-03-19 DIAGNOSIS — Z11.59 ENCOUNTER FOR HEPATITIS C SCREENING TEST FOR LOW RISK PATIENT: ICD-10-CM

## 2024-03-19 DIAGNOSIS — E11.65 UNCONTROLLED TYPE 2 DIABETES MELLITUS WITH HYPERGLYCEMIA (HCC): ICD-10-CM

## 2024-03-19 LAB
ALBUMIN SERPL BCP-MCNC: 4 G/DL (ref 3.2–4.9)
ALBUMIN/GLOB SERPL: 1.3 G/DL
ALP SERPL-CCNC: 102 U/L (ref 30–99)
ALT SERPL-CCNC: 13 U/L (ref 2–50)
ANION GAP SERPL CALC-SCNC: 10 MMOL/L (ref 7–16)
AST SERPL-CCNC: 22 U/L (ref 12–45)
BASOPHILS # BLD AUTO: 0.6 % (ref 0–1.8)
BASOPHILS # BLD: 0.04 K/UL (ref 0–0.12)
BILIRUB SERPL-MCNC: 0.3 MG/DL (ref 0.1–1.5)
BUN SERPL-MCNC: 27 MG/DL (ref 8–22)
CALCIUM ALBUM COR SERPL-MCNC: 9.4 MG/DL (ref 8.5–10.5)
CALCIUM SERPL-MCNC: 9.4 MG/DL (ref 8.5–10.5)
CHLORIDE SERPL-SCNC: 103 MMOL/L (ref 96–112)
CHOLEST SERPL-MCNC: 250 MG/DL (ref 100–199)
CO2 SERPL-SCNC: 23 MMOL/L (ref 20–33)
CREAT SERPL-MCNC: 0.64 MG/DL (ref 0.5–1.4)
EOSINOPHIL # BLD AUTO: 0.21 K/UL (ref 0–0.51)
EOSINOPHIL NFR BLD: 3.4 % (ref 0–6.9)
ERYTHROCYTE [DISTWIDTH] IN BLOOD BY AUTOMATED COUNT: 39.6 FL (ref 35.9–50)
EST. AVERAGE GLUCOSE BLD GHB EST-MCNC: 220 MG/DL
FASTING STATUS PATIENT QL REPORTED: NORMAL
GFR SERPLBLD CREATININE-BSD FMLA CKD-EPI: 102 ML/MIN/1.73 M 2
GLOBULIN SER CALC-MCNC: 3.1 G/DL (ref 1.9–3.5)
GLUCOSE SERPL-MCNC: 171 MG/DL (ref 65–99)
HBA1C MFR BLD: 9.3 % (ref 4–5.6)
HCT VFR BLD AUTO: 39.7 % (ref 37–47)
HCV AB SER QL: NORMAL
HDLC SERPL-MCNC: 49 MG/DL
HGB BLD-MCNC: 13.3 G/DL (ref 12–16)
HIV 1+2 AB+HIV1 P24 AG SERPL QL IA: NORMAL
IMM GRANULOCYTES # BLD AUTO: 0.03 K/UL (ref 0–0.11)
IMM GRANULOCYTES NFR BLD AUTO: 0.5 % (ref 0–0.9)
LDLC SERPL CALC-MCNC: 155 MG/DL
LYMPHOCYTES # BLD AUTO: 1.85 K/UL (ref 1–4.8)
LYMPHOCYTES NFR BLD: 29.7 % (ref 22–41)
MCH RBC QN AUTO: 29.5 PG (ref 27–33)
MCHC RBC AUTO-ENTMCNC: 33.5 G/DL (ref 32.2–35.5)
MCV RBC AUTO: 88 FL (ref 81.4–97.8)
MONOCYTES # BLD AUTO: 0.36 K/UL (ref 0–0.85)
MONOCYTES NFR BLD AUTO: 5.8 % (ref 0–13.4)
NEUTROPHILS # BLD AUTO: 3.74 K/UL (ref 1.82–7.42)
NEUTROPHILS NFR BLD: 60 % (ref 44–72)
NRBC # BLD AUTO: 0 K/UL
NRBC BLD-RTO: 0 /100 WBC (ref 0–0.2)
PLATELET # BLD AUTO: 276 K/UL (ref 164–446)
PMV BLD AUTO: 11.9 FL (ref 9–12.9)
POTASSIUM SERPL-SCNC: 4.5 MMOL/L (ref 3.6–5.5)
PROT SERPL-MCNC: 7.1 G/DL (ref 6–8.2)
RBC # BLD AUTO: 4.51 M/UL (ref 4.2–5.4)
SODIUM SERPL-SCNC: 136 MMOL/L (ref 135–145)
TRIGL SERPL-MCNC: 230 MG/DL (ref 0–149)
WBC # BLD AUTO: 6.2 K/UL (ref 4.8–10.8)

## 2024-03-19 PROCEDURE — 85025 COMPLETE CBC W/AUTO DIFF WBC: CPT

## 2024-03-19 PROCEDURE — 80053 COMPREHEN METABOLIC PANEL: CPT

## 2024-03-19 PROCEDURE — 86803 HEPATITIS C AB TEST: CPT

## 2024-03-19 PROCEDURE — 87389 HIV-1 AG W/HIV-1&-2 AB AG IA: CPT

## 2024-03-19 PROCEDURE — 36415 COLL VENOUS BLD VENIPUNCTURE: CPT

## 2024-03-19 PROCEDURE — 83036 HEMOGLOBIN GLYCOSYLATED A1C: CPT

## 2024-03-19 PROCEDURE — 80061 LIPID PANEL: CPT

## 2024-03-25 DIAGNOSIS — I16.0 HYPERTENSIVE URGENCY: ICD-10-CM

## 2024-03-25 DIAGNOSIS — M54.2 NECK PAIN: ICD-10-CM

## 2024-03-25 NOTE — TELEPHONE ENCOUNTER
Requested Prescriptions     Pending Prescriptions Disp Refills    hydroCHLOROthiazide 25 MG Tab [Pharmacy Med Name: hydroCHLOROthiazide 25 MG Oral Tablet] 30 Tablet 0     Sig: Take 1 tablet by mouth once daily    methocarbamol (ROBAXIN) 500 MG Tab [Pharmacy Med Name: Methocarbamol 500 MG Oral Tablet] 90 Tablet 0     Sig: TAKE 1 TABLET BY MOUTH THREE TIMES DAILY      Last office visit: 12/14/23  Last lab: 3/19/24

## 2024-03-25 NOTE — TELEPHONE ENCOUNTER
Requested Prescriptions     Pending Prescriptions Disp Refills    amLODIPine (NORVASC) 10 MG Tab [Pharmacy Med Name: amLODIPine Besylate 10 MG Oral Tablet] 30 Tablet 0     Sig: Take 1 tablet by mouth once daily      Last office visit: 12/14/23  Last lab: 3/19/24

## 2024-03-26 RX ORDER — AMLODIPINE BESYLATE 10 MG/1
10 TABLET ORAL DAILY
Qty: 90 TABLET | Refills: 3 | Status: SHIPPED | OUTPATIENT
Start: 2024-03-26

## 2024-03-26 RX ORDER — HYDROCHLOROTHIAZIDE 25 MG/1
25 TABLET ORAL DAILY
Qty: 90 TABLET | Refills: 3 | Status: SHIPPED | OUTPATIENT
Start: 2024-03-26

## 2024-03-26 RX ORDER — METHOCARBAMOL 500 MG/1
500 TABLET, FILM COATED ORAL 3 TIMES DAILY
Qty: 270 TABLET | Refills: 3 | Status: SHIPPED | OUTPATIENT
Start: 2024-03-26

## 2024-03-28 NOTE — RESULT ENCOUNTER NOTE
Please advise patient that I reviewed lab results. There are some results that need further evaluation. Please schedule an office visit. Triglycerides have improved and A1c is high but has improved.   Nu Eason M.D.

## 2024-05-14 NOTE — PROGRESS NOTES
"[facility] NEUROLOGY  FOLLOW-UP VISIT    This evaluation was conducted via telephone. The patient was in their home in the [facility].    The patient's identity was confirmed and verbal consent was obtained for this virtual visit.    CC: headaches s/p COVID-19 Infection, likely migraine    INTERVAL HISTORY:  Sonya Wei is a 56 y.o. woman with worsened headaches following COVID-19 infection.  I last saw her in the clinic on 3/3/2022.  At that time I recommended she remain off amitriptyline and start topiramate.  Today, I followed up with her via phone (she had laptop issues), and she provided the following interval history:    5/27/2022:  Sonya developed lightheadedness and palpitations.  Paramedics performed an EKG, and they suspected she was having a heart attack.  She was given aspirin and nitroglycerine.  She went to the ED.  Later, she followed up with her PCP, and she was told that she did have a heart attack.    The following is a summary of headache symptoms, presented in my standard format:    Family History: mom had migraines related to \"brain tumors\" she had headaches earlier in life  Age at onset: teenage years  Location: bi-temporal, occipital  Radiation: both proximal upper extremities  Frequency: baseline: ~twice daily, currently: daily  Duration: baseline: 2-3 hours, currently: 30-40 minutes  Headache Days/Month: baseline: 30/30, currently: 30/30  Quality: \"like someone trying to stab her in the ear,\" \"pressure\"  Intensity: baseline: 6-8/10, currently: 4-5/10  Aura: none  Photophobia/Phonophobia/Nausea/Vomiting: yes/yes/yes/no  Provoked by Physical Activity?:   Triggers: loud noises at work  Associated Symptoms: after washing dishes, hands \"hurt,\" dizziness, difficulty walking  Autonomic Signs (such as ptosis, miosis, conjunctival injection, rhinorrhea, increased lacrimation): none  Head Trauma:   Association with Menses: menopausal  ED Visits: yes  Hospitalizations: " Gave pt report to [facility].   yes  Missed Work Days: works at [employer]  Sleep: 6-7 hours/night; she wakes 2-3 times/night  Caffeine Intake: none  Hydration: keeps well-hydrated  Nutrition: eats regular meals  Analgesic Overuse:      Current Medication Regimen:  - topiramate: 50 mg daily   - naproxen  - sumatriptan: 100 mg is helpful, she doesn't have to re-dose     Medications Tried: Response  Preventive:  - amitriptyline: 25 mg was very effective, caused excessive sedation     Abortive:  -     Medications Not Tried:  - nortriptyline: will avoid due to med-med interaction with escitalopram    MEDICATIONS:  Current Outpatient Medications   Medication Sig   • topiramate (TOPAMAX) 50 MG tablet Take 1 Tablet by mouth 2 times a day for 30 days.   • sumatriptan (IMITREX) 100 MG tablet Take 100 mg at the onset of aura/HA; may re-dose x1 after 2 hrs if HA persists; MDD: 200 mg; do not use >2 days/week.   • albuterol 108 (90 Base) MCG/ACT Aero Soln inhalation aerosol Inhale 2 Puffs every 6 hours as needed for Shortness of Breath.   • JANUVIA 100 MG Tab Take 1 tablet by mouth once daily   • escitalopram (LEXAPRO) 10 MG Tab TAKE 1 & 1/2 (ONE & ONE-HALF) TABLETS BY MOUTH ONCE DAILY . APPOINTMENT REQUIRED FOR FUTURE REFILLS .  CALL  SCHEDULING  [phone]   • TRULICITY 1.5 MG/0.5ML Solution Pen-injector INJECT 1 DOSE SUBCUTANEOUSLY ONCE A WEEK   • glyBURIDE (DIABETA) 5 MG Tab TAKE 2 TABLETS BY MOUTH TWICE DAILY WITH MEALS   • metFORMIN (GLUCOPHAGE) 500 MG Tab Take 2 Tablets by mouth 2 times a day with meals.   • acetaminophen (TYLENOL) 500 MG Tab Take 1 Tablet by mouth every 6 hours as needed for Mild Pain or Moderate Pain.   • atorvastatin (LIPITOR) 20 MG Tab Take 1 tablet by mouth once daily   • lisinopril (PRINIVIL) 40 MG tablet Take 1 tablet by mouth once daily   • gabapentin (NEURONTIN) 300 MG Cap Take 1 cap daily in the morning and 2 caps at bedtime.   • Ascorbic Acid (VITAMIN C) 1000 MG Tab Take 1 Tab by mouth every morning.   • Cholecalciferol  (VITAMIN D PO) Take 2 Tablets by mouth every day. GUMMIES   • naproxen (NAPROSYN) 500 MG Tab Take 1 tablet by mouth 2 times a day with meals. (Patient not taking: Reported on 2022)     MEDICAL, SOCIAL, AND FAMILY HISTORY:  There is no change in the patient's ROS or PFSH from their previous visit on 3/3/2022.    REVIEW OF SYSTEMS:  A ROS was completed.  Pertinent positives and negatives were included in the HPI, above.  All other systems were reviewed and are negative.    PHYSICAL EXAM:  No physical exam was performed because this visit was conducted via telephone.    REVIEW OF LABORATORY STUDIES:  2022:  - CBC w/ diff: notable for mild anemia  - CMP: unremarkable    ASSESSMENT:  Sonya Wei is a 56 y.o. woman with headaches (mgraine-like) s/p COVID-19.  Plans/recommenadtions as follows:    PLAN:  Migraine Headaches s/p COVID-19:  Prevention:  - increase topiramate to 50 mg BID  - get ~8 hours of sleep per night  - drink plenty of fluids (urine should be nearly clear)  - avoid excessive caffeine intake (no more than 2 servings per day and nothing in the afternoon)  - eat regular meals (don't skip meals)  - get moderate exercise (even just a 20 minute walk daily)    :  - sumatriptan 100 mg: take 100 mg at the onset of aura/HA; may re-dose x1 after 2 hrs if HA persists; MDD: 200 mg; do not use >2 days/week.  - do not use analgesics (e.g., ibuprofen, acetaminophen) more than 2 days per week in order to avoid analgesic rebound headaches    - keep a headache log    Follow-Up:  - Return in about 3 months (around 2022).    Signed: Sebastian Parikh M.D.    BILLING DOCUMENTATION:   I spent 14 minutes on the phone with this patient.

## 2024-06-21 NOTE — TELEPHONE ENCOUNTER
Last ov 3/31/23    Received request via: Pharmacy    Was the patient seen in the last year in this department? Yes    Does the patient have an active prescription (recently filled or refills available) for medication(s) requested? No    Does the patient have intermediate Plus and need 100 day supply (blood pressure, diabetes and cholesterol meds only)? Patient does not have SCP     s/p fall

## 2024-07-16 ENCOUNTER — OCCUPATIONAL MEDICINE (OUTPATIENT)
Dept: URGENT CARE | Facility: PHYSICIAN GROUP | Age: 59
End: 2024-07-16
Payer: COMMERCIAL

## 2024-07-16 VITALS
SYSTOLIC BLOOD PRESSURE: 128 MMHG | WEIGHT: 170 LBS | TEMPERATURE: 97.3 F | DIASTOLIC BLOOD PRESSURE: 74 MMHG | HEIGHT: 61 IN | RESPIRATION RATE: 16 BRPM | OXYGEN SATURATION: 98 % | HEART RATE: 78 BPM | BODY MASS INDEX: 32.1 KG/M2

## 2024-07-16 DIAGNOSIS — S39.012A LOW BACK STRAIN, INITIAL ENCOUNTER: ICD-10-CM

## 2024-07-16 PROCEDURE — 3078F DIAST BP <80 MM HG: CPT | Performed by: NURSE PRACTITIONER

## 2024-07-16 PROCEDURE — 3074F SYST BP LT 130 MM HG: CPT | Performed by: NURSE PRACTITIONER

## 2024-07-16 PROCEDURE — 99213 OFFICE O/P EST LOW 20 MIN: CPT | Performed by: NURSE PRACTITIONER

## 2024-07-16 ASSESSMENT — ENCOUNTER SYMPTOMS
ABDOMINAL PAIN: 0
MYALGIAS: 0
BACK PAIN: 1
TINGLING: 0
FOCAL WEAKNESS: 0
SENSORY CHANGE: 0

## 2024-07-16 ASSESSMENT — FIBROSIS 4 INDEX: FIB4 SCORE: 1.3

## 2024-07-20 ENCOUNTER — OCCUPATIONAL MEDICINE (OUTPATIENT)
Dept: URGENT CARE | Facility: PHYSICIAN GROUP | Age: 59
End: 2024-07-20
Payer: COMMERCIAL

## 2024-07-20 VITALS
OXYGEN SATURATION: 99 % | BODY MASS INDEX: 32.55 KG/M2 | WEIGHT: 172.4 LBS | RESPIRATION RATE: 16 BRPM | DIASTOLIC BLOOD PRESSURE: 84 MMHG | SYSTOLIC BLOOD PRESSURE: 132 MMHG | HEART RATE: 83 BPM | HEIGHT: 61 IN | TEMPERATURE: 97.6 F

## 2024-07-20 DIAGNOSIS — S39.012D LOW BACK STRAIN, SUBSEQUENT ENCOUNTER: ICD-10-CM

## 2024-07-20 PROCEDURE — 3075F SYST BP GE 130 - 139MM HG: CPT | Performed by: FAMILY MEDICINE

## 2024-07-20 PROCEDURE — 3079F DIAST BP 80-89 MM HG: CPT | Performed by: FAMILY MEDICINE

## 2024-07-20 PROCEDURE — 99213 OFFICE O/P EST LOW 20 MIN: CPT | Performed by: FAMILY MEDICINE

## 2024-07-20 RX ORDER — CYCLOBENZAPRINE HCL 5 MG
5-10 TABLET ORAL EVERY 8 HOURS PRN
Qty: 30 TABLET | Refills: 0 | Status: SHIPPED | OUTPATIENT
Start: 2024-07-20

## 2024-07-20 ASSESSMENT — FIBROSIS 4 INDEX: FIB4 SCORE: 1.3

## 2024-07-29 ENCOUNTER — APPOINTMENT (OUTPATIENT)
Dept: RADIOLOGY | Facility: IMAGING CENTER | Age: 59
End: 2024-07-29
Payer: COMMERCIAL

## 2024-07-29 ENCOUNTER — OCCUPATIONAL MEDICINE (OUTPATIENT)
Dept: URGENT CARE | Facility: PHYSICIAN GROUP | Age: 59
End: 2024-07-29
Payer: COMMERCIAL

## 2024-07-29 VITALS
DIASTOLIC BLOOD PRESSURE: 84 MMHG | RESPIRATION RATE: 16 BRPM | BODY MASS INDEX: 31.65 KG/M2 | OXYGEN SATURATION: 95 % | WEIGHT: 172 LBS | TEMPERATURE: 97.8 F | HEIGHT: 62 IN | SYSTOLIC BLOOD PRESSURE: 166 MMHG | HEART RATE: 84 BPM

## 2024-07-29 DIAGNOSIS — M54.16 RADICULOPATHY, LUMBAR REGION: ICD-10-CM

## 2024-07-29 DIAGNOSIS — S39.012D LOW BACK STRAIN, SUBSEQUENT ENCOUNTER: ICD-10-CM

## 2024-07-29 DIAGNOSIS — M43.16 ANTEROLISTHESIS OF LUMBAR SPINE: ICD-10-CM

## 2024-07-29 PROCEDURE — 72100 X-RAY EXAM L-S SPINE 2/3 VWS: CPT | Mod: TC,FY

## 2024-07-29 ASSESSMENT — FIBROSIS 4 INDEX: FIB4 SCORE: 1.3

## 2024-07-31 ENCOUNTER — TELEPHONE (OUTPATIENT)
Dept: HEALTH INFORMATION MANAGEMENT | Facility: OTHER | Age: 59
End: 2024-07-31
Payer: COMMERCIAL

## 2024-08-02 ENCOUNTER — TELEPHONE (OUTPATIENT)
Dept: HEALTH INFORMATION MANAGEMENT | Facility: OTHER | Age: 59
End: 2024-08-02
Payer: COMMERCIAL

## 2024-08-04 DIAGNOSIS — E11.9 TYPE 2 DIABETES MELLITUS WITHOUT COMPLICATION, WITH LONG-TERM CURRENT USE OF INSULIN (HCC): ICD-10-CM

## 2024-08-04 DIAGNOSIS — Z79.4 TYPE 2 DIABETES MELLITUS WITHOUT COMPLICATION, WITH LONG-TERM CURRENT USE OF INSULIN (HCC): ICD-10-CM

## 2024-08-06 ENCOUNTER — OFFICE VISIT (OUTPATIENT)
Dept: MEDICAL GROUP | Facility: PHYSICIAN GROUP | Age: 59
End: 2024-08-06
Payer: COMMERCIAL

## 2024-08-06 VITALS
TEMPERATURE: 97.7 F | HEIGHT: 61 IN | RESPIRATION RATE: 18 BRPM | SYSTOLIC BLOOD PRESSURE: 132 MMHG | WEIGHT: 169 LBS | OXYGEN SATURATION: 98 % | DIASTOLIC BLOOD PRESSURE: 84 MMHG | BODY MASS INDEX: 31.91 KG/M2 | HEART RATE: 84 BPM

## 2024-08-06 DIAGNOSIS — Z79.4 TYPE 2 DIABETES MELLITUS WITHOUT COMPLICATION, WITH LONG-TERM CURRENT USE OF INSULIN (HCC): ICD-10-CM

## 2024-08-06 DIAGNOSIS — E11.9 TYPE 2 DIABETES MELLITUS WITHOUT COMPLICATION, WITH LONG-TERM CURRENT USE OF INSULIN (HCC): ICD-10-CM

## 2024-08-06 DIAGNOSIS — E55.9 VITAMIN D DEFICIENCY: ICD-10-CM

## 2024-08-06 LAB
HBA1C MFR BLD: 10.2 % (ref ?–5.8)
POCT INT CON NEG: NEGATIVE
POCT INT CON POS: POSITIVE

## 2024-08-06 PROCEDURE — 3079F DIAST BP 80-89 MM HG: CPT | Performed by: FAMILY MEDICINE

## 2024-08-06 PROCEDURE — 3075F SYST BP GE 130 - 139MM HG: CPT | Performed by: FAMILY MEDICINE

## 2024-08-06 PROCEDURE — 99214 OFFICE O/P EST MOD 30 MIN: CPT | Performed by: FAMILY MEDICINE

## 2024-08-06 PROCEDURE — 83036 HEMOGLOBIN GLYCOSYLATED A1C: CPT | Performed by: FAMILY MEDICINE

## 2024-08-06 RX ORDER — FLUOXETINE 10 MG/1
10 CAPSULE ORAL DAILY
Qty: 90 CAPSULE | Refills: 3 | Status: SHIPPED | OUTPATIENT
Start: 2024-08-06 | End: 2024-08-23

## 2024-08-06 ASSESSMENT — PATIENT HEALTH QUESTIONNAIRE - PHQ9
5. POOR APPETITE OR OVEREATING: 0 - NOT AT ALL
CLINICAL INTERPRETATION OF PHQ2 SCORE: 2
SUM OF ALL RESPONSES TO PHQ QUESTIONS 1-9: 11

## 2024-08-06 ASSESSMENT — FIBROSIS 4 INDEX: FIB4 SCORE: 1.3

## 2024-08-06 NOTE — LETTER
[facility]  Nu Eason M.D.  [address] [place] [address]  Fax: [phone]   Authorization for Release/Disclosure of   Protected Health Information   Name: SONYA WEI : 1965 SSN: [id number]   Address: [facility] [address]  [place] [address] Phone:    [phone] (home)    I authorize the entity listed below to release/disclose the PHI below to:   [facility]/Nu Eason M.D. and Nu Eason M.D.   Provider or Entity Name:  [facility]   City, [facility], [facility]   Phone:  [phone]    Fax:  [phone]   Reason for request: continuity of care   Information to be released:    [  ] LAST COLONOSCOPY,  including any PATH REPORT and follow-up  [  ] LAST FIT/COLOGUARD RESULT [  ] LAST DEXA  [  ] LAST MAMMOGRAM  [  ] LAST PAP  [  ] LAST LABS [x] RETINA EXAM REPORT  [  ] IMMUNIZATION RECORDS  [  ] Release all info      [  ] Check here and initial the line next to each item to release ALL health information INCLUDING  _____ Care and treatment for drug and / or alcohol abuse  _____ HIV testing, infection status, or AIDS  _____ Genetic Testing    DATES OF SERVICE OR TIME PERIOD TO BE DISCLOSED: _____________  I understand and acknowledge that:  * This Authorization may be revoked at any time by you in writing, except if your health information has already been used or disclosed.  * Your health information that will be used or disclosed as a result of you signing this authorization could be re-disclosed by the recipient. If this occurs, your re-disclosed health information may no longer be protected by State or Federal laws.  * You may refuse to sign this Authorization. Your refusal will not affect your ability to obtain treatment.  * This Authorization becomes effective upon signing and will  on (date) __________.      If no date is indicated, this Authorization will  one (1) year from the signature date.    Name: Sonya Wei  Signature: Date:    8/6/2024     PLEASE FAX REQUESTED RECORDS BACK TO: [phone]

## 2024-08-06 NOTE — TELEPHONE ENCOUNTER
Received request via: Pharmacy    Was the patient seen in the last year in this department? Yes    Does the patient have an active prescription (recently filled or refills available) for medication(s) requested? No    Pharmacy Name: [facility]     Does the patient have shelter Plus and need 100 day supply (blood pressure, diabetes and cholesterol meds only)? Patient does not have SCP    Last OV 12 14 2023

## 2024-08-06 NOTE — ASSESSMENT & PLAN NOTE
Last A1c was 9.3 when she was in the hospital in March with high blood sugars and high blood pressures.   Recheck A1c in clinic, she has not been checking BS at home.   She has been depressed since Sept 2023, after she went to her daughter's in [place] and stressful with her grandson breaking things and crying for 2 hrs on a flight. She felt better March/April and has been going out more, is able to sleep and smile, also has some situations at work making her depressed.   Discussed prozac Rx 10 mg provided as a prior antidepressant caused her to be too sleepy.     Her neck and back were hurting, got unbearable and work was not giving her accommodations.   Now with [facility] due to b/l LE weakness, low back pain.   She is more sedentary now so this is likely contributing to her high blood sugars and to her depression.     Normal sensation to monofilament testing b/l  Has thickened toenails, no ulcers of feet.

## 2024-08-06 NOTE — PROGRESS NOTES
Subjective:   Sonya Wei is a 58 y.o. female here today for evaluation and management of:     Type 2 diabetes mellitus without complication, with long-term current use of insulin ([facility])  Last A1c was 9.3 when she was in the hospital in March with high blood sugars and high blood pressures.   Recheck A1c in clinic, she has not been checking BS at home.   She has been depressed since Sept 2023, after she went to her daughter's in [place] and stressful with her grandson breaking things and crying for 2 hrs on a flight. She felt better March/April and has been going out more, is able to sleep and smile, also has some situations at work making her depressed.   Discussed prozac Rx 10 mg provided as a prior antidepressant caused her to be too sleepy.     Her neck and back were hurting, got unbearable and work was not giving her accommodations.   Now with [facility] due to b/l LE weakness, low back pain.   She is more sedentary now so this is likely contributing to her high blood sugars and to her depression.     Normal sensation to monofilament testing b/l  Has thickened toenails, no ulcers of feet.          Current medicines (including changes today)  Current Outpatient Medications   Medication Sig Dispense Refill    FLUoxetine (PROZAC) 10 MG Cap Take 1 Capsule by mouth every day. 90 Capsule 3    cyclobenzaprine (FLEXERIL) 5 mg tablet Take 1-2 Tablets by mouth every 8 hours as needed for Muscle Spasms. 30 Tablet 0    insulin glargine 100 UNIT/ML Solution Pen-injector injection Inject 5 Units under the skin every evening. 3 mL 1    Dulaglutide (TRULICITY) 4.5 MG/0.5ML Solution Pen-injector Inject 0.5 mL under the skin every 7 days. 2 mL 6    acetaminophen (TYLENOL) 500 MG Tab Take 1-2 Tablets by mouth every 6 hours as needed for Moderate Pain. 30 Tablet 0    ibuprofen (MOTRIN) 600 MG Tab Take 1 Tablet by mouth every 6 hours as needed for Mild Pain or Moderate Pain. 30 Tablet 0    amLODIPine (NORVASC) 10 MG Tab Take 1  tablet by mouth once daily (Patient not taking: Reported on 7/29/2024) 90 Tablet 3    hydroCHLOROthiazide 25 MG Tab Take 1 tablet by mouth once daily (Patient not taking: Reported on 7/29/2024) 90 Tablet 3    losartan (COZAAR) 25 MG Tab Take 1 Tablet by mouth every day. (Patient not taking: Reported on 7/29/2024) 90 Tablet 3    atorvastatin (LIPITOR) 80 MG tablet Take 1 Tablet by mouth every evening. (Patient not taking: Reported on 7/29/2024) 90 Tablet 1    Continuous Blood Gluc Sensor (FREESTYLE KEN 3 SENSOR) Misc Apply 1 Each topically every 14 days. (Patient not taking: Reported on 7/29/2024) 2 Each 6    Empagliflozin 25 MG Tab Take 1 Tablet by mouth every day. (Patient not taking: Reported on 7/29/2024) 90 Tablet 3    Icosapent Ethyl (VASCEPA) 1 g Cap Take 2 g by mouth 2 times a day. (Patient not taking: Reported on 7/29/2024) 120 Capsule 6    Insulin Pen Needle 32 G x 4 mm Use one pen needle in pen device to inject insulin once daily. (Patient not taking: Reported on 7/29/2024) 100 Each 1    lidocaine (ASPERFLEX) 4 % Patch Place 1 Patch on the skin every 12 hours. (Patient not taking: Reported on 7/29/2024) 1 Patch 0    Alcohol Swabs Wipe site with prep pad prior to injection. (Patient not taking: Reported on 7/29/2024) 100 Each 1    hydrALAZINE (APRESOLINE) 25 MG Tab Take 1 Tablet by mouth every 8 hours. (Patient not taking: Reported on 7/29/2024) 90 Tablet 1    fluticasone-salmeterol (ADVAIR) 100-50 MCG/ACT AEROSOL POWDER, BREATH ACTIVATED Inhale 1 Puff every 12 hours. (Patient not taking: Reported on 7/29/2024) 60 Each 2    gabapentin (NEURONTIN) 100 MG Cap Take 1 Capsule by mouth at bedtime as needed (insomnia). (Patient not taking: Reported on 7/29/2024) 90 Capsule 3    hydrOXYzine HCl (ATARAX) 25 MG Tab Take 0.5-1 Tablets by mouth 3 times a day as needed for Anxiety. (Patient not taking: Reported on 7/29/2024) 30 Tablet 5    metoprolol SR (TOPROL XL) 50 MG TABLET SR 24 HR Take 1 Tablet by mouth every  "day. (Patient not taking: Reported on 7/29/2024) 90 Tablet 3    metFORMIN (GLUCOPHAGE) 500 MG Tab Take 2 Tablets by mouth 2 times a day with meals. (Patient not taking: Reported on 7/29/2024) 360 Tablet 3    Cyanocobalamin (VITAMIN B-12 PO) Take 1 Tablet by mouth every day. (Patient not taking: Reported on 7/29/2024)      ASPIRIN 81 PO Take 81 mg by mouth every evening. (Patient not taking: Reported on 7/29/2024)      Ascorbic Acid (VITAMIN C) 1000 MG Tab Take 1 Tablet by mouth every morning. (Patient not taking: Reported on 7/29/2024)      Cholecalciferol (VITAMIN D PO) Take 1 Tablet by mouth 2 times a day. (Patient not taking: Reported on 7/29/2024)       No current facility-administered medications for this visit.     She  has a past medical history of Anemia, Bowel habit changes, Diabetes, Diabetes (HCC), GERD (gastroesophageal reflux disease), Healthcare maintenance (9/27/2014), High cholesterol, Hyperlipidemia, Hypertension, Infectious disease, Jaundice (1999), Psychiatric problem, and Vaginal itching (8/27/2014).    ROS  No chest pain, no shortness of breath, no abdominal pain       Objective:     /84 (BP Location: Left arm, Patient Position: Sitting, BP Cuff Size: Large adult)   Pulse 84   Temp 36.5 °C (97.7 °F)   Resp 18   Ht 1.549 m (5' 1\")   Wt 76.7 kg (169 lb)   SpO2 98%  Body mass index is 31.93 kg/m².   Physical Exam:  Constitutional: Alert, no distress.  Skin: Warm, dry, good turgor, no rashes in visible areas.  Eye: Equal, round and reactive, conjunctiva clear, lids normal.  ENMT: Lips without lesions, good dentition, oropharynx clear.  Neck: Trachea midline, no masses, no thyromegaly. No cervical or supraclavicular lymphadenopathy  Respiratory: Unlabored respiratory effort, lungs clear to auscultation, no wheezes, no ronchi.  Cardiovascular: Normal S1, S2, no murmur, no edema.  Abdomen: Soft, non-tender, no masses, no hepatosplenomegaly.  Psych: Alert and oriented x3, normal affect and " mood.    Assessment and Plan:   The following treatment plan was discussed    1. Type 2 diabetes mellitus without complication, with long-term current use of insulin (HCC)  - Diabetic Monofilament LE Exam  - Comp Metabolic Panel; Future  - Lipid Profile; Future  - HEMOGLOBIN A1C; Future    2. Vitamin D deficiency  - VITAMIN D,25 HYDROXY (DEFICIENCY); Future    Other orders  - FLUoxetine (PROZAC) 10 MG Cap; Take 1 Capsule by mouth every day.  Dispense: 90 Capsule; Refill: 3      Followup: Return in about 3 months (around 11/6/2024) for Diabetes, Lab Review.

## 2024-08-07 RX ORDER — PEN NEEDLE, DIABETIC 32GX 5/32"
NEEDLE, DISPOSABLE MISCELLANEOUS
Qty: 100 EACH | Refills: 3 | Status: SHIPPED | OUTPATIENT
Start: 2024-08-07

## 2024-08-14 ENCOUNTER — TELEPHONE (OUTPATIENT)
Dept: HEALTH INFORMATION MANAGEMENT | Facility: OTHER | Age: 59
End: 2024-08-14
Payer: COMMERCIAL

## 2024-08-21 ENCOUNTER — OCCUPATIONAL MEDICINE (OUTPATIENT)
Dept: OCCUPATIONAL MEDICINE | Facility: CLINIC | Age: 59
End: 2024-08-21
Payer: COMMERCIAL

## 2024-08-21 VITALS
DIASTOLIC BLOOD PRESSURE: 84 MMHG | HEIGHT: 61 IN | OXYGEN SATURATION: 96 % | TEMPERATURE: 97.7 F | SYSTOLIC BLOOD PRESSURE: 126 MMHG | HEART RATE: 72 BPM | BODY MASS INDEX: 31.79 KG/M2 | RESPIRATION RATE: 16 BRPM | WEIGHT: 168.4 LBS

## 2024-08-21 DIAGNOSIS — M43.16 ANTEROLISTHESIS OF LUMBAR SPINE: ICD-10-CM

## 2024-08-21 DIAGNOSIS — M54.10 RADICULOPATHY, UNSPECIFIED SPINAL REGION: ICD-10-CM

## 2024-08-21 PROCEDURE — 99213 OFFICE O/P EST LOW 20 MIN: CPT | Performed by: NURSE PRACTITIONER

## 2024-08-21 PROCEDURE — 3079F DIAST BP 80-89 MM HG: CPT | Performed by: NURSE PRACTITIONER

## 2024-08-21 PROCEDURE — 3074F SYST BP LT 130 MM HG: CPT | Performed by: NURSE PRACTITIONER

## 2024-08-21 RX ORDER — CYCLOBENZAPRINE HCL 5 MG
10 TABLET ORAL 3 TIMES DAILY PRN
Qty: 30 TABLET | Refills: 0 | Status: SHIPPED | OUTPATIENT
Start: 2024-08-21

## 2024-08-21 ASSESSMENT — FIBROSIS 4 INDEX: FIB4 SCORE: 1.3

## 2024-08-21 NOTE — PROGRESS NOTES
"Subjective:     Sonya Wei is a 58 y.o. female who presents for Work-Related Injury ([id number] 7/2/24. Pt reports low back pain while working. Pt reports pain 2-3/10 today. )    DOI: 7/2/24. ULI:  She reportedly was moving and cleaning equipment when she felt pain in her lower back.  She was initially seen by the clinic at work and they did some taping, heat, and cupping.    Today patient states that symptoms are minimally improved. She notes that symptoms are better at home than at work. She gets an intermittent sharp pain down her legs and soreness in the low back. She denies saddle anesthesia, leg weakness, or bowel/bladder changes. She notes that she has a history of GI issues which has not changes since the injury. She is taking Ibuprofen/Tylenol if needed and has relief from the cyclobenzaprine. She has been resting and feels that this helps. She has been swimming which has been helpful. Neurosurgery referral is pending. MRI and physical therapy referral placed. Patient has not returned to work since her last visit, but feels that she can work in her department as there is no heavy lifting. She states that her claim may be denied, per her  but she has not received anything in the mail yet. Plan of care discussed with patient.          ROS: All systems were reviewed on intake form, form was reviewed and signed. See scanned documents in media. Pertinent positives and negatives included in HPI.    PMH: No pertinent past medical history to this problem  MEDS: Medications were reviewed in Epic  ALLERGIES:   Allergies   Allergen Reactions    Latex Rash    Morphine Itching     Rxn = unknown   Bumps all over body    Other Misc Rash     Rash from gloves at work     SOCHX: Works as  at [employer]  FH: No pertinent family history to this problem       Objective:     /84   Pulse 72   Temp 36.5 °C (97.7 °F) (Temporal)   Resp 16   Ht 1.549 m (5' 1\")   Wt 76.4 kg (168 lb 6.4 oz)   " SpO2 96%   BMI 31.82 kg/m²     [unfilled]      Lumbar back: Mild tenderness present. No swelling, edema, deformity, signs of trauma, lacerations, spasms or bony tenderness.  Slightly decreased range of motion. Positive right straight leg raise test and positive left straight leg raise test. No scoliosis.  Distal pulses are 2+.  No gait abnormalities noted.  DTRs are 2+.  Distal neurovascular strength and sensation intact.    Assessment/Plan:       1. Anterolisthesis of lumbar spine  - Referral to Physical Therapy  - MR-LUMBAR SPINE-W/O; Future  - Referral to Radiology  - cyclobenzaprine (FLEXERIL) 5 mg tablet; Take 2 Tablets by mouth 3 times a day as needed for Moderate Pain.  Dispense: 30 Tablet; Refill: 0    2. Radiculopathy, unspecified spinal region  - Referral to Physical Therapy  - MR-LUMBAR SPINE-W/O; Future  - Referral to Radiology  - cyclobenzaprine (FLEXERIL) 5 mg tablet; Take 2 Tablets by mouth 3 times a day as needed for Moderate Pain.  Dispense: 30 Tablet; Refill: 0      FROM   8/21/24 TO    May work in phase 1 room       Differential diagnosis, natural history, supportive care, and indications for immediate follow-up discussed.    Approximately 30 minutes were spent in reviewing notes, preparing for visit, obtaining history, exam and evaluation, patient counseling/education and post visit documentation/orders.

## 2024-08-21 NOTE — LETTER
PHYSICIAN’S AND CHIROPRACTIC PHYSICIAN'S   PROGRESS REPORT CERTIFICATION OF DISABILITY Claim Number:     Social Security Number:    Patient’s Name:Sonya Wei Date of Injury:7/2/2024   Employer: [employer] [facility] Name of O (if applicable)      Patient’s Job Description/Occupation:        Previous Injuries/Diseases/Surgeries Contributing to the Condition:  Anterolisthesis of lumbar spine      Diagnosis:  (M43.16) Anterolisthesis of lumbar spine  (M54.10) Radiculopathy, unspecified spinal region      Related to the Industrial Injury? Yes     Explain:DOI: 7/2/24. ULI:  She reportedly was moving and cleaning equipment when she felt pain in her lower back.  She was initially seen by the clinic at work and they did some taping, heat, and cupping.      Objective Medical Findings:  Lumbar back: Mild tenderness present. No swelling, edema, deformity, signs of trauma, lacerations, spasms or bony tenderness.  Slightly decreased range of motion. Positive right straight leg raise test and positive left straight leg raise test. No scoliosis.  Distal pulses are 2+.  No gait abnormalities noted.  DTRs are 2+.  Distal neurovascular strength and sensation intact.        None - Discharged                         Stable  Yes                 Ratable  No     X  Generally Improved                        Condition Worsened               X  Condition Same  May Have Suffered a Permanent Disability  No     Treatment Plan:    Follow-up in 4 weeks, unless seen by neurosurgery  Continue with OTC Tylenol/ibuprofen, ice/heat application, gentle range of motion, stretching/swimming, walking, and exercises as tolerated   Pt understands return to the ED immediately if she develops increased pain, numbness/weakness of lower extremities, or incontinence of bowel or bladder for immediate evaluation.  Return to clinic sooner if needed for further evaluation and management of symptoms            No  Change in Therapy               X  PT/OT Prescribed                     Medication May be Used While Working       Case Management                        PT/OT Discontinued  X  Consultation    Further Diagnostic Studies:                               Prescription(s) Neurosurgery and physical therapy referrals  Physical therapy appointments as scheduled        MRI requested        Limits of pain as prescribed do not drive or work while taking this medication due to sedating effects         Released to FULL DUTY /No Restrictions on (Date):  From:      Certified TOTALLY TEMPORARILY DISABLED (Indicate Dates) From:   To:    X  Released to RESTRICTED/Modified Duty on (Date): From: 8/21/2024 To: 9/18/2024                                                                Restrictions Are:  Temporary     No Sitting                               No Standing                   No Pulling                  Other: May work in phase 1 room    No Bending at Waist           No Stooping                    No Lifting       No Carrying                           No Walking                Lifting Restricted to (lbs.):  < or = to 25 pounds    No Pushing                            No Climbing                   No Reaching Above Shoulders   Date of Next Visit:  9/18/2024  @ 1:00 PM  Date of this Exam:8/21/2024 Physician/Chiropractic Physician Name:DECLAN Santos Physician/Chiropractic Physician Signature:  Eligio Salmon DO MPH   D-[id number] (Rev. 2/24)

## 2024-08-23 ENCOUNTER — OFFICE VISIT (OUTPATIENT)
Dept: NEUROLOGY | Facility: MEDICAL CENTER | Age: 59
End: 2024-08-23
Attending: PSYCHIATRY & NEUROLOGY
Payer: COMMERCIAL

## 2024-08-23 VITALS
WEIGHT: 168.87 LBS | SYSTOLIC BLOOD PRESSURE: 144 MMHG | OXYGEN SATURATION: 97 % | HEART RATE: 82 BPM | BODY MASS INDEX: 31.88 KG/M2 | TEMPERATURE: 97.7 F | HEIGHT: 61 IN | DIASTOLIC BLOOD PRESSURE: 102 MMHG

## 2024-08-23 DIAGNOSIS — G43.709 CHRONIC MIGRAINE WITHOUT AURA WITHOUT STATUS MIGRAINOSUS, NOT INTRACTABLE: ICD-10-CM

## 2024-08-23 PROCEDURE — 3077F SYST BP >= 140 MM HG: CPT | Performed by: PSYCHIATRY & NEUROLOGY

## 2024-08-23 PROCEDURE — 99211 OFF/OP EST MAY X REQ PHY/QHP: CPT | Performed by: PSYCHIATRY & NEUROLOGY

## 2024-08-23 PROCEDURE — 3080F DIAST BP >= 90 MM HG: CPT | Performed by: PSYCHIATRY & NEUROLOGY

## 2024-08-23 PROCEDURE — 99213 OFFICE O/P EST LOW 20 MIN: CPT | Performed by: PSYCHIATRY & NEUROLOGY

## 2024-08-23 ASSESSMENT — FIBROSIS 4 INDEX: FIB4 SCORE: 1.3

## 2024-08-23 NOTE — PROGRESS NOTES
"[facility] NEUROLOGY  FOLLOW-UP VISIT    CC: headaches s/p COVID-19 Infection, likely migraine    INTERVAL HISTORY:  Sonya Wei is a 58 y.o. woman with worsened headaches following COVID-19 infection.  I last saw her in the clinic on 4/10/2023.  At that time I recommended she start Botox, continue topiramate, and try sumatriptan.  Today, she was unaccompanied, and she provided the following interval history:    The following is a summary of headache symptoms, presented in my standard format:    Family History: mom had migraines related to \"brain tumors\" she had headaches earlier in life  Age at onset: teenage years  Location: bi-temporal, occipital  Radiation: both proximal upper extremities  Frequency: baseline: ~twice daily, currently: daily  Duration: baseline: 2-3 hours, currently: 4 hours  Headache Days/Month: baseline: 30/30, currently: 30/30  Quality: \"like someone trying to stab her in the ear,\" \"pressure\"  Intensity: baseline: 6-8/10, currently: 7-8/10  Aura: none  Photophobia/Phonophobia/Nausea/Vomiting: yes/yes/yes/sometimes  Provoked by Physical Activity?:   Triggers: loud noises at work  Associated Symptoms: \"vertigo\"  Autonomic Signs (such as ptosis, miosis, conjunctival injection, rhinorrhea, increased lacrimation): none  Head Trauma:   Association with Menses: menopausal  ED Visits: yes  Hospitalizations: yes  Missed Work Days: works at [employer]  Sleep: up to 8 hours/night  Caffeine Intake: none  Hydration: keeps well-hydrated  Nutrition: eats regular meals  Analgesic Overuse:      Current Medication Regimen:  - naproxen     Medications Tried: Response  Preventive:  - amitriptyline: 25 mg was very effective, caused excessive sedation  - metoprolol: didn't help headaches  - topiramate 50 mg daily: sub-optimally effective  - Botox: extremely helpful, only received 1 injection series    Abortive:  - sumatriptan: 100 mg is helpful, she doesn't have to re-dose  - sumatriptan 20 mg " nasal spray: works faster than the tablets    Medications Not Tried:  - nortriptyline: will avoid due to med-med interaction with escitalopram    MEDICATIONS:  Current Outpatient Medications   Medication Sig    Insulin Pen Needle 32 G x 4 mm (RELION PEN NEEDLES) USE 1 PEN NEEDLE IN PEN DEVICE TO INJECT INSULIN ONCE DAILY    insulin glargine 100 UNIT/ML Solution Pen-injector injection Inject 5 Units under the skin every evening.    Dulaglutide (TRULICITY) 4.5 MG/0.5ML Solution Pen-injector Inject 0.5 mL under the skin every 7 days.    acetaminophen (TYLENOL) 500 MG Tab Take 1-2 Tablets by mouth every 6 hours as needed for Moderate Pain.    ibuprofen (MOTRIN) 600 MG Tab Take 1 Tablet by mouth every 6 hours as needed for Mild Pain or Moderate Pain.    cyclobenzaprine (FLEXERIL) 5 mg tablet Take 2 Tablets by mouth 3 times a day as needed for Moderate Pain.     MEDICAL, SOCIAL, AND FAMILY HISTORY:  There is no change in the patient's ROS or PFSH from their previous visit on 4/10/2023.    REVIEW OF SYSTEMS:  A ROS was completed.  Pertinent positives and negatives were included in the HPI, above.  All other systems were reviewed and are negative.    PHYSICAL EXAM:  General/Medical:  - NAD    Neuro:  MENTAL STATUS: awake and alert; no deficits of speech or language; oriented to conversation; affect was appropriate to situation; pleasant, cooperative    CRANIAL NERVES:    II: acuity: NT, fields: NT, pupils: NT, discs: NT    III/IV/VI: versions: grossly intact    V: facial sensation: NT    VII: facial expression: symmetric    VIII: hearing: intact to voice    IX/X: palate: NT    XI: shoulder shrug: NT    XII: tongue: NT    MOTOR:  - bulk: NT  - tone: NT  Upper Extremity Strength  (R/L)    NT   Elbow flexion NT   Elbow extension NT   Shoulder abduction NT     Lower Extremity Strength  (R/L)   Hip flexion NT   Knee extension NT   Knee flexion NT   Ankle plantarflexion NT   Ankle dorsiflexion NT     - pronator drift: NT  -  abnormal movements: none    SENSATION:  - light touch: NT  - vibration (R/L, seconds): NT at the great toes  - pinprick: NT  - proprioception: NT  - Romberg: absent    COORDINATION:  - finger to nose: NT  - finger tapping: NT    REFLEXES:  Reflex Right Left   BR NT NT   Biceps NT NT   Triceps NT NT   Patellae NT NT   Achilles NT NT   Toes NT NT     GAIT:  - narrow base, normal    REVIEW OF LABORATORY STUDIES:  No recent data available.    REVIEW OF IMAGING STUDIES:  No recent data available.    ASSESSMENT:  Sonya Wei is a 58 y.o. woman with chronic migraine and a history otherwise notable for HTN, HLD, T2DM, and MDD.  Her headaches remain poorly controlled.  A single Botox injection series was extremely effective, so plan to resume Botox.  Plans/recommenadtions as follows:    PLAN:  Migraine Headaches s/p COVID-19:  Prevention:  - resume Botox (a single injection series was extremely helpful, but for some reason she didn't continue with subsequent injections  - start Botox: plan to inject 155 units according to the dosing/injection paradigm currently mandated by the FDA for chronic migraine as follows:  - 10 units of BOTOX divided into 2 sites into the   - 5 units into the Procerus  - 20 units of Botox divided into 4 units into the Frontalis  - 40 units of Botox divided into 8 sites (4 sites to the right Temporalis and 4 sites to the left Temporalis)  - 30 units divided into 6 units (3 units to the right Occipitalis and 3 units to left Occipitalis)  - 20 units divided into 4 units (2 units to the right Cervical paraspinals, 2 units to the left Cervical paraspinals)  - 30 units of Botox divided into 6 units (3 units to right trapezius, 3 units to the left trapezius).    - get ~8 hours of sleep per night  - drink plenty of fluids (urine should be nearly clear)  - avoid excessive caffeine intake (no more than 2 servings per day and nothing in the afternoon)  - eat regular meals (don't skip meals)  -  get moderate exercise (even just a 20 minute walk daily)    :  - when on Botox she required no rescue medications, so will hold off for now  - do not use analgesics (e.g., ibuprofen, acetaminophen) more than 2 days per week in order to avoid analgesic rebound headaches    - keep a headache log    Follow-Up:  - Return in about 5 months (around 2025).    Signed: Sebastian Parikh M.D.

## 2024-08-24 DIAGNOSIS — I16.0 HYPERTENSIVE URGENCY: ICD-10-CM

## 2024-08-27 NOTE — TELEPHONE ENCOUNTER
Received request via: Patient    Was the patient seen in the last year in this department? Yes    Does the patient have an active prescription (recently filled or refills available) for medication(s) requested? No    Pharmacy Name: [facility]     Does the patient have senior living Plus and need 100-day supply? (This applies to ALL medications) Patient does not have SCP

## 2024-08-27 NOTE — TELEPHONE ENCOUNTER
Received request via: Patient    Was the patient seen in the last year in this department? Yes    Does the patient have an active prescription (recently filled or refills available) for medication(s) requested? No    Pharmacy Name: [facility]     Does the patient have [facility] Plus and need 100-day supply? (This applies to ALL medications) Patient does not have SCP

## 2024-08-28 RX ORDER — ICOSAPENT ETHYL 1000 MG/1
2 CAPSULE ORAL 2 TIMES DAILY
Qty: 120 CAPSULE | Refills: 3 | Status: SHIPPED | OUTPATIENT
Start: 2024-08-28

## 2024-08-28 RX ORDER — AMLODIPINE BESYLATE 10 MG/1
10 TABLET ORAL DAILY
Qty: 90 TABLET | Refills: 3 | Status: SHIPPED | OUTPATIENT
Start: 2024-08-28

## 2024-08-28 RX ORDER — HYDROCHLOROTHIAZIDE 25 MG/1
25 TABLET ORAL DAILY
Qty: 90 TABLET | Refills: 3 | Status: SHIPPED | OUTPATIENT
Start: 2024-08-28

## 2024-09-04 NOTE — TELEPHONE ENCOUNTER
[id number]  last ov     Received request via: Pharmacy    Was the patient seen in the last year in this department? Yes    Does the patient have an active prescription (recently filled or refills available) for medication(s) requested? No    Pharmacy Name: [facility]     Does the patient have long term Plus and need 100-day supply? (This applies to ALL medications) Patient does not have SCP

## 2024-09-06 RX ORDER — DULAGLUTIDE 4.5 MG/.5ML
1 INJECTION, SOLUTION SUBCUTANEOUS
Qty: 4 ML | Refills: 0 | Status: SHIPPED | OUTPATIENT
Start: 2024-09-06 | End: 2024-09-17

## 2024-09-10 NOTE — DISCHARGE INSTRUCTIONS
Discharge Instructions    Discharged to home by car with relative. Discharged via wheelchair, hospital escort: Yes.  Special equipment needed: Not Applicable    Be sure to schedule a follow-up appointment with your primary care doctor or any specialists as instructed.     Discharge Plan:   Influenza Vaccine Indication: Patient Refuses    I understand that a diet low in cholesterol, fat, and sodium is recommended for good health. Unless I have been given specific instructions below for another diet, I accept this instruction as my diet prescription.   Other diet: Regular    Special Instructions: None    · Is patient discharged on Warfarin / Coumadin?   No     Depression / Suicide Risk    As you are discharged from this [facility] facility, it is important to learn how to keep safe from harming yourself.    Recognize the warning signs:  · Abrupt changes in personality, positive or negative- including increase in energy   · Giving away possessions  · Change in eating patterns- significant weight changes-  positive or negative  · Change in sleeping patterns- unable to sleep or sleeping all the time   · Unwillingness or inability to communicate  · Depression  · Unusual sadness, discouragement and loneliness  · Talk of wanting to die  · Neglect of personal appearance   · Rebelliousness- reckless behavior  · Withdrawal from people/activities they love  · Confusion- inability to concentrate     If you or a loved one observes any of these behaviors or has concerns about self-harm, here's what you can do:  · Talk about it- your feelings and reasons for harming yourself  · Remove any means that you might use to hurt yourself (examples: pills, rope, extension cords, firearm)  · Get professional help from the community (Mental Health, Substance Abuse, psychological counseling)  · Do not be alone:Call your Safe Contact- someone whom you trust who will be there for you.  · Call your local CRISIS HOTLINE 581-4662 or  302.145.1540  · Call your local Children's Mobile Crisis Response Team [place] (397) 418-0810 or www.Urgent Career  · Call the toll free National Suicide Prevention Hotlines   · National Suicide Prevention Lifeline 637-615-PEDU (7284)  · [employer] 800-SUICIDE (288-1923)        Chest Wall Pain  Introduction  Chest wall pain is pain in or around the bones and muscles of your chest. Sometimes, an injury causes this pain. Sometimes, the cause may not be known. This pain may take several weeks or longer to get better.  Follow these instructions at home:  Pay attention to any changes in your symptoms. Take these actions to help with your pain:  · Rest as told by your doctor.  · Avoid activities that cause pain. Try not to use your chest, belly (abdominal), or side muscles to lift heavy things.  · If directed, apply ice to the painful area:  ¨ Put ice in a plastic bag.  ¨ Place a towel between your skin and the bag.  ¨ Leave the ice on for 20 minutes, 2-3 times per day.  · Take over-the-counter and prescription medicines only as told by your doctor.  · Do not use tobacco products, including cigarettes, chewing tobacco, and e-cigarettes. If you need help quitting, ask your doctor.  · Keep all follow-up visits as told by your doctor. This is important.  Contact a doctor if:  · You have a fever.  · Your chest pain gets worse.  · You have new symptoms.  Get help right away if:  · You feel sick to your stomach (nauseous) or you throw up (vomit).  · You feel sweaty or light-headed.  · You have a cough with phlegm (sputum) or you cough up blood.  · You are short of breath.  This information is not intended to replace advice given to you by your health care provider. Make sure you discuss any questions you have with your health care provider.  Document Released: 06/05/2009 Document Revised: 05/25/2017 Document Reviewed: 03/14/2016  © 2017 Elsevier    Infection Control in the Home  If you have an infection  or you are taking care of someone who has an infection, it is important to know how to keep the infection from spreading. Follow these guidelines to help stop the spread of infection, and talk to your health care provider.  HOW ARE INFECTIONS SPREAD?  In order for an infection to spread, the following must be present:  A germ. This may be a virus, bacteria, fungus, or parasite.  A place for the germ to live. This may be:  On or in a person, animal, plant, or food.  In soil or water.  On surfaces, such as a door handle.  A susceptible host. This is a person or animal who does not have resistance (immunity) to the germ.  A way for the germ to enter the host. This may occur by:  Direct contact. This may happen by making contact--such as shaking hands or hugging--with an infected person or animal. Some germs can also travel through the air and spread to you if an infected person coughs or sneezes on you or near you.  Indirect contact. This is when the germ enters the host through contact with an infected object. Examples include eating contaminated food, drinking contaminated water, or touching a contaminated surface with your hands and then touching your face, nose, or mouth soon after that.  HOW CAN I HELP TO PREVENT INFECTION FROM SPREADING?  There are several things that you can do to help prevent infection from spreading.  Hand Washing  It is very important to wash your hands correctly, following these steps:  Wet your hands with clean, running water.  Apply soap to your hands. Liquid soap is better than bar soap.  Rub your hands together quickly to create lather.  Keep rubbing your hands together for at least 20 seconds. Thoroughly scrub all parts of your hands, including under your fingernails and between your fingers.  Rinse your hands with clean, running water until all of the soap is gone.  Dry your hands with an air dryer or a clean paper or cloth towel, or let your hands air-dry. Do not use your clothing or  a soiled towel to dry your hands.  If you are in a public restroom, use your towel to turn off the water faucet and to open the bathroom door.  Make sure to wash your hands:  Before:  Visiting a baby or anyone with a weakened or lowered defense (immune) system.  Putting in and taking out any contact lenses.  After:  Working or playing outside.  Touching an animal or its toys or leash.  Handling livestock.  Using the bathroom or helping a child or adult to use the bathroom.  Using household  or toxic chemicals.  Touching or taking out the garbage.  Touching anything dirty around your home.  Handling soiled clothes or rags.  Taking care of a sick child. This includes touching used tissues, toys, and clothes.  Sneezing, coughing, or blowing your nose.  Using public transportation.  Shaking hands.  Using a phone, including your mobile phone.  Touching money.  Before and after:  Preparing food.  Preparing a bottle for a baby.  Feeding a baby or a young child.  Eating.  Visiting or taking care of someone who is sick.  Changing a diaper.  Changing a bandage (dressing) or taking care of an injury or wound.  Giving or taking medicine.  Taking Care of Your Home  Make sure that you have enough cleaning supplies at all times. These include:  Disinfectants.  Reusable cleaning cloths. Wash these after each use.  Paper towels.  Utility gloves. Replace your gloves if they are cracked or torn or if they start to peel.  Use bleach safely. Never mix it with other cleaning products, especially those that contain ammonia. This mixture can create a dangerous gas that may be deadly.  Take care of your cleaning supplies. Toilet brushes, mops, and sponges can breed germs. Soak them in bleach and water for 5 minutes after each use.  Do not pour used mop water down the sink. Pour it down the toilet instead.  Maintain proper ventilation in your home.  If you have a pet, ensure that your pet stays clean. Do not let people with weak  immune systems touch bird droppings, fish tank water, or a litter box.  If you have a cat, be sure to change the litter every day.  In the bathroom, make sure you:  Provide liquid soap.  Change towels and washcloths frequently. Avoid sharing towels and washcloths.  Change toothbrushes often and store them separately in a clean, dry place.  Disinfect the toilet.  Clean the tub, shower, and sink with standard cleaning products.    Mop the floor with a standard .  Do not share personal items, such as razors, toothbrushes, drinking glasses, deodorant, mcgrath, brushes, towels, and washcloths.    In the kitchen, make sure you:  Store food carefully.  Refrigerate leftovers promptly in covered containers.  Throw out stale or spoiled food.  Clean the inside of your refrigerator each week.  Keep your refrigerator set at 40°F (4°C) or less, and set your freezer at 0°F (-18°C) or less.  Thaw foods in the refrigerator or microwave, not at room temperature.  Serve foods at the proper temperature. Do not eat raw meat. Make sure it is cooked to the appropriate temperature. Cook eggs until they are firm.  Wash fruits and vegetables under running water.  Use separate cutting boards, plates, and utensils for raw foods and cooked foods.  Keep work surfaces clean.  Use a clean spoon each time you sample food while cooking.  Wash your dishes in hot, soapy water. Air-dry your dishes or use a .  Do not share forks, cups, or spoons during meals.  Wear gloves if laundry is visibly soiled.  Change linens each week or whenever they are soiled.  Do not shake soiled linens. Doing that may send germs into the air. Put dressings, sanitary or incontinence pads, diapers, and gloves in plastic garbage bags for disposal.     This information is not intended to replace advice given to you by your health care provider. Make sure you discuss any questions you have with your health care provider.     Document Released: 09/26/2009 Document  Revised: 01/08/2016 Document Reviewed: 08/20/2015  Elsevier Interactive Patient Education ©2016 [employer]         Initiate Treatment: Thymol drops 3 times a day to affected nail. Detail Level: Detailed Plan: File down with nail file at home.

## 2024-09-17 ENCOUNTER — OFFICE VISIT (OUTPATIENT)
Dept: MEDICAL GROUP | Facility: PHYSICIAN GROUP | Age: 59
End: 2024-09-17
Payer: COMMERCIAL

## 2024-09-17 VITALS
SYSTOLIC BLOOD PRESSURE: 122 MMHG | BODY MASS INDEX: 31.91 KG/M2 | HEIGHT: 61 IN | HEART RATE: 90 BPM | DIASTOLIC BLOOD PRESSURE: 80 MMHG | RESPIRATION RATE: 13 BRPM | WEIGHT: 169 LBS | TEMPERATURE: 97.1 F | OXYGEN SATURATION: 97 %

## 2024-09-17 DIAGNOSIS — E11.9 TYPE 2 DIABETES MELLITUS WITHOUT COMPLICATION, WITH LONG-TERM CURRENT USE OF INSULIN (HCC): ICD-10-CM

## 2024-09-17 DIAGNOSIS — Z79.4 TYPE 2 DIABETES MELLITUS WITHOUT COMPLICATION, WITH LONG-TERM CURRENT USE OF INSULIN (HCC): ICD-10-CM

## 2024-09-17 DIAGNOSIS — M54.16 LUMBAR RADICULOPATHY: ICD-10-CM

## 2024-09-17 PROCEDURE — 3074F SYST BP LT 130 MM HG: CPT | Performed by: FAMILY MEDICINE

## 2024-09-17 PROCEDURE — 99214 OFFICE O/P EST MOD 30 MIN: CPT | Performed by: FAMILY MEDICINE

## 2024-09-17 PROCEDURE — 3079F DIAST BP 80-89 MM HG: CPT | Performed by: FAMILY MEDICINE

## 2024-09-17 RX ORDER — ROSUVASTATIN CALCIUM 20 MG/1
20 TABLET, COATED ORAL EVERY EVENING
Qty: 90 TABLET | Refills: 3 | Status: SHIPPED | OUTPATIENT
Start: 2024-09-17

## 2024-09-17 ASSESSMENT — FIBROSIS 4 INDEX: FIB4 SCORE: 1.3

## 2024-09-17 NOTE — ASSESSMENT & PLAN NOTE
Uncontrolled inc to 10.2 from 9.3  Last A1c was 9.3 when she was in the hospital in March with high blood sugars and high blood pressures.   Recheck A1c in clinic, she has not been checking BS at home.   Depression is improved, she has been working on her garden and busy with her grandkids.       She is on trulicity now up to 4.5 every week, insulin glargine 5 units qhs, not checking her blood sugars or adjusting her insulin. Wants to switch to ozempic rx for 2 mg/week provided  Continue diet changes: decreasing carbs: using zucchini pasta. Less dizzy and nauseaous.     vascepa   Could not tolerate metformin due to nausea and vomiting  Add jardiance

## 2024-09-17 NOTE — ASSESSMENT & PLAN NOTE
Since July 2024   Low back pain radiating now up her spine and down her legs  She has weakness of legs sometimes. No injury or trauma.   Has been trying to get more active, doing gardening, swimming with her grandkids.   Heating pad has been helping with her pain.   She has cramping pain in legs at night  She had xray and was evaluated by worker's comp, they closed her case.   Has worsening back pain even with muscle relaxant, tylenol and ibuprofen and stretches.   Ref to PT  MRI ordered  Ref to spine and pain specialist.   Paperwork for fmla provided for evaluation and treatment.

## 2024-09-17 NOTE — PROGRESS NOTES
Subjective:   Sonya Wei is a 58 y.o. female here today for evaluation and management of:     Type 2 diabetes mellitus without complication, with long-term current use of insulin (HCC)  Uncontrolled inc to 10.2 from 9.3  Last A1c was 9.3 when she was in the hospital in March with high blood sugars and high blood pressures.   Recheck A1c in clinic, she has not been checking BS at home.   Depression is improved, she has been working on her garden and busy with her grandkids.       She is on trulicity now up to 4.5 every week, insulin glargine 5 units qhs, not checking her blood sugars or adjusting her insulin. Wants to switch to ozempic rx for 2 mg/week provided  Continue diet changes: decreasing carbs: using zucchini pasta. Less dizzy and nauseaous.     vascepa   Could not tolerate metformin due to nausea and vomiting  Add jardiance      Lumbar radiculopathy  Since July 2024   Low back pain radiating now up her spine and down her legs  She has weakness of legs sometimes. No injury or trauma.   Has been trying to get more active, doing gardening, swimming with her grandkids.   Heating pad has been helping with her pain.   She has cramping pain in legs at night  She had xray and was evaluated by worker's comp, they closed her case.   Has worsening back pain even with muscle relaxant, tylenol and ibuprofen and stretches.   Ref to PT  MRI ordered  Ref to spine and pain specialist.   Paperwork for fmla provided for evaluation and treatment.            Current medicines (including changes today)  Current Outpatient Medications   Medication Sig Dispense Refill    Empagliflozin 25 MG Tab Take 1 Tablet by mouth every day. For diabetes 90 Tablet 3    rosuvastatin (CRESTOR) 20 MG Tab Take 1 Tablet by mouth every evening. 90 Tablet 3    Semaglutide, 2 MG/DOSE, 8 MG/3ML Solution Pen-injector Inject 2 mg under the skin every 7 days. 3 mL 3    VASCEPA 1 g Cap TAKE 2 CAPSULES BY MOUTH 2 TIMES A  Capsule 3     "amLODIPine (NORVASC) 10 MG Tab Take 1 tablet by mouth once daily 90 Tablet 3    hydroCHLOROthiazide 25 MG Tab Take 1 tablet by mouth once daily 90 Tablet 3    cyclobenzaprine (FLEXERIL) 5 mg tablet Take 2 Tablets by mouth 3 times a day as needed for Moderate Pain. 30 Tablet 0    Insulin Pen Needle 32 G x 4 mm (RELION PEN NEEDLES) USE 1 PEN NEEDLE IN PEN DEVICE TO INJECT INSULIN ONCE DAILY 100 Each 3    insulin glargine 100 UNIT/ML Solution Pen-injector injection Inject 5 Units under the skin every evening. 3 mL 1    acetaminophen (TYLENOL) 500 MG Tab Take 1-2 Tablets by mouth every 6 hours as needed for Moderate Pain. 30 Tablet 0    ibuprofen (MOTRIN) 600 MG Tab Take 1 Tablet by mouth every 6 hours as needed for Mild Pain or Moderate Pain. 30 Tablet 0     No current facility-administered medications for this visit.     She  has a past medical history of Anemia, Bowel habit changes, Diabetes, Diabetes (HCC), GERD (gastroesophageal reflux disease), Healthcare maintenance (9/27/2014), High cholesterol, Hyperlipidemia, Hypertension, Infectious disease, Jaundice (1999), Psychiatric problem, and Vaginal itching (8/27/2014).    ROS  No chest pain, no shortness of breath, no abdominal pain       Objective:     /80 (BP Location: Left arm, Patient Position: Sitting, BP Cuff Size: Adult long)   Pulse 90   Temp 36.2 °C (97.1 °F) (Temporal)   Resp 13   Ht 1.549 m (5' 1\")   Wt 76.7 kg (169 lb)   SpO2 97%  Body mass index is 31.93 kg/m².   Physical Exam:  Constitutional: Alert, no distress.  Skin: Warm, dry, good turgor, no rashes in visible areas.  Eye: Equal, round and reactive, conjunctiva clear, lids normal.  ENMT: Lips without lesions, good dentition, oropharynx clear.  Neck: Trachea midline, no masses, no thyromegaly. No cervical or supraclavicular lymphadenopathy  Respiratory: Unlabored respiratory effort, lungs clear to auscultation, no wheezes, no ronchi.  Cardiovascular: Normal S1, S2, no murmur, no " edema.  Abdomen: Soft, non-tender, no masses, no hepatosplenomegaly.  Psych: Alert and oriented x3, normal affect and mood.    Assessment and Plan:   The following treatment plan was discussed    1. Type 2 diabetes mellitus without complication, with long-term current use of insulin (HCC)  - Referral to Pharmacotherapy Service  - HEMOGLOBIN A1C; Future    2. Lumbar radiculopathy  - MR-LUMBAR SPINE-W/O; Future  - Referral to Physical Therapy  - Referral to Pain Clinic    Other orders  - Empagliflozin 25 MG Tab; Take 1 Tablet by mouth every day. For diabetes  Dispense: 90 Tablet; Refill: 3  - rosuvastatin (CRESTOR) 20 MG Tab; Take 1 Tablet by mouth every evening.  Dispense: 90 Tablet; Refill: 3  - Semaglutide, 2 MG/DOSE, 8 MG/3ML Solution Pen-injector; Inject 2 mg under the skin every 7 days.  Dispense: 3 mL; Refill: 3      Followup: Return for as scheduled in NOv.

## 2024-09-20 ENCOUNTER — TELEPHONE (OUTPATIENT)
Dept: HEALTH INFORMATION MANAGEMENT | Facility: OTHER | Age: 59
End: 2024-09-20
Payer: COMMERCIAL

## 2024-09-30 ENCOUNTER — OFFICE VISIT (OUTPATIENT)
Dept: PHYSICAL MEDICINE AND REHAB | Facility: MEDICAL CENTER | Age: 59
End: 2024-09-30
Payer: COMMERCIAL

## 2024-09-30 VITALS
SYSTOLIC BLOOD PRESSURE: 143 MMHG | DIASTOLIC BLOOD PRESSURE: 81 MMHG | HEIGHT: 61 IN | HEART RATE: 92 BPM | OXYGEN SATURATION: 95 % | WEIGHT: 166 LBS | TEMPERATURE: 97.7 F | BODY MASS INDEX: 31.34 KG/M2

## 2024-09-30 DIAGNOSIS — R20.0 BILATERAL HAND NUMBNESS: ICD-10-CM

## 2024-09-30 DIAGNOSIS — M54.41 CHRONIC BILATERAL LOW BACK PAIN WITH BILATERAL SCIATICA: ICD-10-CM

## 2024-09-30 DIAGNOSIS — M54.16 LUMBAR RADICULOPATHY: ICD-10-CM

## 2024-09-30 DIAGNOSIS — G89.29 CHRONIC BILATERAL LOW BACK PAIN WITH BILATERAL SCIATICA: ICD-10-CM

## 2024-09-30 DIAGNOSIS — M54.42 CHRONIC BILATERAL LOW BACK PAIN WITH BILATERAL SCIATICA: ICD-10-CM

## 2024-09-30 DIAGNOSIS — M47.816 LUMBAR SPONDYLOSIS: ICD-10-CM

## 2024-09-30 DIAGNOSIS — E11.42 DIABETIC POLYNEUROPATHY ASSOCIATED WITH TYPE 2 DIABETES MELLITUS (HCC): ICD-10-CM

## 2024-09-30 ASSESSMENT — PATIENT HEALTH QUESTIONNAIRE - PHQ9
SUM OF ALL RESPONSES TO PHQ QUESTIONS 1-9: 11
5. POOR APPETITE OR OVEREATING: 1 - SEVERAL DAYS
CLINICAL INTERPRETATION OF PHQ2 SCORE: 2

## 2024-09-30 ASSESSMENT — FIBROSIS 4 INDEX: FIB4 SCORE: 1.3

## 2024-09-30 ASSESSMENT — PAIN SCALES - GENERAL: PAINLEVEL: 3=SLIGHT PAIN

## 2024-09-30 NOTE — PROGRESS NOTES
New patient note    Interventional spine and Pain  Physiatry (physical medicine and  Rehabilitation)     Date of service: See epic    Chief complaint:   Chief Complaint   Patient presents with    New Patient     Back pain        Referring provider: Nu Eason M.D.     HISTORY    HPI: Sonya Wei 58 y.o.  who presents today with Diagnoses of Lumbar radiculopathy, Chronic bilateral low back pain with bilateral sciatica, Lumbar spondylosis, Diabetic polyneuropathy associated with type 2 diabetes mellitus (HCC), and Bilateral hand numbness were pertinent to this visit.    HPI    Verbal consent was obtained for Sean copilot: Yes      History of Present Illness  The patient is a 58-year-old female presenting as a new patient.    She reports bilateral low back pain radiating down both legs, which has been acute on chronic and worsened since 07/2024. The pain is associated with numbness and tingling, rated at 5 to 7 out of 10 in intensity. She also experiences circumferential numbness and tingling around her toes, feet, and both hands. She works as a . An MRI of the lumbar spine, ordered by her primary care physician, is scheduled for 11/2024. Medications tried in the past include acetaminophen, Flexeril, gabapentin, Ibuprofen up to 800 mg, Toradol injections, methocarbamol, and Tizanidine.    She experiences back pain extending to her legs, accompanied by occasional numbness and tingling in her feet, occurring about once a week. During her COVID-19 infection, she experienced constant hand pain and finger cramping. She was diagnosed with carpal tunnel syndrome at a clinic but believes it has improved. However, she still occasionally drops objects due to perceived heaviness. She tries to keep her hands active to prevent stiffness, which can cause severe pain when she remains seated for extended periods. She avoids sleeping on her left side due to pain. She has not undergone physical therapy for  these symptoms. Currently, she does not have numbness in her feet. She recalls a previous incident of a pinched nerve during a long trip, which caused severe pain after a bathroom visit. She received therapy for this issue.    Her diabetes has been uncontrolled. She has been modifying her diet and plans to have another A1c test in 11/2024. Her blood sugar levels went up because she stopped her medication. She has not been taking all of her prescribed medications.  However she notes that her children and her grandchildren have motivated her to take her medications and to improve her health.  Her doctor prescribed Jardiance and Zyprexa.    SOCIAL HISTORY  The patient has 6 daughters and 1 son.              Medical records review:  I reviewed the note from the referring provider Nu Eason M.D. including the note dated 9/17/2024.  The patient has type II uncontrolled diabetes previously with an A1c of 10.2 now the most recent A1c was 9.3.  Noted for lumbar radiculopathy with pain radiating from the low back down the legs it was noted the patient was previously seen by [employer] who closed her case..           ROS:   Red Flags ROS:   Fever, Chills, Sweats: Denies  Involuntary Weight Loss: Denies  Bladder Incontinence: Denies  Bowel Incontinence: denies  Saddle Anesthesia: Denies    All other systems reviewed and negative.       PMHx:   Past Medical History:   Diagnosis Date    Anemia     Bowel habit changes     constipation    Diabetes     Diabetes (HCC)     GERD (gastroesophageal reflux disease)     Healthcare maintenance 9/27/2014    Mammogram: Due    High cholesterol     Hyperlipidemia     Hypertension     Infectious disease     Cold 10/2016    Jaundice 1999    Psychiatric problem     depression and anxiety    Vaginal itching 8/27/2014    With anal itching X 1 month Getting worse Burning Min vag disch         Current Outpatient Medications on File Prior to Visit   Medication Sig Dispense Refill     Empagliflozin 25 MG Tab Take 1 Tablet by mouth every day. For diabetes 90 Tablet 3    rosuvastatin (CRESTOR) 20 MG Tab Take 1 Tablet by mouth every evening. 90 Tablet 3    Semaglutide, 2 MG/DOSE, 8 MG/3ML Solution Pen-injector Inject 2 mg under the skin every 7 days. 3 mL 3    amLODIPine (NORVASC) 10 MG Tab Take 1 tablet by mouth once daily 90 Tablet 3    hydroCHLOROthiazide 25 MG Tab Take 1 tablet by mouth once daily 90 Tablet 3    cyclobenzaprine (FLEXERIL) 5 mg tablet Take 2 Tablets by mouth 3 times a day as needed for Moderate Pain. 30 Tablet 0    Insulin Pen Needle 32 G x 4 mm (RELION PEN NEEDLES) USE 1 PEN NEEDLE IN PEN DEVICE TO INJECT INSULIN ONCE DAILY 100 Each 3    insulin glargine 100 UNIT/ML Solution Pen-injector injection Inject 5 Units under the skin every evening. 3 mL 1    acetaminophen (TYLENOL) 500 MG Tab Take 1-2 Tablets by mouth every 6 hours as needed for Moderate Pain. 30 Tablet 0    ibuprofen (MOTRIN) 600 MG Tab Take 1 Tablet by mouth every 6 hours as needed for Mild Pain or Moderate Pain. 30 Tablet 0    VASCEPA 1 g Cap TAKE 2 CAPSULES BY MOUTH 2 TIMES A DAY (Patient not taking: Reported on 9/30/2024) 120 Capsule 3     No current facility-administered medications on file prior to visit.        PSHx:   Past Surgical History:   Procedure Laterality Date    VENTRAL HERNIA REPAIR ROBOTIC XI N/A 10/14/2016    Procedure: VENTRAL HERNIA REPAIR ROBOTIC XI FOR INCISIONAL W/MESH;  Surgeon: Edi Mendoza M.D.;  Location: SURGERY [facility];  Service:     ABDOMINAL HYSTERECTOMY TOTAL      still has ovaries    CHOLECYSTECTOMY      PRIMARY C SECTION      TUBAL COAGULATION LAPAROSCOPIC BILATERAL         Family history   Family History   Problem Relation Age of Onset    Diabetes Mother     Hyperlipidemia Mother     Diabetes Father     Hypertension Father     Hyperlipidemia Father          Medications: reviewed on epic.   Outpatient Medications Marked as Taking for the 9/30/24 encounter (Office Visit)  with Trenton Bob M.D.   Medication Sig Dispense Refill    Empagliflozin 25 MG Tab Take 1 Tablet by mouth every day. For diabetes 90 Tablet 3    rosuvastatin (CRESTOR) 20 MG Tab Take 1 Tablet by mouth every evening. 90 Tablet 3    Semaglutide, 2 MG/DOSE, 8 MG/3ML Solution Pen-injector Inject 2 mg under the skin every 7 days. 3 mL 3    amLODIPine (NORVASC) 10 MG Tab Take 1 tablet by mouth once daily 90 Tablet 3    hydroCHLOROthiazide 25 MG Tab Take 1 tablet by mouth once daily 90 Tablet 3    cyclobenzaprine (FLEXERIL) 5 mg tablet Take 2 Tablets by mouth 3 times a day as needed for Moderate Pain. 30 Tablet 0    Insulin Pen Needle 32 G x 4 mm (RELION PEN NEEDLES) USE 1 PEN NEEDLE IN PEN DEVICE TO INJECT INSULIN ONCE DAILY 100 Each 3    insulin glargine 100 UNIT/ML Solution Pen-injector injection Inject 5 Units under the skin every evening. 3 mL 1    acetaminophen (TYLENOL) 500 MG Tab Take 1-2 Tablets by mouth every 6 hours as needed for Moderate Pain. 30 Tablet 0    ibuprofen (MOTRIN) 600 MG Tab Take 1 Tablet by mouth every 6 hours as needed for Mild Pain or Moderate Pain. 30 Tablet 0        Allergies:   Allergies   Allergen Reactions    Latex Rash    Morphine Itching     Rxn = unknown   Bumps all over body    Other Misc Rash     Rash from gloves at work       Social Hx:   Social History     Socioeconomic History    Marital status:      Spouse name: Not on file    Number of children: Not on file    Years of education: Not on file    Highest education level: Not on file   Occupational History    Not on file   Tobacco Use    Smoking status: Never    Smokeless tobacco: Never   Vaping Use    Vaping status: Never Used   Substance and Sexual Activity    Alcohol use: No    Drug use: No    Sexual activity: Yes     Partners: Male     Birth control/protection: None   Other Topics Concern    Not on file   Social History Narrative    ** Merged History Encounter **          Social Determinants of Health     Financial  "Resource Strain: Not on file   Food Insecurity: Not on file   Transportation Needs: Not on file   Physical Activity: Not on file   Stress: Not on file   Social Connections: Not on file   Intimate Partner Violence: Not on file   Housing Stability: Not on file         EXAMINATION     Physical Exam:   Vitals: BP (!) 143/81 (BP Location: Left arm, Patient Position: Sitting, BP Cuff Size: Adult)   Pulse 92   Temp 36.5 °C (97.7 °F) (Temporal)   Ht 1.549 m (5' 1\")   Wt 75.3 kg (166 lb)   SpO2 95%     Constitutional:   Body Habitus: Body mass index is 31.37 kg/m².  Cooperation: Fully cooperates with exam  Appearance: Well-groomed, well-nourished, not disheveled     Eyes: No scleral icterus to suggest severe liver disease, no proptosis to suggest severe hyperthyroid    ENT -no obvious auditory deficits, no obvious tongue lesions, tongue midline, no facial droop     Skin -no rashes or lesions noted     Respiratory-  breathing comfortable on room air, no audible wheezing    Cardiovascular- capillary refills less than 2 seconds.     Psychiatric- alert and oriented ×3. Normal affect.       Musculoskeletal and Neuro -     Physical Exam       Thoracic/Lumbar Spine/Sacral Spine/Hips   Inspection: No evidence of atrophy in bilateral lower extremities throughout     ROM: decreased active range of motion with flexion, lateral flexion, and rotation bilaterally.   There is decreased active range of motion with lumbar extension with pain.    There is pain with facet loading maneuver (extension rotation) with axial low back pain on the BILATERAL side(s)    Palpation:   No tenderness to palpation in midline at T1-T12 levels. No tenderness to palpation in the left and right of the midline T1-L5, NEGATIVE for tenderness to palpation to the para-midline region in the lower lumbar levels.  palpation over SI joint: negative bilaterally    palpation in hip or over the gluteus medius tendon insertion: negative bilaterally      Lumbar spine " Special tests  Neuro tension  Straight leg test positive bilaterally    Slump test positive bilaterally      HIP  FAIR test negative bilaterally    Range of motion in the RIGHT hip is full  in flexion, extension, abduction, internal rotation, external rotation.  Range of motion in the LEFT hip is full  in flexion, extension, abduction, internal rotation, external rotation.      SI joint tests  Observation patient sits on one buttocks: Negative  SI joint compression negative bilaterally    SI joint distraction negative bilaterally    Thigh thrust test negative bilaterally    JOHN test negative bilaterally                 Key points for the international standards for neurological classification of spinal cord injury (ISNCSCI) to light touch.     Dermatome R L                                      L2 2 2   L3 2 2   L4 1 1   L5 1 1   S1 1 1   S2 2 2     This is in a circumferential distribution consistent with peripheral polyneuropathy    Motor Exam Lower Extremities    ? Myotome R L   Hip flexion L2 5 5   Knee extension L3 5 5   Ankle dorsiflexion L4 5 5   Toe extension L5 5 5   Ankle plantarflexion S1 5 5         Reflexes     Babinski sign negative bilaterally   Clonus of the ankle negative bilaterally       MEDICAL DECISION MAKING    Medical records review: see under HPI section.     DATA    Labs:   Lab Results   Component Value Date/Time    SODIUM 136 03/19/2024 10:44 AM    POTASSIUM 4.5 03/19/2024 10:44 AM    CHLORIDE 103 03/19/2024 10:44 AM    CO2 23 03/19/2024 10:44 AM    ANION 10.0 03/19/2024 10:44 AM    GLUCOSE 171 (H) 03/19/2024 10:44 AM    BUN 27 (H) 03/19/2024 10:44 AM    CREATININE 0.64 03/19/2024 10:44 AM    CALCIUM 9.4 03/19/2024 10:44 AM    ASTSGOT 22 03/19/2024 10:44 AM    ALTSGPT 13 03/19/2024 10:44 AM    TBILIRUBIN 0.3 03/19/2024 10:44 AM    ALBUMIN 4.0 03/19/2024 10:44 AM    TOTPROTEIN 7.1 03/19/2024 10:44 AM    GLOBULIN 3.1 03/19/2024 10:44 AM    AGRATIO 1.3 03/19/2024 10:44 AM   ]    Lab Results    Component Value Date/Time    PROTHROMBTM 12.9 12/18/2023 04:07 PM    INR 0.96 12/18/2023 04:07 PM        Lab Results   Component Value Date/Time    WBC 6.2 03/19/2024 10:44 AM    RBC 4.51 03/19/2024 10:44 AM    HEMOGLOBIN 13.3 03/19/2024 10:44 AM    HEMATOCRIT 39.7 03/19/2024 10:44 AM    MCV 88.0 03/19/2024 10:44 AM    MCH 29.5 03/19/2024 10:44 AM    MCHC 33.5 03/19/2024 10:44 AM    MPV 11.9 03/19/2024 10:44 AM    NEUTSPOLYS 60.00 03/19/2024 10:44 AM    LYMPHOCYTES 29.70 03/19/2024 10:44 AM    MONOCYTES 5.80 03/19/2024 10:44 AM    EOSINOPHILS 3.40 03/19/2024 10:44 AM    BASOPHILS 0.60 03/19/2024 10:44 AM    HYPOCHROMIA 1+ 09/11/2013 08:34 AM    ANISOCYTOSIS 1+ 12/03/2020 09:45 PM        Lab Results   Component Value Date/Time    HBA1C 10.2 (A) 08/06/2024 02:30 PM        Imaging:   I personally reviewed following images, these are my reads      Results  Laboratory Studies  Hemoglobin A1c was 10.2 on 8/6/2024.         IMAGING radiology reads. I reviewed the following radiology reads       Results for orders placed during the hospital encounter of 03/09/21    MR-BRAIN-W/O    Impression  1.  No acute abnormality.  2.  A few punctate nonspecific supratentorial T2 hyperintensities likely representing nonspecific foci of gliosis/minimal chronic ischemia.             Results for orders placed during the hospital encounter of 07/26/22    MR-CERVICAL SPINE-W/O    Impression  1.  There is no spinal or neural foraminal stenosis.  2.  There is an approximately 1 cm sized nodule in the right lobe of the thyroid.                                                 Results for orders placed during the hospital encounter of 03/09/21    DX-CERVICAL SPINE-2 OR 3 VIEWS    Impression  No acute fracture or malalignment.     Results for orders placed in visit on 05/11/22    DX-CHEST-2 VIEWS    Impression  Negative two views of the chest.     Results for orders placed during the hospital encounter of 05/28/20    DX-ELBOW-COMPLETE 3+  RIGHT    Impression  No evidence of acute fracture or dislocation.                  Results for orders placed during the hospital encounter of 05/28/20    DX-KNEE COMPLETE 4+ LEFT    Impression  Mild tricompartment degenerative change. No evidence of fracture.   Results for orders placed in visit on 07/29/24    DX-LUMBAR SPINE-2 OR 3 VIEWS    Impression  1. Interval development of 2 mm anterolisthesis of L4 with respect to L5.  2. No fracture.  3. Stable physiologic lumbar dextrocurvature with multilevel degenerative disc disease and facet arthrosis.             Results for orders placed in visit on 02/19/18    DX-SHOULDER 2+ RIGHT    Impression  Unremarkable shoulder series.            INTERPRETING LOCATION:  [address] [place] [address], [id number]    Results for orders placed in visit on 02/19/18    DX-THORACIC SPINE-2 VIEWS    Impression  Unremarkable thoracic spine.            Diagnosis   Visit Diagnoses     ICD-10-CM   1. Lumbar radiculopathy  M54.16   2. Chronic bilateral low back pain with bilateral sciatica  M54.42    M54.41    G89.29   3. Lumbar spondylosis  M47.816   4. Diabetic polyneuropathy associated with type 2 diabetes mellitus (HCC)  E11.42   5. Bilateral hand numbness  R20.0           ASSESSMENT AND PLAN:  Sonya Wei 58 y.o. female      Sonya was seen today for new patient.    Diagnoses and all orders for this visit:    Lumbar radiculopathy    Chronic bilateral low back pain with bilateral sciatica    Lumbar spondylosis    Diabetic polyneuropathy associated with type 2 diabetes mellitus (HCC)    Bilateral hand numbness          -    Assessment & Plan  Symptoms suggest a possible lumbar radiculopathy in the back, potentially exacerbated by arthritis. An MRI of the lumbar spine is scheduled for November 2024 as ordered by PCP Nu Eason M.D. . She is encouraged to continue with her current physical therapy regimen and incorporate home exercises into her routine. A referral for physical therapy  has been placed.  Also on the differential is peripheral polyneuropathy.    Her hemoglobin A1c was 10.2 on 08/06/2024. She has been experiencing high blood sugar levels, partly due to non-adherence to her medication regimen. She is advised to resume her medications, including Jardiance and Zyprexa. Dietary modifications have been discussed, and she is encouraged to continue her efforts to improve her diet. A follow-up A1c test is scheduled for November 2024.  We discussed she would need improved glucose control to be a candidate for an epidural steroid injection    Medications: Continue Flexeril.  Gabapentin could also be helpful for the patient however I would like for her to be compliant with her medications prior to starting any new medication.  Cymbalta could also be considered by the patient's PCP which could be helpful for pain and mood.    Follow-up  Patient is scheduled for a follow-up visit in early December 2024.                Please note that this dictation was created using voice recognition software. I have made every reasonable attempt to correct obvious errors but there may be errors of grammar and content that I may have overlooked prior to finalization of this note.      Trenton Bob MD  Physical Medicine and Rehabilitation  Interventional Spine and Sports Physiatry  [facility]          Nu Eason M.D.

## 2024-10-02 ENCOUNTER — HOSPITAL ENCOUNTER (OUTPATIENT)
Dept: LAB | Facility: MEDICAL CENTER | Age: 59
End: 2024-10-02
Attending: HEALTH EDUCATOR
Payer: COMMERCIAL

## 2024-10-02 ENCOUNTER — TELEPHONE (OUTPATIENT)
Dept: MEDICAL GROUP | Facility: PHYSICIAN GROUP | Age: 59
End: 2024-10-02

## 2024-10-02 PROCEDURE — 82306 VITAMIN D 25 HYDROXY: CPT

## 2024-10-02 PROCEDURE — 84443 ASSAY THYROID STIM HORMONE: CPT

## 2024-10-02 PROCEDURE — 84481 FREE ASSAY (FT-3): CPT

## 2024-10-02 PROCEDURE — 36415 COLL VENOUS BLD VENIPUNCTURE: CPT

## 2024-10-02 PROCEDURE — 80299 QUANTITATIVE ASSAY DRUG: CPT

## 2024-10-02 PROCEDURE — 84681 ASSAY OF C-PEPTIDE: CPT

## 2024-10-02 PROCEDURE — 82570 ASSAY OF URINE CREATININE: CPT

## 2024-10-02 PROCEDURE — 82043 UR ALBUMIN QUANTITATIVE: CPT

## 2024-10-02 PROCEDURE — 82607 VITAMIN B-12: CPT

## 2024-10-02 PROCEDURE — 82024 ASSAY OF ACTH: CPT

## 2024-10-02 PROCEDURE — 80061 LIPID PANEL: CPT

## 2024-10-02 PROCEDURE — 84439 ASSAY OF FREE THYROXINE: CPT

## 2024-10-02 PROCEDURE — 86337 INSULIN ANTIBODIES: CPT

## 2024-10-02 PROCEDURE — 82533 TOTAL CORTISOL: CPT

## 2024-10-02 PROCEDURE — 80053 COMPREHEN METABOLIC PANEL: CPT

## 2024-10-02 PROCEDURE — 86376 MICROSOMAL ANTIBODY EACH: CPT

## 2024-10-02 PROCEDURE — 86341 ISLET CELL ANTIBODY: CPT | Mod: 91

## 2024-10-03 LAB
25(OH)D3 SERPL-MCNC: 15 NG/ML (ref 30–100)
ALBUMIN SERPL BCP-MCNC: 3.6 G/DL (ref 3.2–4.9)
ALBUMIN/GLOB SERPL: 1 G/DL
ALP SERPL-CCNC: 111 U/L (ref 30–99)
ALT SERPL-CCNC: 20 U/L (ref 2–50)
ANION GAP SERPL CALC-SCNC: 12 MMOL/L (ref 7–16)
AST SERPL-CCNC: 22 U/L (ref 12–45)
BILIRUB SERPL-MCNC: 0.3 MG/DL (ref 0.1–1.5)
BUN SERPL-MCNC: 25 MG/DL (ref 8–22)
CALCIUM ALBUM COR SERPL-MCNC: 10.5 MG/DL (ref 8.5–10.5)
CALCIUM SERPL-MCNC: 10.2 MG/DL (ref 8.5–10.5)
CHLORIDE SERPL-SCNC: 101 MMOL/L (ref 96–112)
CHOLEST SERPL-MCNC: 352 MG/DL (ref 100–199)
CO2 SERPL-SCNC: 25 MMOL/L (ref 20–33)
CORTIS SERPL-MCNC: 1.8 UG/DL (ref 0–23)
CREAT SERPL-MCNC: 0.81 MG/DL (ref 0.5–1.4)
CREAT UR-MCNC: 41.2 MG/DL
GFR SERPLBLD CREATININE-BSD FMLA CKD-EPI: 83 ML/MIN/1.73 M 2
GLOBULIN SER CALC-MCNC: 3.6 G/DL (ref 1.9–3.5)
GLUCOSE SERPL-MCNC: 262 MG/DL (ref 65–99)
HDLC SERPL-MCNC: 49 MG/DL
LDLC SERPL CALC-MCNC: 250 MG/DL
MICROALBUMIN UR-MCNC: 222 MG/DL
MICROALBUMIN/CREAT UR: 5388 MG/G (ref 0–30)
POTASSIUM SERPL-SCNC: 4.7 MMOL/L (ref 3.6–5.5)
PROT SERPL-MCNC: 7.2 G/DL (ref 6–8.2)
SODIUM SERPL-SCNC: 138 MMOL/L (ref 135–145)
T3FREE SERPL-MCNC: 2.54 PG/ML (ref 2–4.4)
T4 FREE SERPL-MCNC: 1.13 NG/DL (ref 0.93–1.7)
THYROPEROXIDASE AB SERPL-ACNC: 11 IU/ML (ref 0–9)
TRIGL SERPL-MCNC: 264 MG/DL (ref 0–149)
TSH SERPL-ACNC: 2.75 UIU/ML (ref 0.35–5.5)
VIT B12 SERPL-MCNC: 796 PG/ML (ref 211–911)

## 2024-10-04 LAB
ACTH PLAS-MCNC: 7.2 PG/ML (ref 7.2–63.3)
C PEPTIDE SERPL-MCNC: 4 NG/ML (ref 0.5–3.3)

## 2024-10-05 LAB
GAD65 AB SER IA-ACNC: <5 IU/ML (ref 0–5)
INSULIN HUMAN AB SER-ACNC: <0.4 U/ML (ref 0–0.4)
PANC ISLET CELL AB TITR SER: NORMAL {TITER}
TEST NAME 95000: NORMAL

## 2024-10-09 ENCOUNTER — OFFICE VISIT (OUTPATIENT)
Dept: MEDICAL GROUP | Facility: PHYSICIAN GROUP | Age: 59
End: 2024-10-09
Payer: COMMERCIAL

## 2024-10-09 VITALS
TEMPERATURE: 96.8 F | OXYGEN SATURATION: 99 % | SYSTOLIC BLOOD PRESSURE: 122 MMHG | WEIGHT: 163 LBS | BODY MASS INDEX: 30.78 KG/M2 | HEART RATE: 74 BPM | RESPIRATION RATE: 15 BRPM | HEIGHT: 61 IN | DIASTOLIC BLOOD PRESSURE: 80 MMHG

## 2024-10-09 DIAGNOSIS — R19.00 ABDOMINAL WALL BULGE: ICD-10-CM

## 2024-10-09 DIAGNOSIS — Z23 NEED FOR VACCINATION: ICD-10-CM

## 2024-10-09 DIAGNOSIS — R80.1 PERSISTENT PROTEINURIA: ICD-10-CM

## 2024-10-09 PROCEDURE — 3079F DIAST BP 80-89 MM HG: CPT | Performed by: FAMILY MEDICINE

## 2024-10-09 PROCEDURE — 99214 OFFICE O/P EST MOD 30 MIN: CPT | Mod: 25 | Performed by: FAMILY MEDICINE

## 2024-10-09 PROCEDURE — 3074F SYST BP LT 130 MM HG: CPT | Performed by: FAMILY MEDICINE

## 2024-10-09 PROCEDURE — 90471 IMMUNIZATION ADMIN: CPT | Performed by: FAMILY MEDICINE

## 2024-10-09 PROCEDURE — 90746 HEPB VACCINE 3 DOSE ADULT IM: CPT | Performed by: FAMILY MEDICINE

## 2024-10-09 RX ORDER — SEMAGLUTIDE 1.34 MG/ML
INJECTION, SOLUTION SUBCUTANEOUS
COMMUNITY
Start: 2024-10-01 | End: 2024-10-09

## 2024-10-09 RX ORDER — EMPAGLIFLOZIN AND METFORMIN HYDROCHLORIDE 12.5; 5 MG/1; MG/1
TABLET ORAL
COMMUNITY

## 2024-10-09 RX ORDER — LISINOPRIL 5 MG/1
5 TABLET ORAL DAILY
Qty: 90 TABLET | Refills: 3 | Status: SHIPPED | OUTPATIENT
Start: 2024-10-09

## 2024-10-09 RX ORDER — METOPROLOL SUCCINATE 50 MG/1
TABLET, EXTENDED RELEASE ORAL
COMMUNITY

## 2024-10-09 ASSESSMENT — FIBROSIS 4 INDEX: FIB4 SCORE: 1.05

## 2024-10-18 ENCOUNTER — TELEPHONE (OUTPATIENT)
Dept: HEALTH INFORMATION MANAGEMENT | Facility: OTHER | Age: 59
End: 2024-10-18
Payer: COMMERCIAL

## 2024-10-30 DIAGNOSIS — Z79.4 TYPE 2 DIABETES MELLITUS WITHOUT COMPLICATION, WITH LONG-TERM CURRENT USE OF INSULIN (HCC): ICD-10-CM

## 2024-10-30 DIAGNOSIS — E11.9 TYPE 2 DIABETES MELLITUS WITHOUT COMPLICATION, WITH LONG-TERM CURRENT USE OF INSULIN (HCC): ICD-10-CM

## 2024-10-30 RX ORDER — INSULIN GLARGINE 100 [IU]/ML
INJECTION, SOLUTION SUBCUTANEOUS
Qty: 15 ML | Refills: 3 | Status: SHIPPED | OUTPATIENT
Start: 2024-10-30

## 2024-11-11 ENCOUNTER — APPOINTMENT (OUTPATIENT)
Dept: NEUROLOGY | Facility: MEDICAL CENTER | Age: 59
End: 2024-11-11
Attending: PSYCHIATRY & NEUROLOGY
Payer: COMMERCIAL

## 2024-11-13 ENCOUNTER — APPOINTMENT (OUTPATIENT)
Dept: RADIOLOGY | Facility: MEDICAL CENTER | Age: 59
End: 2024-11-13
Attending: FAMILY MEDICINE
Payer: COMMERCIAL

## 2024-11-15 ENCOUNTER — OFFICE VISIT (OUTPATIENT)
Dept: URGENT CARE | Facility: PHYSICIAN GROUP | Age: 59
End: 2024-11-15
Payer: COMMERCIAL

## 2024-11-15 VITALS
HEART RATE: 85 BPM | TEMPERATURE: 98.7 F | HEIGHT: 61 IN | SYSTOLIC BLOOD PRESSURE: 124 MMHG | BODY MASS INDEX: 30.96 KG/M2 | WEIGHT: 164 LBS | DIASTOLIC BLOOD PRESSURE: 68 MMHG | RESPIRATION RATE: 20 BRPM | OXYGEN SATURATION: 97 %

## 2024-11-15 DIAGNOSIS — B34.9 VIRAL ILLNESS: ICD-10-CM

## 2024-11-15 LAB
FLUAV RNA SPEC QL NAA+PROBE: NEGATIVE
FLUBV RNA SPEC QL NAA+PROBE: NEGATIVE
RSV RNA SPEC QL NAA+PROBE: NEGATIVE
SARS-COV-2 RNA RESP QL NAA+PROBE: NEGATIVE

## 2024-11-15 PROCEDURE — 99214 OFFICE O/P EST MOD 30 MIN: CPT | Performed by: PHYSICIAN ASSISTANT

## 2024-11-15 PROCEDURE — 3074F SYST BP LT 130 MM HG: CPT | Performed by: PHYSICIAN ASSISTANT

## 2024-11-15 PROCEDURE — 3078F DIAST BP <80 MM HG: CPT | Performed by: PHYSICIAN ASSISTANT

## 2024-11-15 PROCEDURE — 0241U POCT CEPHEID COV-2, FLU A/B, RSV - PCR: CPT | Performed by: PHYSICIAN ASSISTANT

## 2024-11-15 RX ORDER — DEXTROMETHORPHAN HYDROBROMIDE AND PROMETHAZINE HYDROCHLORIDE 15; 6.25 MG/5ML; MG/5ML
5 SYRUP ORAL 3 TIMES DAILY PRN
Qty: 120 ML | Refills: 0 | Status: SHIPPED | OUTPATIENT
Start: 2024-11-15

## 2024-11-15 RX ORDER — ALBUTEROL SULFATE 90 UG/1
2 INHALANT RESPIRATORY (INHALATION) EVERY 6 HOURS PRN
Qty: 8.5 G | Refills: 0 | Status: SHIPPED | OUTPATIENT
Start: 2024-11-15

## 2024-11-15 ASSESSMENT — ENCOUNTER SYMPTOMS
GASTROINTESTINAL NEGATIVE: 1
COUGH: 1
SHORTNESS OF BREATH: 0
DIZZINESS: 0
CHILLS: 1
WHEEZING: 0
SPUTUM PRODUCTION: 1
HEADACHES: 1
SINUS PAIN: 0
RHINORRHEA: 1
FEVER: 1
SORE THROAT: 1
MYALGIAS: 1
PALPITATIONS: 0

## 2024-11-15 ASSESSMENT — FIBROSIS 4 INDEX: FIB4 SCORE: 1.05

## 2024-11-15 NOTE — PROGRESS NOTES
Subjective     Sonya Wei is a very pleasant 58 y.o. female who presents with Cough (X 5 days ), Pharyngitis (X 5 days ), Headache (X 5 days ), Nasal Congestion (X 5 days ), and Fever (X 5 days )            Cough  This is a new problem. The current episode started in the past 7 days. The problem has been gradually worsening. The problem occurs every few minutes. The cough is Productive of sputum. Associated symptoms include chills, a fever, headaches, myalgias, nasal congestion, postnasal drip, rhinorrhea and a sore throat. Pertinent negatives include no chest pain, ear congestion, ear pain, rash, shortness of breath or wheezing. She has tried OTC cough suppressant for the symptoms. The treatment provided mild relief. There is no history of asthma or pneumonia.       PMH:  has a past medical history of Anemia, Bowel habit changes, Diabetes, Diabetes (HCC), GERD (gastroesophageal reflux disease), Healthcare maintenance (9/27/2014), High cholesterol, Hyperlipidemia, Hypertension, Infectious disease, Jaundice (1999), Psychiatric problem, and Vaginal itching (8/27/2014).  MEDS:   Current Outpatient Medications:     SYNJARDY 12.5-500 MG Tab, TAKE 1 TABLET BY MOUTH TWICE DAILY WITH MEALS FOR 30 DAYS, Disp: , Rfl:     metoprolol SR (TOPROL XL) 50 MG TABLET SR 24 HR, Oral for 30 Days, Disp: , Rfl:     lisinopril (PRINIVIL) 5 MG Tab, Take 1 Tablet by mouth every day., Disp: 90 Tablet, Rfl: 3    Empagliflozin 25 MG Tab, Take 1 Tablet by mouth every day. For diabetes, Disp: 90 Tablet, Rfl: 3    rosuvastatin (CRESTOR) 20 MG Tab, Take 1 Tablet by mouth every evening., Disp: 90 Tablet, Rfl: 3    Semaglutide, 2 MG/DOSE, 8 MG/3ML Solution Pen-injector, Inject 2 mg under the skin every 7 days., Disp: 3 mL, Rfl: 3    VASCEPA 1 g Cap, TAKE 2 CAPSULES BY MOUTH 2 TIMES A DAY, Disp: 120 Capsule, Rfl: 3    amLODIPine (NORVASC) 10 MG Tab, Take 1 tablet by mouth once daily, Disp: 90 Tablet, Rfl: 3    hydroCHLOROthiazide 25 MG Tab,  Take 1 tablet by mouth once daily, Disp: 90 Tablet, Rfl: 3    cyclobenzaprine (FLEXERIL) 5 mg tablet, Take 2 Tablets by mouth 3 times a day as needed for Moderate Pain., Disp: 30 Tablet, Rfl: 0    Insulin Pen Needle 32 G x 4 mm (RELION PEN NEEDLES), USE 1 PEN NEEDLE IN PEN DEVICE TO INJECT INSULIN ONCE DAILY, Disp: 100 Each, Rfl: 3    acetaminophen (TYLENOL) 500 MG Tab, Take 1-2 Tablets by mouth every 6 hours as needed for Moderate Pain., Disp: 30 Tablet, Rfl: 0    ibuprofen (MOTRIN) 600 MG Tab, Take 1 Tablet by mouth every 6 hours as needed for Mild Pain or Moderate Pain., Disp: 30 Tablet, Rfl: 0  ALLERGIES:   Allergies   Allergen Reactions    Latex Rash    Morphine Itching     Rxn = unknown   Bumps all over body    Other Misc Rash     Rash from gloves at work     SURGHX:   Past Surgical History:   Procedure Laterality Date    VENTRAL HERNIA REPAIR ROBOTIC XI N/A 10/14/2016    Procedure: VENTRAL HERNIA REPAIR ROBOTIC XI FOR INCISIONAL W/MESH;  Surgeon: Edi Mendoza M.D.;  Location: SURGERY [facility];  Service:     ABDOMINAL HYSTERECTOMY TOTAL      still has ovaries    CHOLECYSTECTOMY      PRIMARY C SECTION      TUBAL COAGULATION LAPAROSCOPIC BILATERAL       SOCHX:  reports that she has never smoked. She has never used smokeless tobacco. She reports that she does not drink alcohol and does not use drugs.  FH: family history includes Diabetes in her father and mother; Hyperlipidemia in her father and mother; Hypertension in her father.      Review of Systems   Constitutional:  Positive for chills and fever.   HENT:  Positive for congestion, postnasal drip, rhinorrhea and sore throat. Negative for ear pain and sinus pain.    Respiratory:  Positive for cough and sputum production. Negative for shortness of breath and wheezing.    Cardiovascular:  Negative for chest pain, palpitations and leg swelling.   Gastrointestinal: Negative.    Musculoskeletal:  Positive for myalgias.   Skin:  Negative for rash.  "  Neurological:  Positive for headaches. Negative for dizziness.       Medications, Allergies, and current problem list reviewed today in Epic           Objective     /68   Pulse 85   Temp 37.1 °C (98.7 °F) (Temporal)   Resp 20   Ht 1.549 m (5' 1\")   Wt 74.4 kg (164 lb)   SpO2 97%   BMI 30.99 kg/m²      Physical Exam  Vitals and nursing note reviewed.   Constitutional:       General: She is not in acute distress.     Appearance: Normal appearance. She is well-developed. She is not ill-appearing, toxic-appearing or diaphoretic.   HENT:      Head: Normocephalic and atraumatic.      Right Ear: Tympanic membrane, ear canal and external ear normal.      Left Ear: Tympanic membrane, ear canal and external ear normal.      Nose: Congestion and rhinorrhea present.      Mouth/Throat:      Mouth: Mucous membranes are moist.      Pharynx: No oropharyngeal exudate or posterior oropharyngeal erythema.   Eyes:      General:         Right eye: No discharge.         Left eye: No discharge.      Conjunctiva/sclera: Conjunctivae normal.   Cardiovascular:      Rate and Rhythm: Normal rate and regular rhythm.      Pulses: Normal pulses.      Heart sounds: Normal heart sounds.   Pulmonary:      Effort: Pulmonary effort is normal. No respiratory distress.      Breath sounds: Normal breath sounds. No stridor. No wheezing, rhonchi or rales.   Musculoskeletal:      Cervical back: Normal range of motion and neck supple.      Right lower leg: No edema.      Left lower leg: No edema.   Lymphadenopathy:      Cervical: No cervical adenopathy.   Skin:     General: Skin is warm and dry.   Neurological:      General: No focal deficit present.      Mental Status: She is alert and oriented to person, place, and time. Mental status is at baseline.   Psychiatric:         Mood and Affect: Mood normal.         Behavior: Behavior normal.         Thought Content: Thought content normal.         Judgment: Judgment normal.                       "       Assessment & Plan      This is a very pleasant 58-year-old female presenting with viral URI symptoms for the past 5 days.  In clinic COVID, flu, RSV testing negative. No clinical symptoms/signs of focal bacterial infection.  Patient will be treated for self-limiting viral URI with OTC meds, conservative measures, and symptomatic relief.  ER and red flag symptoms discussed at length.    Assessment & Plan  Viral illness    Orders:    promethazine-dextromethorphan (PROMETHAZINE-DM) 6.25-15 MG/5ML syrup; Take 5 mL by mouth 3 times a day as needed for Cough.    albuterol 108 (90 Base) MCG/ACT Aero Soln inhalation aerosol; Inhale 2 Puffs every 6 hours as needed for Shortness of Breath.    POCT CEPHEID COV-2, FLU A/B, RSV - PCR            I personally reviewed prior external notes and test results pertinent to today's visit. Return to clinic or go to ED if symptoms worsen or persist. Red flag symptoms and indications for ED discussed at length. Patient/Parent/Guardian voices understanding.  AVS with post-visit instructions printed and provided or given verbally.  Follow-up with your primary care provider in 3-5 days. All side effects and potential interactions of prescribed medication discussed including allergic response, GI upset, tendon injury, rash, sedation, OCP effectiveness, etc.    Please note that this dictation was created using voice recognition software. I have made every reasonable attempt to correct obvious errors, but I expect that there are errors of grammar and possibly content that I did not discover before finalizing the note.

## 2024-12-02 ENCOUNTER — APPOINTMENT (OUTPATIENT)
Dept: PHYSICAL MEDICINE AND REHAB | Facility: MEDICAL CENTER | Age: 59
End: 2024-12-02
Payer: COMMERCIAL

## 2025-01-23 ENCOUNTER — HOSPITAL ENCOUNTER (OUTPATIENT)
Dept: RADIOLOGY | Facility: MEDICAL CENTER | Age: 60
End: 2025-01-23
Attending: FAMILY MEDICINE
Payer: COMMERCIAL

## 2025-01-23 ENCOUNTER — APPOINTMENT (OUTPATIENT)
Dept: NEUROLOGY | Facility: MEDICAL CENTER | Age: 60
End: 2025-01-23
Attending: PSYCHIATRY & NEUROLOGY
Payer: COMMERCIAL

## 2025-01-23 DIAGNOSIS — R80.1 PERSISTENT PROTEINURIA: ICD-10-CM

## 2025-01-23 DIAGNOSIS — Z12.31 VISIT FOR SCREENING MAMMOGRAM: ICD-10-CM

## 2025-01-23 DIAGNOSIS — M54.16 LUMBAR RADICULOPATHY: ICD-10-CM

## 2025-01-23 DIAGNOSIS — R19.00 ABDOMINAL WALL BULGE: ICD-10-CM

## 2025-01-23 PROCEDURE — 77067 SCR MAMMO BI INCL CAD: CPT

## 2025-01-23 PROCEDURE — 76705 ECHO EXAM OF ABDOMEN: CPT

## 2025-01-23 PROCEDURE — 72148 MRI LUMBAR SPINE W/O DYE: CPT

## 2025-01-23 PROCEDURE — 76775 US EXAM ABDO BACK WALL LIM: CPT

## 2025-02-12 ENCOUNTER — RESULTS FOLLOW-UP (OUTPATIENT)
Dept: MEDICAL GROUP | Facility: PHYSICIAN GROUP | Age: 60
End: 2025-02-12

## 2025-03-04 DIAGNOSIS — E11.42 DIABETIC POLYNEUROPATHY ASSOCIATED WITH TYPE 2 DIABETES MELLITUS (HCC): ICD-10-CM

## 2025-03-05 ENCOUNTER — TELEPHONE (OUTPATIENT)
Dept: HEALTH INFORMATION MANAGEMENT | Facility: OTHER | Age: 60
End: 2025-03-05
Payer: COMMERCIAL

## 2025-03-07 ENCOUNTER — OFFICE VISIT (OUTPATIENT)
Dept: MEDICAL GROUP | Facility: PHYSICIAN GROUP | Age: 60
End: 2025-03-07
Payer: COMMERCIAL

## 2025-03-07 VITALS
HEIGHT: 61 IN | SYSTOLIC BLOOD PRESSURE: 124 MMHG | HEART RATE: 87 BPM | TEMPERATURE: 96.9 F | DIASTOLIC BLOOD PRESSURE: 86 MMHG | BODY MASS INDEX: 28.92 KG/M2 | WEIGHT: 153.2 LBS | OXYGEN SATURATION: 99 %

## 2025-03-07 DIAGNOSIS — E11.42 DIABETIC POLYNEUROPATHY ASSOCIATED WITH TYPE 2 DIABETES MELLITUS (HCC): ICD-10-CM

## 2025-03-07 DIAGNOSIS — M48.061 NEURAL FORAMINAL STENOSIS OF LUMBAR SPINE: ICD-10-CM

## 2025-03-07 DIAGNOSIS — M54.16 LUMBAR RADICULOPATHY: ICD-10-CM

## 2025-03-07 DIAGNOSIS — Z79.4 TYPE 2 DIABETES MELLITUS WITHOUT COMPLICATION, WITH LONG-TERM CURRENT USE OF INSULIN (HCC): ICD-10-CM

## 2025-03-07 DIAGNOSIS — Z23 NEED FOR VACCINATION: ICD-10-CM

## 2025-03-07 DIAGNOSIS — M51.362 DEGENERATION OF INTERVERTEBRAL DISC OF LUMBAR REGION WITH DISCOGENIC BACK PAIN AND LOWER EXTREMITY PAIN: ICD-10-CM

## 2025-03-07 DIAGNOSIS — E11.9 TYPE 2 DIABETES MELLITUS WITHOUT COMPLICATION, WITH LONG-TERM CURRENT USE OF INSULIN (HCC): ICD-10-CM

## 2025-03-07 LAB
HBA1C MFR BLD: 9.3 % (ref ?–5.8)
POCT INT CON NEG: NEGATIVE
POCT INT CON POS: POSITIVE

## 2025-03-07 PROCEDURE — 83036 HEMOGLOBIN GLYCOSYLATED A1C: CPT | Performed by: FAMILY MEDICINE

## 2025-03-07 PROCEDURE — 3074F SYST BP LT 130 MM HG: CPT | Performed by: FAMILY MEDICINE

## 2025-03-07 PROCEDURE — 99214 OFFICE O/P EST MOD 30 MIN: CPT | Performed by: FAMILY MEDICINE

## 2025-03-07 PROCEDURE — 3079F DIAST BP 80-89 MM HG: CPT | Performed by: FAMILY MEDICINE

## 2025-03-07 RX ORDER — ASPIRIN 81 MG/1
81 TABLET, CHEWABLE ORAL DAILY
COMMUNITY

## 2025-03-07 ASSESSMENT — FIBROSIS 4 INDEX: FIB4 SCORE: 1.07

## 2025-03-07 ASSESSMENT — PATIENT HEALTH QUESTIONNAIRE - PHQ9
CLINICAL INTERPRETATION OF PHQ2 SCORE: 6
5. POOR APPETITE OR OVEREATING: 3 - NEARLY EVERY DAY
SUM OF ALL RESPONSES TO PHQ QUESTIONS 1-9: 19

## 2025-03-07 NOTE — PROGRESS NOTES
Subjective:   Sonya Wei is a 59 y.o. female here today for evaluation and management of:     History of Present Illness  The patient presents for a follow-up visit.    She has not been monitoring her blood glucose levels but reports no significant symptoms related to diabetes. She experiences frequent urination at night, which disrupts her sleep and leaves her feeling fatigued. She recalls an instance where her blood glucose level was in the 290s. She plans to consult her diabetes specialist once she has the financial means to do so.    She has been experiencing persistent upper back pain, which is exacerbated by raising her hand. The pain has recently started radiating down to her legs. She also reports decreased strength and range of motion, particularly when lifting her arm above her shoulder or elbow. She has an upcoming appointment with pain management on 03/13/2025. She has been referred to a neurosurgeon for potential surgical intervention. She has been attending physical therapy sessions for her upper back pain.    She has been under significant stress due to the discontinuation of her disability benefits, which has led to financial concerns. She has been experiencing severe depression, with occasional passive suicidal ideation. She does not have any active suicidal thoughts or plans.    She has bilateral carpal tunnel syndrome, which occasionally causes stiffness in her hands. This condition developed after a COVID-19 infection in 2020.    IMMUNIZATIONS  She has received her third hepatitis B vaccine.          Current medicines (including changes today)  Current Outpatient Medications   Medication Sig Dispense Refill    aspirin (ASA) 81 MG Chew Tab chewable tablet Chew 81 mg every day.      multivitamin Tab Take 1 Tablet by mouth every day.      albuterol 108 (90 Base) MCG/ACT Aero Soln inhalation aerosol Inhale 2 Puffs every 6 hours as needed for Shortness of Breath. 8.5 g 0    lisinopril  "(PRINIVIL) 5 MG Tab Take 1 Tablet by mouth every day. 90 Tablet 3    Empagliflozin 25 MG Tab Take 1 Tablet by mouth every day. For diabetes 90 Tablet 3    rosuvastatin (CRESTOR) 20 MG Tab Take 1 Tablet by mouth every evening. 90 Tablet 3    Semaglutide, 2 MG/DOSE, 8 MG/3ML Solution Pen-injector Inject 2 mg under the skin every 7 days. 3 mL 3    amLODIPine (NORVASC) 10 MG Tab Take 1 tablet by mouth once daily 90 Tablet 3    hydroCHLOROthiazide 25 MG Tab Take 1 tablet by mouth once daily 90 Tablet 3    Insulin Pen Needle 32 G x 4 mm (RELION PEN NEEDLES) USE 1 PEN NEEDLE IN PEN DEVICE TO INJECT INSULIN ONCE DAILY 100 Each 3    acetaminophen (TYLENOL) 500 MG Tab Take 1-2 Tablets by mouth every 6 hours as needed for Moderate Pain. 30 Tablet 0    ibuprofen (MOTRIN) 600 MG Tab Take 1 Tablet by mouth every 6 hours as needed for Mild Pain or Moderate Pain. 30 Tablet 0     No current facility-administered medications for this visit.     She  has a past medical history of Anemia, Bowel habit changes, Diabetes, Diabetes (HCC), GERD (gastroesophageal reflux disease), Healthcare maintenance (9/27/2014), High cholesterol, Hyperlipidemia, Hypertension, Infectious disease, Jaundice (1999), Psychiatric problem, and Vaginal itching (8/27/2014).    ROS  No chest pain, no shortness of breath, no abdominal pain       Objective:     /86   Pulse 87   Temp 36.1 °C (96.9 °F) (Temporal)   Ht 1.549 m (5' 1\")   Wt 69.5 kg (153 lb 3.2 oz)   SpO2 99%  Body mass index is 28.95 kg/m².   Physical Exam:  Constitutional: Alert, no distress.  Skin: Warm, dry, good turgor, no rashes in visible areas.  Eye: Equal, round and reactive, conjunctiva clear, lids normal.  ENMT: Lips without lesions, good dentition, oropharynx clear.  Neck: Trachea midline, no masses, no thyromegaly. No cervical or supraclavicular lymphadenopathy  Respiratory: Unlabored respiratory effort, lungs clear to auscultation, no wheezes, no ronchi.  Cardiovascular: Normal " S1, S2, no murmur, no edema.  Abdomen: Soft, non-tender, no masses, no hepatosplenomegaly.  Psych: Alert and oriented x3, normal affect and mood.       The following treatment plan was discussed  Assessment & Plan  1. Type 2 Diabetes Mellitus.  Her diabetes remains uncontrolled, contributing to neuropathy, confusion, and labile blood sugars. Symptoms include nausea, vomiting, and headaches. However, her A1c has improved to 9.3 from 10. She is advised to continue her current diabetes management regimen. An A1c test will be conducted today. If the A1c levels are still high, medication adjustments will be considered.    2. Major Depression.  She reports severe depression with passive suicidal thoughts. No active suicidal plan or intent is present. Additional information will be faxed to the disability office to support her claim.    3. Lumbar Radiculopathy.  She has multilevel degenerative disc disease and facet osteoarthritis, with left foraminal extrusion and sequestration crowding the left L4 nerve root. She has an appointment with a pain specialist on 07/13/2025. If pain management interventions such as epidurals or nerve ablations are ineffective, a referral to a spine surgeon will be considered.    4. Bilateral Carpal Tunnel Syndrome.  She experiences pain and stiffness in her hands, which worsened after COVID-19 in 2020.    5. Polyneuropathy.  Likely secondary to uncontrolled diabetes. She reports decreased strength and range of motion in her upper and lower extremities.    6. Health Maintenance.  She has completed her hepatitis B vaccination series.    Follow-up  The patient will follow up in 3 months.    1. Diabetic polyneuropathy associated with type 2 diabetes mellitus (HCC)  - Referral for Retinal Screening Exam; Future    2. Type 2 diabetes mellitus without complication, with long-term current use of insulin (HCC)  - POCT  A1C    3. Need for vaccination    4. Lumbar radiculopathy  - Referral to Spine  Surgery    5. Degeneration of intervertebral disc of lumbar region with discogenic back pain and lower extremity pain  - Referral to Spine Surgery    6. Neural foraminal stenosis of lumbar spine  - Referral to Spine Surgery    Other orders  - aspirin (ASA) 81 MG Chew Tab chewable tablet; Chew 81 mg every day.  - multivitamin Tab; Take 1 Tablet by mouth every day.      Followup: Return in about 3 months (around 6/7/2025) for Diabetes, low back pain. .  Verbal consent was acquired by the patient to use [employer] ambient listening note generation during this visit Yes

## 2025-03-13 ENCOUNTER — APPOINTMENT (OUTPATIENT)
Dept: PHYSICAL MEDICINE AND REHAB | Facility: MEDICAL CENTER | Age: 60
End: 2025-03-13
Payer: COMMERCIAL

## 2025-03-13 ENCOUNTER — HOSPITAL ENCOUNTER (OUTPATIENT)
Dept: RADIOLOGY | Facility: MEDICAL CENTER | Age: 60
End: 2025-03-13
Attending: PHYSICAL MEDICINE & REHABILITATION
Payer: COMMERCIAL

## 2025-03-13 VITALS
HEART RATE: 79 BPM | DIASTOLIC BLOOD PRESSURE: 78 MMHG | TEMPERATURE: 97.3 F | OXYGEN SATURATION: 91 % | BODY MASS INDEX: 28.89 KG/M2 | SYSTOLIC BLOOD PRESSURE: 127 MMHG | WEIGHT: 153 LBS | HEIGHT: 61 IN

## 2025-03-13 DIAGNOSIS — M54.12 CERVICAL RADICULOPATHY: ICD-10-CM

## 2025-03-13 DIAGNOSIS — M54.16 LUMBAR RADICULOPATHY: ICD-10-CM

## 2025-03-13 DIAGNOSIS — M54.2 CHRONIC NECK PAIN: ICD-10-CM

## 2025-03-13 DIAGNOSIS — M54.42 CHRONIC BILATERAL LOW BACK PAIN WITH BILATERAL SCIATICA: ICD-10-CM

## 2025-03-13 DIAGNOSIS — G89.29 CHRONIC NECK PAIN: ICD-10-CM

## 2025-03-13 DIAGNOSIS — M47.816 LUMBAR SPONDYLOSIS: ICD-10-CM

## 2025-03-13 DIAGNOSIS — M54.41 CHRONIC BILATERAL LOW BACK PAIN WITH BILATERAL SCIATICA: ICD-10-CM

## 2025-03-13 DIAGNOSIS — G89.29 CHRONIC BILATERAL LOW BACK PAIN WITH BILATERAL SCIATICA: ICD-10-CM

## 2025-03-13 PROCEDURE — 99214 OFFICE O/P EST MOD 30 MIN: CPT | Performed by: PHYSICAL MEDICINE & REHABILITATION

## 2025-03-13 PROCEDURE — 3074F SYST BP LT 130 MM HG: CPT | Performed by: PHYSICAL MEDICINE & REHABILITATION

## 2025-03-13 PROCEDURE — 1125F AMNT PAIN NOTED PAIN PRSNT: CPT | Performed by: PHYSICAL MEDICINE & REHABILITATION

## 2025-03-13 PROCEDURE — 72040 X-RAY EXAM NECK SPINE 2-3 VW: CPT

## 2025-03-13 PROCEDURE — 3078F DIAST BP <80 MM HG: CPT | Performed by: PHYSICAL MEDICINE & REHABILITATION

## 2025-03-13 ASSESSMENT — PATIENT HEALTH QUESTIONNAIRE - PHQ9
CLINICAL INTERPRETATION OF PHQ2 SCORE: 3
5. POOR APPETITE OR OVEREATING: 1 - SEVERAL DAYS
SUM OF ALL RESPONSES TO PHQ QUESTIONS 1-9: 10

## 2025-03-13 ASSESSMENT — PAIN SCALES - GENERAL: PAINLEVEL_OUTOF10: 3=SLIGHT PAIN

## 2025-03-13 ASSESSMENT — FIBROSIS 4 INDEX: FIB4 SCORE: 1.07

## 2025-03-13 NOTE — PROGRESS NOTES
Follow up patient note  Interventional spine and Pain  Physiatry (physical medicine and  Rehabilitation)       Chief complaint:   Chief Complaint   Patient presents with    Follow-Up     Back pain          HISTORY    Please see new patient note by Dr Bob,  for more details.     HPI  Patient identification: Sonya Wei ,  1965,   With Diagnoses of Lumbar radiculopathy, Chronic bilateral low back pain with bilateral sciatica, Lumbar spondylosis, Chronic neck pain, and Cervical radiculopathy were pertinent to this visit.       Verbal consent was obtained for Sean copilot : Yes      History of Present Illness  The patient, a 59-year-old female, presents for a follow-up visit.    Since her last visit, she has been participating in physical therapy and adhering to her prescribed home exercise program. Despite these efforts, she reports a progressive worsening of her symptoms. She experiences severe cervical pain on a daily basis, which radiates across her shoulders and has increasingly restricted her range of motion. Activities as simple as cleaning the microwave exacerbate her pain, which she describes as a sensation of being pricked with needles, accompanied by myalgia. She rates her pain intensity as 5 out of 10, with episodes escalating to 7 out of 10. The cervical pain radiates to both arms and shoulders, and she also reports lumbosacral pain radiating down both legs. These symptoms are associated with aching, burning, numbness, and paresthesia. The lumbosacral pain is typically more severe than the cervical pain and significantly impairs her ability to stand, ambulate, bend, and lift. She was previously informed that her MRI revealed disc thinning, which could potentially result in bone-on-bone contact and increased pain. A referral to a surgeon was made by her PCP, but she has not yet been contacted. She inquires about the anticipated duration of her recovery. Her past pharmacological  interventions include acetaminophen up to 1000 mg three times daily, ibuprofen 600 mg up to three times daily, and aspirin 81 mg daily. Previous treatments have also included cyclobenzaprine, gabapentin, ketorolac injections, methocarbamol, and tizanidine.    MEDICATIONS  - Current: Aspirin, ibuprofen  - Past: Acetaminophen, Flexeril, gabapentin, Toradol injections, methocarbamol, tizanidine    Conservative treatments including the past two months within the past six months  NSAIDs: yes  Acetaminophen: yes  Home exercise program or physical therapy: yes  Activity modification: yes         ROS Red Flags :   Fever, Chills, Sweats: Denies  Involuntary Weight Loss: Denies  Bowel/Bladder Incontinence: Denies  Saddle Anesthesia: Denies        PMHx:   Past Medical History:   Diagnosis Date    Anemia     Bowel habit changes     constipation    Diabetes     Diabetes (HCC)     GERD (gastroesophageal reflux disease)     Healthcare maintenance 9/27/2014    Mammogram: Due    High cholesterol     Hyperlipidemia     Hypertension     Infectious disease     Cold 10/2016    Jaundice 1999    Psychiatric problem     depression and anxiety    Vaginal itching 8/27/2014    With anal itching X 1 month Getting worse Burning Min vag disch       PSHx:   Past Surgical History:   Procedure Laterality Date    VENTRAL HERNIA REPAIR ROBOTIC XI N/A 10/14/2016    Procedure: VENTRAL HERNIA REPAIR ROBOTIC XI FOR INCISIONAL W/MESH;  Surgeon: Edi Mendoza M.D.;  Location: SURGERY [facility];  Service:     ABDOMINAL HYSTERECTOMY TOTAL      still has ovaries    CHOLECYSTECTOMY      PRIMARY C SECTION      TUBAL COAGULATION LAPAROSCOPIC BILATERAL         Family history   Family History   Problem Relation Age of Onset    Diabetes Mother     Hyperlipidemia Mother     Diabetes Father     Hypertension Father     Hyperlipidemia Father          Medications:   Outpatient Medications Marked as Taking for the 3/13/25 encounter (Office Visit) with Trenton SOARES  GABRIELA Bob   Medication Sig Dispense Refill    aspirin (ASA) 81 MG Chew Tab chewable tablet Chew 81 mg every day.      multivitamin Tab Take 1 Tablet by mouth every day.      albuterol 108 (90 Base) MCG/ACT Aero Soln inhalation aerosol Inhale 2 Puffs every 6 hours as needed for Shortness of Breath. 8.5 g 0    lisinopril (PRINIVIL) 5 MG Tab Take 1 Tablet by mouth every day. 90 Tablet 3    Empagliflozin 25 MG Tab Take 1 Tablet by mouth every day. For diabetes 90 Tablet 3    rosuvastatin (CRESTOR) 20 MG Tab Take 1 Tablet by mouth every evening. 90 Tablet 3    Semaglutide, 2 MG/DOSE, 8 MG/3ML Solution Pen-injector Inject 2 mg under the skin every 7 days. 3 mL 3    amLODIPine (NORVASC) 10 MG Tab Take 1 tablet by mouth once daily 90 Tablet 3    hydroCHLOROthiazide 25 MG Tab Take 1 tablet by mouth once daily 90 Tablet 3    Insulin Pen Needle 32 G x 4 mm (RELION PEN NEEDLES) USE 1 PEN NEEDLE IN PEN DEVICE TO INJECT INSULIN ONCE DAILY 100 Each 3    acetaminophen (TYLENOL) 500 MG Tab Take 1-2 Tablets by mouth every 6 hours as needed for Moderate Pain. 30 Tablet 0    ibuprofen (MOTRIN) 600 MG Tab Take 1 Tablet by mouth every 6 hours as needed for Mild Pain or Moderate Pain. 30 Tablet 0        Current Outpatient Medications on File Prior to Visit   Medication Sig Dispense Refill    aspirin (ASA) 81 MG Chew Tab chewable tablet Chew 81 mg every day.      multivitamin Tab Take 1 Tablet by mouth every day.      albuterol 108 (90 Base) MCG/ACT Aero Soln inhalation aerosol Inhale 2 Puffs every 6 hours as needed for Shortness of Breath. 8.5 g 0    lisinopril (PRINIVIL) 5 MG Tab Take 1 Tablet by mouth every day. 90 Tablet 3    Empagliflozin 25 MG Tab Take 1 Tablet by mouth every day. For diabetes 90 Tablet 3    rosuvastatin (CRESTOR) 20 MG Tab Take 1 Tablet by mouth every evening. 90 Tablet 3    Semaglutide, 2 MG/DOSE, 8 MG/3ML Solution Pen-injector Inject 2 mg under the skin every 7 days. 3 mL 3    amLODIPine (NORVASC) 10 MG Tab  "Take 1 tablet by mouth once daily 90 Tablet 3    hydroCHLOROthiazide 25 MG Tab Take 1 tablet by mouth once daily 90 Tablet 3    Insulin Pen Needle 32 G x 4 mm (RELION PEN NEEDLES) USE 1 PEN NEEDLE IN PEN DEVICE TO INJECT INSULIN ONCE DAILY 100 Each 3    acetaminophen (TYLENOL) 500 MG Tab Take 1-2 Tablets by mouth every 6 hours as needed for Moderate Pain. 30 Tablet 0    ibuprofen (MOTRIN) 600 MG Tab Take 1 Tablet by mouth every 6 hours as needed for Mild Pain or Moderate Pain. 30 Tablet 0     No current facility-administered medications on file prior to visit.         Allergies:   Allergies   Allergen Reactions    Latex Rash    Morphine Itching     Rxn = unknown   Bumps all over body    Other Misc Rash     Rash from gloves at work       Social Hx:   Social History     Socioeconomic History    Marital status:      Spouse name: Not on file    Number of children: Not on file    Years of education: Not on file    Highest education level: Not on file   Occupational History    Not on file   Tobacco Use    Smoking status: Never    Smokeless tobacco: Never   Vaping Use    Vaping status: Never Used   Substance and Sexual Activity    Alcohol use: No    Drug use: No    Sexual activity: Yes     Partners: Male     Birth control/protection: None   Other Topics Concern    Not on file   Social History Narrative    ** Merged History Encounter **          Social Drivers of Health     Financial Resource Strain: Not on file   Food Insecurity: Not on file   Transportation Needs: Not on file   Physical Activity: Not on file   Stress: Not on file   Social Connections: Not on file   Intimate Partner Violence: Not on file   Housing Stability: Not on file         EXAMINATION     Physical Exam:   Vitals: /78 (BP Location: Right arm, Patient Position: Sitting, BP Cuff Size: Large adult)   Pulse 79   Temp 36.3 °C (97.3 °F) (Temporal)   Ht 1.549 m (5' 1\")   Wt 69.4 kg (153 lb)   SpO2 91%     Constitutional:   Body Habitus: Body " mass index is 28.91 kg/m².  Cooperation: Fully cooperates with exam  Appearance: Well-groomed no disheveled    Respiratory-  breathing comfortable on room air, no audible wheezing  Cardiovascular- capillary refills less than 2 seconds. No lower extremity edema is noted.   Psychiatric- alert and oriented ×3. Normal affect.    MSK and Neuro: -    Physical Exam  Examination was performed.     Cervical spine  There are no signs of infection around the injection sites.     decreased active range of motion with flexion, lateral flexion, and rotation bilaterally.   There is decreased active range of motion with cervical extension.      Palpation:   Tenderness to palpation throughout the cervical spine: positive bilaterally        Cervical spine Special tests  Neuro tension  Spurling's maneuver positive bilaterally    Cervical facet loading maneuver  positive bilaterally        Key points for the international standards for neurological classification of spinal cord injury (ISNCSCI) to light touch.     Dermatome R L   C4 2 2   C5 2 2   C6 2 2   C7 2 2   C8 2 2   T1 2 2   T2 2 2                                         Motor Exam Upper Extremities   ? Myotome R L   Shoulder flexion C5 5 5   Elbow flexion C5 5 5   Wrist extension C6 5 5   Elbow extension C7 5 5   Finger flexion C8 5 5   Finger abduction T1 5 5       Thoracic/Lumbar Spine/Sacral Spine/Hips   There are no signs of infection around the injection sites.   decreased active range of motion with flexion, lateral flexion, and rotation bilaterally.   There is decreased active range of motion with lumbar extension.    There is  pain with lumbar extension.   There is pain with facet loading maneuver (extension rotation) with axial low back pain on the BILATERAL side(s)       Palpation:   No tenderness to palpation in midline at T1-T12 levels. No tenderness to palpation in the left and right of the midline T1-L5, NEGATIVE for tenderness to palpation to the para-midline  region in the lower lumbar levels.  palpation over SI joint: negative bilaterally    palpation in hip or over the gluteus medius tendon insertion: negative bilaterally      Lumbar spine Special tests  Neuro tension  Straight leg test positive bilaterally    Slump test positive bilaterally      Key points for the international standards for neurological classification of spinal cord injury (ISNCSCI) to light touch.     Dermatome R L                                      L2 2 2   L3 2 2   L4 1 1   L5 2 2   S1 2 2   S2 2 2         Motor Exam Lower Extremities    ? Myotome R L   Hip flexion L2 5 5   Knee extension L3 5 5   Ankle dorsiflexion L4 5 5   Toe extension L5 5 5   Ankle plantarflexion S1 5 5         MEDICAL DECISION MAKING    DATA    Labs:   Lab Results   Component Value Date/Time    SODIUM 138 10/02/2024 09:37 AM    POTASSIUM 4.7 10/02/2024 09:37 AM    CHLORIDE 101 10/02/2024 09:37 AM    CO2 25 10/02/2024 09:37 AM    GLUCOSE 262 (H) 10/02/2024 09:37 AM    BUN 25 (H) 10/02/2024 09:37 AM    CREATININE 0.81 10/02/2024 09:37 AM        Lab Results   Component Value Date/Time    PROTHROMBTM 12.9 12/18/2023 04:07 PM    INR 0.96 12/18/2023 04:07 PM        Lab Results   Component Value Date/Time    WBC 6.2 03/19/2024 10:44 AM    RBC 4.51 03/19/2024 10:44 AM    HEMOGLOBIN 13.3 03/19/2024 10:44 AM    HEMATOCRIT 39.7 03/19/2024 10:44 AM    MCV 88.0 03/19/2024 10:44 AM    MCH 29.5 03/19/2024 10:44 AM    MCHC 33.5 03/19/2024 10:44 AM    MPV 11.9 03/19/2024 10:44 AM    NEUTSPOLYS 60.00 03/19/2024 10:44 AM    LYMPHOCYTES 29.70 03/19/2024 10:44 AM    MONOCYTES 5.80 03/19/2024 10:44 AM    EOSINOPHILS 3.40 03/19/2024 10:44 AM    BASOPHILS 0.60 03/19/2024 10:44 AM    HYPOCHROMIA 1+ 09/11/2013 08:34 AM    ANISOCYTOSIS 1+ 12/03/2020 09:45 PM        Lab Results   Component Value Date/Time    HBA1C 9.3 (A) 03/07/2025 11:10 AM          Imaging:   I personally reviewed following images          Results  - Imaging (MRI of the lumbar  spine from 01/23/2025):    - Degenerative disk disease with a high intensity zone at L4-5 consistent with an annular tear    - Foraminal stenosis bilaterally worse at L4-5    - Facet arthropathy bilaterally at L3-4, L4-5, and L5-S1    - No areas of high grade central canal stenosis       I reviewed the following radiology reports          Results for orders placed during the hospital encounter of 03/09/21    MR-BRAIN-W/O    Impression  1.  No acute abnormality.  2.  A few punctate nonspecific supratentorial T2 hyperintensities likely representing nonspecific foci of gliosis/minimal chronic ischemia.             Results for orders placed during the hospital encounter of 07/26/22    MR-CERVICAL SPINE-W/O    Impression  1.  There is no spinal or neural foraminal stenosis.  2.  There is an approximately 1 cm sized nodule in the right lobe of the thyroid.      Results for orders placed during the hospital encounter of 01/23/25    MR-LUMBAR SPINE-W/O    Impression  1. Multilevel degenerative disc disease and posterior facet osteoarthrosis as detailed above.  2. There is a left foraminal extrusion versus sequestration with some crowding of the exiting left L4 nerve root.        Results for orders placed during the hospital encounter of 01/23/25    MR-LUMBAR SPINE-W/O    Impression  1. Multilevel degenerative disc disease and posterior facet osteoarthrosis as detailed above.  2. There is a left foraminal extrusion versus sequestration with some crowding of the exiting left L4 nerve root.                                                Results for orders placed during the hospital encounter of 12/18/23    CT-CTA NECK WITH & W/O-POST PROCESSING    Impression  No high-grade stenosis, dissection or occlusion of the cervical carotid or vertebral arteries.               Results for orders placed during the hospital encounter of 02/12/16    CT-CHEST,ABDOMEN,PELVIS WITH    Impression  1. No solid organ or vascular injury is  identified.    2. Subcutaneous stranding and a small subcutaneous hematoma in the left anterior thorax.    3. Hepatic steatosis.    4. Status post cholecystectomy with mild prominence of the common duct likely representing biliary ectasia in the setting of prior cholecystectomy.    5. Fat-containing ventral hernia in the lower abdomen to the right of midline.            Results for orders placed during the hospital encounter of 12/07/12    CT-ABDOMEN-PELVIS WITH    Impression  1. No acute abnormality identified within the abdomen or pelvis.    2. Right paramedian pelvic ventral hernia containing omental fat no bowel.    3. Hepatic fatty infiltration.    4. Previous cholecystectomy.    5. Previous hysterectomy                    Results for orders placed during the hospital encounter of 03/09/21    DX-CERVICAL SPINE-2 OR 3 VIEWS    Impression  No acute fracture or malalignment.     Results for orders placed in visit on 05/11/22    DX-CHEST-2 VIEWS    Impression  Negative two views of the chest.     Results for orders placed during the hospital encounter of 05/28/20    DX-ELBOW-COMPLETE 3+ RIGHT    Impression  No evidence of acute fracture or dislocation.                  Results for orders placed during the hospital encounter of 05/28/20    DX-KNEE COMPLETE 4+ LEFT    Impression  Mild tricompartment degenerative change. No evidence of fracture.   Results for orders placed in visit on 07/29/24    DX-LUMBAR SPINE-2 OR 3 VIEWS    Impression  1. Interval development of 2 mm anterolisthesis of L4 with respect to L5.  2. No fracture.  3. Stable physiologic lumbar dextrocurvature with multilevel degenerative disc disease and facet arthrosis.             Results for orders placed in visit on 02/19/18    DX-SHOULDER 2+ RIGHT    Impression  Unremarkable shoulder series.            INTERPRETING LOCATION:  [address] [place] [address], [id number]    Results for orders placed in visit on 02/19/18    DX-THORACIC SPINE-2  VIEWS    Impression  Unremarkable thoracic spine.            DIAGNOSIS   Visit Diagnoses     ICD-10-CM   1. Lumbar radiculopathy  M54.16   2. Chronic bilateral low back pain with bilateral sciatica  M54.42    M54.41    G89.29   3. Lumbar spondylosis  M47.816   4. Chronic neck pain  M54.2    G89.29   5. Cervical radiculopathy  M54.12         ASSESSMENT and PLAN:     Sonya Wei  1965 female      Sonya was seen today for follow-up.    Diagnoses and all orders for this visit:    Lumbar radiculopathy  -     Referral to Physical Medicine Rehab    Chronic bilateral low back pain with bilateral sciatica    Lumbar spondylosis    Chronic neck pain  -     DX-CERVICAL SPINE-2 OR 3 VIEWS; Future  -     MR-CERVICAL SPINE-W/O; Future    Cervical radiculopathy  -     DX-CERVICAL SPINE-2 OR 3 VIEWS; Future  -     MR-CERVICAL SPINE-W/O; Future        Assessment & Plan  Lumbar spondylosis. The patient's axial back pain is primarily due to lumbar spondylosis, with discomfort originating from the facet joints. A bilateral transforaminal epidural steroid injection at the L4-5 level will be administered to alleviate the back pain and the radiating leg pain secondary to lumbar radiculopathy.  If she continues to have axial back pain then diagnostic medial branch blocks at L4-5 and L5-S1 would be considered.  She will continue her current regimen of aspirin and ibuprofen throughout the procedure.    Lumbar radiculopathy. The patient is also experiencing pain from pinched nerves in the back, known as lumbar radiculopathy.     The bilateral transforaminal epidural steroid injection at the L4-5 level will be used to address this condition.    The risks benefits and alternatives to this procedure were discussed and the patient wishes to proceed with the procedure. Risks include but are not limited to damage to surrounding structures, infection, bleeding, worsening of pain which can be permanent, weakness which can be  permanent. Benefits include pain relief, improved function. Alternatives includes not doing the procedure.      Cervicalgia with likely cervical radiculopathy and cervical spondylosis. The patient reports worsening neck pain with reduced range of motion and radiating pain to the bilateral arms and shoulders. An x-ray and MRI of the cervical spine will be ordered to further evaluate the neck pain. Follow-up will occur after the back injection.    The patient will inquire about disability paperwork with her PCP.    Medications: Acetaminophen up to 1000 mg 3 times daily as needed not to exceed 3000 mg per 24-hours.  Continue ibuprofen up to 600 mg up to 3 times daily with food during flares of pain.  Okay for the patient to continue ibuprofen and aspirin through the above procedure.  EL guidelines reviewed.        Follow up: After the above diagnostic and therapeutic procedure    Thank you for allowing me to participate in the care of this patient. If you have any questions please not hesitate to contact me.             Please note that this dictation was created using voice recognition software. I have made every reasonable attempt to correct obvious errors but there may be errors of grammar and content that I may have overlooked prior to finalization of this note.      Trenton Bob MD  Interventional Spine and Sports Physiatry  Physical Medicine and Rehabilitation  [facility]

## 2025-03-13 NOTE — H&P (VIEW-ONLY)
Follow up patient note  Interventional spine and Pain  Physiatry (physical medicine and  Rehabilitation)       Chief complaint:   Chief Complaint   Patient presents with    Follow-Up     Back pain          HISTORY    Please see new patient note by Dr Bob,  for more details.     HPI  Patient identification: Sonya Wei ,  1965,   With Diagnoses of Lumbar radiculopathy, Chronic bilateral low back pain with bilateral sciatica, Lumbar spondylosis, Chronic neck pain, and Cervical radiculopathy were pertinent to this visit.       Verbal consent was obtained for Sean copilot : Yes      History of Present Illness  The patient, a 59-year-old female, presents for a follow-up visit.    Since her last visit, she has been participating in physical therapy and adhering to her prescribed home exercise program. Despite these efforts, she reports a progressive worsening of her symptoms. She experiences severe cervical pain on a daily basis, which radiates across her shoulders and has increasingly restricted her range of motion. Activities as simple as cleaning the microwave exacerbate her pain, which she describes as a sensation of being pricked with needles, accompanied by myalgia. She rates her pain intensity as 5 out of 10, with episodes escalating to 7 out of 10. The cervical pain radiates to both arms and shoulders, and she also reports lumbosacral pain radiating down both legs. These symptoms are associated with aching, burning, numbness, and paresthesia. The lumbosacral pain is typically more severe than the cervical pain and significantly impairs her ability to stand, ambulate, bend, and lift. She was previously informed that her MRI revealed disc thinning, which could potentially result in bone-on-bone contact and increased pain. A referral to a surgeon was made by her PCP, but she has not yet been contacted. She inquires about the anticipated duration of her recovery. Her past pharmacological  interventions include acetaminophen up to 1000 mg three times daily, ibuprofen 600 mg up to three times daily, and aspirin 81 mg daily. Previous treatments have also included cyclobenzaprine, gabapentin, ketorolac injections, methocarbamol, and tizanidine.    MEDICATIONS  - Current: Aspirin, ibuprofen  - Past: Acetaminophen, Flexeril, gabapentin, Toradol injections, methocarbamol, tizanidine    Conservative treatments including the past two months within the past six months  NSAIDs: yes  Acetaminophen: yes  Home exercise program or physical therapy: yes  Activity modification: yes         ROS Red Flags :   Fever, Chills, Sweats: Denies  Involuntary Weight Loss: Denies  Bowel/Bladder Incontinence: Denies  Saddle Anesthesia: Denies        PMHx:   Past Medical History:   Diagnosis Date    Anemia     Bowel habit changes     constipation    Diabetes     Diabetes (HCC)     GERD (gastroesophageal reflux disease)     Healthcare maintenance 9/27/2014    Mammogram: Due    High cholesterol     Hyperlipidemia     Hypertension     Infectious disease     Cold 10/2016    Jaundice 1999    Psychiatric problem     depression and anxiety    Vaginal itching 8/27/2014    With anal itching X 1 month Getting worse Burning Min vag disch       PSHx:   Past Surgical History:   Procedure Laterality Date    VENTRAL HERNIA REPAIR ROBOTIC XI N/A 10/14/2016    Procedure: VENTRAL HERNIA REPAIR ROBOTIC XI FOR INCISIONAL W/MESH;  Surgeon: Edi Mendoza M.D.;  Location: SURGERY [facility];  Service:     ABDOMINAL HYSTERECTOMY TOTAL      still has ovaries    CHOLECYSTECTOMY      PRIMARY C SECTION      TUBAL COAGULATION LAPAROSCOPIC BILATERAL         Family history   Family History   Problem Relation Age of Onset    Diabetes Mother     Hyperlipidemia Mother     Diabetes Father     Hypertension Father     Hyperlipidemia Father          Medications:   Outpatient Medications Marked as Taking for the 3/13/25 encounter (Office Visit) with Trenton SOARES  GABRIELA Bob   Medication Sig Dispense Refill    aspirin (ASA) 81 MG Chew Tab chewable tablet Chew 81 mg every day.      multivitamin Tab Take 1 Tablet by mouth every day.      albuterol 108 (90 Base) MCG/ACT Aero Soln inhalation aerosol Inhale 2 Puffs every 6 hours as needed for Shortness of Breath. 8.5 g 0    lisinopril (PRINIVIL) 5 MG Tab Take 1 Tablet by mouth every day. 90 Tablet 3    Empagliflozin 25 MG Tab Take 1 Tablet by mouth every day. For diabetes 90 Tablet 3    rosuvastatin (CRESTOR) 20 MG Tab Take 1 Tablet by mouth every evening. 90 Tablet 3    Semaglutide, 2 MG/DOSE, 8 MG/3ML Solution Pen-injector Inject 2 mg under the skin every 7 days. 3 mL 3    amLODIPine (NORVASC) 10 MG Tab Take 1 tablet by mouth once daily 90 Tablet 3    hydroCHLOROthiazide 25 MG Tab Take 1 tablet by mouth once daily 90 Tablet 3    Insulin Pen Needle 32 G x 4 mm (RELION PEN NEEDLES) USE 1 PEN NEEDLE IN PEN DEVICE TO INJECT INSULIN ONCE DAILY 100 Each 3    acetaminophen (TYLENOL) 500 MG Tab Take 1-2 Tablets by mouth every 6 hours as needed for Moderate Pain. 30 Tablet 0    ibuprofen (MOTRIN) 600 MG Tab Take 1 Tablet by mouth every 6 hours as needed for Mild Pain or Moderate Pain. 30 Tablet 0        Current Outpatient Medications on File Prior to Visit   Medication Sig Dispense Refill    aspirin (ASA) 81 MG Chew Tab chewable tablet Chew 81 mg every day.      multivitamin Tab Take 1 Tablet by mouth every day.      albuterol 108 (90 Base) MCG/ACT Aero Soln inhalation aerosol Inhale 2 Puffs every 6 hours as needed for Shortness of Breath. 8.5 g 0    lisinopril (PRINIVIL) 5 MG Tab Take 1 Tablet by mouth every day. 90 Tablet 3    Empagliflozin 25 MG Tab Take 1 Tablet by mouth every day. For diabetes 90 Tablet 3    rosuvastatin (CRESTOR) 20 MG Tab Take 1 Tablet by mouth every evening. 90 Tablet 3    Semaglutide, 2 MG/DOSE, 8 MG/3ML Solution Pen-injector Inject 2 mg under the skin every 7 days. 3 mL 3    amLODIPine (NORVASC) 10 MG Tab  "Take 1 tablet by mouth once daily 90 Tablet 3    hydroCHLOROthiazide 25 MG Tab Take 1 tablet by mouth once daily 90 Tablet 3    Insulin Pen Needle 32 G x 4 mm (RELION PEN NEEDLES) USE 1 PEN NEEDLE IN PEN DEVICE TO INJECT INSULIN ONCE DAILY 100 Each 3    acetaminophen (TYLENOL) 500 MG Tab Take 1-2 Tablets by mouth every 6 hours as needed for Moderate Pain. 30 Tablet 0    ibuprofen (MOTRIN) 600 MG Tab Take 1 Tablet by mouth every 6 hours as needed for Mild Pain or Moderate Pain. 30 Tablet 0     No current facility-administered medications on file prior to visit.         Allergies:   Allergies   Allergen Reactions    Latex Rash    Morphine Itching     Rxn = unknown   Bumps all over body    Other Misc Rash     Rash from gloves at work       Social Hx:   Social History     Socioeconomic History    Marital status:      Spouse name: Not on file    Number of children: Not on file    Years of education: Not on file    Highest education level: Not on file   Occupational History    Not on file   Tobacco Use    Smoking status: Never    Smokeless tobacco: Never   Vaping Use    Vaping status: Never Used   Substance and Sexual Activity    Alcohol use: No    Drug use: No    Sexual activity: Yes     Partners: Male     Birth control/protection: None   Other Topics Concern    Not on file   Social History Narrative    ** Merged History Encounter **          Social Drivers of Health     Financial Resource Strain: Not on file   Food Insecurity: Not on file   Transportation Needs: Not on file   Physical Activity: Not on file   Stress: Not on file   Social Connections: Not on file   Intimate Partner Violence: Not on file   Housing Stability: Not on file         EXAMINATION     Physical Exam:   Vitals: /78 (BP Location: Right arm, Patient Position: Sitting, BP Cuff Size: Large adult)   Pulse 79   Temp 36.3 °C (97.3 °F) (Temporal)   Ht 1.549 m (5' 1\")   Wt 69.4 kg (153 lb)   SpO2 91%     Constitutional:   Body Habitus: Body " mass index is 28.91 kg/m².  Cooperation: Fully cooperates with exam  Appearance: Well-groomed no disheveled    Respiratory-  breathing comfortable on room air, no audible wheezing  Cardiovascular- capillary refills less than 2 seconds. No lower extremity edema is noted.   Psychiatric- alert and oriented ×3. Normal affect.    MSK and Neuro: -    Physical Exam  Examination was performed.     Cervical spine  There are no signs of infection around the injection sites.     decreased active range of motion with flexion, lateral flexion, and rotation bilaterally.   There is decreased active range of motion with cervical extension.      Palpation:   Tenderness to palpation throughout the cervical spine: positive bilaterally        Cervical spine Special tests  Neuro tension  Spurling's maneuver positive bilaterally    Cervical facet loading maneuver  positive bilaterally        Key points for the international standards for neurological classification of spinal cord injury (ISNCSCI) to light touch.     Dermatome R L   C4 2 2   C5 2 2   C6 2 2   C7 2 2   C8 2 2   T1 2 2   T2 2 2                                         Motor Exam Upper Extremities   ? Myotome R L   Shoulder flexion C5 5 5   Elbow flexion C5 5 5   Wrist extension C6 5 5   Elbow extension C7 5 5   Finger flexion C8 5 5   Finger abduction T1 5 5       Thoracic/Lumbar Spine/Sacral Spine/Hips   There are no signs of infection around the injection sites.   decreased active range of motion with flexion, lateral flexion, and rotation bilaterally.   There is decreased active range of motion with lumbar extension.    There is  pain with lumbar extension.   There is pain with facet loading maneuver (extension rotation) with axial low back pain on the BILATERAL side(s)       Palpation:   No tenderness to palpation in midline at T1-T12 levels. No tenderness to palpation in the left and right of the midline T1-L5, NEGATIVE for tenderness to palpation to the para-midline  region in the lower lumbar levels.  palpation over SI joint: negative bilaterally    palpation in hip or over the gluteus medius tendon insertion: negative bilaterally      Lumbar spine Special tests  Neuro tension  Straight leg test positive bilaterally    Slump test positive bilaterally      Key points for the international standards for neurological classification of spinal cord injury (ISNCSCI) to light touch.     Dermatome R L                                      L2 2 2   L3 2 2   L4 1 1   L5 2 2   S1 2 2   S2 2 2         Motor Exam Lower Extremities    ? Myotome R L   Hip flexion L2 5 5   Knee extension L3 5 5   Ankle dorsiflexion L4 5 5   Toe extension L5 5 5   Ankle plantarflexion S1 5 5         MEDICAL DECISION MAKING    DATA    Labs:   Lab Results   Component Value Date/Time    SODIUM 138 10/02/2024 09:37 AM    POTASSIUM 4.7 10/02/2024 09:37 AM    CHLORIDE 101 10/02/2024 09:37 AM    CO2 25 10/02/2024 09:37 AM    GLUCOSE 262 (H) 10/02/2024 09:37 AM    BUN 25 (H) 10/02/2024 09:37 AM    CREATININE 0.81 10/02/2024 09:37 AM        Lab Results   Component Value Date/Time    PROTHROMBTM 12.9 12/18/2023 04:07 PM    INR 0.96 12/18/2023 04:07 PM        Lab Results   Component Value Date/Time    WBC 6.2 03/19/2024 10:44 AM    RBC 4.51 03/19/2024 10:44 AM    HEMOGLOBIN 13.3 03/19/2024 10:44 AM    HEMATOCRIT 39.7 03/19/2024 10:44 AM    MCV 88.0 03/19/2024 10:44 AM    MCH 29.5 03/19/2024 10:44 AM    MCHC 33.5 03/19/2024 10:44 AM    MPV 11.9 03/19/2024 10:44 AM    NEUTSPOLYS 60.00 03/19/2024 10:44 AM    LYMPHOCYTES 29.70 03/19/2024 10:44 AM    MONOCYTES 5.80 03/19/2024 10:44 AM    EOSINOPHILS 3.40 03/19/2024 10:44 AM    BASOPHILS 0.60 03/19/2024 10:44 AM    HYPOCHROMIA 1+ 09/11/2013 08:34 AM    ANISOCYTOSIS 1+ 12/03/2020 09:45 PM        Lab Results   Component Value Date/Time    HBA1C 9.3 (A) 03/07/2025 11:10 AM          Imaging:   I personally reviewed following images          Results  - Imaging (MRI of the lumbar  spine from 01/23/2025):    - Degenerative disk disease with a high intensity zone at L4-5 consistent with an annular tear    - Foraminal stenosis bilaterally worse at L4-5    - Facet arthropathy bilaterally at L3-4, L4-5, and L5-S1    - No areas of high grade central canal stenosis       I reviewed the following radiology reports          Results for orders placed during the hospital encounter of 03/09/21    MR-BRAIN-W/O    Impression  1.  No acute abnormality.  2.  A few punctate nonspecific supratentorial T2 hyperintensities likely representing nonspecific foci of gliosis/minimal chronic ischemia.             Results for orders placed during the hospital encounter of 07/26/22    MR-CERVICAL SPINE-W/O    Impression  1.  There is no spinal or neural foraminal stenosis.  2.  There is an approximately 1 cm sized nodule in the right lobe of the thyroid.      Results for orders placed during the hospital encounter of 01/23/25    MR-LUMBAR SPINE-W/O    Impression  1. Multilevel degenerative disc disease and posterior facet osteoarthrosis as detailed above.  2. There is a left foraminal extrusion versus sequestration with some crowding of the exiting left L4 nerve root.        Results for orders placed during the hospital encounter of 01/23/25    MR-LUMBAR SPINE-W/O    Impression  1. Multilevel degenerative disc disease and posterior facet osteoarthrosis as detailed above.  2. There is a left foraminal extrusion versus sequestration with some crowding of the exiting left L4 nerve root.                                                Results for orders placed during the hospital encounter of 12/18/23    CT-CTA NECK WITH & W/O-POST PROCESSING    Impression  No high-grade stenosis, dissection or occlusion of the cervical carotid or vertebral arteries.               Results for orders placed during the hospital encounter of 02/12/16    CT-CHEST,ABDOMEN,PELVIS WITH    Impression  1. No solid organ or vascular injury is  identified.    2. Subcutaneous stranding and a small subcutaneous hematoma in the left anterior thorax.    3. Hepatic steatosis.    4. Status post cholecystectomy with mild prominence of the common duct likely representing biliary ectasia in the setting of prior cholecystectomy.    5. Fat-containing ventral hernia in the lower abdomen to the right of midline.            Results for orders placed during the hospital encounter of 12/07/12    CT-ABDOMEN-PELVIS WITH    Impression  1. No acute abnormality identified within the abdomen or pelvis.    2. Right paramedian pelvic ventral hernia containing omental fat no bowel.    3. Hepatic fatty infiltration.    4. Previous cholecystectomy.    5. Previous hysterectomy                    Results for orders placed during the hospital encounter of 03/09/21    DX-CERVICAL SPINE-2 OR 3 VIEWS    Impression  No acute fracture or malalignment.     Results for orders placed in visit on 05/11/22    DX-CHEST-2 VIEWS    Impression  Negative two views of the chest.     Results for orders placed during the hospital encounter of 05/28/20    DX-ELBOW-COMPLETE 3+ RIGHT    Impression  No evidence of acute fracture or dislocation.                  Results for orders placed during the hospital encounter of 05/28/20    DX-KNEE COMPLETE 4+ LEFT    Impression  Mild tricompartment degenerative change. No evidence of fracture.   Results for orders placed in visit on 07/29/24    DX-LUMBAR SPINE-2 OR 3 VIEWS    Impression  1. Interval development of 2 mm anterolisthesis of L4 with respect to L5.  2. No fracture.  3. Stable physiologic lumbar dextrocurvature with multilevel degenerative disc disease and facet arthrosis.             Results for orders placed in visit on 02/19/18    DX-SHOULDER 2+ RIGHT    Impression  Unremarkable shoulder series.            INTERPRETING LOCATION:  [address] [place] [address], [address]    Results for orders placed in visit on 02/19/18    DX-THORACIC SPINE-2  VIEWS    Impression  Unremarkable thoracic spine.            DIAGNOSIS   Visit Diagnoses     ICD-10-CM   1. Lumbar radiculopathy  M54.16   2. Chronic bilateral low back pain with bilateral sciatica  M54.42    M54.41    G89.29   3. Lumbar spondylosis  M47.816   4. Chronic neck pain  M54.2    G89.29   5. Cervical radiculopathy  M54.12         ASSESSMENT and PLAN:     Sonya Wei  1965 female      Sonya was seen today for follow-up.    Diagnoses and all orders for this visit:    Lumbar radiculopathy  -     Referral to Physical Medicine Rehab    Chronic bilateral low back pain with bilateral sciatica    Lumbar spondylosis    Chronic neck pain  -     DX-CERVICAL SPINE-2 OR 3 VIEWS; Future  -     MR-CERVICAL SPINE-W/O; Future    Cervical radiculopathy  -     DX-CERVICAL SPINE-2 OR 3 VIEWS; Future  -     MR-CERVICAL SPINE-W/O; Future        Assessment & Plan  Lumbar spondylosis. The patient's axial back pain is primarily due to lumbar spondylosis, with discomfort originating from the facet joints. A bilateral transforaminal epidural steroid injection at the L4-5 level will be administered to alleviate the back pain and the radiating leg pain secondary to lumbar radiculopathy.  If she continues to have axial back pain then diagnostic medial branch blocks at L4-5 and L5-S1 would be considered.  She will continue her current regimen of aspirin and ibuprofen throughout the procedure.    Lumbar radiculopathy. The patient is also experiencing pain from pinched nerves in the back, known as lumbar radiculopathy.     The bilateral transforaminal epidural steroid injection at the L4-5 level will be used to address this condition.    The risks benefits and alternatives to this procedure were discussed and the patient wishes to proceed with the procedure. Risks include but are not limited to damage to surrounding structures, infection, bleeding, worsening of pain which can be permanent, weakness which can be  permanent. Benefits include pain relief, improved function. Alternatives includes not doing the procedure.      Cervicalgia with likely cervical radiculopathy and cervical spondylosis. The patient reports worsening neck pain with reduced range of motion and radiating pain to the bilateral arms and shoulders. An x-ray and MRI of the cervical spine will be ordered to further evaluate the neck pain. Follow-up will occur after the back injection.    The patient will inquire about disability paperwork with her PCP.    Medications: Acetaminophen up to 1000 mg 3 times daily as needed not to exceed 3000 mg per 24-hours.  Continue ibuprofen up to 600 mg up to 3 times daily with food during flares of pain.  Okay for the patient to continue ibuprofen and aspirin through the above procedure.  EL guidelines reviewed.        Follow up: After the above diagnostic and therapeutic procedure    Thank you for allowing me to participate in the care of this patient. If you have any questions please not hesitate to contact me.             Please note that this dictation was created using voice recognition software. I have made every reasonable attempt to correct obvious errors but there may be errors of grammar and content that I may have overlooked prior to finalization of this note.      Trenton Bob MD  Interventional Spine and Sports Physiatry  Physical Medicine and Rehabilitation  [facility]

## 2025-03-17 NOTE — Clinical Note
REFERRAL APPROVAL NOTICE         Sent on March 17, 2025                   Sonya Yañezo  [address] [place] [address]                   Dear Ms. Wei,    After a careful review of the medical information and benefit coverage, [facility] has processed your referral. See below for additional details.    If applicable, you must be actively enrolled with your insurance for coverage of the authorized service. If you have any questions regarding your coverage, please contact your insurance directly.    REFERRAL INFORMATION   Referral #:  [id number]  Referred-To Service Location    Referred-By Provider:  Spine Surgery    Nu Eason M.D.   [facility]      [address] [place] [address] [address] [place] [address]  [phone] [address] [place] [address]  [phone]    Referral Start Date:  03/07/2025  Referral End Date:   03/07/2026             SCHEDULING  If you do not already have an appointment, please call [phone] to make an appointment.     MORE INFORMATION  If you do not already have a [employer] account, sign up at: [email or web address]  You can access your medical information, make appointments, see lab results, billing information, and more.  If you have questions regarding this referral, please contact  the [facility] Referrals department at:             [phone]. Monday - Friday 8:00AM - 5:00PM.     Sincerely,    [facility]

## 2025-03-17 NOTE — Clinical Note
REFERRAL APPROVAL NOTICE         Sent on March 17, 2025                   Sonya Wei  [address]  [place] [address]                   Dear Ms. Wei,    After a careful review of the medical information and benefit coverage, [facility] has processed your referral. See below for additional details.    If applicable, you must be actively enrolled with your insurance for coverage of the authorized service. If you have any questions regarding your coverage, please contact your insurance directly.    REFERRAL INFORMATION   Referral #:  [id number]  Referred-To Department    Referred-By Provider:  Physical Medicine and Rehab    Trenton Bob M.D.   Pain Management       [address]  [address]  [facility] [id number] [phone] [address] [place] [address]  [phone]    Referral Start Date:  03/17/2025  Referral End Date:   03/17/2026             SCHEDULING  If you do not already have an appointment, please call [phone] to make an appointment.     MORE INFORMATION  If you do not already have a [employer] account, sign up at: [email or web address]  You can access your medical information, make appointments, see lab results, billing information, and more.  If you have questions regarding this referral, please contact  the [facility] Referrals department at:             [phone]. Monday - Friday 8:00AM - 5:00PM.     Sincerely,    [facility]

## 2025-03-17 NOTE — Clinical Note
REFERRAL APPROVAL NOTICE         Sent on March 17, 2025                   Sonya Cervantes Wei  [address] [place] [address]                   Dear Ms. Wei,    After a careful review of the medical information and benefit coverage, [facility] has processed your referral. See below for additional details.    If applicable, you must be actively enrolled with your insurance for coverage of the authorized service. If you have any questions regarding your coverage, please contact your insurance directly.    REFERRAL INFORMATION   Referral #:  [id number]  Referred-To Service Location    Referred-By Provider:  Ophthalmology    Nu Eason M.D.   [facility] [address] [place] [address]  [phone] [address] [place] [address]  [phone]    Referral Start Date:  03/07/2025  Referral End Date:   03/07/2026             SCHEDULING  If you do not already have an appointment, please call [phone] to make an appointment.     MORE INFORMATION  If you do not already have a [employer] account, sign up at: [email or web address]  You can access your medical information, make appointments, see lab results, billing information, and more.  If you have questions regarding this referral, please contact  the [facility] Referrals department at:             [phone]. Monday - Friday 8:00AM - 5:00PM.     Sincerely,    [facility]

## 2025-03-20 ENCOUNTER — HOSPITAL ENCOUNTER (OUTPATIENT)
Facility: REHABILITATION | Age: 60
End: 2025-03-20
Attending: PHYSICAL MEDICINE & REHABILITATION | Admitting: PHYSICAL MEDICINE & REHABILITATION
Payer: COMMERCIAL

## 2025-03-20 ENCOUNTER — APPOINTMENT (OUTPATIENT)
Dept: RADIOLOGY | Facility: REHABILITATION | Age: 60
End: 2025-03-20
Attending: PHYSICAL MEDICINE & REHABILITATION
Payer: COMMERCIAL

## 2025-03-20 VITALS
DIASTOLIC BLOOD PRESSURE: 92 MMHG | RESPIRATION RATE: 16 BRPM | WEIGHT: 147.93 LBS | OXYGEN SATURATION: 95 % | HEART RATE: 83 BPM | SYSTOLIC BLOOD PRESSURE: 164 MMHG | BODY MASS INDEX: 27.93 KG/M2 | HEIGHT: 61 IN | TEMPERATURE: 97.7 F

## 2025-03-20 LAB — GLUCOSE BLD STRIP.AUTO-MCNC: 136 MG/DL (ref 65–99)

## 2025-03-20 PROCEDURE — 700117 HCHG RX CONTRAST REV CODE 255

## 2025-03-20 PROCEDURE — 99152 MOD SED SAME PHYS/QHP 5/>YRS: CPT

## 2025-03-20 PROCEDURE — 82962 GLUCOSE BLOOD TEST: CPT

## 2025-03-20 PROCEDURE — 700111 HCHG RX REV CODE 636 W/ 250 OVERRIDE (IP): Mod: JZ

## 2025-03-20 PROCEDURE — 64483 NJX AA&/STRD TFRM EPI L/S 1: CPT

## 2025-03-20 RX ORDER — LIDOCAINE HYDROCHLORIDE 10 MG/ML
INJECTION, SOLUTION EPIDURAL; INFILTRATION; INTRACAUDAL; PERINEURAL
Status: COMPLETED
Start: 2025-03-20 | End: 2025-03-20

## 2025-03-20 RX ORDER — MIDAZOLAM HYDROCHLORIDE 1 MG/ML
INJECTION INTRAMUSCULAR; INTRAVENOUS
Status: COMPLETED
Start: 2025-03-20 | End: 2025-03-20

## 2025-03-20 RX ORDER — DEXAMETHASONE SODIUM PHOSPHATE 10 MG/ML
INJECTION, SOLUTION INTRAMUSCULAR; INTRAVENOUS
Status: COMPLETED
Start: 2025-03-20 | End: 2025-03-20

## 2025-03-20 RX ORDER — EMPAGLIFLOZIN AND METFORMIN HYDROCHLORIDE 12.5; 1 MG/1; MG/1
1 TABLET ORAL 2 TIMES DAILY
COMMUNITY

## 2025-03-20 RX ADMIN — FENTANYL CITRATE 50 MCG: 50 INJECTION, SOLUTION INTRAMUSCULAR; INTRAVENOUS at 14:17

## 2025-03-20 RX ADMIN — IOHEXOL 5 ML: 240 INJECTION, SOLUTION INTRATHECAL; INTRAVASCULAR; INTRAVENOUS; ORAL at 14:23

## 2025-03-20 RX ADMIN — DEXAMETHASONE SODIUM PHOSPHATE 10 MG: 10 INJECTION, SOLUTION INTRAMUSCULAR; INTRAVENOUS at 14:23

## 2025-03-20 RX ADMIN — MIDAZOLAM HYDROCHLORIDE 1 MG: 1 INJECTION, SOLUTION INTRAMUSCULAR; INTRAVENOUS at 14:17

## 2025-03-20 RX ADMIN — LIDOCAINE HYDROCHLORIDE 10 ML: 10 INJECTION, SOLUTION EPIDURAL; INFILTRATION; INTRACAUDAL; PERINEURAL at 14:23

## 2025-03-20 ASSESSMENT — PAIN DESCRIPTION - PAIN TYPE
TYPE: CHRONIC PAIN
TYPE: CHRONIC PAIN

## 2025-03-20 ASSESSMENT — FIBROSIS 4 INDEX: FIB4 SCORE: 1.07

## 2025-03-20 NOTE — OP REPORT
Date of Service: 3/20/2025     Patient: Sonya Wei 59 y.o. female     MRN: [id number]     Physician/s: Trenton Bob MD    Pre-operative Diagnosis: Lumbar radiculopathy    Post-operative Diagnosis: Lumbar radiculopathy    Procedure: bilateral  Lumbar Transforaminal Epidural Steroid  at the L4-5 levels.     Description of procedure:    The risks, benefits, and alternatives of the procedure were reviewed and discussed with the patient.  Written informed consent was freely obtained. A pre-procedural time-out was conducted by the physician verifying patient’s identity, procedure to be performed, procedure site and side, and allergy verification. Appropriate equipment was determined to be in place for the procedure.     Moderation sedation was achieved with Versed (1mg) and Fentanyl (50mcg). Monitoring of the patients vital signs and respiratory status was provided by trained independent registered nurse during the entire course of the procedures and under my supervision and recoded in the patient’s medical record. The duration of sedation was over 10 minutes.      The patient's vital signs were carefully monitored before, throughout, and after the procedure.     In the fluoroscopy suite the patient was placed in a prone position, a pillow placed underneath their umbilicus. The skin was prepped and draped in the usual sterile fashion.     The fluoroscope was placed over the lumbar spine and adjusted into the proper AP/Oblique view to enter the transforaminal space just adjacent to the pedicle at the levels below. The targets for injection were then marked at the left L4-5. A 27g 1.5 inch needle was placed into the marked site, and 1mL of 1% Lidocaine was injected subcutaneously into the epidermal and dermal layers. The needle was removed intact.  A 22g 5 inch spinal needle was then placed and advanced under fluoroscopic guidance in an oblique view towards the epidural space of the levels noted above. The needle  position was confirmed to not be past the 6 o'clock position in the AP view and it was in the neuroforamen in the lateral view.        The fluoroscope was was then adjusted over the lumbar spine and adjusted into the proper AP/Oblique view to enter the transforaminal space adjacent to the pedicle at the RIGHT  L4-5. A 27g 1.5 inch needle was placed into the marked site, and 1mL of 1% Lidocaine was injected subcutaneously into the epidermal and dermal layers. The needle was removed intact.  A 22g 5 inch spinal needle was then placed and advanced under fluoroscopic guidance in an oblique view towards the epidural space of the levels noted above. The needle position was confirmed to not be past the 6 o'clock position in the AP view and it was in the neuroforamen in the lateral view.       Under live fluoroscopic guidance in the AP view, contrast dye was used to highlight the epidural space spread of each level above. Final fluoroscopic images were saved.  Following negative aspiration, 1mL of 1% lidocaine preservative free with 5mg dexamethasone   was then injected at each level above, and the needles were removed intact after restyleted. The patient's back was covered with a 4x4 gauze, the area was cleansed with sterile normal saline, and a dressing was applied. There were no complications noted.     The patient was then evaluated post-procedure, and was hemodynamically stable prior to leaving the post-operative care unit.     The patient had 80% pain relief postprocedure  Preprocedural pain: 6/10 NRS  Postprocedural pain: 2/10 NRS    Follow-up as scheduled    Trenton Bob MD  Interventional Spine and Pain  Physical Medicine and Rehabilitation  [facility]          CPT codes  Transforaminal epidural injection- lumbar or sacral (first level):  01049-76  moderate procedural sedation first 15 minutes: [id number]

## 2025-03-20 NOTE — INTERVAL H&P NOTE
Consented Procedure: BILATERAL L4-5 transforaminal epidural steroid injection with fluoroscopic guidance with sedation…Note: 22-5. Plan on sedation 1mg versed and 50mcg fentanyl  I have examined the patient, provided the risks, benefits, and alternatives to the procedure(s) indicated on the signed consent form, and the patient wishes to proceed.    H&P reviewed. The patient was examined and there are no changes to the H&P      Trenton Bob M.D.  03/20/25 2:12 PM

## 2025-03-20 NOTE — OR SURGEON
Immediate Post OP Note    Pre-Op Diagnosis Codes:      * Lumbar radiculopathy [M54.16]      Post-Op Diagnosis Codes:     * Lumbar radiculopathy [M54.16]      Procedure(s):  BILATERAL L4-5 transforaminal epidural steroid injection with fluoroscopic guidance with sedation     sedation 1mg versed and 50mcg fentanyl - Wound Class: Clean    Surgeon(s):  Trenton Bob M.D.    Anesthesiologist/Type of Anesthesia:  No anesthesia staff entered./Local    Surgical Staff:  Circulator: Comfort Cruz R.N.  Scrub Person: Padmaja Posey R.N.  Radiology Technologist: Efrain Manuel    Specimens removed if any:  * No specimens in log *    Estimated Blood Loss: None    Findings: None    Complications: None        3/20/2025 2:29 PM Trenton Bob M.D.

## 2025-04-29 ENCOUNTER — HOSPITAL ENCOUNTER (OUTPATIENT)
Dept: RADIOLOGY | Facility: MEDICAL CENTER | Age: 60
End: 2025-04-29
Attending: PHYSICAL MEDICINE & REHABILITATION
Payer: COMMERCIAL

## 2025-04-29 ENCOUNTER — HOSPITAL ENCOUNTER (OUTPATIENT)
Dept: RADIOLOGY | Facility: MEDICAL CENTER | Age: 60
End: 2025-04-29
Attending: NURSE PRACTITIONER
Payer: COMMERCIAL

## 2025-04-29 DIAGNOSIS — M54.2 CHRONIC NECK PAIN: ICD-10-CM

## 2025-04-29 DIAGNOSIS — M54.10 RADICULOPATHY, UNSPECIFIED SPINAL REGION: ICD-10-CM

## 2025-04-29 DIAGNOSIS — M54.12 CERVICAL RADICULOPATHY: ICD-10-CM

## 2025-04-29 DIAGNOSIS — M43.16 ANTEROLISTHESIS OF LUMBAR SPINE: ICD-10-CM

## 2025-04-29 DIAGNOSIS — G89.29 CHRONIC NECK PAIN: ICD-10-CM

## 2025-04-29 PROCEDURE — 72141 MRI NECK SPINE W/O DYE: CPT

## 2025-04-29 PROCEDURE — 72148 MRI LUMBAR SPINE W/O DYE: CPT

## 2025-05-05 ENCOUNTER — APPOINTMENT (OUTPATIENT)
Dept: PHYSICAL MEDICINE AND REHAB | Facility: MEDICAL CENTER | Age: 60
End: 2025-05-05
Payer: COMMERCIAL

## 2025-05-05 VITALS
TEMPERATURE: 97.8 F | OXYGEN SATURATION: 93 % | BODY MASS INDEX: 27.75 KG/M2 | WEIGHT: 147 LBS | DIASTOLIC BLOOD PRESSURE: 91 MMHG | HEIGHT: 61 IN | SYSTOLIC BLOOD PRESSURE: 147 MMHG | HEART RATE: 86 BPM

## 2025-05-05 DIAGNOSIS — M54.2 CHRONIC NECK PAIN: ICD-10-CM

## 2025-05-05 DIAGNOSIS — M79.10 MYALGIA: ICD-10-CM

## 2025-05-05 DIAGNOSIS — M54.41 CHRONIC BILATERAL LOW BACK PAIN WITH BILATERAL SCIATICA: ICD-10-CM

## 2025-05-05 DIAGNOSIS — M47.812 CERVICAL SPONDYLOSIS: ICD-10-CM

## 2025-05-05 DIAGNOSIS — E11.42 DIABETIC POLYNEUROPATHY ASSOCIATED WITH TYPE 2 DIABETES MELLITUS (HCC): ICD-10-CM

## 2025-05-05 DIAGNOSIS — G89.29 CHRONIC NECK PAIN: ICD-10-CM

## 2025-05-05 DIAGNOSIS — M54.42 CHRONIC BILATERAL LOW BACK PAIN WITH BILATERAL SCIATICA: ICD-10-CM

## 2025-05-05 DIAGNOSIS — G89.29 CHRONIC BILATERAL LOW BACK PAIN WITH BILATERAL SCIATICA: ICD-10-CM

## 2025-05-05 DIAGNOSIS — M54.16 LUMBAR RADICULOPATHY: ICD-10-CM

## 2025-05-05 DIAGNOSIS — M47.816 LUMBAR SPONDYLOSIS: ICD-10-CM

## 2025-05-05 PROCEDURE — 3077F SYST BP >= 140 MM HG: CPT | Performed by: PHYSICAL MEDICINE & REHABILITATION

## 2025-05-05 PROCEDURE — 99214 OFFICE O/P EST MOD 30 MIN: CPT | Performed by: PHYSICAL MEDICINE & REHABILITATION

## 2025-05-05 PROCEDURE — 3080F DIAST BP >= 90 MM HG: CPT | Performed by: PHYSICAL MEDICINE & REHABILITATION

## 2025-05-05 PROCEDURE — 1125F AMNT PAIN NOTED PAIN PRSNT: CPT | Performed by: PHYSICAL MEDICINE & REHABILITATION

## 2025-05-05 RX ORDER — MAGNESIUM OXIDE 400 MG/1
400 TABLET ORAL DAILY
COMMUNITY

## 2025-05-05 ASSESSMENT — PAIN SCALES - GENERAL: PAINLEVEL_OUTOF10: 2=MINIMAL-SLIGHT

## 2025-05-05 ASSESSMENT — PATIENT HEALTH QUESTIONNAIRE - PHQ9
SUM OF ALL RESPONSES TO PHQ QUESTIONS 1-9: 9
5. POOR APPETITE OR OVEREATING: 1 - SEVERAL DAYS
CLINICAL INTERPRETATION OF PHQ2 SCORE: 2

## 2025-05-05 ASSESSMENT — FIBROSIS 4 INDEX: FIB4 SCORE: 1.07

## 2025-05-05 NOTE — PROGRESS NOTES
Follow up patient note  Interventional spine and Pain  Physiatry (physical medicine and  Rehabilitation)       Chief complaint:   Chief Complaint   Patient presents with    Follow-Up     Back pain          HISTORY    Please see new patient note by Dr Bob,  for more details.     HPI  Patient identification: Sonya Wei ,  1965,   With Diagnoses of Lumbar radiculopathy, Lumbar spondylosis, Chronic bilateral low back pain with bilateral sciatica, Chronic neck pain, Cervical spondylosis, Diabetic polyneuropathy associated with type 2 diabetes mellitus (HCC), and Myalgia were pertinent to this visit.       Verbal consent was obtained for Sean copilot : Yes      History of Present Illness    The patient is a 59-year-old female presenting for follow-up after bilateral lumbar L4-5 transforaminal epidural steroid injection administered on 2025. She reports significant improvement in lumbar radiculopathy, with pain intensity previously rated at 7/10, now reduced to 2/10, indicating a 75% improvement in pain. The patient notes enhanced ability to sit and ambulate and continues to engage in physical therapy with her partners.    Lumbar Radiculopathy  - Onset: Significant improvement after epidural steroid injection on 2025.  - Location: Lumbar region.  - Duration: Improvement noted since the injection.  - Character: Pain intensity reduced from 7/10 to 2/10.  - Alleviating Factors: Epidural steroid injection, physical therapy.  - Severity: 75% improvement in pain.    Bilateral Cervical Radiculopathy  Additionally, she experiences bilateral cervical radiculopathy radiating to the shoulders. She has trialed various medications including acetaminophen, cyclobenzaprine (Flexeril), gabapentin, ketorolac (Toradol) injections, methocarbamol, and tizanidine.    The patient describes severe pain upon awakening and difficulty with flexion movements. She utilizes a back brace during the day and while  performing chores, which she finds beneficial, and removes it when not required. She also uses a massager for symptomatic relief.    Her bilateral cervical radiculopathy radiates to the shoulders. Her physical therapist has recommended arm exercises, such as using an arm bicycle and pulley system, to improve her range of motion, which is currently limited due to pain. She performs chin tucks and push-ups on the couch or floor. She has been diagnosed with neuropathy and has been advised to maintain mobility. An MRI was conducted, which did not reveal a specific etiology for her pain.  - Onset: Severe pain upon awakening.  - Location: Cervical region radiating to shoulders.  - Character: Severe pain, difficulty with flexion movements.  - Alleviating Factors: Back brace, massager, arm exercises recommended by physical therapist.  - Radiation: Pain radiates to shoulders.  - Severity: Limited range of motion due to pain.    MEDICATIONS  - Acetaminophen  - Flexeril  - Gabapentin  - Toradol injections  - Methocarbamol  - Tizanidine    Conservative treatments including the past two months within the past six months  NSAIDs: yes  Acetaminophen: yes  Home exercise program or physical therapy: yes  Activity modification: yes         ROS Red Flags :   Fever, Chills, Sweats: Denies  Involuntary Weight Loss: Denies  Bowel/Bladder Incontinence: Denies  Saddle Anesthesia: Denies        PMHx:   Past Medical History:   Diagnosis Date    Anemia     Bowel habit changes     constipation    Diabetes     Diabetes (HCC)     GERD (gastroesophageal reflux disease)     Healthcare maintenance 9/27/2014    Mammogram: Due    High cholesterol     Hyperlipidemia     Hypertension     Infectious disease     Cold 10/2016    Jaundice 1999    Psychiatric problem     depression and anxiety    Vaginal itching 8/27/2014    With anal itching X 1 month Getting worse Burning Min vag disch       PSHx:   Past Surgical History:   Procedure Laterality Date    ND  NJX AA&/STRD TFRML EPI LUMBAR/SACRAL 1 LEVEL Bilateral 3/20/2025    Procedure: BILATERAL L4-5 transforaminal epidural steroid injection with fluoroscopic guidance with sedation…Note: 22-5. Plan on sedation 1mg versed and 50mcg fentanyl;  Surgeon: Trenton Bob M.D.;  [facility];  Service: Pain Management    VENTRAL HERNIA REPAIR ROBOTIC XI N/A 10/14/2016    Procedure: VENTRAL HERNIA REPAIR ROBOTIC XI FOR INCISIONAL W/MESH;  Surgeon: Edi Mendoza M.D.;  Location: SUR[facility];  Service:     ABDOMINAL HYSTERECTOMY TOTAL      still has ovaries    CHOLECYSTECTOMY      PRIMARY C SECTION      TUBAL COAGULATION LAPAROSCOPIC BILATERAL         Family history   Family History   Problem Relation Age of Onset    Diabetes Mother     Hyperlipidemia Mother     Diabetes Father     Hypertension Father     Hyperlipidemia Father          Medications:   Outpatient Medications Marked as Taking for the 5/5/25 encounter (Office Visit) with Trenton Bob M.D.   Medication Sig Dispense Refill    magnesium oxide (MAG-OX) 400 MG Tab tablet Take 400 mg by mouth every day.          Current Outpatient Medications on File Prior to Visit   Medication Sig Dispense Refill    magnesium oxide (MAG-OX) 400 MG Tab tablet Take 400 mg by mouth every day.      Empagliflozin-metFORMIN HCl (SYNJARDY) 12.5-1000 MG Tab Take 1 Tablet by mouth 2 times a day.      aspirin (ASA) 81 MG Chew Tab chewable tablet Chew 81 mg every day.      multivitamin Tab Take 1 Tablet by mouth every day.      albuterol 108 (90 Base) MCG/ACT Aero Soln inhalation aerosol Inhale 2 Puffs every 6 hours as needed for Shortness of Breath. 8.5 g 0    lisinopril (PRINIVIL) 5 MG Tab Take 1 Tablet by mouth every day. 90 Tablet 3    Empagliflozin 25 MG Tab Take 1 Tablet by mouth every day. For diabetes 90 Tablet 3    rosuvastatin (CRESTOR) 20 MG Tab Take 1 Tablet by mouth every evening. 90 Tablet 3    Semaglutide, 2 MG/DOSE, 8 MG/3ML Solution  "Pen-injector Inject 2 mg under the skin every 7 days. 3 mL 3    amLODIPine (NORVASC) 10 MG Tab Take 1 tablet by mouth once daily 90 Tablet 3    hydroCHLOROthiazide 25 MG Tab Take 1 tablet by mouth once daily 90 Tablet 3    Insulin Pen Needle 32 G x 4 mm (RELION PEN NEEDLES) USE 1 PEN NEEDLE IN PEN DEVICE TO INJECT INSULIN ONCE DAILY 100 Each 3    acetaminophen (TYLENOL) 500 MG Tab Take 1-2 Tablets by mouth every 6 hours as needed for Moderate Pain. 30 Tablet 0    ibuprofen (MOTRIN) 600 MG Tab Take 1 Tablet by mouth every 6 hours as needed for Mild Pain or Moderate Pain. 30 Tablet 0     No current facility-administered medications on file prior to visit.         Allergies:   Allergies   Allergen Reactions    Latex Rash    Morphine Itching     Rxn = unknown   Bumps all over body    Other Misc Rash     Rash from gloves at work       Social Hx:   Social History     Socioeconomic History    Marital status:      Spouse name: Not on file    Number of children: Not on file    Years of education: Not on file    Highest education level: Not on file   Occupational History    Not on file   Tobacco Use    Smoking status: Never    Smokeless tobacco: Never   Vaping Use    Vaping status: Never Used   Substance and Sexual Activity    Alcohol use: No    Drug use: No    Sexual activity: Yes     Partners: Male     Birth control/protection: None   Other Topics Concern    Not on file   Social History Narrative    ** Merged History Encounter **          Social Drivers of Health     Financial Resource Strain: Not on file   Food Insecurity: Not on file   Transportation Needs: Not on file   Physical Activity: Not on file   Stress: Not on file   Social Connections: Not on file   Intimate Partner Violence: Not on file   Housing Stability: Not on file         EXAMINATION     Physical Exam:   Vitals: BP (!) 147/91   Pulse 86   Temp 36.6 °C (97.8 °F) (Temporal)   Ht 1.549 m (5' 1\")   Wt 66.7 kg (147 lb)   SpO2 93%     Constitutional: "   Body Habitus: Body mass index is 27.78 kg/m².  Cooperation: Fully cooperates with exam  Appearance: Well-groomed no disheveled    Respiratory-  breathing comfortable on room air, no audible wheezing  Cardiovascular- capillary refills less than 2 seconds. No lower extremity edema is noted.   Psychiatric- alert and oriented ×3. Normal affect.    MSK and Neuro: -    Physical Exam  Facet loading positive in cervical and lumbar spine. Spurling's maneuver mildly positive bilaterally in cervical spine. Strength 5/5 in bilateral upper and lower extremities. Straight leg raise negative bilaterally.         MEDICAL DECISION MAKING    DATA    Labs:   Lab Results   Component Value Date/Time    SODIUM 138 10/02/2024 09:37 AM    POTASSIUM 4.7 10/02/2024 09:37 AM    CHLORIDE 101 10/02/2024 09:37 AM    CO2 25 10/02/2024 09:37 AM    GLUCOSE 262 (H) 10/02/2024 09:37 AM    BUN 25 (H) 10/02/2024 09:37 AM    CREATININE 0.81 10/02/2024 09:37 AM        Lab Results   Component Value Date/Time    PROTHROMBTM 12.9 12/18/2023 04:07 PM    INR 0.96 12/18/2023 04:07 PM        Lab Results   Component Value Date/Time    WBC 6.2 03/19/2024 10:44 AM    RBC 4.51 03/19/2024 10:44 AM    HEMOGLOBIN 13.3 03/19/2024 10:44 AM    HEMATOCRIT 39.7 03/19/2024 10:44 AM    MCV 88.0 03/19/2024 10:44 AM    MCH 29.5 03/19/2024 10:44 AM    MCHC 33.5 03/19/2024 10:44 AM    MPV 11.9 03/19/2024 10:44 AM    NEUTSPOLYS 60.00 03/19/2024 10:44 AM    LYMPHOCYTES 29.70 03/19/2024 10:44 AM    MONOCYTES 5.80 03/19/2024 10:44 AM    EOSINOPHILS 3.40 03/19/2024 10:44 AM    BASOPHILS 0.60 03/19/2024 10:44 AM    HYPOCHROMIA 1+ 09/11/2013 08:34 AM    ANISOCYTOSIS 1+ 12/03/2020 09:45 PM        Lab Results   Component Value Date/Time    HBA1C 9.3 (A) 03/07/2025 11:10 AM          Imaging:   I personally reviewed following images          Results  - Imaging (MRI of the lumbar spine from 01/23/2025):    - Degenerative disk disease with a high intensity zone at L4-5 consistent with an  annular tear    - Foraminal stenosis bilaterally worse at L4-5    - Facet arthropathy bilaterally at L3-4, L4-5, and L5-S1    - No areas of high grade central canal stenosis      - MRI cervical spine:    - No high-grade central canal or neuroforaminal stenosis    - Mild degenerative changes  - MRI lumbar spine (04/29/2025):    - Facet arthropathy bilaterally at L4-5 and L5-S1    - Mild foraminal stenosis bilaterally at L4-5    - No high-grade central canal stenosis       I reviewed the following radiology reports          Results for orders placed during the hospital encounter of 03/09/21    MR-BRAIN-W/O    Impression  1.  No acute abnormality.  2.  A few punctate nonspecific supratentorial T2 hyperintensities likely representing nonspecific foci of gliosis/minimal chronic ischemia.             Results for orders placed during the hospital encounter of 07/26/22    MR-CERVICAL SPINE-W/O    Impression  1.  There is no spinal or neural foraminal stenosis.  2.  There is an approximately 1 cm sized nodule in the right lobe of the thyroid.      Results for orders placed during the hospital encounter of 01/23/25    MR-LUMBAR SPINE-W/O    Impression  1. Multilevel degenerative disc disease and posterior facet osteoarthrosis as detailed above.  2. There is a left foraminal extrusion versus sequestration with some crowding of the exiting left L4 nerve root.        Results for orders placed during the hospital encounter of 01/23/25    MR-LUMBAR SPINE-W/O    Impression  1. Multilevel degenerative disc disease and posterior facet osteoarthrosis as detailed above.  2. There is a left foraminal extrusion versus sequestration with some crowding of the exiting left L4 nerve root.                                                Results for orders placed during the hospital encounter of 12/18/23    CT-CTA NECK WITH & W/O-POST PROCESSING    Impression  No high-grade stenosis, dissection or occlusion of the cervical carotid or vertebral  arteries.               Results for orders placed during the hospital encounter of 02/12/16    CT-CHEST,ABDOMEN,PELVIS WITH    Impression  1. No solid organ or vascular injury is identified.    2. Subcutaneous stranding and a small subcutaneous hematoma in the left anterior thorax.    3. Hepatic steatosis.    4. Status post cholecystectomy with mild prominence of the common duct likely representing biliary ectasia in the setting of prior cholecystectomy.    5. Fat-containing ventral hernia in the lower abdomen to the right of midline.            Results for orders placed during the hospital encounter of 12/07/12    CT-ABDOMEN-PELVIS WITH    Impression  1. No acute abnormality identified within the abdomen or pelvis.    2. Right paramedian pelvic ventral hernia containing omental fat no bowel.    3. Hepatic fatty infiltration.    4. Previous cholecystectomy.    5. Previous hysterectomy                    Results for orders placed during the hospital encounter of 03/09/21    DX-CERVICAL SPINE-2 OR 3 VIEWS    Impression  No acute fracture or malalignment.     Results for orders placed in visit on 05/11/22    DX-CHEST-2 VIEWS    Impression  Negative two views of the chest.     Results for orders placed during the hospital encounter of 05/28/20    DX-ELBOW-COMPLETE 3+ RIGHT    Impression  No evidence of acute fracture or dislocation.                  Results for orders placed during the hospital encounter of 05/28/20    DX-KNEE COMPLETE 4+ LEFT    Impression  Mild tricompartment degenerative change. No evidence of fracture.   Results for orders placed in visit on 07/29/24    DX-LUMBAR SPINE-2 OR 3 VIEWS    Impression  1. Interval development of 2 mm anterolisthesis of L4 with respect to L5.  2. No fracture.  3. Stable physiologic lumbar dextrocurvature with multilevel degenerative disc disease and facet arthrosis.             Results for orders placed in visit on 02/19/18    DX-SHOULDER 2+  RIGHT    Impression  Unremarkable shoulder series.            INTERPRETING LOCATION:  [address] [place] [address] [place], [address]    Results for orders placed in visit on 18    DX-THORACIC SPINE-2 VIEWS    Impression  Unremarkable thoracic spine.            DIAGNOSIS   Visit Diagnoses     ICD-10-CM   1. Lumbar radiculopathy  M54.16   2. Lumbar spondylosis  M47.816   3. Chronic bilateral low back pain with bilateral sciatica  M54.42    M54.41    G89.29   4. Chronic neck pain  M54.2    G89.29   5. Cervical spondylosis  M47.812   6. Diabetic polyneuropathy associated with type 2 diabetes mellitus (HCC)  E11.42   7. Myalgia  M79.10         ASSESSMENT and PLAN:     Sonya Wei  1965 female      Sonya was seen today for follow-up.    Diagnoses and all orders for this visit:    Lumbar radiculopathy  -     Referral to Physical Therapy    Lumbar spondylosis  -     Referral to Physical Therapy    Chronic bilateral low back pain with bilateral sciatica  -     Referral to Physical Therapy    Chronic neck pain  -     Referral to Physical Therapy    Cervical spondylosis  -     Referral to Physical Therapy    Diabetic polyneuropathy associated with type 2 diabetes mellitus (HCC)  -     Referral to Physical Therapy    Myalgia  -     Referral to Physical Therapy        Assessment & Plan  Cervical pain: Stable. MRI cervical spine: no high-grade central canal or neuroforaminal stenosis, mild degenerative changes. No surgical intervention needed.  - Continue current exercise regimen and physical therapy (chin tucks, band work for arms/shoulders, push-ups).  - New physical therapy order placed.    Lumbar pain: Stable. MRI lumbar spine (2025): facet arthropathy bilaterally at L4-5 and L5-S1, mild foraminal stenosis bilaterally at L4-5, no high-grade central canal stenosis. Recent epidural steroid injection led to significant pain improvement.  - Continue current exercise regimen and physical therapy.  - Use back brace as  needed, avoid wearing >1 hour to prevent muscle weakness.  - Continue ibuprofen 600 mg as needed during pain flares, up to three times daily with food.    Diabetic peripheral polyneuropathy  -Recommend improved glucose control  - Acetaminophen up to 1000 mg 3 times daily as needed not to exceed 3000 mg per 24-hours.    Myalgia  We discussed additions to home exercise program    Follow-up in approximately 6 months.    PROCEDURE  Bilateral lumbar L4-5 transforaminal epidural steroid injection on 03/20/2025.        Thank you for allowing me to participate in the care of this patient. If you have any questions please not hesitate to contact me.             Please note that this dictation was created using voice recognition software. I have made every reasonable attempt to correct obvious errors but there may be errors of grammar and content that I may have overlooked prior to finalization of this note.      Trenton Bob MD  Interventional Spine and Sports Physiatry  Physical Medicine and Rehabilitation  [facility]

## 2025-05-06 NOTE — Clinical Note
REFERRAL APPROVAL NOTICE         Sent on May 6, 2025                   Sonya Wei  [address]  [facility] [address]                   Dear Ms. Wei,    After a careful review of the medical information and benefit coverage, [facility] has processed your referral. See below for additional details.    If applicable, you must be actively enrolled with your insurance for coverage of the authorized service. If you have any questions regarding your coverage, please contact your insurance directly.    REFERRAL INFORMATION   Referral #:  [id number]  Referred-To Provider    Referred-By Provider:  Physical Therapy    Trenton Bob M.D.   [facility]      [address]  [address]  [place] [address]  [phone] [id number]  [facility] [place] [address]  [phone]    Referral Start Date:  05/05/2025  Referral End Date:   05/05/2026             SCHEDULING  If you do not already have an appointment, please call [phone] to make an appointment.     MORE INFORMATION  If you do not already have a [employer] account, sign up at: [email or web address]  You can access your medical information, make appointments, see lab results, billing information, and more.  If you have questions regarding this referral, please contact  the [facility] Referrals department at:             [phone]. Monday - Friday 8:00AM - 5:00PM.     Sincerely,    [facility]

## 2025-05-22 ENCOUNTER — APPOINTMENT (OUTPATIENT)
Dept: PHYSICAL MEDICINE AND REHAB | Facility: MEDICAL CENTER | Age: 60
End: 2025-05-22
Payer: COMMERCIAL

## 2025-05-27 DIAGNOSIS — E11.42 DIABETIC POLYNEUROPATHY ASSOCIATED WITH TYPE 2 DIABETES MELLITUS (HCC): ICD-10-CM

## 2025-05-30 ENCOUNTER — TELEPHONE (OUTPATIENT)
Dept: HEALTH INFORMATION MANAGEMENT | Facility: OTHER | Age: 60
End: 2025-05-30
Payer: COMMERCIAL

## 2025-06-13 ENCOUNTER — APPOINTMENT (OUTPATIENT)
Dept: MEDICAL GROUP | Facility: PHYSICIAN GROUP | Age: 60
End: 2025-06-13
Payer: COMMERCIAL

## 2025-06-13 VITALS
RESPIRATION RATE: 17 BRPM | OXYGEN SATURATION: 97 % | HEIGHT: 61 IN | TEMPERATURE: 97 F | DIASTOLIC BLOOD PRESSURE: 78 MMHG | SYSTOLIC BLOOD PRESSURE: 130 MMHG | BODY MASS INDEX: 28.28 KG/M2 | WEIGHT: 149.8 LBS | HEART RATE: 85 BPM

## 2025-06-13 DIAGNOSIS — Z11.1 SCREENING-PULMONARY TB: ICD-10-CM

## 2025-06-13 DIAGNOSIS — E11.9 TYPE 2 DIABETES MELLITUS WITHOUT COMPLICATION, WITH LONG-TERM CURRENT USE OF INSULIN (HCC): ICD-10-CM

## 2025-06-13 DIAGNOSIS — Z12.83 SKIN CANCER SCREENING: ICD-10-CM

## 2025-06-13 DIAGNOSIS — M54.12 CERVICAL RADICULOPATHY: ICD-10-CM

## 2025-06-13 DIAGNOSIS — E11.65 UNCONTROLLED TYPE 2 DIABETES MELLITUS WITH HYPERGLYCEMIA (HCC): Primary | ICD-10-CM

## 2025-06-13 DIAGNOSIS — M54.16 LUMBAR RADICULOPATHY: ICD-10-CM

## 2025-06-13 DIAGNOSIS — Z79.4 TYPE 2 DIABETES MELLITUS WITHOUT COMPLICATION, WITH LONG-TERM CURRENT USE OF INSULIN (HCC): ICD-10-CM

## 2025-06-13 DIAGNOSIS — E55.9 VITAMIN D DEFICIENCY: ICD-10-CM

## 2025-06-13 DIAGNOSIS — G62.9 NEUROPATHY: ICD-10-CM

## 2025-06-13 DIAGNOSIS — G56.03 BILATERAL CARPAL TUNNEL SYNDROME: ICD-10-CM

## 2025-06-13 LAB
HBA1C MFR BLD: 9.6 % (ref ?–5.8)
POCT INT CON NEG: NEGATIVE
POCT INT CON POS: POSITIVE

## 2025-06-13 PROCEDURE — 83036 HEMOGLOBIN GLYCOSYLATED A1C: CPT | Performed by: FAMILY MEDICINE

## 2025-06-13 PROCEDURE — 3075F SYST BP GE 130 - 139MM HG: CPT | Performed by: FAMILY MEDICINE

## 2025-06-13 PROCEDURE — 3078F DIAST BP <80 MM HG: CPT | Performed by: FAMILY MEDICINE

## 2025-06-13 PROCEDURE — 99214 OFFICE O/P EST MOD 30 MIN: CPT | Performed by: FAMILY MEDICINE

## 2025-06-13 RX ORDER — GABAPENTIN 300 MG/1
300 CAPSULE ORAL 3 TIMES DAILY
Qty: 270 CAPSULE | Refills: 3 | Status: SHIPPED | OUTPATIENT
Start: 2025-06-13

## 2025-06-13 RX ORDER — GLIPIZIDE 10 MG/1
10 TABLET ORAL DAILY
Qty: 100 TABLET | Refills: 3 | Status: SHIPPED | OUTPATIENT
Start: 2025-06-13 | End: 2026-07-18

## 2025-06-13 RX ORDER — LISINOPRIL 10 MG/1
10 TABLET ORAL DAILY
Qty: 100 TABLET | Refills: 3 | Status: SHIPPED | OUTPATIENT
Start: 2025-06-13 | End: 2026-07-18

## 2025-06-13 ASSESSMENT — FIBROSIS 4 INDEX: FIB4 SCORE: 1.07

## 2025-06-13 NOTE — ASSESSMENT & PLAN NOTE
Uncontrolled A1c 9.6  Pt is on synjardy 12.5 1000 bid  Semaglutide inc to 2 mg  Add glipizide 10 mg back  Repeat fasting labs ordered  Is on rosuvastatin 20 mg and asa 81 mg  Also on lisinopril 10 mg  Foot exam: normal sensation to monofilament b/l no ulcers.

## 2025-06-13 NOTE — PROGRESS NOTES
"Subjective:   Sonya Wei is a 59 y.o. female here today for evaluation and management of:     Lumbar radiculopathy  Chronic low back pain and upper back pain  FMLA for work done.   Cannot bend  Had spine surgery consult, no surgery indicated, followed by pain management    Type 2 diabetes mellitus without complication, with long-term current use of insulin (HCC)  Uncontrolled A1c 9.6  Pt is on synjardy 12.5 1000 bid  Semaglutide inc to 2 mg  Add glipizide 10 mg back  Repeat fasting labs ordered  Is on rosuvastatin 20 mg and asa 81 mg  Also on lisinopril 10 mg  Foot exam: normal sensation to monofilament b/l no ulcers.            Current medicines (including changes today)  Current Medications[1]  She  has a past medical history of Anemia, Bowel habit changes, Diabetes, Diabetes (HCC), GERD (gastroesophageal reflux disease), Healthcare maintenance (9/27/2014), High cholesterol, Hyperlipidemia, Hypertension, Infectious disease, Jaundice (1999), Psychiatric problem, and Vaginal itching (8/27/2014).    ROS  No chest pain, no shortness of breath, no abdominal pain       Objective:     /78 (BP Location: Right arm, Patient Position: Sitting, BP Cuff Size: Adult long)   Pulse 85   Temp 36.1 °C (97 °F) (Temporal)   Resp 17   Ht 1.549 m (5' 1\")   Wt 67.9 kg (149 lb 12.8 oz)   SpO2 97%  Body mass index is 28.3 kg/m².   Physical Exam:  Constitutional: Alert, no distress.  Skin: Warm, dry, good turgor, no rashes in visible areas.  Eye: Equal, round and reactive, conjunctiva clear, lids normal.  ENMT: Lips without lesions, good dentition, oropharynx clear.  Neck: Trachea midline, no masses, no thyromegaly. No cervical or supraclavicular lymphadenopathy  Respiratory: Unlabored respiratory effort, lungs clear to auscultation, no wheezes, no ronchi.  Cardiovascular: Normal S1, S2, no murmur, no edema.  Abdomen: Soft, non-tender, no masses, no hepatosplenomegaly.  Psych: Alert and oriented x3, normal affect and " mood.    Assessment and Plan:   The following treatment plan was discussed    1. Uncontrolled type 2 diabetes mellitus with hyperglycemia (HCC)  - POCT Hemoglobin A1C  - Comp Metabolic Panel; Future  - Lipid Profile; Future  - MICROALBUMIN CREAT RATIO URINE; Future    2. Vitamin D deficiency  - VITAMIN D,25 HYDROXY (DEFICIENCY); Future    3. Skin cancer screening  - Referral to Dermatology    4. Bilateral carpal tunnel syndrome  - Referral to Hand Surgery  - EMG/NCS; Future    5. Screening-pulmonary TB  - Quantiferon Gold TB (PPD); Future    6. Lumbar radiculopathy    7. Cervical radiculopathy    8. Neuropathy    9. Type 2 diabetes mellitus without complication, with long-term current use of insulin (HCC)    Other orders  - lisinopril (PRINIVIL) 10 MG Tab; Take 1 Tablet by mouth every day.  Dispense: 100 Tablet; Refill: 3  - glipiZIDE (GLUCOTROL) 10 MG Tab; Take 1 Tablet by mouth every day.  Dispense: 100 Tablet; Refill: 3  - Semaglutide, 2 MG/DOSE, 8 MG/3ML Solution Pen-injector; Inject 2 mg under the skin every 7 days.  Dispense: 3 mL; Refill: 3  - gabapentin (NEURONTIN) 300 MG Cap; Take 1 Capsule by mouth 3 times a day.  Dispense: 270 Capsule; Refill: 3      Followup: Return in about 3 months (around 9/13/2025) for Diabetes.                [1]   Current Outpatient Medications   Medication Sig Dispense Refill    lisinopril (PRINIVIL) 10 MG Tab Take 1 Tablet by mouth every day. 100 Tablet 3    glipiZIDE (GLUCOTROL) 10 MG Tab Take 1 Tablet by mouth every day. 100 Tablet 3    Semaglutide, 2 MG/DOSE, 8 MG/3ML Solution Pen-injector Inject 2 mg under the skin every 7 days. 3 mL 3    gabapentin (NEURONTIN) 300 MG Cap Take 1 Capsule by mouth 3 times a day. 270 Capsule 3    magnesium oxide (MAG-OX) 400 MG Tab tablet Take 400 mg by mouth every day.      Empagliflozin-metFORMIN HCl (SYNJARDY) 12.5-1000 MG Tab Take 1 Tablet by mouth 2 times a day.      aspirin (ASA) 81 MG Chew Tab chewable tablet Chew 81 mg every day.       multivitamin Tab Take 1 Tablet by mouth every day.      albuterol 108 (90 Base) MCG/ACT Aero Soln inhalation aerosol Inhale 2 Puffs every 6 hours as needed for Shortness of Breath. 8.5 g 0    rosuvastatin (CRESTOR) 20 MG Tab Take 1 Tablet by mouth every evening. 90 Tablet 3    amLODIPine (NORVASC) 10 MG Tab Take 1 tablet by mouth once daily 90 Tablet 3    acetaminophen (TYLENOL) 500 MG Tab Take 1-2 Tablets by mouth every 6 hours as needed for Moderate Pain. 30 Tablet 0    ibuprofen (MOTRIN) 600 MG Tab Take 1 Tablet by mouth every 6 hours as needed for Mild Pain or Moderate Pain. 30 Tablet 0    hydroCHLOROthiazide 25 MG Tab Take 1 tablet by mouth once daily 90 Tablet 3     No current facility-administered medications for this visit.

## 2025-06-13 NOTE — ASSESSMENT & PLAN NOTE
Chronic low back pain and upper back pain  FMLA for work done.   Cannot bend  Had spine surgery consult, no surgery indicated, followed by pain management

## 2025-06-17 ENCOUNTER — APPOINTMENT (OUTPATIENT)
Dept: PHYSICAL MEDICINE AND REHAB | Facility: MEDICAL CENTER | Age: 60
End: 2025-06-17
Payer: COMMERCIAL

## 2025-06-17 VITALS
WEIGHT: 149 LBS | OXYGEN SATURATION: 94 % | TEMPERATURE: 97.4 F | SYSTOLIC BLOOD PRESSURE: 103 MMHG | HEIGHT: 61 IN | HEART RATE: 96 BPM | BODY MASS INDEX: 28.13 KG/M2 | DIASTOLIC BLOOD PRESSURE: 63 MMHG

## 2025-06-17 DIAGNOSIS — Z91.81 RISK FOR FALLS: ICD-10-CM

## 2025-06-17 DIAGNOSIS — M47.816 LUMBAR SPONDYLOSIS: ICD-10-CM

## 2025-06-17 DIAGNOSIS — M54.12 CERVICAL RADICULOPATHY: Primary | ICD-10-CM

## 2025-06-17 DIAGNOSIS — M79.10 MYALGIA: ICD-10-CM

## 2025-06-17 DIAGNOSIS — M54.16 LUMBAR RADICULOPATHY: ICD-10-CM

## 2025-06-17 DIAGNOSIS — E11.42 DIABETIC POLYNEUROPATHY ASSOCIATED WITH TYPE 2 DIABETES MELLITUS (HCC): ICD-10-CM

## 2025-06-17 DIAGNOSIS — M47.812 CERVICAL SPONDYLOSIS: ICD-10-CM

## 2025-06-17 PROCEDURE — 1125F AMNT PAIN NOTED PAIN PRSNT: CPT | Performed by: PHYSICAL MEDICINE & REHABILITATION

## 2025-06-17 PROCEDURE — 99214 OFFICE O/P EST MOD 30 MIN: CPT | Performed by: PHYSICAL MEDICINE & REHABILITATION

## 2025-06-17 PROCEDURE — 3074F SYST BP LT 130 MM HG: CPT | Performed by: PHYSICAL MEDICINE & REHABILITATION

## 2025-06-17 PROCEDURE — 3078F DIAST BP <80 MM HG: CPT | Performed by: PHYSICAL MEDICINE & REHABILITATION

## 2025-06-17 ASSESSMENT — PAIN SCALES - GENERAL: PAINLEVEL_OUTOF10: 6=MODERATE PAIN

## 2025-06-17 ASSESSMENT — PATIENT HEALTH QUESTIONNAIRE - PHQ9
5. POOR APPETITE OR OVEREATING: 2 - MORE THAN HALF THE DAYS
SUM OF ALL RESPONSES TO PHQ QUESTIONS 1-9: 15
CLINICAL INTERPRETATION OF PHQ2 SCORE: 3

## 2025-06-17 ASSESSMENT — FIBROSIS 4 INDEX: FIB4 SCORE: 1.07

## 2025-06-17 NOTE — H&P (VIEW-ONLY)
[facility] (Physical Medicine and Rehabilitation)  Interventional Pain and Spine        Chief complaint:   Chief Complaint   Patient presents with    Follow-Up     Back pain          HISTORY    Please see new patient note by Dr Bob,  for more details.     HPI  Patient identification: Sonya Wei ,  1965,   With The primary encounter diagnosis was Cervical radiculopathy. Diagnoses of Cervical spondylosis, Lumbar radiculopathy, Lumbar spondylosis, Myalgia, Diabetic polyneuropathy associated with type 2 diabetes mellitus (HCC), and Risk for falls were also pertinent to this visit.       Verbal consent was obtained for Sean copilot : Yes      History of Present Illness    The patient, a 59-year-old female, presents for a follow-up visit.    Chronic Cervicalgia and Bilateral Shoulder Pain  She reports chronic cervicalgia rated at 6/10, bilateral shoulder pain, and chronic lumbar pain rated at 5/10. The cervicalgia has exacerbated and radiates bilaterally down her arms, resulting in a sensation of heaviness in the shoulders and occasional dyspnea. She generally avoids analgesic medications.  - Onset: Chronic, with recent exacerbation.  - Location: Cervical region, radiating bilaterally down her arms; bilateral shoulders.  - Character: Cervicalgia rated at 6/10, heaviness in shoulders, occasional dyspnea.  - Alleviating/Aggravating Factors: Avoids analgesic medications.  - Radiation: Radiates bilaterally down her arms.  - Severity: Cervicalgia rated at 6/10, lumbar pain rated at 5/10.    Activity Limitations and Symptoms  The patient can engage in activities in the garage for approximately 1.5 to 2 hours before requiring rest due to symptoms of dehydration, dizziness, and lightheadedness. Her therapist has recommended testing for rheumatoid arthritis, which she plans to discuss with Dr. Eason.  - Onset: Symptoms occur after 1.5 to 2 hours of activity.  - Character: Dehydration, dizziness,  lightheadedness.  - Alleviating/Aggravating Factors: Requires rest after activity.  - Timing: Symptoms occur during and after activity.    Peripheral Neuropathy and Carpal Tunnel Syndrome  She also reports peripheral neuropathy in her feet and carpal tunnel syndrome, for which she has been referred for diagnostic testing.  - Location: Feet (peripheral neuropathy), wrists (carpal tunnel syndrome).  - Character: Peripheral neuropathy, carpal tunnel syndrome.  - Timing: Referred for diagnostic testing.    Vertigo and Cardiac Tremor  Additionally, she has experienced a recurrence of vertigo over the past few weeks and a sensation of cardiac tremor over the past two days, for which she intends to seek evaluation at urgent care.  - Onset: Vertigo recurrence over the past few weeks; cardiac tremor sensation over the past two days.  - Character: Vertigo, sensation of cardiac tremor.  - Timing: Vertigo over the past few weeks; cardiac tremor over the past two days.    MEDICATIONS  - Gabapentin  - Acetaminophen  - Flexeril  - Toradol  - Methocarbamol  - Tizanidine    Conservative treatments including the past two months within the past six months  NSAIDs: yes  Acetaminophen: yes  Home exercise program or physical therapy: yes  Activity modification: yes         ROS Red Flags :   Fever, Chills, Sweats: Denies  Involuntary Weight Loss: Denies  Bowel/Bladder Incontinence: Denies  Saddle Anesthesia: Denies        PMHx:   Past Medical History:   Diagnosis Date    Anemia     Bowel habit changes     constipation    Diabetes     Diabetes (HCC)     GERD (gastroesophageal reflux disease)     Healthcare maintenance 9/27/2014    Mammogram: Due    High cholesterol     Hyperlipidemia     Hypertension     Infectious disease     Cold 10/2016    Jaundice 1999    Psychiatric problem     depression and anxiety    Vaginal itching 8/27/2014    With anal itching X 1 month Getting worse Burning Min vag disch       PSHx:   Past Surgical History:    Procedure Laterality Date    AZ NJX AA&/STRD TFRML EPI LUMBAR/SACRAL 1 LEVEL Bilateral 3/20/2025    Procedure: BILATERAL L4-5 transforaminal epidural steroid injection with fluoroscopic guidance with sedation…Note: 22-5. Plan on sedation 1mg versed and 50mcg fentanyl;  Surgeon: Trenton Bob M.D.;  [facility];  Service: Pain Management    VENTRAL HERNIA REPAIR ROBOTIC XI N/A 10/14/2016    Procedure: VENTRAL HERNIA REPAIR ROBOTIC XI FOR INCISIONAL W/MESH;  Surgeon: Edi Mendoza M.D.;  Location: [facility];  Service:     ABDOMINAL HYSTERECTOMY TOTAL      still has ovaries    CHOLECYSTECTOMY      PRIMARY C SECTION      TUBAL COAGULATION LAPAROSCOPIC BILATERAL         Family history   Family History   Problem Relation Age of Onset    Diabetes Mother     Hyperlipidemia Mother     Diabetes Father     Hypertension Father     Hyperlipidemia Father          Medications:   Outpatient Medications Marked as Taking for the 6/17/25 encounter (Office Visit) with Trenton Bob M.D.   Medication Sig Dispense Refill    lisinopril (PRINIVIL) 10 MG Tab Take 1 Tablet by mouth every day. 100 Tablet 3    glipiZIDE (GLUCOTROL) 10 MG Tab Take 1 Tablet by mouth every day. 100 Tablet 3    Semaglutide, 2 MG/DOSE, 8 MG/3ML Solution Pen-injector Inject 2 mg under the skin every 7 days. 3 mL 3    gabapentin (NEURONTIN) 300 MG Cap Take 1 Capsule by mouth 3 times a day. 270 Capsule 3    magnesium oxide (MAG-OX) 400 MG Tab tablet Take 400 mg by mouth every day.      Empagliflozin-metFORMIN HCl (SYNJARDY) 12.5-1000 MG Tab Take 1 Tablet by mouth 2 times a day.      aspirin (ASA) 81 MG Chew Tab chewable tablet Chew 81 mg every day.      multivitamin Tab Take 1 Tablet by mouth every day.      albuterol 108 (90 Base) MCG/ACT Aero Soln inhalation aerosol Inhale 2 Puffs every 6 hours as needed for Shortness of Breath. 8.5 g 0    rosuvastatin (CRESTOR) 20 MG Tab Take 1 Tablet by mouth every evening. 90  Tablet 3    amLODIPine (NORVASC) 10 MG Tab Take 1 tablet by mouth once daily 90 Tablet 3    hydroCHLOROthiazide 25 MG Tab Take 1 tablet by mouth once daily 90 Tablet 3    acetaminophen (TYLENOL) 500 MG Tab Take 1-2 Tablets by mouth every 6 hours as needed for Moderate Pain. 30 Tablet 0    ibuprofen (MOTRIN) 600 MG Tab Take 1 Tablet by mouth every 6 hours as needed for Mild Pain or Moderate Pain. 30 Tablet 0        Current Outpatient Medications on File Prior to Visit   Medication Sig Dispense Refill    lisinopril (PRINIVIL) 10 MG Tab Take 1 Tablet by mouth every day. 100 Tablet 3    glipiZIDE (GLUCOTROL) 10 MG Tab Take 1 Tablet by mouth every day. 100 Tablet 3    Semaglutide, 2 MG/DOSE, 8 MG/3ML Solution Pen-injector Inject 2 mg under the skin every 7 days. 3 mL 3    gabapentin (NEURONTIN) 300 MG Cap Take 1 Capsule by mouth 3 times a day. 270 Capsule 3    magnesium oxide (MAG-OX) 400 MG Tab tablet Take 400 mg by mouth every day.      Empagliflozin-metFORMIN HCl (SYNJARDY) 12.5-1000 MG Tab Take 1 Tablet by mouth 2 times a day.      aspirin (ASA) 81 MG Chew Tab chewable tablet Chew 81 mg every day.      multivitamin Tab Take 1 Tablet by mouth every day.      albuterol 108 (90 Base) MCG/ACT Aero Soln inhalation aerosol Inhale 2 Puffs every 6 hours as needed for Shortness of Breath. 8.5 g 0    rosuvastatin (CRESTOR) 20 MG Tab Take 1 Tablet by mouth every evening. 90 Tablet 3    amLODIPine (NORVASC) 10 MG Tab Take 1 tablet by mouth once daily 90 Tablet 3    hydroCHLOROthiazide 25 MG Tab Take 1 tablet by mouth once daily 90 Tablet 3    acetaminophen (TYLENOL) 500 MG Tab Take 1-2 Tablets by mouth every 6 hours as needed for Moderate Pain. 30 Tablet 0    ibuprofen (MOTRIN) 600 MG Tab Take 1 Tablet by mouth every 6 hours as needed for Mild Pain or Moderate Pain. 30 Tablet 0     No current facility-administered medications on file prior to visit.         Allergies:   Allergies   Allergen Reactions    Latex Rash    Morphine  "Itching     Rxn = unknown   Bumps all over body    Other Misc Rash     Rash from gloves at work       Social Hx:   Social History     Socioeconomic History    Marital status:      Spouse name: Not on file    Number of children: Not on file    Years of education: Not on file    Highest education level: Not on file   Occupational History    Not on file   Tobacco Use    Smoking status: Never    Smokeless tobacco: Never   Vaping Use    Vaping status: Never Used   Substance and Sexual Activity    Alcohol use: No    Drug use: No    Sexual activity: Yes     Partners: Male     Birth control/protection: None   Other Topics Concern    Not on file   Social History Narrative    ** Merged History Encounter **          Social Drivers of Health     Financial Resource Strain: Not on file   Food Insecurity: Not on file   Transportation Needs: Not on file   Physical Activity: Not on file   Stress: Not on file   Social Connections: Not on file   Intimate Partner Violence: Not on file   Housing Stability: Not on file         EXAMINATION     Physical Exam:   Vitals: /63 (BP Location: Left arm, Patient Position: Sitting, BP Cuff Size: Large adult)   Pulse 96   Temp 36.3 °C (97.4 °F) (Temporal)   Ht 1.549 m (5' 1\")   Wt 67.6 kg (149 lb)   SpO2 94%     Constitutional:   Body Habitus: Body mass index is 28.15 kg/m².  Cooperation: Fully cooperates with exam  Appearance: Well-groomed no disheveled    Respiratory-  breathing comfortable on room air, no audible wheezing  Cardiovascular- capillary refills less than 2 seconds. No lower extremity edema is noted.   Psychiatric- alert and oriented ×3. Normal affect.    MSK and Neuro: -    Physical Exam  Examined neck and musculoskeletal system.     Cervical spine    decreased active range of motion with flexion, lateral flexion, and rotation bilaterally.   There is decreased active range of motion with cervical extension.      Palpation:   Tenderness to palpation throughout the " cervical spine: positive bilaterally        Cervical spine Special tests  Neuro tension  Spurling's maneuver positive bilaterally    Cervical facet loading maneuver  positive bilaterally        Key points for the international standards for neurological classification of spinal cord injury (ISNCSCI) to light touch.     Dermatome R L   C4 2 2   C5 2 2   C6 2 2   C7 2 2   C8 2 2   T1 2 2   T2 2 2                                         Motor Exam Upper Extremities   ? Myotome R L   Shoulder flexion C5 5 5   Elbow flexion C5 5 5   Wrist extension C6 5 5   Elbow extension C7 5 5   Finger flexion C8 5 5   Finger abduction T1 5 5       Thoracic/Lumbar Spine/Sacral Spine/Hips   There are no signs of infection around the injection sites.   decreased active range of motion with flexion, lateral flexion, and rotation bilaterally.   There is decreased active range of motion with lumbar extension.    There is  pain with lumbar extension.   There is pain with facet loading maneuver (extension rotation) with axial low back pain on the BILATERAL side(s) L4-5 and L5-S1      Palpation:   No tenderness to palpation in midline at T1-T12 levels. No tenderness to palpation in the left and right of the midline T1-L5, NEGATIVE for tenderness to palpation to the para-midline region in the lower lumbar levels.  palpation over SI joint: negative bilaterally    palpation in hip or over the gluteus medius tendon insertion: negative bilaterally      Lumbar spine Special tests  Neuro tension  Straight leg test negative bilaterally    Slump test negative bilaterally      Key points for the international standards for neurological classification of spinal cord injury (ISNCSCI) to light touch.     Dermatome R L                                      L2 2 2   L3 2 2   L4 2 2   L5 2 2   S1 2 2   S2 2 2         Motor Exam Lower Extremities    ? Myotome R L   Hip flexion L2 5 5   Knee extension L3 5 5   Ankle dorsiflexion L4 5 5   Toe extension L5 5 5    Ankle plantarflexion S1 5 5           MEDICAL DECISION MAKING    DATA    Labs:   Lab Results   Component Value Date/Time    SODIUM 138 10/02/2024 09:37 AM    POTASSIUM 4.7 10/02/2024 09:37 AM    CHLORIDE 101 10/02/2024 09:37 AM    CO2 25 10/02/2024 09:37 AM    GLUCOSE 262 (H) 10/02/2024 09:37 AM    BUN 25 (H) 10/02/2024 09:37 AM    CREATININE 0.81 10/02/2024 09:37 AM        Lab Results   Component Value Date/Time    PROTHROMBTM 12.9 12/18/2023 04:07 PM    INR 0.96 12/18/2023 04:07 PM        Lab Results   Component Value Date/Time    WBC 6.2 03/19/2024 10:44 AM    RBC 4.51 03/19/2024 10:44 AM    HEMOGLOBIN 13.3 03/19/2024 10:44 AM    HEMATOCRIT 39.7 03/19/2024 10:44 AM    MCV 88.0 03/19/2024 10:44 AM    MCH 29.5 03/19/2024 10:44 AM    MCHC 33.5 03/19/2024 10:44 AM    MPV 11.9 03/19/2024 10:44 AM    NEUTSPOLYS 60.00 03/19/2024 10:44 AM    LYMPHOCYTES 29.70 03/19/2024 10:44 AM    MONOCYTES 5.80 03/19/2024 10:44 AM    EOSINOPHILS 3.40 03/19/2024 10:44 AM    BASOPHILS 0.60 03/19/2024 10:44 AM    HYPOCHROMIA 1+ 09/11/2013 08:34 AM    ANISOCYTOSIS 1+ 12/03/2020 09:45 PM        Lab Results   Component Value Date/Time    HBA1C 9.6 (A) 06/13/2025 06:57 AM          Imaging:   I personally reviewed following images          Results  - Imaging (MRI of the lumbar spine from 01/23/2025):    - Degenerative disk disease with a high intensity zone at L4-5 consistent with an annular tear    - Foraminal stenosis bilaterally worse at L4-5    - Facet arthropathy bilaterally at L3-4, L4-5, and L5-S1    - No areas of high grade central canal stenosis      - MRI cervical spine:    - No high-grade central canal or neuroforaminal stenosis    - Mild degenerative changes  - MRI lumbar spine (04/29/2025):    - Facet arthropathy bilaterally at L4-5 and L5-S1    - Mild foraminal stenosis bilaterally at L4-5    - No high-grade central canal stenosis       I reviewed the following radiology reports          Results for orders placed during the  hospital encounter of 03/09/21    MR-BRAIN-W/O    Impression  1.  No acute abnormality.  2.  A few punctate nonspecific supratentorial T2 hyperintensities likely representing nonspecific foci of gliosis/minimal chronic ischemia.             Results for orders placed during the hospital encounter of 07/26/22    MR-CERVICAL SPINE-W/O    Impression  1.  There is no spinal or neural foraminal stenosis.  2.  There is an approximately 1 cm sized nodule in the right lobe of the thyroid.      Results for orders placed during the hospital encounter of 01/23/25    MR-LUMBAR SPINE-W/O    Impression  1. Multilevel degenerative disc disease and posterior facet osteoarthrosis as detailed above.  2. There is a left foraminal extrusion versus sequestration with some crowding of the exiting left L4 nerve root.        Results for orders placed during the hospital encounter of 01/23/25    MR-LUMBAR SPINE-W/O    Impression  1. Multilevel degenerative disc disease and posterior facet osteoarthrosis as detailed above.  2. There is a left foraminal extrusion versus sequestration with some crowding of the exiting left L4 nerve root.                                                Results for orders placed during the hospital encounter of 12/18/23    CT-CTA NECK WITH & W/O-POST PROCESSING    Impression  No high-grade stenosis, dissection or occlusion of the cervical carotid or vertebral arteries.               Results for orders placed during the hospital encounter of 02/12/16    CT-CHEST,ABDOMEN,PELVIS WITH    Impression  1. No solid organ or vascular injury is identified.    2. Subcutaneous stranding and a small subcutaneous hematoma in the left anterior thorax.    3. Hepatic steatosis.    4. Status post cholecystectomy with mild prominence of the common duct likely representing biliary ectasia in the setting of prior cholecystectomy.    5. Fat-containing ventral hernia in the lower abdomen to the right of midline.            Results for  orders placed during the hospital encounter of 12/07/12    CT-ABDOMEN-PELVIS WITH    Impression  1. No acute abnormality identified within the abdomen or pelvis.    2. Right paramedian pelvic ventral hernia containing omental fat no bowel.    3. Hepatic fatty infiltration.    4. Previous cholecystectomy.    5. Previous hysterectomy                    Results for orders placed during the hospital encounter of 03/09/21    DX-CERVICAL SPINE-2 OR 3 VIEWS    Impression  No acute fracture or malalignment.     Results for orders placed in visit on 05/11/22    DX-CHEST-2 VIEWS    Impression  Negative two views of the chest.     Results for orders placed during the hospital encounter of 05/28/20    DX-ELBOW-COMPLETE 3+ RIGHT    Impression  No evidence of acute fracture or dislocation.                  Results for orders placed during the hospital encounter of 05/28/20    DX-KNEE COMPLETE 4+ LEFT    Impression  Mild tricompartment degenerative change. No evidence of fracture.   Results for orders placed in visit on 07/29/24    DX-LUMBAR SPINE-2 OR 3 VIEWS    Impression  1. Interval development of 2 mm anterolisthesis of L4 with respect to L5.  2. No fracture.  3. Stable physiologic lumbar dextrocurvature with multilevel degenerative disc disease and facet arthrosis.             Results for orders placed in visit on 02/19/18    DX-SHOULDER 2+ RIGHT    Impression  Unremarkable shoulder series.            INTERPRETING LOCATION:  [address] [place] [address], [id number]    Results for orders placed in visit on 02/19/18    DX-THORACIC SPINE-2 VIEWS    Impression  Unremarkable thoracic spine.            DIAGNOSIS   Visit Diagnoses     ICD-10-CM   1. Cervical radiculopathy  M54.12   2. Cervical spondylosis  M47.812   3. Lumbar radiculopathy  M54.16   4. Lumbar spondylosis  M47.816   5. Myalgia  M79.10   6. Diabetic polyneuropathy associated with type 2 diabetes mellitus (HCC)  E11.42   7. Risk for falls  Z91.81           ASSESSMENT and PLAN:      Sonya Wei  1965 female      Sonya was seen today for follow-up.    Diagnoses and all orders for this visit:    Cervical radiculopathy  -     Pain Hospital Procedure; Future    Cervical spondylosis    Lumbar radiculopathy    Lumbar spondylosis    Myalgia    Diabetic polyneuropathy associated with type 2 diabetes mellitus (HCC)    Risk for falls  -     Patient identified as fall risk.  Appropriate orders and counseling given.          Assessment & Plan  1. Cervical radiculopathy: Neck pain radiates down both arms, confirmed by positive Spurling's maneuver.  - Plan for C7-T1 cervical interlaminar epidural steroid injection  - Discussed risks, benefits, and alternatives  - Needs clearance to be off aspirin for 5 days prior    2. Cervical spondylosis: Pain likely from C3-4, C4-5, C5-6 facets.  - If conservative treatments fail, consider diagnostic medial branch blocks    3. Lumbar radiculopathy: Stable.    4. Lumbar spondylosis: Uncontrolled.  - If conservative treatments fail, consider diagnostic medial branch blocks at bilateral lumbar L4-5 and L5-S1 facet joints  - Continue home exercise program    5. Diabetic peripheral polyneuropathy.  - Continue gabapentin    6. Carpal tunnel syndrome.  - Patient reports she has upcoming testing    7. Vertigo: Recurrence over past few weeks.  - Plans to visit urgent care for evaluation.  Continue follow-up with PCP    Follow-up  - In 4 to 6 weeks post-procedure        Thank you for allowing me to participate in the care of this patient. If you have any questions please not hesitate to contact me.             Please note that this dictation was created using voice recognition software. I have made every reasonable attempt to correct obvious errors but there may be errors of grammar and content that I may have overlooked prior to finalization of this note.      Trenton Bob MD  Interventional Spine and Sports Physiatry  Physical Medicine and  [facility]

## 2025-06-17 NOTE — PROGRESS NOTES
[facility] (Physical Medicine and Rehabilitation)  Interventional Pain and Spine        Chief complaint:   Chief Complaint   Patient presents with    Follow-Up     Back pain          HISTORY    Please see new patient note by Dr Bob,  for more details.     HPI  Patient identification: Sonya Wei ,  1965,   With The primary encounter diagnosis was Cervical radiculopathy. Diagnoses of Cervical spondylosis, Lumbar radiculopathy, Lumbar spondylosis, Myalgia, Diabetic polyneuropathy associated with type 2 diabetes mellitus (HCC), and Risk for falls were also pertinent to this visit.       Verbal consent was obtained for Sean copilot : Yes      History of Present Illness    The patient, a 59-year-old female, presents for a follow-up visit.    Chronic Cervicalgia and Bilateral Shoulder Pain  She reports chronic cervicalgia rated at 6/10, bilateral shoulder pain, and chronic lumbar pain rated at 5/10. The cervicalgia has exacerbated and radiates bilaterally down her arms, resulting in a sensation of heaviness in the shoulders and occasional dyspnea. She generally avoids analgesic medications.  - Onset: Chronic, with recent exacerbation.  - Location: Cervical region, radiating bilaterally down her arms; bilateral shoulders.  - Character: Cervicalgia rated at 6/10, heaviness in shoulders, occasional dyspnea.  - Alleviating/Aggravating Factors: Avoids analgesic medications.  - Radiation: Radiates bilaterally down her arms.  - Severity: Cervicalgia rated at 6/10, lumbar pain rated at 5/10.    Activity Limitations and Symptoms  The patient can engage in activities in the garage for approximately 1.5 to 2 hours before requiring rest due to symptoms of dehydration, dizziness, and lightheadedness. Her therapist has recommended testing for rheumatoid arthritis, which she plans to discuss with Dr. Eason.  - Onset: Symptoms occur after 1.5 to 2 hours of activity.  - Character: Dehydration, dizziness,  lightheadedness.  - Alleviating/Aggravating Factors: Requires rest after activity.  - Timing: Symptoms occur during and after activity.    Peripheral Neuropathy and Carpal Tunnel Syndrome  She also reports peripheral neuropathy in her feet and carpal tunnel syndrome, for which she has been referred for diagnostic testing.  - Location: Feet (peripheral neuropathy), wrists (carpal tunnel syndrome).  - Character: Peripheral neuropathy, carpal tunnel syndrome.  - Timing: Referred for diagnostic testing.    Vertigo and Cardiac Tremor  Additionally, she has experienced a recurrence of vertigo over the past few weeks and a sensation of cardiac tremor over the past two days, for which she intends to seek evaluation at urgent care.  - Onset: Vertigo recurrence over the past few weeks; cardiac tremor sensation over the past two days.  - Character: Vertigo, sensation of cardiac tremor.  - Timing: Vertigo over the past few weeks; cardiac tremor over the past two days.    MEDICATIONS  - Gabapentin  - Acetaminophen  - Flexeril  - Toradol  - Methocarbamol  - Tizanidine    Conservative treatments including the past two months within the past six months  NSAIDs: yes  Acetaminophen: yes  Home exercise program or physical therapy: yes  Activity modification: yes         ROS Red Flags :   Fever, Chills, Sweats: Denies  Involuntary Weight Loss: Denies  Bowel/Bladder Incontinence: Denies  Saddle Anesthesia: Denies        PMHx:   Past Medical History:   Diagnosis Date    Anemia     Bowel habit changes     constipation    Diabetes     Diabetes (HCC)     GERD (gastroesophageal reflux disease)     Healthcare maintenance 9/27/2014    Mammogram: Due    High cholesterol     Hyperlipidemia     Hypertension     Infectious disease     Cold 10/2016    Jaundice 1999    Psychiatric problem     depression and anxiety    Vaginal itching 8/27/2014    With anal itching X 1 month Getting worse Burning Min vag disch       PSHx:   Past Surgical History:    Procedure Laterality Date    NV NJX AA&/STRD TFRML EPI LUMBAR/SACRAL 1 LEVEL Bilateral 3/20/2025    Procedure: BILATERAL L4-5 transforaminal epidural steroid injection with fluoroscopic guidance with sedation…Note: 22-5. Plan on sedation 1mg versed and 50mcg fentanyl;  Surgeon: Trenton Bob M.D.;  [facility];  Service: Pain Management    VENTRAL HERNIA REPAIR ROBOTIC XI N/A 10/14/2016    Procedure: VENTRAL HERNIA REPAIR ROBOTIC XI FOR INCISIONAL W/MESH;  Surgeon: Edi Mendoza M.D.;  Location: SURGERY [facility];  Service:     ABDOMINAL HYSTERECTOMY TOTAL      still has ovaries    CHOLECYSTECTOMY      PRIMARY C SECTION      TUBAL COAGULATION LAPAROSCOPIC BILATERAL         Family history   Family History   Problem Relation Age of Onset    Diabetes Mother     Hyperlipidemia Mother     Diabetes Father     Hypertension Father     Hyperlipidemia Father          Medications:   Outpatient Medications Marked as Taking for the 6/17/25 encounter (Office Visit) with Trenton Bob M.D.   Medication Sig Dispense Refill    lisinopril (PRINIVIL) 10 MG Tab Take 1 Tablet by mouth every day. 100 Tablet 3    glipiZIDE (GLUCOTROL) 10 MG Tab Take 1 Tablet by mouth every day. 100 Tablet 3    Semaglutide, 2 MG/DOSE, 8 MG/3ML Solution Pen-injector Inject 2 mg under the skin every 7 days. 3 mL 3    gabapentin (NEURONTIN) 300 MG Cap Take 1 Capsule by mouth 3 times a day. 270 Capsule 3    magnesium oxide (MAG-OX) 400 MG Tab tablet Take 400 mg by mouth every day.      Empagliflozin-metFORMIN HCl (SYNJARDY) 12.5-1000 MG Tab Take 1 Tablet by mouth 2 times a day.      aspirin (ASA) 81 MG Chew Tab chewable tablet Chew 81 mg every day.      multivitamin Tab Take 1 Tablet by mouth every day.      albuterol 108 (90 Base) MCG/ACT Aero Soln inhalation aerosol Inhale 2 Puffs every 6 hours as needed for Shortness of Breath. 8.5 g 0    rosuvastatin (CRESTOR) 20 MG Tab Take 1 Tablet by mouth every evening. 90  Tablet 3    amLODIPine (NORVASC) 10 MG Tab Take 1 tablet by mouth once daily 90 Tablet 3    hydroCHLOROthiazide 25 MG Tab Take 1 tablet by mouth once daily 90 Tablet 3    acetaminophen (TYLENOL) 500 MG Tab Take 1-2 Tablets by mouth every 6 hours as needed for Moderate Pain. 30 Tablet 0    ibuprofen (MOTRIN) 600 MG Tab Take 1 Tablet by mouth every 6 hours as needed for Mild Pain or Moderate Pain. 30 Tablet 0        Current Outpatient Medications on File Prior to Visit   Medication Sig Dispense Refill    lisinopril (PRINIVIL) 10 MG Tab Take 1 Tablet by mouth every day. 100 Tablet 3    glipiZIDE (GLUCOTROL) 10 MG Tab Take 1 Tablet by mouth every day. 100 Tablet 3    Semaglutide, 2 MG/DOSE, 8 MG/3ML Solution Pen-injector Inject 2 mg under the skin every 7 days. 3 mL 3    gabapentin (NEURONTIN) 300 MG Cap Take 1 Capsule by mouth 3 times a day. 270 Capsule 3    magnesium oxide (MAG-OX) 400 MG Tab tablet Take 400 mg by mouth every day.      Empagliflozin-metFORMIN HCl (SYNJARDY) 12.5-1000 MG Tab Take 1 Tablet by mouth 2 times a day.      aspirin (ASA) 81 MG Chew Tab chewable tablet Chew 81 mg every day.      multivitamin Tab Take 1 Tablet by mouth every day.      albuterol 108 (90 Base) MCG/ACT Aero Soln inhalation aerosol Inhale 2 Puffs every 6 hours as needed for Shortness of Breath. 8.5 g 0    rosuvastatin (CRESTOR) 20 MG Tab Take 1 Tablet by mouth every evening. 90 Tablet 3    amLODIPine (NORVASC) 10 MG Tab Take 1 tablet by mouth once daily 90 Tablet 3    hydroCHLOROthiazide 25 MG Tab Take 1 tablet by mouth once daily 90 Tablet 3    acetaminophen (TYLENOL) 500 MG Tab Take 1-2 Tablets by mouth every 6 hours as needed for Moderate Pain. 30 Tablet 0    ibuprofen (MOTRIN) 600 MG Tab Take 1 Tablet by mouth every 6 hours as needed for Mild Pain or Moderate Pain. 30 Tablet 0     No current facility-administered medications on file prior to visit.         Allergies:   Allergies   Allergen Reactions    Latex Rash    Morphine  "Itching     Rxn = unknown   Bumps all over body    Other Misc Rash     Rash from gloves at work       Social Hx:   Social History     Socioeconomic History    Marital status:      Spouse name: Not on file    Number of children: Not on file    Years of education: Not on file    Highest education level: Not on file   Occupational History    Not on file   Tobacco Use    Smoking status: Never    Smokeless tobacco: Never   Vaping Use    Vaping status: Never Used   Substance and Sexual Activity    Alcohol use: No    Drug use: No    Sexual activity: Yes     Partners: Male     Birth control/protection: None   Other Topics Concern    Not on file   Social History Narrative    ** Merged History Encounter **          Social Drivers of Health     Financial Resource Strain: Not on file   Food Insecurity: Not on file   Transportation Needs: Not on file   Physical Activity: Not on file   Stress: Not on file   Social Connections: Not on file   Intimate Partner Violence: Not on file   Housing Stability: Not on file         EXAMINATION     Physical Exam:   Vitals: /63 (BP Location: Left arm, Patient Position: Sitting, BP Cuff Size: Large adult)   Pulse 96   Temp 36.3 °C (97.4 °F) (Temporal)   Ht 1.549 m (5' 1\")   Wt 67.6 kg (149 lb)   SpO2 94%     Constitutional:   Body Habitus: Body mass index is 28.15 kg/m².  Cooperation: Fully cooperates with exam  Appearance: Well-groomed no disheveled    Respiratory-  breathing comfortable on room air, no audible wheezing  Cardiovascular- capillary refills less than 2 seconds. No lower extremity edema is noted.   Psychiatric- alert and oriented ×3. Normal affect.    MSK and Neuro: -    Physical Exam  Examined neck and musculoskeletal system.     Cervical spine    decreased active range of motion with flexion, lateral flexion, and rotation bilaterally.   There is decreased active range of motion with cervical extension.      Palpation:   Tenderness to palpation throughout the " cervical spine: positive bilaterally        Cervical spine Special tests  Neuro tension  Spurling's maneuver positive bilaterally    Cervical facet loading maneuver  positive bilaterally        Key points for the international standards for neurological classification of spinal cord injury (ISNCSCI) to light touch.     Dermatome R L   C4 2 2   C5 2 2   C6 2 2   C7 2 2   C8 2 2   T1 2 2   T2 2 2                                         Motor Exam Upper Extremities   ? Myotome R L   Shoulder flexion C5 5 5   Elbow flexion C5 5 5   Wrist extension C6 5 5   Elbow extension C7 5 5   Finger flexion C8 5 5   Finger abduction T1 5 5       Thoracic/Lumbar Spine/Sacral Spine/Hips   There are no signs of infection around the injection sites.   decreased active range of motion with flexion, lateral flexion, and rotation bilaterally.   There is decreased active range of motion with lumbar extension.    There is  pain with lumbar extension.   There is pain with facet loading maneuver (extension rotation) with axial low back pain on the BILATERAL side(s) L4-5 and L5-S1      Palpation:   No tenderness to palpation in midline at T1-T12 levels. No tenderness to palpation in the left and right of the midline T1-L5, NEGATIVE for tenderness to palpation to the para-midline region in the lower lumbar levels.  palpation over SI joint: negative bilaterally    palpation in hip or over the gluteus medius tendon insertion: negative bilaterally      Lumbar spine Special tests  Neuro tension  Straight leg test negative bilaterally    Slump test negative bilaterally      Key points for the international standards for neurological classification of spinal cord injury (ISNCSCI) to light touch.     Dermatome R L                                      L2 2 2   L3 2 2   L4 2 2   L5 2 2   S1 2 2   S2 2 2         Motor Exam Lower Extremities    ? Myotome R L   Hip flexion L2 5 5   Knee extension L3 5 5   Ankle dorsiflexion L4 5 5   Toe extension L5 5 5    Ankle plantarflexion S1 5 5           MEDICAL DECISION MAKING    DATA    Labs:   Lab Results   Component Value Date/Time    SODIUM 138 10/02/2024 09:37 AM    POTASSIUM 4.7 10/02/2024 09:37 AM    CHLORIDE 101 10/02/2024 09:37 AM    CO2 25 10/02/2024 09:37 AM    GLUCOSE 262 (H) 10/02/2024 09:37 AM    BUN 25 (H) 10/02/2024 09:37 AM    CREATININE 0.81 10/02/2024 09:37 AM        Lab Results   Component Value Date/Time    PROTHROMBTM 12.9 12/18/2023 04:07 PM    INR 0.96 12/18/2023 04:07 PM        Lab Results   Component Value Date/Time    WBC 6.2 03/19/2024 10:44 AM    RBC 4.51 03/19/2024 10:44 AM    HEMOGLOBIN 13.3 03/19/2024 10:44 AM    HEMATOCRIT 39.7 03/19/2024 10:44 AM    MCV 88.0 03/19/2024 10:44 AM    MCH 29.5 03/19/2024 10:44 AM    MCHC 33.5 03/19/2024 10:44 AM    MPV 11.9 03/19/2024 10:44 AM    NEUTSPOLYS 60.00 03/19/2024 10:44 AM    LYMPHOCYTES 29.70 03/19/2024 10:44 AM    MONOCYTES 5.80 03/19/2024 10:44 AM    EOSINOPHILS 3.40 03/19/2024 10:44 AM    BASOPHILS 0.60 03/19/2024 10:44 AM    HYPOCHROMIA 1+ 09/11/2013 08:34 AM    ANISOCYTOSIS 1+ 12/03/2020 09:45 PM        Lab Results   Component Value Date/Time    HBA1C 9.6 (A) 06/13/2025 06:57 AM          Imaging:   I personally reviewed following images          Results  - Imaging (MRI of the lumbar spine from 01/23/2025):    - Degenerative disk disease with a high intensity zone at L4-5 consistent with an annular tear    - Foraminal stenosis bilaterally worse at L4-5    - Facet arthropathy bilaterally at L3-4, L4-5, and L5-S1    - No areas of high grade central canal stenosis      - MRI cervical spine:    - No high-grade central canal or neuroforaminal stenosis    - Mild degenerative changes  - MRI lumbar spine (04/29/2025):    - Facet arthropathy bilaterally at L4-5 and L5-S1    - Mild foraminal stenosis bilaterally at L4-5    - No high-grade central canal stenosis       I reviewed the following radiology reports          Results for orders placed during the  hospital encounter of 03/09/21    MR-BRAIN-W/O    Impression  1.  No acute abnormality.  2.  A few punctate nonspecific supratentorial T2 hyperintensities likely representing nonspecific foci of gliosis/minimal chronic ischemia.             Results for orders placed during the hospital encounter of 07/26/22    MR-CERVICAL SPINE-W/O    Impression  1.  There is no spinal or neural foraminal stenosis.  2.  There is an approximately 1 cm sized nodule in the right lobe of the thyroid.      Results for orders placed during the hospital encounter of 01/23/25    MR-LUMBAR SPINE-W/O    Impression  1. Multilevel degenerative disc disease and posterior facet osteoarthrosis as detailed above.  2. There is a left foraminal extrusion versus sequestration with some crowding of the exiting left L4 nerve root.        Results for orders placed during the hospital encounter of 01/23/25    MR-LUMBAR SPINE-W/O    Impression  1. Multilevel degenerative disc disease and posterior facet osteoarthrosis as detailed above.  2. There is a left foraminal extrusion versus sequestration with some crowding of the exiting left L4 nerve root.                                                Results for orders placed during the hospital encounter of 12/18/23    CT-CTA NECK WITH & W/O-POST PROCESSING    Impression  No high-grade stenosis, dissection or occlusion of the cervical carotid or vertebral arteries.               Results for orders placed during the hospital encounter of 02/12/16    CT-CHEST,ABDOMEN,PELVIS WITH    Impression  1. No solid organ or vascular injury is identified.    2. Subcutaneous stranding and a small subcutaneous hematoma in the left anterior thorax.    3. Hepatic steatosis.    4. Status post cholecystectomy with mild prominence of the common duct likely representing biliary ectasia in the setting of prior cholecystectomy.    5. Fat-containing ventral hernia in the lower abdomen to the right of midline.            Results for  orders placed during the hospital encounter of 12/07/12    CT-ABDOMEN-PELVIS WITH    Impression  1. No acute abnormality identified within the abdomen or pelvis.    2. Right paramedian pelvic ventral hernia containing omental fat no bowel.    3. Hepatic fatty infiltration.    4. Previous cholecystectomy.    5. Previous hysterectomy                    Results for orders placed during the hospital encounter of 03/09/21    DX-CERVICAL SPINE-2 OR 3 VIEWS    Impression  No acute fracture or malalignment.     Results for orders placed in visit on 05/11/22    DX-CHEST-2 VIEWS    Impression  Negative two views of the chest.     Results for orders placed during the hospital encounter of 05/28/20    DX-ELBOW-COMPLETE 3+ RIGHT    Impression  No evidence of acute fracture or dislocation.                  Results for orders placed during the hospital encounter of 05/28/20    DX-KNEE COMPLETE 4+ LEFT    Impression  Mild tricompartment degenerative change. No evidence of fracture.   Results for orders placed in visit on 07/29/24    DX-LUMBAR SPINE-2 OR 3 VIEWS    Impression  1. Interval development of 2 mm anterolisthesis of L4 with respect to L5.  2. No fracture.  3. Stable physiologic lumbar dextrocurvature with multilevel degenerative disc disease and facet arthrosis.             Results for orders placed in visit on 02/19/18    DX-SHOULDER 2+ RIGHT    Impression  Unremarkable shoulder series.            INTERPRETING LOCATION:  [address] [place] [address], [address]    Results for orders placed in visit on 02/19/18    DX-THORACIC SPINE-2 VIEWS    Impression  Unremarkable thoracic spine.            DIAGNOSIS   Visit Diagnoses     ICD-10-CM   1. Cervical radiculopathy  M54.12   2. Cervical spondylosis  M47.812   3. Lumbar radiculopathy  M54.16   4. Lumbar spondylosis  M47.816   5. Myalgia  M79.10   6. Diabetic polyneuropathy associated with type 2 diabetes mellitus (HCC)  E11.42   7. Risk for falls  Z91.81           ASSESSMENT and PLAN:      Sonya Wei  1965 female      Sonya was seen today for follow-up.    Diagnoses and all orders for this visit:    Cervical radiculopathy  -     Pain Hospital Procedure; Future    Cervical spondylosis    Lumbar radiculopathy    Lumbar spondylosis    Myalgia    Diabetic polyneuropathy associated with type 2 diabetes mellitus (HCC)    Risk for falls  -     Patient identified as fall risk.  Appropriate orders and counseling given.          Assessment & Plan  1. Cervical radiculopathy: Neck pain radiates down both arms, confirmed by positive Spurling's maneuver.  - Plan for C7-T1 cervical interlaminar epidural steroid injection  - Discussed risks, benefits, and alternatives  - Needs clearance to be off aspirin for 5 days prior    2. Cervical spondylosis: Pain likely from C3-4, C4-5, C5-6 facets.  - If conservative treatments fail, consider diagnostic medial branch blocks    3. Lumbar radiculopathy: Stable.    4. Lumbar spondylosis: Uncontrolled.  - If conservative treatments fail, consider diagnostic medial branch blocks at bilateral lumbar L4-5 and L5-S1 facet joints  - Continue home exercise program    5. Diabetic peripheral polyneuropathy.  - Continue gabapentin    6. Carpal tunnel syndrome.  - Patient reports she has upcoming testing    7. Vertigo: Recurrence over past few weeks.  - Plans to visit urgent care for evaluation.  Continue follow-up with PCP    Follow-up  - In 4 to 6 weeks post-procedure        Thank you for allowing me to participate in the care of this patient. If you have any questions please not hesitate to contact me.             Please note that this dictation was created using voice recognition software. I have made every reasonable attempt to correct obvious errors but there may be errors of grammar and content that I may have overlooked prior to finalization of this note.      Trenton Bob MD  Interventional Spine and Sports Physiatry  Physical Medicine and  [facility]

## 2025-06-18 NOTE — Clinical Note
REFERRAL APPROVAL NOTICE         Sent on June 18, 2025                   Sonya Wei  [address]  [facility] [address]                   Dear Ms. Wei,    After a careful review of the medical information and benefit coverage, [facility] has processed your referral. See below for additional details.    If applicable, you must be actively enrolled with your insurance for coverage of the authorized service. If you have any questions regarding your coverage, please contact your insurance directly.    REFERRAL INFORMATION   Referral #:  [id number]  Referred-To Department    Referred-By Provider:  Pain Management    Trenton Bob M.D.   Pain Management       [address]  [address]  [facility] [address]  [phone] [address] [place] [address]  [phone]    Referral Start Date:  06/18/2025  Referral End Date:   09/18/2025             SCHEDULING  If you do not already have an appointment, please call [phone] to make an appointment.     MORE INFORMATION  If you do not already have a [employer]ix account, sign up at: [email or web address]  You can access your medical information, make appointments, see lab results, billing information, and more.  If you have questions regarding this referral, please contact  the [facility] Referrals department at:             [phone]. Monday - Friday 8:00AM - 5:00PM.     Sincerely,    [facility]

## 2025-06-19 NOTE — Clinical Note
REFERRAL APPROVAL NOTICE         Sent on June 19, 2025                   Sonya Yañezo  [address] [place] [address]                   Dear Ms. Wei,    After a careful review of the medical information and benefit coverage, [facility] has processed your referral. See below for additional details.    If applicable, you must be actively enrolled with your insurance for coverage of the authorized service. If you have any questions regarding your coverage, please contact your insurance directly.    REFERRAL INFORMATION   Referral #:  [id number]  Referred-To Department    Referred-By Provider:  Dermatology    Nu Eason M.D.   D[facility]      [address] [place] [address]  [phone] [address]  [place] [id number] [phone]    Referral Start Date:  06/13/2025  Referral End Date:   06/13/2026             SCHEDULING  If you do not already have an appointment, please call [phone] to make an appointment.     MORE INFORMATION  If you do not already have a [employer] account, sign up at: [email or web address]  You can access your medical information, make appointments, see lab results, billing information, and more.  If you have questions regarding this referral, please contact  the [facility] Referrals department at:             [phone]. Monday - Friday 8:00AM - 5:00PM.     Sincerely,    [facility]

## 2025-06-19 NOTE — Clinical Note
REFERRAL APPROVAL NOTICE         Sent on June 19, 2025                   Sonya Wei  [address] [place] [address]                   Dear Ms. Wei,    After a careful review of the medical information and benefit coverage, [facility] has processed your referral. See below for additional details.    If applicable, you must be actively enrolled with your insurance for coverage of the authorized service. If you have any questions regarding your coverage, please contact your insurance directly.    REFERRAL INFORMATION   Referral #:  [id number]  Referred-To Department    Referred-By Provider:  Hand Surgery    Nu Eason M.D.   [facility]      [address] [place] [id number] [phone] [address] [place] [address]  [place] [address]  [phone]    Referral Start Date:  06/13/2025  Referral End Date:   06/13/2026             SCHEDULING  If you do not already have an appointment, please call [phone] to make an appointment.     MORE INFORMATION  If you do not already have a [employer] account, sign up at: [email or web address]  You can access your medical information, make appointments, see lab results, billing information, and more.  If you have questions regarding this referral, please contact  the [facility] Referrals department at:             [phone]. Monday - Friday 8:00AM - 5:00PM.     Sincerely,    [facility]

## 2025-06-24 ENCOUNTER — APPOINTMENT (OUTPATIENT)
Dept: RADIOLOGY | Facility: REHABILITATION | Age: 60
End: 2025-06-24
Attending: PHYSICAL MEDICINE & REHABILITATION
Payer: COMMERCIAL

## 2025-06-24 ENCOUNTER — HOSPITAL ENCOUNTER (OUTPATIENT)
Facility: REHABILITATION | Age: 60
End: 2025-06-24
Attending: PHYSICAL MEDICINE & REHABILITATION | Admitting: PHYSICAL MEDICINE & REHABILITATION
Payer: COMMERCIAL

## 2025-06-24 VITALS
DIASTOLIC BLOOD PRESSURE: 73 MMHG | HEIGHT: 61 IN | HEART RATE: 74 BPM | BODY MASS INDEX: 27.93 KG/M2 | WEIGHT: 147.93 LBS | SYSTOLIC BLOOD PRESSURE: 143 MMHG | TEMPERATURE: 97.3 F | RESPIRATION RATE: 16 BRPM | OXYGEN SATURATION: 94 %

## 2025-06-24 LAB — GLUCOSE BLD STRIP.AUTO-MCNC: 174 MG/DL (ref 65–99)

## 2025-06-24 PROCEDURE — 700101 HCHG RX REV CODE 250

## 2025-06-24 PROCEDURE — 99152 MOD SED SAME PHYS/QHP 5/>YRS: CPT

## 2025-06-24 PROCEDURE — 700111 HCHG RX REV CODE 636 W/ 250 OVERRIDE (IP)

## 2025-06-24 PROCEDURE — 700117 HCHG RX CONTRAST REV CODE 255

## 2025-06-24 PROCEDURE — 82962 GLUCOSE BLOOD TEST: CPT | Performed by: PHYSICAL MEDICINE & REHABILITATION

## 2025-06-24 PROCEDURE — 62321 NJX INTERLAMINAR CRV/THRC: CPT

## 2025-06-24 RX ORDER — SODIUM CHLORIDE 9 MG/ML
INJECTION, SOLUTION INTRAMUSCULAR; INTRAVENOUS; SUBCUTANEOUS
Status: COMPLETED
Start: 2025-06-24 | End: 2025-06-24

## 2025-06-24 RX ORDER — ONDANSETRON 2 MG/ML
4 INJECTION INTRAMUSCULAR; INTRAVENOUS ONCE
Status: COMPLETED | OUTPATIENT
Start: 2025-06-24 | End: 2025-06-24

## 2025-06-24 RX ORDER — LIDOCAINE HYDROCHLORIDE 10 MG/ML
INJECTION, SOLUTION EPIDURAL; INFILTRATION; INTRACAUDAL; PERINEURAL
Status: COMPLETED
Start: 2025-06-24 | End: 2025-06-24

## 2025-06-24 RX ORDER — ONDANSETRON 2 MG/ML
INJECTION INTRAMUSCULAR; INTRAVENOUS
Status: COMPLETED
Start: 2025-06-24 | End: 2025-06-24

## 2025-06-24 RX ORDER — MIDAZOLAM HYDROCHLORIDE 1 MG/ML
INJECTION INTRAMUSCULAR; INTRAVENOUS
Status: COMPLETED
Start: 2025-06-24 | End: 2025-06-24

## 2025-06-24 RX ORDER — DEXAMETHASONE SODIUM PHOSPHATE 10 MG/ML
INJECTION, SOLUTION INTRAMUSCULAR; INTRAVENOUS
Status: COMPLETED
Start: 2025-06-24 | End: 2025-06-24

## 2025-06-24 RX ADMIN — ONDANSETRON 4 MG: 2 INJECTION INTRAMUSCULAR; INTRAVENOUS at 12:00

## 2025-06-24 RX ADMIN — DEXAMETHASONE SODIUM PHOSPHATE 10 MG: 10 INJECTION, SOLUTION INTRAMUSCULAR; INTRAVENOUS at 11:45

## 2025-06-24 RX ADMIN — SODIUM CHLORIDE 5 ML: 9 INJECTION, SOLUTION INTRAMUSCULAR; INTRAVENOUS; SUBCUTANEOUS at 11:45

## 2025-06-24 RX ADMIN — LIDOCAINE HYDROCHLORIDE 10 ML: 10 INJECTION, SOLUTION EPIDURAL; INFILTRATION; INTRACAUDAL; PERINEURAL at 11:45

## 2025-06-24 RX ADMIN — FENTANYL CITRATE 50 MCG: 50 INJECTION, SOLUTION INTRAMUSCULAR; INTRAVENOUS at 11:44

## 2025-06-24 RX ADMIN — MIDAZOLAM HYDROCHLORIDE 1 MG: 1 INJECTION, SOLUTION INTRAMUSCULAR; INTRAVENOUS at 11:44

## 2025-06-24 RX ADMIN — IOHEXOL 5 ML: 240 INJECTION, SOLUTION INTRATHECAL; INTRAVASCULAR; INTRAVENOUS; ORAL at 11:45

## 2025-06-24 ASSESSMENT — PAIN DESCRIPTION - PAIN TYPE: TYPE: CHRONIC PAIN

## 2025-06-24 ASSESSMENT — FIBROSIS 4 INDEX: FIB4 SCORE: 1.07

## 2025-06-24 NOTE — OP REPORT
Date of Service: 6/24/2025    Physician/s: Trenton Bob MD    Pre-operative Diagnosis: Cervical radiculopathy    Post-operative Diagnosis: Cervical radiculopathy    Procedure: C7-T1  Cervical interlaminar epidural steroid injection with sedation     Description of procedure:    The risks, benefits, and alternatives of the procedure were reviewed and discussed with the patient.  Written informed consent was freely obtained. A pre-procedural time-out was conducted by the physician verifying patient’s identity, procedure to be performed, procedure site and side, and allergy verification. Appropriate equipment was determined to be in place for the procedure.     Moderation sedation was achieved with Versed (1mg) . Monitoring of the patients vital signs and respiratory status was provided by trained independent registered nurse during the entire course of the procedures and under my supervision and recoded in the patient’s medical record. The duration of sedation was over 10 minutes.     The patient's vital signs were carefully monitored before, throughout, and after the procedure.     In the fluoroscopy suite the patient was placed in a prone position, a pillow placed underneath their chest. The skin was prepped and draped in the usual sterile fashion. The fluoroscope was placed over the cervical neck at the appropriate injection AP angle view, and the target for injection was marked. A 25g needle was placed into the marked site, and approx 2mL of 1% Lidocaine was injected subcutaneously into the epidermal and dermal layers. The needle was removed. A 20g Tuohy needle was then placed and advanced under fluoroscopic guidance into the  C7-T1 interlaminar space at both the initial position AP view and contralateral oblique at a lateral view to ensure proper location of the needle tip at all times.  The needle was advanced with fluoroscopic guidance to the superior aspect of the T1 lamina.  Then a contralateral oblique  view was used to advance the needle slightly towards the epidural space, A loss-of-resistance technique was used to guide the needle into the epidural space in a lateral fluoroscopic view and confirmed with loss of resistance with sterile normal saline. contrast dye was used to highlight the epidural space spread while the fluoroscope was running live. Following negative aspiration, 1mL of 10mg/mL of dexamethasone followed by 2 mL of sterile saline . The needle was removed intact after restyleted. The patient's back was covered with a 4x4 gauze, the area was cleansed with sterile normal saline, and a dressing was applied. There were no complications noted.     The patient was then evaluated post-procedure, and was hemodynamically stable prior to leaving the post-operative care unit.       Follow-up as scheduled    Trenton Bob MD  Interventional Spine and Pain  Physical Medicine and Rehabilitation  [facility]        CPT  interlaminar cervical/thoracic epidural: 34987  moderate procedural sedation first 15 minutes: [id number]

## 2025-06-24 NOTE — PROGRESS NOTES
1059 Pt arrived to pre-procedure area. Procedure & plan for recovery reviewed, site confirmed, & consent signed. VSS. Allergies & current medications verified. Appointed  to be called for DC. Printed discharge instructions discussed & signed. MD to bedside prior to procedure. FSBG checked pre-procedure= 174.    1154 Pt to recovery area s/p SEGUNDO & updates received from procedure RN. Pt with N/V, medication given per MD order. Dr. Bob to bedside for post-procedure evaluation.      1215 Nausea resolving. Pt reports small amount of emesis this morning PTA.     1233 DC criteria met. PIV removed. RN assisted pt off unit via WC to appointed .

## 2025-06-24 NOTE — OR SURGEON
Immediate Post OP Note    Pre-Op Diagnosis Codes:      * Cervical radiculopathy [M54.12]      Post-Op Diagnosis Codes:     * Cervical radiculopathy [M54.12]      Procedure(s):  cervical C7-T1 interlaminar epidural steroid injection with sedation    sedation versed 1mg and fentanyl 50mcg - Wound Class: Clean    Surgeon(s):  Trenton Bob M.D.    Anesthesiologist/Type of Anesthesia:  No anesthesia staff entered./Local    Surgical Staff:  Circulator: Etta Win R.N.  Scrub Person: Padmaja Posey R.N.  Radiology Technologist: Efrain Manuel    Specimens removed if any:  * No specimens in log *    Estimated Blood Loss: None    Findings: None    Complications: None        6/24/2025 11:56 AM Trenton Bob M.D.

## 2025-06-24 NOTE — INTERVAL H&P NOTE
Consented Procedure: cervical C7-T1 interlaminar epidural steroid injection with sedation…Note: plan on sedation versed 1mg and fentanyl 50mcg  I have examined the patient, provided the risks, benefits, and alternatives to the procedure(s) indicated on the signed consent form, and the patient wishes to proceed.    H&P reviewed. The patient was examined and there are no changes to the H&P      Trenton Bob M.D.  06/24/25 11:30 AM

## 2025-06-25 ENCOUNTER — TELEPHONE (OUTPATIENT)
Dept: PHYSICAL MEDICINE AND REHAB | Facility: MEDICAL CENTER | Age: 60
End: 2025-06-25
Payer: COMMERCIAL

## 2025-06-25 NOTE — TELEPHONE ENCOUNTER
Called for post-sp check-up. Pt reported the following regarding the procedure site: cervical C7-T1 interlaminar epidural steroid injection with sedation     Change in pain?: Yes    Concerns?: No    Confirmed [facility] appt?: Yes

## 2025-07-09 ENCOUNTER — HOSPITAL ENCOUNTER (OUTPATIENT)
Dept: LAB | Facility: MEDICAL CENTER | Age: 60
End: 2025-07-09
Attending: FAMILY MEDICINE
Payer: COMMERCIAL

## 2025-07-09 DIAGNOSIS — Z11.1 SCREENING-PULMONARY TB: ICD-10-CM

## 2025-07-09 DIAGNOSIS — Z79.4 TYPE 2 DIABETES MELLITUS WITHOUT COMPLICATION, WITH LONG-TERM CURRENT USE OF INSULIN (HCC): ICD-10-CM

## 2025-07-09 DIAGNOSIS — E55.9 VITAMIN D DEFICIENCY: ICD-10-CM

## 2025-07-09 DIAGNOSIS — E11.9 TYPE 2 DIABETES MELLITUS WITHOUT COMPLICATION, WITH LONG-TERM CURRENT USE OF INSULIN (HCC): ICD-10-CM

## 2025-07-09 DIAGNOSIS — E11.65 UNCONTROLLED TYPE 2 DIABETES MELLITUS WITH HYPERGLYCEMIA (HCC): ICD-10-CM

## 2025-07-09 LAB
25(OH)D3 SERPL-MCNC: 32 NG/ML (ref 30–100)
ALBUMIN SERPL BCP-MCNC: 4 G/DL (ref 3.2–4.9)
ALBUMIN/GLOB SERPL: 1.2 G/DL
ALP SERPL-CCNC: 80 U/L (ref 30–99)
ALT SERPL-CCNC: 19 U/L (ref 2–50)
ANION GAP SERPL CALC-SCNC: 13 MMOL/L (ref 7–16)
AST SERPL-CCNC: 25 U/L (ref 12–45)
BILIRUB SERPL-MCNC: 0.3 MG/DL (ref 0.1–1.5)
BUN SERPL-MCNC: 35 MG/DL (ref 8–22)
CALCIUM ALBUM COR SERPL-MCNC: 9.6 MG/DL (ref 8.5–10.5)
CALCIUM SERPL-MCNC: 9.6 MG/DL (ref 8.5–10.5)
CHLORIDE SERPL-SCNC: 103 MMOL/L (ref 96–112)
CHOLEST SERPL-MCNC: 226 MG/DL (ref 100–199)
CO2 SERPL-SCNC: 20 MMOL/L (ref 20–33)
CREAT SERPL-MCNC: 0.98 MG/DL (ref 0.5–1.4)
CREAT UR-MCNC: 65.6 MG/DL
EST. AVERAGE GLUCOSE BLD GHB EST-MCNC: 200 MG/DL
FASTING STATUS PATIENT QL REPORTED: NORMAL
GFR SERPLBLD CREATININE-BSD FMLA CKD-EPI: 66 ML/MIN/1.73 M 2
GLOBULIN SER CALC-MCNC: 3.3 G/DL (ref 1.9–3.5)
GLUCOSE SERPL-MCNC: 187 MG/DL (ref 65–99)
HBA1C MFR BLD: 8.6 % (ref 4–5.6)
HDLC SERPL-MCNC: 52 MG/DL
LDLC SERPL CALC-MCNC: 125 MG/DL
MICROALBUMIN UR-MCNC: 144 MG/DL
MICROALBUMIN/CREAT UR: 2195 MG/G (ref 0–30)
POTASSIUM SERPL-SCNC: 4.5 MMOL/L (ref 3.6–5.5)
PROT SERPL-MCNC: 7.3 G/DL (ref 6–8.2)
SODIUM SERPL-SCNC: 136 MMOL/L (ref 135–145)
TRIGL SERPL-MCNC: 244 MG/DL (ref 0–149)

## 2025-07-09 PROCEDURE — 86480 TB TEST CELL IMMUN MEASURE: CPT

## 2025-07-09 PROCEDURE — 82043 UR ALBUMIN QUANTITATIVE: CPT

## 2025-07-09 PROCEDURE — 82306 VITAMIN D 25 HYDROXY: CPT

## 2025-07-09 PROCEDURE — 80061 LIPID PANEL: CPT

## 2025-07-09 PROCEDURE — 83036 HEMOGLOBIN GLYCOSYLATED A1C: CPT

## 2025-07-09 PROCEDURE — 36415 COLL VENOUS BLD VENIPUNCTURE: CPT

## 2025-07-09 PROCEDURE — 82570 ASSAY OF URINE CREATININE: CPT

## 2025-07-09 PROCEDURE — 80053 COMPREHEN METABOLIC PANEL: CPT

## 2025-07-10 LAB
GAMMA INTERFERON BACKGROUND BLD IA-ACNC: 0.04 IU/ML
M TB IFN-G BLD-IMP: NEGATIVE
M TB IFN-G CD4+ BCKGRND COR BLD-ACNC: -0.01 IU/ML
MITOGEN IGNF BCKGRD COR BLD-ACNC: >10 IU/ML
QFT TB2 - NIL TBQ2: -0.01 IU/ML

## 2025-07-20 DIAGNOSIS — I16.0 HYPERTENSIVE URGENCY: ICD-10-CM

## 2025-07-21 ENCOUNTER — OFFICE VISIT (OUTPATIENT)
Dept: URGENT CARE | Facility: PHYSICIAN GROUP | Age: 60
End: 2025-07-21
Payer: COMMERCIAL

## 2025-07-21 VITALS
TEMPERATURE: 97.8 F | RESPIRATION RATE: 23 BRPM | WEIGHT: 153.8 LBS | SYSTOLIC BLOOD PRESSURE: 158 MMHG | DIASTOLIC BLOOD PRESSURE: 82 MMHG | BODY MASS INDEX: 29.04 KG/M2 | OXYGEN SATURATION: 97 % | HEART RATE: 91 BPM | HEIGHT: 61 IN

## 2025-07-21 DIAGNOSIS — I10 ELEVATED BLOOD PRESSURE READING IN OFFICE WITH DIAGNOSIS OF HYPERTENSION: ICD-10-CM

## 2025-07-21 DIAGNOSIS — R68.89 FLU-LIKE SYMPTOMS: Primary | ICD-10-CM

## 2025-07-21 PROCEDURE — 3079F DIAST BP 80-89 MM HG: CPT | Performed by: NURSE PRACTITIONER

## 2025-07-21 PROCEDURE — 3077F SYST BP >= 140 MM HG: CPT | Performed by: NURSE PRACTITIONER

## 2025-07-21 PROCEDURE — 99213 OFFICE O/P EST LOW 20 MIN: CPT | Performed by: NURSE PRACTITIONER

## 2025-07-21 PROCEDURE — 87637 SARSCOV2&INF A&B&RSV AMP PRB: CPT | Performed by: NURSE PRACTITIONER

## 2025-07-21 PROCEDURE — 87651 STREP A DNA AMP PROBE: CPT | Performed by: NURSE PRACTITIONER

## 2025-07-21 RX ORDER — DEXTROMETHORPHAN HYDROBROMIDE AND PROMETHAZINE HYDROCHLORIDE 15; 6.25 MG/5ML; MG/5ML
5 SYRUP ORAL EVERY 4 HOURS PRN
Qty: 120 ML | Refills: 0 | Status: SHIPPED | OUTPATIENT
Start: 2025-07-21 | End: 2025-07-28

## 2025-07-21 RX ORDER — ACETAMINOPHEN 500 MG
1000 TABLET ORAL ONCE
Status: COMPLETED | OUTPATIENT
Start: 2025-07-21 | End: 2025-07-21

## 2025-07-21 RX ORDER — IBUPROFEN 200 MG
600 TABLET ORAL ONCE
Status: COMPLETED | OUTPATIENT
Start: 2025-07-21 | End: 2025-07-21

## 2025-07-21 RX ADMIN — Medication 600 MG: at 18:36

## 2025-07-21 RX ADMIN — Medication 1000 MG: at 18:35

## 2025-07-21 ASSESSMENT — FIBROSIS 4 INDEX: FIB4 SCORE: 1.25

## 2025-07-22 RX ORDER — AMLODIPINE BESYLATE 10 MG/1
10 TABLET ORAL DAILY
Qty: 90 TABLET | Refills: 3 | Status: SHIPPED | OUTPATIENT
Start: 2025-07-22

## 2025-07-22 RX ORDER — HYDROCHLOROTHIAZIDE 25 MG/1
25 TABLET ORAL DAILY
Qty: 90 TABLET | Refills: 3 | Status: SHIPPED | OUTPATIENT
Start: 2025-07-22

## 2025-07-22 NOTE — TELEPHONE ENCOUNTER
Received request via: Patient    Was the patient seen in the last year in this department? Yes    Does the patient have an active prescription (recently filled or refills available) for medication(s) requested? No    Pharmacy Name: [facility]    Does the patient have FCI Plus and need 100-day supply? (This applies to ALL medications) Patient does not have SCP

## 2025-07-22 NOTE — PROGRESS NOTES
"Subjective:     Sonya Wei is a 59 y.o. female who presents for Pharyngitis (Sore throat x 3 days, body aches)      Pharyngitis       Pt presents for evaluation of a new problem. Sonya is a very pleasant 59-year-old female presents to urgent care today with complaints of flulike symptoms that began 2 days ago.  She endorses a sore throat, cough, congestion, fatigue and bodyaches.  She does state that her illness began after she took care of her ill grandson and daughter.  It is unknown what her grandson and daughter were ill with.  They did not receive any treatment and are now feeling better.  She is unable to sleep at night due to her sore throat and cough.  She has not used any medication for her symptoms.    ROS    PMH: Past Medical History[1]  ALLERGIES: Allergies[2]  SURGHX: Past Surgical History[3]  SOCHX: Social History[4]  FH:   Family History   Problem Relation Age of Onset    Diabetes Mother     Hyperlipidemia Mother     Diabetes Father     Hypertension Father     Hyperlipidemia Father          Objective:   BP (!) 158/82 (BP Location: Left arm, Patient Position: Sitting, BP Cuff Size: Large adult)   Pulse 91   Temp 36.6 °C (97.8 °F) (Temporal)   Resp (!) 23   Ht 1.549 m (5' 1\")   Wt 69.8 kg (153 lb 12.8 oz)   SpO2 97%   BMI 29.06 kg/m²     Physical Exam  Constitutional:       Appearance: She is ill-appearing.   HENT:      Head: Normocephalic and atraumatic.      Right Ear: Tympanic membrane, ear canal and external ear normal.      Left Ear: Tympanic membrane, ear canal and external ear normal.      Nose: Congestion and rhinorrhea present.      Mouth/Throat:      Mouth: Mucous membranes are moist.      Pharynx: Posterior oropharyngeal erythema present. No oropharyngeal exudate.   Eyes:      General:         Right eye: No discharge.         Left eye: No discharge.      Extraocular Movements: Extraocular movements intact.      Conjunctiva/sclera: Conjunctivae normal.      Pupils: Pupils are " equal, round, and reactive to light.   Cardiovascular:      Rate and Rhythm: Tachycardia present.      Heart sounds: Normal heart sounds. No murmur heard.  Pulmonary:      Effort: Pulmonary effort is normal. No respiratory distress.      Breath sounds: No stridor. No wheezing, rhonchi or rales.   Chest:      Chest wall: No tenderness.   Abdominal:      General: Abdomen is flat. There is no distension.      Palpations: Abdomen is soft.   Musculoskeletal:         General: Normal range of motion.      Cervical back: Normal range of motion. Muscular tenderness present.   Lymphadenopathy:      Cervical: Cervical adenopathy present.   Skin:     General: Skin is warm and dry.      Capillary Refill: Capillary refill takes less than 2 seconds.   Neurological:      General: No focal deficit present.      Mental Status: She is alert and oriented to person, place, and time. Mental status is at baseline.   Psychiatric:         Mood and Affect: Mood normal.         Behavior: Behavior normal.         Thought Content: Thought content normal.         Judgment: Judgment normal.       Results for orders placed or performed in visit on 07/21/25   POCT CoV-2, Flu A/B, RSV by PCR    Collection Time: 07/21/25  6:29 PM   Result Value Ref Range    SARS-CoV-2 by PCR Negative Negative, Invalid    Influenza virus A RNA Negative Negative, Invalid    Influenza virus B, PCR Negative Negative, Invalid    RSV, PCR Negative Negative, Invalid   POCT GROUP A STREP, PCR    Collection Time: 07/21/25  6:29 PM   Result Value Ref Range    POC Group A Strep, PCR Not Detected Not Detected, Invalid     *Note: Due to a large number of results and/or encounters for the requested time period, some results have not been displayed. A complete set of results can be found in Results Review.       Assessment/Plan:   Assessment      1. Flu-like symptoms  POCT CoV-2, Flu A/B, RSV by PCR    POCT GROUP A STREP, PCR    promethazine-dextromethorphan (PROMETHAZINE-DM)  6.25-15 MG/5ML syrup    ibuprofen (Motrin) tablet 600 mg    acetaminophen (Tylenol) tablet 1,000 mg      2. Elevated blood pressure reading in office with diagnosis of hypertension          Strep, COVID, flu and RSV were all negative in the clinic today.  She is likely suffering from different viral type illness.  She was prescribed promethazine/dextromethorphan for relief of cough.  Drowsy side effects discussed with patient.  Continue with over-the-counter ibuprofen, Tylenol, rest and increase fluids.  Her blood pressure is elevated in the clinic today.  She states that she has not taken her blood pressure medication tonight.  She notes that she will take this when she gets home.        [1]   Past Medical History:  Diagnosis Date    Anemia     Bowel habit changes     constipation    Diabetes     Diabetes (HCC)     GERD (gastroesophageal reflux disease)     Healthcare maintenance 9/27/2014    Mammogram: Due    High cholesterol     Hyperlipidemia     Hypertension     Infectious disease     Cold 10/2016    Jaundice 1999    Psychiatric problem     depression and anxiety    Vaginal itching 8/27/2014    With anal itching X 1 month Getting worse Burning Min vag disch   [2]   Allergies  Allergen Reactions    Latex Rash    Morphine Itching     Rxn = unknown   Bumps all over body    Other Misc Rash     Rash from gloves at work   [3]   Past Surgical History:  Procedure Laterality Date    WV INJ CERV/THORAC,W/ IMAGING N/A 6/24/2025    Procedure: cervical C7-T1 interlaminar epidural steroid injection with sedation…Note: plan on sedation versed 1mg and fentanyl 50mcg;  Surgeon: Trenton Bob M.D.;  [facility];  Service: Pain Management    WV NJX AA&/STRD TFRML EPI LUMBAR/SACRAL 1 LEVEL Bilateral 3/20/2025    Procedure: BILATERAL L4-5 transforaminal epidural steroid injection with fluoroscopic guidance with sedation…Note: 22-5. Plan on sedation 1mg versed and 50mcg fentanyl;  Surgeon: Trenton SOARES  AGBRIELA Bob;  Location: [facility];  Service: Pain Management    VENTRAL HERNIA REPAIR ROBOTIC XI N/A 10/14/2016    Procedure: VENTRAL HERNIA REPAIR ROBOTIC XI FOR INCISIONAL W/MESH;  Surgeon: Edi Mendoza M.D.;  Location: [facility];  Service:     ABDOMINAL HYSTERECTOMY TOTAL      still has ovaries    CHOLECYSTECTOMY      PRIMARY C SECTION      TUBAL COAGULATION LAPAROSCOPIC BILATERAL     [4]   Social History  Socioeconomic History    Marital status:    Tobacco Use    Smoking status: Never    Smokeless tobacco: Never   Vaping Use    Vaping status: Never Used   Substance and Sexual Activity    Alcohol use: No    Drug use: No    Sexual activity: Yes     Partners: Male     Birth control/protection: None   Social History Narrative    ** Merged History Encounter **

## 2025-08-01 ENCOUNTER — OFFICE VISIT (OUTPATIENT)
Dept: URGENT CARE | Facility: PHYSICIAN GROUP | Age: 60
End: 2025-08-01
Payer: COMMERCIAL

## 2025-08-01 ENCOUNTER — APPOINTMENT (OUTPATIENT)
Dept: RADIOLOGY | Facility: IMAGING CENTER | Age: 60
End: 2025-08-01
Attending: NURSE PRACTITIONER
Payer: COMMERCIAL

## 2025-08-01 VITALS
WEIGHT: 152.2 LBS | HEART RATE: 85 BPM | OXYGEN SATURATION: 98 % | BODY MASS INDEX: 28.74 KG/M2 | HEIGHT: 61 IN | TEMPERATURE: 97.3 F | SYSTOLIC BLOOD PRESSURE: 132 MMHG | RESPIRATION RATE: 16 BRPM | DIASTOLIC BLOOD PRESSURE: 78 MMHG

## 2025-08-01 DIAGNOSIS — R06.02 SHORTNESS OF BREATH: Primary | ICD-10-CM

## 2025-08-01 DIAGNOSIS — J22 LRTI (LOWER RESPIRATORY TRACT INFECTION): ICD-10-CM

## 2025-08-01 DIAGNOSIS — R06.02 SHORTNESS OF BREATH: ICD-10-CM

## 2025-08-01 PROCEDURE — 71046 X-RAY EXAM CHEST 2 VIEWS: CPT | Mod: TC,FY | Performed by: RADIOLOGY

## 2025-08-01 PROCEDURE — 3075F SYST BP GE 130 - 139MM HG: CPT | Performed by: NURSE PRACTITIONER

## 2025-08-01 PROCEDURE — 94640 AIRWAY INHALATION TREATMENT: CPT | Performed by: NURSE PRACTITIONER

## 2025-08-01 PROCEDURE — 3078F DIAST BP <80 MM HG: CPT | Performed by: NURSE PRACTITIONER

## 2025-08-01 PROCEDURE — 99214 OFFICE O/P EST MOD 30 MIN: CPT | Mod: 25 | Performed by: NURSE PRACTITIONER

## 2025-08-01 RX ORDER — TIZANIDINE 2 MG/1
TABLET ORAL
COMMUNITY
End: 2025-08-27

## 2025-08-01 RX ORDER — CLONIDINE HYDROCHLORIDE 0.1 MG/1
TABLET ORAL
COMMUNITY
End: 2025-08-27

## 2025-08-01 RX ORDER — ONDANSETRON 4 MG/1
TABLET, FILM COATED ORAL
COMMUNITY
End: 2025-08-27

## 2025-08-01 RX ORDER — TOPIRAMATE 50 MG/1
50 TABLET, FILM COATED ORAL DAILY
Status: ON HOLD | COMMUNITY

## 2025-08-01 RX ORDER — ALBUTEROL SULFATE 90 UG/1
2 INHALANT RESPIRATORY (INHALATION) EVERY 6 HOURS PRN
Qty: 8.5 G | Refills: 0 | Status: SHIPPED | OUTPATIENT
Start: 2025-08-01 | End: 2025-08-27

## 2025-08-01 RX ORDER — DULAGLUTIDE 1.5 MG/.5ML
INJECTION, SOLUTION SUBCUTANEOUS
COMMUNITY
End: 2025-08-27

## 2025-08-01 RX ORDER — DOXYCYCLINE HYCLATE 100 MG
100 TABLET ORAL 2 TIMES DAILY
Qty: 14 TABLET | Refills: 0 | Status: SHIPPED | OUTPATIENT
Start: 2025-08-01 | End: 2025-08-08

## 2025-08-01 RX ORDER — ATORVASTATIN CALCIUM 20 MG/1
TABLET, FILM COATED ORAL
COMMUNITY
End: 2025-08-31

## 2025-08-01 RX ORDER — HYDROXYZINE HYDROCHLORIDE 25 MG/1
TABLET, FILM COATED ORAL
COMMUNITY
End: 2025-08-31

## 2025-08-01 RX ORDER — METOPROLOL SUCCINATE 50 MG/1
TABLET, EXTENDED RELEASE ORAL
COMMUNITY
End: 2025-08-31

## 2025-08-01 RX ORDER — PREDNISONE 20 MG/1
40 TABLET ORAL DAILY
Qty: 10 TABLET | Refills: 0 | Status: SHIPPED | OUTPATIENT
Start: 2025-08-01 | End: 2025-08-06

## 2025-08-01 RX ORDER — GLYBURIDE 5 MG/1
TABLET ORAL
COMMUNITY
End: 2025-08-27

## 2025-08-01 RX ORDER — ESCITALOPRAM OXALATE 10 MG/1
TABLET ORAL
COMMUNITY
End: 2025-08-31

## 2025-08-01 RX ORDER — DEXTROMETHORPHAN HYDROBROMIDE AND PROMETHAZINE HYDROCHLORIDE 15; 6.25 MG/5ML; MG/5ML
5 SYRUP ORAL EVERY 4 HOURS PRN
Qty: 120 ML | Refills: 0 | Status: SHIPPED | OUTPATIENT
Start: 2025-08-01 | End: 2025-08-08

## 2025-08-01 RX ORDER — SUMATRIPTAN SUCCINATE 100 MG/1
TABLET ORAL
COMMUNITY
End: 2025-08-27

## 2025-08-01 RX ORDER — FLUTICASONE PROPIONATE AND SALMETEROL 100; 50 UG/1; UG/1
POWDER RESPIRATORY (INHALATION)
COMMUNITY
End: 2025-08-31

## 2025-08-01 RX ORDER — IPRATROPIUM BROMIDE AND ALBUTEROL SULFATE 2.5; .5 MG/3ML; MG/3ML
3 SOLUTION RESPIRATORY (INHALATION) ONCE
Status: COMPLETED | OUTPATIENT
Start: 2025-08-01 | End: 2025-08-01

## 2025-08-01 RX ADMIN — IPRATROPIUM BROMIDE AND ALBUTEROL SULFATE 3 ML: 2.5; .5 SOLUTION RESPIRATORY (INHALATION) at 15:30

## 2025-08-01 ASSESSMENT — FIBROSIS 4 INDEX: FIB4 SCORE: 1.25

## 2025-08-04 ENCOUNTER — APPOINTMENT (OUTPATIENT)
Dept: RADIOLOGY | Facility: MEDICAL CENTER | Age: 60
End: 2025-08-04
Attending: STUDENT IN AN ORGANIZED HEALTH CARE EDUCATION/TRAINING PROGRAM
Payer: COMMERCIAL

## 2025-08-04 ENCOUNTER — HOSPITAL ENCOUNTER (EMERGENCY)
Facility: MEDICAL CENTER | Age: 60
End: 2025-08-04
Attending: STUDENT IN AN ORGANIZED HEALTH CARE EDUCATION/TRAINING PROGRAM
Payer: COMMERCIAL

## 2025-08-04 ENCOUNTER — APPOINTMENT (OUTPATIENT)
Dept: RADIOLOGY | Facility: MEDICAL CENTER | Age: 60
End: 2025-08-04
Payer: COMMERCIAL

## 2025-08-04 VITALS
TEMPERATURE: 98 F | RESPIRATION RATE: 20 BRPM | WEIGHT: 149.25 LBS | OXYGEN SATURATION: 96 % | HEIGHT: 61 IN | BODY MASS INDEX: 28.18 KG/M2 | SYSTOLIC BLOOD PRESSURE: 177 MMHG | DIASTOLIC BLOOD PRESSURE: 89 MMHG | HEART RATE: 75 BPM

## 2025-08-04 DIAGNOSIS — J45.901 MODERATE ASTHMA WITH ACUTE EXACERBATION, UNSPECIFIED WHETHER PERSISTENT: ICD-10-CM

## 2025-08-04 DIAGNOSIS — E86.0 DEHYDRATION: ICD-10-CM

## 2025-08-04 DIAGNOSIS — J06.9 UPPER RESPIRATORY TRACT INFECTION, UNSPECIFIED TYPE: Primary | ICD-10-CM

## 2025-08-04 LAB
ALBUMIN SERPL BCP-MCNC: 3.9 G/DL (ref 3.2–4.9)
ALBUMIN/GLOB SERPL: 1.1 G/DL
ALP SERPL-CCNC: 131 U/L (ref 30–99)
ALT SERPL-CCNC: 12 U/L (ref 2–50)
ANION GAP SERPL CALC-SCNC: 15 MMOL/L (ref 7–16)
AST SERPL-CCNC: 17 U/L (ref 12–45)
BASOPHILS # BLD AUTO: 0.2 % (ref 0–1.8)
BASOPHILS # BLD: 0.02 K/UL (ref 0–0.12)
BILIRUB SERPL-MCNC: 0.2 MG/DL (ref 0.1–1.5)
BUN SERPL-MCNC: 50 MG/DL (ref 8–22)
CALCIUM ALBUM COR SERPL-MCNC: 10.2 MG/DL (ref 8.5–10.5)
CALCIUM SERPL-MCNC: 10.1 MG/DL (ref 8.5–10.5)
CHLORIDE SERPL-SCNC: 96 MMOL/L (ref 96–112)
CO2 SERPL-SCNC: 19 MMOL/L (ref 20–33)
CREAT SERPL-MCNC: 1.12 MG/DL (ref 0.5–1.4)
D DIMER PPP IA.FEU-MCNC: 0.91 UG/ML (FEU) (ref 0–0.5)
EKG IMPRESSION: NORMAL
EOSINOPHIL # BLD AUTO: 0 K/UL (ref 0–0.51)
EOSINOPHIL NFR BLD: 0 % (ref 0–6.9)
ERYTHROCYTE [DISTWIDTH] IN BLOOD BY AUTOMATED COUNT: 36.9 FL (ref 35.9–50)
GFR SERPLBLD CREATININE-BSD FMLA CKD-EPI: 56 ML/MIN/1.73 M 2
GLOBULIN SER CALC-MCNC: 3.5 G/DL (ref 1.9–3.5)
GLUCOSE SERPL-MCNC: 432 MG/DL (ref 65–99)
HCT VFR BLD AUTO: 37.4 % (ref 37–47)
HGB BLD-MCNC: 12.6 G/DL (ref 12–16)
IMM GRANULOCYTES # BLD AUTO: 0.08 K/UL (ref 0–0.11)
IMM GRANULOCYTES NFR BLD AUTO: 0.7 % (ref 0–0.9)
LYMPHOCYTES # BLD AUTO: 2.01 K/UL (ref 1–4.8)
LYMPHOCYTES NFR BLD: 16.8 % (ref 22–41)
MCH RBC QN AUTO: 29.1 PG (ref 27–33)
MCHC RBC AUTO-ENTMCNC: 33.7 G/DL (ref 32.2–35.5)
MCV RBC AUTO: 86.4 FL (ref 81.4–97.8)
MONOCYTES # BLD AUTO: 0.61 K/UL (ref 0–0.85)
MONOCYTES NFR BLD AUTO: 5.1 % (ref 0–13.4)
NEUTROPHILS # BLD AUTO: 9.24 K/UL (ref 1.82–7.42)
NEUTROPHILS NFR BLD: 77.2 % (ref 44–72)
NRBC # BLD AUTO: 0 K/UL
NRBC BLD-RTO: 0 /100 WBC (ref 0–0.2)
NT-PROBNP SERPL IA-MCNC: 370 PG/ML (ref 0–125)
PLATELET # BLD AUTO: 405 K/UL (ref 164–446)
PMV BLD AUTO: 10.7 FL (ref 9–12.9)
POTASSIUM SERPL-SCNC: 4.6 MMOL/L (ref 3.6–5.5)
PROT SERPL-MCNC: 7.4 G/DL (ref 6–8.2)
RBC # BLD AUTO: 4.33 M/UL (ref 4.2–5.4)
SODIUM SERPL-SCNC: 130 MMOL/L (ref 135–145)
TROPONIN T SERPL-MCNC: 12 NG/L (ref 6–19)
TROPONIN T SERPL-MCNC: 13 NG/L (ref 6–19)
WBC # BLD AUTO: 12 K/UL (ref 4.8–10.8)

## 2025-08-04 PROCEDURE — 85379 FIBRIN DEGRADATION QUANT: CPT

## 2025-08-04 PROCEDURE — 36415 COLL VENOUS BLD VENIPUNCTURE: CPT

## 2025-08-04 PROCEDURE — 94640 AIRWAY INHALATION TREATMENT: CPT

## 2025-08-04 PROCEDURE — 80053 COMPREHEN METABOLIC PANEL: CPT

## 2025-08-04 PROCEDURE — 85025 COMPLETE CBC W/AUTO DIFF WBC: CPT

## 2025-08-04 PROCEDURE — 83880 ASSAY OF NATRIURETIC PEPTIDE: CPT

## 2025-08-04 PROCEDURE — 700111 HCHG RX REV CODE 636 W/ 250 OVERRIDE (IP): Mod: UD | Performed by: STUDENT IN AN ORGANIZED HEALTH CARE EDUCATION/TRAINING PROGRAM

## 2025-08-04 PROCEDURE — 71275 CT ANGIOGRAPHY CHEST: CPT

## 2025-08-04 PROCEDURE — 93005 ELECTROCARDIOGRAM TRACING: CPT | Mod: TC

## 2025-08-04 PROCEDURE — 700117 HCHG RX CONTRAST REV CODE 255: Mod: UD | Performed by: STUDENT IN AN ORGANIZED HEALTH CARE EDUCATION/TRAINING PROGRAM

## 2025-08-04 PROCEDURE — 71045 X-RAY EXAM CHEST 1 VIEW: CPT

## 2025-08-04 PROCEDURE — 84484 ASSAY OF TROPONIN QUANT: CPT

## 2025-08-04 PROCEDURE — 93005 ELECTROCARDIOGRAM TRACING: CPT | Mod: TC | Performed by: STUDENT IN AN ORGANIZED HEALTH CARE EDUCATION/TRAINING PROGRAM

## 2025-08-04 PROCEDURE — 700102 HCHG RX REV CODE 250 W/ 637 OVERRIDE(OP): Mod: UD | Performed by: STUDENT IN AN ORGANIZED HEALTH CARE EDUCATION/TRAINING PROGRAM

## 2025-08-04 PROCEDURE — A9270 NON-COVERED ITEM OR SERVICE: HCPCS | Mod: UD | Performed by: STUDENT IN AN ORGANIZED HEALTH CARE EDUCATION/TRAINING PROGRAM

## 2025-08-04 PROCEDURE — 700101 HCHG RX REV CODE 250: Mod: UD | Performed by: STUDENT IN AN ORGANIZED HEALTH CARE EDUCATION/TRAINING PROGRAM

## 2025-08-04 PROCEDURE — 99284 EMERGENCY DEPT VISIT MOD MDM: CPT

## 2025-08-04 RX ORDER — DEXAMETHASONE SODIUM PHOSPHATE 4 MG/ML
6 INJECTION, SOLUTION INTRA-ARTICULAR; INTRALESIONAL; INTRAMUSCULAR; INTRAVENOUS; SOFT TISSUE ONCE
Status: COMPLETED | OUTPATIENT
Start: 2025-08-04 | End: 2025-08-04

## 2025-08-04 RX ORDER — IPRATROPIUM BROMIDE AND ALBUTEROL SULFATE 2.5; .5 MG/3ML; MG/3ML
3 SOLUTION RESPIRATORY (INHALATION)
Status: DISCONTINUED | OUTPATIENT
Start: 2025-08-04 | End: 2025-08-04 | Stop reason: HOSPADM

## 2025-08-04 RX ORDER — ALBUTEROL SULFATE 5 MG/ML
2.5 SOLUTION RESPIRATORY (INHALATION) ONCE
Status: COMPLETED | OUTPATIENT
Start: 2025-08-04 | End: 2025-08-04

## 2025-08-04 RX ORDER — NAPROXEN 500 MG/1
500 TABLET ORAL ONCE
Status: COMPLETED | OUTPATIENT
Start: 2025-08-04 | End: 2025-08-04

## 2025-08-04 RX ADMIN — NAPROXEN 500 MG: 500 TABLET ORAL at 16:22

## 2025-08-04 RX ADMIN — DEXAMETHASONE SODIUM PHOSPHATE 6 MG: 4 INJECTION INTRA-ARTICULAR; INTRALESIONAL; INTRAMUSCULAR; INTRAVENOUS; SOFT TISSUE at 16:22

## 2025-08-04 RX ADMIN — ALBUTEROL SULFATE 2.5 MG: 2.5 SOLUTION RESPIRATORY (INHALATION) at 18:36

## 2025-08-04 RX ADMIN — IPRATROPIUM BROMIDE AND ALBUTEROL SULFATE 3 ML: .5; 2.5 SOLUTION RESPIRATORY (INHALATION) at 16:13

## 2025-08-04 RX ADMIN — IOHEXOL 54 ML: 350 INJECTION, SOLUTION INTRAVENOUS at 18:45

## 2025-08-04 ASSESSMENT — PAIN DESCRIPTION - PAIN TYPE: TYPE: ACUTE PAIN

## 2025-08-04 ASSESSMENT — FIBROSIS 4 INDEX: FIB4 SCORE: 1.25

## 2025-08-05 ENCOUNTER — APPOINTMENT (OUTPATIENT)
Dept: PHYSICAL MEDICINE AND REHAB | Facility: MEDICAL CENTER | Age: 60
End: 2025-08-05
Payer: COMMERCIAL

## 2025-08-07 ENCOUNTER — OFFICE VISIT (OUTPATIENT)
Dept: URGENT CARE | Facility: PHYSICIAN GROUP | Age: 60
End: 2025-08-07
Payer: COMMERCIAL

## 2025-08-07 VITALS
OXYGEN SATURATION: 98 % | SYSTOLIC BLOOD PRESSURE: 140 MMHG | HEART RATE: 90 BPM | BODY MASS INDEX: 28.13 KG/M2 | HEIGHT: 61 IN | WEIGHT: 149 LBS | RESPIRATION RATE: 12 BRPM | TEMPERATURE: 96.9 F | DIASTOLIC BLOOD PRESSURE: 60 MMHG

## 2025-08-07 DIAGNOSIS — R51.9 ACUTE NONINTRACTABLE HEADACHE, UNSPECIFIED HEADACHE TYPE: ICD-10-CM

## 2025-08-07 DIAGNOSIS — I10 PRIMARY HYPERTENSION: ICD-10-CM

## 2025-08-07 DIAGNOSIS — R42 DIZZINESS: ICD-10-CM

## 2025-08-07 DIAGNOSIS — R10.13 EPIGASTRIC PAIN: ICD-10-CM

## 2025-08-07 DIAGNOSIS — R79.89 ELEVATED BRAIN NATRIURETIC PEPTIDE (BNP) LEVEL: ICD-10-CM

## 2025-08-07 PROCEDURE — 3078F DIAST BP <80 MM HG: CPT

## 2025-08-07 PROCEDURE — 96372 THER/PROPH/DIAG INJ SC/IM: CPT

## 2025-08-07 PROCEDURE — 3077F SYST BP >= 140 MM HG: CPT

## 2025-08-07 PROCEDURE — 99214 OFFICE O/P EST MOD 30 MIN: CPT | Mod: 25

## 2025-08-07 PROCEDURE — 93000 ELECTROCARDIOGRAM COMPLETE: CPT

## 2025-08-07 RX ORDER — KETOROLAC TROMETHAMINE 15 MG/ML
15 INJECTION, SOLUTION INTRAMUSCULAR; INTRAVENOUS ONCE
Status: COMPLETED | OUTPATIENT
Start: 2025-08-07 | End: 2025-08-07

## 2025-08-07 RX ORDER — OMEPRAZOLE 20 MG/1
20 CAPSULE, DELAYED RELEASE ORAL DAILY
Qty: 30 CAPSULE | Refills: 0 | Status: ON HOLD | OUTPATIENT
Start: 2025-08-07

## 2025-08-07 RX ADMIN — KETOROLAC TROMETHAMINE 15 MG: 15 INJECTION, SOLUTION INTRAMUSCULAR; INTRAVENOUS at 15:04

## 2025-08-07 ASSESSMENT — ENCOUNTER SYMPTOMS
WHEEZING: 0
NECK PAIN: 1
VOMITING: 1
HEADACHES: 1
SHORTNESS OF BREATH: 1

## 2025-08-07 ASSESSMENT — FIBROSIS 4 INDEX: FIB4 SCORE: 0.73

## 2025-08-08 ASSESSMENT — ENCOUNTER SYMPTOMS
DIZZINESS: 1
NAUSEA: 0
ABDOMINAL PAIN: 0
COUGH: 0
FEVER: 0
PALPITATIONS: 0
SPUTUM PRODUCTION: 0
WEAKNESS: 0
DIARRHEA: 0
CHILLS: 0

## 2025-08-15 RX ORDER — ROSUVASTATIN CALCIUM 20 MG/1
20 TABLET, COATED ORAL EVERY EVENING
Qty: 90 TABLET | Refills: 3 | Status: SHIPPED | OUTPATIENT
Start: 2025-08-15 | End: 2025-08-27

## 2025-08-20 ENCOUNTER — OFFICE VISIT (OUTPATIENT)
Dept: PHYSICAL MEDICINE AND REHAB | Facility: MEDICAL CENTER | Age: 60
End: 2025-08-20
Payer: COMMERCIAL

## 2025-08-20 VITALS
OXYGEN SATURATION: 97 % | SYSTOLIC BLOOD PRESSURE: 173 MMHG | HEIGHT: 61 IN | WEIGHT: 148 LBS | BODY MASS INDEX: 27.94 KG/M2 | HEART RATE: 85 BPM | DIASTOLIC BLOOD PRESSURE: 90 MMHG | TEMPERATURE: 98.5 F

## 2025-08-20 DIAGNOSIS — M47.816 LUMBAR SPONDYLOSIS: ICD-10-CM

## 2025-08-20 DIAGNOSIS — E11.42 DIABETIC POLYNEUROPATHY ASSOCIATED WITH TYPE 2 DIABETES MELLITUS (HCC): ICD-10-CM

## 2025-08-20 DIAGNOSIS — M54.16 LUMBAR RADICULOPATHY: Primary | ICD-10-CM

## 2025-08-20 DIAGNOSIS — M54.12 CERVICAL RADICULOPATHY: ICD-10-CM

## 2025-08-20 DIAGNOSIS — M47.812 CERVICAL SPONDYLOSIS: ICD-10-CM

## 2025-08-20 PROCEDURE — 99214 OFFICE O/P EST MOD 30 MIN: CPT | Performed by: PHYSICAL MEDICINE & REHABILITATION

## 2025-08-20 PROCEDURE — 1125F AMNT PAIN NOTED PAIN PRSNT: CPT | Performed by: PHYSICAL MEDICINE & REHABILITATION

## 2025-08-20 PROCEDURE — 3077F SYST BP >= 140 MM HG: CPT | Performed by: PHYSICAL MEDICINE & REHABILITATION

## 2025-08-20 PROCEDURE — 3080F DIAST BP >= 90 MM HG: CPT | Performed by: PHYSICAL MEDICINE & REHABILITATION

## 2025-08-20 ASSESSMENT — FIBROSIS 4 INDEX: FIB4 SCORE: 0.73

## 2025-08-20 ASSESSMENT — PATIENT HEALTH QUESTIONNAIRE - PHQ9
5. POOR APPETITE OR OVEREATING: 1 - SEVERAL DAYS
SUM OF ALL RESPONSES TO PHQ QUESTIONS 1-9: 8
CLINICAL INTERPRETATION OF PHQ2 SCORE: 1

## 2025-08-20 ASSESSMENT — PAIN SCALES - GENERAL: PAINLEVEL_OUTOF10: 4=SLIGHT-MODERATE PAIN

## 2025-08-21 ENCOUNTER — OFFICE VISIT (OUTPATIENT)
Dept: NEUROLOGY | Facility: MEDICAL CENTER | Age: 60
End: 2025-08-21
Attending: FAMILY MEDICINE
Payer: COMMERCIAL

## 2025-08-21 VITALS
HEIGHT: 61 IN | WEIGHT: 152.34 LBS | HEART RATE: 83 BPM | BODY MASS INDEX: 28.76 KG/M2 | DIASTOLIC BLOOD PRESSURE: 94 MMHG | SYSTOLIC BLOOD PRESSURE: 151 MMHG | OXYGEN SATURATION: 99 % | RESPIRATION RATE: 17 BRPM | TEMPERATURE: 98.1 F

## 2025-08-21 DIAGNOSIS — G56.03 BILATERAL CARPAL TUNNEL SYNDROME: Primary | ICD-10-CM

## 2025-08-21 DIAGNOSIS — G56.23 ULNAR NEUROPATHY OF BOTH UPPER EXTREMITIES: ICD-10-CM

## 2025-08-21 PROCEDURE — 95886 MUSC TEST DONE W/N TEST COMP: CPT | Performed by: STUDENT IN AN ORGANIZED HEALTH CARE EDUCATION/TRAINING PROGRAM

## 2025-08-21 PROCEDURE — 95912 NRV CNDJ TEST 11-12 STUDIES: CPT | Mod: 26 | Performed by: STUDENT IN AN ORGANIZED HEALTH CARE EDUCATION/TRAINING PROGRAM

## 2025-08-21 PROCEDURE — 95912 NRV CNDJ TEST 11-12 STUDIES: CPT | Performed by: STUDENT IN AN ORGANIZED HEALTH CARE EDUCATION/TRAINING PROGRAM

## 2025-08-21 PROCEDURE — 3080F DIAST BP >= 90 MM HG: CPT | Performed by: STUDENT IN AN ORGANIZED HEALTH CARE EDUCATION/TRAINING PROGRAM

## 2025-08-21 PROCEDURE — 3077F SYST BP >= 140 MM HG: CPT | Performed by: STUDENT IN AN ORGANIZED HEALTH CARE EDUCATION/TRAINING PROGRAM

## 2025-08-21 PROCEDURE — 95886 MUSC TEST DONE W/N TEST COMP: CPT | Mod: 26 | Performed by: STUDENT IN AN ORGANIZED HEALTH CARE EDUCATION/TRAINING PROGRAM

## 2025-08-21 ASSESSMENT — FIBROSIS 4 INDEX: FIB4 SCORE: 0.73

## 2025-08-27 ENCOUNTER — OFFICE VISIT (OUTPATIENT)
Dept: MEDICAL GROUP | Facility: PHYSICIAN GROUP | Age: 60
End: 2025-08-27
Payer: COMMERCIAL

## 2025-08-27 VITALS
BODY MASS INDEX: 28.51 KG/M2 | DIASTOLIC BLOOD PRESSURE: 86 MMHG | SYSTOLIC BLOOD PRESSURE: 136 MMHG | RESPIRATION RATE: 15 BRPM | OXYGEN SATURATION: 99 % | WEIGHT: 151 LBS | HEIGHT: 61 IN | TEMPERATURE: 97.8 F | HEART RATE: 82 BPM

## 2025-08-27 DIAGNOSIS — E78.5 HYPERLIPIDEMIA, UNSPECIFIED HYPERLIPIDEMIA TYPE: ICD-10-CM

## 2025-08-27 DIAGNOSIS — E11.42 DIABETIC POLYNEUROPATHY ASSOCIATED WITH TYPE 2 DIABETES MELLITUS (HCC): ICD-10-CM

## 2025-08-27 DIAGNOSIS — M25.50 ARTHRALGIA, UNSPECIFIED JOINT: ICD-10-CM

## 2025-08-27 DIAGNOSIS — K59.00 CONSTIPATION, UNSPECIFIED CONSTIPATION TYPE: ICD-10-CM

## 2025-08-27 DIAGNOSIS — E11.65 UNCONTROLLED TYPE 2 DIABETES MELLITUS WITH HYPERGLYCEMIA (HCC): Primary | ICD-10-CM

## 2025-08-27 DIAGNOSIS — R19.7 DIARRHEA, UNSPECIFIED TYPE: ICD-10-CM

## 2025-08-27 DIAGNOSIS — R11.2 NAUSEA AND VOMITING, UNSPECIFIED VOMITING TYPE: ICD-10-CM

## 2025-08-27 DIAGNOSIS — K31.84 GASTROPARESIS: ICD-10-CM

## 2025-08-27 RX ORDER — POLYETHYLENE GLYCOL 3350 17 G/17G
17 POWDER, FOR SOLUTION ORAL DAILY
Qty: 500 G | Refills: 3 | Status: ON HOLD | OUTPATIENT
Start: 2025-08-27

## 2025-08-27 RX ORDER — GABAPENTIN 100 MG/1
CAPSULE ORAL
COMMUNITY
End: 2025-08-31

## 2025-08-27 RX ORDER — METOCLOPRAMIDE 5 MG/1
5 TABLET ORAL 3 TIMES DAILY PRN
Qty: 90 TABLET | Refills: 3 | Status: ON HOLD | OUTPATIENT
Start: 2025-08-27

## 2025-08-27 RX ORDER — LISINOPRIL 40 MG/1
TABLET ORAL
COMMUNITY
End: 2025-08-31

## 2025-08-27 RX ORDER — ALBUTEROL SULFATE 90 UG/1
INHALANT RESPIRATORY (INHALATION)
COMMUNITY
End: 2025-08-27

## 2025-08-27 RX ORDER — HYDROCHLOROTHIAZIDE 12.5 MG/1
TABLET ORAL
COMMUNITY
End: 2025-08-27

## 2025-08-27 RX ORDER — ACETAMINOPHEN 500 MG
TABLET ORAL
COMMUNITY
End: 2025-08-27

## 2025-08-27 ASSESSMENT — FIBROSIS 4 INDEX: FIB4 SCORE: 0.73

## 2025-08-31 ENCOUNTER — HOSPITAL ENCOUNTER (OUTPATIENT)
Facility: MEDICAL CENTER | Age: 60
End: 2025-08-31
Attending: EMERGENCY MEDICINE | Admitting: STUDENT IN AN ORGANIZED HEALTH CARE EDUCATION/TRAINING PROGRAM
Payer: COMMERCIAL

## 2025-08-31 VITALS
HEART RATE: 83 BPM | WEIGHT: 148.59 LBS | BODY MASS INDEX: 28.05 KG/M2 | DIASTOLIC BLOOD PRESSURE: 67 MMHG | SYSTOLIC BLOOD PRESSURE: 150 MMHG | TEMPERATURE: 98.4 F | RESPIRATION RATE: 16 BRPM | OXYGEN SATURATION: 96 % | HEIGHT: 61 IN

## 2025-08-31 DIAGNOSIS — M54.2 CHRONIC NECK PAIN: ICD-10-CM

## 2025-08-31 DIAGNOSIS — G43.809 OTHER MIGRAINE WITHOUT STATUS MIGRAINOSUS, NOT INTRACTABLE: ICD-10-CM

## 2025-08-31 DIAGNOSIS — R29.90 STROKE-LIKE SYMPTOM: ICD-10-CM

## 2025-08-31 DIAGNOSIS — G89.29 CHRONIC NECK PAIN: ICD-10-CM

## 2025-08-31 DIAGNOSIS — G45.9 TIA (TRANSIENT ISCHEMIC ATTACK): Primary | ICD-10-CM

## 2025-08-31 PROBLEM — I99.9 VASCULAR DISEASE: Status: ACTIVE | Noted: 2025-08-31

## 2025-08-31 LAB
ABO GROUP BLD: NORMAL
ALBUMIN SERPL BCP-MCNC: 3.7 G/DL (ref 3.2–4.9)
ALBUMIN/GLOB SERPL: 1.3 G/DL
ALP SERPL-CCNC: 79 U/L (ref 30–99)
ALT SERPL-CCNC: 12 U/L (ref 2–50)
ANION GAP SERPL CALC-SCNC: 14 MMOL/L (ref 7–16)
APTT PPP: 30.6 SEC (ref 24.7–36)
AST SERPL-CCNC: 23 U/L (ref 12–45)
BASOPHILS # BLD AUTO: 0.5 % (ref 0–1.8)
BASOPHILS # BLD: 0.03 K/UL (ref 0–0.12)
BILIRUB SERPL-MCNC: 0.3 MG/DL (ref 0.1–1.5)
BLD GP AB SCN SERPL QL: NORMAL
BUN SERPL-MCNC: 43 MG/DL (ref 8–22)
CALCIUM ALBUM COR SERPL-MCNC: 9.2 MG/DL (ref 8.5–10.5)
CALCIUM SERPL-MCNC: 9 MG/DL (ref 8.5–10.5)
CHLORIDE SERPL-SCNC: 101 MMOL/L (ref 96–112)
CO2 SERPL-SCNC: 22 MMOL/L (ref 20–33)
CREAT SERPL-MCNC: 1.43 MG/DL (ref 0.5–1.4)
CREAT UR-MCNC: 21.9 MG/DL
EKG IMPRESSION: NORMAL
EOSINOPHIL # BLD AUTO: 0.22 K/UL (ref 0–0.51)
EOSINOPHIL NFR BLD: 3.4 % (ref 0–6.9)
ERYTHROCYTE [DISTWIDTH] IN BLOOD BY AUTOMATED COUNT: 40.2 FL (ref 35.9–50)
FLUAV RNA SPEC QL NAA+PROBE: NEGATIVE
FLUBV RNA SPEC QL NAA+PROBE: NEGATIVE
GFR SERPLBLD CREATININE-BSD FMLA CKD-EPI: 42 ML/MIN/1.73 M 2
GLOBULIN SER CALC-MCNC: 2.9 G/DL (ref 1.9–3.5)
GLUCOSE BLD STRIP.AUTO-MCNC: 188 MG/DL (ref 65–99)
GLUCOSE SERPL-MCNC: 200 MG/DL (ref 65–99)
HCT VFR BLD AUTO: 33.8 % (ref 37–47)
HGB BLD-MCNC: 11.5 G/DL (ref 12–16)
IMM GRANULOCYTES # BLD AUTO: 0.04 K/UL (ref 0–0.11)
IMM GRANULOCYTES NFR BLD AUTO: 0.6 % (ref 0–0.9)
INR PPP: 0.9 (ref 0.87–1.13)
LYMPHOCYTES # BLD AUTO: 2.35 K/UL (ref 1–4.8)
LYMPHOCYTES NFR BLD: 36.2 % (ref 22–41)
MCH RBC QN AUTO: 29.9 PG (ref 27–33)
MCHC RBC AUTO-ENTMCNC: 34 G/DL (ref 32.2–35.5)
MCV RBC AUTO: 87.8 FL (ref 81.4–97.8)
MONOCYTES # BLD AUTO: 0.54 K/UL (ref 0–0.85)
MONOCYTES NFR BLD AUTO: 8.3 % (ref 0–13.4)
NEUTROPHILS # BLD AUTO: 3.32 K/UL (ref 1.82–7.42)
NEUTROPHILS NFR BLD: 51 % (ref 44–72)
NRBC # BLD AUTO: 0 K/UL
NRBC BLD-RTO: 0 /100 WBC (ref 0–0.2)
PLATELET # BLD AUTO: 326 K/UL (ref 164–446)
PMV BLD AUTO: 10.7 FL (ref 9–12.9)
POTASSIUM SERPL-SCNC: 4.5 MMOL/L (ref 3.6–5.5)
PROT SERPL-MCNC: 6.6 G/DL (ref 6–8.2)
PROTHROMBIN TIME: 12.2 SEC (ref 12–14.6)
RBC # BLD AUTO: 3.85 M/UL (ref 4.2–5.4)
RH BLD: NORMAL
RSV RNA SPEC QL NAA+PROBE: NEGATIVE
SARS-COV-2 RNA RESP QL NAA+PROBE: NEGATIVE
SODIUM SERPL-SCNC: 137 MMOL/L (ref 135–145)
SODIUM UR-SCNC: 88 MMOL/L
TROPONIN T SERPL-MCNC: 22 NG/L (ref 6–19)
WBC # BLD AUTO: 6.5 K/UL (ref 4.8–10.8)

## 2025-08-31 PROCEDURE — 82962 GLUCOSE BLOOD TEST: CPT | Performed by: EMERGENCY MEDICINE

## 2025-08-31 PROCEDURE — 93005 ELECTROCARDIOGRAM TRACING: CPT | Mod: TC | Performed by: EMERGENCY MEDICINE

## 2025-08-31 PROCEDURE — 700102 HCHG RX REV CODE 250 W/ 637 OVERRIDE(OP): Mod: UD | Performed by: STUDENT IN AN ORGANIZED HEALTH CARE EDUCATION/TRAINING PROGRAM

## 2025-08-31 PROCEDURE — G0378 HOSPITAL OBSERVATION PER HR: HCPCS

## 2025-08-31 PROCEDURE — A9270 NON-COVERED ITEM OR SERVICE: HCPCS | Mod: UD | Performed by: STUDENT IN AN ORGANIZED HEALTH CARE EDUCATION/TRAINING PROGRAM

## 2025-08-31 PROCEDURE — 700105 HCHG RX REV CODE 258: Mod: UD | Performed by: STUDENT IN AN ORGANIZED HEALTH CARE EDUCATION/TRAINING PROGRAM

## 2025-08-31 PROCEDURE — 700117 HCHG RX CONTRAST REV CODE 255: Mod: UD | Performed by: EMERGENCY MEDICINE

## 2025-08-31 RX ORDER — DOXYCYCLINE HYCLATE 100 MG
100 TABLET ORAL 2 TIMES DAILY
Status: ON HOLD | COMMUNITY
Start: 2025-08-01

## 2025-08-31 RX ORDER — ACETAMINOPHEN 325 MG/1
650 TABLET ORAL EVERY 6 HOURS PRN
Status: ACTIVE | OUTPATIENT
Start: 2025-08-31

## 2025-08-31 RX ORDER — SODIUM CHLORIDE 9 MG/ML
INJECTION, SOLUTION INTRAVENOUS CONTINUOUS
Status: ACTIVE | OUTPATIENT
Start: 2025-08-31

## 2025-08-31 RX ORDER — PROMETHAZINE HYDROCHLORIDE 25 MG/1
12.5-25 TABLET ORAL EVERY 4 HOURS PRN
Status: ACTIVE | OUTPATIENT
Start: 2025-08-31

## 2025-08-31 RX ORDER — NAPROXEN SODIUM 220 MG/1
220 TABLET, FILM COATED ORAL 2 TIMES DAILY PRN
Status: ON HOLD | COMMUNITY

## 2025-08-31 RX ORDER — LISINOPRIL 10 MG/1
10 TABLET ORAL DAILY
Status: ON HOLD | COMMUNITY

## 2025-08-31 RX ORDER — PROMETHAZINE HYDROCHLORIDE 25 MG/1
12.5-25 SUPPOSITORY RECTAL EVERY 4 HOURS PRN
Status: ACTIVE | OUTPATIENT
Start: 2025-08-31

## 2025-08-31 RX ORDER — POLYETHYLENE GLYCOL 3350 17 G/17G
1 POWDER, FOR SOLUTION ORAL
Status: ACTIVE | OUTPATIENT
Start: 2025-08-31

## 2025-08-31 RX ORDER — LABETALOL HYDROCHLORIDE 5 MG/ML
10 INJECTION, SOLUTION INTRAVENOUS EVERY 4 HOURS PRN
Status: ACTIVE | OUTPATIENT
Start: 2025-08-31

## 2025-08-31 RX ORDER — ESCITALOPRAM OXALATE 10 MG/1
10 TABLET ORAL DAILY
Status: SHIPPED | OUTPATIENT
Start: 2025-09-01

## 2025-08-31 RX ORDER — ROSUVASTATIN CALCIUM 20 MG/1
20 TABLET, COATED ORAL EVERY EVENING
Status: ON HOLD | COMMUNITY

## 2025-08-31 RX ORDER — LORAZEPAM 1 MG/1
1 TABLET ORAL
Status: ACTIVE | OUTPATIENT
Start: 2025-08-31

## 2025-08-31 RX ORDER — AMOXICILLIN 250 MG
2 CAPSULE ORAL EVERY EVENING
Status: ACTIVE | OUTPATIENT
Start: 2025-09-01

## 2025-08-31 RX ORDER — INSULIN LISPRO 100 [IU]/ML
2-9 INJECTION, SOLUTION INTRAVENOUS; SUBCUTANEOUS
Status: ACTIVE | OUTPATIENT
Start: 2025-09-01

## 2025-08-31 RX ORDER — AMLODIPINE BESYLATE 5 MG/1
10 TABLET ORAL DAILY
Status: SHIPPED | OUTPATIENT
Start: 2025-09-01

## 2025-08-31 RX ORDER — ASPIRIN 81 MG/1
81 TABLET, CHEWABLE ORAL DAILY
Status: ACTIVE | OUTPATIENT
Start: 2025-09-01

## 2025-08-31 RX ORDER — PROCHLORPERAZINE EDISYLATE 5 MG/ML
5-10 INJECTION INTRAMUSCULAR; INTRAVENOUS EVERY 4 HOURS PRN
Status: ACTIVE | OUTPATIENT
Start: 2025-08-31

## 2025-08-31 RX ORDER — OMEPRAZOLE 20 MG/1
20 CAPSULE, DELAYED RELEASE ORAL DAILY
Status: ACTIVE | OUTPATIENT
Start: 2025-09-01

## 2025-08-31 RX ORDER — ASPIRIN 300 MG/1
300 SUPPOSITORY RECTAL DAILY
Status: ACTIVE | OUTPATIENT
Start: 2025-09-01

## 2025-08-31 RX ORDER — ALBUTEROL SULFATE 90 UG/1
2 INHALANT RESPIRATORY (INHALATION) EVERY 6 HOURS PRN
Status: SHIPPED | OUTPATIENT
Start: 2025-08-31

## 2025-08-31 RX ORDER — FLUTICASONE PROPIONATE AND SALMETEROL 100; 50 UG/1; UG/1
1 POWDER RESPIRATORY (INHALATION)
Status: SHIPPED | OUTPATIENT
Start: 2025-08-31

## 2025-08-31 RX ORDER — HYDROCHLOROTHIAZIDE 25 MG/1
25 TABLET ORAL DAILY
Status: SHIPPED | OUTPATIENT
Start: 2025-09-01

## 2025-08-31 RX ORDER — GUAIFENESIN/DEXTROMETHORPHAN 100-10MG/5
10 SYRUP ORAL EVERY 6 HOURS PRN
Status: ACTIVE | OUTPATIENT
Start: 2025-08-31

## 2025-08-31 RX ORDER — HYDROXYZINE HYDROCHLORIDE 25 MG/1
50 TABLET, FILM COATED ORAL 4 TIMES DAILY PRN
Status: SHIPPED | OUTPATIENT
Start: 2025-08-31

## 2025-08-31 RX ORDER — ATORVASTATIN CALCIUM 40 MG/1
40 TABLET, FILM COATED ORAL EVERY EVENING
Status: DISPENSED | OUTPATIENT
Start: 2025-08-31

## 2025-08-31 RX ORDER — DEXTROSE MONOHYDRATE 25 G/50ML
25 INJECTION, SOLUTION INTRAVENOUS
Status: ACTIVE | OUTPATIENT
Start: 2025-08-31

## 2025-08-31 RX ORDER — METOCLOPRAMIDE 10 MG/1
5 TABLET ORAL 3 TIMES DAILY PRN
Status: SHIPPED | OUTPATIENT
Start: 2025-08-31

## 2025-08-31 RX ORDER — ONDANSETRON 2 MG/ML
4 INJECTION INTRAMUSCULAR; INTRAVENOUS EVERY 4 HOURS PRN
Status: ACTIVE | OUTPATIENT
Start: 2025-08-31

## 2025-08-31 RX ORDER — ONDANSETRON 4 MG/1
4 TABLET, ORALLY DISINTEGRATING ORAL EVERY 4 HOURS PRN
Status: ACTIVE | OUTPATIENT
Start: 2025-08-31

## 2025-08-31 RX ORDER — BUTALBITAL, ACETAMINOPHEN AND CAFFEINE 50; 325; 40 MG/1; MG/1; MG/1
1 TABLET ORAL EVERY 6 HOURS PRN
Status: ACTIVE | OUTPATIENT
Start: 2025-08-31

## 2025-08-31 RX ADMIN — SODIUM CHLORIDE: 9 INJECTION, SOLUTION INTRAVENOUS at 22:40

## 2025-08-31 RX ADMIN — ATORVASTATIN CALCIUM 40 MG: 40 TABLET, FILM COATED ORAL at 23:37

## 2025-08-31 RX ADMIN — IOHEXOL 80 ML: 350 INJECTION, SOLUTION INTRAVENOUS at 20:45

## 2025-08-31 RX ADMIN — IOHEXOL 40 ML: 350 INJECTION, SOLUTION INTRAVENOUS at 20:45

## 2025-08-31 ASSESSMENT — COGNITIVE AND FUNCTIONAL STATUS - GENERAL
DAILY ACTIVITIY SCORE: 24
SUGGESTED CMS G CODE MODIFIER MOBILITY: CH
MOBILITY SCORE: 24
SUGGESTED CMS G CODE MODIFIER DAILY ACTIVITY: CH

## 2025-08-31 ASSESSMENT — ENCOUNTER SYMPTOMS
DOUBLE VISION: 0
DEPRESSION: 0
SPEECH CHANGE: 1
ABDOMINAL PAIN: 0
BRUISES/BLEEDS EASILY: 0
WEAKNESS: 0
MYALGIAS: 0
CHILLS: 0
VOMITING: 0
FEVER: 0
HEARTBURN: 0
SHORTNESS OF BREATH: 0
LOSS OF CONSCIOUSNESS: 0
TINGLING: 0
BLURRED VISION: 0
HEADACHES: 1
NAUSEA: 0
PALPITATIONS: 0
SEIZURES: 0
COUGH: 0
SENSORY CHANGE: 0
FOCAL WEAKNESS: 0
DIZZINESS: 0
TREMORS: 0

## 2025-08-31 ASSESSMENT — LIFESTYLE VARIABLES
EVER FELT BAD OR GUILTY ABOUT YOUR DRINKING: NO
SUBSTANCE_ABUSE: 0
HAVE YOU EVER FELT YOU SHOULD CUT DOWN ON YOUR DRINKING: NO
HAVE PEOPLE ANNOYED YOU BY CRITICIZING YOUR DRINKING: NO
CONSUMPTION TOTAL: NEGATIVE
EVER HAD A DRINK FIRST THING IN THE MORNING TO STEADY YOUR NERVES TO GET RID OF A HANGOVER: NO
TOTAL SCORE: 0
TOTAL SCORE: 0
ON A TYPICAL DAY WHEN YOU DRINK ALCOHOL HOW MANY DRINKS DO YOU HAVE: 0
HOW MANY TIMES IN THE PAST YEAR HAVE YOU HAD 5 OR MORE DRINKS IN A DAY: 0
DOES PATIENT WANT TO STOP DRINKING: NO
ALCOHOL_USE: NO
TOTAL SCORE: 0
AVERAGE NUMBER OF DAYS PER WEEK YOU HAVE A DRINK CONTAINING ALCOHOL: 0

## 2025-08-31 ASSESSMENT — PAIN DESCRIPTION - PAIN TYPE
TYPE: CHRONIC PAIN
TYPE: ACUTE PAIN
TYPE: ACUTE PAIN

## 2025-08-31 ASSESSMENT — PATIENT HEALTH QUESTIONNAIRE - PHQ9
1. LITTLE INTEREST OR PLEASURE IN DOING THINGS: NOT AT ALL
2. FEELING DOWN, DEPRESSED, IRRITABLE, OR HOPELESS: NOT AT ALL
SUM OF ALL RESPONSES TO PHQ9 QUESTIONS 1 AND 2: 0

## 2025-08-31 ASSESSMENT — FIBROSIS 4 INDEX
FIB4 SCORE: 0.73
FIB4 SCORE: 1.22

## 2025-10-14 ENCOUNTER — APPOINTMENT (OUTPATIENT)
Dept: PHYSICAL MEDICINE AND REHAB | Facility: MEDICAL CENTER | Age: 60
End: 2025-10-14
Payer: COMMERCIAL